# Patient Record
Sex: MALE | Race: WHITE | NOT HISPANIC OR LATINO | Employment: UNEMPLOYED | URBAN - METROPOLITAN AREA
[De-identification: names, ages, dates, MRNs, and addresses within clinical notes are randomized per-mention and may not be internally consistent; named-entity substitution may affect disease eponyms.]

---

## 2017-03-17 ENCOUNTER — TRANSCRIBE ORDERS (OUTPATIENT)
Dept: ADMINISTRATIVE | Facility: HOSPITAL | Age: 52
End: 2017-03-17

## 2017-03-17 DIAGNOSIS — R31.9 HEMATURIA: Primary | ICD-10-CM

## 2017-03-22 ENCOUNTER — HOSPITAL ENCOUNTER (OUTPATIENT)
Dept: CT IMAGING | Facility: HOSPITAL | Age: 52
Discharge: HOME | End: 2017-03-22
Admitting: NURSE PRACTITIONER

## 2017-03-22 DIAGNOSIS — R31.9 HEMATURIA: ICD-10-CM

## 2017-03-22 LAB — CREAT BLDA-MCNC: 0.9 MG/DL (ref 0.6–1.3)

## 2017-03-22 PROCEDURE — 74178 CT ABD&PLV WO CNTR FLWD CNTR: CPT

## 2017-03-22 PROCEDURE — 0 IOPAMIDOL 61 % SOLUTION: Performed by: NURSE PRACTITIONER

## 2017-03-22 PROCEDURE — 82565 ASSAY OF CREATININE: CPT

## 2017-03-22 RX ORDER — ALFUZOSIN HYDROCHLORIDE 10 MG/1
10 TABLET, EXTENDED RELEASE ORAL DAILY
Status: ON HOLD | COMMUNITY
End: 2023-03-03 | Stop reason: SDUPTHER

## 2017-03-22 RX ORDER — AMLODIPINE BESYLATE 5 MG/1
5 TABLET ORAL DAILY
Status: ON HOLD | COMMUNITY
End: 2023-03-03 | Stop reason: SDUPTHER

## 2017-03-22 RX ORDER — OLANZAPINE 10 MG/1
20 TABLET ORAL NIGHTLY
COMMUNITY
End: 2023-03-03 | Stop reason: HOSPADM

## 2017-03-22 RX ADMIN — IOPAMIDOL 85 ML: 612 INJECTION, SOLUTION INTRAVENOUS at 09:00

## 2022-05-24 ENCOUNTER — OFFICE VISIT (OUTPATIENT)
Dept: UROLOGY | Facility: CLINIC | Age: 57
End: 2022-05-24

## 2022-05-24 VITALS — WEIGHT: 215 LBS | OXYGEN SATURATION: 95 % | BODY MASS INDEX: 28.49 KG/M2 | HEIGHT: 73 IN | HEART RATE: 83 BPM

## 2022-05-24 DIAGNOSIS — R10.32 LEFT GROIN PAIN: Primary | ICD-10-CM

## 2022-05-24 PROCEDURE — 99204 OFFICE O/P NEW MOD 45 MIN: CPT | Performed by: UROLOGY

## 2022-05-24 RX ORDER — ATORVASTATIN CALCIUM 40 MG/1
TABLET, FILM COATED ORAL
Status: ON HOLD | COMMUNITY
Start: 2022-05-02 | End: 2023-03-03 | Stop reason: SDUPTHER

## 2022-05-24 RX ORDER — IBUPROFEN 800 MG/1
800 TABLET ORAL EVERY 6 HOURS PRN
COMMUNITY
Start: 2022-04-25 | End: 2022-09-14

## 2022-05-24 RX ORDER — DAPAGLIFLOZIN 5 MG/1
TABLET, FILM COATED ORAL
Status: ON HOLD | COMMUNITY
Start: 2022-05-23 | End: 2023-03-03 | Stop reason: SDUPTHER

## 2022-05-24 RX ORDER — VENLAFAXINE HYDROCHLORIDE 150 MG/1
CAPSULE, EXTENDED RELEASE ORAL
COMMUNITY
Start: 2022-04-20 | End: 2023-03-03 | Stop reason: HOSPADM

## 2022-05-24 RX ORDER — PANTOPRAZOLE SODIUM 40 MG/1
TABLET, DELAYED RELEASE ORAL
Status: ON HOLD | COMMUNITY
Start: 2022-05-14 | End: 2023-03-03 | Stop reason: SDUPTHER

## 2022-05-24 RX ORDER — LOSARTAN POTASSIUM 100 MG/1
TABLET ORAL
Status: ON HOLD | COMMUNITY
Start: 2022-04-20 | End: 2023-03-03 | Stop reason: SDUPTHER

## 2022-05-24 NOTE — PROGRESS NOTES
Office Visit New Urology      Patient Name: Regulo Sepulveda  : 1965   MRN: [id number]     Chief Complaint: Left inguinal pain    Referring Provider: Melissa Chahal NP    History of Present Illness: Regulo Sepulveda is a 56 y.o. male who presents to Urology today for evaluation of left inguinal pain.  The patient presents today unaccompanied.  He is unable to provide significant medical history.  We have no prior medical records.  He reports onset and pain of the left groin.  He reports that approximately 5 months ago he underwent a left orchiectomy at [facility] for left testicular infection.  No available operative reports or urologic reports today.    Scrotal ultrasound was obtained by primary care physician.  Demonstrates distended right testicle, no concerning masses or lesions, intact blood flow.  Surgically absent left testicle.    He reports discomfort of the left groin, phantom and testicular pain.  Reports pain is sharp in nature, neuropathic in nature.  Right testicle is descended, denies any associated pain.    Denies significant lower urinary tract symptoms.  He denies gross hematuria or history of urinary tract infection.  He is a current every day smoker.      Subjective      Review of System: Review of Systems   Constitutional: Negative for chills, fatigue, fever and unexpected weight change.   HENT: Negative for sore throat.    Eyes: Negative for visual disturbance.   Respiratory: Negative for cough, chest tightness and shortness of breath.    Cardiovascular: Negative for chest pain and leg swelling.   Gastrointestinal: Negative for blood in stool, constipation, diarrhea, nausea, rectal pain and vomiting.   Genitourinary: Positive for testicular pain. Negative for decreased urine volume, difficulty urinating, dysuria, enuresis, flank pain, frequency, genital sores, hematuria and urgency.   Musculoskeletal: Negative for back pain and joint swelling.    Skin: Negative for rash and wound.   Neurological: Negative for seizures, speech difficulty, weakness and headaches.   Psychiatric/Behavioral: Negative for confusion, sleep disturbance and suicidal ideas. The patient is not nervous/anxious.       I have reviewed the ROS documented by my clinical staff, updated appropriately and I agree. Kalia Corbin MD    Past Medical History: History reviewed. No pertinent past medical history.    Past Surgical History: History reviewed. No pertinent surgical history.    Family History: History reviewed. No pertinent family history.    Social History:   Social History     Socioeconomic History   • Marital status: Single   Tobacco Use   • Smoking status: Current Every Day Smoker     Packs/day: 1.00     Types: Cigarettes   • Smokeless tobacco: Never Used   Vaping Use   • Vaping Use: Never used   Substance and Sexual Activity   • Alcohol use: Not Currently   • Drug use: Not Currently   • Sexual activity: Defer       Medications:     Current Outpatient Medications:   •  alfuzosin (UROXATRAL) 10 MG 24 hr tablet, Take 10 mg by mouth Daily., Disp: , Rfl:   •  amLODIPine (NORVASC) 5 MG tablet, Take 5 mg by mouth Daily., Disp: , Rfl:   •  atorvastatin (LIPITOR) 40 MG tablet, , Disp: , Rfl:   •  Farxiga 5 MG tablet tablet, , Disp: , Rfl:   •  ibuprofen (ADVIL,MOTRIN) 800 MG tablet, Take 800 mg by mouth Every 6 (Six) Hours As Needed. for pain, Disp: , Rfl:   •  losartan (COZAAR) 100 MG tablet, , Disp: , Rfl:   •  metFORMIN (GLUCOPHAGE) 1000 MG tablet, , Disp: , Rfl:   •  OLANZapine (zyPREXA) 10 MG tablet, Take 20 mg by mouth Every Night., Disp: , Rfl:   •  pantoprazole (PROTONIX) 40 MG EC tablet, , Disp: , Rfl:   •  ProAir  (90 Base) MCG/ACT inhaler, , Disp: , Rfl:   •  Serevent Diskus 50 MCG/DOSE diskus inhaler, , Disp: , Rfl:   •  venlafaxine XR (EFFEXOR-XR) 150 MG 24 hr capsule, , Disp: , Rfl:     Allergies:   No Known Allergies        Objective     Physical Exam:   Vital Signs:  "  Vitals:    05/24/22 1358   Pulse: 83   SpO2: 95%   Weight: 97.5 kg (215 lb)   Height: 185.4 cm (73\")   PainSc:   2     Body mass index is 28.37 kg/m².     Physical Exam  Vitals and nursing note reviewed.   Constitutional:       Appearance: Normal appearance.   HENT:      Head: Normocephalic and atraumatic.   Cardiovascular:      Comments: Well perfused  Pulmonary:      Effort: Pulmonary effort is normal.   Abdominal:      General: Abdomen is flat.      Palpations: Abdomen is soft.   Musculoskeletal:         General: Normal range of motion.   Skin:     General: Skin is warm and dry.   Neurological:      General: No focal deficit present.      Mental Status: He is alert and oriented to person, place, and time. Mental status is at baseline.   Psychiatric:         Mood and Affect: Mood normal.         Behavior: Behavior normal.         Thought Content: Thought content normal.         Judgment: Judgment normal.         Genitourinary  Penis: circumcised penis, glans normal, no penile discharge.  No rashes/lesions.    Testes: Surgically absent left testicle, right testicle descended no masses, nontender to palpation. Remainder of scrotal contents normal. No hernia appreciated.      Labs:   Brief Urine Lab Results     None               Lab Results   Component Value Date    CALCIUM 9.0 02/22/2022     02/22/2022    K 3.3 (L) 02/22/2022    CO2 28 02/22/2022     02/22/2022    BUN 16 02/22/2022    CREATININE 1.03 02/22/2022    EGFRIFAFRI >60 02/22/2022    EGFRIFNONA >60 02/22/2022    BCR 16 02/22/2022    ANIONGAP 10 02/22/2022       Lab Results   Component Value Date    WBC 8.29 02/22/2022    HGB 14.2 02/22/2022    HCT 43.4 02/22/2022    MCV 88 02/22/2022     02/22/2022       Images:   No Images in the past 120 days found..    Measures:   Tobacco:   Regulo Sepulveda  reports that he has been smoking cigarettes. He has been smoking about 1.00 pack per day. He has never used smokeless tobacco.. I have " educated him on the risk of diseases from using tobacco products.    Assessment / Plan      Assessment/Plan:   56 y.o. male is here today for left inguinal groin pain.  Reported history of left orchiectomy approximately 5 months ago at [facility].  Pain is described as sharp groin pain worse with movement, appearing to be possible phantom related nerve pain after orchiectomy.  Physical exam today is normal.  Review of report of scrotal ultrasound which was obtained by primary care physician without concerning findings.    Today I discussed with the patient that unfortunately I have no prior records or surgical history.  Discussed that his physical exam is normal.  At this time we discussed conservative measures for inguinal groin pain.  We have recommended that he follow-up with his surgical urologist.  He is understanding agreeable.  He will follow-up with [facility].    Diagnoses and all orders for this visit:    1. Left groin pain (Primary)           Follow Up:   Return if symptoms worsen or fail to improve.    I spent approximately 45 minutes providing clinical care for this patient; including review of patient's chart and provider documentation, face to face time spent with patient in examination room (obtaining history, performing physical exam, discussing diagnosis and management options), placing orders, and completing patient documentation.     Kalia Corbin MD  [facility] [place]

## 2022-05-26 PROBLEM — R10.32 LEFT GROIN PAIN: Status: ACTIVE | Noted: 2022-05-26

## 2022-06-01 ENCOUNTER — TELEPHONE (OUTPATIENT)
Dept: UROLOGY | Facility: CLINIC | Age: 57
End: 2022-06-01

## 2022-06-01 NOTE — TELEPHONE ENCOUNTER
Caller: DEV FLAHERTY    Relationship: REF. PROVIDER    Best call back number: [phone]    What form or medical record are you requesting: OFFICE VISIT NOTE FROM MAY 24TH    Who is requesting this form or medical record from you: REF. PROVIDER    How would you like to receive the form or medical records (pick-up, mail, fax): FAX  If fax, what is the fax number: [phone]  If mail, what is the address:   If pick-up, provide patient with address and location details    Timeframe paperwork needed: ASAP    Additional notes: APRN NEEDS TO GO OVER WITH PATIENT.

## 2022-09-14 ENCOUNTER — APPOINTMENT (OUTPATIENT)
Dept: GENERAL RADIOLOGY | Facility: HOSPITAL | Age: 57
End: 2022-09-14

## 2022-09-14 ENCOUNTER — HOSPITAL ENCOUNTER (EMERGENCY)
Facility: HOSPITAL | Age: 57
Discharge: HOME | End: 2022-09-14
Attending: EMERGENCY MEDICINE | Admitting: EMERGENCY MEDICINE

## 2022-09-14 VITALS
BODY MASS INDEX: 24.43 KG/M2 | HEART RATE: 78 BPM | HEIGHT: 73 IN | RESPIRATION RATE: 16 BRPM | OXYGEN SATURATION: 93 % | TEMPERATURE: 98 F | WEIGHT: 184.3 LBS | SYSTOLIC BLOOD PRESSURE: 162 MMHG | DIASTOLIC BLOOD PRESSURE: 98 MMHG

## 2022-09-14 DIAGNOSIS — M79.10 MYALGIA: Primary | ICD-10-CM

## 2022-09-14 DIAGNOSIS — B34.9 VIRAL SYNDROME: ICD-10-CM

## 2022-09-14 DIAGNOSIS — R06.00 DYSPNEA, UNSPECIFIED TYPE: ICD-10-CM

## 2022-09-14 LAB
ALBUMIN SERPL-MCNC: 4.3 G/DL (ref 3.5–5.2)
ALBUMIN/GLOB SERPL: 1.5 G/DL
ALP SERPL-CCNC: 143 U/L (ref 39–117)
ALT SERPL W P-5'-P-CCNC: 25 U/L (ref 1–41)
ANION GAP SERPL CALCULATED.3IONS-SCNC: 12 MMOL/L (ref 5–15)
AST SERPL-CCNC: 23 U/L (ref 1–40)
BASOPHILS # BLD AUTO: 0.04 10*3/MM3 (ref 0–0.2)
BASOPHILS NFR BLD AUTO: 0.5 % (ref 0–1.5)
BILIRUB SERPL-MCNC: 0.4 MG/DL (ref 0–1.2)
BILIRUB UR QL STRIP: NEGATIVE
BUN SERPL-MCNC: 13 MG/DL (ref 6–20)
BUN/CREAT SERPL: 10 (ref 7–25)
CALCIUM SPEC-SCNC: 9 MG/DL (ref 8.6–10.5)
CHLORIDE SERPL-SCNC: 97 MMOL/L (ref 98–107)
CK SERPL-CCNC: 54 U/L (ref 20–200)
CLARITY UR: CLEAR
CO2 SERPL-SCNC: 30 MMOL/L (ref 22–29)
COLOR UR: YELLOW
CREAT SERPL-MCNC: 1.3 MG/DL (ref 0.76–1.27)
D-LACTATE SERPL-SCNC: 1.3 MMOL/L (ref 0.5–2)
DEPRECATED RDW RBC AUTO: 44.7 FL (ref 37–54)
EGFRCR SERPLBLD CKD-EPI 2021: 64.1 ML/MIN/1.73
EOSINOPHIL # BLD AUTO: 0.11 10*3/MM3 (ref 0–0.4)
EOSINOPHIL NFR BLD AUTO: 1.3 % (ref 0.3–6.2)
ERYTHROCYTE [DISTWIDTH] IN BLOOD BY AUTOMATED COUNT: 13.9 % (ref 12.3–15.4)
FLUAV AG NPH QL: NEGATIVE
FLUBV AG NPH QL IA: NEGATIVE
GLOBULIN UR ELPH-MCNC: 2.8 GM/DL
GLUCOSE SERPL-MCNC: 97 MG/DL (ref 65–99)
GLUCOSE UR STRIP-MCNC: NEGATIVE MG/DL
HCT VFR BLD AUTO: 46.5 % (ref 37.5–51)
HGB BLD-MCNC: 15.4 G/DL (ref 13–17.7)
HGB UR QL STRIP.AUTO: NEGATIVE
HOLD SPECIMEN: NORMAL
HOLD SPECIMEN: NORMAL
IMM GRANULOCYTES # BLD AUTO: 0.01 10*3/MM3 (ref 0–0.05)
IMM GRANULOCYTES NFR BLD AUTO: 0.1 % (ref 0–0.5)
KETONES UR QL STRIP: NEGATIVE
LEUKOCYTE ESTERASE UR QL STRIP.AUTO: NEGATIVE
LIPASE SERPL-CCNC: 32 U/L (ref 13–60)
LYMPHOCYTES # BLD AUTO: 2.41 10*3/MM3 (ref 0.7–3.1)
LYMPHOCYTES NFR BLD AUTO: 29.5 % (ref 19.6–45.3)
MCH RBC QN AUTO: 28.9 PG (ref 26.6–33)
MCHC RBC AUTO-ENTMCNC: 33.1 G/DL (ref 31.5–35.7)
MCV RBC AUTO: 87.4 FL (ref 79–97)
MONOCYTES # BLD AUTO: 0.86 10*3/MM3 (ref 0.1–0.9)
MONOCYTES NFR BLD AUTO: 10.5 % (ref 5–12)
NEUTROPHILS NFR BLD AUTO: 4.73 10*3/MM3 (ref 1.7–7)
NEUTROPHILS NFR BLD AUTO: 58.1 % (ref 42.7–76)
NITRITE UR QL STRIP: NEGATIVE
NRBC BLD AUTO-RTO: 0 /100 WBC (ref 0–0.2)
NT-PROBNP SERPL-MCNC: 2431 PG/ML (ref 0–900)
PH UR STRIP.AUTO: 6.5 [PH] (ref 5–8)
PLATELET # BLD AUTO: 349 10*3/MM3 (ref 140–450)
PMV BLD AUTO: 9.5 FL (ref 6–12)
POTASSIUM SERPL-SCNC: 3.1 MMOL/L (ref 3.5–5.2)
PROT SERPL-MCNC: 7.1 G/DL (ref 6–8.5)
PROT UR QL STRIP: ABNORMAL
RBC # BLD AUTO: 5.32 10*6/MM3 (ref 4.14–5.8)
SARS-COV-2 RNA PNL SPEC NAA+PROBE: NOT DETECTED
SODIUM SERPL-SCNC: 139 MMOL/L (ref 136–145)
SP GR UR STRIP: 1.01 (ref 1–1.03)
UROBILINOGEN UR QL STRIP: ABNORMAL
WBC NRBC COR # BLD: 8.16 10*3/MM3 (ref 3.4–10.8)
WHOLE BLOOD HOLD COAG: NORMAL
WHOLE BLOOD HOLD SPECIMEN: NORMAL

## 2022-09-14 PROCEDURE — 81003 URINALYSIS AUTO W/O SCOPE: CPT

## 2022-09-14 PROCEDURE — 83605 ASSAY OF LACTIC ACID: CPT

## 2022-09-14 PROCEDURE — 25010000002 MORPHINE PER 10 MG: Performed by: EMERGENCY MEDICINE

## 2022-09-14 PROCEDURE — 93010 ELECTROCARDIOGRAM REPORT: CPT | Performed by: INTERNAL MEDICINE

## 2022-09-14 PROCEDURE — 25010000002 KETOROLAC TROMETHAMINE PER 15 MG: Performed by: EMERGENCY MEDICINE

## 2022-09-14 PROCEDURE — 83880 ASSAY OF NATRIURETIC PEPTIDE: CPT | Performed by: NURSE PRACTITIONER

## 2022-09-14 PROCEDURE — 36415 COLL VENOUS BLD VENIPUNCTURE: CPT

## 2022-09-14 PROCEDURE — 80053 COMPREHEN METABOLIC PANEL: CPT

## 2022-09-14 PROCEDURE — 71045 X-RAY EXAM CHEST 1 VIEW: CPT

## 2022-09-14 PROCEDURE — 96375 TX/PRO/DX INJ NEW DRUG ADDON: CPT

## 2022-09-14 PROCEDURE — 96374 THER/PROPH/DIAG INJ IV PUSH: CPT

## 2022-09-14 PROCEDURE — U0004 COV-19 TEST NON-CDC HGH THRU: HCPCS | Performed by: EMERGENCY MEDICINE

## 2022-09-14 PROCEDURE — 87804 INFLUENZA ASSAY W/OPTIC: CPT | Performed by: EMERGENCY MEDICINE

## 2022-09-14 PROCEDURE — 82550 ASSAY OF CK (CPK): CPT | Performed by: EMERGENCY MEDICINE

## 2022-09-14 PROCEDURE — 99283 EMERGENCY DEPT VISIT LOW MDM: CPT

## 2022-09-14 PROCEDURE — 93005 ELECTROCARDIOGRAM TRACING: CPT

## 2022-09-14 PROCEDURE — 94640 AIRWAY INHALATION TREATMENT: CPT

## 2022-09-14 PROCEDURE — 94761 N-INVAS EAR/PLS OXIMETRY MLT: CPT

## 2022-09-14 PROCEDURE — 83690 ASSAY OF LIPASE: CPT

## 2022-09-14 PROCEDURE — 94799 UNLISTED PULMONARY SVC/PX: CPT

## 2022-09-14 PROCEDURE — 85025 COMPLETE CBC W/AUTO DIFF WBC: CPT

## 2022-09-14 RX ORDER — KETOROLAC TROMETHAMINE 30 MG/ML
30 INJECTION, SOLUTION INTRAMUSCULAR; INTRAVENOUS ONCE
Status: COMPLETED | OUTPATIENT
Start: 2022-09-14 | End: 2022-09-14

## 2022-09-14 RX ORDER — IBUPROFEN 600 MG/1
600 TABLET ORAL EVERY 8 HOURS PRN
Qty: 21 TABLET | Refills: 0 | Status: SHIPPED | OUTPATIENT
Start: 2022-09-14 | End: 2022-09-21

## 2022-09-14 RX ORDER — SODIUM CHLORIDE 0.9 % (FLUSH) 0.9 %
10 SYRINGE (ML) INJECTION AS NEEDED
Status: DISCONTINUED | OUTPATIENT
Start: 2022-09-14 | End: 2022-09-15 | Stop reason: HOSPADM

## 2022-09-14 RX ORDER — ALBUTEROL SULFATE 2.5 MG/3ML
2.5 SOLUTION RESPIRATORY (INHALATION) ONCE
Status: COMPLETED | OUTPATIENT
Start: 2022-09-14 | End: 2022-09-14

## 2022-09-14 RX ORDER — ALBUTEROL SULFATE 90 UG/1
2 AEROSOL, METERED RESPIRATORY (INHALATION) EVERY 4 HOURS PRN
Qty: 1 G | Refills: 0 | Status: SHIPPED | OUTPATIENT
Start: 2022-09-14 | End: 2022-10-14

## 2022-09-14 RX ORDER — PREDNISONE 50 MG/1
50 TABLET ORAL DAILY
Qty: 5 TABLET | Refills: 0 | Status: SHIPPED | OUTPATIENT
Start: 2022-09-14 | End: 2022-09-19

## 2022-09-14 RX ADMIN — KETOROLAC TROMETHAMINE 30 MG: 30 INJECTION, SOLUTION INTRAMUSCULAR; INTRAVENOUS at 17:42

## 2022-09-14 RX ADMIN — MORPHINE SULFATE 4 MG: 4 INJECTION INTRAVENOUS at 19:38

## 2022-09-14 RX ADMIN — SODIUM CHLORIDE 1000 ML: 9 INJECTION, SOLUTION INTRAVENOUS at 17:42

## 2022-09-14 RX ADMIN — ALBUTEROL SULFATE 2.5 MG: 2.5 SOLUTION RESPIRATORY (INHALATION) at 17:37

## 2022-09-14 NOTE — ED PROVIDER NOTES
Time: 5:44 PM EDT  Arrived by: private car  Chief Complaint: diffuse myalgia  History provided by: Pt  History is limited by: N/A     History of Present Illness:  Patient is a 57 y.o. year old male that presents to the emergency department with diffuse myalgia.     Pt says he started becoming weak and having muscle aches that started after [place]. He admits SOB and some back pain. Pt denies fever, chills, nausea and emesis. Pt denies dysuria, hematuria, urinary urgency, hesitancy or frequency. Pt notes normal BM, denies diarrhea, hematochezia and melena. He admits some constipation but denies any swelling in his legs.     He reports having COPD.         History provided by:  Patient   used: No        Similar Symptoms Previously: No  Recently seen: No      Patient Care Team  Primary Care Provider: Provider, No Known    Past Medical History:     No Known Allergies  History reviewed. No pertinent past medical history.  History reviewed. No pertinent surgical history.  History reviewed. No pertinent family history.    Home Medications:  Prior to Admission medications    Medication Sig Start Date End Date Taking? Authorizing Provider   alfuzosin (UROXATRAL) 10 MG 24 hr tablet Take 10 mg by mouth Daily.    Yuliya Blanchard MD   amLODIPine (NORVASC) 5 MG tablet Take 5 mg by mouth Daily.    Yuliya Blanchard MD   atorvastatin (LIPITOR) 40 MG tablet  5/2/22   Yuliya Blanchard MD   Farxiga 5 MG tablet tablet  5/23/22   Yuliya Blanchard MD   ibuprofen (ADVIL,MOTRIN) 800 MG tablet Take 800 mg by mouth Every 6 (Six) Hours As Needed. for pain 4/25/22   Yuliya Blanchard MD   losartan (COZAAR) 100 MG tablet  4/20/22   Yuliya Blanchard MD   metFORMIN (GLUCOPHAGE) 1000 MG tablet  5/18/22   Yuliya Blanchard MD   OLANZapine (zyPREXA) 10 MG tablet Take 20 mg by mouth Every Night.    Yuliya Blanchard MD   pantoprazole (PROTONIX) 40 MG EC  "tablet  5/14/22   ProviderYuliya MD   ProAir  (90 Base) MCG/ACT inhaler  5/11/22   Yuliya Blanchard MD   Serevent Diskus 50 MCG/DOSE diskus inhaler  4/21/22   Yuliya Blanchard MD   venlafaxine XR (EFFEXOR-XR) 150 MG 24 hr capsule  4/20/22   Yuliya Blanchard MD        Social History:   Social History     Tobacco Use   • Smoking status: Current Every Day Smoker     Packs/day: 1.00     Types: Cigarettes   • Smokeless tobacco: Never Used   Vaping Use   • Vaping Use: Never used   Substance Use Topics   • Alcohol use: Not Currently   • Drug use: Not Currently         Review of Systems:  Review of Systems   Constitutional: Negative for chills, diaphoresis and fever.   HENT: Negative for congestion, postnasal drip, rhinorrhea and sore throat.    Eyes: Negative for photophobia.   Respiratory: Positive for shortness of breath. Negative for cough and chest tightness.    Cardiovascular: Negative for chest pain, palpitations and leg swelling.   Gastrointestinal: Positive for constipation. Negative for abdominal pain, diarrhea, nausea and vomiting.   Genitourinary: Negative for difficulty urinating, dysuria, flank pain, frequency, hematuria and urgency.   Musculoskeletal: Positive for back pain and myalgias. Negative for neck pain and neck stiffness.   Skin: Negative for pallor and rash.   Neurological: Negative for dizziness, syncope, weakness, numbness and headaches.   Hematological: Negative for adenopathy. Does not bruise/bleed easily.   Psychiatric/Behavioral: Negative.         Physical Exam:  /98   Pulse 78   Temp 98 °F (36.7 °C) (Oral)   Resp 16   Ht 185.4 cm (73\")   Wt 83.6 kg (184 lb 4.9 oz)   SpO2 93%   BMI 24.32 kg/m²     Physical Exam  Vitals and nursing note reviewed.   Constitutional:       General: He is not in acute distress.     Appearance: Normal appearance. He is not ill-appearing, toxic-appearing or diaphoretic.   HENT:      Head: Normocephalic and atraumatic.      " Mouth/Throat:      Mouth: Mucous membranes are moist.   Eyes:      Pupils: Pupils are equal, round, and reactive to light.   Neck:      Vascular: Decreased carotid pulses.      Comments: No carotid aortic pulsations  Cardiovascular:      Rate and Rhythm: Normal rate and regular rhythm.      Pulses: Normal pulses.           Carotid pulses are 2+ on the right side and 2+ on the left side.       Radial pulses are 2+ on the right side and 2+ on the left side.        Femoral pulses are 2+ on the right side and 2+ on the left side.       Popliteal pulses are 2+ on the right side and 2+ on the left side.        Dorsalis pedis pulses are 2+ on the right side and 2+ on the left side.        Posterior tibial pulses are 2+ on the right side and 2+ on the left side.      Heart sounds: Normal heart sounds. No murmur heard.  Pulmonary:      Effort: Pulmonary effort is normal. No accessory muscle usage, respiratory distress or retractions.      Breath sounds: Normal breath sounds. No wheezing, rhonchi or rales.   Abdominal:      General: Abdomen is flat. There is no distension.      Palpations: Abdomen is soft. There is no mass.      Tenderness: There is abdominal tenderness in the epigastric area. There is no right CVA tenderness, left CVA tenderness, guarding or rebound.      Comments: No rigidity   Musculoskeletal:         General: No swelling or deformity.      Cervical back: Neck supple. No tenderness.      Thoracic back: Tenderness present. Decreased range of motion.      Lumbar back: Tenderness present. Decreased range of motion.      Right lower leg: No edema.      Left lower leg: No edema.   Skin:     General: Skin is warm and dry.      Capillary Refill: Capillary refill takes less than 2 seconds.      Coloration: Skin is not jaundiced or pale.      Findings: No erythema.   Neurological:      General: No focal deficit present.      Mental Status: He is alert and oriented to person, place, and time. Mental status is at  baseline.      Sensory: No sensory deficit.      Motor: No weakness.   Psychiatric:         Mood and Affect: Mood normal.         Behavior: Behavior normal.                Medications in the Emergency Department:  Medications   sodium chloride 0.9 % bolus 1,000 mL (0 mL Intravenous Stopped 9/14/22 1903)   ketorolac (TORADOL) injection 30 mg (30 mg Intravenous Given 9/14/22 1742)   albuterol (PROVENTIL) nebulizer solution 0.083% 2.5 mg/3mL (2.5 mg Nebulization Given 9/14/22 1737)   morphine injection 4 mg (4 mg Intravenous Given 9/14/22 1938)        Labs  Lab Results (last 24 hours)     ** No results found for the last 24 hours. **           Imaging:  No Radiology Exams Resulted Within Past 24 Hours    Procedures:  Procedures    Progress  ED Course as of 09/16/22 0229   Wed Sep 14, 2022   1648 EKG:    Rhythm: Sinus rhythm  Rate: 89  Intervals: Normal IL and QT interval  T-wave: T wave inversion in I and aVL, nonstick T wave flattening in V5 and V6  ST Segment: Nonspecific ST segment V1, V2, V3, there is no pathological ST elevation or ST depression to suggest myocardial injury or myocardial ischemia    EKG Comparison: Overall the QRS and ST morphology are probably unchanged from the EKG performed January 10, 2016    Interpreted by me   [SD]      ED Course User Index  [SD] Abel Bowden DO                            Medical Decision Making:  MDM  Number of Diagnoses or Management Options  Dyspnea, unspecified type  Myalgia  Viral syndrome  Diagnosis management comments:     Patient presented with dyspnea and myalgias.  The patient was afebrile.  The patient vital signs are stable.  The patient's urine was negative for infection.  The patient had negative flu.  The patient had a negative COVID.  The patient's lipase was normal.  The patient's chemistry essentially was normal with the exception of a creatinine of 1.3, potassium 3.1 and a bicarb of 30.0.  Patient's CBC was normal.  The patient had a lactate that was  normal.  Patient's chest x-ray was clear.  The patient's EKG demonstrated a normal sinus rhythm with a rate of 89 no acute abnormalities.  In the emergency room the patient was given Toradol morphine and a liter of saline and breathing treatments.  The patient was reevaluated afterwards patient states that he feels substantially better.  Patient states he feels better and feels comfortable to go home.  The patient was given very specific instructions on when and why to return to the emergency room.  The patient voiced understanding and felt comfortable with the discharge instructions.  They would return to the emergency room if necessary.  The patient appears appropriate for discharge and outpatient follow-up.         Amount and/or Complexity of Data Reviewed  Clinical lab tests: reviewed  Tests in the radiology section of CPT®: reviewed  Tests in the medicine section of CPT®: reviewed                 Final diagnoses:   Myalgia   Viral syndrome   Dyspnea, unspecified type        Disposition:  ED Disposition     ED Disposition   Discharge    Condition   Stable    Comment   --             Documentation assistance provided by MILLICENT LOWRY acting as scribe for Abel Bowden DO. Information recorded by the scribe was done at my direction and has been verified and validated by me.        Millicent Lowry  09/14/22 1807       Abel Bowden DO  09/16/22 3975

## 2022-09-15 NOTE — DISCHARGE INSTRUCTIONS
Please follow-up with your doctor in 48 hours for evaluation.    Please return to the emergency room for increasing shortness of breath, chest pain, chest pressure, near passing out, passing out, unusual fatigue, unusual sweating, fever or any new symptoms or concerns with

## 2022-09-18 ENCOUNTER — HOSPITAL ENCOUNTER (EMERGENCY)
Facility: HOSPITAL | Age: 57
Discharge: HOME | End: 2022-09-19
Attending: EMERGENCY MEDICINE | Admitting: EMERGENCY MEDICINE

## 2022-09-18 DIAGNOSIS — F19.10 SUBSTANCE ABUSE: ICD-10-CM

## 2022-09-18 DIAGNOSIS — M79.10 MYALGIA: Primary | ICD-10-CM

## 2022-09-18 LAB
ALBUMIN SERPL-MCNC: 4 G/DL (ref 3.5–5.2)
ALBUMIN/GLOB SERPL: 1.5 G/DL
ALP SERPL-CCNC: 134 U/L (ref 39–117)
ALT SERPL W P-5'-P-CCNC: 17 U/L (ref 1–41)
AMPHET+METHAMPHET UR QL: POSITIVE
ANION GAP SERPL CALCULATED.3IONS-SCNC: 10.1 MMOL/L (ref 5–15)
APAP SERPL-MCNC: <5 MCG/ML (ref 0–30)
AST SERPL-CCNC: 18 U/L (ref 1–40)
BACTERIA UR QL AUTO: ABNORMAL /HPF
BARBITURATES UR QL SCN: NEGATIVE
BASOPHILS # BLD AUTO: 0.06 10*3/MM3 (ref 0–0.2)
BASOPHILS NFR BLD AUTO: 0.7 % (ref 0–1.5)
BENZODIAZ UR QL SCN: NEGATIVE
BILIRUB SERPL-MCNC: 0.3 MG/DL (ref 0–1.2)
BILIRUB UR QL STRIP: NEGATIVE
BUN SERPL-MCNC: 12 MG/DL (ref 6–20)
BUN/CREAT SERPL: 9.4 (ref 7–25)
CALCIUM SPEC-SCNC: 9.5 MG/DL (ref 8.6–10.5)
CANNABINOIDS SERPL QL: NEGATIVE
CHLORIDE SERPL-SCNC: 104 MMOL/L (ref 98–107)
CK SERPL-CCNC: 61 U/L (ref 20–200)
CLARITY UR: CLEAR
CO2 SERPL-SCNC: 30.9 MMOL/L (ref 22–29)
COCAINE UR QL: NEGATIVE
COLOR UR: ABNORMAL
CREAT SERPL-MCNC: 1.27 MG/DL (ref 0.76–1.27)
DEPRECATED RDW RBC AUTO: 45.1 FL (ref 37–54)
EGFRCR SERPLBLD CKD-EPI 2021: 65.9 ML/MIN/1.73
EOSINOPHIL # BLD AUTO: 0.1 10*3/MM3 (ref 0–0.4)
EOSINOPHIL NFR BLD AUTO: 1.1 % (ref 0.3–6.2)
ERYTHROCYTE [DISTWIDTH] IN BLOOD BY AUTOMATED COUNT: 14.2 % (ref 12.3–15.4)
ETHANOL BLD-MCNC: <10 MG/DL (ref 0–10)
ETHANOL UR QL: <0.01 %
FLUAV AG NPH QL: NEGATIVE
FLUBV AG NPH QL IA: NEGATIVE
GLOBULIN UR ELPH-MCNC: 2.7 GM/DL
GLUCOSE SERPL-MCNC: 97 MG/DL (ref 65–99)
GLUCOSE UR STRIP-MCNC: ABNORMAL MG/DL
HCT VFR BLD AUTO: 43.2 % (ref 37.5–51)
HGB BLD-MCNC: 14.4 G/DL (ref 13–17.7)
HGB UR QL STRIP.AUTO: ABNORMAL
HOLD SPECIMEN: NORMAL
HOLD SPECIMEN: NORMAL
HYALINE CASTS UR QL AUTO: ABNORMAL /LPF
IMM GRANULOCYTES # BLD AUTO: 0.02 10*3/MM3 (ref 0–0.05)
IMM GRANULOCYTES NFR BLD AUTO: 0.2 % (ref 0–0.5)
KETONES UR QL STRIP: ABNORMAL
LEUKOCYTE ESTERASE UR QL STRIP.AUTO: NEGATIVE
LYMPHOCYTES # BLD AUTO: 3.03 10*3/MM3 (ref 0.7–3.1)
LYMPHOCYTES NFR BLD AUTO: 34.6 % (ref 19.6–45.3)
MCH RBC QN AUTO: 28.7 PG (ref 26.6–33)
MCHC RBC AUTO-ENTMCNC: 33.3 G/DL (ref 31.5–35.7)
MCV RBC AUTO: 86.1 FL (ref 79–97)
METHADONE UR QL SCN: NEGATIVE
MONOCYTES # BLD AUTO: 0.89 10*3/MM3 (ref 0.1–0.9)
MONOCYTES NFR BLD AUTO: 10.2 % (ref 5–12)
NEUTROPHILS NFR BLD AUTO: 4.66 10*3/MM3 (ref 1.7–7)
NEUTROPHILS NFR BLD AUTO: 53.2 % (ref 42.7–76)
NITRITE UR QL STRIP: NEGATIVE
NRBC BLD AUTO-RTO: 0 /100 WBC (ref 0–0.2)
OPIATES UR QL: NEGATIVE
OXYCODONE UR QL SCN: NEGATIVE
PH UR STRIP.AUTO: 6 [PH] (ref 5–8)
PLATELET # BLD AUTO: 313 10*3/MM3 (ref 140–450)
PMV BLD AUTO: 9.8 FL (ref 6–12)
POTASSIUM SERPL-SCNC: 3.3 MMOL/L (ref 3.5–5.2)
PROT SERPL-MCNC: 6.7 G/DL (ref 6–8.5)
PROT UR QL STRIP: ABNORMAL
RBC # BLD AUTO: 5.02 10*6/MM3 (ref 4.14–5.8)
RBC # UR STRIP: ABNORMAL /HPF
REF LAB TEST METHOD: ABNORMAL
SALICYLATES SERPL-MCNC: <0.3 MG/DL
SODIUM SERPL-SCNC: 145 MMOL/L (ref 136–145)
SP GR UR STRIP: 1.02 (ref 1–1.03)
SQUAMOUS #/AREA URNS HPF: ABNORMAL /HPF
UROBILINOGEN UR QL STRIP: ABNORMAL
WBC # UR STRIP: ABNORMAL /HPF
WBC NRBC COR # BLD: 8.76 10*3/MM3 (ref 3.4–10.8)
WHOLE BLOOD HOLD COAG: NORMAL
WHOLE BLOOD HOLD SPECIMEN: NORMAL

## 2022-09-18 PROCEDURE — 80179 DRUG ASSAY SALICYLATE: CPT | Performed by: EMERGENCY MEDICINE

## 2022-09-18 PROCEDURE — 80307 DRUG TEST PRSMV CHEM ANLYZR: CPT | Performed by: EMERGENCY MEDICINE

## 2022-09-18 PROCEDURE — 82550 ASSAY OF CK (CPK): CPT | Performed by: EMERGENCY MEDICINE

## 2022-09-18 PROCEDURE — 87804 INFLUENZA ASSAY W/OPTIC: CPT | Performed by: EMERGENCY MEDICINE

## 2022-09-18 PROCEDURE — 25010000002 KETOROLAC TROMETHAMINE PER 15 MG: Performed by: EMERGENCY MEDICINE

## 2022-09-18 PROCEDURE — U0004 COV-19 TEST NON-CDC HGH THRU: HCPCS | Performed by: EMERGENCY MEDICINE

## 2022-09-18 PROCEDURE — 36415 COLL VENOUS BLD VENIPUNCTURE: CPT | Performed by: EMERGENCY MEDICINE

## 2022-09-18 PROCEDURE — 80053 COMPREHEN METABOLIC PANEL: CPT | Performed by: EMERGENCY MEDICINE

## 2022-09-18 PROCEDURE — C9803 HOPD COVID-19 SPEC COLLECT: HCPCS | Performed by: EMERGENCY MEDICINE

## 2022-09-18 PROCEDURE — 80143 DRUG ASSAY ACETAMINOPHEN: CPT | Performed by: EMERGENCY MEDICINE

## 2022-09-18 PROCEDURE — 85025 COMPLETE CBC W/AUTO DIFF WBC: CPT | Performed by: EMERGENCY MEDICINE

## 2022-09-18 PROCEDURE — 96374 THER/PROPH/DIAG INJ IV PUSH: CPT

## 2022-09-18 PROCEDURE — 25010000002 DROPERIDOL PER 5 MG: Performed by: EMERGENCY MEDICINE

## 2022-09-18 PROCEDURE — 99284 EMERGENCY DEPT VISIT MOD MDM: CPT

## 2022-09-18 PROCEDURE — 82077 ASSAY SPEC XCP UR&BREATH IA: CPT | Performed by: EMERGENCY MEDICINE

## 2022-09-18 PROCEDURE — 96375 TX/PRO/DX INJ NEW DRUG ADDON: CPT

## 2022-09-18 PROCEDURE — 81001 URINALYSIS AUTO W/SCOPE: CPT | Performed by: EMERGENCY MEDICINE

## 2022-09-18 RX ORDER — KETOROLAC TROMETHAMINE 30 MG/ML
30 INJECTION, SOLUTION INTRAMUSCULAR; INTRAVENOUS ONCE
Status: COMPLETED | OUTPATIENT
Start: 2022-09-18 | End: 2022-09-18

## 2022-09-18 RX ORDER — SODIUM CHLORIDE 0.9 % (FLUSH) 0.9 %
10 SYRINGE (ML) INJECTION AS NEEDED
Status: DISCONTINUED | OUTPATIENT
Start: 2022-09-18 | End: 2022-09-19 | Stop reason: HOSPADM

## 2022-09-18 RX ORDER — DROPERIDOL 2.5 MG/ML
2.5 INJECTION, SOLUTION INTRAMUSCULAR; INTRAVENOUS ONCE
Status: COMPLETED | OUTPATIENT
Start: 2022-09-18 | End: 2022-09-18

## 2022-09-18 RX ADMIN — DROPERIDOL 2.5 MG: 2.5 INJECTION, SOLUTION INTRAMUSCULAR; INTRAVENOUS at 23:59

## 2022-09-18 RX ADMIN — KETOROLAC TROMETHAMINE 30 MG: 30 INJECTION, SOLUTION INTRAMUSCULAR; INTRAVENOUS at 22:13

## 2022-09-18 RX ADMIN — SODIUM CHLORIDE 1000 ML: 9 INJECTION, SOLUTION INTRAVENOUS at 22:14

## 2022-09-19 VITALS
RESPIRATION RATE: 18 BRPM | HEART RATE: 88 BPM | SYSTOLIC BLOOD PRESSURE: 187 MMHG | OXYGEN SATURATION: 96 % | BODY MASS INDEX: 24.51 KG/M2 | WEIGHT: 184.97 LBS | TEMPERATURE: 98.1 F | HEIGHT: 73 IN | DIASTOLIC BLOOD PRESSURE: 119 MMHG

## 2022-09-19 LAB — SARS-COV-2 RNA PNL SPEC NAA+PROBE: NOT DETECTED

## 2022-09-19 NOTE — ED PROVIDER NOTES
"Time: 9:45 PM EDT  Arrived by: private car  Chief Complaint: generalized body aches  Chief Complaint   Patient presents with   • Generalized Body Aches     History provided by: patient  History is limited by: not applicable     History of Present Illness:  Patient is a 57 y.o. year old male that presents to the emergency department with generalized body aches. Pt reports he smoked \"bad meth\" a few days ago, and he reports he has been experiencing generalized body aches since. He reports associated bilateral flank pain, mild cough, nausea, and vomiting, but he denies abdominal pain, diarrhea, shortness of breath, or chest pain. Pt reports hx of COPD.      HPI    Similar Symptoms Previously: no  Recently seen: yes, 9/14/22      Patient Care Team  Primary Care Provider: Palma Blanchard Known    Past Medical History:     No Known Allergies  Past Medical History:   Diagnosis Date   • COPD (chronic obstructive pulmonary disease) (HCC)    • Diabetes mellitus (HCC)    • Hypertension      History reviewed. No pertinent surgical history.  History reviewed. No pertinent family history.    Home Medications:  Prior to Admission medications    Medication Sig Start Date End Date Taking? Authorizing Provider   albuterol sulfate  (90 Base) MCG/ACT inhaler Inhale 2 puffs Every 4 (Four) Hours As Needed for Wheezing for up to 30 days. 9/14/22 10/14/22  Abel Bowden DO   alfuzosin (UROXATRAL) 10 MG 24 hr tablet Take 10 mg by mouth Daily.    Yuliya Blanchard MD   amLODIPine (NORVASC) 5 MG tablet Take 5 mg by mouth Daily.    Yuliya Blanchard MD   atorvastatin (LIPITOR) 40 MG tablet  5/2/22   Yuliya Blanchard MD   Farxiga 5 MG tablet tablet  5/23/22   Yuliya Blanchard MD   ibuprofen (ADVIL,MOTRIN) 600 MG tablet Take 1 tablet by mouth Every 8 (Eight) Hours As Needed for Mild Pain for up to 7 days. 9/14/22 9/21/22  Abel Bowden DO   losartan (COZAAR) 100 MG tablet  4/20/22   Yuliya Blanchard MD   metFORMIN " "(GLUCOPHAGE) 1000 MG tablet  5/18/22   Yuliya Blanchard MD   OLANZapine (zyPREXA) 10 MG tablet Take 20 mg by mouth Every Night.    Yuliya Blanchard MD   pantoprazole (PROTONIX) 40 MG EC tablet  5/14/22   Yuliya Blanchard MD   predniSONE (DELTASONE) 50 MG tablet Take 1 tablet by mouth Daily for 5 days. 9/14/22 9/19/22  Abel Bowden DO   Serevent Diskus 50 MCG/DOSE diskus inhaler  4/21/22   Yuliya Blanchard MD   venlafaxine XR (EFFEXOR-XR) 150 MG 24 hr capsule  4/20/22   Yuliya Blanchard MD        Social History:   Social History     Tobacco Use   • Smoking status: Current Every Day Smoker     Packs/day: 1.00     Types: Cigarettes   • Smokeless tobacco: Never Used   Vaping Use   • Vaping Use: Never used   Substance Use Topics   • Alcohol use: Not Currently   • Drug use: Yes     Types: Methamphetamines     Comment: LAST USED 4 DAYS AGO     Recent travel: not applicable    Review of Systems:  Review of Systems   Constitutional: Negative for chills and fever.   HENT: Negative for congestion, rhinorrhea and sore throat.    Eyes: Negative for pain and visual disturbance.   Respiratory: Positive for cough (mild). Negative for apnea, chest tightness and shortness of breath.    Cardiovascular: Negative for chest pain and palpitations.   Gastrointestinal: Positive for nausea and vomiting. Negative for abdominal pain and diarrhea.   Genitourinary: Positive for flank pain (bilateral). Negative for difficulty urinating and dysuria.   Musculoskeletal: Positive for myalgias (generalized). Negative for joint swelling.   Skin: Negative for color change.   Neurological: Negative for seizures and headaches.   Psychiatric/Behavioral: Negative.    All other systems reviewed and are negative.       Physical Exam:  BP (!) 170/105   Pulse 78   Temp 98.1 °F (36.7 °C) (Oral)   Resp 17   Ht 185.4 cm (73\")   Wt 83.9 kg (184 lb 15.5 oz)   SpO2 98%   BMI 24.40 kg/m²     Physical Exam  Vitals and nursing note " reviewed.   Constitutional:       General: He is not in acute distress.     Appearance: Normal appearance. He is not toxic-appearing.   HENT:      Head: Normocephalic and atraumatic.      Jaw: There is normal jaw occlusion.   Eyes:      General: Lids are normal.      Extraocular Movements: Extraocular movements intact.      Conjunctiva/sclera: Conjunctivae normal.      Pupils: Pupils are equal, round, and reactive to light.   Cardiovascular:      Rate and Rhythm: Normal rate and regular rhythm.      Pulses: Normal pulses.      Heart sounds: Normal heart sounds.   Pulmonary:      Effort: Pulmonary effort is normal. No respiratory distress.      Breath sounds: Normal breath sounds. No wheezing or rhonchi.   Abdominal:      General: Abdomen is flat.      Palpations: Abdomen is soft.      Tenderness: There is no abdominal tenderness. There is no guarding or rebound.   Musculoskeletal:         General: Normal range of motion.      Cervical back: Normal range of motion and neck supple.      Right lower leg: No edema.      Left lower leg: No edema.   Skin:     General: Skin is warm and dry.   Neurological:      Mental Status: He is alert and oriented to person, place, and time. Mental status is at baseline.   Psychiatric:         Mood and Affect: Mood normal.                Medications in the Emergency Department:  Medications   sodium chloride 0.9 % flush 10 mL (has no administration in time range)   sodium chloride 0.9 % bolus 1,000 mL (1,000 mL Intravenous New Bag 9/18/22 2214)   ketorolac (TORADOL) injection 30 mg (30 mg Intravenous Given 9/18/22 2213)   droperidol (INAPSINE) injection 2.5 mg (2.5 mg Intravenous Given 9/18/22 4488)        Labs  Lab Results (last 24 hours)     Procedure Component Value Units Date/Time    CBC & Differential [[id number]]  (Normal) Collected: 09/18/22 2210    Specimen: Blood Updated: 09/18/22 2223    Narrative:      The following orders were created for panel order CBC &  Differential.  Procedure                               Abnormality         Status                     ---------                               -----------         ------                     CBC Auto Differential[[id number]]        Normal              Final result                 Please view results for these tests on the individual orders.    Comprehensive Metabolic Panel [[id number]]  (Abnormal) Collected: 09/18/22 2210    Specimen: Blood Updated: 09/18/22 2252     Glucose 97 mg/dL      BUN 12 mg/dL      Creatinine 1.27 mg/dL      Sodium 145 mmol/L      Potassium 3.3 mmol/L      Chloride 104 mmol/L      CO2 30.9 mmol/L      Calcium 9.5 mg/dL      Total Protein 6.7 g/dL      Albumin 4.00 g/dL      ALT (SGPT) 17 U/L      AST (SGOT) 18 U/L      Alkaline Phosphatase 134 U/L      Total Bilirubin 0.3 mg/dL      Globulin 2.7 gm/dL      A/G Ratio 1.5 g/dL      BUN/Creatinine Ratio 9.4     Anion Gap 10.1 mmol/L      eGFR 65.9 mL/min/1.73      Comment: National Kidney Foundation and American Society of Nephrology (ASN) Task Force recommended calculation based on the Chronic Kidney Disease Epidemiology Collaboration (CKD-EPI) equation refit without adjustment for race.       Narrative:      GFR Normal >60  Chronic Kidney Disease <60  Kidney Failure <15      Acetaminophen Level [[id number]]  (Normal) Collected: 09/18/22 2210    Specimen: Blood Updated: 09/18/22 2252     Acetaminophen <5.0 mcg/mL     Ethanol [[id number]] Collected: 09/18/22 2210    Specimen: Blood Updated: 09/18/22 2252     Ethanol <10 mg/dL      Ethanol % <0.010 %     Narrative:      Ethanol (Plasma)  <10 Essentially Negative    Toxic Concentrations           mg/dL    Flushing, slowing of reflexes    Impaired visual activity         Depression of CNS              >100  Possible Coma                  >300       Salicylate Level [[id number]]  (Normal) Collected: 09/18/22 2210    Specimen: Blood Updated: 09/18/22 2252     Salicylate <0.3 mg/dL     CK  [[id number]]  (Normal) Collected: 09/18/22 2210    Specimen: Blood Updated: 09/18/22 2252     Creatine Kinase 61 U/L     CBC Auto Differential [[id number]]  (Normal) Collected: 09/18/22 2210    Specimen: Blood Updated: 09/18/22 2223     WBC 8.76 10*3/mm3      RBC 5.02 10*6/mm3      Hemoglobin 14.4 g/dL      Hematocrit 43.2 %      MCV 86.1 fL      MCH 28.7 pg      MCHC 33.3 g/dL      RDW 14.2 %      RDW-SD 45.1 fl      MPV 9.8 fL      Platelets 313 10*3/mm3      Neutrophil % 53.2 %      Lymphocyte % 34.6 %      Monocyte % 10.2 %      Eosinophil % 1.1 %      Basophil % 0.7 %      Immature Grans % 0.2 %      Neutrophils, Absolute 4.66 10*3/mm3      Lymphocytes, Absolute 3.03 10*3/mm3      Monocytes, Absolute 0.89 10*3/mm3      Eosinophils, Absolute 0.10 10*3/mm3      Basophils, Absolute 0.06 10*3/mm3      Immature Grans, Absolute 0.02 10*3/mm3      nRBC 0.0 /100 WBC     Influenza Antigen, Rapid - Swab, Nasopharynx [[id number]]  (Normal) Collected: 09/18/22 2230    Specimen: Swab from Nasopharynx Updated: 09/18/22 2308     Influenza A Ag, EIA Negative     Influenza B Ag, EIA Negative    COVID-19,APTIMA PANTHER(MARILEE),[facility]/ [facility], NP/OP SWAB IN UTM/VTM/[facility] TRANSPORT MEDIA,24 HR TAT - Swab, Nasal Cavity [[id number]] Collected: 09/18/22 2230    Specimen: Swab from Nasal Cavity Updated: 09/18/22 2240    Urine Drug Screen - Urine, Clean Catch [[id number]]  (Abnormal) Collected: 09/18/22 2312    Specimen: Urine, Clean Catch Updated: 09/18/22 2344     Amphet/Methamphet, Screen Positive     Barbiturates Screen, Urine Negative     Benzodiazepine Screen, Urine Negative     Cocaine Screen, Urine Negative     Opiate Screen Negative     THC, Screen, Urine Negative     Methadone Screen, Urine Negative     Oxycodone Screen, Urine Negative    Narrative:      Negative Thresholds Per Drugs Screened:    Amphetamines                 500 ng/ml  Barbiturates                 200 ng/ml  Benzodiazepines              100 ng/ml  Cocaine                       300 ng/ml  Methadone                    300 ng/ml  Opiates                      300 ng/ml  Oxycodone                    100 ng/ml  THC                           50 ng/ml    The Normal Value for all drugs tested is negative. This report includes final unconfirmed screening results to be used for medical treatment purposes only. Unconfirmed results must not be used for non-medical purposes such as employment or legal testing. Clinical consideration should be applied to any drug of abuse test, particularly when unconfirmed results are used.            Urinalysis With Culture If Indicated - Urine, Clean Catch [[id number]]  (Abnormal) Collected: 09/18/22 2312    Specimen: Urine, Clean Catch Updated: 09/18/22 2334     Color, UA Dark Yellow     Appearance, UA Clear     pH, UA 6.0     Specific Gravity, UA 1.021     Glucose,  mg/dL (Trace)     Ketones, UA Trace     Bilirubin, UA Negative     Blood, UA Trace     Protein, UA >=300 mg/dL (3+)     Leuk Esterase, UA Negative     Nitrite, UA Negative     Urobilinogen, UA 1.0 E.U./dL    Narrative:      In absence of clinical symptoms, the presence of pyuria, bacteria, and/or nitrites on the urinalysis result does not correlate with infection.    Urinalysis, Microscopic Only - Urine, Clean Catch [[id number]]  (Abnormal) Collected: 09/18/22 2312    Specimen: Urine, Clean Catch Updated: 09/18/22 2339     RBC, UA 6-12 /HPF      WBC, UA 0-2 /HPF      Comment: Urine culture not indicated.        Bacteria, UA None Seen /HPF      Squamous Epithelial Cells, UA 0-2 /HPF      Hyaline Casts, UA 7-12 /LPF      Methodology Automated Microscopy           Imaging:  No Radiology Exams Resulted Within Past 24 Hours    Procedures:  Procedures    Progress                            Medical Decision Making:  MDM  Number of Diagnoses or Management Options  Diagnosis management comments: Patient is afebrile nontoxic-appearing.  Vital signs are stable.  At time of evaluation is lying in the  bed reporting diffuse pain.  Labs were obtained and showed no significant abnormality.  O2 sat upper 90s on room air.  Patient given Toradol.  On reevaluation he still reports diffuse pain.  He was subsequently given droperidol.  He was also given IV fluids.  I recommend that he stop using drugs.  Patient is able to ambulate without difficulty.  He has no focal neurological deficits.  Recommend close follow-up with his primary physician.  Discussed return precautions, discharge instructions and answered all his questions.       Amount and/or Complexity of Data Reviewed  Clinical lab tests: ordered and reviewed  Review and summarize past medical records: yes  Independent visualization of images, tracings, or specimens: yes    Risk of Complications, Morbidity, and/or Mortality  Presenting problems: low  Management options: low    Patient Progress  Patient progress: stable       Final diagnoses:   Myalgia   Substance abuse (HCC)        Disposition:  ED Disposition     ED Disposition   Discharge    Condition   Stable    Comment   --             This medical record created using voice recognition software and may contain unintended errors.    Documentation assistance provided by Soham Aguila acting as scribe for Luis M Houston MD. Information recorded by the scribe was done at my direction and has been verified and validated by me.        Soham Aguila  09/18/22 2170       Luis M Houston MD  09/19/22 0028

## 2022-09-21 LAB — QT INTERVAL: 367 MS

## 2023-02-27 ENCOUNTER — APPOINTMENT (OUTPATIENT)
Dept: GENERAL RADIOLOGY | Facility: HOSPITAL | Age: 58
DRG: 885 | End: 2023-02-27
Payer: COMMERCIAL

## 2023-02-27 ENCOUNTER — HOSPITAL ENCOUNTER (INPATIENT)
Facility: HOSPITAL | Age: 58
LOS: 4 days | Discharge: PSYCHIATRIC OR SUBSTANCE USE FACILITY | DRG: 885 | End: 2023-03-03
Attending: PSYCHIATRY & NEUROLOGY | Admitting: PSYCHIATRY & NEUROLOGY
Payer: COMMERCIAL

## 2023-02-27 ENCOUNTER — HOSPITAL ENCOUNTER (EMERGENCY)
Facility: HOSPITAL | Age: 58
Discharge: PSYCHIATRIC OR SUBSTANCE USE FACILITY | DRG: 885 | End: 2023-02-27
Attending: EMERGENCY MEDICINE | Admitting: EMERGENCY MEDICINE
Payer: COMMERCIAL

## 2023-02-27 VITALS
DIASTOLIC BLOOD PRESSURE: 91 MMHG | RESPIRATION RATE: 18 BRPM | HEART RATE: 96 BPM | SYSTOLIC BLOOD PRESSURE: 140 MMHG | HEIGHT: 73 IN | WEIGHT: 155.2 LBS | BODY MASS INDEX: 20.57 KG/M2 | OXYGEN SATURATION: 92 % | TEMPERATURE: 97.6 F

## 2023-02-27 DIAGNOSIS — R45.850 HOMICIDAL IDEATION: ICD-10-CM

## 2023-02-27 DIAGNOSIS — R45.851 SUICIDAL IDEATION: Primary | ICD-10-CM

## 2023-02-27 PROBLEM — F33.1 MAJOR DEPRESSIVE DISORDER, RECURRENT EPISODE, MODERATE: Status: ACTIVE | Noted: 2023-02-27

## 2023-02-27 LAB
ALBUMIN SERPL-MCNC: 3.8 G/DL (ref 3.5–5.2)
ALBUMIN/GLOB SERPL: 1.3 G/DL
ALP SERPL-CCNC: 161 U/L (ref 39–117)
ALT SERPL W P-5'-P-CCNC: 23 U/L (ref 1–41)
AMPHET+METHAMPHET UR QL: POSITIVE
ANION GAP SERPL CALCULATED.3IONS-SCNC: 8.1 MMOL/L (ref 5–15)
APAP SERPL-MCNC: <5 MCG/ML (ref 0–30)
AST SERPL-CCNC: 19 U/L (ref 1–40)
BACTERIA UR QL AUTO: ABNORMAL /HPF
BARBITURATES UR QL SCN: NEGATIVE
BASOPHILS # BLD AUTO: 0.03 10*3/MM3 (ref 0–0.2)
BASOPHILS NFR BLD AUTO: 0.3 % (ref 0–1.5)
BENZODIAZ UR QL SCN: NEGATIVE
BILIRUB SERPL-MCNC: 0.4 MG/DL (ref 0–1.2)
BILIRUB UR QL STRIP: NEGATIVE
BUN SERPL-MCNC: 18 MG/DL (ref 6–20)
BUN/CREAT SERPL: 15.1 (ref 7–25)
CALCIUM SPEC-SCNC: 9.6 MG/DL (ref 8.6–10.5)
CANNABINOIDS SERPL QL: NEGATIVE
CHLORIDE SERPL-SCNC: 102 MMOL/L (ref 98–107)
CLARITY UR: CLEAR
CO2 SERPL-SCNC: 28.9 MMOL/L (ref 22–29)
COCAINE UR QL: NEGATIVE
COLOR UR: YELLOW
CREAT SERPL-MCNC: 1.19 MG/DL (ref 0.76–1.27)
DEPRECATED RDW RBC AUTO: 44.8 FL (ref 37–54)
EGFRCR SERPLBLD CKD-EPI 2021: 71.2 ML/MIN/1.73
EOSINOPHIL # BLD AUTO: 0.08 10*3/MM3 (ref 0–0.4)
EOSINOPHIL NFR BLD AUTO: 0.8 % (ref 0.3–6.2)
ERYTHROCYTE [DISTWIDTH] IN BLOOD BY AUTOMATED COUNT: 14 % (ref 12.3–15.4)
ETHANOL BLD-MCNC: <10 MG/DL (ref 0–10)
ETHANOL UR QL: <0.01 %
GLOBULIN UR ELPH-MCNC: 3 GM/DL
GLUCOSE SERPL-MCNC: 99 MG/DL (ref 65–99)
GLUCOSE UR STRIP-MCNC: NEGATIVE MG/DL
HCT VFR BLD AUTO: 43.2 % (ref 37.5–51)
HGB BLD-MCNC: 14.5 G/DL (ref 13–17.7)
HGB UR QL STRIP.AUTO: ABNORMAL
HOLD SPECIMEN: NORMAL
HOLD SPECIMEN: NORMAL
HYALINE CASTS UR QL AUTO: ABNORMAL /LPF
IMM GRANULOCYTES # BLD AUTO: 0.02 10*3/MM3 (ref 0–0.05)
IMM GRANULOCYTES NFR BLD AUTO: 0.2 % (ref 0–0.5)
KETONES UR QL STRIP: NEGATIVE
LEUKOCYTE ESTERASE UR QL STRIP.AUTO: NEGATIVE
LYMPHOCYTES # BLD AUTO: 2.13 10*3/MM3 (ref 0.7–3.1)
LYMPHOCYTES NFR BLD AUTO: 22 % (ref 19.6–45.3)
MCH RBC QN AUTO: 29.4 PG (ref 26.6–33)
MCHC RBC AUTO-ENTMCNC: 33.6 G/DL (ref 31.5–35.7)
MCV RBC AUTO: 87.4 FL (ref 79–97)
METHADONE UR QL SCN: NEGATIVE
MONOCYTES # BLD AUTO: 0.78 10*3/MM3 (ref 0.1–0.9)
MONOCYTES NFR BLD AUTO: 8 % (ref 5–12)
NEUTROPHILS NFR BLD AUTO: 6.65 10*3/MM3 (ref 1.7–7)
NEUTROPHILS NFR BLD AUTO: 68.7 % (ref 42.7–76)
NITRITE UR QL STRIP: NEGATIVE
NRBC BLD AUTO-RTO: 0 /100 WBC (ref 0–0.2)
NT-PROBNP SERPL-MCNC: 3584 PG/ML (ref 0–900)
OPIATES UR QL: NEGATIVE
OXYCODONE UR QL SCN: NEGATIVE
PH UR STRIP.AUTO: 7.5 [PH] (ref 5–8)
PLATELET # BLD AUTO: 342 10*3/MM3 (ref 140–450)
PMV BLD AUTO: 9.3 FL (ref 6–12)
POTASSIUM SERPL-SCNC: 3.5 MMOL/L (ref 3.5–5.2)
PROT SERPL-MCNC: 6.8 G/DL (ref 6–8.5)
PROT UR QL STRIP: ABNORMAL
RBC # BLD AUTO: 4.94 10*6/MM3 (ref 4.14–5.8)
RBC # UR STRIP: ABNORMAL /HPF
REF LAB TEST METHOD: ABNORMAL
SALICYLATES SERPL-MCNC: <0.3 MG/DL
SARS-COV-2 RNA RESP QL NAA+PROBE: NOT DETECTED
SODIUM SERPL-SCNC: 139 MMOL/L (ref 136–145)
SP GR UR STRIP: 1.01 (ref 1–1.03)
SQUAMOUS #/AREA URNS HPF: ABNORMAL /HPF
TROPONIN T SERPL HS-MCNC: 15 NG/L
TROPONIN T SERPL HS-MCNC: 16 NG/L
TROPONIN T SERPL HS-MCNC: 16 NG/L
UROBILINOGEN UR QL STRIP: ABNORMAL
WBC # UR STRIP: ABNORMAL /HPF
WBC NRBC COR # BLD: 9.69 10*3/MM3 (ref 3.4–10.8)
WHOLE BLOOD HOLD COAG: NORMAL
WHOLE BLOOD HOLD SPECIMEN: NORMAL

## 2023-02-27 PROCEDURE — 80307 DRUG TEST PRSMV CHEM ANLYZR: CPT | Performed by: EMERGENCY MEDICINE

## 2023-02-27 PROCEDURE — 71045 X-RAY EXAM CHEST 1 VIEW: CPT

## 2023-02-27 PROCEDURE — 93010 ELECTROCARDIOGRAM REPORT: CPT | Performed by: INTERNAL MEDICINE

## 2023-02-27 PROCEDURE — 86738 MYCOPLASMA ANTIBODY: CPT | Performed by: PHYSICIAN ASSISTANT

## 2023-02-27 PROCEDURE — 99284 EMERGENCY DEPT VISIT MOD MDM: CPT

## 2023-02-27 PROCEDURE — 36415 COLL VENOUS BLD VENIPUNCTURE: CPT

## 2023-02-27 PROCEDURE — 85025 COMPLETE CBC W/AUTO DIFF WBC: CPT | Performed by: EMERGENCY MEDICINE

## 2023-02-27 PROCEDURE — 83880 ASSAY OF NATRIURETIC PEPTIDE: CPT | Performed by: EMERGENCY MEDICINE

## 2023-02-27 PROCEDURE — 80143 DRUG ASSAY ACETAMINOPHEN: CPT | Performed by: EMERGENCY MEDICINE

## 2023-02-27 PROCEDURE — 93005 ELECTROCARDIOGRAM TRACING: CPT

## 2023-02-27 PROCEDURE — 81001 URINALYSIS AUTO W/SCOPE: CPT

## 2023-02-27 PROCEDURE — 84484 ASSAY OF TROPONIN QUANT: CPT

## 2023-02-27 PROCEDURE — 80179 DRUG ASSAY SALICYLATE: CPT | Performed by: EMERGENCY MEDICINE

## 2023-02-27 PROCEDURE — 93005 ELECTROCARDIOGRAM TRACING: CPT | Performed by: EMERGENCY MEDICINE

## 2023-02-27 PROCEDURE — 82077 ASSAY SPEC XCP UR&BREATH IA: CPT | Performed by: EMERGENCY MEDICINE

## 2023-02-27 PROCEDURE — 84145 PROCALCITONIN (PCT): CPT | Performed by: INTERNAL MEDICINE

## 2023-02-27 PROCEDURE — 84484 ASSAY OF TROPONIN QUANT: CPT | Performed by: EMERGENCY MEDICINE

## 2023-02-27 PROCEDURE — U0003 INFECTIOUS AGENT DETECTION BY NUCLEIC ACID (DNA OR RNA); SEVERE ACUTE RESPIRATORY SYNDROME CORONAVIRUS 2 (SARS-COV-2) (CORONAVIRUS DISEASE [COVID-19]), AMPLIFIED PROBE TECHNIQUE, MAKING USE OF HIGH THROUGHPUT TECHNOLOGIES AS DESCRIBED BY CMS-2020-01-R: HCPCS | Performed by: PSYCHIATRY & NEUROLOGY

## 2023-02-27 PROCEDURE — 80053 COMPREHEN METABOLIC PANEL: CPT | Performed by: EMERGENCY MEDICINE

## 2023-02-27 RX ORDER — NICOTINE 21 MG/24HR
1 PATCH, TRANSDERMAL 24 HOURS TRANSDERMAL
Status: DISCONTINUED | OUTPATIENT
Start: 2023-02-27 | End: 2023-03-03 | Stop reason: HOSPADM

## 2023-02-27 RX ORDER — SODIUM CHLORIDE 0.9 % (FLUSH) 0.9 %
10 SYRINGE (ML) INJECTION AS NEEDED
Status: DISCONTINUED | OUTPATIENT
Start: 2023-02-27 | End: 2023-02-27 | Stop reason: HOSPADM

## 2023-02-27 RX ORDER — LOSARTAN POTASSIUM 50 MG/1
100 TABLET ORAL DAILY
Status: DISCONTINUED | OUTPATIENT
Start: 2023-02-27 | End: 2023-03-03 | Stop reason: HOSPADM

## 2023-02-27 RX ORDER — ATORVASTATIN CALCIUM 40 MG/1
40 TABLET, FILM COATED ORAL NIGHTLY
Status: DISCONTINUED | OUTPATIENT
Start: 2023-02-27 | End: 2023-03-03 | Stop reason: HOSPADM

## 2023-02-27 RX ORDER — PANTOPRAZOLE SODIUM 40 MG/1
40 TABLET, DELAYED RELEASE ORAL
Status: DISCONTINUED | OUTPATIENT
Start: 2023-02-28 | End: 2023-03-03 | Stop reason: HOSPADM

## 2023-02-27 RX ORDER — TAMSULOSIN HYDROCHLORIDE 0.4 MG/1
0.4 CAPSULE ORAL NIGHTLY
Status: DISCONTINUED | OUTPATIENT
Start: 2023-02-27 | End: 2023-03-03 | Stop reason: HOSPADM

## 2023-02-27 RX ORDER — HYDROXYZINE PAMOATE 25 MG/1
50 CAPSULE ORAL EVERY 6 HOURS PRN
Status: DISCONTINUED | OUTPATIENT
Start: 2023-02-27 | End: 2023-03-03 | Stop reason: HOSPADM

## 2023-02-27 RX ORDER — AMLODIPINE BESYLATE 5 MG/1
5 TABLET ORAL DAILY
Status: DISCONTINUED | OUTPATIENT
Start: 2023-02-27 | End: 2023-03-03 | Stop reason: HOSPADM

## 2023-02-27 RX ORDER — ALBUTEROL SULFATE 90 UG/1
2 AEROSOL, METERED RESPIRATORY (INHALATION) EVERY 4 HOURS PRN
Status: DISCONTINUED | OUTPATIENT
Start: 2023-02-27 | End: 2023-03-03 | Stop reason: HOSPADM

## 2023-02-27 RX ORDER — LOPERAMIDE HYDROCHLORIDE 2 MG/1
2 CAPSULE ORAL
Status: DISCONTINUED | OUTPATIENT
Start: 2023-02-27 | End: 2023-03-03 | Stop reason: HOSPADM

## 2023-02-27 RX ORDER — TRAZODONE HYDROCHLORIDE 50 MG/1
100 TABLET ORAL NIGHTLY PRN
Status: DISCONTINUED | OUTPATIENT
Start: 2023-02-27 | End: 2023-03-03 | Stop reason: HOSPADM

## 2023-02-27 RX ORDER — ALUMINA, MAGNESIA, AND SIMETHICONE 2400; 2400; 240 MG/30ML; MG/30ML; MG/30ML
15 SUSPENSION ORAL EVERY 6 HOURS PRN
Status: DISCONTINUED | OUTPATIENT
Start: 2023-02-27 | End: 2023-03-03 | Stop reason: HOSPADM

## 2023-02-27 RX ORDER — ACETAMINOPHEN 325 MG/1
650 TABLET ORAL EVERY 4 HOURS PRN
Status: DISCONTINUED | OUTPATIENT
Start: 2023-02-27 | End: 2023-03-03 | Stop reason: HOSPADM

## 2023-02-27 RX ADMIN — METFORMIN HYDROCHLORIDE 1000 MG: 500 TABLET ORAL at 22:30

## 2023-02-28 LAB
FLUAV AG NPH QL: NEGATIVE
FLUBV AG NPH QL IA: NEGATIVE
PROCALCITONIN SERPL-MCNC: 0.05 NG/ML (ref 0–0.25)

## 2023-02-28 PROCEDURE — 87804 INFLUENZA ASSAY W/OPTIC: CPT | Performed by: INTERNAL MEDICINE

## 2023-02-28 RX ORDER — DIPHENHYDRAMINE HYDROCHLORIDE 50 MG/ML
50 INJECTION INTRAMUSCULAR; INTRAVENOUS EVERY 4 HOURS PRN
Status: DISCONTINUED | OUTPATIENT
Start: 2023-02-28 | End: 2023-03-03 | Stop reason: HOSPADM

## 2023-02-28 RX ORDER — LORAZEPAM 2 MG/1
2 TABLET ORAL EVERY 4 HOURS PRN
Status: DISCONTINUED | OUTPATIENT
Start: 2023-02-28 | End: 2023-03-03 | Stop reason: HOSPADM

## 2023-02-28 RX ORDER — DIPHENHYDRAMINE HCL 50 MG
50 CAPSULE ORAL EVERY 4 HOURS PRN
Status: DISCONTINUED | OUTPATIENT
Start: 2023-02-28 | End: 2023-03-03 | Stop reason: HOSPADM

## 2023-02-28 RX ORDER — LORAZEPAM 2 MG/ML
2 INJECTION INTRAMUSCULAR EVERY 4 HOURS PRN
Status: DISCONTINUED | OUTPATIENT
Start: 2023-02-28 | End: 2023-03-03 | Stop reason: HOSPADM

## 2023-02-28 RX ORDER — HALOPERIDOL 5 MG/1
5 TABLET ORAL EVERY 4 HOURS PRN
Status: DISCONTINUED | OUTPATIENT
Start: 2023-02-28 | End: 2023-03-03 | Stop reason: HOSPADM

## 2023-02-28 RX ORDER — ARIPIPRAZOLE 15 MG/1
15 TABLET ORAL DAILY
Status: DISCONTINUED | OUTPATIENT
Start: 2023-02-28 | End: 2023-03-03 | Stop reason: HOSPADM

## 2023-02-28 RX ORDER — HALOPERIDOL 5 MG/ML
5 INJECTION INTRAMUSCULAR EVERY 4 HOURS PRN
Status: DISCONTINUED | OUTPATIENT
Start: 2023-02-28 | End: 2023-03-03 | Stop reason: HOSPADM

## 2023-02-28 RX ADMIN — ARIPIPRAZOLE 15 MG: 15 TABLET ORAL at 12:46

## 2023-02-28 RX ADMIN — ACETAMINOPHEN 650 MG: 325 TABLET ORAL at 17:34

## 2023-02-28 RX ADMIN — METFORMIN HYDROCHLORIDE 1000 MG: 500 TABLET ORAL at 17:34

## 2023-02-28 RX ADMIN — EMPAGLIFLOZIN 10 MG: 10 TABLET, FILM COATED ORAL at 09:00

## 2023-02-28 RX ADMIN — ATORVASTATIN CALCIUM 40 MG: 40 TABLET, FILM COATED ORAL at 21:03

## 2023-02-28 RX ADMIN — AMLODIPINE BESYLATE 5 MG: 5 TABLET ORAL at 09:01

## 2023-02-28 RX ADMIN — LOSARTAN POTASSIUM 100 MG: 50 TABLET, FILM COATED ORAL at 09:01

## 2023-02-28 RX ADMIN — ALUMINUM HYDROXIDE, MAGNESIUM HYDROXIDE, AND DIMETHICONE 15 ML: 400; 400; 40 SUSPENSION ORAL at 19:11

## 2023-02-28 RX ADMIN — PANTOPRAZOLE SODIUM 40 MG: 40 TABLET, DELAYED RELEASE ORAL at 07:09

## 2023-02-28 RX ADMIN — METFORMIN HYDROCHLORIDE 1000 MG: 500 TABLET ORAL at 09:00

## 2023-02-28 RX ADMIN — TRAZODONE HYDROCHLORIDE 100 MG: 50 TABLET ORAL at 21:03

## 2023-03-01 LAB
ALBUMIN SERPL-MCNC: 3.5 G/DL (ref 3.5–5.2)
ALBUMIN/GLOB SERPL: 1.3 G/DL
ALP SERPL-CCNC: 152 U/L (ref 39–117)
ALT SERPL W P-5'-P-CCNC: 21 U/L (ref 1–41)
ANION GAP SERPL CALCULATED.3IONS-SCNC: 7.2 MMOL/L (ref 5–15)
AST SERPL-CCNC: 17 U/L (ref 1–40)
BASOPHILS # BLD AUTO: 0.05 10*3/MM3 (ref 0–0.2)
BASOPHILS NFR BLD AUTO: 0.6 % (ref 0–1.5)
BILIRUB SERPL-MCNC: 0.3 MG/DL (ref 0–1.2)
BUN SERPL-MCNC: 17 MG/DL (ref 6–20)
BUN/CREAT SERPL: 15.2 (ref 7–25)
CALCIUM SPEC-SCNC: 9.3 MG/DL (ref 8.6–10.5)
CHLORIDE SERPL-SCNC: 100 MMOL/L (ref 98–107)
CO2 SERPL-SCNC: 29.8 MMOL/L (ref 22–29)
CREAT SERPL-MCNC: 1.12 MG/DL (ref 0.76–1.27)
DEPRECATED RDW RBC AUTO: 44 FL (ref 37–54)
EGFRCR SERPLBLD CKD-EPI 2021: 76.6 ML/MIN/1.73
EOSINOPHIL # BLD AUTO: 0.12 10*3/MM3 (ref 0–0.4)
EOSINOPHIL NFR BLD AUTO: 1.5 % (ref 0.3–6.2)
ERYTHROCYTE [DISTWIDTH] IN BLOOD BY AUTOMATED COUNT: 13.7 % (ref 12.3–15.4)
GLOBULIN UR ELPH-MCNC: 2.6 GM/DL
GLUCOSE BLDC GLUCOMTR-MCNC: 120 MG/DL (ref 70–99)
GLUCOSE SERPL-MCNC: 138 MG/DL (ref 65–99)
HCT VFR BLD AUTO: 42.9 % (ref 37.5–51)
HGB BLD-MCNC: 14.3 G/DL (ref 13–17.7)
IMM GRANULOCYTES # BLD AUTO: 0.02 10*3/MM3 (ref 0–0.05)
IMM GRANULOCYTES NFR BLD AUTO: 0.2 % (ref 0–0.5)
L PNEUMO1 AG UR QL IA: NEGATIVE
LYMPHOCYTES # BLD AUTO: 2.52 10*3/MM3 (ref 0.7–3.1)
LYMPHOCYTES NFR BLD AUTO: 31.3 % (ref 19.6–45.3)
M PNEUMO IGM SER QL: NEGATIVE
MCH RBC QN AUTO: 29.2 PG (ref 26.6–33)
MCHC RBC AUTO-ENTMCNC: 33.3 G/DL (ref 31.5–35.7)
MCV RBC AUTO: 87.6 FL (ref 79–97)
MONOCYTES # BLD AUTO: 0.71 10*3/MM3 (ref 0.1–0.9)
MONOCYTES NFR BLD AUTO: 8.8 % (ref 5–12)
NEUTROPHILS NFR BLD AUTO: 4.63 10*3/MM3 (ref 1.7–7)
NEUTROPHILS NFR BLD AUTO: 57.6 % (ref 42.7–76)
NRBC BLD AUTO-RTO: 0 /100 WBC (ref 0–0.2)
PLATELET # BLD AUTO: 323 10*3/MM3 (ref 140–450)
PMV BLD AUTO: 9.6 FL (ref 6–12)
POTASSIUM SERPL-SCNC: 3.6 MMOL/L (ref 3.5–5.2)
PROT SERPL-MCNC: 6.1 G/DL (ref 6–8.5)
RBC # BLD AUTO: 4.9 10*6/MM3 (ref 4.14–5.8)
S PNEUM AG SPEC QL LA: NEGATIVE
SODIUM SERPL-SCNC: 137 MMOL/L (ref 136–145)
WBC NRBC COR # BLD: 8.05 10*3/MM3 (ref 3.4–10.8)

## 2023-03-01 PROCEDURE — 94799 UNLISTED PULMONARY SVC/PX: CPT

## 2023-03-01 PROCEDURE — 80053 COMPREHEN METABOLIC PANEL: CPT | Performed by: INTERNAL MEDICINE

## 2023-03-01 PROCEDURE — 94760 N-INVAS EAR/PLS OXIMETRY 1: CPT

## 2023-03-01 PROCEDURE — 87449 NOS EACH ORGANISM AG IA: CPT | Performed by: PHYSICIAN ASSISTANT

## 2023-03-01 PROCEDURE — 85025 COMPLETE CBC W/AUTO DIFF WBC: CPT | Performed by: INTERNAL MEDICINE

## 2023-03-01 PROCEDURE — 82962 GLUCOSE BLOOD TEST: CPT

## 2023-03-01 PROCEDURE — 87899 AGENT NOS ASSAY W/OPTIC: CPT | Performed by: PHYSICIAN ASSISTANT

## 2023-03-01 RX ORDER — GUAIFENESIN 600 MG/1
600 TABLET, EXTENDED RELEASE ORAL EVERY 12 HOURS SCHEDULED
Status: DISCONTINUED | OUTPATIENT
Start: 2023-03-01 | End: 2023-03-03 | Stop reason: HOSPADM

## 2023-03-01 RX ORDER — NICOTINE POLACRILEX 4 MG
15 LOZENGE BUCCAL
Status: DISCONTINUED | OUTPATIENT
Start: 2023-03-01 | End: 2023-03-03 | Stop reason: HOSPADM

## 2023-03-01 RX ORDER — BUDESONIDE AND FORMOTEROL FUMARATE DIHYDRATE 160; 4.5 UG/1; UG/1
2 AEROSOL RESPIRATORY (INHALATION)
Status: DISCONTINUED | OUTPATIENT
Start: 2023-03-01 | End: 2023-03-03 | Stop reason: HOSPADM

## 2023-03-01 RX ORDER — INSULIN LISPRO 100 [IU]/ML
2-7 INJECTION, SOLUTION INTRAVENOUS; SUBCUTANEOUS
Status: DISCONTINUED | OUTPATIENT
Start: 2023-03-01 | End: 2023-03-03 | Stop reason: HOSPADM

## 2023-03-01 RX ORDER — DOXYCYCLINE 100 MG/1
100 CAPSULE ORAL EVERY 12 HOURS SCHEDULED
Status: DISCONTINUED | OUTPATIENT
Start: 2023-03-01 | End: 2023-03-03 | Stop reason: HOSPADM

## 2023-03-01 RX ORDER — DEXTROSE MONOHYDRATE 25 G/50ML
25 INJECTION, SOLUTION INTRAVENOUS
Status: DISCONTINUED | OUTPATIENT
Start: 2023-03-01 | End: 2023-03-03 | Stop reason: HOSPADM

## 2023-03-01 RX ADMIN — BUDESONIDE AND FORMOTEROL FUMARATE DIHYDRATE 2 PUFF: 160; 4.5 AEROSOL RESPIRATORY (INHALATION) at 19:44

## 2023-03-01 RX ADMIN — GUAIFENESIN 600 MG: 600 TABLET ORAL at 20:35

## 2023-03-01 RX ADMIN — LOSARTAN POTASSIUM 100 MG: 50 TABLET, FILM COATED ORAL at 08:15

## 2023-03-01 RX ADMIN — PANTOPRAZOLE SODIUM 40 MG: 40 TABLET, DELAYED RELEASE ORAL at 06:14

## 2023-03-01 RX ADMIN — EMPAGLIFLOZIN 10 MG: 10 TABLET, FILM COATED ORAL at 08:15

## 2023-03-01 RX ADMIN — ALUMINUM HYDROXIDE, MAGNESIUM HYDROXIDE, AND DIMETHICONE 15 ML: 400; 400; 40 SUSPENSION ORAL at 16:04

## 2023-03-01 RX ADMIN — ATORVASTATIN CALCIUM 40 MG: 40 TABLET, FILM COATED ORAL at 20:35

## 2023-03-01 RX ADMIN — TAMSULOSIN HYDROCHLORIDE 0.4 MG: 0.4 CAPSULE ORAL at 20:35

## 2023-03-01 RX ADMIN — ACETAMINOPHEN 650 MG: 325 TABLET ORAL at 15:45

## 2023-03-01 RX ADMIN — DOXYCYCLINE 100 MG: 100 CAPSULE ORAL at 20:35

## 2023-03-01 RX ADMIN — DOXYCYCLINE 100 MG: 100 CAPSULE ORAL at 14:17

## 2023-03-01 RX ADMIN — AMLODIPINE BESYLATE 5 MG: 5 TABLET ORAL at 08:15

## 2023-03-01 RX ADMIN — ARIPIPRAZOLE 15 MG: 15 TABLET ORAL at 08:15

## 2023-03-01 RX ADMIN — GUAIFENESIN 600 MG: 600 TABLET ORAL at 14:17

## 2023-03-01 RX ADMIN — METFORMIN HYDROCHLORIDE 1000 MG: 500 TABLET ORAL at 08:15

## 2023-03-01 NOTE — PROGRESS NOTES
" [facility]     Psychiatric Progress Note    Patient Name: Regulo Sepulveda  : 1965  MRN: [id number]  Primary Care Physician:  Provider, No Known  Date of admission: 2023    Subjective   Subjective     Patient seen and chart reviewed, discussed with staff.    Chief Complaint: Depression, suicidal or homicidal ideation      HPI:     Staff reports the patient has been withdrawn to his room.  Continues to voice suicidal ideation.  Reported his anxiety and depression both as an 8 to staff.    Patient day is lying in bed.  He is engaging and cooperative but rather minimally.  Continues to endorse suicidal ideations.  Denies any homicidal ideation today.  He is difficult to engage.  Reports that he slept poorly last night but spent much of the day in bed.  He reports he was up and down all night.    Reports he feels a little better physically.  Cough seems improved      Objective   Objective     Vitals:   Temp:  [97.7 °F (36.5 °C)-98.1 °F (36.7 °C)] 98.1 °F (36.7 °C)  Heart Rate:  [82-84] 82  Resp:  [16-18] 16  BP: (147-150)/() 147/102    Less coughing      Mental Status Exam:      Appearance:   In bed, thin, ADLs attended to  Reliability:   Fair   Eye Contact:   Limited  Concentration/Focus:    Attentive to the interview  Behaviors:    No psychomotor restlessness or agitation  Memory :    Sensorium intact, no deficits  Speech:    Minimal, decreased tone  Language:   Appropriate, relevant  Mood :    \"Depressed\"  Affect:    Congruent with stated mood and constricted  Thought process:    Guarded, goal directed, no paranoia or delusions  Thought Content:    Endorses suicidal ideation, no homicidal ideation, no auditory visualizations  Insight:   Limited  Judgement:    Intact, no behavioral disturbance      Result Review    Result Review:  I have personally reviewed the results from the time of this admission to 3/1/2023 10:44 EST and agree with these findings:  [x]  Laboratory  []  Microbiology  []  " Radiology  []  EKG/Telemetry   []  Cardiology/Vascular   []  Pathology  []  Old records  []  Other:  Most notable findings include:     Lab Results (last 24 hours)     Procedure Component Value Units Date/Time    Comprehensive Metabolic Panel [[id number]]  (Abnormal) Collected: 03/01/23 0435    Specimen: Blood Updated: 03/01/23 0554     Glucose 138 mg/dL      BUN 17 mg/dL      Creatinine 1.12 mg/dL      Sodium 137 mmol/L      Potassium 3.6 mmol/L      Chloride 100 mmol/L      CO2 29.8 mmol/L      Calcium 9.3 mg/dL      Total Protein 6.1 g/dL      Albumin 3.5 g/dL      ALT (SGPT) 21 U/L      AST (SGOT) 17 U/L      Alkaline Phosphatase 152 U/L      Total Bilirubin 0.3 mg/dL      Globulin 2.6 gm/dL      A/G Ratio 1.3 g/dL      BUN/Creatinine Ratio 15.2     Anion Gap 7.2 mmol/L      eGFR 76.6 mL/min/1.73     Narrative:      GFR Normal >60  Chronic Kidney Disease <60  Kidney Failure <15      CBC & Differential [[id number]]  (Normal) Collected: 03/01/23 0435    Specimen: Blood Updated: 03/01/23 0533    Narrative:      The following orders were created for panel order CBC & Differential.  Procedure                               Abnormality         Status                     ---------                               -----------         ------                     CBC Auto Differential[[id number]]        Normal              Final result                 Please view results for these tests on the individual orders.    CBC Auto Differential [[id number]]  (Normal) Collected: 03/01/23 0435    Specimen: Blood Updated: 03/01/23 0533     WBC 8.05 10*3/mm3      RBC 4.90 10*6/mm3      Hemoglobin 14.3 g/dL      Hematocrit 42.9 %      MCV 87.6 fL      MCH 29.2 pg      MCHC 33.3 g/dL      RDW 13.7 %      RDW-SD 44.0 fl      MPV 9.6 fL      Platelets 323 10*3/mm3      Neutrophil % 57.6 %      Lymphocyte % 31.3 %      Monocyte % 8.8 %      Eosinophil % 1.5 %      Basophil % 0.6 %      Immature Grans % 0.2 %      Neutrophils, Absolute 4.63  "10*3/mm3      Lymphocytes, Absolute 2.52 10*3/mm3      Monocytes, Absolute 0.71 10*3/mm3      Eosinophils, Absolute 0.12 10*3/mm3      Basophils, Absolute 0.05 10*3/mm3      Immature Grans, Absolute 0.02 10*3/mm3      nRBC 0.0 /100 WBC     Influenza Antigen, Rapid - Swab, Nasopharynx [[id number]]  (Normal) Collected: 02/28/23 1058    Specimen: Swab from Nasopharynx Updated: 02/28/23 1124     Influenza A Ag, EIA Negative     Influenza B Ag, EIA Negative    Procalcitonin [[id number]]  (Normal) Collected: 02/27/23 1845    Specimen: Blood Updated: 02/28/23 1110     Procalcitonin 0.05 ng/mL     Narrative:      As a Marker for Sepsis (Non-Neonates):    1. <0.5 ng/mL represents a low risk of severe sepsis and/or septic shock.  2. >2 ng/mL represents a high risk of severe sepsis and/or septic shock.    As a Marker for Lower Respiratory Tract Infections that require antibiotic therapy:    PCT on Admission    Antibiotic Therapy       6-12 Hrs later    >0.5                Strongly Recommended  >0.25 - <0.5        Recommended  0.1 - 0.25          Discouraged              Remeasure/reassess PCT  <0.1                Strongly Discouraged     Remeasure/reassess PCT    As 28 day mortality risk marker: \"Change in Procalcitonin Result\" (>80% or <=80%) if Day 0 (or Day 1) and Day 4 values are available. Refer to http://[email or web address]-pct-calculator.com    Change in PCT <=80%  A decrease of PCT levels below or equal to 80% defines a positive change in PCT test result representing a higher risk for 28-day all-cause mortality of patients diagnosed with severe sepsis for septic shock.    Change in PCT >80%  A decrease of PCT levels of more than 80% defines a negative change in PCT result representing a lower risk for 28-day all-cause mortality of patients diagnosed with severe sepsis or septic shock.    This test is Prognostic not Diagnostic, if elevated correlate with clinical findings before administering antibiotic treatment.        "           Medications:   amLODIPine, 5 mg, Oral, Daily  ARIPiprazole, 15 mg, Oral, Daily  atorvastatin, 40 mg, Oral, Nightly  empagliflozin, 10 mg, Oral, Daily  insulin lispro, 2-7 Units, Subcutaneous, TID With Meals  losartan, 100 mg, Oral, Daily  nicotine, 1 patch, Transdermal, Q24H  pantoprazole, 40 mg, Oral, Q AM  tamsulosin, 0.4 mg, Oral, Nightly          Assessment / Plan       Active Hospital Problems:  Active Hospital Problems    Diagnosis    • **Major depressive disorder, recurrent episode, moderate (HCC)        Plan:     Medicine team has ordered labs and following for upper respiratory infection and other medical problems including elevated alkaline phosphatase  Encouraged patient to be up and out in the milieu, but to wear a mask due to possible illness  Work on mood stabilization and abatement of any suicidal ideation or psychosis.  Work on appropriate safety plan  Continue supportive therapy  Patient to engage in all group and individual treatment modalities available on the unit  Obtain collateral information if possible  Titrate medications as clinically indicated  Work on appropriate disposition follow-up including referrals to substance abuse treatment if indicated      Disposition:  I expect patient to be discharged 2 to 3 days.    Part of this note may be an electronic transcription/translation of spoken language to printed text using the Dragon dictation system.         Electronically signed by Felton Garduno MD, 03/01/23, 10:44 AM EST.

## 2023-03-01 NOTE — CONSULTS
[facility]   Hospitalist Consult Note  Date: 3/1/2023   Patient Name: Regulo Sepulveda  : 1965  MRN: [id number]  Primary Care Physician:  Provider, No Known  Referring Physician: No Known Provider  Date of admission: 2023    Subjective   Subjective     Reason for Consult/ Chief Complaint: Medical management cough wheezing    HPI:  Regulo Sepulveda is a 57 y.o. male past medical history significant for type 2 diabetes hypertension COPD bipolar disorder and anxiety initially presents emergency department with homicidal and suicidal ideations patient was stabilized in the emergency department has been admitted to the psychiatric cordero at [facility] hospitalist have been consulted for medical management shortness of air and wheezing.  The patient is currently resting comfortably complains of cough sputum production and intermittent shortness of air.      Personal History     Past Medical History:  Past Medical History:   Diagnosis Date   • Anxiety    • Bipolar affective (HCC)    • COPD (chronic obstructive pulmonary disease) ([facility])    • Depression    • Diabetes mellitus ([facility])    • Hypertension          Past Surgical History:  History reviewed. No pertinent surgical history.      Family History:   Family History   Problem Relation Age of Onset   • Diabetes Mother    • Depression Sister    • Depression Brother          Social History:   Social History     Socioeconomic History   • Marital status: Single   Tobacco Use   • Smoking status: Every Day     Packs/day: 1.00     Types: Cigarettes   • Smokeless tobacco: Never   Vaping Use   • Vaping Use: Never used   Substance and Sexual Activity   • Alcohol use: Not Currently   • Drug use: Yes     Types: Methamphetamines     Comment: LAST USED 4 DAYS AGO   • Sexual activity: Defer         Home Medications:  OLANZapine, Salmeterol Xinafoate, alfuzosin, amLODIPine, atorvastatin, dapagliflozin, losartan, metFORMIN, pantoprazole, and venlafaxine XR    Allergies:  No  Known Allergies    Review of Systems   All systems were reviewed and negative except for: Generalized weakness fatigue pain shortness of air cough sputum production    Objective    Objective     Vitals:   Temp:  [97.7 °F (36.5 °C)-98.1 °F (36.7 °C)] 98.1 °F (36.7 °C)  Heart Rate:  [82-84] 82  Resp:  [16-18] 16  BP: (147-150)/() 147/102    Physical Exam:   Constitutional: Awake, alert, no acute distress   Eyes: Pupils equal, sclerae anicteric, no conjunctival injection   HENT: NCAT, mucous membranes moist   Neck: Supple, no thyromegaly, no lymphadenopathy, trachea midline   Respiratory: Scattered expiratory wheezes nonlabored respirations    Cardiovascular: RRR, no murmurs, rubs, or gallops, palpable pedal pulses bilaterally   Gastrointestinal: Positive bowel sounds, soft, nontender, nondistended   Musculoskeletal: No bilateral ankle edema, no clubbing or cyanosis to extremities   Psychiatric: Flat affect depressed mood   Neurologic: Oriented x 3, strength symmetric in all extremities, Cranial Nerves grossly intact to confrontation, speech clear   Skin: No rashes     Result Review    Result Review:  I have personally reviewed the results from the time of this admission to 3/1/2023 13:05 EST and agree with these findings:  [x]  Laboratory  []  Microbiology  []  Radiology  []  EKG/Telemetry   []  Cardiology/Vascular   []  Pathology  []  Old records  []  Other:    Assessment & Plan   Assessment / Plan   Assessment:    Depression  Suicidal/homicidal ideation  Acute exacerbation of chronic obstructive pulmonary disease  Possible acute bronchitis/community-acquired pneumonia  Elevated BNP  Mildly elevated alk phos  Type 2 diabetes  Hypertension  Hyperlipidemia  Chronic and ongoing tobacco abuse with cigarettes  Hepatitis C    Plan:    We agree with current medical management of Dr. Pacheco additionally would recommend the following:  Patient with long history of tobacco abuse ongoing shortness of air sputum production  and wheezing start Symbicort albuterol and Spiriva inhalers start oral doxycycline short 5-day course.  Consider adding steroid if no improvement  Patient with elevated BNP we will check 2D echocardiogram does not appear volume overloaded at this time.  Recheck labs in the morning to include hepatic function  Sliding-scale insulin per protocol  Check thyroid profile B12 and folate levels  Check mycoplasma IgM check strep pneumo urine antigen and Legionella urine antigen  Further treatment contingent upon his hospital course  Discussed with RN  DVT prophylaxis:  Mechanical DVT prophylaxis orders are present.    CODE STATUS:    Code Status (Patient has no pulse and is not breathing): CPR (Attempt to Resuscitate)  Medical Interventions (Patient has pulse or is breathing): Full Support      Electronically signed by ARIEL Mota, 03/01/23, 1:05 PM EST.

## 2023-03-01 NOTE — PLAN OF CARE
Goal Outcome Evaluation:      Patient was awake and alert upon assessment,clear speech, pleasant mood, polite, flat affect, poor eye contact, pt reports SI, but denies a plan, denies HI ,and denies AVH. Patient reports anxiety 8/10 and depression 8/10. Pt contract for safety, able to make needs known, pt is slightly dismissive and hard to engage, medication compliant, but refused Flomax due to frequent urination. Patient has slept throughout night with no issues, will continue to monitor and provide safe environment.

## 2023-03-01 NOTE — PLAN OF CARE
Goal Outcome Evaluation:  Plan of Care Reviewed With: patient  Patient Agreement with Plan of Care: agrees         Pt has been withdrawn to bed most of the day but was observed in dayroom one time.  He reports SI with no intent or plan. He denies HI, AVH, and contracts for safety.  He rates anxiety, depression 8.  He is guarded.  He is able to make his needs known and compliant with medication.  Will continue to monitor.

## 2023-03-02 PROBLEM — I10 HYPERTENSIVE DISORDER: Status: ACTIVE | Noted: 2021-10-06

## 2023-03-02 PROBLEM — J44.9 CHRONIC OBSTRUCTIVE PULMONARY DISEASE: Status: ACTIVE | Noted: 2021-10-06

## 2023-03-02 PROBLEM — E11.9 DIABETES MELLITUS (HCC): Status: ACTIVE | Noted: 2021-10-06

## 2023-03-02 PROBLEM — B19.20 HEPATITIS C: Status: ACTIVE | Noted: 2023-03-02

## 2023-03-02 PROBLEM — E78.5 HYPERLIPIDEMIA: Status: ACTIVE | Noted: 2023-03-02

## 2023-03-02 PROBLEM — F17.210 CIGARETTE NICOTINE DEPENDENCE: Status: ACTIVE | Noted: 2023-03-02

## 2023-03-02 LAB
ALBUMIN SERPL-MCNC: 3.5 G/DL (ref 3.5–5.2)
ALP SERPL-CCNC: 150 U/L (ref 39–117)
ALT SERPL W P-5'-P-CCNC: 19 U/L (ref 1–41)
ANION GAP SERPL CALCULATED.3IONS-SCNC: 12.3 MMOL/L (ref 5–15)
AST SERPL-CCNC: 16 U/L (ref 1–40)
BILIRUB CONJ SERPL-MCNC: <0.2 MG/DL (ref 0–0.3)
BILIRUB INDIRECT SERPL-MCNC: ABNORMAL MG/DL
BILIRUB SERPL-MCNC: 0.4 MG/DL (ref 0–1.2)
BUN SERPL-MCNC: 14 MG/DL (ref 6–20)
BUN/CREAT SERPL: 13.3 (ref 7–25)
CALCIUM SPEC-SCNC: 9.1 MG/DL (ref 8.6–10.5)
CHLORIDE SERPL-SCNC: 100 MMOL/L (ref 98–107)
CO2 SERPL-SCNC: 26.7 MMOL/L (ref 22–29)
CREAT SERPL-MCNC: 1.05 MG/DL (ref 0.76–1.27)
DEPRECATED RDW RBC AUTO: 43.7 FL (ref 37–54)
EGFRCR SERPLBLD CKD-EPI 2021: 82.8 ML/MIN/1.73
ERYTHROCYTE [DISTWIDTH] IN BLOOD BY AUTOMATED COUNT: 13.7 % (ref 12.3–15.4)
FOLATE SERPL-MCNC: 6.39 NG/ML (ref 4.78–24.2)
GLUCOSE BLDC GLUCOMTR-MCNC: 112 MG/DL (ref 70–99)
GLUCOSE BLDC GLUCOMTR-MCNC: 129 MG/DL (ref 70–99)
GLUCOSE BLDC GLUCOMTR-MCNC: 86 MG/DL (ref 70–99)
GLUCOSE SERPL-MCNC: 116 MG/DL (ref 65–99)
HBA1C MFR BLD: 5.9 % (ref 4.8–5.6)
HCT VFR BLD AUTO: 42.8 % (ref 37.5–51)
HGB BLD-MCNC: 14.6 G/DL (ref 13–17.7)
MAGNESIUM SERPL-MCNC: 1.7 MG/DL (ref 1.6–2.6)
MCH RBC QN AUTO: 29.7 PG (ref 26.6–33)
MCHC RBC AUTO-ENTMCNC: 34.1 G/DL (ref 31.5–35.7)
MCV RBC AUTO: 87 FL (ref 79–97)
PHOSPHATE SERPL-MCNC: 3.8 MG/DL (ref 2.5–4.5)
PLATELET # BLD AUTO: 319 10*3/MM3 (ref 140–450)
PMV BLD AUTO: 9.6 FL (ref 6–12)
POTASSIUM SERPL-SCNC: 3.3 MMOL/L (ref 3.5–5.2)
PROT SERPL-MCNC: 6.4 G/DL (ref 6–8.5)
RBC # BLD AUTO: 4.92 10*6/MM3 (ref 4.14–5.8)
SODIUM SERPL-SCNC: 139 MMOL/L (ref 136–145)
T4 FREE SERPL-MCNC: 0.99 NG/DL (ref 0.93–1.7)
TSH SERPL DL<=0.05 MIU/L-ACNC: 1.41 UIU/ML (ref 0.27–4.2)
VIT B12 BLD-MCNC: 384 PG/ML (ref 211–946)
WBC NRBC COR # BLD: 8.34 10*3/MM3 (ref 3.4–10.8)

## 2023-03-02 PROCEDURE — 94799 UNLISTED PULMONARY SVC/PX: CPT

## 2023-03-02 PROCEDURE — 84443 ASSAY THYROID STIM HORMONE: CPT | Performed by: PHYSICIAN ASSISTANT

## 2023-03-02 PROCEDURE — 94760 N-INVAS EAR/PLS OXIMETRY 1: CPT

## 2023-03-02 PROCEDURE — 82962 GLUCOSE BLOOD TEST: CPT

## 2023-03-02 PROCEDURE — 85027 COMPLETE CBC AUTOMATED: CPT | Performed by: PHYSICIAN ASSISTANT

## 2023-03-02 PROCEDURE — 83735 ASSAY OF MAGNESIUM: CPT | Performed by: PHYSICIAN ASSISTANT

## 2023-03-02 PROCEDURE — 82607 VITAMIN B-12: CPT | Performed by: PHYSICIAN ASSISTANT

## 2023-03-02 PROCEDURE — 80076 HEPATIC FUNCTION PANEL: CPT | Performed by: PHYSICIAN ASSISTANT

## 2023-03-02 PROCEDURE — 80048 BASIC METABOLIC PNL TOTAL CA: CPT | Performed by: PHYSICIAN ASSISTANT

## 2023-03-02 PROCEDURE — 84439 ASSAY OF FREE THYROXINE: CPT | Performed by: PHYSICIAN ASSISTANT

## 2023-03-02 PROCEDURE — 82746 ASSAY OF FOLIC ACID SERUM: CPT | Performed by: PHYSICIAN ASSISTANT

## 2023-03-02 PROCEDURE — 84100 ASSAY OF PHOSPHORUS: CPT | Performed by: PHYSICIAN ASSISTANT

## 2023-03-02 PROCEDURE — 83036 HEMOGLOBIN GLYCOSYLATED A1C: CPT | Performed by: PHYSICIAN ASSISTANT

## 2023-03-02 RX ORDER — CHOLECALCIFEROL (VITAMIN D3) 125 MCG
1000 CAPSULE ORAL DAILY
Status: DISCONTINUED | OUTPATIENT
Start: 2023-03-02 | End: 2023-03-02

## 2023-03-02 RX ORDER — CHOLECALCIFEROL (VITAMIN D3) 125 MCG
500 CAPSULE ORAL DAILY
Status: DISCONTINUED | OUTPATIENT
Start: 2023-03-03 | End: 2023-03-03 | Stop reason: HOSPADM

## 2023-03-02 RX ORDER — POTASSIUM CHLORIDE 750 MG/1
30 CAPSULE, EXTENDED RELEASE ORAL ONCE
Status: COMPLETED | OUTPATIENT
Start: 2023-03-02 | End: 2023-03-02

## 2023-03-02 RX ADMIN — CYANOCOBALAMIN TAB 500 MCG 1000 MCG: 500 TAB at 13:37

## 2023-03-02 RX ADMIN — TIOTROPIUM BROMIDE INHALATION SPRAY 2 PUFF: 3.12 SPRAY, METERED RESPIRATORY (INHALATION) at 08:41

## 2023-03-02 RX ADMIN — ATORVASTATIN CALCIUM 40 MG: 40 TABLET, FILM COATED ORAL at 20:52

## 2023-03-02 RX ADMIN — TAMSULOSIN HYDROCHLORIDE 0.4 MG: 0.4 CAPSULE ORAL at 20:52

## 2023-03-02 RX ADMIN — AMLODIPINE BESYLATE 5 MG: 5 TABLET ORAL at 08:13

## 2023-03-02 RX ADMIN — GUAIFENESIN 600 MG: 600 TABLET ORAL at 20:52

## 2023-03-02 RX ADMIN — ARIPIPRAZOLE 15 MG: 15 TABLET ORAL at 08:13

## 2023-03-02 RX ADMIN — DOXYCYCLINE 100 MG: 100 CAPSULE ORAL at 08:13

## 2023-03-02 RX ADMIN — EMPAGLIFLOZIN 10 MG: 10 TABLET, FILM COATED ORAL at 08:13

## 2023-03-02 RX ADMIN — BUDESONIDE AND FORMOTEROL FUMARATE DIHYDRATE 2 PUFF: 160; 4.5 AEROSOL RESPIRATORY (INHALATION) at 19:50

## 2023-03-02 RX ADMIN — POTASSIUM CHLORIDE 30 MEQ: 10 CAPSULE, COATED, EXTENDED RELEASE ORAL at 10:48

## 2023-03-02 RX ADMIN — BUDESONIDE AND FORMOTEROL FUMARATE DIHYDRATE 2 PUFF: 160; 4.5 AEROSOL RESPIRATORY (INHALATION) at 08:41

## 2023-03-02 RX ADMIN — GUAIFENESIN 600 MG: 600 TABLET ORAL at 08:13

## 2023-03-02 RX ADMIN — DOXYCYCLINE 100 MG: 100 CAPSULE ORAL at 20:52

## 2023-03-02 RX ADMIN — LOSARTAN POTASSIUM 100 MG: 50 TABLET, FILM COATED ORAL at 08:13

## 2023-03-02 RX ADMIN — PANTOPRAZOLE SODIUM 40 MG: 40 TABLET, DELAYED RELEASE ORAL at 06:31

## 2023-03-02 NOTE — PLAN OF CARE
Goal Outcome Evaluation:  Plan of Care Reviewed With: patient  Patient Agreement with Plan of Care: agrees     Progress: improving     Patient was awake and alert upon assessment clear speech, pt denies SI, HI and denies A/V/H. Clear speech, poor eye contact, depressed mood, flat affect, rates anxiety 8/10 and depression 8/10, pt has been more talkative today, out in dayroom socializing, walking the conner, medication complaint, eating meals 100%, drinking fluids, contracts for safety, will continue to monitor and provide safe environment.

## 2023-03-02 NOTE — PROGRESS NOTES
" [facility]     Psychiatric Progress Note    Patient Name: Regulo Sepulveda  : 1965  MRN: [id number]  Primary Care Physician:  Provider, No Known  Date of admission: 2023    Subjective   Subjective     Patient seen and chart reviewed, discussed with staff.    Chief Complaint: Depression      HPI:     Patient is been seen by the medical team and I have reviewed her note and plan and agree and appreciate their input.    Staff reports patient been denying suicidal or homicidal ideation as well as any hallucinations.  Continues to have some somatic complaints.  Has been irritable at times but not agitated acutely.  Has been up and out of his room.    Today the patient is up and out in the milieu.  He does appear improved and has a better affect.  Reports that he is feeling better and no suicidal ideations today.  Reports he slept well last night and his mood is improved.  He has talked to his  and is agreeable to a plan to going to a group home house.  States prior to coming to the hospital he been seen at [facility] as well as begin receiving aripiprazole injections, but that he did not receive it.  Has been tolerating oral aripiprazole here and may initiate injectable aripiprazole prior to discharge      Objective   Objective     Vitals:   Temp:  [97.8 °F (36.6 °C)] 97.8 °F (36.6 °C)  Heart Rate:  [85-98] 98  Resp:  [16] 16  BP: (129-141)/(85-91) 141/91    Being followed by medical team      Mental Status Exam:      Appearance:   Appears better, up and out in the day room, more engaging,  Reliability:   Good  Eye Contact:   Good  Concentration/Focus:    Attentive to the interview  Behaviors:    No restlessness or agitation  Memory :    Sensorium intact  Speech:    Normal rate and volume  Language:   Appropriate, relevant  Mood :    \"Better, okay\"  Affect:    Improved but constricted  Thought process:    Linear, goal directed, sequential, future oriented, no paranoia or " delusional thinking  Thought Content:    Denies suicidal or homicidal ideation, no auditory visualization  Insight:   Improving  Judgement:    Intact, no behavioral disturbance      Result Review    Result Review:  I have personally reviewed the results from the time of this admission to 3/2/2023 11:11 EST and agree with these findings:  [x]  Laboratory  []  Microbiology  []  Radiology  []  EKG/Telemetry   []  Cardiology/Vascular   []  Pathology  []  Old records  []  Other:  Most notable findings include:     Lab Results (last 24 hours)     Procedure Component Value Units Date/Time    Folate [[id number]]  (Normal) Collected: 03/02/23 0453    Specimen: Blood Updated: 03/02/23 1014     Folate 6.39 ng/mL     Narrative:      Results may be falsely increased if patient taking Biotin.      Vitamin B12 [[id number]]  (Normal) Collected: 03/02/23 0453    Specimen: Blood Updated: 03/02/23 1014     Vitamin B-12 384 pg/mL     Narrative:      Results may be falsely increased if patient taking Biotin.      Hemoglobin A1c [[id number]]  (Abnormal) Collected: 03/02/23 0453    Specimen: Blood Updated: 03/02/23 1000     Hemoglobin A1C 5.90 %     Narrative:      Hemoglobin A1C Ranges:    Increased Risk for Diabetes  5.7% to 6.4%  Diabetes                     >= 6.5%  Diabetic Goal                < 7.0%    POC Glucose Once [[id number]]  (Abnormal) Collected: 03/02/23 0739    Specimen: Blood Updated: 03/02/23 0740     Glucose 112 mg/dL      Comment: Serial Number: [id number]:  [id number]       Basic Metabolic Panel [[id number]]  (Abnormal) Collected: 03/02/23 0453    Specimen: Blood Updated: 03/02/23 0605     Glucose 116 mg/dL      BUN 14 mg/dL      Creatinine 1.05 mg/dL      Sodium 139 mmol/L      Potassium 3.3 mmol/L      Chloride 100 mmol/L      CO2 26.7 mmol/L      Calcium 9.1 mg/dL      BUN/Creatinine Ratio 13.3     Anion Gap 12.3 mmol/L      eGFR 82.8 mL/min/1.73     Narrative:      GFR Normal >60  Chronic Kidney Disease  <60  Kidney Failure <15      Hepatic Function Panel [[id number]]  (Abnormal) Collected: 03/02/23 0453    Specimen: Blood Updated: 03/02/23 0605     Total Protein 6.4 g/dL      Albumin 3.5 g/dL      ALT (SGPT) 19 U/L      AST (SGOT) 16 U/L      Alkaline Phosphatase 150 U/L      Total Bilirubin 0.4 mg/dL      Bilirubin, Direct <0.2 mg/dL      Bilirubin, Indirect --     Comment: Unable to calculate       Magnesium [[id number]]  (Normal) Collected: 03/02/23 0453    Specimen: Blood Updated: 03/02/23 0605     Magnesium 1.7 mg/dL     Phosphorus [[id number]]  (Normal) Collected: 03/02/23 0453    Specimen: Blood Updated: 03/02/23 0605     Phosphorus 3.8 mg/dL     T4, Free [[id number]]  (Normal) Collected: 03/02/23 0453    Specimen: Blood Updated: 03/02/23 0556     Free T4 0.99 ng/dL     Narrative:      Results may be falsely increased if patient taking Biotin.      TSH [[id number]]  (Normal) Collected: 03/02/23 0453    Specimen: Blood Updated: 03/02/23 0555     TSH 1.410 uIU/mL     CBC (No Diff) [[id number]]  (Normal) Collected: 03/02/23 0453    Specimen: Blood Updated: 03/02/23 0523     WBC 8.34 10*3/mm3      RBC 4.92 10*6/mm3      Hemoglobin 14.6 g/dL      Hematocrit 42.8 %      MCV 87.0 fL      MCH 29.7 pg      MCHC 34.1 g/dL      RDW 13.7 %      RDW-SD 43.7 fl      MPV 9.6 fL      Platelets 319 10*3/mm3     S. Pneumo Ag Urine or CSF - Urine, Urine, Clean Catch [[id number]]  (Normal) Collected: 03/01/23 1540    Specimen: Urine, Clean Catch Updated: 03/01/23 1609     Strep Pneumo Ag Negative    Legionella Antigen, Urine - Urine, Urine, Clean Catch [[id number]]  (Normal) Collected: 03/01/23 1540    Specimen: Urine, Clean Catch Updated: 03/01/23 1609     LEGIONELLA ANTIGEN, URINE Negative    Mycoplasma Pneumoniae Antibody, IgM - Blood, [[id number]]  (Normal) Collected: 02/27/23 1225    Specimen: Blood Updated: 03/01/23 1255     Mycoplasma pneumo IgM Negative              Medications:   amLODIPine, 5 mg, Oral, Daily  ARIPiprazole, 15  mg, Oral, Daily  atorvastatin, 40 mg, Oral, Nightly  budesonide-formoterol, 2 puff, Inhalation, BID - RT  doxycycline, 100 mg, Oral, Q12H  empagliflozin, 10 mg, Oral, Daily  guaiFENesin, 600 mg, Oral, Q12H  insulin lispro, 2-7 Units, Subcutaneous, TID With Meals  losartan, 100 mg, Oral, Daily  nicotine, 1 patch, Transdermal, Q24H  pantoprazole, 40 mg, Oral, Q AM  tamsulosin, 0.4 mg, Oral, Nightly  tiotropium bromide monohydrate, 2 puff, Inhalation, Daily - RT          Assessment / Plan       Active Hospital Problems:  Active Hospital Problems    Diagnosis    • **Major depressive disorder, recurrent episode, moderate (HCC)    • Hyperlipidemia    • Hepatitis C    • Cigarette nicotine dependence    • Chronic obstructive pulmonary disease (HCC)    • Diabetes mellitus (HCC)    • Hypertensive disorder        Plan:     Continue current treatment protocol and titrate medications as clinically indicated  Medicine to follow make treatment recommendations indicated  Patient will go to senior living [place] and staff will contact  to help coordinate this discharge plan  Work on mood stabilization and abatement of any suicidal ideation or psychosis.  Work on appropriate safety plan  Continue supportive therapy  Patient to engage in all group and individual treatment modalities available on the unit  Obtain collateral information if possible  Titrate medications as clinically indicated  Work on appropriate disposition follow-up including referrals to substance abuse treatment if indicated      Disposition:  I expect patient to be discharged 1 to 2 days.    Part of this note may be an electronic transcription/translation of spoken language to printed text using the Dragon dictation system.         Electronically signed by Felton Garduno MD, 03/02/23, 11:09 AM EST.

## 2023-03-02 NOTE — PLAN OF CARE
"Goal Outcome Evaluation:  Plan of Care Reviewed With: patient  Patient Agreement with Plan of Care: agrees     Progress: improving.  Patient has been withdrawn to room last evening, verbalizes that he doesn't feel good and he has anxiety around others. Patient has an irritable, depressed affect and is minimally cooperative with pm assessments.  Patient answered questions very rapidly, voicing annoyance that same questions are always asked.  Patient denies S/I, states still has H/I and anger but would not act on it, because he states \"I can't, I don't have a way.\"  Patient does not share insight.  Patient was cooperative with pm medications and states that he is coughing less and feels like the congestion  has \"broken loose.\"  No SOA at rest. Patient encouraged to drink fluids and was cooperative with this and ate a pm snack.  Patient rates anxiety at 8/10 and depression at 7/10.  Patient makes needs known with encouragement. No aggression or agitation present this shift. Safe environment provided.      "

## 2023-03-03 VITALS
RESPIRATION RATE: 16 BRPM | BODY MASS INDEX: 20.57 KG/M2 | SYSTOLIC BLOOD PRESSURE: 141 MMHG | TEMPERATURE: 97.9 F | DIASTOLIC BLOOD PRESSURE: 96 MMHG | OXYGEN SATURATION: 97 % | HEIGHT: 73 IN | HEART RATE: 102 BPM | WEIGHT: 155.2 LBS

## 2023-03-03 PROBLEM — N40.0 BPH (BENIGN PROSTATIC HYPERPLASIA): Status: ACTIVE | Noted: 2023-03-03

## 2023-03-03 PROBLEM — E53.8 B12 DEFICIENCY: Status: ACTIVE | Noted: 2023-03-03

## 2023-03-03 PROBLEM — K21.9 GERD (GASTROESOPHAGEAL REFLUX DISEASE): Status: ACTIVE | Noted: 2023-03-03

## 2023-03-03 LAB
GLUCOSE BLDC GLUCOMTR-MCNC: 120 MG/DL (ref 70–99)
GLUCOSE BLDC GLUCOMTR-MCNC: 89 MG/DL (ref 70–99)

## 2023-03-03 PROCEDURE — 82962 GLUCOSE BLOOD TEST: CPT

## 2023-03-03 PROCEDURE — 94799 UNLISTED PULMONARY SVC/PX: CPT

## 2023-03-03 RX ORDER — ATORVASTATIN CALCIUM 40 MG/1
40 TABLET, FILM COATED ORAL NIGHTLY
Qty: 30 TABLET | Refills: 1 | Status: SHIPPED | OUTPATIENT
Start: 2023-03-03

## 2023-03-03 RX ORDER — ARIPIPRAZOLE 15 MG/1
15 TABLET ORAL DAILY
Qty: 30 TABLET | Refills: 1 | Status: SHIPPED | OUTPATIENT
Start: 2023-03-04

## 2023-03-03 RX ORDER — LOSARTAN POTASSIUM 100 MG/1
100 TABLET ORAL DAILY
Qty: 30 TABLET | Refills: 1 | Status: SHIPPED | OUTPATIENT
Start: 2023-03-03

## 2023-03-03 RX ORDER — ALFUZOSIN HYDROCHLORIDE 10 MG/1
10 TABLET, EXTENDED RELEASE ORAL DAILY
Qty: 30 TABLET | Refills: 1 | Status: SHIPPED | OUTPATIENT
Start: 2023-03-03

## 2023-03-03 RX ORDER — DOXYCYCLINE 100 MG/1
100 CAPSULE ORAL EVERY 12 HOURS SCHEDULED
Qty: 5 CAPSULE | Refills: 0 | Status: SHIPPED | OUTPATIENT
Start: 2023-03-03 | End: 2023-03-06

## 2023-03-03 RX ORDER — DAPAGLIFLOZIN 5 MG/1
5 TABLET, FILM COATED ORAL DAILY
Qty: 30 TABLET | Refills: 1 | Status: SHIPPED | OUTPATIENT
Start: 2023-03-03

## 2023-03-03 RX ORDER — ALBUTEROL SULFATE 90 UG/1
2 AEROSOL, METERED RESPIRATORY (INHALATION) EVERY 4 HOURS PRN
Qty: 18 G | Refills: 0 | Status: SHIPPED | OUTPATIENT
Start: 2023-03-03

## 2023-03-03 RX ORDER — PANTOPRAZOLE SODIUM 40 MG/1
40 TABLET, DELAYED RELEASE ORAL DAILY
Qty: 30 TABLET | Refills: 1 | Status: SHIPPED | OUTPATIENT
Start: 2023-03-03

## 2023-03-03 RX ORDER — AMLODIPINE BESYLATE 5 MG/1
5 TABLET ORAL DAILY
Qty: 30 TABLET | Refills: 1 | Status: SHIPPED | OUTPATIENT
Start: 2023-03-03

## 2023-03-03 RX ADMIN — PANTOPRAZOLE SODIUM 40 MG: 40 TABLET, DELAYED RELEASE ORAL at 06:37

## 2023-03-03 RX ADMIN — GUAIFENESIN 600 MG: 600 TABLET ORAL at 08:45

## 2023-03-03 RX ADMIN — EMPAGLIFLOZIN 10 MG: 10 TABLET, FILM COATED ORAL at 08:46

## 2023-03-03 RX ADMIN — AMLODIPINE BESYLATE 5 MG: 5 TABLET ORAL at 08:46

## 2023-03-03 RX ADMIN — LOSARTAN POTASSIUM 100 MG: 50 TABLET, FILM COATED ORAL at 08:46

## 2023-03-03 RX ADMIN — DOXYCYCLINE 100 MG: 100 CAPSULE ORAL at 08:46

## 2023-03-03 RX ADMIN — ARIPIPRAZOLE 15 MG: 15 TABLET ORAL at 08:45

## 2023-03-03 RX ADMIN — BUDESONIDE AND FORMOTEROL FUMARATE DIHYDRATE 2 PUFF: 160; 4.5 AEROSOL RESPIRATORY (INHALATION) at 08:19

## 2023-03-03 RX ADMIN — CYANOCOBALAMIN TAB 500 MCG 500 MCG: 500 TAB at 08:45

## 2023-03-03 RX ADMIN — TIOTROPIUM BROMIDE INHALATION SPRAY 2 PUFF: 3.12 SPRAY, METERED RESPIRATORY (INHALATION) at 08:19

## 2023-03-03 NOTE — DISCHARGE SUMMARY
[facility]         DISCHARGE SUMMARY    Patient Name: Regulo Sepulveda  : 1965  MRN: [id number]    Date of Admission: 2023  Date of Discharge: 3/3/2023  Primary Care Physician: Provider, No Known    Consults     Date and Time Order Name Status Description    2023  8:59 AM Inpatient Hospitalist Consult            Presenting Problem:   Major depressive disorder, recurrent episode, moderate (HCC) [F33.1]    Active and Resolved Hospital Problems:  Active Hospital Problems    Diagnosis POA   • **Major depressive disorder, recurrent episode, moderate (HCC) [F33.1] Yes   • BPH (benign prostatic hyperplasia) [N40.0] Yes   • GERD (gastroesophageal reflux disease) [K21.9] Yes   • B12 deficiency [E53.8] Yes   • Hyperlipidemia [E78.5] Yes   • Hepatitis C [B19.20] Yes   • Cigarette nicotine dependence [F17.210] Yes   • Chronic obstructive pulmonary disease (HCC) [J44.9] Yes   • Diabetes mellitus (HCC) [E11.9] Yes   • Hypertensive disorder [I10] Yes      Resolved Hospital Problems   No resolved problems to display.         Hospital Course     Hospital Course:  Regulo Sepulveda is a 57 y.o. male depression with suicidal ideations.  Patient also reported some possible hallucinations.  He also presented with cough and was ill-appearing.    Patient does acknowledge depression as well as some psychosis.  He reported not doing well on antidepressants in the past.  Patient had recently failed venlafaxine.  Patient reported increasing irritability and was started on aripiprazole for mood stabilization, psychosis, as well as to help with depression.  He tolerated the medication well.  He had an increase in his mood and affect throughout the course of his hospital stay.    Patient did appear quite ill and had significant cough on admission.  He does have a history of COPD as well as hypertension and other medical problems.  Consultation was done patient was seen by Dr. Lipscomb.  Inhalers were  "reinitiated and the patient's breathing improved.  His cough resolved.  He was no longer having productive sputum.  Respiratory work-up was negative for flu, COVID, no other tested pathogens and felt to have a viral upper respiratory infection and exacerbation of COPD.    The patient showed significant premotor course of hospital stay.  His general health had significant improvement.  He initially was in bed and isolative regressed over the course of his hospital stay has been up and out in the milieu and engaged with peers and staff in appropriate fashion.  Initially had suicidal ideations and these dissipated after his first day of hospitalization.  Combination of improved general health as well as aripiprazole for mood disorder has had a positive impact on his overall wellbeing.    Patient is also talked to his  who has made a referral and found a bed in a long-term house.  He did have a recent relapse on substances and wants to get back to living a sober life.    On day of discharge she is calm, cooperative, engaging, future oriented and goal directed.  There is no acute anxiety or agitation.  He makes good eye contact.  There is no restlessness or agitation.  His speech is spontaneous, fluent, articulate, normal rate and volume.  Language is appropriate relevant.  Mood is described as \"good\" and he has an appropriate and congruent affect.  Thought processes are linear nature and goal directed.  Thought content is negative for suicidal or homicidal ideation there is no auditory visualizations.    Agreeable to a [facility], [place] and following up with [facility]        DISCHARGE Follow Up Recommendations for labs and diagnostics: Lipid and glucose monitoring, primary care for general health, [facility] health, [facility], [place]      Day of Discharge     Vital Signs:  Temp:  [97.7 °F (36.5 °C)-97.9 °F (36.6 °C)] 97.9 °F (36.6 °C)  Heart Rate:  [] 102  Resp:  [16-20] 16  BP: " (141-147)/(91-96) 141/96      Pertinent  and/or Most Recent Results     LAB RESULTS:      Lab 03/02/23 0453 03/01/23 0435 02/27/23  1845 02/27/23  1225   WBC 8.34 8.05  --  9.69   HEMOGLOBIN 14.6 14.3  --  14.5   HEMATOCRIT 42.8 42.9  --  43.2   PLATELETS 319 323  --  342   NEUTROS ABS  --  4.63  --  6.65   IMMATURE GRANS (ABS)  --  0.02  --  0.02   LYMPHS ABS  --  2.52  --  2.13   MONOS ABS  --  0.71  --  0.78   EOS ABS  --  0.12  --  0.08   MCV 87.0 87.6  --  87.4   PROCALCITONIN  --   --  0.05  --          Lab 03/02/23 0453 03/01/23  0435 02/27/23  1225   SODIUM 139 137 139   POTASSIUM 3.3* 3.6 3.5   CHLORIDE 100 100 102   CO2 26.7 29.8* 28.9   ANION GAP 12.3 7.2 8.1   BUN 14 17 18   CREATININE 1.05 1.12 1.19   EGFR 82.8 76.6 71.2   GLUCOSE 116* 138* 99   CALCIUM 9.1 9.3 9.6   MAGNESIUM 1.7  --   --    PHOSPHORUS 3.8  --   --    HEMOGLOBIN A1C 5.90*  --   --    TSH 1.410  --   --          Lab 03/02/23 0453 03/01/23  0435 02/27/23  1225   TOTAL PROTEIN 6.4 6.1 6.8   ALBUMIN 3.5 3.5 3.8   GLOBULIN  --  2.6 3.0   ALT (SGPT) 19 21 23   AST (SGOT) 16 17 19   BILIRUBIN 0.4 0.3 0.4   BILIRUBIN DIRECT <0.2  --   --    ALK PHOS 150* 152* 161*         Lab 02/27/23  1845 02/27/23  1720 02/27/23  1225   PROBNP  --   --  3,584.0*   HSTROP T 16* 15* 16*             Lab 03/02/23 0453   FOLATE 6.39   VITAMIN B 12 384                     Lab 02/27/23  2210   COVID19 Not Detected         Lab 02/27/23  1225   ETHANOL PCT <0.010   ETHANOL MGDL <10         Lab 02/27/23  1350   AMPH/METHAM SCREEN, URINE Positive*   BENZODIAZEPINE SCREEN, URINE Negative   COCAINE SCREEN, URINE Negative   OPIATES Negative   THC URINE SCREEN Negative   METHADONE SCREEN, URINE Negative     Brief Urine Lab Results  (Last result in the past 365 days)      Color   Clarity   Blood   Leuk Est   Nitrite   Protein   CREAT   Urine HCG        02/27/23 1350 Yellow   Clear   Trace   Negative   Negative   100 mg/dL (2+)                    XR Chest 1  View    Result Date: 2/27/2023  Impression:  No active disease is seen.       ROBERTO HARKINS MD       Electronically Signed and Approved By: ROBERTO HARKINS MD on 2/27/2023 at 12:30                           Imaging Results (Last 7 Days)     ** No results found for the last 168 hours. **           Labs Pending at Discharge:           Discharge Details        Discharge Medications      New Medications      Instructions Start Date   ARIPiprazole 15 MG tablet  Commonly known as: ABILIFY   15 mg, Oral, Daily   Start Date: March 4, 2023     cyanocobalamin 500 MCG tablet  Commonly known as: VITAMIN B-12   500 mcg, Oral, Daily   Start Date: March 4, 2023        Changes to Medications      Instructions Start Date   atorvastatin 40 MG tablet  Commonly known as: LIPITOR  What changed:   how much to take  how to take this  when to take this   40 mg, Oral, Nightly      Farxiga 5 MG tablet tablet  Generic drug: dapagliflozin  What changed:   how much to take  how to take this  when to take this   5 mg, Oral, Daily      losartan 100 MG tablet  Commonly known as: COZAAR  What changed:   how much to take  how to take this  when to take this   100 mg, Oral, Daily      metFORMIN 1000 MG tablet  Commonly known as: GLUCOPHAGE  What changed:   how much to take  how to take this  when to take this   500 mg, Oral, 2 Times Daily With Meals      pantoprazole 40 MG EC tablet  Commonly known as: PROTONIX  What changed:   how much to take  how to take this  when to take this   40 mg, Oral, Daily         Continue These Medications      Instructions Start Date   alfuzosin 10 MG 24 hr tablet  Commonly known as: UROXATRAL   10 mg, Oral, Daily      amLODIPine 5 MG tablet  Commonly known as: NORVASC   5 mg, Oral, Daily      Serevent Diskus 50 MCG/ACT diskus inhaler  Generic drug: Salmeterol Xinafoate   No dose, route, or frequency recorded.         Stop These Medications    OLANZapine 10 MG tablet  Commonly known as: zyPREXA     venlafaxine  MG  24 hr capsule  Commonly known as: EFFEXOR-XR            No Known Allergies      Discharge Disposition:  Psychiatric Hospital or Unit (DC - External)    Diet:  Hospital:  Diet Order   Procedures   • Diet: Diabetic Diets; Consistent Carbohydrate; Texture: Regular Texture (IDDSI 7); Fluid Consistency: Thin (IDDSI 0)         Discharge Activity:   Activity Instructions     Activity as Tolerated            Discharge Condition: Stable    CODE STATUS:  Code Status and Medical Interventions:   Ordered at: 02/27/23 9963     Code Status (Patient has no pulse and is not breathing):    CPR (Attempt to Resuscitate)     Medical Interventions (Patient has pulse or is breathing):    Full Support         No future appointments.    Additional Instructions for the Follow-ups that You Need to Schedule     Discharge Follow-up with PCP   As directed       Currently Documented PCP:    Provider, No Known    PCP Phone Number:    None     Follow Up Details: 2 to 4 weeks         Discharge Follow-up with Specified Provider: Tal   As directed      To: [facility]         Discharge Follow-up with Specified Provider: custodial house   As directed      To: custodial house               Time spent on Discharge including face to face service: 40 minutes    Part of this note may be an electronic transcription/translation of spoken language to printed text using the Dragon dictation system.        Electronically signed by Felton Garduno MD, 03/03/23, 12:49 PM EST.

## 2023-03-03 NOTE — PLAN OF CARE
Goal Outcome Evaluation:  Plan of Care Reviewed With: patient  Patient Agreement with Plan of Care: agrees     Progress: improving   Patient calm and cooperative. Denies SI/HI/AVH. Rates anxiety and depression 2. Safety plan completed by patient prior to discharge.

## 2023-03-03 NOTE — PROGRESS NOTES
" [facility]     Psychiatric Progress Note    Patient Name: Regulo Sepulveda  : 1965  MRN: [id number]  Primary Care Physician:  Provider, No Known  Date of admission: 2023    Subjective   Subjective     Patient seen and chart reviewed, discussed with staff.    Chief Complaint: Depression      HPI:     Staff reports the patient has been compliant with treatment medications.  Reports that he is feeling better.  Depression and anxiety rated as a 2.  Slept last night.  Look forward to going to a detention house.    Patient today is calm and cooperative.  He is up and out the milieu.  He reports he feels better mentally and physically.  Patient is calm and cooperative.  He continues to deny any suicidal or homicidal ideations today.  Has no hallucinations.  Has talked with his  looking forward to going to a detention house.      Objective   Objective     Vitals:   Temp:  [97.7 °F (36.5 °C)-97.9 °F (36.6 °C)] 97.9 °F (36.6 °C)  Heart Rate:  [] 102  Resp:  [16-20] 16  BP: (141-147)/(91-96) 141/96          Mental Status Exam:      Appearance:   Well-developed, well-nourished, nondistressed, improved does not appear ill  Reliability:   Good  Eye Contact:   Good  Concentration/Focus:    Attentive to the interview  Behaviors:    No restlessness or agitation  Memory :    Sensorium intact, no deficit  Speech:    Normal rate and volume, spontaneous  Language:   Appropriate, relevant  Mood :    \"Good\"  Affect:    Improved, constricted  Thought process:    Linear, goal directed, future oriented  Thought Content:    Denies suicidal or homicidal ideation and auditory visual examinations  Insight:   Good  Judgement:    Intact, no behavioral      Result Review    Result Review:  I have personally reviewed the results from the time of this admission to 3/3/2023 10:40 EST and agree with these findings:  [x]  Laboratory  []  Microbiology  []  Radiology  []  EKG/Telemetry   []  Cardiology/Vascular   []  " Pathology  []  Old records  []  Other:  Most notable findings include:     Lab Results (last 24 hours)     Procedure Component Value Units Date/Time    POC Glucose Once [[id number]]  (Abnormal) Collected: 03/03/23 0741    Specimen: Blood Updated: 03/03/23 0743     Glucose 120 mg/dL      Comment: Serial Number: [id number]:  [id number]       POC Glucose Once [[id number]]  (Abnormal) Collected: 03/02/23 1633    Specimen: Blood Updated: 03/02/23 1635     Glucose 129 mg/dL      Comment: Serial Number: [id number]:  [id number]       POC Glucose Once [[id number]]  (Normal) Collected: 03/02/23 1139    Specimen: Blood Updated: 03/02/23 1140     Glucose 86 mg/dL      Comment: Serial Number: [id number]:  [id number]                 Medications:   amLODIPine, 5 mg, Oral, Daily  ARIPiprazole, 15 mg, Oral, Daily  atorvastatin, 40 mg, Oral, Nightly  budesonide-formoterol, 2 puff, Inhalation, BID - RT  doxycycline, 100 mg, Oral, Q12H  empagliflozin, 10 mg, Oral, Daily  guaiFENesin, 600 mg, Oral, Q12H  insulin lispro, 2-7 Units, Subcutaneous, TID With Meals  losartan, 100 mg, Oral, Daily  nicotine, 1 patch, Transdermal, Q24H  pantoprazole, 40 mg, Oral, Q AM  tamsulosin, 0.4 mg, Oral, Nightly  tiotropium bromide monohydrate, 2 puff, Inhalation, Daily - RT  vitamin B-12, 500 mcg, Oral, Daily          Assessment / Plan       Active Hospital Problems:  Active Hospital Problems    Diagnosis    • **Major depressive disorder, recurrent episode, moderate (HCC)    • Hyperlipidemia    • Hepatitis C    • Cigarette nicotine dependence    • Chronic obstructive pulmonary disease (HCC)    • Diabetes mellitus (HCC)    • Hypertensive disorder        Plan:     Continue current treatment protocol and titrate medications as clinically indicated  Work on mood stabilization and abatement of any suicidal ideation or psychosis.  Work on appropriate safety plan  Continue supportive therapy  Patient to engage in all group and individual  treatment modalities available on the unit  Obtain collateral information if possible  Titrate medications as clinically indicated  Work on appropriate disposition follow-up including referrals to substance abuse treatment if indicated      Disposition:  I expect patient to be discharged when bed at [facility] house found.    Part of this note may be an electronic transcription/translation of spoken language to printed text using the Dragon dictation system.         Electronically signed by Felton Garduno MD, 03/03/23, 10:40 AM EST.

## 2023-03-03 NOTE — PLAN OF CARE
Goal Outcome Evaluation:  Plan of Care Reviewed With: patient  Patient Agreement with Plan of Care: agrees     Progress: improving.  Patient has been staying in room this evening resting.  Patient has been more pleasant and talkative and states that he is physically feeling better.  Patient still has an infrequent cough, with some sputum production and patient states his chest is feeling less tight.  Patient reports he still has some SOA with exertion, O2 sat is WDL. Patient has been cooperative with pm medication and is drinking extra fluids as encouraged.  Patient denies S/I, H/I, and CFS, patient rates anxiety and depression at 6/10 which shows some improvement over the course of his stay.  Patient makes good eye contact and makes needs known.  Discussed positive thinking and coping with stress with patient and he was receptive.  Patient denies any A/V hallucinations.  Supportive care and safe environment provided.

## 2023-03-03 NOTE — SIGNIFICANT NOTE
03/03/23 1109   Plan   Plan Patient provided verbal authorization for staff to speak with Arnie Garcai, .   Patient/Family in Agreement with Plan yes  (Patient in agreement with placement by his .)   Final Note Patient informed staff he will discharge where probation and parole place him. Staff spoke with Arnie Garcia who advised she will place pt at [facility] and will arrange for his transportation today.

## 2023-03-26 LAB — QT INTERVAL: 366 MS

## 2023-08-27 ENCOUNTER — APPOINTMENT (OUTPATIENT)
Dept: GENERAL RADIOLOGY | Facility: HOSPITAL | Age: 58
DRG: 280 | End: 2023-08-27
Payer: COMMERCIAL

## 2023-08-27 ENCOUNTER — HOSPITAL ENCOUNTER (INPATIENT)
Facility: HOSPITAL | Age: 58
LOS: 6 days | Discharge: HOME HEALTH | DRG: 280 | End: 2023-09-02
Attending: EMERGENCY MEDICINE | Admitting: FAMILY MEDICINE
Payer: COMMERCIAL

## 2023-08-27 ENCOUNTER — APPOINTMENT (OUTPATIENT)
Dept: CT IMAGING | Facility: HOSPITAL | Age: 58
DRG: 280 | End: 2023-08-27
Payer: COMMERCIAL

## 2023-08-27 DIAGNOSIS — I48.91 NEW ONSET ATRIAL FIBRILLATION: ICD-10-CM

## 2023-08-27 DIAGNOSIS — Z78.9 DECREASED ACTIVITIES OF DAILY LIVING (ADL): ICD-10-CM

## 2023-08-27 DIAGNOSIS — I51.3 LV (LEFT VENTRICULAR) MURAL THROMBUS: ICD-10-CM

## 2023-08-27 DIAGNOSIS — I50.21 ACUTE SYSTOLIC CONGESTIVE HEART FAILURE: Primary | ICD-10-CM

## 2023-08-27 DIAGNOSIS — I50.9 ACUTE CONGESTIVE HEART FAILURE, UNSPECIFIED HEART FAILURE TYPE: ICD-10-CM

## 2023-08-27 LAB
ALBUMIN SERPL-MCNC: 3.9 G/DL (ref 3.5–5.2)
ALBUMIN/GLOB SERPL: 1.4 G/DL
ALP SERPL-CCNC: 212 U/L (ref 39–117)
ALT SERPL W P-5'-P-CCNC: 48 U/L (ref 1–41)
AMPHET+METHAMPHET UR QL: POSITIVE
ANION GAP SERPL CALCULATED.3IONS-SCNC: 9.6 MMOL/L (ref 5–15)
APTT PPP: 30 SECONDS (ref 78–95.9)
AST SERPL-CCNC: 27 U/L (ref 1–40)
BACTERIA UR QL AUTO: ABNORMAL /HPF
BARBITURATES UR QL SCN: NEGATIVE
BASOPHILS # BLD AUTO: 0.05 10*3/MM3 (ref 0–0.2)
BASOPHILS NFR BLD AUTO: 0.5 % (ref 0–1.5)
BENZODIAZ UR QL SCN: NEGATIVE
BILIRUB SERPL-MCNC: 0.9 MG/DL (ref 0–1.2)
BILIRUB UR QL STRIP: NEGATIVE
BUN SERPL-MCNC: 19 MG/DL (ref 6–20)
BUN/CREAT SERPL: 17.6 (ref 7–25)
CALCIUM SPEC-SCNC: 9.2 MG/DL (ref 8.6–10.5)
CANNABINOIDS SERPL QL: NEGATIVE
CHLORIDE SERPL-SCNC: 102 MMOL/L (ref 98–107)
CLARITY UR: CLEAR
CO2 SERPL-SCNC: 27.4 MMOL/L (ref 22–29)
COCAINE UR QL: NEGATIVE
COLOR UR: YELLOW
CREAT SERPL-MCNC: 1.08 MG/DL (ref 0.76–1.27)
DEPRECATED RDW RBC AUTO: 48.3 FL (ref 37–54)
EGFRCR SERPLBLD CKD-EPI 2021: 79.5 ML/MIN/1.73
EOSINOPHIL # BLD AUTO: 0.08 10*3/MM3 (ref 0–0.4)
EOSINOPHIL NFR BLD AUTO: 0.7 % (ref 0.3–6.2)
ERYTHROCYTE [DISTWIDTH] IN BLOOD BY AUTOMATED COUNT: 14.7 % (ref 12.3–15.4)
FENTANYL UR-MCNC: NEGATIVE NG/ML
GEN 5 2HR TROPONIN T REFLEX: 50 NG/L
GLOBULIN UR ELPH-MCNC: 2.8 GM/DL
GLUCOSE BLDC GLUCOMTR-MCNC: 116 MG/DL (ref 70–99)
GLUCOSE SERPL-MCNC: 156 MG/DL (ref 65–99)
GLUCOSE UR STRIP-MCNC: ABNORMAL MG/DL
HCT VFR BLD AUTO: 42.6 % (ref 37.5–51)
HGB BLD-MCNC: 14.1 G/DL (ref 13–17.7)
HGB UR QL STRIP.AUTO: ABNORMAL
HOLD SPECIMEN: NORMAL
HOLD SPECIMEN: NORMAL
HYALINE CASTS UR QL AUTO: ABNORMAL /LPF
IMM GRANULOCYTES # BLD AUTO: 0.03 10*3/MM3 (ref 0–0.05)
IMM GRANULOCYTES NFR BLD AUTO: 0.3 % (ref 0–0.5)
INR PPP: 1.02 (ref 0.86–1.15)
KETONES UR QL STRIP: NEGATIVE
LEUKOCYTE ESTERASE UR QL STRIP.AUTO: NEGATIVE
LIPASE SERPL-CCNC: 34 U/L (ref 13–60)
LYMPHOCYTES # BLD AUTO: 1.82 10*3/MM3 (ref 0.7–3.1)
LYMPHOCYTES NFR BLD AUTO: 16.5 % (ref 19.6–45.3)
MAGNESIUM SERPL-MCNC: 2 MG/DL (ref 1.6–2.6)
MCH RBC QN AUTO: 29.7 PG (ref 26.6–33)
MCHC RBC AUTO-ENTMCNC: 33.1 G/DL (ref 31.5–35.7)
MCV RBC AUTO: 89.9 FL (ref 79–97)
METHADONE UR QL SCN: NEGATIVE
MONOCYTES # BLD AUTO: 0.95 10*3/MM3 (ref 0.1–0.9)
MONOCYTES NFR BLD AUTO: 8.6 % (ref 5–12)
NEUTROPHILS NFR BLD AUTO: 73.4 % (ref 42.7–76)
NEUTROPHILS NFR BLD AUTO: 8.07 10*3/MM3 (ref 1.7–7)
NITRITE UR QL STRIP: NEGATIVE
NRBC BLD AUTO-RTO: 0 /100 WBC (ref 0–0.2)
NT-PROBNP SERPL-MCNC: ABNORMAL PG/ML (ref 0–900)
OPIATES UR QL: POSITIVE
OXYCODONE UR QL SCN: NEGATIVE
PH UR STRIP.AUTO: 6 [PH] (ref 5–8)
PLATELET # BLD AUTO: 273 10*3/MM3 (ref 140–450)
PMV BLD AUTO: 9.6 FL (ref 6–12)
POTASSIUM SERPL-SCNC: 3.9 MMOL/L (ref 3.5–5.2)
PROT SERPL-MCNC: 6.7 G/DL (ref 6–8.5)
PROT UR QL STRIP: ABNORMAL
PROTHROMBIN TIME: 13.5 SECONDS (ref 11.8–14.9)
RBC # BLD AUTO: 4.74 10*6/MM3 (ref 4.14–5.8)
RBC # UR STRIP: ABNORMAL /HPF
REF LAB TEST METHOD: ABNORMAL
SODIUM SERPL-SCNC: 139 MMOL/L (ref 136–145)
SP GR UR STRIP: >1.03 (ref 1–1.03)
SQUAMOUS #/AREA URNS HPF: ABNORMAL /HPF
TROPONIN T DELTA: 33 NG/L
TROPONIN T SERPL HS-MCNC: 17 NG/L
UROBILINOGEN UR QL STRIP: ABNORMAL
WBC # UR STRIP: ABNORMAL /HPF
WBC NRBC COR # BLD: 11 10*3/MM3 (ref 3.4–10.8)
WHOLE BLOOD HOLD COAG: NORMAL
WHOLE BLOOD HOLD SPECIMEN: NORMAL

## 2023-08-27 PROCEDURE — 80307 DRUG TEST PRSMV CHEM ANLYZR: CPT | Performed by: EMERGENCY MEDICINE

## 2023-08-27 PROCEDURE — 99223 1ST HOSP IP/OBS HIGH 75: CPT | Performed by: FAMILY MEDICINE

## 2023-08-27 PROCEDURE — 25010000002 FUROSEMIDE PER 20 MG: Performed by: EMERGENCY MEDICINE

## 2023-08-27 PROCEDURE — 71260 CT THORAX DX C+: CPT

## 2023-08-27 PROCEDURE — 94799 UNLISTED PULMONARY SVC/PX: CPT

## 2023-08-27 PROCEDURE — 93005 ELECTROCARDIOGRAM TRACING: CPT | Performed by: EMERGENCY MEDICINE

## 2023-08-27 PROCEDURE — 94761 N-INVAS EAR/PLS OXIMETRY MLT: CPT

## 2023-08-27 PROCEDURE — 80053 COMPREHEN METABOLIC PANEL: CPT | Performed by: EMERGENCY MEDICINE

## 2023-08-27 PROCEDURE — 84484 ASSAY OF TROPONIN QUANT: CPT | Performed by: EMERGENCY MEDICINE

## 2023-08-27 PROCEDURE — 83690 ASSAY OF LIPASE: CPT | Performed by: EMERGENCY MEDICINE

## 2023-08-27 PROCEDURE — 85025 COMPLETE CBC W/AUTO DIFF WBC: CPT

## 2023-08-27 PROCEDURE — 87040 BLOOD CULTURE FOR BACTERIA: CPT | Performed by: FAMILY MEDICINE

## 2023-08-27 PROCEDURE — 85610 PROTHROMBIN TIME: CPT | Performed by: FAMILY MEDICINE

## 2023-08-27 PROCEDURE — 82948 REAGENT STRIP/BLOOD GLUCOSE: CPT

## 2023-08-27 PROCEDURE — 36415 COLL VENOUS BLD VENIPUNCTURE: CPT

## 2023-08-27 PROCEDURE — 99285 EMERGENCY DEPT VISIT HI MDM: CPT

## 2023-08-27 PROCEDURE — 25010000002 HEPARIN (PORCINE) 25000-0.45 UT/250ML-% SOLUTION: Performed by: FAMILY MEDICINE

## 2023-08-27 PROCEDURE — 83735 ASSAY OF MAGNESIUM: CPT | Performed by: EMERGENCY MEDICINE

## 2023-08-27 PROCEDURE — 83880 ASSAY OF NATRIURETIC PEPTIDE: CPT | Performed by: EMERGENCY MEDICINE

## 2023-08-27 PROCEDURE — 25010000002 ONDANSETRON PER 1 MG: Performed by: EMERGENCY MEDICINE

## 2023-08-27 PROCEDURE — 71045 X-RAY EXAM CHEST 1 VIEW: CPT

## 2023-08-27 PROCEDURE — 93005 ELECTROCARDIOGRAM TRACING: CPT

## 2023-08-27 PROCEDURE — 25510000001 IOPAMIDOL PER 1 ML: Performed by: EMERGENCY MEDICINE

## 2023-08-27 PROCEDURE — 93010 ELECTROCARDIOGRAM REPORT: CPT | Performed by: INTERNAL MEDICINE

## 2023-08-27 PROCEDURE — 81001 URINALYSIS AUTO W/SCOPE: CPT | Performed by: EMERGENCY MEDICINE

## 2023-08-27 PROCEDURE — 85730 THROMBOPLASTIN TIME PARTIAL: CPT | Performed by: FAMILY MEDICINE

## 2023-08-27 PROCEDURE — 25010000002 MORPHINE PER 10 MG: Performed by: EMERGENCY MEDICINE

## 2023-08-27 RX ORDER — INSULIN LISPRO 100 [IU]/ML
2-7 INJECTION, SOLUTION INTRAVENOUS; SUBCUTANEOUS
Status: DISCONTINUED | OUTPATIENT
Start: 2023-08-27 | End: 2023-09-02 | Stop reason: HOSPADM

## 2023-08-27 RX ORDER — BISACODYL 10 MG
10 SUPPOSITORY, RECTAL RECTAL DAILY PRN
Status: DISCONTINUED | OUTPATIENT
Start: 2023-08-27 | End: 2023-09-02 | Stop reason: HOSPADM

## 2023-08-27 RX ORDER — LOSARTAN POTASSIUM 50 MG/1
1 TABLET ORAL 2 TIMES DAILY
Status: ON HOLD | COMMUNITY
Start: 2023-06-21

## 2023-08-27 RX ORDER — NICOTINE POLACRILEX 4 MG
15 LOZENGE BUCCAL
Status: DISCONTINUED | OUTPATIENT
Start: 2023-08-27 | End: 2023-09-02 | Stop reason: HOSPADM

## 2023-08-27 RX ORDER — HEPARIN SODIUM 10000 [USP'U]/100ML
12 INJECTION, SOLUTION INTRAVENOUS
Status: DISCONTINUED | OUTPATIENT
Start: 2023-08-27 | End: 2023-08-29

## 2023-08-27 RX ORDER — ASPIRIN 81 MG/1
324 TABLET, CHEWABLE ORAL ONCE
Status: COMPLETED | OUTPATIENT
Start: 2023-08-27 | End: 2023-08-27

## 2023-08-27 RX ORDER — SODIUM CHLORIDE 0.9 % (FLUSH) 0.9 %
10 SYRINGE (ML) INJECTION EVERY 12 HOURS SCHEDULED
Status: DISCONTINUED | OUTPATIENT
Start: 2023-08-27 | End: 2023-09-02 | Stop reason: HOSPADM

## 2023-08-27 RX ORDER — ONDANSETRON 2 MG/ML
4 INJECTION INTRAMUSCULAR; INTRAVENOUS ONCE
Status: COMPLETED | OUTPATIENT
Start: 2023-08-27 | End: 2023-08-27

## 2023-08-27 RX ORDER — FUROSEMIDE 10 MG/ML
40 INJECTION INTRAMUSCULAR; INTRAVENOUS ONCE
Status: COMPLETED | OUTPATIENT
Start: 2023-08-27 | End: 2023-08-27

## 2023-08-27 RX ORDER — DILTIAZEM HCL IN NACL,ISO-OSM 125 MG/125
5-15 PLASTIC BAG, INJECTION (ML) INTRAVENOUS
Status: DISCONTINUED | OUTPATIENT
Start: 2023-08-27 | End: 2023-08-29

## 2023-08-27 RX ORDER — BISACODYL 5 MG/1
5 TABLET, DELAYED RELEASE ORAL DAILY PRN
Status: DISCONTINUED | OUTPATIENT
Start: 2023-08-27 | End: 2023-09-02 | Stop reason: HOSPADM

## 2023-08-27 RX ORDER — SODIUM CHLORIDE 0.9 % (FLUSH) 0.9 %
10 SYRINGE (ML) INJECTION AS NEEDED
Status: DISCONTINUED | OUTPATIENT
Start: 2023-08-27 | End: 2023-08-27

## 2023-08-27 RX ORDER — ARIPIPRAZOLE 300 MG
300 KIT INTRAMUSCULAR
Status: ON HOLD | COMMUNITY
Start: 2023-07-10

## 2023-08-27 RX ORDER — SODIUM CHLORIDE 9 MG/ML
40 INJECTION, SOLUTION INTRAVENOUS AS NEEDED
Status: DISCONTINUED | OUTPATIENT
Start: 2023-08-27 | End: 2023-09-02 | Stop reason: HOSPADM

## 2023-08-27 RX ORDER — AMOXICILLIN 250 MG
2 CAPSULE ORAL 2 TIMES DAILY
Status: DISCONTINUED | OUTPATIENT
Start: 2023-08-27 | End: 2023-09-02 | Stop reason: HOSPADM

## 2023-08-27 RX ORDER — SODIUM CHLORIDE 0.9 % (FLUSH) 0.9 %
10 SYRINGE (ML) INJECTION AS NEEDED
Status: DISCONTINUED | OUTPATIENT
Start: 2023-08-27 | End: 2023-09-02 | Stop reason: HOSPADM

## 2023-08-27 RX ORDER — DILTIAZEM HYDROCHLORIDE 5 MG/ML
10 INJECTION INTRAVENOUS ONCE
Status: COMPLETED | OUTPATIENT
Start: 2023-08-27 | End: 2023-08-27

## 2023-08-27 RX ORDER — VENLAFAXINE HYDROCHLORIDE 37.5 MG/1
1 CAPSULE, EXTENDED RELEASE ORAL DAILY
Status: ON HOLD | COMMUNITY
Start: 2023-06-21

## 2023-08-27 RX ORDER — POLYETHYLENE GLYCOL 3350 17 G/17G
17 POWDER, FOR SOLUTION ORAL DAILY PRN
Status: DISCONTINUED | OUTPATIENT
Start: 2023-08-27 | End: 2023-09-02 | Stop reason: HOSPADM

## 2023-08-27 RX ORDER — DEXTROSE MONOHYDRATE 25 G/50ML
25 INJECTION, SOLUTION INTRAVENOUS
Status: DISCONTINUED | OUTPATIENT
Start: 2023-08-27 | End: 2023-09-02 | Stop reason: HOSPADM

## 2023-08-27 RX ADMIN — ASPIRIN 324 MG: 81 TABLET, CHEWABLE ORAL at 16:06

## 2023-08-27 RX ADMIN — ONDANSETRON 4 MG: 2 INJECTION INTRAMUSCULAR; INTRAVENOUS at 16:07

## 2023-08-27 RX ADMIN — FUROSEMIDE 40 MG: 10 INJECTION, SOLUTION INTRAMUSCULAR; INTRAVENOUS at 18:12

## 2023-08-27 RX ADMIN — MORPHINE SULFATE 4 MG: 4 INJECTION, SOLUTION INTRAMUSCULAR; INTRAVENOUS at 16:08

## 2023-08-27 RX ADMIN — DILTIAZEM HYDROCHLORIDE 10 MG: 5 INJECTION INTRAVENOUS at 16:13

## 2023-08-27 RX ADMIN — HEPARIN SODIUM 12 UNITS/KG/HR: 10000 INJECTION, SOLUTION INTRAVENOUS at 21:33

## 2023-08-27 RX ADMIN — Medication 10 ML: at 21:33

## 2023-08-27 RX ADMIN — IOPAMIDOL 100 ML: 755 INJECTION, SOLUTION INTRAVENOUS at 16:46

## 2023-08-27 NOTE — ED PROVIDER NOTES
Time: 3:27 PM EDT  Date of encounter:  8/27/2023  Independent Historian/Clinical History and Information was obtained by:   Patient    History is limited by: N/A    Chief Complaint: Chest pain, shortness of breath      History of Present Illness:  Patient is a 58 y.o. year old male who presents to the emergency department for evaluation of chest pain and shortness of breath which started last night.  Patient arrived in atrial fibrillation with rapid ventricular response.  Patient is not aware of any prior history of atrial fibrillation.    HPI    Patient Care Team  Primary Care Provider: Grace Cruz APRN    Past Medical History:     No Known Allergies  Past Medical History:   Diagnosis Date    Anxiety     Asthma     Bipolar affective     Cardiac tamponade     2023    CHF (congestive heart failure)     COPD (chronic obstructive pulmonary disease)     Coronary artery disease     Depression     Diabetes mellitus     Elevated cholesterol     Hypertension      Past Surgical History:   Procedure Laterality Date    TESTICLE SURGERY Left     extraction     Family History   Problem Relation Age of Onset    Diabetes Mother     Depression Sister     Depression Brother        Home Medications:  Prior to Admission medications    Medication Sig Start Date End Date Taking? Authorizing Provider   albuterol sulfate  (90 Base) MCG/ACT inhaler Inhale 2 puffs Every 4 (Four) Hours As Needed for Wheezing or Shortness of Air. Indications: Chronic Obstructive Lung Disease 3/3/23   Rogers Lipscomb MD   alfuzosin (UROXATRAL) 10 MG 24 hr tablet Take 1 tablet by mouth Daily. Indications: Benign Enlargement of Prostate 3/3/23   Felton Garduno MD   amLODIPine (NORVASC) 5 MG tablet Take 1 tablet by mouth Daily. Indications: High Blood Pressure Disorder 3/3/23   Felton Garduno MD   ARIPiprazole (ABILIFY) 15 MG tablet Take 1 tablet by mouth Daily. Indications: Major Depressive Disorder 3/4/23   Felton Garduno MD   atorvastatin (LIPITOR) 40 MG  "tablet Take 1 tablet by mouth Every Night. Indications: High Amount of Fats in the Blood 3/3/23   Felton Garduno MD   Farxiga 5 MG tablet tablet Take 1 tablet by mouth Daily. Indications: Type 2 Diabetes 3/3/23   Felton Garduno MD   losartan (COZAAR) 100 MG tablet Take 1 tablet by mouth Daily. Indications: High Blood Pressure Disorder 3/3/23   Felton Garduno MD   metFORMIN (GLUCOPHAGE) 1000 MG tablet Take 0.5 tablets by mouth 2 (Two) Times a Day With Meals. Indications: Type 2 Diabetes 3/3/23   Felton Garduno MD   pantoprazole (PROTONIX) 40 MG EC tablet Take 1 tablet by mouth Daily. Indications: Gastroesophageal Reflux Disease 3/3/23   Felton Garduno MD   Serevent Diskus 50 MCG/DOSE diskus inhaler  4/21/22   Yuliya Blanchard MD   tiotropium bromide monohydrate (SPIRIVA RESPIMAT) 2.5 MCG/ACT aerosol solution inhaler Inhale 2 puffs Daily. Indications: Chronic Obstructive Lung Disease 3/3/23   Rogers Lipscomb MD   vitamin B-12 (VITAMIN B-12) 500 MCG tablet Take 1 tablet by mouth Daily. Indications: Inadequate Vitamin B12 3/4/23   Felton Garduno MD        Social History:   Social History     Tobacco Use    Smoking status: Every Day     Packs/day: 1.00     Years: 50.00     Pack years: 50.00     Types: Cigarettes    Smokeless tobacco: Never   Vaping Use    Vaping Use: Every day    Substances: Nicotine   Substance Use Topics    Alcohol use: Not Currently    Drug use: Not Currently     Types: Methamphetamines     Comment: LAST USED 6 months ago         Review of Systems:  Review of Systems   Respiratory:  Positive for shortness of breath.    Cardiovascular:  Positive for chest pain.      Physical Exam:  /77 (BP Location: Left arm, Patient Position: Lying)   Pulse 63   Temp 98.6 °F (37 °C) (Oral)   Resp 18   Ht 185.4 cm (73\")   Wt 71.2 kg (156 lb 15.5 oz)   SpO2 98%   BMI 20.71 kg/m²     Physical Exam  Vitals and nursing note reviewed.   Constitutional:       General: He is not in acute distress.  HENT:      Head: " Normocephalic.   Cardiovascular:      Rate and Rhythm: Tachycardia present. Rhythm irregular.      Heart sounds: Normal heart sounds.   Pulmonary:      Effort: Pulmonary effort is normal. No respiratory distress.      Breath sounds: Normal breath sounds.   Abdominal:      General: Abdomen is flat.      Palpations: Abdomen is soft.      Tenderness: There is no abdominal tenderness.   Musculoskeletal:         General: Normal range of motion.      Cervical back: Normal range of motion.   Skin:     General: Skin is warm and dry.   Neurological:      Mental Status: He is alert and oriented to person, place, and time. Mental status is at baseline.                Procedures:  Procedures      Medical Decision Making:      Comorbidities that affect care:    Congestive Heart Failure, COPD, Diabetes, Hypertension, Substance Abuse    External Notes reviewed:    Hospital Discharge Summary: Patient discharged by Dr. Garduno from [facility] after admission for psychiatric evaluation.      The following orders were placed and all results were independently analyzed by me:  Orders Placed This Encounter   Procedures    Blood Culture - Blood,    Blood Culture - Blood,    XR Chest 1 View    CT Chest With Contrast Diagnostic    [place] Draw    High Sensitivity Troponin T    Comprehensive Metabolic Panel    Lipase    BNP    Magnesium    CBC Auto Differential    High Sensitivity Troponin T 2Hr    Urinalysis With Microscopic If Indicated (No Culture) - Urine, Clean Catch    Urine Drug Screen - Urine, Clean Catch    Protime-INR    aPTT    Urinalysis, Microscopic Only - Urine, Clean Catch    aPTT    CBC Auto Differential    aPTT    High Sensitivity Troponin T    High Sensitivity Troponin T    aPTT    CBC (No Diff)    Basic Metabolic Panel    Magnesium    High Sensitivity Troponin T    aPTT    aPTT    High Sensitivity Troponin T 2Hr    Lipid Panel    Renal Function Panel    Magnesium    Hemoglobin A1c    Protime-INR    Diet: Cardiac Diets,  Diabetic Diets, Fluid Restriction (240 mL/tray) Diets; Low Sodium (2g); Consistent Carbohydrate; 1500 mL/day; Texture: Regular Texture (IDDSI 7); Fluid Consistency: Thin (IDDSI 0)    Undress & Gown    Intake & Output    Weigh Patient    Saline Lock & Maintain IV Access    Oral Care    Vital Signs Per Hospital Policy    Pulse Oximetry, Continuous    Vital Signs    Up in Chair    Up With Assistance    Daily Weights    Strict Intake & Output    Code Status and Medical Interventions:    Inpatient Hospitalist Consult    Heart Failure     Inpatient Cardiology Consult    Inpatient Psychiatrist Consult    OT Consult: Eval & Treat    PT Consult: Eval & Treat Functional Mobility Below Baseline    Oxygen Therapy- Nasal Cannula; Titrate 1-6 LPM Per SpO2; 90 - 95%    POC Glucose 4x Daily Before Meals & at Bedtime    POC Glucose Once    POC Glucose Once    POC Glucose Once    POC Glucose Once    POC Glucose Once    POC Glucose Once    POC Glucose Once    POC Glucose Once    POC Glucose Once    POC Glucose Once    POC Glucose Once    POC Glucose Once    POC Glucose Once    POC Glucose Once    ECG 12 Lead ED Triage Standing Order; Chest Pain    ECG 12 Lead ED Triage Standing Order; Chest Pain    ECG 12 Lead Rhythm Change    Adult Transthoracic Echo Complete w/ Color, Spectral and Contrast if necessary per protocol    Walking Oximetry    Insert Peripheral IV    Insert Peripheral IV    Inpatient Admission    CBC & Differential    Green Top (Gel)    Lavender Top    Gold Top - SST    Light Blue Top    Extra Tubes    Lavender Top       Medications Given in the Emergency Department:  Medications   sodium chloride 0.9 % flush 10 mL (10 mL Intravenous Given 8/31/23 0841)   sodium chloride 0.9 % flush 10 mL (has no administration in time range)   sodium chloride 0.9 % infusion 40 mL (has no administration in time range)   sennosides-docusate (PERICOLACE) 8.6-50 MG per tablet 2 tablet (2 tablets Oral Given 8/31/23 0842)     And    polyethylene glycol (MIRALAX) packet 17 g (has no administration in time range)     And   bisacodyl (DULCOLAX) EC tablet 5 mg (has no administration in time range)     And   bisacodyl (DULCOLAX) suppository 10 mg (has no administration in time range)   dextrose (GLUTOSE) oral gel 15 g (has no administration in time range)   dextrose (D50W) (25 g/50 mL) IV injection 25 g (has no administration in time range)   glucagon (GLUCAGEN) injection 1 mg (has no administration in time range)   Insulin Lispro (humaLOG) injection 2-7 Units (2 Units Subcutaneous Not Given 8/31/23 1718)   oxyCODONE-acetaminophen (PERCOCET) 5-325 MG per tablet 1 tablet (1 tablet Oral Given 8/31/23 1706)   !Patient Home Medications Stored in Pharmacy ( Does not apply Not Given 8/31/23 0909)   busPIRone (BUSPAR) tablet 5 mg (5 mg Oral Given 8/30/23 2020)   venlafaxine XR (EFFEXOR-XR) 24 hr capsule 37.5 mg (37.5 mg Oral Given 8/31/23 0838)   acetaminophen (TYLENOL) tablet 650 mg (650 mg Oral Given 8/29/23 0554)   nicotine (NICODERM CQ) 21 MG/24HR patch 1 patch (1 patch Transdermal Medication Applied 8/31/23 0844)   potassium chloride (MICRO-K) CR capsule 40 mEq (40 mEq Oral Given 8/31/23 0838)   oxyCODONE-acetaminophen (PERCOCET)  MG per tablet 1 tablet (has no administration in time range)   Enoxaparin Sodium (LOVENOX) syringe 70 mg (70 mg Subcutaneous Given 8/31/23 1133)   famotidine (PEPCID) tablet 20 mg (20 mg Oral Given 8/31/23 1704)   albuterol (PROVENTIL) nebulizer solution 0.083% 2.5 mg/3mL (has no administration in time range)   carvedilol (COREG) tablet 25 mg (25 mg Oral Given 8/31/23 1704)   empagliflozin (JARDIANCE) tablet 10 mg (10 mg Oral Given 8/31/23 0838)   losartan (COZAAR) tablet 50 mg (50 mg Oral Given 8/31/23 0839)   vitamin B-12 (CYANOCOBALAMIN) tablet 500 mcg (500 mcg Oral Given 8/31/23 0846)   budesonide-formoterol (SYMBICORT) 160-4.5 MCG/ACT inhaler 2 puff (2 puffs Inhalation Given 8/31/23 0700)   Pharmacy to dose  warfarin (has no administration in time range)   warfarin (COUMADIN) tablet 5 mg (5 mg Oral Given 8/31/23 1704)   ARIPiprazole (ABILIFY) tablet 30 mg (30 mg Oral Given 8/31/23 0839)   aspirin chewable tablet 324 mg (324 mg Oral Given 8/27/23 1606)   dilTIAZem (CARDIZEM) injection 10 mg (10 mg Intravenous Given 8/27/23 1613)   morphine injection 4 mg (4 mg Intravenous Given 8/27/23 1608)   ondansetron (ZOFRAN) injection 4 mg (4 mg Intravenous Given 8/27/23 1607)   iopamidol (ISOVUE-370) 76 % injection 100 mL (100 mL Intravenous Given 8/27/23 1646)   furosemide (LASIX) injection 40 mg (40 mg Intravenous Given 8/27/23 1812)   heparin bolus from bag 4,600 Units (4,600 Units Intravenous Bolus from Bag 8/27/23 2134)   perflutren (DEFINITY) lipid microspheres injection 13.04 mg (13.04 mg Intravenous Given 8/28/23 0943)   traZODone (DESYREL) tablet 25 mg (25 mg Oral Given 8/29/23 0013)        ED Course:    ED Course as of 08/31/23 1720   Sun Aug 27, 2023   1501 EKG:    Rhythm: Atrial fibrillation  Rate: 114  Intervals: IVCD  T-wave: Specific changes/T wave inversion V5 V6  ST Segment: Nonspecific changes    EKG Comparison: 2/27/2023 similar    Interpreted by me   [NL]   1531 . [NL]      ED Course User Index  [NL] Jacques Renteria DO       Labs:    Lab Results (last 24 hours)       Procedure Component Value Units Date/Time    POC Glucose Once [[id number]]  (Abnormal) Collected: 08/30/23 1958    Specimen: Blood Updated: 08/30/23 1959     Glucose 162 mg/dL      Comment: Serial Number: [id number]:  [id number]       Renal Function Panel [[id number]]  (Abnormal) Collected: 08/31/23 0403    Specimen: Blood from Arm, Left Updated: 08/31/23 0551     Glucose 103 mg/dL      BUN 31 mg/dL      Creatinine 1.32 mg/dL      Sodium 141 mmol/L      Potassium 4.1 mmol/L      Chloride 100 mmol/L      CO2 29.0 mmol/L      Calcium 9.2 mg/dL      Albumin 4.1 g/dL      Phosphorus 3.8 mg/dL      Anion Gap 12.0 mmol/L      BUN/Creatinine Ratio  23.5     eGFR 62.5 mL/min/1.73     Narrative:      GFR Normal >60  Chronic Kidney Disease <60  Kidney Failure <15      Protime-INR [[id number]]  (Normal) Collected: 08/31/23 0403    Specimen: Blood from Arm, Left Updated: 08/31/23 0504     Protime 14.0 Seconds      INR 1.07    Narrative:      Suggested Therapeutic Ranges For Oral Anticoagulant Therapy:  Level of Therapy                      INR Target Range  Standard Dose                            2.0-3.0  High Dose                                2.5-3.5  Patients not receiving anticoagulant  Therapy Normal Range                     0.86-1.15    POC Glucose Once [[id number]]  (Abnormal) Collected: 08/31/23 0740    Specimen: Blood Updated: 08/31/23 0742     Glucose 155 mg/dL      Comment: Serial Number: [id number]:  [id number]       POC Glucose Once [[id number]]  (Abnormal) Collected: 08/31/23 1132    Specimen: Blood Updated: 08/31/23 1135     Glucose 112 mg/dL      Comment: Serial Number: [id number]:  [id number]       POC Glucose Once [[id number]]  (Abnormal) Collected: 08/31/23 1702    Specimen: Blood Updated: 08/31/23 1703     Glucose 100 mg/dL      Comment: Serial Number: [id number]:  [id number]                Imaging:    No Radiology Exams Resulted Within Past 24 Hours      Differential Diagnosis and Discussion:    Chest Pain:  Based on the patient's signs and symptoms, I considered aortic dissection, myocardial infaction, pulmonary embolism, cardiac tamponade, pericarditis, pneumothorax, musculoskeletal chest pain and other differential diagnosis as an etiology of the patient's chest pain.     All labs were reviewed and interpreted by me.  All X-rays impressions were independently interpreted by me.  EKG was interpreted by me.  CT scan radiology impression was interpreted by me.    MDM  Patient has new onset atrial fibrillation.  Patient was given a dose of Cardizem which improved his rate.  Patient will be admitted to the hospital service for  further evaluation and treatment.    Critical Care Note: Total Critical Care time of 40 minutes. Total critical care time documented does not include time spent on separately billed procedures for services of nurses or physician assistants. I personally saw and examined the patient. I have reviewed all diagnostic interpretations and treatment plans as written. I was present for the key portions of any procedures performed and the inclusive time noted in any critical care statement. Critical care time includes patient management by me, time spent at the patients bedside,  time to review lab and imaging results, discussing patient care, documentation in the medical record, and time spent with family or caregiver.    Patient Care Considerations:          Consultants/Shared Management Plan:    Hospitalist: I have discussed the case with Dr. Carolina who agrees to accept the patient for admission.    Social Determinants of Health:    Patient is independent, reliable, and has access to care.       Disposition and Care Coordination:    Admit:   Through independent evaluation of the patient's history, physical, and imperical data, the patient meets criteria for observation/admission to the hospital.        Final diagnoses:   New onset atrial fibrillation   Acute congestive heart failure, unspecified heart failure type        ED Disposition       ED Disposition   Decision to Admit    Condition   --    Comment   Level of Care: Telemetry [5]   Diagnosis: Atrial fibrillation with rapid ventricular response [579079]   Admitting Physician: NANCY CAROLINA [[id number]]   Attending Physician: NANCY CAROLINA [[id number]]   Certification: I Certify That Inpatient Hospital Services Are Medically Necessary For Greater Than 2 Midnights                 This medical record created using voice recognition software.             Jacques Renteria DO  08/31/23 1089

## 2023-08-27 NOTE — H&P
[facility]  Date: [facility]   Patient Name: Regulo Sepulveda  : 1965  MRN: [id number]  Primary Care Physician:  Provider, No Known  Date of admission: 2023    Subjective   Subjective     Chief Complaint: Chest pain, shortness of breath    HPI:    Regulo Sepulveda is a 58 y.o. male brought to the emergency department for evaluation of chest pain.  Had associated shortness of breath.  Onset last night.  Upon arrival to the ED, patient was in atrial fibrillation with RVR.  Patient has no known prior history of atrial fibrillation.  Patient has a history of COPD and diabetes, reports adherence to prescribed medications.  However, patient reports that over the last 2 to 3 days, his sister who is usually in charge of setting his medications aside from to take, has not been organizing his medications due to an interpersonal disagreement.  Patient denies ever having issues like this in the past.      Personal History     Past Medical History:  Past Medical History:   Diagnosis Date    Anxiety     Bipolar affective     Cardiac tamponade         COPD (chronic obstructive pulmonary disease)     Depression     Diabetes mellitus     Hypertension          Past Surgical History:  History reviewed. No pertinent surgical history.      Family History:   Family History   Problem Relation Age of Onset    Diabetes Mother     Depression Sister     Depression Brother          Social History:   Social History     Socioeconomic History    Marital status: Single   Tobacco Use    Smoking status: Former     Types: Cigarettes    Smokeless tobacco: Never   Vaping Use    Vaping Use: Every day   Substance and Sexual Activity    Alcohol use: Not Currently    Drug use: Not Currently     Types: Methamphetamines     Comment: LAST USED 6 months ago    Sexual activity: Defer         Home Medications:  ARIPiprazole, Salmeterol Xinafoate, albuterol sulfate HFA, alfuzosin, amLODIPine,  atorvastatin, cyanocobalamin, dapagliflozin, losartan, metFORMIN, pantoprazole, and tiotropium bromide monohydrate    Allergies:  No Known Allergies    Review of Systems   All systems were reviewed and negative except for: Chest pain, shortness of breath    Objective   Objective     Vitals:   Temp:  [98.2 °F (36.8 °C)] 98.2 °F (36.8 °C)  Heart Rate:  [] 90  Resp:  [16-25] 16  BP: (139-171)/() 150/102    Physical Exam    Constitutional: Awake, alert, no acute distress   Eyes: Pupils equal, sclerae anicteric, no conjunctival injection   HENT: NCAT, mucous membranes moist   Neck: Supple, no thyromegaly, no lymphadenopathy, trachea midline   Respiratory: Clear to auscultation bilaterally, nonlabored respirations    Cardiovascular: Irregularly irregular, no murmurs, rubs, or gallops, palpable pedal pulses bilaterally   Gastrointestinal: Positive bowel sounds, soft, nontender, nondistended   Musculoskeletal: No bilateral ankle edema, no clubbing or cyanosis to extremities   Psychiatric: Appropriate affect, cooperative   Neurologic: Oriented x 3, strength symmetric in all extremities, Cranial Nerves grossly intact to confrontation, speech clear   Skin: No rashes     Result Review    Result Review:  I have personally reviewed the results from the time of this admission to 8/27/2023 18:24 EDT and agree with these findings:  [x]  Laboratory  []  Microbiology  [x]  Radiology  [x]  EKG/Telemetry   [x]  Cardiology/Vascular   []  Pathology  [x]  Old records  []  Other:      Assessment & Plan   Assessment / Plan     Assessment/Plan:   Atrial fibrillation with RVR-heparin drip, Cardizem push in ED had initially helped with rate control, however during examination, heart rate was beginning to creep back up.  We will initiate Cardizem drip.  Acute congestive heart failure, unknown type-BNP 10,000.  2D echo.  Diuresis.  Leukocytosis-likely stress reaction due to above.  Check UA to rule out UTI.  Blood cultures.  Pulmonary  edema-plan as above.  Noted on CT scan  History of amphetamine use-check urine drug screen rule out relapse.      DVT prophylaxis: Heparin    CODE STATUS: Full code    Admission Status:  I believe this patient meets inpatient status.    Electronically signed by Regulo Carolina DO, 08/27/23, 6:24 PM EDT.

## 2023-08-28 ENCOUNTER — APPOINTMENT (OUTPATIENT)
Dept: CARDIOLOGY | Facility: HOSPITAL | Age: 58
DRG: 280 | End: 2023-08-28
Payer: COMMERCIAL

## 2023-08-28 LAB
APTT PPP: 32.9 SECONDS (ref 78–95.9)
APTT PPP: 41.8 SECONDS (ref 78–95.9)
APTT PPP: 54.7 SECONDS (ref 78–95.9)
BASOPHILS # BLD AUTO: 0.07 10*3/MM3 (ref 0–0.2)
BASOPHILS NFR BLD AUTO: 0.7 % (ref 0–1.5)
BH CV ECHO MEAS - AO MAX PG: 2 MMHG
BH CV ECHO MEAS - AO MEAN PG: 1 MMHG
BH CV ECHO MEAS - AO ROOT DIAM: 3.2 CM
BH CV ECHO MEAS - AO V2 MAX: 76 CM/SEC
BH CV ECHO MEAS - AO V2 VTI: 11.2 CM
BH CV ECHO MEAS - AVA(I,D): 1.48 CM2
BH CV ECHO MEAS - EDV(CUBED): 163.7 ML
BH CV ECHO MEAS - EDV(MOD-SP2): 182 ML
BH CV ECHO MEAS - EDV(MOD-SP4): 143 ML
BH CV ECHO MEAS - EF(MOD-BP): 20 %
BH CV ECHO MEAS - EF(MOD-SP2): 18.7 %
BH CV ECHO MEAS - EF(MOD-SP4): 20.3 %
BH CV ECHO MEAS - ESV(CUBED): 132.7 ML
BH CV ECHO MEAS - ESV(MOD-SP2): 148 ML
BH CV ECHO MEAS - ESV(MOD-SP4): 114 ML
BH CV ECHO MEAS - FS: 6.8 %
BH CV ECHO MEAS - IVS/LVPW: 0.98 CM
BH CV ECHO MEAS - IVSD: 1.2 CM
BH CV ECHO MEAS - LA DIMENSION: 4.1 CM
BH CV ECHO MEAS - LAT PEAK E' VEL: 6.1 CM/SEC
BH CV ECHO MEAS - LV MASS(C)D: 274.6 GRAMS
BH CV ECHO MEAS - LV MAX PG: 0.61 MMHG
BH CV ECHO MEAS - LV MEAN PG: 0 MMHG
BH CV ECHO MEAS - LV V1 MAX: 39.2 CM/SEC
BH CV ECHO MEAS - LV V1 VTI: 5.3 CM
BH CV ECHO MEAS - LVIDD: 5.5 CM
BH CV ECHO MEAS - LVIDS: 5.1 CM
BH CV ECHO MEAS - LVOT AREA: 3.1 CM2
BH CV ECHO MEAS - LVOT DIAM: 2 CM
BH CV ECHO MEAS - LVPWD: 1.23 CM
BH CV ECHO MEAS - MED PEAK E' VEL: 4.9 CM/SEC
BH CV ECHO MEAS - MV A MAX VEL: 27.9 CM/SEC
BH CV ECHO MEAS - MV DEC SLOPE: 625 CM/SEC2
BH CV ECHO MEAS - MV DEC TIME: 0.13 MSEC
BH CV ECHO MEAS - MV E MAX VEL: 78 CM/SEC
BH CV ECHO MEAS - MV E/A: 2.8
BH CV ECHO MEAS - MV MEAN PG: 1 MMHG
BH CV ECHO MEAS - MV V2 VTI: 19.4 CM
BH CV ECHO MEAS - MVA(VTI): 0.85 CM2
BH CV ECHO MEAS - RAP SYSTOLE: 3 MMHG
BH CV ECHO MEAS - RVDD: 3 CM
BH CV ECHO MEAS - RVSP: 35 MMHG
BH CV ECHO MEAS - SV(LVOT): 16.6 ML
BH CV ECHO MEAS - SV(MOD-SP2): 34 ML
BH CV ECHO MEAS - SV(MOD-SP4): 29 ML
BH CV ECHO MEAS - TR MAX PG: 32 MMHG
BH CV ECHO MEAS - TR MAX VEL: 283 CM/SEC
BH CV ECHO MEASUREMENTS AVERAGE E/E' RATIO: 14.18
DEPRECATED RDW RBC AUTO: 48.8 FL (ref 37–54)
EOSINOPHIL # BLD AUTO: 0.11 10*3/MM3 (ref 0–0.4)
EOSINOPHIL NFR BLD AUTO: 1.1 % (ref 0.3–6.2)
ERYTHROCYTE [DISTWIDTH] IN BLOOD BY AUTOMATED COUNT: 14.6 % (ref 12.3–15.4)
GLUCOSE BLDC GLUCOMTR-MCNC: 103 MG/DL (ref 70–99)
GLUCOSE BLDC GLUCOMTR-MCNC: 127 MG/DL (ref 70–99)
GLUCOSE BLDC GLUCOMTR-MCNC: 137 MG/DL (ref 70–99)
GLUCOSE BLDC GLUCOMTR-MCNC: 92 MG/DL (ref 70–99)
HCT VFR BLD AUTO: 40.8 % (ref 37.5–51)
HGB BLD-MCNC: 13 G/DL (ref 13–17.7)
IMM GRANULOCYTES # BLD AUTO: 0.03 10*3/MM3 (ref 0–0.05)
IMM GRANULOCYTES NFR BLD AUTO: 0.3 % (ref 0–0.5)
LEFT ATRIUM VOLUME INDEX: 45.8 ML/M2
LYMPHOCYTES # BLD AUTO: 1.88 10*3/MM3 (ref 0.7–3.1)
LYMPHOCYTES NFR BLD AUTO: 18.7 % (ref 19.6–45.3)
MCH RBC QN AUTO: 28.9 PG (ref 26.6–33)
MCHC RBC AUTO-ENTMCNC: 31.9 G/DL (ref 31.5–35.7)
MCV RBC AUTO: 90.7 FL (ref 79–97)
MONOCYTES # BLD AUTO: 0.98 10*3/MM3 (ref 0.1–0.9)
MONOCYTES NFR BLD AUTO: 9.7 % (ref 5–12)
NEUTROPHILS NFR BLD AUTO: 69.5 % (ref 42.7–76)
NEUTROPHILS NFR BLD AUTO: 7 10*3/MM3 (ref 1.7–7)
NRBC BLD AUTO-RTO: 0 /100 WBC (ref 0–0.2)
PLATELET # BLD AUTO: 273 10*3/MM3 (ref 140–450)
PMV BLD AUTO: 10.5 FL (ref 6–12)
QT INTERVAL: 355 MS
QTC INTERVAL: 490 MS
RBC # BLD AUTO: 4.5 10*6/MM3 (ref 4.14–5.8)
TROPONIN T SERPL HS-MCNC: 285 NG/L
TROPONIN T SERPL HS-MCNC: 312 NG/L
WBC NRBC COR # BLD: 10.07 10*3/MM3 (ref 3.4–10.8)

## 2023-08-28 PROCEDURE — 93010 ELECTROCARDIOGRAM REPORT: CPT | Performed by: INTERNAL MEDICINE

## 2023-08-28 PROCEDURE — 84484 ASSAY OF TROPONIN QUANT: CPT | Performed by: INTERNAL MEDICINE

## 2023-08-28 PROCEDURE — 93306 TTE W/DOPPLER COMPLETE: CPT | Performed by: STUDENT IN AN ORGANIZED HEALTH CARE EDUCATION/TRAINING PROGRAM

## 2023-08-28 PROCEDURE — 85730 THROMBOPLASTIN TIME PARTIAL: CPT | Performed by: FAMILY MEDICINE

## 2023-08-28 PROCEDURE — 82948 REAGENT STRIP/BLOOD GLUCOSE: CPT

## 2023-08-28 PROCEDURE — 25510000001 PERFLUTREN 6.52 MG/ML SUSPENSION: Performed by: INTERNAL MEDICINE

## 2023-08-28 PROCEDURE — 85730 THROMBOPLASTIN TIME PARTIAL: CPT | Performed by: INTERNAL MEDICINE

## 2023-08-28 PROCEDURE — 25010000002 FUROSEMIDE PER 20 MG: Performed by: INTERNAL MEDICINE

## 2023-08-28 PROCEDURE — 85025 COMPLETE CBC W/AUTO DIFF WBC: CPT | Performed by: FAMILY MEDICINE

## 2023-08-28 PROCEDURE — 99254 IP/OBS CNSLTJ NEW/EST MOD 60: CPT | Performed by: STUDENT IN AN ORGANIZED HEALTH CARE EDUCATION/TRAINING PROGRAM

## 2023-08-28 PROCEDURE — 99233 SBSQ HOSP IP/OBS HIGH 50: CPT | Performed by: INTERNAL MEDICINE

## 2023-08-28 PROCEDURE — 25010000002 HEPARIN (PORCINE) 25000-0.45 UT/250ML-% SOLUTION: Performed by: FAMILY MEDICINE

## 2023-08-28 PROCEDURE — 93005 ELECTROCARDIOGRAM TRACING: CPT | Performed by: INTERNAL MEDICINE

## 2023-08-28 PROCEDURE — 93306 TTE W/DOPPLER COMPLETE: CPT

## 2023-08-28 RX ORDER — TRAZODONE HYDROCHLORIDE 50 MG/1
25 TABLET ORAL ONCE
Status: COMPLETED | OUTPATIENT
Start: 2023-08-29 | End: 2023-08-29

## 2023-08-28 RX ORDER — BUSPIRONE HYDROCHLORIDE 5 MG/1
5 TABLET ORAL NIGHTLY
Status: ON HOLD | COMMUNITY

## 2023-08-28 RX ORDER — FUROSEMIDE 10 MG/ML
40 INJECTION INTRAMUSCULAR; INTRAVENOUS
Status: DISCONTINUED | OUTPATIENT
Start: 2023-08-28 | End: 2023-08-31

## 2023-08-28 RX ORDER — CLONIDINE HYDROCHLORIDE 0.1 MG/1
0.1 TABLET ORAL 2 TIMES DAILY
Status: CANCELLED | OUTPATIENT
Start: 2023-08-28

## 2023-08-28 RX ORDER — VENLAFAXINE HYDROCHLORIDE 37.5 MG/1
37.5 CAPSULE, EXTENDED RELEASE ORAL DAILY
Status: CANCELLED | OUTPATIENT
Start: 2023-08-28

## 2023-08-28 RX ORDER — CLONIDINE HYDROCHLORIDE 0.1 MG/1
0.1 TABLET ORAL 2 TIMES DAILY
COMMUNITY
End: 2023-09-02 | Stop reason: HOSPADM

## 2023-08-28 RX ORDER — SPIRONOLACTONE 25 MG/1
25 TABLET ORAL DAILY
Status: ON HOLD | COMMUNITY

## 2023-08-28 RX ORDER — BUSPIRONE HYDROCHLORIDE 5 MG/1
5 TABLET ORAL NIGHTLY
Status: DISCONTINUED | OUTPATIENT
Start: 2023-08-29 | End: 2023-09-02 | Stop reason: HOSPADM

## 2023-08-28 RX ORDER — BUMETANIDE 2 MG/1
1 TABLET ORAL DAILY
Status: ON HOLD | COMMUNITY

## 2023-08-28 RX ORDER — ASPIRIN 81 MG/1
81 TABLET, CHEWABLE ORAL DAILY
Status: DISCONTINUED | OUTPATIENT
Start: 2023-08-28 | End: 2023-08-30

## 2023-08-28 RX ORDER — VENLAFAXINE HYDROCHLORIDE 37.5 MG/1
37.5 CAPSULE, EXTENDED RELEASE ORAL DAILY
Status: DISCONTINUED | OUTPATIENT
Start: 2023-08-29 | End: 2023-09-02 | Stop reason: HOSPADM

## 2023-08-28 RX ORDER — CARVEDILOL 25 MG/1
25 TABLET ORAL 2 TIMES DAILY WITH MEALS
Status: ON HOLD | COMMUNITY

## 2023-08-28 RX ORDER — OXYCODONE HYDROCHLORIDE AND ACETAMINOPHEN 5; 325 MG/1; MG/1
1 TABLET ORAL EVERY 8 HOURS PRN
Status: DISCONTINUED | OUTPATIENT
Start: 2023-08-28 | End: 2023-09-02 | Stop reason: HOSPADM

## 2023-08-28 RX ORDER — BUSPIRONE HYDROCHLORIDE 5 MG/1
5 TABLET ORAL NIGHTLY
Status: CANCELLED | OUTPATIENT
Start: 2023-08-28

## 2023-08-28 RX ORDER — CARVEDILOL 25 MG/1
25 TABLET ORAL 2 TIMES DAILY WITH MEALS
Status: CANCELLED | OUTPATIENT
Start: 2023-08-28

## 2023-08-28 RX ADMIN — OXYCODONE AND ACETAMINOPHEN 1 TABLET: 5; 325 TABLET ORAL at 11:20

## 2023-08-28 RX ADMIN — HEPARIN SODIUM 21 UNITS/KG/HR: 10000 INJECTION, SOLUTION INTRAVENOUS at 21:20

## 2023-08-28 RX ADMIN — FUROSEMIDE 40 MG: 10 INJECTION, SOLUTION INTRAMUSCULAR; INTRAVENOUS at 10:28

## 2023-08-28 RX ADMIN — ASPIRIN 81 MG 81 MG: 81 TABLET ORAL at 09:28

## 2023-08-28 RX ADMIN — FUROSEMIDE 40 MG: 10 INJECTION, SOLUTION INTRAMUSCULAR; INTRAVENOUS at 17:35

## 2023-08-28 RX ADMIN — HEPARIN SODIUM 18 UNITS/KG/HR: 10000 INJECTION, SOLUTION INTRAVENOUS at 12:09

## 2023-08-28 RX ADMIN — PERFLUTREN 13.04 MG: 6.52 INJECTION, SUSPENSION INTRAVENOUS at 09:43
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
## 2023-08-28 NOTE — PLAN OF CARE
Goal Outcome Evaluation:   No acute events this shift.  Family in to visit.  VSS.
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
## 2023-08-28 NOTE — PLAN OF CARE
Goal Outcome Evaluation:  Plan of Care Reviewed With: patient, sibling        Progress: improving  Outcome Evaluation: Patient SR-ST 's. Patient converted to SR in ER at 1958 on 8/27/23. No reports of pain per patient. PTA med list completed with sister over phone. Heparin gtt initiated. VSS. Call light within reach.
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
## 2023-08-29 LAB
ANION GAP SERPL CALCULATED.3IONS-SCNC: 8.5 MMOL/L (ref 5–15)
APTT PPP: 78.7 SECONDS (ref 78–95.9)
APTT PPP: 80.9 SECONDS (ref 78–95.9)
BUN SERPL-MCNC: 20 MG/DL (ref 6–20)
BUN/CREAT SERPL: 20.2 (ref 7–25)
CALCIUM SPEC-SCNC: 9.1 MG/DL (ref 8.6–10.5)
CHLORIDE SERPL-SCNC: 100 MMOL/L (ref 98–107)
CO2 SERPL-SCNC: 29.5 MMOL/L (ref 22–29)
CREAT SERPL-MCNC: 0.99 MG/DL (ref 0.76–1.27)
DEPRECATED RDW RBC AUTO: 47.8 FL (ref 37–54)
EGFRCR SERPLBLD CKD-EPI 2021: 88.3 ML/MIN/1.73
ERYTHROCYTE [DISTWIDTH] IN BLOOD BY AUTOMATED COUNT: 14.4 % (ref 12.3–15.4)
GEN 5 2HR TROPONIN T REFLEX: 369 NG/L
GLUCOSE BLDC GLUCOMTR-MCNC: 116 MG/DL (ref 70–99)
GLUCOSE BLDC GLUCOMTR-MCNC: 118 MG/DL (ref 70–99)
GLUCOSE BLDC GLUCOMTR-MCNC: 126 MG/DL (ref 70–99)
GLUCOSE SERPL-MCNC: 135 MG/DL (ref 65–99)
HBA1C MFR BLD: 6.1 % (ref 4.8–5.6)
HCT VFR BLD AUTO: 43.9 % (ref 37.5–51)
HGB BLD-MCNC: 13.9 G/DL (ref 13–17.7)
MAGNESIUM SERPL-MCNC: 1.9 MG/DL (ref 1.6–2.6)
MCH RBC QN AUTO: 28.6 PG (ref 26.6–33)
MCHC RBC AUTO-ENTMCNC: 31.7 G/DL (ref 31.5–35.7)
MCV RBC AUTO: 90.3 FL (ref 79–97)
PLATELET # BLD AUTO: 263 10*3/MM3 (ref 140–450)
PMV BLD AUTO: 10.3 FL (ref 6–12)
POTASSIUM SERPL-SCNC: 3.2 MMOL/L (ref 3.5–5.2)
QT INTERVAL: 367 MS
QT INTERVAL: 379 MS
QTC INTERVAL: 456 MS
QTC INTERVAL: 491 MS
RBC # BLD AUTO: 4.86 10*6/MM3 (ref 4.14–5.8)
SODIUM SERPL-SCNC: 138 MMOL/L (ref 136–145)
TROPONIN T DELTA: -28 NG/L
TROPONIN T SERPL HS-MCNC: 397 NG/L
WBC NRBC COR # BLD: 8.7 10*3/MM3 (ref 3.4–10.8)

## 2023-08-29 PROCEDURE — 94640 AIRWAY INHALATION TREATMENT: CPT

## 2023-08-29 PROCEDURE — 25010000002 FUROSEMIDE PER 20 MG: Performed by: INTERNAL MEDICINE

## 2023-08-29 PROCEDURE — 97165 OT EVAL LOW COMPLEX 30 MIN: CPT

## 2023-08-29 PROCEDURE — 85027 COMPLETE CBC AUTOMATED: CPT | Performed by: INTERNAL MEDICINE

## 2023-08-29 PROCEDURE — 25010000002 ENOXAPARIN PER 10 MG: Performed by: INTERNAL MEDICINE

## 2023-08-29 PROCEDURE — 83735 ASSAY OF MAGNESIUM: CPT | Performed by: INTERNAL MEDICINE

## 2023-08-29 PROCEDURE — 83036 HEMOGLOBIN GLYCOSYLATED A1C: CPT | Performed by: INTERNAL MEDICINE

## 2023-08-29 PROCEDURE — 84484 ASSAY OF TROPONIN QUANT: CPT | Performed by: INTERNAL MEDICINE

## 2023-08-29 PROCEDURE — 99233 SBSQ HOSP IP/OBS HIGH 50: CPT | Performed by: INTERNAL MEDICINE

## 2023-08-29 PROCEDURE — 82948 REAGENT STRIP/BLOOD GLUCOSE: CPT

## 2023-08-29 PROCEDURE — 94799 UNLISTED PULMONARY SVC/PX: CPT

## 2023-08-29 PROCEDURE — 80048 BASIC METABOLIC PNL TOTAL CA: CPT | Performed by: INTERNAL MEDICINE

## 2023-08-29 PROCEDURE — 85730 THROMBOPLASTIN TIME PARTIAL: CPT | Performed by: PHYSICIAN ASSISTANT

## 2023-08-29 RX ORDER — SPIRONOLACTONE 25 MG/1
25 TABLET ORAL DAILY
Status: DISCONTINUED | OUTPATIENT
Start: 2023-08-29 | End: 2023-08-31

## 2023-08-29 RX ORDER — CARVEDILOL 25 MG/1
25 TABLET ORAL 2 TIMES DAILY WITH MEALS
Status: DISCONTINUED | OUTPATIENT
Start: 2023-08-29 | End: 2023-09-02 | Stop reason: HOSPADM

## 2023-08-29 RX ORDER — ENOXAPARIN SODIUM 100 MG/ML
1 INJECTION SUBCUTANEOUS EVERY 12 HOURS
Status: DISCONTINUED | OUTPATIENT
Start: 2023-08-29 | End: 2023-09-02 | Stop reason: HOSPADM

## 2023-08-29 RX ORDER — FAMOTIDINE 20 MG/1
20 TABLET, FILM COATED ORAL
Status: DISCONTINUED | OUTPATIENT
Start: 2023-08-29 | End: 2023-09-02 | Stop reason: HOSPADM

## 2023-08-29 RX ORDER — ACETAMINOPHEN 325 MG/1
650 TABLET ORAL EVERY 4 HOURS PRN
Status: DISCONTINUED | OUTPATIENT
Start: 2023-08-29 | End: 2023-09-02 | Stop reason: HOSPADM

## 2023-08-29 RX ORDER — BUDESONIDE AND FORMOTEROL FUMARATE DIHYDRATE 160; 4.5 UG/1; UG/1
2 AEROSOL RESPIRATORY (INHALATION)
Status: DISCONTINUED | OUTPATIENT
Start: 2023-08-29 | End: 2023-09-02 | Stop reason: HOSPADM

## 2023-08-29 RX ORDER — OXYCODONE AND ACETAMINOPHEN 10; 325 MG/1; MG/1
1 TABLET ORAL EVERY 8 HOURS PRN
Status: DISCONTINUED | OUTPATIENT
Start: 2023-08-29 | End: 2023-09-02 | Stop reason: HOSPADM

## 2023-08-29 RX ORDER — POTASSIUM CHLORIDE 750 MG/1
40 CAPSULE, EXTENDED RELEASE ORAL DAILY
Status: DISCONTINUED | OUTPATIENT
Start: 2023-08-29 | End: 2023-09-02

## 2023-08-29 RX ORDER — NICOTINE 21 MG/24HR
1 PATCH, TRANSDERMAL 24 HOURS TRANSDERMAL
Status: DISCONTINUED | OUTPATIENT
Start: 2023-08-29 | End: 2023-09-02 | Stop reason: HOSPADM

## 2023-08-29 RX ORDER — CHOLECALCIFEROL (VITAMIN D3) 125 MCG
500 CAPSULE ORAL DAILY
Status: DISCONTINUED | OUTPATIENT
Start: 2023-08-29 | End: 2023-09-02 | Stop reason: HOSPADM

## 2023-08-29 RX ORDER — LOSARTAN POTASSIUM 50 MG/1
50 TABLET ORAL 2 TIMES DAILY
Status: DISCONTINUED | OUTPATIENT
Start: 2023-08-29 | End: 2023-09-02 | Stop reason: HOSPADM

## 2023-08-29 RX ORDER — ALBUTEROL SULFATE 2.5 MG/3ML
2.5 SOLUTION RESPIRATORY (INHALATION) EVERY 6 HOURS PRN
Status: DISCONTINUED | OUTPATIENT
Start: 2023-08-29 | End: 2023-09-02 | Stop reason: HOSPADM

## 2023-08-29 RX ADMIN — FUROSEMIDE 40 MG: 10 INJECTION, SOLUTION INTRAMUSCULAR; INTRAVENOUS at 08:49

## 2023-08-29 RX ADMIN — OXYCODONE AND ACETAMINOPHEN 1 TABLET: 5; 325 TABLET ORAL at 00:13

## 2023-08-29 RX ADMIN — TRAZODONE HYDROCHLORIDE 25 MG: 50 TABLET ORAL at 00:13

## 2023-08-29 RX ADMIN — OXYCODONE AND ACETAMINOPHEN 1 TABLET: 5; 325 TABLET ORAL at 08:50

## 2023-08-29 RX ADMIN — NICOTINE 1 PATCH: 21 PATCH, EXTENDED RELEASE TRANSDERMAL at 11:19

## 2023-08-29 RX ADMIN — Medication 10 ML: at 20:25

## 2023-08-29 RX ADMIN — FAMOTIDINE 20 MG: 20 TABLET ORAL at 17:06

## 2023-08-29 RX ADMIN — SENNOSIDES AND DOCUSATE SODIUM 2 TABLET: 50; 8.6 TABLET ORAL at 20:25

## 2023-08-29 RX ADMIN — FUROSEMIDE 40 MG: 10 INJECTION, SOLUTION INTRAMUSCULAR; INTRAVENOUS at 17:06

## 2023-08-29 RX ADMIN — ENOXAPARIN SODIUM 70 MG: 100 INJECTION SUBCUTANEOUS at 11:18

## 2023-08-29 RX ADMIN — EMPAGLIFLOZIN 10 MG: 10 TABLET, FILM COATED ORAL at 11:19

## 2023-08-29 RX ADMIN — ENOXAPARIN SODIUM 70 MG: 100 INJECTION SUBCUTANEOUS at 22:32

## 2023-08-29 RX ADMIN — BUSPIRONE HYDROCHLORIDE 5 MG: 5 TABLET ORAL at 00:13

## 2023-08-29 RX ADMIN — BUDESONIDE AND FORMOTEROL FUMARATE DIHYDRATE 2 PUFF: 160; 4.5 AEROSOL RESPIRATORY (INHALATION) at 13:29

## 2023-08-29 RX ADMIN — ACETAMINOPHEN 650 MG: 325 TABLET ORAL at 05:54

## 2023-08-29 RX ADMIN — CARVEDILOL 25 MG: 25 TABLET, FILM COATED ORAL at 17:06

## 2023-08-29 RX ADMIN — LOSARTAN POTASSIUM 50 MG: 50 TABLET, FILM COATED ORAL at 11:19

## 2023-08-29 RX ADMIN — SPIRONOLACTONE 25 MG: 25 TABLET ORAL at 11:19

## 2023-08-29 RX ADMIN — POTASSIUM CHLORIDE 40 MEQ: 10 CAPSULE, COATED, EXTENDED RELEASE ORAL at 11:19

## 2023-08-29 RX ADMIN — BUDESONIDE AND FORMOTEROL FUMARATE DIHYDRATE 2 PUFF: 160; 4.5 AEROSOL RESPIRATORY (INHALATION) at 20:27

## 2023-08-29 RX ADMIN — CARVEDILOL 25 MG: 25 TABLET, FILM COATED ORAL at 11:19

## 2023-08-29 RX ADMIN — ASPIRIN 81 MG 81 MG: 81 TABLET ORAL at 08:50

## 2023-08-29 RX ADMIN — OXYCODONE AND ACETAMINOPHEN 1 TABLET: 5; 325 TABLET ORAL at 17:07

## 2023-08-29 RX ADMIN — CYANOCOBALAMIN TAB 500 MCG 500 MCG: 500 TAB at 11:19

## 2023-08-29 RX ADMIN — Medication 10 ML: at 08:50

## 2023-08-29 RX ADMIN — BUSPIRONE HYDROCHLORIDE 5 MG: 5 TABLET ORAL at 20:25

## 2023-08-29 RX ADMIN — VENLAFAXINE HYDROCHLORIDE 37.5 MG: 37.5 CAPSULE, EXTENDED RELEASE ORAL at 00:13

## 2023-08-29 NOTE — PLAN OF CARE
Goal Outcome Evaluation:      No acute events this shift.  Pt. Compliant and agreeable. VSS.

## 2023-08-29 NOTE — THERAPY EVALUATION
Patient Name: Regulo eSpulveda  : 1965    MRN: [id number]                              Today's Date: 2023       Admit Date: 2023    Visit Dx:     ICD-10-CM ICD-9-CM   1. New onset atrial fibrillation  I48.91 427.31   2. Acute congestive heart failure, unspecified heart failure type  I50.9 428.0   3. Decreased activities of daily living (ADL)  Z78.9 V49.89     Patient Active Problem List   Diagnosis    Left groin pain    Major depressive disorder, recurrent episode, moderate    Chronic obstructive pulmonary disease    Diabetes mellitus    Hypertensive disorder    Hyperlipidemia    Hepatitis C    Cigarette nicotine dependence    BPH (benign prostatic hyperplasia)    GERD (gastroesophageal reflux disease)    B12 deficiency    Atrial fibrillation with rapid ventricular response     Past Medical History:   Diagnosis Date    Anxiety     Asthma     Bipolar affective     Cardiac tamponade         CHF (congestive heart failure)     COPD (chronic obstructive pulmonary disease)     Coronary artery disease     Depression     Diabetes mellitus     Elevated cholesterol     Hypertension      Past Surgical History:   Procedure Laterality Date    TESTICLE SURGERY Left     extraction      General Information       Row Name 23 1518          OT Time and Intention    Document Type evaluation  -ES     Mode of Treatment individual therapy;occupational therapy  -ES       Row Name 23 1518          General Information    Patient Profile Reviewed yes  -ES     Prior Level of Function independent:;ADL's;all household mobility;community mobility  Patient independent with ADLs at baseline. No device for fucntional mobility. Tub/shower combo, standing shower completion. [place] in stance. No home O2. Denies recent falls.  -ES     Existing Precautions/Restrictions fall;oxygen therapy device and L/min  -ES     Barriers to Rehab none identified  -ES       Row Name 23 1518          Occupational Profile    Reason  for Services/Referral (Occupational Profile) Patient is 58 yr old male admitted to [facility] on [facility]/27/2023 with reports of chest pain and shortness of breath. Patient PMH significant for substance abuse. OT evaluation and treatment ordered d/t recent decline in ADLs/transfer ability and discharge planning recommendations. No previous OT services for current condition.  -ES       Row Name 08/29/23 1518          Living Environment    People in Home friend(s)  -ES       Row Name 08/29/23 1518          Cognition    Orientation Status (Cognition) oriented x 3  patient cooperative, agreeable to therapy evaluation  -ES       Row Name 08/29/23 1518          Safety Issues, Functional Mobility    Impairments Affecting Function (Mobility) shortness of breath  -ES               User Key  (r) = Recorded By, (t) = Taken By, (c) = Cosigned By      Initials Name Provider Type    ES Paige Azevedo, OTR/L, CSRS Occupational Therapist                     Mobility/ADL's       Row Name 08/29/23 1523          Bed Mobility    Bed Mobility supine-sit;sit-supine  -ES     Supine-Sit [place] (Bed Mobility) independent  -ES     Sit-Supine [place] (Bed Mobility) independent  -ES       Row Name 08/29/23 1523          Transfers    Transfers sit-stand transfer;stand-sit transfer  -ES       Row Name 08/29/23 1523          Sit-Stand Transfer    Sit-Stand [place] (Transfers) independent  -ES     Assistive Device (Sit-Stand Transfers) other (see comments)  no assistive device  -ES       Row Name 08/29/23 1523          Stand-Sit Transfer    Stand-Sit [place] (Transfers) independent  -ES     Assistive Device (Stand-Sit Transfers) other (see comments)  no assistive device  -ES       Row Name 08/29/23 1523          Functional Mobility    Functional Mobility- Ind. Level independent  -ES     Functional Mobility- Device other (see comments)  no assistive device  -ES       Row Name 08/29/23 1523          Activities of Daily  Living    BADL Assessment/Intervention bathing;upper body dressing;lower body dressing;grooming;feeding;toileting  -ES       Row Name 08/29/23 1523          Bathing Assessment/Intervention    [place] Level (Bathing) bathing skills;independent  -ES       Row Name 08/29/23 1523          Upper Body Dressing Assessment/Training    [place] Level (Upper Body Dressing) upper body dressing skills;independent  -ES       Row Name 08/29/23 1523          Lower Body Dressing Assessment/Training    [place] Level (Lower Body Dressing) lower body dressing skills;independent  -ES       Row Name 08/29/23 1523          Grooming Assessment/Training    [place] Level (Grooming) grooming skills;independent  -ES       Row Name 08/29/23 1523          Self-Feeding Assessment/Training    [place] Level (Feeding) feeding skills;independent  -ES       Row Name 08/29/23 1523          Toileting Assessment/Training    [place] Level (Toileting) toileting skills;independent  -ES               User Key  (r) = Recorded By, (t) = Taken By, (c) = Cosigned By      Initials Name Provider Type    ES Paige Azevedo, OTR/L, CSRS Occupational Therapist                   Obj/Interventions       Row Name 08/29/23 1524          Sensory Assessment (Somatosensory)    Sensory Assessment (Somatosensory) sensation intact  -ES       Row Name 08/29/23 1524          Vision Assessment/Intervention    Visual Impairment/Limitations WFL  -ES       Row Name 08/29/23 1524          Range of Motion Comprehensive    General Range of Motion no range of motion deficits identified;bilateral upper extremity ROM WNL  -ES       Row Name 08/29/23 1524          Strength Comprehensive (MMT)    Comment, General Manual Muscle Testing (MMT) Assessment BUEs assessed 4/5  -ES       Row Name 08/29/23 1524          Motor Skills    Motor Skills functional endurance  -ES     Functional Endurance fair.  -ES       Row Name 08/29/23 1524          Balance    Balance  Assessment sitting dynamic balance;standing dynamic balance  -ES     Dynamic Sitting Balance independent  -ES     Position, Sitting Balance unsupported  -ES     Dynamic Standing Balance independent  -ES     Position/Device Used, Standing Balance unsupported  -ES               User Key  (r) = Recorded By, (t) = Taken By, (c) = Cosigned By      Initials Name Provider Type    Paige Fan, OTR/L, CSRS Occupational Therapist                   Goals/Plan    No documentation.                  Clinical Impression       Row Name 08/29/23 1530          Plan of Care Review    Plan of Care Reviewed With patient  -ES     Progress no change  -ES     Outcome Evaluation Patient presents at or near baseline functional status with no functional deficits that impede patient independence with activities of daily living.  No indicated need for skilled occupational therapy intervention in the acute care setting.  Occupational therapy will sign off at this time.  -ES       Row Name 08/29/23 1530          Therapy Assessment/Plan (OT)    Criteria for Skilled Therapeutic Interventions Met (OT) no problems identified which require skilled intervention  -ES     Therapy Frequency (OT) evaluation only  -ES       Row Name 08/29/23 1530          Therapy Plan Review/Discharge Plan (OT)    Anticipated Discharge Disposition (OT) home  -ES               User Key  (r) = Recorded By, (t) = Taken By, (c) = Cosigned By      Initials Name Provider Type    Paige Fan, OTR/L, CSRS Occupational Therapist                   Outcome Measures       Row Name 08/29/23 1530          How much help from another is currently needed...    Putting on and taking off regular lower body clothing? 4  -ES     Bathing (including washing, rinsing, and drying) 4  -ES     Toileting (which includes using toilet bed pan or urinal) 4  -ES     Putting on and taking off regular upper body clothing 4  -ES     Taking care of personal grooming (such as brushing teeth) 4  -ES      Eating meals 4  -ES     [facility] 6 Clicks Score (OT) 24  -ES       Row Name 08/29/23 0900          How much help from another person do you currently need...    Turning from your back to your side while in flat bed without using bedrails? 4  -MS     Moving from lying on back to sitting on the side of a flat bed without bedrails? 4  -MS     Moving to and from a bed to a chair (including a wheelchair)? 3  -MS     Standing up from a chair using your arms (e.g., wheelchair, bedside chair)? 4  -MS     Climbing 3-5 steps with a railing? 3  -MS     To walk in hospital room? 3  -MS     [facility] 6 Clicks Score (PT) 21  -MS     Highest level of mobility 6 --> Walked 10 steps or more  -MS       Row Name 08/29/23 1530          Functional Assessment    Outcome Measure Options [facility] 6 Clicks Daily Activity (OT);Optimal Instrument  -ES       Row Name 08/29/23 1530          Optimal Instrument    Optimal Instrument Optimal - 3  -ES     Bending/Stooping 1  -ES     Balancing 1  -ES     Standing 1  -ES     From the list, choose the 3 activities you would most like to be able to do without any difficulty Bending/stooping;Standing;Balancing  -ES     Total Score Optimal - 3 3  -ES               User Key  (r) = Recorded By, (t) = Taken By, (c) = Cosigned By      Initials Name Provider Type    Mita Cline, RN Registered Nurse    Paige Fan, OTR/L, CSRS Occupational Therapist                      OT Recommendation and Plan  Therapy Frequency (OT): evaluation only  Plan of Care Review  Plan of Care Reviewed With: patient  Progress: no change  Outcome Evaluation: Patient presents at or near baseline functional status with no functional deficits that impede patient independence with activities of daily living.  No indicated need for skilled occupational therapy intervention in the acute care setting.  Occupational therapy will sign off at this time.     Time Calculation:   Evaluation Complexity (OT)  Review Occupational  Profile/Medical/Therapy History Complexity: brief/low complexity  Assessment, Occupational Performance/Identification of Deficit Complexity: 1-3 performance deficits  Clinical Decision Making Complexity (OT): problem focused assessment/low complexity  Overall Complexity of Evaluation (OT): low complexity     Time Calculation- OT       Row Name 08/29/23 1532             Time Calculation- OT    OT Received On 08/29/23  -ES         Untimed Charges    OT Eval/Re-eval Minutes 31  -ES         Total Minutes    Untimed Charges Total Minutes 31  -ES       Total Minutes 31  -ES                User Key  (r) = Recorded By, (t) = Taken By, (c) = Cosigned By      Initials Name Provider Type    ES Paige Azevedo, OTR/L, CSRS Occupational Therapist                  Therapy Charges for Today       Code Description Service Date Service Provider Modifiers Qty    [id number]  OT EVAL LOW COMPLEXITY 3 8/29/2023 Paige Azevedo, OTR/L, CSRS GO 1                 Paige Azevedo OTR/L, CSRS  8/29/2023

## 2023-08-29 NOTE — PLAN OF CARE
Goal Outcome Evaluation:  Plan of Care Reviewed With: patient        Progress: no change  Outcome Evaluation: Patient presents at or near baseline functional status with no functional deficits that impede patient independence with activities of daily living.  No indicated need for skilled occupational therapy intervention in the acute care setting.  Occupational therapy will sign off at this time.      Anticipated Discharge Disposition (OT): home

## 2023-08-29 NOTE — PLAN OF CARE
Goal Outcome Evaluation:  Plan of Care Reviewed With: patient        Progress: no change  Outcome Evaluation: Pt was irritable and agitated at beginning os shift per report, administered scheduled anxiety meds as ordered. PT c/o pain/discomfort this shift, administered prn pain med as ordered. Pt on heparin drip as ordered with it running  at a rate of 21 units/kg/hr as ordered/parameter and will have a repeat aPTT at 0600. No new issues or new needs noted at this time.
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
## 2023-08-30 LAB
ALBUMIN SERPL-MCNC: 3.8 G/DL (ref 3.5–5.2)
ANION GAP SERPL CALCULATED.3IONS-SCNC: 11.3 MMOL/L (ref 5–15)
BUN SERPL-MCNC: 23 MG/DL (ref 6–20)
BUN/CREAT SERPL: 21.5 (ref 7–25)
CALCIUM SPEC-SCNC: 9 MG/DL (ref 8.6–10.5)
CHLORIDE SERPL-SCNC: 101 MMOL/L (ref 98–107)
CHOLEST SERPL-MCNC: 131 MG/DL (ref 0–200)
CO2 SERPL-SCNC: 28.7 MMOL/L (ref 22–29)
CREAT SERPL-MCNC: 1.07 MG/DL (ref 0.76–1.27)
EGFRCR SERPLBLD CKD-EPI 2021: 80.4 ML/MIN/1.73
GLUCOSE BLDC GLUCOMTR-MCNC: 112 MG/DL (ref 70–99)
GLUCOSE BLDC GLUCOMTR-MCNC: 120 MG/DL (ref 70–99)
GLUCOSE BLDC GLUCOMTR-MCNC: 162 MG/DL (ref 70–99)
GLUCOSE SERPL-MCNC: 100 MG/DL (ref 65–99)
HDLC SERPL-MCNC: 32 MG/DL (ref 40–60)
LDLC SERPL CALC-MCNC: 77 MG/DL (ref 0–100)
LDLC/HDLC SERPL: 2.34 {RATIO}
MAGNESIUM SERPL-MCNC: 1.9 MG/DL (ref 1.6–2.6)
PHOSPHATE SERPL-MCNC: 3.6 MG/DL (ref 2.5–4.5)
POTASSIUM SERPL-SCNC: 3.6 MMOL/L (ref 3.5–5.2)
SODIUM SERPL-SCNC: 141 MMOL/L (ref 136–145)
TRIGL SERPL-MCNC: 121 MG/DL (ref 0–150)
VLDLC SERPL-MCNC: 22 MG/DL (ref 5–40)
WHOLE BLOOD HOLD SPECIMEN: NORMAL

## 2023-08-30 PROCEDURE — 80069 RENAL FUNCTION PANEL: CPT | Performed by: INTERNAL MEDICINE

## 2023-08-30 PROCEDURE — 94799 UNLISTED PULMONARY SVC/PX: CPT

## 2023-08-30 PROCEDURE — 83735 ASSAY OF MAGNESIUM: CPT | Performed by: INTERNAL MEDICINE

## 2023-08-30 PROCEDURE — 99233 SBSQ HOSP IP/OBS HIGH 50: CPT | Performed by: INTERNAL MEDICINE

## 2023-08-30 PROCEDURE — 25010000002 FUROSEMIDE PER 20 MG: Performed by: INTERNAL MEDICINE

## 2023-08-30 PROCEDURE — 80061 LIPID PANEL: CPT | Performed by: INTERNAL MEDICINE

## 2023-08-30 PROCEDURE — 25010000002 ENOXAPARIN PER 10 MG: Performed by: INTERNAL MEDICINE

## 2023-08-30 PROCEDURE — 82948 REAGENT STRIP/BLOOD GLUCOSE: CPT

## 2023-08-30 RX ORDER — ARIPIPRAZOLE 15 MG/1
30 TABLET ORAL DAILY
Status: DISCONTINUED | OUTPATIENT
Start: 2023-08-30 | End: 2023-09-02

## 2023-08-30 RX ORDER — ENOXAPARIN SODIUM 100 MG/ML
1 INJECTION SUBCUTANEOUS EVERY 12 HOURS SCHEDULED
Qty: 8 ML | Refills: 0 | Status: SHIPPED | OUTPATIENT
Start: 2023-08-30 | End: 2023-09-08

## 2023-08-30 RX ORDER — WARFARIN SODIUM 5 MG/1
5 TABLET ORAL
Status: DISCONTINUED | OUTPATIENT
Start: 2023-08-30 | End: 2023-09-01

## 2023-08-30 RX ADMIN — Medication 10 ML: at 20:20

## 2023-08-30 RX ADMIN — BUDESONIDE AND FORMOTEROL FUMARATE DIHYDRATE 2 PUFF: 160; 4.5 AEROSOL RESPIRATORY (INHALATION) at 07:12

## 2023-08-30 RX ADMIN — ASPIRIN 81 MG 81 MG: 81 TABLET ORAL at 08:28

## 2023-08-30 RX ADMIN — BUDESONIDE AND FORMOTEROL FUMARATE DIHYDRATE 2 PUFF: 160; 4.5 AEROSOL RESPIRATORY (INHALATION) at 18:39

## 2023-08-30 RX ADMIN — EMPAGLIFLOZIN 10 MG: 10 TABLET, FILM COATED ORAL at 08:28

## 2023-08-30 RX ADMIN — FUROSEMIDE 40 MG: 10 INJECTION, SOLUTION INTRAMUSCULAR; INTRAVENOUS at 08:29

## 2023-08-30 RX ADMIN — LOSARTAN POTASSIUM 50 MG: 50 TABLET, FILM COATED ORAL at 08:28

## 2023-08-30 RX ADMIN — Medication 10 ML: at 08:29

## 2023-08-30 RX ADMIN — CARVEDILOL 25 MG: 25 TABLET, FILM COATED ORAL at 08:28

## 2023-08-30 RX ADMIN — VENLAFAXINE HYDROCHLORIDE 37.5 MG: 37.5 CAPSULE, EXTENDED RELEASE ORAL at 08:28

## 2023-08-30 RX ADMIN — OXYCODONE AND ACETAMINOPHEN 1 TABLET: 5; 325 TABLET ORAL at 08:27

## 2023-08-30 RX ADMIN — CARVEDILOL 25 MG: 25 TABLET, FILM COATED ORAL at 17:14

## 2023-08-30 RX ADMIN — POTASSIUM CHLORIDE 40 MEQ: 10 CAPSULE, COATED, EXTENDED RELEASE ORAL at 08:27

## 2023-08-30 RX ADMIN — LOSARTAN POTASSIUM 50 MG: 50 TABLET, FILM COATED ORAL at 20:20

## 2023-08-30 RX ADMIN — CYANOCOBALAMIN TAB 500 MCG 500 MCG: 500 TAB at 08:28

## 2023-08-30 RX ADMIN — SPIRONOLACTONE 25 MG: 25 TABLET ORAL at 08:28

## 2023-08-30 RX ADMIN — NICOTINE 1 PATCH: 21 PATCH, EXTENDED RELEASE TRANSDERMAL at 08:29

## 2023-08-30 RX ADMIN — ARIPIPRAZOLE 30 MG: 15 TABLET ORAL at 13:51

## 2023-08-30 RX ADMIN — FAMOTIDINE 20 MG: 20 TABLET ORAL at 08:28

## 2023-08-30 RX ADMIN — FUROSEMIDE 40 MG: 10 INJECTION, SOLUTION INTRAMUSCULAR; INTRAVENOUS at 17:14

## 2023-08-30 RX ADMIN — WARFARIN SODIUM 5 MG: 5 TABLET ORAL at 17:14

## 2023-08-30 RX ADMIN — SENNOSIDES AND DOCUSATE SODIUM 2 TABLET: 50; 8.6 TABLET ORAL at 08:28

## 2023-08-30 RX ADMIN — BUSPIRONE HYDROCHLORIDE 5 MG: 5 TABLET ORAL at 20:20

## 2023-08-30 RX ADMIN — ENOXAPARIN SODIUM 70 MG: 100 INJECTION SUBCUTANEOUS at 11:23

## 2023-08-30 RX ADMIN — OXYCODONE AND ACETAMINOPHEN 1 TABLET: 5; 325 TABLET ORAL at 20:20

## 2023-08-30 RX ADMIN — FAMOTIDINE 20 MG: 20 TABLET ORAL at 17:14

## 2023-08-30 NOTE — PROGRESS NOTES
Pharmacy to Dose: Warfarin  Consulting provider: Kev  Indication for warfarin: New LV thrombus, full anticoagulation (patient also afib with RVR)  Goal INR range: 2 - 3  Home warfarin dose:  New start warfarin    Date INR Warfarin Dose Given   8/30 1.02 5mg ordered                         Any Vitamin K given?: No  Drug interactions Reviewed: Yes. Details: None at this time  Is patient on bridging therapy with another anticoagulant?: Yes; Enoxaparin 70 mg SQ Q12H    Lab Results   Component Value Date     08/29/2023     08/28/2023    HGB 13.9 08/29/2023    HGB 13.0 08/28/2023       Plan:   Patient has new LV thrombus, was on heparin drip and was transitioned to therapeutic Lovenox. Team is adding warfarin for patient to be discharged on, but bridging currently with the Lovenox.  Warfarin 5mg ordered today and also ordered daily INR labs.     Thank you for this consult. Pharmacy will continue to monitor.   Rosi Begum RPH

## 2023-08-30 NOTE — PLAN OF CARE
Goal Outcome Evaluation:  Plan of Care Reviewed With: patient, sibling      No acute changes overnight. VSS. Call light within reach. Reached out to sister about Abilify IM injection, since pharmacy does not supply the medication. She will bring the shot in when she is able to get it from their pharmacy.
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
## 2023-08-30 NOTE — SIGNIFICANT NOTE
08/30/23 1245   Coping/Psychosocial   Observed Emotional State calm;cooperative   Verbalized Emotional State hopefulness   Trust Relationship/Rapport empathic listening provided   Involvement in Care interacting with patient   Additional Documentation Spiritual Care (Group)   Spiritual Care   Use of Spiritual Resources non-Yazidism use of spiritual care   Spiritual Care Source  initiative   Spiritual Care Follow-Up follow-up, none required as presently assessed   Response to Spiritual Care receptive of support   Spiritual Care Interventions supportive conversation provided   Spiritual Care Visit Type initial   Receptivity to Spiritual Care visit welcomed

## 2023-08-30 NOTE — PROGRESS NOTES
[facility]   Hospitalist Progress Note  Date: 2023  Patient Name: Regulo Sepulveda  : 1965  MRN: [id number]  Date of admission: 2023      Subjective   Subjective     Chief Complaint:   Chest pain, shortness of breath    Summary:   Regulo Sepulveda is a 58-year-old male with a past medical history significant for COPD, diabetes, hypertension, anxiety, drug use.  Patient was admitted to the hospital on the evening of the  with chief complaint of chest pain or shortness of breath.  Patient was found to be in A-fib with RVR given diltiazem and converted to normal sinus rhythm.  Endorses adherence with medications.  UDS positive for meth, denies use, states he has been clean for a few months.  States he was in a room where other people were smoking meth.  Patient's troponins christine to the night and patient was started on a heparin drip.  Morning troponin christine to 285 therefore cardiology consulted.  Patient evaluated by cardiology and psychiatry    Interval Followup:   Patient still complaining of positional chest pain and shortness of breath   Remains on 2 L.  No home oxygen use.  Still has dyspnea on exertion  Patient wants his monthly Abilify injection for schizophrenia.  Not in hospital formulary.  Patient's sister trying to get it from his pharmacy.  Good urine output, negative fluid balance.   patient's echocardiogram shows LV thrombus seen in the apex.  Echo also shows ejection fraction of 20%.    Review of Systems   All systems were reviewed and negative except for: Shortness of breath, chest pain    Objective   Objective     Vitals:   Temp:  [97.4 °F (36.3 °C)-98.2 °F (36.8 °C)] 97.7 °F (36.5 °C)  Heart Rate:  [60-74] 68  Resp:  [16-18] 16  BP: ()/(59-89) 127/79  Flow (L/min):  [1-2] 1  Physical Exam      Constitutional: Awake, alert, uncomfortable appearing              Eyes: Pupils equal, sclerae anicteric, no conjunctival injection              HENT: NCAT, mucous membranes  moist              Neck: Supple, no thyromegaly, no lymphadenopathy, trachea midline              Respiratory: Diminished breath sounds heard throughout, crackles at bases              Cardiovascular: RRR, no murmurs, rubs, or gallops, palpable pedal pulses bilaterally              Gastrointestinal: Positive bowel sounds, soft, nontender, nondistended              Musculoskeletal: No bilateral ankle edema, no clubbing or cyanosis to extremities              Neurologic: Oriented x 3, strength symmetric in all extremities, Cranial Nerves grossly intact to confrontation, speech clear              Skin: No rashes        Result Review    Result Review:  I have personally reviewed the results from 8/30/2023 and agree with these findings:  x laboratory  Microbiology  x radiology  x EKG/Telemetry sinus tachycardia 101 with LVH  Cardiology/Vascular   Pathology  x old records  x other: Medications    Assessment & Plan   Assessment / Plan     Assessment/Plan:  Acute exacerbation of combined heart failure, 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  Hemoglobin A1c of 6.1%  Hypertension  Anxiety  Question substance abuse.  Urine drug seen positive for meth.  Patient denies using it  Schizophrenia.  Hyperlipidemia LDL of 77    Plan:  Patient remains admitted to hospital for further care and management  Cardiology consulted, appreciate assistance  Pharmacy to start warfarin with INR target of 2-3.  Continue bridging Lovenox until therapeutic INR.  Lovenox has been approved for home use by insurance  Patient's EKG with no evidence of ischemia  Suspecting elevated troponin in the setting of atrial fibrillation, heart failure exacerbation  Continue IV Lasix, strict I's and O's  Abilify IM injection once a month.  Nonformulary.  Sister tried to get it  Appreciate psych input.  Started on oral Abilify  to overlap with his injection.  Patient will need long-term anticoagulation given LV thrombus and [id number] score  Sliding-scale insulin.  Nicotine patch.  DC aspirin as starting Coumadin  Continue Lipitor.  Resume appropriate home medication including BuSpar and Effexor  Cardiology recommends reconsultation once euvolemic for ischemic evaluation  Wean off oxygen  Discussed plan with RN and .  May need 6-minute walk test.  Patient prefers to go home and not to rehab.  Need follow-up with heart failure clinic on discharge  Continue telemetry    DVT prophylaxis:  Medical DVT prophylaxis orders are present.    CODE STATUS:   Level Of Support Discussed With: Patient  Code Status (Patient has no pulse and is not breathing): CPR (Attempt to Resuscitate)  Medical Interventions (Patient has pulse or is breathing): Full Support

## 2023-08-31 LAB
ALBUMIN SERPL-MCNC: 4.1 G/DL (ref 3.5–5.2)
ANION GAP SERPL CALCULATED.3IONS-SCNC: 12 MMOL/L (ref 5–15)
BUN SERPL-MCNC: 31 MG/DL (ref 6–20)
BUN/CREAT SERPL: 23.5 (ref 7–25)
CALCIUM SPEC-SCNC: 9.2 MG/DL (ref 8.6–10.5)
CHLORIDE SERPL-SCNC: 100 MMOL/L (ref 98–107)
CO2 SERPL-SCNC: 29 MMOL/L (ref 22–29)
CREAT SERPL-MCNC: 1.32 MG/DL (ref 0.76–1.27)
EGFRCR SERPLBLD CKD-EPI 2021: 62.5 ML/MIN/1.73
GLUCOSE BLDC GLUCOMTR-MCNC: 100 MG/DL (ref 70–99)
GLUCOSE BLDC GLUCOMTR-MCNC: 112 MG/DL (ref 70–99)
GLUCOSE BLDC GLUCOMTR-MCNC: 115 MG/DL (ref 70–99)
GLUCOSE BLDC GLUCOMTR-MCNC: 155 MG/DL (ref 70–99)
GLUCOSE SERPL-MCNC: 103 MG/DL (ref 65–99)
INR PPP: 1.07 (ref 0.86–1.15)
PHOSPHATE SERPL-MCNC: 3.8 MG/DL (ref 2.5–4.5)
POTASSIUM SERPL-SCNC: 4.1 MMOL/L (ref 3.5–5.2)
PROTHROMBIN TIME: 14 SECONDS (ref 11.8–14.9)
SODIUM SERPL-SCNC: 141 MMOL/L (ref 136–145)

## 2023-08-31 PROCEDURE — 94618 PULMONARY STRESS TESTING: CPT

## 2023-08-31 PROCEDURE — 82948 REAGENT STRIP/BLOOD GLUCOSE: CPT

## 2023-08-31 PROCEDURE — 94664 DEMO&/EVAL PT USE INHALER: CPT

## 2023-08-31 PROCEDURE — 80069 RENAL FUNCTION PANEL: CPT | Performed by: INTERNAL MEDICINE

## 2023-08-31 PROCEDURE — 25010000002 FUROSEMIDE PER 20 MG: Performed by: INTERNAL MEDICINE

## 2023-08-31 PROCEDURE — 25010000002 ENOXAPARIN PER 10 MG: Performed by: INTERNAL MEDICINE

## 2023-08-31 PROCEDURE — 94799 UNLISTED PULMONARY SVC/PX: CPT

## 2023-08-31 PROCEDURE — 85610 PROTHROMBIN TIME: CPT | Performed by: INTERNAL MEDICINE

## 2023-08-31 PROCEDURE — 94761 N-INVAS EAR/PLS OXIMETRY MLT: CPT

## 2023-08-31 PROCEDURE — 63710000001 INSULIN LISPRO (HUMAN) PER 5 UNITS: Performed by: FAMILY MEDICINE

## 2023-08-31 PROCEDURE — 99232 SBSQ HOSP IP/OBS MODERATE 35: CPT | Performed by: INTERNAL MEDICINE

## 2023-08-31 RX ADMIN — OXYCODONE AND ACETAMINOPHEN 1 TABLET: 5; 325 TABLET ORAL at 08:41

## 2023-08-31 RX ADMIN — POTASSIUM CHLORIDE 40 MEQ: 10 CAPSULE, COATED, EXTENDED RELEASE ORAL at 08:38

## 2023-08-31 RX ADMIN — Medication 10 ML: at 20:55

## 2023-08-31 RX ADMIN — ENOXAPARIN SODIUM 70 MG: 100 INJECTION SUBCUTANEOUS at 11:33

## 2023-08-31 RX ADMIN — OXYCODONE AND ACETAMINOPHEN 1 TABLET: 5; 325 TABLET ORAL at 17:06

## 2023-08-31 RX ADMIN — BUSPIRONE HYDROCHLORIDE 5 MG: 5 TABLET ORAL at 20:53

## 2023-08-31 RX ADMIN — SPIRONOLACTONE 25 MG: 25 TABLET ORAL at 08:39

## 2023-08-31 RX ADMIN — VENLAFAXINE HYDROCHLORIDE 37.5 MG: 37.5 CAPSULE, EXTENDED RELEASE ORAL at 08:38

## 2023-08-31 RX ADMIN — ARIPIPRAZOLE 30 MG: 15 TABLET ORAL at 08:39

## 2023-08-31 RX ADMIN — LOSARTAN POTASSIUM 50 MG: 50 TABLET, FILM COATED ORAL at 08:39

## 2023-08-31 RX ADMIN — EMPAGLIFLOZIN 10 MG: 10 TABLET, FILM COATED ORAL at 08:38

## 2023-08-31 RX ADMIN — Medication 10 ML: at 08:41

## 2023-08-31 RX ADMIN — SENNOSIDES AND DOCUSATE SODIUM 2 TABLET: 50; 8.6 TABLET ORAL at 08:42

## 2023-08-31 RX ADMIN — BUDESONIDE AND FORMOTEROL FUMARATE DIHYDRATE 2 PUFF: 160; 4.5 AEROSOL RESPIRATORY (INHALATION) at 21:55

## 2023-08-31 RX ADMIN — ENOXAPARIN SODIUM 70 MG: 100 INJECTION SUBCUTANEOUS at 00:29

## 2023-08-31 RX ADMIN — FUROSEMIDE 40 MG: 10 INJECTION, SOLUTION INTRAMUSCULAR; INTRAVENOUS at 08:38

## 2023-08-31 RX ADMIN — CARVEDILOL 25 MG: 25 TABLET, FILM COATED ORAL at 17:04

## 2023-08-31 RX ADMIN — CYANOCOBALAMIN TAB 500 MCG 500 MCG: 500 TAB at 08:46

## 2023-08-31 RX ADMIN — WARFARIN SODIUM 5 MG: 5 TABLET ORAL at 17:04

## 2023-08-31 RX ADMIN — FAMOTIDINE 20 MG: 20 TABLET ORAL at 08:40

## 2023-08-31 RX ADMIN — CARVEDILOL 25 MG: 25 TABLET, FILM COATED ORAL at 08:39

## 2023-08-31 RX ADMIN — INSULIN LISPRO 2 UNITS: 100 INJECTION, SOLUTION INTRAVENOUS; SUBCUTANEOUS at 08:38

## 2023-08-31 RX ADMIN — BUDESONIDE AND FORMOTEROL FUMARATE DIHYDRATE 2 PUFF: 160; 4.5 AEROSOL RESPIRATORY (INHALATION) at 07:00

## 2023-08-31 RX ADMIN — FAMOTIDINE 20 MG: 20 TABLET ORAL at 17:04

## 2023-08-31 RX ADMIN — NICOTINE 1 PATCH: 21 PATCH, EXTENDED RELEASE TRANSDERMAL at 08:44

## 2023-08-31 RX ADMIN — LOSARTAN POTASSIUM 50 MG: 50 TABLET, FILM COATED ORAL at 20:53

## 2023-08-31 NOTE — PLAN OF CARE
Goal Outcome Evaluation:         Pt Receiving Lovenox while bridging to coumadin. Pt on 1LNC. Received pain pill.

## 2023-08-31 NOTE — SIGNIFICANT NOTE
"   08/31/23 8201   Values/Beliefs   Spiritual, Cultural Beliefs, Hoahaoism Practices, Values that Affect Care no   Behavior WDL   Behavior WDL interactions   Interactions   (Falling asleep in the begining)   Emotion Mood WDL   Emotion/Mood/Affect WDL WDL   Mental Health   Little Interest or Pleasure in Doing Things 3-->nearly every day   Feeling Down, Depressed or Hopeless 2-->more than half the days   Do you feel stress - tense, restless, nervous, or anxious, or unable to sleep at night because your mind is troubled all the time - these days? Very much   Speech WDL   Speech WDL speech   Speech soft/quiet   Perceptual State WDL   Perceptual State WDL WDL   Thought Process WDL   Thought Process WDL WDL   Intellectual Performance WDL   Intellectual Performance WDL WDL   Coping/Stress   Major Change/Loss/Stressor housing concerns;illness   C-SSRS (Recent)   Q1 Wished to be Dead (Past Month) no   Q2 Suicidal Thoughts (Past Month) no   Q6 Suicide Behavior (Lifetime) no   Violence Risk   Feels Like Hurting Others no   Previous Attempt to Harm Others no     CSW social met with patient at the bedside Patient very quiet and a bit hard to understand. Patient notified SW that he is currently living at home with his sister. He is unemployed, and attempting to get on disability. Patient denies SI/HI. In the beginning of the assessment patient was falling asleep, but was more alert during and towards the end of the assessment. Patient states his current mood is \"okay.\" He reports he has been very stressed, due to his illness and trying to find his own place to live. Patient feels that he is safe at home currently. States he was having panic attacks prior to coming to the hospital. He reports he currently sees a a mental health provider, but cannot remember who it is, and began getting frustrated with SW when SW was uncertain who it was. He wants to follow up with them and does not want new counseling resources. Asked patient if " he has a HX of substance abuse, patient at first states no. Then tells SW that 6 months ago, he was taking meth, and that he went to rehab and is now clean. However, he states he was at a friends house who was smoking it, and he is positive for it because he was around someone while they were smoking it. Patient has a UDS positive for opiates and meth. Patient states his main issues right now is he has a transportation barrier, and cannot get two and from Dr appointments. Notified pt that since he has [employer] health insurance, he should be able to use talk. Patient then notified SW that he was aware of this, but he cannot read and write, and that it is difficult to arrange the transportation. SW notified Him we can arrange appointments, before he leaves, but after he is discharged it would be his and his families responsibility to make these arrangements. SW will provide patient with [facility] phone number. SW to follow.

## 2023-08-31 NOTE — PROGRESS NOTES
Pharmacy to Dose: Warfarin  Consulting provider: Kev  Indication for warfarin: New LV thrombus, full anticoagulation (patient also afib with RVR)  Goal INR range: 2 - 3  Home warfarin dose:  New start warfarin    Date INR Warfarin Dose Given   8/30 1.02 5mg   8/31 1.07 5mg                    Any Vitamin K given?: No  Drug interactions Reviewed: Yes. Details: None at this time  Is patient on bridging therapy with another anticoagulant?: Yes; Enoxaparin 70 mg SQ Q12H    Lab Results   Component Value Date     08/29/2023     08/28/2023    HGB 13.9 08/29/2023    HGB 13.0 08/28/2023       Plan:   Continue warfarin 5mg with Lovenox bridge.     Thank you for this consult. Pharmacy will continue to monitor.   Rosi Begum RPH

## 2023-08-31 NOTE — NURSING NOTE
Exercise Oximetry    Patient Name:Regulo Sepulveda   MRN: [id number]   Date: 08/31/23             ROOM AIR BASELINE   SpO2% 98   Heart Rate 67   Blood Pressure      EXERCISE ON ROOM AIR SpO2% EXERCISE ON O2 @  LPM SpO2%   1 MINUTE 97 1 MINUTE    2 MINUTES 95 2 MINUTES    3 MINUTES 96 3 MINUTES    4 MINUTES 92 4 MINUTES    5 MINUTES 94 5 MINUTES    6 MINUTES 94 6 MINUTES               Distance Walked  250 feet Distance Walked   Dyspnea (Anna Marie Scale)   Dyspnea (Anna Marie Scale)   Fatigue (Anna Marie Scale)   Fatigue (Anna Marie Scale)   SpO2% Post Exercise  94 SpO2% Post Exercise   HR Post Exercise  71 HR Post Exercise   Time to Recovery   Time to Recovery     Comments: Walk test complete without using oxygen.

## 2023-08-31 NOTE — PROGRESS NOTES
[facility]   Hospitalist Progress Note  Date: 2023  Patient Name: Regulo Sepulveda  : 1965  MRN: [id number]  Date of admission: 2023      Subjective   Subjective     Chief Complaint:   Chest pain, shortness of breath    Summary:   Regulo Sepulveda is a 58-year-old male with a past medical history significant for COPD, diabetes, hypertension, anxiety, drug use.  Patient was admitted to the hospital on the evening of the  with chief complaint of chest pain or shortness of breath.  Patient was found to be in A-fib with RVR given diltiazem and converted to normal sinus rhythm.  Endorses adherence with medications.  UDS positive for meth, denies use, states he has been clean for a few months.  States he was in a room where other people were smoking meth.  Patient's troponins christine to the night and patient was started on a heparin drip.  Morning troponin christine to 285 therefore cardiology consulted.  Patient evaluated by cardiology and psychiatry.  Cleared by psych.  Cardiology signed off with ischemic work-up as an outpatient    Interval Followup:   Patient on room air with good oxygen saturation of 98%.  Past walking oximetry  still has dyspnea on exertion  Has hoarseness of voice for past few days  Patient wants his monthly Abilify injection for schizophrenia.  Not in hospital formulary.  Patient's sister trying to get it from his pharmacy.  Meanwhile psychiatrist started oral Abilify for overlap.  Creatinine trending up  Good urine output, negative fluid balance.   patient's echocardiogram shows LV thrombus seen in the apex.  Echo also shows ejection fraction of 20%.    Review of Systems   All systems were reviewed and negative except for: Shortness of breath, chest pain    Objective   Objective     Vitals:   Temp:  [97.1 °F (36.2 °C)-97.7 °F (36.5 °C)] 97.5 °F (36.4 °C)  Heart Rate:  [54-80] 62  Resp:  [16-18] 18  BP: (110-131)/(64-79) 110/79  Flow (L/min):  [1] 1  Physical  Exam      Constitutional: Awake, alert, uncomfortable appearing              Eyes: Pupils equal, sclerae anicteric, no conjunctival injection              HENT: NCAT, mucous membranes moist              Neck: Supple, no thyromegaly, no lymphadenopathy, trachea midline              Respiratory: Diminished breath sounds heard throughout, crackles at bases              Cardiovascular: RRR, no murmurs, rubs, or gallops, palpable pedal pulses bilaterally              Gastrointestinal: Positive bowel sounds, soft, nontender, nondistended              Musculoskeletal: No bilateral ankle edema, no clubbing or cyanosis to extremities              Neurologic: Oriented x 3, strength symmetric in all extremities, Cranial Nerves grossly intact to confrontation, speech clear              Skin: No rashes        Result Review    Result Review:  I have personally reviewed the results from 8/31/2023 and agree with these findings:  x laboratory  Microbiology  x radiology  x EKG/Telemetry sinus tachycardia 101 with LVH  Cardiology/Vascular   Pathology  x old records  x other: Medications    Assessment & Plan   Assessment / Plan     Assessment/Plan:  Acute on chronic exacerbation of combined heart failure, EF 20%  LV thrombus on Lovenox/Coumadin  Atrial fibrillation with RVR converted to sinus  NSTEMI, type II demand in the setting of atrial fibrillation and heart failure exacerbation  COPD  Diabetes.  Hemoglobin A1c of 6.1%  Hypertension  Anxiety  Question substance abuse.  Urine drug seen positive for meth.  Patient denies using it  Schizophrenia.  Hyperlipidemia LDL of 77  Hoarseness      Plan:  Patient remains admitted to hospital for further care and management  Cardiology consulted, appreciate assistance  Pharmacy to start warfarin with INR target of 2-3.  Continue bridging Lovenox until therapeutic INR.  Lovenox has been approved for home use by insurance  Patient's EKG with no evidence of ischemia  Suspecting elevated troponin  in the setting of atrial fibrillation, heart failure exacerbation  DC Lasix and Aldactone  Monitor renal function as trending up  Abilify IM injection once a month.  Nonformulary.  Sister is trying to get it from patient's pharmacy  Appreciate psych input.  Started on oral Abilify to overlap with his injection.  Patient will need long-term anticoagulation given LV thrombus and [id number] score  Sliding-scale insulin.  Nicotine patch.  DC aspirin as started Coumadin  Continue Lipitor.  Resume appropriate home medication including BuSpar and Effexor  Cardiology recommends reconsultation once euvolemic for ischemic evaluation  Wean off oxygen  Discussed plan with RN and .   Patient prefers to go home and not to rehab.  Need follow-up with heart failure clinic on discharge.  Likely discharge home in a.m. with monitoring of PT/INR by home health  Continue telemetry    DVT prophylaxis:  Medical DVT prophylaxis orders are present.  Lovenox and Coumadin    CODE STATUS:   Level Of Support Discussed With: Patient  Code Status (Patient has no pulse and is not breathing): CPR (Attempt to Resuscitate)  Medical Interventions (Patient has pulse or is breathing): Full Support    Electronically signed by Alex Angulo MD, 08/31/23, 2:19 PM EDT.

## 2023-08-31 NOTE — PLAN OF CARE
Goal Outcome Evaluation:      Patient passed walk test and on RA. Received pain pill x2 during shift per MAR. VSS. Patient resting comfortably at end of shift.

## 2023-09-01 LAB
ALBUMIN SERPL-MCNC: 4 G/DL (ref 3.5–5.2)
ANION GAP SERPL CALCULATED.3IONS-SCNC: 9.8 MMOL/L (ref 5–15)
BACTERIA SPEC AEROBE CULT: NORMAL
BACTERIA SPEC AEROBE CULT: NORMAL
BUN SERPL-MCNC: 32 MG/DL (ref 6–20)
BUN/CREAT SERPL: 23.5 (ref 7–25)
CALCIUM SPEC-SCNC: 9.4 MG/DL (ref 8.6–10.5)
CHLORIDE SERPL-SCNC: 102 MMOL/L (ref 98–107)
CO2 SERPL-SCNC: 29.2 MMOL/L (ref 22–29)
CREAT SERPL-MCNC: 1.36 MG/DL (ref 0.76–1.27)
EGFRCR SERPLBLD CKD-EPI 2021: 60.3 ML/MIN/1.73
GLUCOSE BLDC GLUCOMTR-MCNC: 108 MG/DL (ref 70–99)
GLUCOSE BLDC GLUCOMTR-MCNC: 116 MG/DL (ref 70–99)
GLUCOSE BLDC GLUCOMTR-MCNC: 159 MG/DL (ref 70–99)
GLUCOSE BLDC GLUCOMTR-MCNC: 99 MG/DL (ref 70–99)
GLUCOSE SERPL-MCNC: 99 MG/DL (ref 65–99)
INR PPP: 1.07 (ref 0.86–1.15)
PHOSPHATE SERPL-MCNC: 3.3 MG/DL (ref 2.5–4.5)
POTASSIUM SERPL-SCNC: 4.3 MMOL/L (ref 3.5–5.2)
PROTHROMBIN TIME: 14 SECONDS (ref 11.8–14.9)
SODIUM SERPL-SCNC: 141 MMOL/L (ref 136–145)
WHOLE BLOOD HOLD SPECIMEN: NORMAL

## 2023-09-01 PROCEDURE — 25010000002 ENOXAPARIN PER 10 MG: Performed by: INTERNAL MEDICINE

## 2023-09-01 PROCEDURE — 94664 DEMO&/EVAL PT USE INHALER: CPT

## 2023-09-01 PROCEDURE — 94761 N-INVAS EAR/PLS OXIMETRY MLT: CPT

## 2023-09-01 PROCEDURE — 94799 UNLISTED PULMONARY SVC/PX: CPT

## 2023-09-01 PROCEDURE — 99232 SBSQ HOSP IP/OBS MODERATE 35: CPT | Performed by: INTERNAL MEDICINE

## 2023-09-01 PROCEDURE — 82948 REAGENT STRIP/BLOOD GLUCOSE: CPT

## 2023-09-01 PROCEDURE — 85610 PROTHROMBIN TIME: CPT | Performed by: INTERNAL MEDICINE

## 2023-09-01 PROCEDURE — 63710000001 INSULIN LISPRO (HUMAN) PER 5 UNITS: Performed by: FAMILY MEDICINE

## 2023-09-01 PROCEDURE — 80069 RENAL FUNCTION PANEL: CPT | Performed by: INTERNAL MEDICINE

## 2023-09-01 RX ORDER — WARFARIN SODIUM 10 MG/1
10 TABLET ORAL
Status: COMPLETED | OUTPATIENT
Start: 2023-09-01 | End: 2023-09-01

## 2023-09-01 RX ORDER — ONDANSETRON 2 MG/ML
4 INJECTION INTRAMUSCULAR; INTRAVENOUS EVERY 6 HOURS PRN
Status: DISCONTINUED | OUTPATIENT
Start: 2023-09-01 | End: 2023-09-02 | Stop reason: HOSPADM

## 2023-09-01 RX ADMIN — BUSPIRONE HYDROCHLORIDE 5 MG: 5 TABLET ORAL at 20:51

## 2023-09-01 RX ADMIN — OXYCODONE AND ACETAMINOPHEN 1 TABLET: 5; 325 TABLET ORAL at 17:19

## 2023-09-01 RX ADMIN — ENOXAPARIN SODIUM 70 MG: 100 INJECTION SUBCUTANEOUS at 23:10

## 2023-09-01 RX ADMIN — VENLAFAXINE HYDROCHLORIDE 37.5 MG: 37.5 CAPSULE, EXTENDED RELEASE ORAL at 08:43

## 2023-09-01 RX ADMIN — LOSARTAN POTASSIUM 50 MG: 50 TABLET, FILM COATED ORAL at 20:51

## 2023-09-01 RX ADMIN — ARIPIPRAZOLE 30 MG: 15 TABLET ORAL at 08:41

## 2023-09-01 RX ADMIN — FAMOTIDINE 20 MG: 20 TABLET ORAL at 08:41

## 2023-09-01 RX ADMIN — FAMOTIDINE 20 MG: 20 TABLET ORAL at 17:19

## 2023-09-01 RX ADMIN — INSULIN LISPRO 2 UNITS: 100 INJECTION, SOLUTION INTRAVENOUS; SUBCUTANEOUS at 17:28

## 2023-09-01 RX ADMIN — SENNOSIDES AND DOCUSATE SODIUM 2 TABLET: 50; 8.6 TABLET ORAL at 08:41

## 2023-09-01 RX ADMIN — NICOTINE 1 PATCH: 21 PATCH, EXTENDED RELEASE TRANSDERMAL at 08:44

## 2023-09-01 RX ADMIN — CARVEDILOL 25 MG: 25 TABLET, FILM COATED ORAL at 08:41

## 2023-09-01 RX ADMIN — Medication 10 ML: at 20:52

## 2023-09-01 RX ADMIN — OXYCODONE AND ACETAMINOPHEN 1 TABLET: 10; 325 TABLET ORAL at 00:45

## 2023-09-01 RX ADMIN — CARVEDILOL 25 MG: 25 TABLET, FILM COATED ORAL at 17:18

## 2023-09-01 RX ADMIN — BISACODYL 5 MG: 5 TABLET, COATED ORAL at 14:32

## 2023-09-01 RX ADMIN — LOSARTAN POTASSIUM 50 MG: 50 TABLET, FILM COATED ORAL at 08:41

## 2023-09-01 RX ADMIN — SENNOSIDES AND DOCUSATE SODIUM 2 TABLET: 50; 8.6 TABLET ORAL at 20:51

## 2023-09-01 RX ADMIN — Medication 10 ML: at 08:42

## 2023-09-01 RX ADMIN — ENOXAPARIN SODIUM 70 MG: 100 INJECTION SUBCUTANEOUS at 00:45

## 2023-09-01 RX ADMIN — BUDESONIDE AND FORMOTEROL FUMARATE DIHYDRATE 2 PUFF: 160; 4.5 AEROSOL RESPIRATORY (INHALATION) at 07:25

## 2023-09-01 RX ADMIN — POLYETHYLENE GLYCOL 3350 17 G: 17 POWDER, FOR SOLUTION ORAL at 12:59

## 2023-09-01 RX ADMIN — POTASSIUM CHLORIDE 40 MEQ: 10 CAPSULE, COATED, EXTENDED RELEASE ORAL at 08:40

## 2023-09-01 RX ADMIN — OXYCODONE AND ACETAMINOPHEN 1 TABLET: 5; 325 TABLET ORAL at 08:55

## 2023-09-01 RX ADMIN — EMPAGLIFLOZIN 10 MG: 10 TABLET, FILM COATED ORAL at 08:41

## 2023-09-01 RX ADMIN — ENOXAPARIN SODIUM 70 MG: 100 INJECTION SUBCUTANEOUS at 11:41

## 2023-09-01 RX ADMIN — WARFARIN SODIUM 10 MG: 5 TABLET ORAL at 17:18

## 2023-09-01 RX ADMIN — CYANOCOBALAMIN TAB 500 MCG 500 MCG: 500 TAB at 08:41

## 2023-09-01 NOTE — PLAN OF CARE
Problem: Adult Inpatient Plan of Care  Goal: Plan of Care Review  Outcome: Ongoing, Progressing  Goal: Patient-Specific Goal (Individualized)  Outcome: Ongoing, Progressing  Goal: Absence of Hospital-Acquired Illness or Injury  Outcome: Ongoing, Progressing  Intervention: Identify and Manage Fall Risk  Recent Flowsheet Documentation  Taken 8/31/2023 2200 by Florence Louise RN  Safety Promotion/Fall Prevention: safety round/check completed  Intervention: Prevent Skin Injury  Recent Flowsheet Documentation  Taken 8/31/2023 2200 by Florence Louise RN  Skin Protection: tubing/devices free from skin contact  Goal: Optimal Comfort and Wellbeing  Outcome: Ongoing, Progressing  Intervention: Provide Person-Centered Care  Recent Flowsheet Documentation  Taken 8/31/2023 1944 by Florence Louise RN  Trust Relationship/Rapport:   care explained   questions encouraged  Goal: Readiness for Transition of Care  Outcome: Ongoing, Progressing     Problem: Dysrhythmia  Goal: Normalized Cardiac Rhythm  Outcome: Ongoing, Progressing     Problem: Behavioral Health Comorbidity  Goal: Maintenance of Behavioral Health Symptom Control  Outcome: Ongoing, Progressing  Intervention: Maintain Behavioral Health Symptom Control  Recent Flowsheet Documentation  Taken 8/31/2023 2200 by Florence Louise RN  Medication Review/Management: medications reviewed     Problem: COPD (Chronic Obstructive Pulmonary Disease) Comorbidity  Goal: Maintenance of COPD Symptom Control  Outcome: Ongoing, Progressing  Intervention: Maintain COPD-Symptom Control  Recent Flowsheet Documentation  Taken 8/31/2023 2200 by Florence Louise RN  Medication Review/Management: medications reviewed     Problem: Diabetes Comorbidity  Goal: Blood Glucose Level Within Targeted Range  Outcome: Ongoing, Progressing  Intervention: Monitor and Manage Glycemia  Recent Flowsheet Documentation  Taken 8/31/2023 2200 by Florence Louise RN  Glycemic Management: blood glucose monitored      Problem: Hypertension Comorbidity  Goal: Blood Pressure in Desired Range  Outcome: Ongoing, Progressing  Intervention: Maintain Blood Pressure Management  Recent Flowsheet Documentation  Taken 8/31/2023 2200 by Florence Louise RN  Medication Review/Management: medications reviewed     Problem: Fall Injury Risk  Goal: Absence of Fall and Fall-Related Injury  Outcome: Ongoing, Progressing  Intervention: Identify and Manage Contributors  Recent Flowsheet Documentation  Taken 8/31/2023 2200 by Florence Louise RN  Medication Review/Management: medications reviewed  Intervention: Promote Injury-Free Environment  Recent Flowsheet Documentation  Taken 8/31/2023 2200 by Florence Louise RN  Safety Promotion/Fall Prevention: safety round/check completed   Goal Outcome Evaluation:

## 2023-09-01 NOTE — PROGRESS NOTES
Pharmacy to Dose: Warfarin  Consulting provider: Kev  Indication for warfarin: New LV thrombus, full anticoagulation (patient also afib with RVR)  Goal INR range: 2 - 3  Home warfarin dose:  New start warfarin    Date INR Warfarin Dose Given   8/30 1.02 5mg   8/31 1.07 5mg   9/1 1.07 10mg               Any Vitamin K given?: No  Drug interactions Reviewed: Yes. Details: None at this time  Is patient on bridging therapy with another anticoagulant?: Yes; Enoxaparin 70 mg SQ Q12H    Lab Results   Component Value Date     08/29/2023     08/28/2023    HGB 13.9 08/29/2023    HGB 13.0 08/28/2023       Plan:   Will give 10mg x 1 tonight as INR has not changed. Will review INR in am for next dose.     Thank you for this consult. Pharmacy will continue to monitor.   Gemini Saavedra RPH

## 2023-09-01 NOTE — PROGRESS NOTES
[facility]   Hospitalist Progress Note  Date: 2023  Patient Name: Regulo Sepulveda  : 1965  MRN: [id number]  Date of admission: 2023      Subjective   Subjective     Chief Complaint:   Chest pain, shortness of breath    Summary:   Regulo Sepulveda is a 58-year-old male with a past medical history significant for COPD, diabetes, hypertension, anxiety, drug use.  Patient was admitted to the hospital on the evening of the  with chief complaint of chest pain or shortness of breath.  Patient was found to be in A-fib with RVR given diltiazem and converted to normal sinus rhythm.  Endorses adherence with medications.  UDS positive for meth, denies use, states he has been clean for a few months.  States he was in a room where other people were smoking meth.  Patient's troponins christine to the night and patient was started on a heparin drip.  Morning troponin christine to 285 therefore cardiology consulted.  Patient evaluated by cardiology and psychiatry.  Cleared by psych.  Cardiology signed off with ischemic work-up as an outpatient    Interval Followup:   Patient on room air with good oxygen saturation of 98%.  Passed walking oximetry  still has dyspnea on exertion  Has hoarseness of voice for past few days  Patient wants his monthly Abilify injection for schizophrenia.  Not in hospital formulary.  Patient's sister trying to get it from his pharmacy.  Meanwhile psychiatrist started oral Abilify for overlap.  Creatinine remains up  Good urine output, negative fluid balance.   patient's echocardiogram shows LV thrombus seen in the apex.  Echo also shows ejection fraction of 20%.    Review of Systems   All systems were reviewed and negative except for: Shortness of breath, chest pain    Objective   Objective     Vitals:   Temp:  [97.5 °F (36.4 °C)-98.1 °F (36.7 °C)] 97.9 °F (36.6 °C)  Heart Rate:  [58-82] 77  Resp:  [16-18] 18  BP: (104-135)/(61-94) 131/83  Physical Exam      Constitutional: Awake,  alert, comfortable appearing              Eyes: Pupils equal, sclerae anicteric, no conjunctival injection              HENT: NCAT, mucous membranes moist              Neck: Supple, no thyromegaly, no lymphadenopathy, trachea midline              Respiratory: Diminished breath sounds heard throughout, crackles at bases              Cardiovascular: RRR, no murmurs, rubs, or gallops, palpable pedal pulses bilaterally              Gastrointestinal: Positive bowel sounds, soft, nontender, nondistended              Musculoskeletal: No bilateral ankle edema, no clubbing or cyanosis to extremities              Neurologic: Oriented x 3, strength symmetric in all extremities, Cranial Nerves grossly intact to confrontation, speech clear              Skin: No rashes        Result Review    Result Review:  I have personally reviewed the results from 9/1/2023 and agree with these findings:  x laboratory  Microbiology  x radiology  x EKG/Telemetry sinus tachycardia 101 with LVH  Cardiology/Vascular   Pathology  x old records  x other: Medications    Assessment & Plan   Assessment / Plan     Assessment/Plan:  Acute on chronic exacerbation of combined heart failure, EF 20%  LV thrombus on Lovenox/Coumadin  Atrial fibrillation with RVR converted to sinus  NSTEMI, type II demand in the setting of atrial fibrillation and heart failure exacerbation  COPD  Diabetes.  Hemoglobin A1c of 6.1%  Hypertension  Anxiety  Question substance abuse.  Urine drug seen positive for meth.  Patient denies using it  Schizophrenia.  Hyperlipidemia LDL of 77  Hoarseness  Hypoxemia.  Resolved      Plan:  DC fluid restriction as creatinine remains elevated.  Cardiology consulted, appreciate assistance  Pharmacy to start warfarin with INR target of 2-3.  Continue bridging Lovenox until therapeutic INR.  Lovenox has been approved for home use by insurance  Patient's EKG with no evidence of ischemia  Suspecting elevated troponin in the setting of atrial  fibrillation, heart failure exacerbation  Holding Lasix and Aldactone  Monitor renal function as trending up  Abilify IM injection once a month.  Nonformulary.  Sister is trying to get it from patient's pharmacy  Appreciate psych input.  Started on oral Abilify to overlap with his injection.  Patient will need long-term anticoagulation given LV thrombus and [id number] score  Sliding-scale insulin.  Nicotine patch.  DC aspirin as started Coumadin  Continue Lipitor.  Resume appropriate home medication including BuSpar and Effexor  Cardiology recommends reconsultation once euvolemic for ischemic evaluation    Discussed plan with RN and .   Patient prefers to go home and not to rehab.  Need follow-up with heart failure clinic on discharge.  Likely discharge home in a.m. with monitoring of PT/INR by home health.  Lovenox has been prior authorized and ready to use for home  Continue telemetry    DVT prophylaxis:  Medical DVT prophylaxis orders are present.  Lovenox and Coumadin    CODE STATUS:   Level Of Support Discussed With: Patient  Code Status (Patient has no pulse and is not breathing): CPR (Attempt to Resuscitate)  Medical Interventions (Patient has pulse or is breathing): Full Support      Electronically signed by Alex Angulo MD, 09/01/23, 4:21 PM EDT.

## 2023-09-02 ENCOUNTER — READMISSION MANAGEMENT (OUTPATIENT)
Dept: CALL CENTER | Facility: HOSPITAL | Age: 58
End: 2023-09-02
Payer: COMMERCIAL

## 2023-09-02 VITALS
WEIGHT: 157.41 LBS | TEMPERATURE: 97.2 F | SYSTOLIC BLOOD PRESSURE: 126 MMHG | OXYGEN SATURATION: 99 % | BODY MASS INDEX: 20.86 KG/M2 | RESPIRATION RATE: 18 BRPM | DIASTOLIC BLOOD PRESSURE: 72 MMHG | HEIGHT: 73 IN | HEART RATE: 65 BPM

## 2023-09-02 PROBLEM — I50.9 ACUTE CONGESTIVE HEART FAILURE: Status: ACTIVE | Noted: 2023-09-02

## 2023-09-02 PROBLEM — I51.3 LV (LEFT VENTRICULAR) MURAL THROMBUS: Status: ACTIVE | Noted: 2023-09-02

## 2023-09-02 PROBLEM — I50.9 ACUTE CONGESTIVE HEART FAILURE: Status: RESOLVED | Noted: 2023-09-02 | Resolved: 2023-09-02

## 2023-09-02 PROBLEM — I48.91 ATRIAL FIBRILLATION WITH RAPID VENTRICULAR RESPONSE: Status: RESOLVED | Noted: 2023-08-27 | Resolved: 2023-09-02

## 2023-09-02 PROBLEM — F20.9 SCHIZOPHRENIA: Status: ACTIVE | Noted: 2023-09-02

## 2023-09-02 LAB
ALBUMIN SERPL-MCNC: 4 G/DL (ref 3.5–5.2)
ANION GAP SERPL CALCULATED.3IONS-SCNC: 8.2 MMOL/L (ref 5–15)
BUN SERPL-MCNC: 34 MG/DL (ref 6–20)
BUN/CREAT SERPL: 28.1 (ref 7–25)
CALCIUM SPEC-SCNC: 9.5 MG/DL (ref 8.6–10.5)
CHLORIDE SERPL-SCNC: 101 MMOL/L (ref 98–107)
CO2 SERPL-SCNC: 29.8 MMOL/L (ref 22–29)
CREAT SERPL-MCNC: 1.21 MG/DL (ref 0.76–1.27)
EGFRCR SERPLBLD CKD-EPI 2021: 69.4 ML/MIN/1.73
GLUCOSE BLDC GLUCOMTR-MCNC: 149 MG/DL (ref 70–99)
GLUCOSE BLDC GLUCOMTR-MCNC: 95 MG/DL (ref 70–99)
GLUCOSE SERPL-MCNC: 118 MG/DL (ref 65–99)
INR PPP: 1.14 (ref 0.86–1.15)
PHOSPHATE SERPL-MCNC: 3.1 MG/DL (ref 2.5–4.5)
POTASSIUM SERPL-SCNC: 5 MMOL/L (ref 3.5–5.2)
PROTHROMBIN TIME: 14.7 SECONDS (ref 11.8–14.9)
SODIUM SERPL-SCNC: 139 MMOL/L (ref 136–145)

## 2023-09-02 PROCEDURE — 99239 HOSP IP/OBS DSCHRG MGMT >30: CPT | Performed by: INTERNAL MEDICINE

## 2023-09-02 PROCEDURE — 82948 REAGENT STRIP/BLOOD GLUCOSE: CPT

## 2023-09-02 PROCEDURE — 94799 UNLISTED PULMONARY SVC/PX: CPT

## 2023-09-02 PROCEDURE — 80069 RENAL FUNCTION PANEL: CPT | Performed by: INTERNAL MEDICINE

## 2023-09-02 PROCEDURE — 85610 PROTHROMBIN TIME: CPT | Performed by: INTERNAL MEDICINE

## 2023-09-02 PROCEDURE — 25010000002 ONDANSETRON PER 1 MG: Performed by: INTERNAL MEDICINE

## 2023-09-02 PROCEDURE — 94761 N-INVAS EAR/PLS OXIMETRY MLT: CPT

## 2023-09-02 PROCEDURE — 25010000002 ENOXAPARIN PER 10 MG: Performed by: INTERNAL MEDICINE

## 2023-09-02 RX ORDER — NICOTINE 21 MG/24HR
1 PATCH, TRANSDERMAL 24 HOURS TRANSDERMAL
Qty: 14 PATCH | Refills: 0 | Status: ON HOLD | OUTPATIENT
Start: 2023-09-03 | End: 2023-09-17

## 2023-09-02 RX ORDER — WARFARIN SODIUM 10 MG/1
10 TABLET ORAL
Status: DISCONTINUED | OUTPATIENT
Start: 2023-09-02 | End: 2023-09-02 | Stop reason: HOSPADM

## 2023-09-02 RX ORDER — WARFARIN SODIUM 7.5 MG/1
TABLET ORAL
Qty: 5 TABLET | Refills: 0 | Status: ON HOLD | OUTPATIENT
Start: 2023-09-02

## 2023-09-02 RX ADMIN — NICOTINE 1 PATCH: 21 PATCH, EXTENDED RELEASE TRANSDERMAL at 08:08

## 2023-09-02 RX ADMIN — LOSARTAN POTASSIUM 50 MG: 50 TABLET, FILM COATED ORAL at 08:09

## 2023-09-02 RX ADMIN — VENLAFAXINE HYDROCHLORIDE 37.5 MG: 37.5 CAPSULE, EXTENDED RELEASE ORAL at 08:09

## 2023-09-02 RX ADMIN — OXYCODONE AND ACETAMINOPHEN 1 TABLET: 5; 325 TABLET ORAL at 10:49

## 2023-09-02 RX ADMIN — POTASSIUM CHLORIDE 40 MEQ: 10 CAPSULE, COATED, EXTENDED RELEASE ORAL at 08:09

## 2023-09-02 RX ADMIN — ONDANSETRON 4 MG: 2 INJECTION INTRAMUSCULAR; INTRAVENOUS at 06:34

## 2023-09-02 RX ADMIN — SENNOSIDES AND DOCUSATE SODIUM 2 TABLET: 50; 8.6 TABLET ORAL at 08:09

## 2023-09-02 RX ADMIN — CARVEDILOL 25 MG: 25 TABLET, FILM COATED ORAL at 08:09

## 2023-09-02 RX ADMIN — ARIPIPRAZOLE 30 MG: 15 TABLET ORAL at 08:09

## 2023-09-02 RX ADMIN — Medication 10 ML: at 08:10

## 2023-09-02 RX ADMIN — CYANOCOBALAMIN TAB 500 MCG 500 MCG: 500 TAB at 08:09

## 2023-09-02 RX ADMIN — EMPAGLIFLOZIN 10 MG: 10 TABLET, FILM COATED ORAL at 08:09

## 2023-09-02 RX ADMIN — FAMOTIDINE 20 MG: 20 TABLET ORAL at 08:10

## 2023-09-02 RX ADMIN — ENOXAPARIN SODIUM 70 MG: 100 INJECTION SUBCUTANEOUS at 10:49

## 2023-09-02 NOTE — OUTREACH NOTE
Prep Survey      Flowsheet Row Responses   [facility] facility patient discharged from? [facility]   Is LACE score < 7 ? No   Eligibility Readm Mgmt   Discharge diagnosis Acute congestive heart failure   Does the patient have one of the following disease processes/diagnoses(primary or secondary)? CHF   Does the patient have Home health ordered? Yes   What is the Home health agency?  [facility]   Is there a DME ordered? No   Prep survey completed? Yes            Lorrie BURROWS - Registered Nurse

## 2023-09-02 NOTE — PLAN OF CARE
Goal Outcome Evaluation:  Plan of Care Reviewed With: patient        Progress: no change. Continue to monitor INR.

## 2023-09-02 NOTE — DISCHARGE SUMMARY
[facility]        HOSPITALIST  DISCHARGE SUMMARY    Patient Name: Regulo Sepulveda  : 1965  MRN: [id number]    Date of Admission: 2023  Date of Discharge:  2023  Primary Care Physician: Grace Cruz APRN    Consults       Date and Time Order Name Status Description    2023 12:33 AM Inpatient Psychiatrist Consult Completed     2023  8:36 AM Inpatient Cardiology Consult Completed     2023  6:09 PM Inpatient Hospitalist Consult              Active and Resolved Hospital Problems:  Active Hospital Problems    Diagnosis POA   • LV (left ventricular) mural thrombus [I51.3] Yes   • Schizophrenia [F20.9] Yes      Resolved Hospital Problems    Diagnosis POA   • Acute congestive heart failure [I50.9] Yes   • Atrial fibrillation with rapid ventricular response [I48.91] Yes   Acute on chronic exacerbation of combined heart failure, EF 20%.  Resolved  LV thrombus on Lovenox/Coumadin  Atrial fibrillation with RVR converted to sinus.  On Coumadin  NSTEMI, type II demand in the setting of atrial fibrillation and heart failure exacerbation  COPD.  Stable  Diabetes.  Hemoglobin A1c of 6.1%  Hypertension  Anxiety  Question substance abuse.  Urine drug seen positive for meth.  Patient denies using it  Schizophrenia.  Hyperlipidemia LDL of 77  Hoarseness  Hypoxemia.  Resolved    Hospital Course     Hospital Course:  Regulo Sepulveda is a 58 y.o. male with a past medical history significant for COPD, diabetes, hypertension, anxiety, drug use.  Patient was admitted to the hospital on the evening of the  with chief complaint of chest pain or shortness of breath.  Patient was found to be in A-fib with RVR given diltiazem and converted to normal sinus rhythm.  Endorses adherence with medications.  UDS positive for meth, denies use, states he has been clean for a few months.  States he was in a room where other people were smoking meth.  Patient's troponins christine to the night and  patient was started on a heparin drip.  Morning troponin christine to 285 therefore cardiology consulted.  Patient evaluated by cardiology and psychiatry.  Cleared by psych.  Cardiology signed off with ischemic work-up as an outpatient     Interval Followup:   Patient on room air with good oxygen saturation of 98%.  Passed walking oximetry  Dyspnea on exertion has improved  Has hoarseness of voice for past few days  Patient given his monthly Abilify IM injection on September 2 brought in by his sister.  Bridging oral Abilify started by psychiatry while in hospital DC'd   creatinine normalized.  Diuretics resumed  Good urine output, negative fluid balance.   patient's echocardiogram shows LV thrombus seen in the apex.  Echo also shows ejection fraction of 20%.  Patient sister and mother will help patient with Lovenox injection at home and taking blood work for PT/INR monitoring      DISCHARGE Follow Up Recommendations for labs and diagnostics: Have PT/INR and BMP checked in 48 hours on September 4.  Follow-up with psychiatry, PCP and heart failure clinic.  Stop Lovenox injection when INR is close to 2.  Continue home Coumadin afterwards.  PCP/heart failure clinic to refill prescription for Coumadin.      Day of Discharge     Vital Signs:  Temp:  [97.3 °F (36.3 °C)-98.2 °F (36.8 °C)] 98.2 °F (36.8 °C)  Heart Rate:  [59-71] 68  Resp:  [14-18] 18  BP: (116-140)/(62-84) 125/80  Flow (L/min):  [0.5] 0.5    Physical Exam:   Constitutional: Awake, alert, comfortable appearing              Eyes: Pupils equal, sclerae anicteric, no conjunctival injection              HENT: NCAT, mucous membranes moist              Neck: Supple, no thyromegaly, no lymphadenopathy, trachea midline              Respiratory: Diminished breath sounds heard throughout, crackles at bases              Cardiovascular: RRR, no murmurs, rubs, or gallops, palpable pedal pulses bilaterally              Gastrointestinal: Positive bowel sounds, soft, nontender,  nondistended              Musculoskeletal: No bilateral ankle edema, no clubbing or cyanosis to extremities              Neurologic: Oriented x 3, strength symmetric in all extremities, Cranial Nerves grossly intact to confrontation, speech clear              Skin: No rashes     Discharge Details        Discharge Medications        New Medications        Instructions Start Date   Enoxaparin Sodium 80 MG/0.8ML solution prefilled syringe syringe  Commonly known as: LOVENOX   1 mg/kg (70 mg), Subcutaneous, Every 12 Hours Scheduled      nicotine 21 MG/24HR patch  Commonly known as: NICODERM CQ   1 patch, Transdermal, Every 24 Hours Scheduled   Start Date: September 3, 2023     warfarin 7.5 MG tablet  Commonly known as: Coumadin   Continue Lovenox injection and Coumadin until INR is 2 and then stop Lovenox injection.             Continue These Medications        Instructions Start Date   Abilify Maintena 300 MG Suspension Reconstituted ER IM injection ER  Generic drug: ARIPiprazole ER   300 mg, Intramuscular, Every 30 Days      albuterol sulfate  (90 Base) MCG/ACT inhaler  Commonly known as: PROVENTIL HFA;VENTOLIN HFA;PROAIR HFA   2 puffs, Inhalation, Every 4 Hours PRN      atorvastatin 40 MG tablet  Commonly known as: LIPITOR   40 mg, Oral, Nightly      bumetanide 0.5 MG tablet  Commonly known as: BUMEX   1 mg, Oral, Daily      busPIRone 5 MG tablet  Commonly known as: BUSPAR   5 mg, Oral, Nightly      carvedilol 25 MG tablet  Commonly known as: COREG   25 mg, Oral, 2 Times Daily With Meals      cyanocobalamin 500 MCG tablet  Commonly known as: VITAMIN B-12   500 mcg, Oral, Daily      Farxiga 5 MG tablet tablet  Generic drug: dapagliflozin   5 mg, Oral, Daily      losartan 50 MG tablet  Commonly known as: COZAAR   1 tablet, Oral, 2 Times Daily      metFORMIN 1000 MG tablet  Commonly known as: GLUCOPHAGE   500 mg, Oral, 2 Times Daily With Meals      pantoprazole 40 MG EC tablet  Commonly known as: PROTONIX   40  mg, Oral, Daily      spironolactone 25 MG tablet  Commonly known as: ALDACTONE   25 mg, Oral, Daily      tiotropium bromide monohydrate 2.5 MCG/ACT aerosol solution inhaler  Commonly known as: SPIRIVA RESPIMAT   2 puffs, Inhalation, Daily - RT      venlafaxine XR 37.5 MG 24 hr capsule  Commonly known as: EFFEXOR-XR   1 capsule, Oral, Daily             Stop These Medications      amLODIPine 5 MG tablet  Commonly known as: NORVASC     cloNIDine 0.1 MG tablet  Commonly known as: CATAPRES              No Known Allergies    Discharge Disposition:  Home-Health Care c.  In private vehicle with sister    Diet:  Diet Instructions       Diet: Cardiac Diets, Fluid Restriction (240 mL/tray) Diets; Low Sodium (2g); Regular Texture (IDDSI 7); Thin (IDDSI 0); 2000 mL/day      Discharge Diet:  Cardiac Diets  Fluid Restriction (240 mL/tray) Diets       Cardiac Diet: Low Sodium (2g)    Texture: Regular Texture (IDDSI 7)    Fluid Consistency: Thin (IDDSI 0)    Fluid Restriction Diet (240 mL/tray): 2000 mL/day            Discharge Activity:   Activity Instructions       Activity as Tolerated              CODE STATUS:  Code Status and Medical Interventions:   Ordered at: 08/27/23 1831     Level Of Support Discussed With:    Patient     Code Status (Patient has no pulse and is not breathing):    CPR (Attempt to Resuscitate)     Medical Interventions (Patient has pulse or is breathing):    Full Support         No future appointments.    Additional Instructions for the Follow-ups that You Need to Schedule       Discharge Follow-up with PCP   As directed       Currently Documented PCP:    Grace Cruz APRN    PCP Phone Number:    [phone]     Follow Up Details: 4 days        Discharge Follow-up with Specified Provider: Dr. Gillette; 1 Week   As directed      To: Dr. Gillette   Follow Up: 1 Week   Follow Up Details: Heart failure clinic./Blood clot in the heart                Pertinent  and/or Most Recent Results     PROCEDURES:   *  Cannot find OR case *     LAB RESULTS:      Lab 09/02/23 0415 09/01/23 0415 08/31/23 0403 08/29/23  0549 08/28/23  2359 08/28/23  1734 08/28/23  1126 08/28/23 0402 08/27/23 2047 08/27/23 2047 08/27/23  1518   WBC  --   --   --  8.70  --   --   --  10.07  --   --  11.00*   HEMOGLOBIN  --   --   --  13.9  --   --   --  13.0  --   --  14.1   HEMATOCRIT  --   --   --  43.9  --   --   --  40.8  --   --  42.6   PLATELETS  --   --   --  263  --   --   --  273  --   --  273   NEUTROS ABS  --   --   --   --   --   --   --  7.00  --   --  8.07*   IMMATURE GRANS (ABS)  --   --   --   --   --   --   --  0.03  --   --  0.03   LYMPHS ABS  --   --   --   --   --   --   --  1.88  --   --  1.82   MONOS ABS  --   --   --   --   --   --   --  0.98*  --   --  0.95*   EOS ABS  --   --   --   --   --   --   --  0.11  --   --  0.08   MCV  --   --   --  90.3  --   --   --  90.7  --   --  89.9   PROTIME 14.7 14.0 14.0  --   --   --   --   --   --  13.5  --    APTT  --   --   --  78.7 80.9 54.7* 41.8* 32.9*   < > 30.0*  --     < > = values in this interval not displayed.         Lab 09/02/23 0415 09/01/23 0415 08/31/23 0403 08/30/23 0429 08/29/23 0549 08/27/23  1518   SODIUM 139 141 141 141 138 139   POTASSIUM 5.0 4.3 4.1 3.6 3.2* 3.9   CHLORIDE 101 102 100 101 100 102   CO2 29.8* 29.2* 29.0 28.7 29.5* 27.4   ANION GAP 8.2 9.8 12.0 11.3 8.5 9.6   BUN 34* 32* 31* 23* 20 19   CREATININE 1.21 1.36* 1.32* 1.07 0.99 1.08   EGFR 69.4 60.3 62.5 80.4 88.3 79.5   GLUCOSE 118* 99 103* 100* 135* 156*   CALCIUM 9.5 9.4 9.2 9.0 9.1 9.2   MAGNESIUM  --   --   --  1.9 1.9 2.0   PHOSPHORUS 3.1 3.3 3.8 3.6  --   --    HEMOGLOBIN A1C  --   --   --   --  6.10*  --          Lab 09/02/23  0415 09/01/23  0415 08/31/23  0403 08/30/23  0429 08/27/23  1518   TOTAL PROTEIN  --   --   --   --  6.7   ALBUMIN 4.0 4.0 4.1 3.8 3.9   GLOBULIN  --   --   --   --  2.8   ALT (SGPT)  --   --   --   --  48*   AST (SGOT)  --   --   --   --  27   BILIRUBIN  --   --    --   --  0.9   ALK PHOS  --   --   --   --  212*   LIPASE  --   --   --   --  34         Lab 09/02/23  0415 09/01/23  0415 08/31/23  0403 08/29/23  0756 08/29/23  0549 08/28/23  1126 08/28/23  0749 08/27/23 2047 08/27/23  1749 08/27/23  1518   PROBNP  --   --   --   --   --   --   --   --   --  10,069.0*   HSTROP T  --   --   --  369* 397* 312* 285*  --  50* 17*   PROTIME 14.7 14.0 14.0  --   --   --   --  13.5  --   --    INR 1.14 1.07 1.07  --   --   --   --  1.02  --   --          Lab 08/30/23  0429   CHOLESTEROL 131   LDL CHOL 77   HDL CHOL 32*   TRIGLYCERIDES 121             Brief Urine Lab Results  (Last result in the past 365 days)        Color   Clarity   Blood   Leuk Est   Nitrite   Protein   CREAT   Urine HCG        08/27/23 1925 Yellow   Clear   Small (1+)   Negative   Negative   100 mg/dL (2+)                 Microbiology Results (last 10 days)       Procedure Component Value - Date/Time    Blood Culture - Blood, Arm, Left [[id number]]  (Normal) Collected: 08/27/23 2047    Lab Status: Final result Specimen: Blood from Arm, Left Updated: 09/01/23 2101     Blood Culture No growth at 5 days    Blood Culture - Blood, Arm, Left [[id number]]  (Normal) Collected: 08/27/23 2047    Lab Status: Final result Specimen: Blood from Arm, Left Updated: 09/01/23 2101     Blood Culture No growth at 5 days            CT Chest With Contrast Diagnostic    Result Date: 8/27/2023  Impression:   1. No definite findings of acute pulmonary embolism. 2. Contrast bolus timing somewhat limits assessment of the distal pulmonary arteries. 3. Cardiomegaly with findings as above which may indicate right heart failure. 4. Moderate bilateral pleural effusions and findings of suspected pulmonary edema in the lung bases which may be related to heart failure.  Mild mediastinal and hilar lymphadenopathy, nonspecific. 5. Mild-to-moderate emphysema.  Correlate with risk factors to see if patient qualifies for low-dose CT lung cancer screening.      DARLINE IZQUIERDO MD       Electronically Signed and Approved By: DARLINE IZQUIERDO MD on 8/27/2023 at 17:19             XR Chest 1 View    Result Date: 8/27/2023  Impression:   New basilar predominant airspace opacities which may indicate pneumonia or edema.       DARLINE IZQUIERDO MD       Electronically Signed and Approved By: DARLINE IZQUIERDO MD on 8/27/2023 at 16:05                          Imaging Results (All)       Procedure Component Value Units Date/Time    CT Chest With Contrast Diagnostic [[id number]] Collected: 08/27/23 1720     Updated: 08/27/23 1723    Narrative:      PROCEDURE: CT CHEST W CONTRAST DIAGNOSTIC     COMPARISON:  [facility], CR, XR CHEST 1 VW, 8/27/2023, 15:51.  [facility], CR, XR CHEST 1 VW, 2/27/2023, 12:12.  INDICATIONS: CHEST PAIN. SHORTNESS OF BREATH.     TECHNIQUE: After obtaining the patient's consent, CT images were obtained with non-ionic   intravenous contrast material.       PROTOCOL:   Pulmonary embolism imaging protocol performed      RADIATION:   DLP: 345.4mGy*cm    Automated exposure control was utilized to minimize radiation dose.   CONTRAST: 93cc Isovue 370 I.V.  LABS:   eGFR: >60ml/min/1.73m2     FINDINGS:   Evaluation is somewhat limited by contrast bolus timing with significant contrast remaining in the   systemic venous circulation.  The central pulmonary arteries appear well opacified and are without   definite focal filling defect.  The distal segmental and subsegmental pulmonary arteries are   incompletely opacified without a definitive focal filling defect seen on this exam.     There is cardiomegaly with reflux of contrast into the IVC, patent veins, and azygos vein.  This   may be seen with right heart failure.  No pericardial effusion.  There are moderate bilateral   layering pleural effusions.  There does not appear to be significant calcific atherosclerosis of   the coronary arteries or aorta.  The aorta is not well opacified.  No definite  acute aortic   abnormality is seen.     There are suspected enlarged paratracheal lymph nodes .  Lymph node on image 101 of the axial   series appears to measure up to 1.4 cm in short axis diameter.  No definite axillary adenopathy is   seen.  There are prominent prevascular lymph nodes.  There is also suspected mild enlargement of   bilateral hilar lymph nodes.     There is mild-to-moderate emphysema.  There is some ground-glass opacities and septal thickening in   the lower lungs with mild central bronchial wall thickening.  This may be seen with pulmonary   edema.  There is mild consolidation adjacent to the effusions, likely atelectasis.     No definite acute abnormality is seen in the visualized upper abdomen on this limited evaluation.    There is gynecomastia.  There are degenerative changes in the thoracic spine.  No aggressive   osseous lesion is identified.       Impression:         1. No definite findings of acute pulmonary embolism.  2. Contrast bolus timing somewhat limits assessment of the distal pulmonary arteries.  3. Cardiomegaly with findings as above which may indicate right heart failure.  4. Moderate bilateral pleural effusions and findings of suspected pulmonary edema in the lung bases   which may be related to heart failure.  Mild mediastinal and hilar lymphadenopathy, nonspecific.  5. Mild-to-moderate emphysema.  Correlate with risk factors to see if patient qualifies for   low-dose CT lung cancer screening.            DARLINE IZQUIERDO MD         Electronically Signed and Approved By: DARLINE IZQUIERDO MD on 8/27/2023 at 17:19                     XR Chest 1 View [[id number]] Collected: 08/27/23 1605     Updated: 08/27/23 1608    Narrative:      PROCEDURE: XR CHEST 1 VW     COMPARISON: [facility], JUAREZ, XR CHEST 1 VW, 9/14/2022, 17:04.  [facility], JUAREZ, XR CHEST 1 VW, 2/27/2023, 12:12.     INDICATIONS: CHEST PAIN AND SHORTNESS OF BREATH     FINDINGS:   There appear to be new  bilateral basilar predominant airspace opacities.  No significant focal   consolidation, pneumothorax, or effusion.  Heart size and mediastinal contour appear within normal   limits.       Impression:         New basilar predominant airspace opacities which may indicate pneumonia or edema.                  DARLINE IZQUIERDO MD         Electronically Signed and Approved By: DARLINE IZQUIERDO MD on 8/27/2023 at 16:05                              Labs Pending at Discharge:          Time spent on Discharge including face to face service: 35 minutes  Part of this note may be an electronic transcription/translation of spoken language to printed text using the Dragon Dictation System.     TElectronically signed by Alex Angulo MD, 09/02/23, 2:22 PM EDT.

## 2023-09-02 NOTE — PROGRESS NOTES
Pharmacy to Dose: Warfarin  Consulting provider: Kev  Indication for warfarin: New LV thrombus, full anticoagulation (patient also afib with RVR)  Goal INR range: 2 - 3  Home warfarin dose:  New start warfarin    Date INR Warfarin Dose Given   8/30 1.02 5mg   8/31 1.07 5mg   9/1 1.07 10mg   9/2 1.14 10 mg          Any Vitamin K given?: No  Drug interactions Reviewed: Yes. Details: None at this time  Is patient on bridging therapy with another anticoagulant?: Yes; Enoxaparin 70 mg SQ Q12H    Lab Results   Component Value Date     08/29/2023     08/28/2023    HGB 13.9 08/29/2023    HGB 13.0 08/28/2023     Plan:   INR reported as 1.14.  Will provide warfarin 10 mg once one today per protocol.  Lovenox 70 mg q 12 hours continues.      Christopher Christiansen

## 2023-09-06 ENCOUNTER — READMISSION MANAGEMENT (OUTPATIENT)
Dept: CALL CENTER | Facility: HOSPITAL | Age: 58
End: 2023-09-06
Payer: COMMERCIAL

## 2023-09-06 NOTE — OUTREACH NOTE
CHF Week 1 Survey      Flowsheet Row Responses   [facility] facility patient discharged from? [facility]   Does the patient have one of the following disease processes/diagnoses(primary or secondary)? CHF   CHF Week 1 attempt successful? No   Unsuccessful attempts Attempt 1            Krsiten SUAREZ - Registered Nurse

## 2023-09-12 ENCOUNTER — READMISSION MANAGEMENT (OUTPATIENT)
Dept: CALL CENTER | Facility: HOSPITAL | Age: 58
End: 2023-09-12
Payer: COMMERCIAL

## 2023-09-12 NOTE — OUTREACH NOTE
CHF Week 2 Survey      Flowsheet Row Responses   [facility] patient discharged from? [facility]   Does the patient have one of the following disease processes/diagnoses(primary or secondary)? CHF   Week 2 attempt successful? No   Unsuccessful attempts Attempt 1  [Reached roommate Yun, but she is not able to go over patient information. Requests to call his number only for follow up calls. ]   General alerts for this patient Call patient number only for follow up calls.            SHELDON HERBERT - Registered Nurse

## 2023-09-15 ENCOUNTER — READMISSION MANAGEMENT (OUTPATIENT)
Dept: CALL CENTER | Facility: HOSPITAL | Age: 58
End: 2023-09-15
Payer: COMMERCIAL

## 2023-09-15 NOTE — OUTREACH NOTE
CHF Week 2 Survey      Flowsheet Row Responses   [facility] patient discharged from? [facility]   Does the patient have one of the following disease processes/diagnoses(primary or secondary)? CHF   Week 2 attempt successful? No   Unsuccessful attempts Attempt 2            KENA BURROWS - Registered Nurse

## 2023-09-16 ENCOUNTER — APPOINTMENT (OUTPATIENT)
Dept: GENERAL RADIOLOGY | Facility: HOSPITAL | Age: 58
End: 2023-09-16
Payer: COMMERCIAL

## 2023-09-16 LAB
ALBUMIN SERPL-MCNC: 4 G/DL (ref 3.5–5.2)
ALBUMIN/GLOB SERPL: 1.4 G/DL
ALP SERPL-CCNC: 175 U/L (ref 39–117)
ALT SERPL W P-5'-P-CCNC: 66 U/L (ref 1–41)
ANION GAP SERPL CALCULATED.3IONS-SCNC: 11 MMOL/L (ref 5–15)
AST SERPL-CCNC: 43 U/L (ref 1–40)
BASOPHILS # BLD AUTO: 0.05 10*3/MM3 (ref 0–0.2)
BASOPHILS NFR BLD AUTO: 0.5 % (ref 0–1.5)
BILIRUB SERPL-MCNC: 0.4 MG/DL (ref 0–1.2)
BUN SERPL-MCNC: 18 MG/DL (ref 6–20)
BUN/CREAT SERPL: 13.6 (ref 7–25)
CALCIUM SPEC-SCNC: 9.1 MG/DL (ref 8.6–10.5)
CHLORIDE SERPL-SCNC: 105 MMOL/L (ref 98–107)
CO2 SERPL-SCNC: 24 MMOL/L (ref 22–29)
CREAT SERPL-MCNC: 1.32 MG/DL (ref 0.76–1.27)
DEPRECATED RDW RBC AUTO: 45.5 FL (ref 37–54)
EGFRCR SERPLBLD CKD-EPI 2021: 62.5 ML/MIN/1.73
EOSINOPHIL # BLD AUTO: 0.18 10*3/MM3 (ref 0–0.4)
EOSINOPHIL NFR BLD AUTO: 1.7 % (ref 0.3–6.2)
ERYTHROCYTE [DISTWIDTH] IN BLOOD BY AUTOMATED COUNT: 13.9 % (ref 12.3–15.4)
GLOBULIN UR ELPH-MCNC: 2.8 GM/DL
GLUCOSE SERPL-MCNC: 101 MG/DL (ref 65–99)
HCT VFR BLD AUTO: 40.6 % (ref 37.5–51)
HGB BLD-MCNC: 13.5 G/DL (ref 13–17.7)
HOLD SPECIMEN: NORMAL
HOLD SPECIMEN: NORMAL
IMM GRANULOCYTES # BLD AUTO: 0.02 10*3/MM3 (ref 0–0.05)
IMM GRANULOCYTES NFR BLD AUTO: 0.2 % (ref 0–0.5)
LYMPHOCYTES # BLD AUTO: 2.31 10*3/MM3 (ref 0.7–3.1)
LYMPHOCYTES NFR BLD AUTO: 22.1 % (ref 19.6–45.3)
MCH RBC QN AUTO: 29.8 PG (ref 26.6–33)
MCHC RBC AUTO-ENTMCNC: 33.3 G/DL (ref 31.5–35.7)
MCV RBC AUTO: 89.6 FL (ref 79–97)
MONOCYTES # BLD AUTO: 0.99 10*3/MM3 (ref 0.1–0.9)
MONOCYTES NFR BLD AUTO: 9.5 % (ref 5–12)
NEUTROPHILS NFR BLD AUTO: 6.92 10*3/MM3 (ref 1.7–7)
NEUTROPHILS NFR BLD AUTO: 66 % (ref 42.7–76)
NRBC BLD AUTO-RTO: 0 /100 WBC (ref 0–0.2)
NT-PROBNP SERPL-MCNC: 8668 PG/ML (ref 0–900)
PLATELET # BLD AUTO: 362 10*3/MM3 (ref 140–450)
PMV BLD AUTO: 9.4 FL (ref 6–12)
POTASSIUM SERPL-SCNC: 3.8 MMOL/L (ref 3.5–5.2)
PROT SERPL-MCNC: 6.8 G/DL (ref 6–8.5)
RBC # BLD AUTO: 4.53 10*6/MM3 (ref 4.14–5.8)
SODIUM SERPL-SCNC: 140 MMOL/L (ref 136–145)
TROPONIN T SERPL HS-MCNC: 15 NG/L
WBC NRBC COR # BLD: 10.47 10*3/MM3 (ref 3.4–10.8)
WHOLE BLOOD HOLD COAG: NORMAL
WHOLE BLOOD HOLD SPECIMEN: NORMAL

## 2023-09-16 PROCEDURE — 93010 ELECTROCARDIOGRAM REPORT: CPT | Performed by: INTERNAL MEDICINE

## 2023-09-16 PROCEDURE — 71045 X-RAY EXAM CHEST 1 VIEW: CPT

## 2023-09-16 PROCEDURE — 85610 PROTHROMBIN TIME: CPT | Performed by: EMERGENCY MEDICINE

## 2023-09-16 PROCEDURE — 93005 ELECTROCARDIOGRAM TRACING: CPT

## 2023-09-16 PROCEDURE — 36415 COLL VENOUS BLD VENIPUNCTURE: CPT

## 2023-09-16 PROCEDURE — 93005 ELECTROCARDIOGRAM TRACING: CPT | Performed by: EMERGENCY MEDICINE

## 2023-09-16 PROCEDURE — 83880 ASSAY OF NATRIURETIC PEPTIDE: CPT

## 2023-09-16 PROCEDURE — 84484 ASSAY OF TROPONIN QUANT: CPT

## 2023-09-16 PROCEDURE — 99284 EMERGENCY DEPT VISIT MOD MDM: CPT

## 2023-09-16 PROCEDURE — 96374 THER/PROPH/DIAG INJ IV PUSH: CPT

## 2023-09-16 PROCEDURE — 85025 COMPLETE CBC W/AUTO DIFF WBC: CPT

## 2023-09-16 PROCEDURE — 80053 COMPREHEN METABOLIC PANEL: CPT

## 2023-09-16 RX ORDER — SODIUM CHLORIDE 0.9 % (FLUSH) 0.9 %
10 SYRINGE (ML) INJECTION AS NEEDED
Status: DISCONTINUED | OUTPATIENT
Start: 2023-09-16 | End: 2023-09-19 | Stop reason: HOSPADM

## 2023-09-17 ENCOUNTER — READMISSION MANAGEMENT (OUTPATIENT)
Dept: CALL CENTER | Facility: HOSPITAL | Age: 58
End: 2023-09-17
Payer: COMMERCIAL

## 2023-09-17 ENCOUNTER — HOSPITAL ENCOUNTER (OUTPATIENT)
Facility: HOSPITAL | Age: 58
Discharge: HOME | End: 2023-09-19
Attending: EMERGENCY MEDICINE | Admitting: INTERNAL MEDICINE
Payer: COMMERCIAL

## 2023-09-17 DIAGNOSIS — R26.2 DIFFICULTY IN WALKING: ICD-10-CM

## 2023-09-17 DIAGNOSIS — I50.9 ACUTE CONGESTIVE HEART FAILURE, UNSPECIFIED HEART FAILURE TYPE: ICD-10-CM

## 2023-09-17 DIAGNOSIS — R06.09 DYSPNEA ON EXERTION: ICD-10-CM

## 2023-09-17 DIAGNOSIS — I50.23 ACUTE ON CHRONIC SYSTOLIC CHF (CONGESTIVE HEART FAILURE): Primary | ICD-10-CM

## 2023-09-17 DIAGNOSIS — I50.9 ACUTE DECOMPENSATED HEART FAILURE: ICD-10-CM

## 2023-09-17 DIAGNOSIS — J81.0 ACUTE PULMONARY EDEMA: ICD-10-CM

## 2023-09-17 LAB
AMPHET+METHAMPHET UR QL: NEGATIVE
ANION GAP SERPL CALCULATED.3IONS-SCNC: 11.2 MMOL/L (ref 5–15)
BARBITURATES UR QL SCN: NEGATIVE
BASE EXCESS BLDA CALC-SCNC: 2.1 MMOL/L (ref -2–2)
BASE EXCESS BLDA CALC-SCNC: 2.4 MMOL/L (ref -2–2)
BASE EXCESS BLDV CALC-SCNC: 2.4 MMOL/L (ref -2–2)
BASOPHILS # BLD AUTO: 0.05 10*3/MM3 (ref 0–0.2)
BASOPHILS NFR BLD AUTO: 0.5 % (ref 0–1.5)
BDY SITE: ABNORMAL
BENZODIAZ UR QL SCN: NEGATIVE
BUN SERPL-MCNC: 16 MG/DL (ref 6–20)
BUN/CREAT SERPL: 13.6 (ref 7–25)
CALCIUM SPEC-SCNC: 9 MG/DL (ref 8.6–10.5)
CANNABINOIDS SERPL QL: NEGATIVE
CHLORIDE SERPL-SCNC: 102 MMOL/L (ref 98–107)
CO2 SERPL-SCNC: 26.8 MMOL/L (ref 22–29)
COCAINE UR QL: NEGATIVE
COHGB MFR BLD: 0.6 % (ref 0–1.5)
COHGB MFR BLD: 0.8 % (ref 0–1.5)
COHGB MFR BLD: 1.7 % (ref 0–1.5)
CREAT SERPL-MCNC: 1.18 MG/DL (ref 0.76–1.27)
D-LACTATE SERPL-SCNC: 0.8 MMOL/L (ref 0.5–2)
DEPRECATED RDW RBC AUTO: 46.2 FL (ref 37–54)
EGFRCR SERPLBLD CKD-EPI 2021: 71.5 ML/MIN/1.73
EOSINOPHIL # BLD AUTO: 0.12 10*3/MM3 (ref 0–0.4)
EOSINOPHIL NFR BLD AUTO: 1.2 % (ref 0.3–6.2)
ERYTHROCYTE [DISTWIDTH] IN BLOOD BY AUTOMATED COUNT: 13.9 % (ref 12.3–15.4)
ETHANOL BLD-MCNC: <10 MG/DL (ref 0–10)
ETHANOL UR QL: <0.01 %
FENTANYL UR-MCNC: NEGATIVE NG/ML
FHHB: 1.6 % (ref 0–5)
FHHB: 3.9 % (ref 0–5)
FHHB: 30.7 % (ref 0–5)
FLUAV AG NPH QL: NEGATIVE
FLUBV AG NPH QL IA: NEGATIVE
GAS FLOW AIRWAY: 2 LPM
GAS FLOW AIRWAY: 3 LPM
GLUCOSE BLDC GLUCOMTR-MCNC: 105 MG/DL (ref 70–99)
GLUCOSE SERPL-MCNC: 131 MG/DL (ref 65–99)
HCO3 BLDA-SCNC: 25.5 MMOL/L (ref 22–26)
HCO3 BLDA-SCNC: 27 MMOL/L (ref 22–26)
HCO3 BLDV-SCNC: 27.7 MMOL/L (ref 22–26)
HCT VFR BLD AUTO: 43.2 % (ref 37.5–51)
HGB BLD-MCNC: 14.3 G/DL (ref 13–17.7)
HGB BLDA-MCNC: 14.5 G/DL (ref 13.8–16.4)
HGB BLDA-MCNC: 14.7 G/DL (ref 13.8–16.4)
HGB BLDA-MCNC: 14.8 G/DL (ref 13.8–16.4)
IMM GRANULOCYTES # BLD AUTO: 0.03 10*3/MM3 (ref 0–0.05)
IMM GRANULOCYTES NFR BLD AUTO: 0.3 % (ref 0–0.5)
INHALED O2 CONCENTRATION: 28 %
INHALED O2 CONCENTRATION: 28 %
INR PPP: 1.11 (ref 0.86–1.15)
LACTATE BLDA-SCNC: ABNORMAL MMOL/L
LACTATE BLDA-SCNC: ABNORMAL MMOL/L
LYMPHOCYTES # BLD AUTO: 1.82 10*3/MM3 (ref 0.7–3.1)
LYMPHOCYTES NFR BLD AUTO: 18.5 % (ref 19.6–45.3)
MAGNESIUM SERPL-MCNC: 1.8 MG/DL (ref 1.6–2.6)
MCH RBC QN AUTO: 30.1 PG (ref 26.6–33)
MCHC RBC AUTO-ENTMCNC: 33.1 G/DL (ref 31.5–35.7)
MCV RBC AUTO: 90.9 FL (ref 79–97)
METHADONE UR QL SCN: NEGATIVE
METHGB BLD QL: 0.2 % (ref 0–1.5)
MODALITY: ABNORMAL
MONOCYTES # BLD AUTO: 0.8 10*3/MM3 (ref 0.1–0.9)
MONOCYTES NFR BLD AUTO: 8.1 % (ref 5–12)
NEUTROPHILS NFR BLD AUTO: 7.02 10*3/MM3 (ref 1.7–7)
NEUTROPHILS NFR BLD AUTO: 71.4 % (ref 42.7–76)
NOTE: ABNORMAL
NRBC BLD AUTO-RTO: 0 /100 WBC (ref 0–0.2)
OPIATES UR QL: NEGATIVE
OXYCODONE UR QL SCN: NEGATIVE
OXYHGB MFR BLDV: 68.3 % (ref 94–99)
OXYHGB MFR BLDV: 95.3 % (ref 94–99)
OXYHGB MFR BLDV: 96.5 % (ref 94–99)
PCO2 BLDA: 34.9 MM HG (ref 35–45)
PCO2 BLDA: 42.9 MM HG (ref 35–45)
PCO2 BLDV: 45.2 MM HG (ref 41–51)
PH BLDA: 7.42 PH UNITS (ref 7.35–7.45)
PH BLDA: 7.48 PH UNITS (ref 7.35–7.45)
PH BLDV: 7.41 PH UNITS (ref 7.31–7.41)
PHOSPHATE SERPL-MCNC: 3.2 MG/DL (ref 2.5–4.5)
PLATELET # BLD AUTO: 362 10*3/MM3 (ref 140–450)
PMV BLD AUTO: 9.8 FL (ref 6–12)
PO2 BLD: 279 MM[HG] (ref 0–500)
PO2 BLDA: 131.4 MM HG (ref 80–100)
PO2 BLDA: 78.1 MM HG (ref 80–100)
PO2 BLDV: <40.5 MM HG (ref 35–42)
POTASSIUM SERPL-SCNC: 3.7 MMOL/L (ref 3.5–5.2)
PROCALCITONIN SERPL-MCNC: 0.04 NG/ML (ref 0–0.25)
PROTHROMBIN TIME: 14.4 SECONDS (ref 11.8–14.9)
QT INTERVAL: 358 MS
QT INTERVAL: 390 MS
QTC INTERVAL: 442 MS
QTC INTERVAL: 467 MS
RBC # BLD AUTO: 4.75 10*6/MM3 (ref 4.14–5.8)
SAO2 % BLDCOA: 96.1 % (ref 95–99)
SAO2 % BLDCOA: 98.4 % (ref 95–99)
SAO2 % BLDCOV: 69 % (ref 45–75)
SARS-COV-2 RNA RESP QL NAA+PROBE: NOT DETECTED
SODIUM SERPL-SCNC: 140 MMOL/L (ref 136–145)
TROPONIN T SERPL HS-MCNC: 19 NG/L
WBC NRBC COR # BLD: 9.84 10*3/MM3 (ref 3.4–10.8)

## 2023-09-17 PROCEDURE — 99152 MOD SED SAME PHYS/QHP 5/>YRS: CPT | Performed by: INTERNAL MEDICINE

## 2023-09-17 PROCEDURE — 99223 1ST HOSP IP/OBS HIGH 75: CPT | Performed by: INTERNAL MEDICINE

## 2023-09-17 PROCEDURE — 85025 COMPLETE CBC W/AUTO DIFF WBC: CPT | Performed by: STUDENT IN AN ORGANIZED HEALTH CARE EDUCATION/TRAINING PROGRAM

## 2023-09-17 PROCEDURE — 0 POTASSIUM CHLORIDE 10 MEQ/100ML SOLUTION: Performed by: STUDENT IN AN ORGANIZED HEALTH CARE EDUCATION/TRAINING PROGRAM

## 2023-09-17 PROCEDURE — 94799 UNLISTED PULMONARY SVC/PX: CPT

## 2023-09-17 PROCEDURE — 82805 BLOOD GASES W/O2 SATURATION: CPT | Performed by: INTERNAL MEDICINE

## 2023-09-17 PROCEDURE — 83735 ASSAY OF MAGNESIUM: CPT | Performed by: STUDENT IN AN ORGANIZED HEALTH CARE EDUCATION/TRAINING PROGRAM

## 2023-09-17 PROCEDURE — C1760 CLOSURE DEV, VASC: HCPCS | Performed by: INTERNAL MEDICINE

## 2023-09-17 PROCEDURE — 84484 ASSAY OF TROPONIN QUANT: CPT | Performed by: EMERGENCY MEDICINE

## 2023-09-17 PROCEDURE — 82820 HEMOGLOBIN-OXYGEN AFFINITY: CPT | Performed by: INTERNAL MEDICINE

## 2023-09-17 PROCEDURE — 25010000002 MIDAZOLAM PER 1MG: Performed by: INTERNAL MEDICINE

## 2023-09-17 PROCEDURE — 87040 BLOOD CULTURE FOR BACTERIA: CPT | Performed by: STUDENT IN AN ORGANIZED HEALTH CARE EDUCATION/TRAINING PROGRAM

## 2023-09-17 PROCEDURE — 80307 DRUG TEST PRSMV CHEM ANLYZR: CPT | Performed by: STUDENT IN AN ORGANIZED HEALTH CARE EDUCATION/TRAINING PROGRAM

## 2023-09-17 PROCEDURE — 83050 HGB METHEMOGLOBIN QUAN: CPT | Performed by: INTERNAL MEDICINE

## 2023-09-17 PROCEDURE — 82805 BLOOD GASES W/O2 SATURATION: CPT | Performed by: EMERGENCY MEDICINE

## 2023-09-17 PROCEDURE — 83605 ASSAY OF LACTIC ACID: CPT | Performed by: EMERGENCY MEDICINE

## 2023-09-17 PROCEDURE — 84100 ASSAY OF PHOSPHORUS: CPT | Performed by: STUDENT IN AN ORGANIZED HEALTH CARE EDUCATION/TRAINING PROGRAM

## 2023-09-17 PROCEDURE — 83050 HGB METHEMOGLOBIN QUAN: CPT | Performed by: EMERGENCY MEDICINE

## 2023-09-17 PROCEDURE — 82948 REAGENT STRIP/BLOOD GLUCOSE: CPT

## 2023-09-17 PROCEDURE — 82077 ASSAY SPEC XCP UR&BREATH IA: CPT | Performed by: STUDENT IN AN ORGANIZED HEALTH CARE EDUCATION/TRAINING PROGRAM

## 2023-09-17 PROCEDURE — 25010000002 ENOXAPARIN PER 10 MG: Performed by: STUDENT IN AN ORGANIZED HEALTH CARE EDUCATION/TRAINING PROGRAM

## 2023-09-17 PROCEDURE — C1894 INTRO/SHEATH, NON-LASER: HCPCS | Performed by: INTERNAL MEDICINE

## 2023-09-17 PROCEDURE — 82375 ASSAY CARBOXYHB QUANT: CPT | Performed by: INTERNAL MEDICINE

## 2023-09-17 PROCEDURE — 87635 SARS-COV-2 COVID-19 AMP PRB: CPT | Performed by: STUDENT IN AN ORGANIZED HEALTH CARE EDUCATION/TRAINING PROGRAM

## 2023-09-17 PROCEDURE — 93460 R&L HRT ART/VENTRICLE ANGIO: CPT | Performed by: INTERNAL MEDICINE

## 2023-09-17 PROCEDURE — 87804 INFLUENZA ASSAY W/OPTIC: CPT | Performed by: STUDENT IN AN ORGANIZED HEALTH CARE EDUCATION/TRAINING PROGRAM

## 2023-09-17 PROCEDURE — 94640 AIRWAY INHALATION TREATMENT: CPT

## 2023-09-17 PROCEDURE — 25010000002 LORAZEPAM PER 2 MG: Performed by: STUDENT IN AN ORGANIZED HEALTH CARE EDUCATION/TRAINING PROGRAM

## 2023-09-17 PROCEDURE — 93010 ELECTROCARDIOGRAM REPORT: CPT | Performed by: INTERNAL MEDICINE

## 2023-09-17 PROCEDURE — 25010000002 MAGNESIUM SULFATE IN D5W 1G/100ML (PREMIX) 1-5 GM/100ML-% SOLUTION: Performed by: STUDENT IN AN ORGANIZED HEALTH CARE EDUCATION/TRAINING PROGRAM

## 2023-09-17 PROCEDURE — 80048 BASIC METABOLIC PNL TOTAL CA: CPT | Performed by: STUDENT IN AN ORGANIZED HEALTH CARE EDUCATION/TRAINING PROGRAM

## 2023-09-17 PROCEDURE — C1769 GUIDE WIRE: HCPCS | Performed by: INTERNAL MEDICINE

## 2023-09-17 PROCEDURE — 25010000002 FENTANYL CITRATE (PF) 100 MCG/2ML SOLUTION: Performed by: INTERNAL MEDICINE

## 2023-09-17 PROCEDURE — 25510000001 IOPAMIDOL PER 1 ML: Performed by: INTERNAL MEDICINE

## 2023-09-17 PROCEDURE — 94761 N-INVAS EAR/PLS OXIMETRY MLT: CPT

## 2023-09-17 PROCEDURE — 82375 ASSAY CARBOXYHB QUANT: CPT | Performed by: EMERGENCY MEDICINE

## 2023-09-17 PROCEDURE — 84145 PROCALCITONIN (PCT): CPT | Performed by: STUDENT IN AN ORGANIZED HEALTH CARE EDUCATION/TRAINING PROGRAM

## 2023-09-17 PROCEDURE — 93005 ELECTROCARDIOGRAM TRACING: CPT | Performed by: STUDENT IN AN ORGANIZED HEALTH CARE EDUCATION/TRAINING PROGRAM

## 2023-09-17 RX ORDER — ACETAMINOPHEN 160 MG/5ML
650 SOLUTION ORAL EVERY 4 HOURS PRN
Status: DISCONTINUED | OUTPATIENT
Start: 2023-09-17 | End: 2023-09-19 | Stop reason: HOSPADM

## 2023-09-17 RX ORDER — ACETAMINOPHEN 650 MG/1
650 SUPPOSITORY RECTAL EVERY 4 HOURS PRN
Status: DISCONTINUED | OUTPATIENT
Start: 2023-09-17 | End: 2023-09-19 | Stop reason: HOSPADM

## 2023-09-17 RX ORDER — LOSARTAN POTASSIUM 25 MG/1
50 TABLET ORAL
Status: DISCONTINUED | OUTPATIENT
Start: 2023-09-17 | End: 2023-09-19 | Stop reason: HOSPADM

## 2023-09-17 RX ORDER — ALBUTEROL SULFATE 2.5 MG/3ML
2.5 SOLUTION RESPIRATORY (INHALATION) EVERY 6 HOURS PRN
Status: DISCONTINUED | OUTPATIENT
Start: 2023-09-17 | End: 2023-09-19 | Stop reason: HOSPADM

## 2023-09-17 RX ORDER — ONDANSETRON 2 MG/ML
4 INJECTION INTRAMUSCULAR; INTRAVENOUS EVERY 6 HOURS PRN
Status: DISCONTINUED | OUTPATIENT
Start: 2023-09-17 | End: 2023-09-19 | Stop reason: HOSPADM

## 2023-09-17 RX ORDER — MIDAZOLAM HYDROCHLORIDE 2 MG/2ML
INJECTION, SOLUTION INTRAMUSCULAR; INTRAVENOUS
Status: DISCONTINUED | OUTPATIENT
Start: 2023-09-17 | End: 2023-09-17 | Stop reason: HOSPADM

## 2023-09-17 RX ORDER — BUMETANIDE 0.25 MG/ML
2 INJECTION INTRAMUSCULAR; INTRAVENOUS ONCE
Status: COMPLETED | OUTPATIENT
Start: 2023-09-17 | End: 2023-09-17

## 2023-09-17 RX ORDER — BUMETANIDE 0.25 MG/ML
2 INJECTION INTRAMUSCULAR; INTRAVENOUS 2 TIMES DAILY WITH MEALS
Status: DISCONTINUED | OUTPATIENT
Start: 2023-09-17 | End: 2023-09-19 | Stop reason: HOSPADM

## 2023-09-17 RX ORDER — POLYETHYLENE GLYCOL 3350 17 G/17G
17 POWDER, FOR SOLUTION ORAL DAILY PRN
Status: DISCONTINUED | OUTPATIENT
Start: 2023-09-17 | End: 2023-09-19 | Stop reason: HOSPADM

## 2023-09-17 RX ORDER — VENLAFAXINE HYDROCHLORIDE 37.5 MG/1
37.5 CAPSULE, EXTENDED RELEASE ORAL
Status: DISCONTINUED | OUTPATIENT
Start: 2023-09-17 | End: 2023-09-19 | Stop reason: HOSPADM

## 2023-09-17 RX ORDER — SODIUM CHLORIDE 0.9 % (FLUSH) 0.9 %
10 SYRINGE (ML) INJECTION EVERY 12 HOURS SCHEDULED
Status: DISCONTINUED | OUTPATIENT
Start: 2023-09-17 | End: 2023-09-19 | Stop reason: HOSPADM

## 2023-09-17 RX ORDER — BUSPIRONE HYDROCHLORIDE 5 MG/1
5 TABLET ORAL NIGHTLY
Status: DISCONTINUED | OUTPATIENT
Start: 2023-09-17 | End: 2023-09-19 | Stop reason: HOSPADM

## 2023-09-17 RX ORDER — BUMETANIDE 0.25 MG/ML
2 INJECTION INTRAMUSCULAR; INTRAVENOUS 2 TIMES DAILY WITH MEALS
Status: DISCONTINUED | OUTPATIENT
Start: 2023-09-17 | End: 2023-09-17

## 2023-09-17 RX ORDER — LORAZEPAM 2 MG/ML
1 INJECTION INTRAMUSCULAR ONCE
Status: COMPLETED | OUTPATIENT
Start: 2023-09-17 | End: 2023-09-17

## 2023-09-17 RX ORDER — SODIUM CHLORIDE 0.9 % (FLUSH) 0.9 %
10 SYRINGE (ML) INJECTION AS NEEDED
Status: DISCONTINUED | OUTPATIENT
Start: 2023-09-17 | End: 2023-09-19 | Stop reason: HOSPADM

## 2023-09-17 RX ORDER — ACETAMINOPHEN 325 MG/1
650 TABLET ORAL EVERY 4 HOURS PRN
Status: DISCONTINUED | OUTPATIENT
Start: 2023-09-17 | End: 2023-09-17

## 2023-09-17 RX ORDER — DIPHENHYDRAMINE HCL 25 MG
25 CAPSULE ORAL EVERY 6 HOURS PRN
Status: DISCONTINUED | OUTPATIENT
Start: 2023-09-17 | End: 2023-09-19 | Stop reason: HOSPADM

## 2023-09-17 RX ORDER — POTASSIUM CHLORIDE 7.45 MG/ML
10 INJECTION INTRAVENOUS
Status: COMPLETED | OUTPATIENT
Start: 2023-09-17 | End: 2023-09-17

## 2023-09-17 RX ORDER — PREDNISONE 20 MG/1
40 TABLET ORAL
Status: DISCONTINUED | OUTPATIENT
Start: 2023-09-17 | End: 2023-09-19 | Stop reason: HOSPADM

## 2023-09-17 RX ORDER — WARFARIN SODIUM 10 MG/1
10 TABLET ORAL
Status: COMPLETED | OUTPATIENT
Start: 2023-09-17 | End: 2023-09-17

## 2023-09-17 RX ORDER — VENLAFAXINE 37.5 MG/1
37.5 TABLET ORAL
Status: DISCONTINUED | OUTPATIENT
Start: 2023-09-17 | End: 2023-09-17

## 2023-09-17 RX ORDER — BISACODYL 10 MG
10 SUPPOSITORY, RECTAL RECTAL DAILY PRN
Status: DISCONTINUED | OUTPATIENT
Start: 2023-09-17 | End: 2023-09-19 | Stop reason: HOSPADM

## 2023-09-17 RX ORDER — NITROGLYCERIN 0.4 MG/1
0.4 TABLET SUBLINGUAL
Status: DISCONTINUED | OUTPATIENT
Start: 2023-09-17 | End: 2023-09-19 | Stop reason: HOSPADM

## 2023-09-17 RX ORDER — NICOTINE 21 MG/24HR
1 PATCH, TRANSDERMAL 24 HOURS TRANSDERMAL EVERY 24 HOURS
Status: DISCONTINUED | OUTPATIENT
Start: 2023-09-17 | End: 2023-09-19 | Stop reason: HOSPADM

## 2023-09-17 RX ORDER — ACETAMINOPHEN 325 MG/1
650 TABLET ORAL EVERY 4 HOURS PRN
Status: DISCONTINUED | OUTPATIENT
Start: 2023-09-17 | End: 2023-09-19 | Stop reason: HOSPADM

## 2023-09-17 RX ORDER — BISACODYL 5 MG/1
5 TABLET, DELAYED RELEASE ORAL DAILY PRN
Status: DISCONTINUED | OUTPATIENT
Start: 2023-09-17 | End: 2023-09-19 | Stop reason: HOSPADM

## 2023-09-17 RX ORDER — NITROGLYCERIN 0.4 MG/1
0.4 TABLET SUBLINGUAL
Status: DISCONTINUED | OUTPATIENT
Start: 2023-09-17 | End: 2023-09-17

## 2023-09-17 RX ORDER — IPRATROPIUM BROMIDE AND ALBUTEROL SULFATE 2.5; .5 MG/3ML; MG/3ML
3 SOLUTION RESPIRATORY (INHALATION)
Status: DISCONTINUED | OUTPATIENT
Start: 2023-09-17 | End: 2023-09-18

## 2023-09-17 RX ORDER — ENOXAPARIN SODIUM 100 MG/ML
1 INJECTION SUBCUTANEOUS EVERY 12 HOURS SCHEDULED
Status: DISCONTINUED | OUTPATIENT
Start: 2023-09-17 | End: 2023-09-19 | Stop reason: HOSPADM

## 2023-09-17 RX ORDER — SODIUM CHLORIDE 9 MG/ML
40 INJECTION, SOLUTION INTRAVENOUS AS NEEDED
Status: DISCONTINUED | OUTPATIENT
Start: 2023-09-17 | End: 2023-09-19 | Stop reason: HOSPADM

## 2023-09-17 RX ORDER — AMOXICILLIN 250 MG
2 CAPSULE ORAL 2 TIMES DAILY
Status: DISCONTINUED | OUTPATIENT
Start: 2023-09-17 | End: 2023-09-19 | Stop reason: HOSPADM

## 2023-09-17 RX ORDER — ONDANSETRON 4 MG/1
4 TABLET, FILM COATED ORAL EVERY 6 HOURS PRN
Status: DISCONTINUED | OUTPATIENT
Start: 2023-09-17 | End: 2023-09-19 | Stop reason: HOSPADM

## 2023-09-17 RX ORDER — HYDROCODONE BITARTRATE AND ACETAMINOPHEN 7.5; 325 MG/1; MG/1
1 TABLET ORAL ONCE
Status: COMPLETED | OUTPATIENT
Start: 2023-09-17 | End: 2023-09-17

## 2023-09-17 RX ORDER — FENTANYL CITRATE 50 UG/ML
INJECTION, SOLUTION INTRAMUSCULAR; INTRAVENOUS
Status: DISCONTINUED | OUTPATIENT
Start: 2023-09-17 | End: 2023-09-17 | Stop reason: HOSPADM

## 2023-09-17 RX ORDER — MAGNESIUM SULFATE 1 G/100ML
1 INJECTION INTRAVENOUS ONCE
Status: COMPLETED | OUTPATIENT
Start: 2023-09-17 | End: 2023-09-17

## 2023-09-17 RX ORDER — LIDOCAINE HYDROCHLORIDE 20 MG/ML
INJECTION, SOLUTION INFILTRATION; PERINEURAL
Status: DISCONTINUED | OUTPATIENT
Start: 2023-09-17 | End: 2023-09-17 | Stop reason: HOSPADM

## 2023-09-17 RX ORDER — HYDROCODONE BITARTRATE AND ACETAMINOPHEN 5; 325 MG/1; MG/1
1 TABLET ORAL EVERY 4 HOURS PRN
Status: DISCONTINUED | OUTPATIENT
Start: 2023-09-17 | End: 2023-09-19 | Stop reason: HOSPADM

## 2023-09-17 RX ADMIN — VENLAFAXINE HYDROCHLORIDE 37.5 MG: 37.5 CAPSULE, EXTENDED RELEASE ORAL at 14:47

## 2023-09-17 RX ADMIN — ENOXAPARIN SODIUM 70 MG: 100 INJECTION SUBCUTANEOUS at 20:10

## 2023-09-17 RX ADMIN — BUMETANIDE 2 MG: 0.25 INJECTION, SOLUTION INTRAMUSCULAR; INTRAVENOUS at 04:24

## 2023-09-17 RX ADMIN — MAGNESIUM SULFATE 1 G: 1 INJECTION INTRAVENOUS at 11:57

## 2023-09-17 RX ADMIN — HYDROCODONE BITARTRATE AND ACETAMINOPHEN 1 TABLET: 5; 325 TABLET ORAL at 20:15

## 2023-09-17 RX ADMIN — BUSPIRONE HYDROCHLORIDE 5 MG: 5 TABLET ORAL at 20:10

## 2023-09-17 RX ADMIN — BUMETANIDE 2 MG: 0.25 INJECTION INTRAMUSCULAR; INTRAVENOUS at 17:23

## 2023-09-17 RX ADMIN — HYDROCODONE BITARTRATE AND ACETAMINOPHEN 1 TABLET: 5; 325 TABLET ORAL at 15:40

## 2023-09-17 RX ADMIN — Medication 10 ML: at 14:47

## 2023-09-17 RX ADMIN — LORAZEPAM 1 MG: 2 INJECTION INTRAMUSCULAR; INTRAVENOUS at 15:50

## 2023-09-17 RX ADMIN — POTASSIUM CHLORIDE 10 MEQ: 7.46 INJECTION, SOLUTION INTRAVENOUS at 15:03

## 2023-09-17 RX ADMIN — POTASSIUM CHLORIDE 10 MEQ: 7.46 INJECTION, SOLUTION INTRAVENOUS at 15:02

## 2023-09-17 RX ADMIN — LOSARTAN POTASSIUM 50 MG: 25 TABLET, FILM COATED ORAL at 14:59

## 2023-09-17 RX ADMIN — HYDROCODONE BITARTRATE AND ACETAMINOPHEN 1 TABLET: 7.5; 325 TABLET ORAL at 04:22

## 2023-09-17 RX ADMIN — WARFARIN SODIUM 10 MG: 10 TABLET ORAL at 17:23

## 2023-09-17 RX ADMIN — IPRATROPIUM BROMIDE AND ALBUTEROL SULFATE 3 ML: .5; 3 SOLUTION RESPIRATORY (INHALATION) at 12:55

## 2023-09-17 RX ADMIN — Medication 10 ML: at 20:10

## 2023-09-17 NOTE — H&P
[facility]   Patient Name: Regulo Sepulveda  : 1965  MRN: [id number]  Primary Care Physician:  Grace Cruz APRN  Date of admission: 2023    Subjective   Subjective     Chief Complaint:   Chief Complaint   Patient presents with    Shortness of Breath         HPI:    Regulo Sepulveda is a 58 y.o. male  with significant hx of heart failure with reduced fraction, LV thrombus, A-fib, COPD, hypertension, hyperlipidemia bipolar disorder presented to ER with complaints of shortness of breath.  Reports its been ongoing, he has not felt well in the past week.  He ran out of his medication 3 days ago.  Reports he cannot walk far without getting short of breath.  He sleeps laying up, has episodes of PND every day.  Also endorses some chest pain, nonradiating associated with cough and.  Denies any fever but endorses chills.  Reports cough but no production of sputum. Reports he has not really smoked since being discharged and has not used any drugs either. Lives with a roommate.     In the ER, he is afebrile at 97.4, heart rate between , respiratory rate 20-26, blood pressure 150/113, saturating 94 to 100% sporadically on 2 L nasal cannula. Lab work was remarkable for WBC count of 10.4, hemoglobin of 13.5, platelet count of 362 high-sensitivity opponent of 15, proBNP of 8000 668, sodium 148, potassium 3.8, bicarb 24, BUN 18, creatinine 1.32, glucose 101, AST 43, ALT 66, T. bili of 0.4, anion gap of 11.  INR is 1.1.  EKG shows normal sinus rhythm with LVH repolarization changes.  In the ER received Bumex 2 mg and made more than 1.5 L of urine per tech. Xr chest concerning for multifocal pneumonia vs pulm edema.    Prior hospitalization 2 weeks ago, discharged in 2023 underwent an echocardiogram which revealed an EF of 20% with apical layering thrombus.  He was discharged on warfarin and Lovenox bridge.  Seen by psychiatry    Personal  History     Past Medical History:  Past Medical History:   Diagnosis Date    Anxiety     Asthma     Bipolar affective     Cardiac tamponade     2023    CHF (congestive heart failure)     COPD (chronic obstructive pulmonary disease)     Coronary artery disease     Depression     Diabetes mellitus     Elevated cholesterol     Hypertension        Past Surgical History:  Past Surgical History:   Procedure Laterality Date    TESTICLE SURGERY Left     extraction       Family History:   Family History   Problem Relation Age of Onset    Diabetes Mother     Depression Sister     Depression Brother        Social History:   Social History     Socioeconomic History    Marital status: Single   Tobacco Use    Smoking status: Every Day     Packs/day: 1.00     Years: 50.00     Pack years: 50.00     Types: Cigarettes    Smokeless tobacco: Never   Vaping Use    Vaping Use: Every day    Substances: Nicotine   Substance and Sexual Activity    Alcohol use: Not Currently    Drug use: Not Currently     Types: Methamphetamines     Comment: LAST USED 6 months ago    Sexual activity: Defer       Home Medications:  ARIPiprazole ER, albuterol sulfate HFA, atorvastatin, bumetanide, busPIRone, carvedilol, cyanocobalamin, dapagliflozin, losartan, metFORMIN, nicotine, pantoprazole, spironolactone, tiotropium bromide monohydrate, venlafaxine XR, and warfarin    Allergies:  No Known Allergies    Review of Systems   All systems were reviewed and negative except for: Chest pain, shortness of breath,  Objective   Objective     Vitals:   Temp:  [97.4 °F (36.3 °C)-97.8 °F (36.6 °C)] 97.8 °F (36.6 °C)  Heart Rate:  [] 88  Resp:  [18-26] 18  BP: (134-164)/() 154/111  Flow (L/min):  [2] 2    Physical Exam    Constitutional: Awake, alert, follows commands.  Unable to lay flat due to significant shortness of breath. Temporal wasting, cachectic appearing.    Eyes: Pupils equal, sclerae anicteric,    HENT: NCAT, mucous membranes moist   Neck:  Supple, JVP about 10 cm.   Respiratory: Bilateral basilar crackles.,  Mild end expiratory wheezing.   Cardiovascular: RRR, holosystolic murmur best heard at apex.  Trace edema bilaterally.  JVP 10 cm.  Warm to touch.   Gastrointestinal: Positive bowel sounds, soft, nontender, nondistended   Musculoskeletal: Trace bilateral ankle edema, no clubbing or cyanosis to extremities   Psychiatric: Appropriate affect, cooperative   Neurologic: Oriented x 3, speech clear, able to follow all commands. Skin: No rashes     Result Review    Result Review:  I have personally reviewed the results from the time of this admission to 9/17/2023 08:31 EDT and agree with these findings:  [x]  Laboratory  []  Microbiology  [x]  Radiology  [x]  EKG/Telemetry   [x]  Cardiology/Vascular   []  Pathology  [x]  Old records  []  Other:      Assessment & Plan   Assessment / Plan   Assessment/Plan:     58-year-old male with recent diagnosis of heart failure, LV thrombus and A-fib presents to ER with complaints of shortness of breath.  Hemodynamically stable, required 2 L on NC sporadically.     Impression    Shortness of breath  Acute on chronic congestive heart failure exacerbation  TTE from last admission LVEF 20%, & LV thrombus.  Has responded well to Bumex 2 mg IV once.  Cardiology consulted, was going to get ischemic eval outpatient but re-presented due to worsening symptoms.  Possible plan for left and right heart cath today.  Repeat labs including  BMP mag phos and lactic acid   May be low output state, does not appear to have significant volume overload on exam.   Will resume Losartan today, followed by addition of GDMT as tolerated. Trend renal function.   we will hold off on beta-blocker for now.   Daily weights, strict I/os      LV thrombus  A-fib  Tele monitoring   - on Anticoagulation with lovenox and warfain  - will resume post procedure   - keep K >4, mg>2    Doubt pneumonia, has no fever/leukocytosis.   Check pro simon, blood  culture  COVID, Flu panel   Hold off on initiating antibiotics     COPD   Prednisone 40 mg daily, DuoNebs, albuterol as needed.  Bronchopulmonary hygiene.  Nasal cannula to keep SPO2 greater than 88%    Mild transaminitis   Likely related to congestive hepatopathy   Trend hepatic function panel    Schizophrenia/ Bipolar Dx  Resume home medications once reconciled.     Polysubstance abuse   Check urine drug screen, ethanol level  Nicotine patch       Diet Npo for now given planned procedure.     DVT prophylaxis:  Lovenox full dose for LV thrombus, and history of A-fib.    CODE STATUS:    Level Of Support Discussed With: Patient  Code Status (Patient has no pulse and is not breathing): CPR (Attempt to Resuscitate)  Medical Interventions (Patient has pulse or is breathing): Full Support      Admission Status:  I believe this patient meets Inpatient status.    Electronically signed by Shubham Jiménez MD, 09/17/23, 7:40 AM EDT.

## 2023-09-17 NOTE — CONSULTS
Referring Provider: Dave Calzada MD    Reason for Consultation: HFrEF exacerbation, LV thrombus, atrial fibrillation      Patient Care Team:  Grace Cruz APRN as PCP - General (Nurse Practitioner)  Kalia Corbin MD as Consulting Physician (Urology)      SUBJECTIVE     Chief Complaint: Shortness of breath    History of present illness:  Regulo Sepulveda is a 58 y.o. male with recently diagnosed HFrEF, LV thrombus, atrial fibrillation, methamphetamine abuse, hypertension, hyperlipidemia, bipolar disorder who presented to the hospital with shortness of breath.  He ran out of his medications 3 days ago.  He has started to notice worsening shortness of breath to the point that he is unable to lay flat and is also noted leg edema.  He was noted to be in volume overload, hypertensive and tachycardic.Labs were remarkable for proBNP of 8668, creatinine 1.32, minimally elevated troponin of 15.  Cardiology has been consulted for further evaluation and management given advanced heart failure.      Review of systems:    Constitutional: No weakness, fatigue, fever, rigors, chills   Eyes: No vision changes, eye pain   ENT/oropharynx: No difficulty swallowing, sore throat, epistaxis, changes in hearing   Cardiovascular: No chest pain, chest tightness, palpitations, paroxysmal nocturnal dyspnea, orthopnea, diaphoresis, dizziness / syncopal episode   Respiratory: + shortness of breath, dyspnea on exertion, cough, wheezing, hemoptysis   Gastrointestinal: No abdominal pain, nausea, vomiting, diarrhea, bloody stools   Genitourinary: No hematuria, dysuria   Neurological: No headache, tremors, numbness, one-sided weakness    Musculoskeletal: No cramps, myalgias, joint pain, joint swelling   Integument: No rash, edema        Personal History:      Past Medical History:   Diagnosis Date    Anxiety     Asthma     Bipolar affective     Cardiac tamponade     2023    CHF (congestive heart failure)     COPD (chronic obstructive  pulmonary disease)     Coronary artery disease     Depression     Diabetes mellitus     Elevated cholesterol     Hypertension        Past Surgical History:   Procedure Laterality Date    TESTICLE SURGERY Left     extraction       Family History   Problem Relation Age of Onset    Diabetes Mother     Depression Sister     Depression Brother        Social History     Tobacco Use    Smoking status: Every Day     Packs/day: 1.00     Years: 50.00     Pack years: 50.00     Types: Cigarettes    Smokeless tobacco: Never   Vaping Use    Vaping Use: Every day    Substances: Nicotine   Substance Use Topics    Alcohol use: Not Currently    Drug use: Not Currently     Types: Methamphetamines     Comment: LAST USED 6 months ago        Home meds:  Prior to Admission medications    Medication Sig Start Date End Date Taking? Authorizing Provider   Abilify Maintena 300 MG Suspension Reconstituted ER IM injection ER Inject 300 mg into the appropriate muscle as directed by prescriber Every 30 (Thirty) Days. 7/10/23   Yuliya Blanchard MD   albuterol sulfate  (90 Base) MCG/ACT inhaler Inhale 2 puffs Every 4 (Four) Hours As Needed for Wheezing or Shortness of Air. Indications: Chronic Obstructive Lung Disease 3/3/23   Rogers Lipscomb MD   atorvastatin (LIPITOR) 40 MG tablet Take 1 tablet by mouth Every Night. Indications: High Amount of Fats in the Blood 3/3/23   Felton Garduno MD   bumetanide (BUMEX) 0.5 MG tablet Take 2 tablets by mouth Daily.    Yuliya Blanchard MD   busPIRone (BUSPAR) 5 MG tablet Take 1 tablet by mouth Every Night.    Yuliya Blanchard MD   carvedilol (COREG) 25 MG tablet Take 1 tablet by mouth 2 (Two) Times a Day With Meals.    Yuliya Blanchard MD   Farxiga 5 MG tablet tablet Take 1 tablet by mouth Daily. Indications: Type 2 Diabetes 3/3/23   Felton Garduno MD   losartan (COZAAR) 50 MG tablet Take 1 tablet by mouth 2 (Two) Times a Day. 6/21/23   Yuliya Blanchard MD   metFORMIN (GLUCOPHAGE)  "1000 MG tablet Take 0.5 tablets by mouth 2 (Two) Times a Day With Meals. Indications: Type 2 Diabetes  Patient taking differently: Take 1 tablet by mouth 2 (Two) Times a Day With Meals. Indications: Type 2 Diabetes 3/3/23   Felton Garduno MD   nicotine (NICODERM CQ) 21 MG/24HR patch Place 1 patch on the skin as directed by provider Daily for 14 days. 9/3/23 9/17/23  Alex Angulo MD   pantoprazole (PROTONIX) 40 MG EC tablet Take 1 tablet by mouth Daily. Indications: Gastroesophageal Reflux Disease 3/3/23   Felton Garduno MD   spironolactone (ALDACTONE) 25 MG tablet Take 1 tablet by mouth Daily.    ProviderYuliya MD   tiotropium bromide monohydrate (SPIRIVA RESPIMAT) 2.5 MCG/ACT aerosol solution inhaler Inhale 2 puffs Daily. Indications: Chronic Obstructive Lung Disease 3/3/23   Rogers Lipscomb MD   venlafaxine XR (EFFEXOR-XR) 37.5 MG 24 hr capsule Take 1 capsule by mouth Daily. 6/21/23   ProviderYuliya MD   vitamin B-12 (VITAMIN B-12) 500 MCG tablet Take 1 tablet by mouth Daily. Indications: Inadequate Vitamin B12 3/4/23   Felton Garduno MD   warfarin (Coumadin) 7.5 MG tablet Continue Lovenox injection and Coumadin until INR is 2 and then stop Lovenox injection. 9/2/23   Alex Angulo MD       Allergies:     Patient has no known allergies.    Scheduled Meds:   Continuous Infusions:   PRN Meds:  sodium chloride    [COMPLETED] Insert Peripheral IV **AND** sodium chloride      OBJECTIVE    Vital Signs  Vitals:    09/17/23 0645 09/17/23 0745 09/17/23 0815 09/17/23 0830   BP: 146/96 (!) 147/109 (!) 154/111 143/91   BP Location:       Patient Position:       Pulse: 97 76 88 90   Resp:  18 18 18   Temp:       TempSrc:       SpO2: 98% 99% 99% 95%   Weight:       Height:           Flowsheet Rows      Flowsheet Row First Filed Value   Admission Height 185.4 cm (73\") Documented at 09/17/2023 0345   Admission Weight 71 kg (156 lb 8.4 oz) Documented at 09/17/2023 0345            No intake or output data in the 24 " hours ending 09/17/23 0839     Telemetry: Sinus rhythm    Physical Exam:  The patient is alert, oriented and in no distress.  Vital signs as noted above.  Head and neck revealed no carotid bruits or jugular venous distention.  No thyromegaly or lymphadenopathy is present  Lungs clear.  No wheezing.  Breath sounds are normal bilaterally.  Heart: Normal first and second heart sounds. No murmur.  No precordial rub is present.  No gallop is present.  Abdomen: Soft and nontender.  No organomegaly is present.  Extremities with good peripheral pulses without any pedal edema.  Skin: Warm and dry.  Musculoskeletal system is grossly normal.  CNS grossly normal.       Results Review:  I have personally reviewed the results from the time of this admission to 9/17/2023 08:39 EDT and agree with these findings:  []  Laboratory  []  Microbiology  []  Radiology  []  EKG/Telemetry   []  Cardiology/Vascular   []  Pathology  []  Old records  []  Other:    Most notable findings include:     Lab Results (last 24 hours)       Procedure Component Value Units Date/Time    Lactic Acid, Plasma [[id number]] Collected: 09/17/23 0831    Specimen: Blood Updated: 09/17/23 0833    Single High Sensitivity Troponin T [[id number]] Collected: 09/17/23 0831    Specimen: Blood Updated: 09/17/23 0833    Basic Metabolic Panel [[id number]] Collected: 09/17/23 0831    Specimen: Blood Updated: 09/17/23 0833    Magnesium [[id number]] Collected: 09/17/23 0831    Specimen: Blood Updated: 09/17/23 0833    Phosphorus [[id number]] Collected: 09/17/23 0831    Specimen: Blood Updated: 09/17/23 0833    Blood Gas, Arterial -With Co-Ox Panel: Yes [[id number]]  (Abnormal) Collected: 09/17/23 0822    Specimen: Arterial Blood Updated: 09/17/23 0822     pH, Arterial 7.417 pH units      pCO2, Arterial 42.9 mm Hg      pO2, Arterial 131.4 mm Hg      HCO3, Arterial 27.0 mmol/L      Base Excess, Arterial 2.1 mmol/L      O2 Saturation, Arterial 98.4 %      Hemoglobin, Blood Gas 14.8  g/dL      Carboxyhemoglobin 1.7 %      Methemoglobin 0.20 %      Oxyhemoglobin 96.5 %      FHHB 1.6 %      Site Arterial: right radial     Modality Cannula     Flow Rate 3 lpm      Lactate, Arterial --    Protime-INR [[id number]]  (Normal) Collected: 09/16/23 2159    Specimen: Blood from Arm, Right Updated: 09/17/23 0415     Protime 14.4 Seconds      INR 1.11    Narrative:      Suggested Therapeutic Ranges For Oral Anticoagulant Therapy:  Level of Therapy                      INR Target Range  Standard Dose                            2.0-3.0  High Dose                                2.5-3.5  Patients not receiving anticoagulant  Therapy Normal Range                     0.86-1.15    [place] Draw [[id number]] Collected: 09/16/23 2159    Specimen: Blood from Arm, Right Updated: 09/16/23 2300    Narrative:      The following orders were created for panel order [place] Draw.  Procedure                               Abnormality         Status                     ---------                               -----------         ------                     Green Top (Gel)[[id number]]                                  Final result               Lavender Top[[id number]]                                     Final result               Gold Top - SST[[id number]]                                   Final result               Light Blue Top[[id number]]                                   Final result                 Please view results for these tests on the individual orders.    Green Top (Gel) [[id number]] Collected: 09/16/23 2159    Specimen: Blood from Arm, Right Updated: 09/16/23 2300     Extra Tube Hold for add-ons.     Comment: Auto resulted.       Gold Top - SST [[id number]] Collected: 09/16/23 2159    Specimen: Blood from Arm, Right Updated: 09/16/23 2300     Extra Tube Hold for add-ons.     Comment: Auto resulted.       Light Blue Top [[id number]] Collected: 09/16/23 2159    Specimen: Blood from Arm, Right Updated: 09/16/23 2300     Extra  Tube Hold for add-ons.     Comment: Auto resulted       Lavender Top [[id number]] Collected: 09/16/23 2159    Specimen: Blood from Arm, Right Updated: 09/16/23 2300     Extra Tube hold for add-on     Comment: Auto resulted       Comprehensive Metabolic Panel [[id number]]  (Abnormal) Collected: 09/16/23 2159    Specimen: Blood from Arm, Right Updated: 09/16/23 2232     Glucose 101 mg/dL      BUN 18 mg/dL      Creatinine 1.32 mg/dL      Sodium 140 mmol/L      Potassium 3.8 mmol/L      Chloride 105 mmol/L      CO2 24.0 mmol/L      Calcium 9.1 mg/dL      Total Protein 6.8 g/dL      Albumin 4.0 g/dL      ALT (SGPT) 66 U/L      AST (SGOT) 43 U/L      Alkaline Phosphatase 175 U/L      Total Bilirubin 0.4 mg/dL      Globulin 2.8 gm/dL      A/G Ratio 1.4 g/dL      BUN/Creatinine Ratio 13.6     Anion Gap 11.0 mmol/L      eGFR 62.5 mL/min/1.73     Narrative:      GFR Normal >60  Chronic Kidney Disease <60  Kidney Failure <15      Single High Sensitivity Troponin T [[id number]]  (Abnormal) Collected: 09/16/23 2159    Specimen: Blood from Arm, Right Updated: 09/16/23 2232     HS Troponin T 15 ng/L     Narrative:      High Sensitive Troponin T Reference Range:  <10.0 ng/L- Negative Female for AMI  <15.0 ng/L- Negative Male for AMI  >=10 - Abnormal Female indicating possible myocardial injury.  >=15 - Abnormal Male indicating possible myocardial injury.   Clinicians would have to utilize clinical acumen, EKG, Troponin, and serial changes to determine if it is an Acute Myocardial Infarction or myocardial injury due to an underlying chronic condition.         BNP [[id number]]  (Abnormal) Collected: 09/16/23 2159    Specimen: Blood from Arm, Right Updated: 09/16/23 2228     proBNP 8,668.0 pg/mL     Narrative:      Among patients with dyspnea, NT-proBNP is highly sensitive for the detection of acute congestive heart failure. In addition NT-proBNP of <300 pg/ml effectively rules out acute congestive heart failure with 99% negative  predictive value.      CBC & Differential [[id number]]  (Abnormal) Collected: 09/16/23 2159    Specimen: Blood from Arm, Right Updated: 09/16/23 2212    Narrative:      The following orders were created for panel order CBC & Differential.  Procedure                               Abnormality         Status                     ---------                               -----------         ------                     CBC Auto Differential[[id number]]        Abnormal            Final result                 Please view results for these tests on the individual orders.    CBC Auto Differential [[id number]]  (Abnormal) Collected: 09/16/23 2159    Specimen: Blood from Arm, Right Updated: 09/16/23 2212     WBC 10.47 10*3/mm3      RBC 4.53 10*6/mm3      Hemoglobin 13.5 g/dL      Hematocrit 40.6 %      MCV 89.6 fL      MCH 29.8 pg      MCHC 33.3 g/dL      RDW 13.9 %      RDW-SD 45.5 fl      MPV 9.4 fL      Platelets 362 10*3/mm3      Neutrophil % 66.0 %      Lymphocyte % 22.1 %      Monocyte % 9.5 %      Eosinophil % 1.7 %      Basophil % 0.5 %      Immature Grans % 0.2 %      Neutrophils, Absolute 6.92 10*3/mm3      Lymphocytes, Absolute 2.31 10*3/mm3      Monocytes, Absolute 0.99 10*3/mm3      Eosinophils, Absolute 0.18 10*3/mm3      Basophils, Absolute 0.05 10*3/mm3      Immature Grans, Absolute 0.02 10*3/mm3      nRBC 0.0 /100 WBC             Imaging Results (Last 24 Hours)       Procedure Component Value Units Date/Time    XR Chest 1 View [[id number]] Collected: 09/16/23 2307     Updated: 09/16/23 2310    Narrative:      PROCEDURE: XR CHEST 1 VW     COMPARISON: 8/27/2023.     INDICATIONS: SOA/SOB/shortness of air/shortness of breath.     FINDINGS: A single PA upright portable view of the chest is provided for review.  Bilateral   infiltrates are seen.  The findings may represent infectious multifocal pneumonia.  Mild cardiac   enlargement is suggested.  No pneumothorax.  No pneumomediastinum.  No pleural effusion.  There is    slight pulmonary hypoinflation.  Degenerative changes involve the bilateral shoulders and the   spine.       Impression:       Bilateral infiltrates are seen.  The findings may represent infectious multifocal   pneumonia.  Pulmonary edema is possible.                 Please note that portions of this note were completed with a voice recognition program.     MAHOGANY ELIZONDO JR, MD         Electronically Signed and Approved By: MAHOGANY ELIZONDO JR, MD on 9/16/2023 at 23:07                                LAB RESULTS (LAST 7 DAYS)    CBC  Results from last 7 days   Lab Units 09/16/23  2159   WBC 10*3/mm3 10.47   RBC 10*6/mm3 4.53   HEMOGLOBIN g/dL 13.5   HEMATOCRIT % 40.6   MCV fL 89.6   PLATELETS 10*3/mm3 362       BMP  Results from last 7 days   Lab Units 09/16/23  2159   SODIUM mmol/L 140   POTASSIUM mmol/L 3.8   CHLORIDE mmol/L 105   CO2 mmol/L 24.0   BUN mg/dL 18   CREATININE mg/dL 1.32*   GLUCOSE mg/dL 101*       CMP   Results from last 7 days   Lab Units 09/16/23  2159   SODIUM mmol/L 140   POTASSIUM mmol/L 3.8   CHLORIDE mmol/L 105   CO2 mmol/L 24.0   BUN mg/dL 18   CREATININE mg/dL 1.32*   GLUCOSE mg/dL 101*   ALBUMIN g/dL 4.0   BILIRUBIN mg/dL 0.4   ALK PHOS U/L 175*   AST (SGOT) U/L 43*   ALT (SGPT) U/L 66*       BNP        TROPONIN  Results from last 7 days   Lab Units 09/16/23  2159   HSTROP T ng/L 15*       CoAg  Results from last 7 days   Lab Units 09/16/23  2159   INR  1.11       Creatinine Clearance  Estimated Creatinine Clearance: 61.3 mL/min (A) (by C-G formula based on SCr of 1.32 mg/dL (H)).    ABG  Results from last 7 days   Lab Units 09/17/23  0822   PH, ARTERIAL pH units 7.417   PCO2, ARTERIAL mm Hg 42.9   PO2 ART mm Hg 131.4*   O2 SATURATION ART % 98.4   BASE EXCESS ART mmol/L 2.1*         Radiology  XR Chest 1 View    Result Date: 9/16/2023   Bilateral infiltrates are seen.  The findings may represent infectious multifocal pneumonia.  Pulmonary edema is possible.      Please note that portions  of this note were completed with a voice recognition program.  MAHOGANY ELIZONDO JR, MD       Electronically Signed and Approved By: MAHOGANY ELIZONDO JR, MD on 9/16/2023 at 23:07                 EKG  I personally viewed and interpreted the patient's EKG/Telemetry data:  ECG 12 Lead Dyspnea   Preliminary Result   HEART RATE= 86  bpm   RR Interval= 696  ms   PA Interval= 195  ms   P Horizontal Axis=   deg   P Front Axis= 63  deg   QRSD Interval= 111  ms   QT Interval= 390  ms   QTcB= 467  ms   QRS Axis= -36  deg   T Wave Axis= 122  deg   - ABNORMAL ECG -   Sinus rhythm   Left atrial enlargement   LVH with secondary repolarization abnormality   Electronically Signed By:    Date and Time of Study: 2023-09-17 08:13:53      ECG 12 Lead ED Triage Standing Order; SOA   Preliminary Result   HEART RATE= 92  bpm   RR Interval= 656  ms   PA Interval= 179  ms   P Horizontal Axis= -3  deg   P Front Axis= 52  deg   QRSD Interval= 105  ms   QT Interval= 358  ms   QTcB= 442  ms   QRS Axis= -31  deg   T Wave Axis= 121  deg   - ABNORMAL ECG -   Sinus rhythm   Left atrial enlargement   LVH with secondary repolarization abnormality   Electronically Signed By:    Date and Time of Study: 2023-09-16 22:01:30            Echocardiogram:    Results for orders placed during the hospital encounter of 08/27/23    Adult Transthoracic Echo Complete w/ Color, Spectral and Contrast if necessary per protocol    Interpretation Summary    Left ventricular systolic function is severely decreased. Calculated left ventricular EF = 20%    Left ventricular diastolic function is consistent with (grade II w/high LAP) pseudonormalization.    There is a solid thrombus located in the left ventricular apex. The thrombus measures 2.1 cm in diameter and is fixed.    The left atrial cavity is mild to moderately dilated.    Estimated right ventricular systolic pressure from tricuspid regurgitation is mildly elevated (35-45 mmHg).    Global hypokinesis of the LV with  severely reduced ejection fraction and 2.1cm fixed, apical LV thrombus seen with difinity.        Stress Test:        Cardiac Catheterization:  No results found for this or any previous visit.        Other:      ASSESSMENT & PLAN:    Principal Problem:    Acute decompensated heart failure    HFrEF  Previous echocardiogram showed EF of 20%, grade 2 diastolic dysfunction akinetic apex with LV thrombus.  Slowly resume and uptitrate his GDMT.  He has previously tolerated Coreg, losartan, Farxiga and Aldactone.  Start IV diuretics with Bumex or Lasix.  Closely monitor renal function creatinine is 1.32.  Monitor I's and O's and replace electrolytes as needed.  HFrEF may be secondary to uncontrolled hypertension, meth use, coronary artery disease.  He will need ischemic work-up given akinetic apex.  I have discussed the risk and benefit of undergoing a right and left heart cath for further work-up of HFrEF.  Patient is agreeable.  Warfarin is held.    Addendum  Cardiac catheterization completed  Nonischemic idiopathic dilated cardiomyopathy.  Normal cardiac output and index  Significantly elevated LVEDP and wedge pressure.  26 mm and 31 mm respectively.  Significantly elevated systemic vascular resistance        RECOMMENDATIONS  -Afterload reduction and diuretics  -Uptitrate GDMT  -Abstinence from drug abuse      Atrial fibrillation  Paroxysmal atrial fibrillation.  Currently in sinus rhythm.  [id number] score is 4.  Continue full anticoagulation with heparin or warfarin.    LV thrombus  Known LV thrombus.  He will need anticoagulation for atrial fibrillation and LV thrombus.  I do not see an indication to repeat an echocardiogram at this time.    Hypertension  Initiate and uptitrate GDMT as mentioned above.    COPD/polysubstance abuse  Meth abuse, tobacco abuse.  Counseling regarding abstinence from substance abuse provided to the patient  Inhaled bronchodilators for COPD    Bipolar disorder  He has been on venlafaxine  and buspirone.    Ben Grant MD  09/17/23  08:39 EDT

## 2023-09-17 NOTE — PROGRESS NOTES
Pharmacy to Dose: Warfarin  Consulting provider: BETO  Indication for warfarin: AF REQUIRING FULL ANTICOAGULATION; DVT/PE, LV CLOT  Goal INR range: 2-3  Home warfarin dose: 7.5 MG DAILY  Actions or monitoring: INR & S/S OF BLEEDING    Any Vitamin K given?: NO  Any major drug interactions:   Is patient on bridging therapy with another anticoagulant?: ENOXAPARIN    Plan: INR 1.11 today. Will order warfarin 10 mg. Daily INRs ordered.    Date HGB PLATELETS INR Warfarin Dose Given   9-17 14.3 362 1.11 10 mg

## 2023-09-17 NOTE — PROGRESS NOTES
Respiratory Therapist Broncho-Pulmonary Hygiene Progress Note      Patient Name:  Regulo Sepulveda  YOB: 1965    Regulo Sepulveda meets the qualification for Level 2 of the Bronco-Pulmonary Hygiene Protocol. This was based on my daily patient assessment and includes review of chest x-ray results, cough ability and quality, oxygenation, secretions or risk for secretion development and patient mobility.     Broncho-Pulmonary Hygiene Assessment:    Level of Movement: Actively changing positions without assistance  Alert/ oriented/ cooperative    Breath Sounds: Diminished and/or coarse rhonchi    Cough: Strong, effective and/or frequent    Chest X-Ray: Mild consolidation and/or atelectasis.    Sputum Productions: None or small amount of thin or watery secretions with effective cough    History and Physical: None POSSIBLE CURRENT COVID      SpO2 to Oxygen Need: greater than 92% on room air or  less than 3L nasal canula    Current SpO2 is: 95 on ROOM AIR    Based on this information, I have completed the following interventions: Teach/Instruct patient on cough and deep breathe and Aerobika with bronchodialtor medication or TID      Electronically signed by Janet Ravi RRT, 09/17/23, 12:58 PM EDT.

## 2023-09-17 NOTE — Clinical Note
A 4 fr sheath was  inserted using micropuncture technique with ultrasound guidance into the right femoral artery.
A 4 fr sheath was  inserted using micropuncture technique with ultrasound guidance into the right femoral vein.
All Patches/Pads removed. Skin Intact.
Allergies reviewed.  H&P note has been confirmed for the patient. Procedural consent has been signed.  Staff has reviewed the patient's labs.
Calculated contrast threshold is 205.58 mL.
Cardiac SAT's Collected
Cardiac SAT's Collected
Catheter Pulled back from LV to AO. Measurement captured.
Dmitriy rodas
Hemostasis started on the right femoral artery. Angio-Seal was used in achieving hemostasis. Closure device deployed in the vessel. Hemostasis achieved successfully.
Hemostasis started on the right femoral vein. Mynx was used in achieving hemostasis. Closure device deployed in the vessel. Hemostasis achieved successfully.
Inserted under fluoro.
No changes from previous assessment.
No in lab complications
Patient was given Post Procedure instruction by the staff.
Post-Procedural Saturation was taken from the Left Hand. Sat result is 96%.
Pre-Procedural Saturation was taken from the Left Hand. Sat result is 98%
Replaced previous sheath in the right femoral artery.
Replaced previous sheath in the right femoral vein.
The DP pulses are +2 bilaterally. The PT pulses are +2 bilaterally. The femoral pulses are +2 bilaterally.
The left coronary artery was selectively engaged, injected and visualized.
The physician has confirmed that the patient has been reassessed and is appropriate for moderate sedation
The right coronary artery was selectively engaged, injected and visualized.
catheter advanced into LV.
catheter removed  over the wire.
catheter removed.
inserted over wire.
removed.
left upper arm

## 2023-09-17 NOTE — PLAN OF CARE
Problem: Fall Injury Risk  Goal: Absence of Fall and Fall-Related Injury  9/17/2023 1806 by Myesha Nicole, RN  Outcome: Ongoing, Progressing  9/17/2023 1802 by Myesha Nicole, RN  Outcome: Ongoing, Progressing  Intervention: Promote Injury-Free Environment   Goal Outcome Evaluation:  Plan of Care Reviewed With: patient        Progress: no change  Outcome Evaluation: Patient has had several tests during this shift, has tolerated well. No complaints or concerns at this time. Will continue to monitor, call light in reach.

## 2023-09-17 NOTE — ED PROVIDER NOTES
Time: 9:54 PM EDT  Date of encounter:  9/16/2023  Independent Historian/Clinical History and Information was obtained by:   Patient    History is limited by: N/A    Chief Complaint   Patient presents with    Shortness of Breath         History of Present Illness:  Patient is a 58 y.o. year old male who presents to the emergency department for evaluation of SOB.  Patient states he feels weak.    Patient states he has increasing dyspnea on exertion after his discharge from the hospital last week.  He states he felt better a week ago than he does today.  He has a nonproductive cough.  He is unable to lay flat on his back.  He states he has been compliant with his medication.  He has discomfort across his anterior chest with cough.  He denies pedal edema.  He denies fever or chills.    HPI    Patient Care Team  Primary Care Provider: Grace Cruz APRN    Past Medical History:     No Known Allergies  Past Medical History:   Diagnosis Date    Anxiety     Asthma     Bipolar affective     Cardiac tamponade     2023    CHF (congestive heart failure)     COPD (chronic obstructive pulmonary disease)     Coronary artery disease     Depression     Diabetes mellitus     Elevated cholesterol     Hypertension      Past Surgical History:   Procedure Laterality Date    TESTICLE SURGERY Left     extraction     Family History   Problem Relation Age of Onset    Diabetes Mother     Depression Sister     Depression Brother        Home Medications:  Prior to Admission medications    Medication Sig Start Date End Date Taking? Authorizing Provider   Abilify Maintena 300 MG Suspension Reconstituted ER IM injection ER Inject 300 mg into the appropriate muscle as directed by prescriber Every 30 (Thirty) Days. 7/10/23   Provider, MD Yuliya   albuterol sulfate  (90 Base) MCG/ACT inhaler Inhale 2 puffs Every 4 (Four) Hours As Needed for Wheezing or Shortness of Air. Indications: Chronic Obstructive Lung Disease 3/3/23   Rogers Lipscomb  MD SRAVAN   atorvastatin (LIPITOR) 40 MG tablet Take 1 tablet by mouth Every Night. Indications: High Amount of Fats in the Blood 3/3/23   Felton Garduno MD   bumetanide (BUMEX) 0.5 MG tablet Take 2 tablets by mouth Daily.    Yuliya Blanchard MD   busPIRone (BUSPAR) 5 MG tablet Take 1 tablet by mouth Every Night.    Yuliya Blanchard MD   carvedilol (COREG) 25 MG tablet Take 1 tablet by mouth 2 (Two) Times a Day With Meals.    Yuliya Blanchard MD   Farxiga 5 MG tablet tablet Take 1 tablet by mouth Daily. Indications: Type 2 Diabetes 3/3/23   Felton Garduno MD   losartan (COZAAR) 50 MG tablet Take 1 tablet by mouth 2 (Two) Times a Day. 6/21/23   Yuliya Blanchard MD   metFORMIN (GLUCOPHAGE) 1000 MG tablet Take 0.5 tablets by mouth 2 (Two) Times a Day With Meals. Indications: Type 2 Diabetes  Patient taking differently: Take 1 tablet by mouth 2 (Two) Times a Day With Meals. Indications: Type 2 Diabetes 3/3/23   Felton Garduno MD   nicotine (NICODERM CQ) 21 MG/24HR patch Place 1 patch on the skin as directed by provider Daily for 14 days. 9/3/23 9/17/23  Alex Angulo MD   pantoprazole (PROTONIX) 40 MG EC tablet Take 1 tablet by mouth Daily. Indications: Gastroesophageal Reflux Disease 3/3/23   Felton Garduno MD   spironolactone (ALDACTONE) 25 MG tablet Take 1 tablet by mouth Daily.    Yuliya Blanchard MD   tiotropium bromide monohydrate (SPIRIVA RESPIMAT) 2.5 MCG/ACT aerosol solution inhaler Inhale 2 puffs Daily. Indications: Chronic Obstructive Lung Disease 3/3/23   Rogers Lipscomb MD   venlafaxine XR (EFFEXOR-XR) 37.5 MG 24 hr capsule Take 1 capsule by mouth Daily. 6/21/23   Yuliya Blanchard MD   vitamin B-12 (VITAMIN B-12) 500 MCG tablet Take 1 tablet by mouth Daily. Indications: Inadequate Vitamin B12 3/4/23   Felton Garduno MD   warfarin (Coumadin) 7.5 MG tablet Continue Lovenox injection and Coumadin until INR is 2 and then stop Lovenox injection. 9/2/23   Alex Angulo MD        Social  "History:   Social History     Tobacco Use    Smoking status: Every Day     Packs/day: 1.00     Years: 50.00     Pack years: 50.00     Types: Cigarettes    Smokeless tobacco: Never   Vaping Use    Vaping Use: Every day    Substances: Nicotine   Substance Use Topics    Alcohol use: Not Currently    Drug use: Not Currently     Types: Methamphetamines     Comment: LAST USED 6 months ago         Review of Systems:  Review of Systems   Constitutional:  Negative for chills and fever.   HENT:  Negative for congestion, ear pain and sore throat.    Eyes:  Negative for pain.   Respiratory:  Positive for cough and shortness of breath. Negative for chest tightness.    Cardiovascular:  Negative for chest pain.   Gastrointestinal:  Negative for abdominal pain, diarrhea, nausea and vomiting.   Genitourinary:  Negative for flank pain and hematuria.   Musculoskeletal:  Negative for joint swelling.   Skin:  Negative for pallor.   Neurological:  Negative for seizures and headaches.   All other systems reviewed and are negative.     Physical Exam:  /96   Pulse 97   Temp 97.8 °F (36.6 °C) (Oral)   Resp 22   Ht 185.4 cm (73\")   Wt 71 kg (156 lb 8.4 oz)   SpO2 98%   BMI 20.65 kg/m²         Physical Exam  Vitals and nursing note reviewed.   Constitutional:       General: He is not in acute distress.     Appearance: Normal appearance. He is not toxic-appearing.   HENT:      Head: Normocephalic and atraumatic.      Jaw: There is normal jaw occlusion.   Eyes:      General: Lids are normal.      Extraocular Movements: Extraocular movements intact.      Conjunctiva/sclera: Conjunctivae normal.      Pupils: Pupils are equal, round, and reactive to light.   Cardiovascular:      Rate and Rhythm: Normal rate and regular rhythm.      Pulses: Normal pulses.      Heart sounds: Normal heart sounds.   Pulmonary:      Effort: Pulmonary effort is normal. No respiratory distress.      Breath sounds: Rhonchi present. No wheezing.   Abdominal:    "   General: Abdomen is flat. There is no distension.      Palpations: Abdomen is soft.      Tenderness: There is no abdominal tenderness. There is no guarding or rebound.   Musculoskeletal:         General: Normal range of motion.      Cervical back: Normal range of motion and neck supple.      Right lower leg: No edema.      Left lower leg: No edema.   Skin:     General: Skin is warm and dry.      Coloration: Skin is not cyanotic.   Neurological:      Mental Status: He is alert and oriented to person, place, and time. Mental status is at baseline.   Psychiatric:         Attention and Perception: Attention and perception normal.         Mood and Affect: Mood normal.              Procedures:  Procedures      Medical Decision Making:      Comorbidities that affect care:    Hypertension, diabetes, cardiac tamponade, left ventricular thrombus, COPD, CHF, asthma, coronary artery disease    External Notes reviewed:    Hospital Discharge Summary: Discharge summary from hospitalization September 2, 2023 for left ventricular thrombus      The following orders were placed and all results were independently analyzed by me:  Orders Placed This Encounter   Procedures    XR Chest 1 View    [place] Draw    Comprehensive Metabolic Panel    BNP    Single High Sensitivity Troponin T    CBC Auto Differential    Protime-INR    Blood Gas, Arterial -With Co-Ox Panel: Yes    Single High Sensitivity Troponin T    Lactic Acid, Plasma    Basic Metabolic Panel    Magnesium    Phosphorus    NPO Diet NPO Type: Strict NPO    Undress & Gown    Continuous Pulse Oximetry    Vital Signs    Hospitalist (on-call MD unless specified)    Oxygen Therapy- Nasal Cannula; Titrate 1-6 LPM Per SpO2; 90 - 95%    ECG 12 Lead ED Triage Standing Order; SOA    Insert Peripheral IV    Insert Peripheral IV    CBC & Differential    Green Top (Gel)    Lavender Top    Gold Top - SST    Light Blue Top       Medications Given in the Emergency Department:  Medications    sodium chloride 0.9 % flush 10 mL (has no administration in time range)   sodium chloride 0.9 % flush 10 mL (has no administration in time range)   bumetanide (BUMEX) injection 2 mg (2 mg Intravenous Given 9/17/23 0424)   HYDROcodone-acetaminophen (NORCO) 7.5-325 MG per tablet 1 tablet (1 tablet Oral Given 9/17/23 0422)        ED Course:    The patient was initially evaluated in the triage area where orders were placed. The patient was later dispositioned by Dave Calzada MD.      The patient was advised to stay for completion of workup which includes but is not limited to communication of labs and radiological results, reassessment and plan. The patient was advised that leaving prior to disposition by a provider could result in critical findings that are not communicated to the patient.     ED Course as of 09/17/23 0755   Sat Sep 16, 2023   2155 --- PROVIDER IN TRIAGE NOTE ---    The patient was evaluated by Cori alonzo in triage. Orders were placed and the patient is currently awaiting disposition.    [AJ]   2330 MPV: 9.4 [AJ]   Sun Sep 17, 2023   0401 , Interpretation of EKG: Sinus rhythm 92, anterolateral T wave inversion, seen previously [JS]      ED Course User Index  [AJ] Cori Perez PA-C  [JS] Dave Calzada MD       Labs:    Lab Results (last 24 hours)       Procedure Component Value Units Date/Time    CBC & Differential [[id number]]  (Abnormal) Collected: 09/16/23 2159    Specimen: Blood from Arm, Right Updated: 09/16/23 2212    Narrative:      The following orders were created for panel order CBC & Differential.  Procedure                               Abnormality         Status                     ---------                               -----------         ------                     CBC Auto Differential[[id number]]        Abnormal            Final result                 Please view results for these tests on the individual orders.    Comprehensive Metabolic Panel [[id number]]   (Abnormal) Collected: 09/16/23 2159    Specimen: Blood from Arm, Right Updated: 09/16/23 2232     Glucose 101 mg/dL      BUN 18 mg/dL      Creatinine 1.32 mg/dL      Sodium 140 mmol/L      Potassium 3.8 mmol/L      Chloride 105 mmol/L      CO2 24.0 mmol/L      Calcium 9.1 mg/dL      Total Protein 6.8 g/dL      Albumin 4.0 g/dL      ALT (SGPT) 66 U/L      AST (SGOT) 43 U/L      Alkaline Phosphatase 175 U/L      Total Bilirubin 0.4 mg/dL      Globulin 2.8 gm/dL      A/G Ratio 1.4 g/dL      BUN/Creatinine Ratio 13.6     Anion Gap 11.0 mmol/L      eGFR 62.5 mL/min/1.73     Narrative:      GFR Normal >60  Chronic Kidney Disease <60  Kidney Failure <15      BNP [[id number]]  (Abnormal) Collected: 09/16/23 2159    Specimen: Blood from Arm, Right Updated: 09/16/23 2228     proBNP 8,668.0 pg/mL     Narrative:      Among patients with dyspnea, NT-proBNP is highly sensitive for the detection of acute congestive heart failure. In addition NT-proBNP of <300 pg/ml effectively rules out acute congestive heart failure with 99% negative predictive value.      Single High Sensitivity Troponin T [[id number]]  (Abnormal) Collected: 09/16/23 2159    Specimen: Blood from Arm, Right Updated: 09/16/23 2232     HS Troponin T 15 ng/L     Narrative:      High Sensitive Troponin T Reference Range:  <10.0 ng/L- Negative Female for AMI  <15.0 ng/L- Negative Male for AMI  >=10 - Abnormal Female indicating possible myocardial injury.  >=15 - Abnormal Male indicating possible myocardial injury.   Clinicians would have to utilize clinical acumen, EKG, Troponin, and serial changes to determine if it is an Acute Myocardial Infarction or myocardial injury due to an underlying chronic condition.         CBC Auto Differential [[id number]]  (Abnormal) Collected: 09/16/23 2159    Specimen: Blood from Arm, Right Updated: 09/16/23 2212     WBC 10.47 10*3/mm3      RBC 4.53 10*6/mm3      Hemoglobin 13.5 g/dL      Hematocrit 40.6 %      MCV 89.6 fL      MCH 29.8  pg      MCHC 33.3 g/dL      RDW 13.9 %      RDW-SD 45.5 fl      MPV 9.4 fL      Platelets 362 10*3/mm3      Neutrophil % 66.0 %      Lymphocyte % 22.1 %      Monocyte % 9.5 %      Eosinophil % 1.7 %      Basophil % 0.5 %      Immature Grans % 0.2 %      Neutrophils, Absolute 6.92 10*3/mm3      Lymphocytes, Absolute 2.31 10*3/mm3      Monocytes, Absolute 0.99 10*3/mm3      Eosinophils, Absolute 0.18 10*3/mm3      Basophils, Absolute 0.05 10*3/mm3      Immature Grans, Absolute 0.02 10*3/mm3      nRBC 0.0 /100 WBC     Protime-INR [[id number]]  (Normal) Collected: 09/16/23 2159    Specimen: Blood from Arm, Right Updated: 09/17/23 0415     Protime 14.4 Seconds      INR 1.11    Narrative:      Suggested Therapeutic Ranges For Oral Anticoagulant Therapy:  Level of Therapy                      INR Target Range  Standard Dose                            2.0-3.0  High Dose                                2.5-3.5  Patients not receiving anticoagulant  Therapy Normal Range                     0.86-1.15             Imaging:    XR Chest 1 View    Result Date: 9/16/2023  PROCEDURE: XR CHEST 1 VW  COMPARISON: 8/27/2023.  INDICATIONS: SOA/SOB/shortness of air/shortness of breath.  FINDINGS: A single PA upright portable view of the chest is provided for review.  Bilateral infiltrates are seen.  The findings may represent infectious multifocal pneumonia.  Mild cardiac enlargement is suggested.  No pneumothorax.  No pneumomediastinum.  No pleural effusion.  There is slight pulmonary hypoinflation.  Degenerative changes involve the bilateral shoulders and the spine.       Bilateral infiltrates are seen.  The findings may represent infectious multifocal pneumonia.  Pulmonary edema is possible.      Please note that portions of this note were completed with a voice recognition program.  MAHOGANY ELIZONDO JR, MD       Electronically Signed and Approved By: MAHOGANY ELIOZNDO JR, MD on 9/16/2023 at 23:07                 Differential Diagnosis and  Discussion:      Dyspnea: Differential diagnosis includes but is not limited to metabolic acidosis, neurological disorders, psychogenic, asthma, pneumothorax, upper airway obstruction, COPD, pneumonia, noncardiogenic pulmonary edema, interstitial lung disease, anemia, congestive heart failure, and pulmonary embolism    All labs were reviewed and interpreted by me.  All X-rays impressions were independently interpreted by me.  EKG was interpreted by me.    MDM     Amount and/or Complexity of Data Reviewed  Decide to obtain previous medical records or to obtain history from someone other than the patient: yes             Patient Care Considerations:    I considered discharge home but the patient is unable to ambulate without significant dyspnea and increased work of breathing even after IV diuresis in the emergency department.      Consultants/Shared Management Plan:    Hospitalist: I have discussed the case with the hospitalist who agrees to accept the patient for admission.    Social Determinants of Health:    Patient is unable to carry out activities of daily life. Escalation of care is necessary.       Disposition and Care Coordination:    Admit:   Through independent evaluation of the patient's history, physical, and imperical data, the patient meets criteria for observation/admission to the hospital.        Final diagnoses:   Acute congestive heart failure, unspecified heart failure type   Acute pulmonary edema   Dyspnea on exertion        ED Disposition       ED Disposition   Decision to Admit    Condition   --    Comment   --               This medical record created using voice recognition software.             Dave Calzada MD  09/17/23 0752

## 2023-09-17 NOTE — PAYOR COMM NOTE
"Nancy Sepluveda (58 y.o. Male)     PATIENT INFORMATION  Name:  Nancy Sepulveda  MRN#:     [id number]  :  1965         ADMISSION INFORMATION  CLASS: Inpatient   DOS:  2023        CURRENT ATTENDING PROVIDER INFORMATION  Name/NPI: Shubham Jiménez MD [id number]  Phone:  Phone: [phone]        RENDERING FACILITY  Name:  [facility]   NPI:  [id number]  TID:  [id number]  Address:      [address] [place] [address]  Phone  [phone]        CASE MANAGEMENT CONTACT INFORMATION  Phone:      [phone]  Fax:           [phone]              Date of Birth   1965    Social Security Number       Address   [address] [place], [place] [address]    Home Phone   [phone]    MRN   [id number]       Oriental orthodox   None    Marital Status   Single                            Admission Date   23    Admission Type   Emergency    Admitting Provider   Shubham Jiménez MD    Attending Provider   Shubham Jiménez MD    Department, Room/Bed   [facility] PROGRESSIVE CARE UNIT 2, 262/1       Discharge Date       Discharge Disposition       Discharge Destination                                 Attending Provider: Shubham Jiménez MD    Allergies: No Known Allergies    Isolation: None   Infection: COVID (rule out) (23)   Code Status: CPR    Ht: 185.4 cm (73\")   Wt: 71 kg (156 lb 8.4 oz)    Admission Cmt: None   Principal Problem: Acute decompensated heart failure [I50.9]                   Active Insurance as of 2023       Primary Coverage       Payor Plan Insurance Group Employer/Plan Group    [facility] BY NITHIN [id number]       Payor Plan Address Payor Plan Phone Number Payor Plan Fax Number Effective Dates    [address] [address]   2021 - None Entered    [facility] [phone]         Subscriber Name Subscriber Birth Date Member ID       NANCY SEPULVEDA 1965 [id number]                  "   Heart Failure [facility] Inpatient Care by Catrina Coyle RN       Indications Met: Reviewed on 9/17/2023 by Catrina Coyle RN       Created Using Review Status Review Entered   [facility] for Case Management Primary Completed 9/17/2023 1202       Created By   Catrina Coyle RN       Criteria Set Name - Subset   Heart Failure [facility] Inpatient Care      Criteria Review   Heart Failure [facility] Inpatient Care     Overall Determination: Indications Met     Criteria:  [×] Admission is indicated for  1 or more  of the following :      [×] Progressively (ongoing) rising creatinine with reduction of more than 25% in estimated glomerular filtration rate from baseline [B]          9/17/2023 12:02 PM              -- 9/17/2023 12:02 PM by Catrina Coyle RN --                  Creatinine 1.32. (Last creat 1.21. Baseline 1.05-1.19). GFR 62.5. Baseline is 79-88.3.     Notes:  -- 9/17/2023 12:02 PM by Catrina Coyle RN --      To ED w/o worsening SOA. + BLE edema. + CORTEZ, +PND; +Orthopnea, + cough. Intermittent CP x 3days. Ran out of his meds 3 days ago but reports had been compliant up to that point. On arrival, /124-154/111. On exam: + wheezing & rales. Trace BLE edema. Sats 96% on RA but placed on O2.             PMhx: HFrEF (20% EF); LV thrombus; Afib; meth abuse; COPD; CAD; DM; HLD; HTN; HLD; Bipolar d/o. On Coumadin. Hx cardiac tamponade.             ED results: Creatinine 1.32; GFR 62.5; ALT 66; AST 43; ; BNP 8,668.0; Trop 15; K+ 3.7;Mg+ 1.8.            CXR: Bilateral infiltrates. Multifocal PNA vs Pulmonary Edema. EKG: ) with LVH.             In ED: O2 @ 2L; Bumex 2mgIV; Norco PO for chest wall pain.             Admit: Telemetry. Plan for cath today. Cardiology consulted. O2 prn; Duonebs qid; albuterol nebs prn; MgSO4 IVPBx1 dose. Prednisone PO qd; KCL 10 IV q1h x2 doses. UDS. COVID swab.             Plan for R & L Heart Cath today by Interventional Cardiology.                   History & Physical        Jessy,  Shubham Grant MD at 23 0740           [facility] HISTORY AND PHYSICAL  Date: 2023   Patient Name: Regulo Sepulveda  : 1965  MRN: [id number]  Primary Care Physician:  Grace Cruz APRN  Date of admission: 2023    Subjective   Subjective     Chief Complaint:   Chief Complaint   Patient presents with   • Shortness of Breath         HPI:    Regulo Sepulveda is a 58 y.o. male  with significant hx of heart failure with reduced fraction, LV thrombus, A-fib, COPD, hypertension, hyperlipidemia bipolar disorder presented to ER with complaints of shortness of breath.  Reports its been ongoing, he has not felt well in the past week.  He ran out of his medication 3 days ago.  Reports he cannot walk far without getting short of breath.  He sleeps laying up, has episodes of PND every day.  Also endorses some chest pain, nonradiating associated with cough and.  Denies any fever but endorses chills.  Reports cough but no production of sputum. Reports he has not really smoked since being discharged and has not used any drugs either. Lives with a roommate.     In the ER, he is afebrile at 97.4, heart rate between , respiratory rate 20-26, blood pressure 150/113, saturating 94 to 100% sporadically on 2 L nasal cannula. Lab work was remarkable for WBC count of 10.4, hemoglobin of 13.5, platelet count of 362 high-sensitivity opponent of 15, proBNP of 8000 668, sodium 148, potassium 3.8, bicarb 24, BUN 18, creatinine 1.32, glucose 101, AST 43, ALT 66, T. bili of 0.4, anion gap of 11.  INR is 1.1.  EKG shows normal sinus rhythm with LVH repolarization changes.  In the ER received Bumex 2 mg and made more than 1.5 L of urine per tech. Xr chest concerning for multifocal pneumonia vs pulm edema.    Prior hospitalization 2 weeks ago, discharged in 2023 underwent an echocardiogram which revealed an EF of 20% with apical layering thrombus.  He was discharged on warfarin and  Lovenox bridge.  Seen by psychiatry    Personal History     Past Medical History:  Past Medical History:   Diagnosis Date   • Anxiety    • Asthma    • Bipolar affective    • Cardiac tamponade     2023   • CHF (congestive heart failure)    • COPD (chronic obstructive pulmonary disease)    • Coronary artery disease    • Depression    • Diabetes mellitus    • Elevated cholesterol    • Hypertension        Past Surgical History:  Past Surgical History:   Procedure Laterality Date   • TESTICLE SURGERY Left     extraction       Family History:   Family History   Problem Relation Age of Onset   • Diabetes Mother    • Depression Sister    • Depression Brother        Social History:   Social History     Socioeconomic History   • Marital status: Single   Tobacco Use   • Smoking status: Every Day     Packs/day: 1.00     Years: 50.00     Pack years: 50.00     Types: Cigarettes   • Smokeless tobacco: Never   Vaping Use   • Vaping Use: Every day   • Substances: Nicotine   Substance and Sexual Activity   • Alcohol use: Not Currently   • Drug use: Not Currently     Types: Methamphetamines     Comment: LAST USED 6 months ago   • Sexual activity: Defer       Home Medications:  ARIPiprazole ER, albuterol sulfate HFA, atorvastatin, bumetanide, busPIRone, carvedilol, cyanocobalamin, dapagliflozin, losartan, metFORMIN, nicotine, pantoprazole, spironolactone, tiotropium bromide monohydrate, venlafaxine XR, and warfarin    Allergies:  No Known Allergies    Review of Systems   All systems were reviewed and negative except for: Chest pain, shortness of breath,  Objective   Objective     Vitals:   Temp:  [97.4 °F (36.3 °C)-97.8 °F (36.6 °C)] 97.8 °F (36.6 °C)  Heart Rate:  [] 88  Resp:  [18-26] 18  BP: (134-164)/() 154/111  Flow (L/min):  [2] 2    Physical Exam    Constitutional: Awake, alert, follows commands.  Unable to lay flat due to significant shortness of breath. Temporal wasting, cachectic appearing.    Eyes: Pupils equal,  sclerae anicteric,    HENT: NCAT, mucous membranes moist   Neck: Supple, JVP about 10 cm.   Respiratory: Bilateral basilar crackles.,  Mild end expiratory wheezing.   Cardiovascular: RRR, holosystolic murmur best heard at apex.  Trace edema bilaterally.  JVP 10 cm.  Warm to touch.   Gastrointestinal: Positive bowel sounds, soft, nontender, nondistended   Musculoskeletal: Trace bilateral ankle edema, no clubbing or cyanosis to extremities   Psychiatric: Appropriate affect, cooperative   Neurologic: Oriented x 3, speech clear, able to follow all commands. Skin: No rashes     Result Review    Result Review:  I have personally reviewed the results from the time of this admission to 9/17/2023 08:31 EDT and agree with these findings:  [x]  Laboratory  []  Microbiology  [x]  Radiology  [x]  EKG/Telemetry   [x]  Cardiology/Vascular   []  Pathology  [x]  Old records  []  Other:      Assessment & Plan   Assessment / Plan   Assessment/Plan:     58-year-old male with recent diagnosis of heart failure, LV thrombus and A-fib presents to ER with complaints of shortness of breath.  Hemodynamically stable, required 2 L on NC sporadically.     Impression    Shortness of breath  Acute on chronic congestive heart failure exacerbation  TTE from last admission LVEF 20%, & LV thrombus.  Has responded well to Bumex 2 mg IV once.  Cardiology consulted, was going to get ischemic eval outpatient but re-presented due to worsening symptoms.  Possible plan for left and right heart cath today.  Repeat labs including  BMP mag phos and lactic acid   May be low output state, does not appear to have significant volume overload on exam.   Will resume Losartan today, followed by addition of GDMT as tolerated. Trend renal function.   we will hold off on beta-blocker for now.   Daily weights, strict I/os      LV thrombus  A-fib  Tele monitoring   - on Anticoagulation with lovenox and warfain  - will resume post procedure   - keep K >4, mg>2    Doubt  pneumonia, has no fever/leukocytosis.   Check pro simon, blood culture  COVID, Flu panel   Hold off on initiating antibiotics     COPD   Prednisone 40 mg daily, DuoNebs, albuterol as needed.  Bronchopulmonary hygiene.  Nasal cannula to keep SPO2 greater than 88%    Mild transaminitis   Likely related to congestive hepatopathy   Trend hepatic function panel    Schizophrenia/ Bipolar Dx  Resume home medications once reconciled.     Polysubstance abuse   Check urine drug screen, ethanol level  Nicotine patch       Diet Npo for now given planned procedure.     DVT prophylaxis:  Lovenox full dose for LV thrombus, and history of A-fib.    CODE STATUS:    Level Of Support Discussed With: Patient  Code Status (Patient has no pulse and is not breathing): CPR (Attempt to Resuscitate)  Medical Interventions (Patient has pulse or is breathing): Full Support      Admission Status:  I believe this patient meets Inpatient status.    Electronically signed by Shubham Jiménez MD, 09/17/23, 7:40 AM EDT.              Electronically signed by Shubham Jiménez MD at 09/17/23 1004          Emergency Department Notes        Dave Calzada MD at 09/16/23 0924          Time: 9:54 PM EDT  Date of encounter:  9/16/2023  Independent Historian/Clinical History and Information was obtained by:   Patient    History is limited by: N/A    Chief Complaint   Patient presents with   • Shortness of Breath         History of Present Illness:  Patient is a 58 y.o. year old male who presents to the emergency department for evaluation of SOB.  Patient states he feels weak.    Patient states he has increasing dyspnea on exertion after his discharge from the hospital last week.  He states he felt better a week ago than he does today.  He has a nonproductive cough.  He is unable to lay flat on his back.  He states he has been compliant with his medication.  He has discomfort across his anterior chest with cough.  He denies pedal edema.  He denies  fever or chills.    HPI    Patient Care Team  Primary Care Provider: Grace Cruz APRN    Past Medical History:     No Known Allergies  Past Medical History:   Diagnosis Date   • Anxiety    • Asthma    • Bipolar affective    • Cardiac tamponade     2023   • CHF (congestive heart failure)    • COPD (chronic obstructive pulmonary disease)    • Coronary artery disease    • Depression    • Diabetes mellitus    • Elevated cholesterol    • Hypertension      Past Surgical History:   Procedure Laterality Date   • TESTICLE SURGERY Left     extraction     Family History   Problem Relation Age of Onset   • Diabetes Mother    • Depression Sister    • Depression Brother        Home Medications:  Prior to Admission medications    Medication Sig Start Date End Date Taking? Authorizing Provider   Abilify Maintena 300 MG Suspension Reconstituted ER IM injection ER Inject 300 mg into the appropriate muscle as directed by prescriber Every 30 (Thirty) Days. 7/10/23   Yuliya Blanchard MD   albuterol sulfate  (90 Base) MCG/ACT inhaler Inhale 2 puffs Every 4 (Four) Hours As Needed for Wheezing or Shortness of Air. Indications: Chronic Obstructive Lung Disease 3/3/23   Rogers Lipsocmb MD   atorvastatin (LIPITOR) 40 MG tablet Take 1 tablet by mouth Every Night. Indications: High Amount of Fats in the Blood 3/3/23   Felton Garduno MD   bumetanide (BUMEX) 0.5 MG tablet Take 2 tablets by mouth Daily.    ProviderYuliya MD   busPIRone (BUSPAR) 5 MG tablet Take 1 tablet by mouth Every Night.    Yuliya Blanchard MD   carvedilol (COREG) 25 MG tablet Take 1 tablet by mouth 2 (Two) Times a Day With Meals.    Yuliya Blanchard MD   Farxiga 5 MG tablet tablet Take 1 tablet by mouth Daily. Indications: Type 2 Diabetes 3/3/23   Felton Garduno MD   losartan (COZAAR) 50 MG tablet Take 1 tablet by mouth 2 (Two) Times a Day. 6/21/23   Yuliya Blanchard MD   metFORMIN (GLUCOPHAGE) 1000 MG tablet Take 0.5 tablets by mouth 2 (Two)  Times a Day With Meals. Indications: Type 2 Diabetes  Patient taking differently: Take 1 tablet by mouth 2 (Two) Times a Day With Meals. Indications: Type 2 Diabetes 3/3/23   Felton Garduno MD   nicotine (NICODERM CQ) 21 MG/24HR patch Place 1 patch on the skin as directed by provider Daily for 14 days. 9/3/23 9/17/23  Alex Angulo MD   pantoprazole (PROTONIX) 40 MG EC tablet Take 1 tablet by mouth Daily. Indications: Gastroesophageal Reflux Disease 3/3/23   Felton Garduno MD   spironolactone (ALDACTONE) 25 MG tablet Take 1 tablet by mouth Daily.    ProviderYuliya MD   tiotropium bromide monohydrate (SPIRIVA RESPIMAT) 2.5 MCG/ACT aerosol solution inhaler Inhale 2 puffs Daily. Indications: Chronic Obstructive Lung Disease 3/3/23   Rogers Lipscomb MD   venlafaxine XR (EFFEXOR-XR) 37.5 MG 24 hr capsule Take 1 capsule by mouth Daily. 6/21/23   ProviderYuliya MD   vitamin B-12 (VITAMIN B-12) 500 MCG tablet Take 1 tablet by mouth Daily. Indications: Inadequate Vitamin B12 3/4/23   Felton Garduno MD   warfarin (Coumadin) 7.5 MG tablet Continue Lovenox injection and Coumadin until INR is 2 and then stop Lovenox injection. 9/2/23   Alex Angulo MD        Social History:   Social History     Tobacco Use   • Smoking status: Every Day     Packs/day: 1.00     Years: 50.00     Pack years: 50.00     Types: Cigarettes   • Smokeless tobacco: Never   Vaping Use   • Vaping Use: Every day   • Substances: Nicotine   Substance Use Topics   • Alcohol use: Not Currently   • Drug use: Not Currently     Types: Methamphetamines     Comment: LAST USED 6 months ago         Review of Systems:  Review of Systems   Constitutional:  Negative for chills and fever.   HENT:  Negative for congestion, ear pain and sore throat.    Eyes:  Negative for pain.   Respiratory:  Positive for cough and shortness of breath. Negative for chest tightness.    Cardiovascular:  Negative for chest pain.   Gastrointestinal:  Negative for abdominal pain,  "diarrhea, nausea and vomiting.   Genitourinary:  Negative for flank pain and hematuria.   Musculoskeletal:  Negative for joint swelling.   Skin:  Negative for pallor.   Neurological:  Negative for seizures and headaches.   All other systems reviewed and are negative.     Physical Exam:  /96   Pulse 97   Temp 97.8 °F (36.6 °C) (Oral)   Resp 22   Ht 185.4 cm (73\")   Wt 71 kg (156 lb 8.4 oz)   SpO2 98%   BMI 20.65 kg/m²         Physical Exam  Vitals and nursing note reviewed.   Constitutional:       General: He is not in acute distress.     Appearance: Normal appearance. He is not toxic-appearing.   HENT:      Head: Normocephalic and atraumatic.      Jaw: There is normal jaw occlusion.   Eyes:      General: Lids are normal.      Extraocular Movements: Extraocular movements intact.      Conjunctiva/sclera: Conjunctivae normal.      Pupils: Pupils are equal, round, and reactive to light.   Cardiovascular:      Rate and Rhythm: Normal rate and regular rhythm.      Pulses: Normal pulses.      Heart sounds: Normal heart sounds.   Pulmonary:      Effort: Pulmonary effort is normal. No respiratory distress.      Breath sounds: Rhonchi present. No wheezing.   Abdominal:      General: Abdomen is flat. There is no distension.      Palpations: Abdomen is soft.      Tenderness: There is no abdominal tenderness. There is no guarding or rebound.   Musculoskeletal:         General: Normal range of motion.      Cervical back: Normal range of motion and neck supple.      Right lower leg: No edema.      Left lower leg: No edema.   Skin:     General: Skin is warm and dry.      Coloration: Skin is not cyanotic.   Neurological:      Mental Status: He is alert and oriented to person, place, and time. Mental status is at baseline.   Psychiatric:         Attention and Perception: Attention and perception normal.         Mood and Affect: Mood normal.              Procedures:  Procedures      Medical Decision " Making:      Comorbidities that affect care:    Hypertension, diabetes, cardiac tamponade, left ventricular thrombus, COPD, CHF, asthma, coronary artery disease    External Notes reviewed:    Hospital Discharge Summary: Discharge summary from hospitalization September 2, 2023 for left ventricular thrombus      The following orders were placed and all results were independently analyzed by me:  Orders Placed This Encounter   Procedures   • XR Chest 1 View   • [place] Draw   • Comprehensive Metabolic Panel   • BNP   • Single High Sensitivity Troponin T   • CBC Auto Differential   • Protime-INR   • Blood Gas, Arterial -With Co-Ox Panel: Yes   • Single High Sensitivity Troponin T   • Lactic Acid, Plasma   • Basic Metabolic Panel   • Magnesium   • Phosphorus   • NPO Diet NPO Type: Strict NPO   • Undress & Gown   • Continuous Pulse Oximetry   • Vital Signs   • Hospitalist (on-call MD unless specified)   • Oxygen Therapy- Nasal Cannula; Titrate 1-6 LPM Per SpO2; 90 - 95%   • ECG 12 Lead ED Triage Standing Order; SOA   • Insert Peripheral IV   • Insert Peripheral IV   • CBC & Differential   • Green Top (Gel)   • Lavender Top   • Gold Top - SST   • Light Blue Top       Medications Given in the Emergency Department:  Medications   sodium chloride 0.9 % flush 10 mL (has no administration in time range)   sodium chloride 0.9 % flush 10 mL (has no administration in time range)   bumetanide (BUMEX) injection 2 mg (2 mg Intravenous Given 9/17/23 0424)   HYDROcodone-acetaminophen (NORCO) 7.5-325 MG per tablet 1 tablet (1 tablet Oral Given 9/17/23 0422)        ED Course:    The patient was initially evaluated in the triage area where orders were placed. The patient was later dispositioned by Dave Calzada MD.      The patient was advised to stay for completion of workup which includes but is not limited to communication of labs and radiological results, reassessment and plan. The patient was advised that leaving prior to disposition  by a provider could result in critical findings that are not communicated to the patient.     ED Course as of 09/17/23 0755   Sat Sep 16, 2023   2155 --- PROVIDER IN TRIAGE NOTE ---    The patient was evaluated by Cori alonzo in triage. Orders were placed and the patient is currently awaiting disposition.    [AJ]   2330 MPV: 9.4 [AJ]   Sun Sep 17, 2023   0401 , Interpretation of EKG: Sinus rhythm 92, anterolateral T wave inversion, seen previously [JS]      ED Course User Index  [AJ] Cori Perez PA-C  [JS] Dave Calzada MD       Labs:    Lab Results (last 24 hours)       Procedure Component Value Units Date/Time    CBC & Differential [[id number]]  (Abnormal) Collected: 09/16/23 2159    Specimen: Blood from Arm, Right Updated: 09/16/23 2212    Narrative:      The following orders were created for panel order CBC & Differential.  Procedure                               Abnormality         Status                     ---------                               -----------         ------                     CBC Auto Differential[[id number]]        Abnormal            Final result                 Please view results for these tests on the individual orders.    Comprehensive Metabolic Panel [[id number]]  (Abnormal) Collected: 09/16/23 2159    Specimen: Blood from Arm, Right Updated: 09/16/23 2232     Glucose 101 mg/dL      BUN 18 mg/dL      Creatinine 1.32 mg/dL      Sodium 140 mmol/L      Potassium 3.8 mmol/L      Chloride 105 mmol/L      CO2 24.0 mmol/L      Calcium 9.1 mg/dL      Total Protein 6.8 g/dL      Albumin 4.0 g/dL      ALT (SGPT) 66 U/L      AST (SGOT) 43 U/L      Alkaline Phosphatase 175 U/L      Total Bilirubin 0.4 mg/dL      Globulin 2.8 gm/dL      A/G Ratio 1.4 g/dL      BUN/Creatinine Ratio 13.6     Anion Gap 11.0 mmol/L      eGFR 62.5 mL/min/1.73     Narrative:      GFR Normal >60  Chronic Kidney Disease <60  Kidney Failure <15      BNP [[id number]]  (Abnormal) Collected: 09/16/23 2159     Specimen: Blood from Arm, Right Updated: 09/16/23 2228     proBNP 8,668.0 pg/mL     Narrative:      Among patients with dyspnea, NT-proBNP is highly sensitive for the detection of acute congestive heart failure. In addition NT-proBNP of <300 pg/ml effectively rules out acute congestive heart failure with 99% negative predictive value.      Single High Sensitivity Troponin T [[id number]]  (Abnormal) Collected: 09/16/23 2159    Specimen: Blood from Arm, Right Updated: 09/16/23 2232     HS Troponin T 15 ng/L     Narrative:      High Sensitive Troponin T Reference Range:  <10.0 ng/L- Negative Female for AMI  <15.0 ng/L- Negative Male for AMI  >=10 - Abnormal Female indicating possible myocardial injury.  >=15 - Abnormal Male indicating possible myocardial injury.   Clinicians would have to utilize clinical acumen, EKG, Troponin, and serial changes to determine if it is an Acute Myocardial Infarction or myocardial injury due to an underlying chronic condition.         CBC Auto Differential [[id number]]  (Abnormal) Collected: 09/16/23 2159    Specimen: Blood from Arm, Right Updated: 09/16/23 2212     WBC 10.47 10*3/mm3      RBC 4.53 10*6/mm3      Hemoglobin 13.5 g/dL      Hematocrit 40.6 %      MCV 89.6 fL      MCH 29.8 pg      MCHC 33.3 g/dL      RDW 13.9 %      RDW-SD 45.5 fl      MPV 9.4 fL      Platelets 362 10*3/mm3      Neutrophil % 66.0 %      Lymphocyte % 22.1 %      Monocyte % 9.5 %      Eosinophil % 1.7 %      Basophil % 0.5 %      Immature Grans % 0.2 %      Neutrophils, Absolute 6.92 10*3/mm3      Lymphocytes, Absolute 2.31 10*3/mm3      Monocytes, Absolute 0.99 10*3/mm3      Eosinophils, Absolute 0.18 10*3/mm3      Basophils, Absolute 0.05 10*3/mm3      Immature Grans, Absolute 0.02 10*3/mm3      nRBC 0.0 /100 WBC     Protime-INR [[id number]]  (Normal) Collected: 09/16/23 2159    Specimen: Blood from Arm, Right Updated: 09/17/23 0415     Protime 14.4 Seconds      INR 1.11    Narrative:      Suggested Therapeutic  Ranges For Oral Anticoagulant Therapy:  Level of Therapy                      INR Target Range  Standard Dose                            2.0-3.0  High Dose                                2.5-3.5  Patients not receiving anticoagulant  Therapy Normal Range                     0.86-1.15             Imaging:    XR Chest 1 View    Result Date: 9/16/2023  PROCEDURE: XR CHEST 1 VW  COMPARISON: 8/27/2023.  INDICATIONS: SOA/SOB/shortness of air/shortness of breath.  FINDINGS: A single PA upright portable view of the chest is provided for review.  Bilateral infiltrates are seen.  The findings may represent infectious multifocal pneumonia.  Mild cardiac enlargement is suggested.  No pneumothorax.  No pneumomediastinum.  No pleural effusion.  There is slight pulmonary hypoinflation.  Degenerative changes involve the bilateral shoulders and the spine.       Bilateral infiltrates are seen.  The findings may represent infectious multifocal pneumonia.  Pulmonary edema is possible.      Please note that portions of this note were completed with a voice recognition program.  MAHOGANY ELIZONDO JR, MD       Electronically Signed and Approved By: MAHOGANY ELIZONDO JR, MD on 9/16/2023 at 23:07                 Differential Diagnosis and Discussion:      Dyspnea: Differential diagnosis includes but is not limited to metabolic acidosis, neurological disorders, psychogenic, asthma, pneumothorax, upper airway obstruction, COPD, pneumonia, noncardiogenic pulmonary edema, interstitial lung disease, anemia, congestive heart failure, and pulmonary embolism    All labs were reviewed and interpreted by me.  All X-rays impressions were independently interpreted by me.  EKG was interpreted by me.    MDM     Amount and/or Complexity of Data Reviewed  Decide to obtain previous medical records or to obtain history from someone other than the patient: yes             Patient Care Considerations:    I considered discharge home but the patient is unable to  ambulate without significant dyspnea and increased work of breathing even after IV diuresis in the emergency department.      Consultants/Shared Management Plan:    Hospitalist: I have discussed the case with the hospitalist who agrees to accept the patient for admission.    Social Determinants of Health:    Patient is unable to carry out activities of daily life. Escalation of care is necessary.       Disposition and Care Coordination:    Admit:   Through independent evaluation of the patient's history, physical, and imperical data, the patient meets criteria for observation/admission to the hospital.        Final diagnoses:   Acute congestive heart failure, unspecified heart failure type   Acute pulmonary edema   Dyspnea on exertion        ED Disposition       ED Disposition   Decision to Admit    Condition   --    Comment   --               This medical record created using voice recognition software.             Dave Calzada MD  09/17/23 0755      Electronically signed by Dave Calzada MD at 09/17/23 0755          Consult Notes (last 24 hours)        Ben Grant MD at 09/17/23 0838        Consult Orders    1. Inpatient Cardiology Consult [[id number]] ordered by Shubham Jiménez MD at 09/17/23 0805                   Referring Provider: Dave Calzada MD    Reason for Consultation: HFrEF exacerbation, LV thrombus, atrial fibrillation      Patient Care Team:  Grace Cruz APRN as PCP - General (Nurse Practitioner)  Kalia Corbin MD as Consulting Physician (Urology)      SUBJECTIVE     Chief Complaint: Shortness of breath    History of present illness:  Regulo Sepulveda is a 58 y.o. male with recently diagnosed HFrEF, LV thrombus, atrial fibrillation, methamphetamine abuse, hypertension, hyperlipidemia, bipolar disorder who presented to the hospital with shortness of breath.  He ran out of his medications 3 days ago.  He has started to notice worsening shortness of breath to the point that he is  unable to lay flat and is also noted leg edema.  He was noted to be in volume overload, hypertensive and tachycardic.Labs were remarkable for proBNP of 8668, creatinine 1.32, minimally elevated troponin of 15.  Cardiology has been consulted for further evaluation and management given advanced heart failure.      Review of systems:    Constitutional: No weakness, fatigue, fever, rigors, chills   Eyes: No vision changes, eye pain   ENT/oropharynx: No difficulty swallowing, sore throat, epistaxis, changes in hearing   Cardiovascular: No chest pain, chest tightness, palpitations, paroxysmal nocturnal dyspnea, orthopnea, diaphoresis, dizziness / syncopal episode   Respiratory: + shortness of breath, dyspnea on exertion, cough, wheezing, hemoptysis   Gastrointestinal: No abdominal pain, nausea, vomiting, diarrhea, bloody stools   Genitourinary: No hematuria, dysuria   Neurological: No headache, tremors, numbness, one-sided weakness    Musculoskeletal: No cramps, myalgias, joint pain, joint swelling   Integument: No rash, edema        Personal History:      Past Medical History:   Diagnosis Date   • Anxiety    • Asthma    • Bipolar affective    • Cardiac tamponade     2023   • CHF (congestive heart failure)    • COPD (chronic obstructive pulmonary disease)    • Coronary artery disease    • Depression    • Diabetes mellitus    • Elevated cholesterol    • Hypertension        Past Surgical History:   Procedure Laterality Date   • TESTICLE SURGERY Left     extraction       Family History   Problem Relation Age of Onset   • Diabetes Mother    • Depression Sister    • Depression Brother        Social History     Tobacco Use   • Smoking status: Every Day     Packs/day: 1.00     Years: 50.00     Pack years: 50.00     Types: Cigarettes   • Smokeless tobacco: Never   Vaping Use   • Vaping Use: Every day   • Substances: Nicotine   Substance Use Topics   • Alcohol use: Not Currently   • Drug use: Not Currently     Types:  Methamphetamines     Comment: LAST USED 6 months ago        Home meds:  Prior to Admission medications    Medication Sig Start Date End Date Taking? Authorizing Provider   Abilify Maintena 300 MG Suspension Reconstituted ER IM injection ER Inject 300 mg into the appropriate muscle as directed by prescriber Every 30 (Thirty) Days. 7/10/23   Yuliya Blanchard MD   albuterol sulfate  (90 Base) MCG/ACT inhaler Inhale 2 puffs Every 4 (Four) Hours As Needed for Wheezing or Shortness of Air. Indications: Chronic Obstructive Lung Disease 3/3/23   Rogers Lipscomb MD   atorvastatin (LIPITOR) 40 MG tablet Take 1 tablet by mouth Every Night. Indications: High Amount of Fats in the Blood 3/3/23   Felton Garduno MD   bumetanide (BUMEX) 0.5 MG tablet Take 2 tablets by mouth Daily.    Yuliya Blanchard MD   busPIRone (BUSPAR) 5 MG tablet Take 1 tablet by mouth Every Night.    Yuliya Blanchard MD   carvedilol (COREG) 25 MG tablet Take 1 tablet by mouth 2 (Two) Times a Day With Meals.    Yuliya Blanchard MD   Farxiga 5 MG tablet tablet Take 1 tablet by mouth Daily. Indications: Type 2 Diabetes 3/3/23   Felton Garduno MD   losartan (COZAAR) 50 MG tablet Take 1 tablet by mouth 2 (Two) Times a Day. 6/21/23   Yuliya Blanchard MD   metFORMIN (GLUCOPHAGE) 1000 MG tablet Take 0.5 tablets by mouth 2 (Two) Times a Day With Meals. Indications: Type 2 Diabetes  Patient taking differently: Take 1 tablet by mouth 2 (Two) Times a Day With Meals. Indications: Type 2 Diabetes 3/3/23   Felton Garduno MD   nicotine (NICODERM CQ) 21 MG/24HR patch Place 1 patch on the skin as directed by provider Daily for 14 days. 9/3/23 9/17/23  Alex Angulo MD   pantoprazole (PROTONIX) 40 MG EC tablet Take 1 tablet by mouth Daily. Indications: Gastroesophageal Reflux Disease 3/3/23   Felton Garduno MD   spironolactone (ALDACTONE) 25 MG tablet Take 1 tablet by mouth Daily.    Yuliya Blanchard MD   tiotropium bromide monohydrate (SPIRIVA  "RESPIMAT) 2.5 MCG/ACT aerosol solution inhaler Inhale 2 puffs Daily. Indications: Chronic Obstructive Lung Disease 3/3/23   Rogers Lipscomb MD   venlafaxine XR (EFFEXOR-XR) 37.5 MG 24 hr capsule Take 1 capsule by mouth Daily. 6/21/23   ProviderYuliya MD   vitamin B-12 (VITAMIN B-12) 500 MCG tablet Take 1 tablet by mouth Daily. Indications: Inadequate Vitamin B12 3/4/23   Felton Garduno MD   warfarin (Coumadin) 7.5 MG tablet Continue Lovenox injection and Coumadin until INR is 2 and then stop Lovenox injection. 9/2/23   Alex Angulo MD       Allergies:     Patient has no known allergies.    Scheduled Meds:   Continuous Infusions:   PRN Meds:•  sodium chloride  •  [COMPLETED] Insert Peripheral IV **AND** sodium chloride      OBJECTIVE    Vital Signs  Vitals:    09/17/23 0645 09/17/23 0745 09/17/23 0815 09/17/23 0830   BP: 146/96 (!) 147/109 (!) 154/111 143/91   BP Location:       Patient Position:       Pulse: 97 76 88 90   Resp:  18 18 18   Temp:       TempSrc:       SpO2: 98% 99% 99% 95%   Weight:       Height:           Flowsheet Rows      Flowsheet Row First Filed Value   Admission Height 185.4 cm (73\") Documented at 09/17/2023 0345   Admission Weight 71 kg (156 lb 8.4 oz) Documented at 09/17/2023 0345            No intake or output data in the 24 hours ending 09/17/23 0839     Telemetry: Sinus rhythm    Physical Exam:  The patient is alert, oriented and in no distress.  Vital signs as noted above.  Head and neck revealed no carotid bruits or jugular venous distention.  No thyromegaly or lymphadenopathy is present  Lungs clear.  No wheezing.  Breath sounds are normal bilaterally.  Heart: Normal first and second heart sounds. No murmur.  No precordial rub is present.  No gallop is present.  Abdomen: Soft and nontender.  No organomegaly is present.  Extremities with good peripheral pulses without any pedal edema.  Skin: Warm and dry.  Musculoskeletal system is grossly normal.  CNS grossly normal. "       Results Review:  I have personally reviewed the results from the time of this admission to 9/17/2023 08:39 EDT and agree with these findings:  []  Laboratory  []  Microbiology  []  Radiology  []  EKG/Telemetry   []  Cardiology/Vascular   []  Pathology  []  Old records  []  Other:    Most notable findings include:     Lab Results (last 24 hours)       Procedure Component Value Units Date/Time    Lactic Acid, Plasma [[id number]] Collected: 09/17/23 0831    Specimen: Blood Updated: 09/17/23 0833    Single High Sensitivity Troponin T [[id number]] Collected: 09/17/23 0831    Specimen: Blood Updated: 09/17/23 0833    Basic Metabolic Panel [[id number]] Collected: 09/17/23 0831    Specimen: Blood Updated: 09/17/23 0833    Magnesium [[id number]] Collected: 09/17/23 0831    Specimen: Blood Updated: 09/17/23 0833    Phosphorus [[id number]] Collected: 09/17/23 0831    Specimen: Blood Updated: 09/17/23 0833    Blood Gas, Arterial -With Co-Ox Panel: Yes [[id number]]  (Abnormal) Collected: 09/17/23 0822    Specimen: Arterial Blood Updated: 09/17/23 0822     pH, Arterial 7.417 pH units      pCO2, Arterial 42.9 mm Hg      pO2, Arterial 131.4 mm Hg      HCO3, Arterial 27.0 mmol/L      Base Excess, Arterial 2.1 mmol/L      O2 Saturation, Arterial 98.4 %      Hemoglobin, Blood Gas 14.8 g/dL      Carboxyhemoglobin 1.7 %      Methemoglobin 0.20 %      Oxyhemoglobin 96.5 %      FHHB 1.6 %      Site Arterial: right radial     Modality Cannula     Flow Rate 3 lpm      Lactate, Arterial --    Protime-INR [[id number]]  (Normal) Collected: 09/16/23 2159    Specimen: Blood from Arm, Right Updated: 09/17/23 0415     Protime 14.4 Seconds      INR 1.11    Narrative:      Suggested Therapeutic Ranges For Oral Anticoagulant Therapy:  Level of Therapy                      INR Target Range  Standard Dose                            2.0-3.0  High Dose                                2.5-3.5  Patients not receiving anticoagulant  Therapy Normal Range                      0.86-1.15    Gary Draw [[id number]] Collected: 09/16/23 2159    Specimen: Blood from Arm, Right Updated: 09/16/23 2300    Narrative:      The following orders were created for panel order Gary Draw.  Procedure                               Abnormality         Status                     ---------                               -----------         ------                     Green Top (Gel)[[id number]]                                  Final result               Lavender Top[[id number]]                                     Final result               Gold Top - SST[[id number]]                                   Final result               Light Blue Top[[id number]]                                   Final result                 Please view results for these tests on the individual orders.    Green Top (Gel) [[id number]] Collected: 09/16/23 2159    Specimen: Blood from Arm, Right Updated: 09/16/23 2300     Extra Tube Hold for add-ons.     Comment: Auto resulted.       Gold Top - SST [[id number]] Collected: 09/16/23 2159    Specimen: Blood from Arm, Right Updated: 09/16/23 2300     Extra Tube Hold for add-ons.     Comment: Auto resulted.       Light Blue Top [[id number]] Collected: 09/16/23 2159    Specimen: Blood from Arm, Right Updated: 09/16/23 2300     Extra Tube Hold for add-ons.     Comment: Auto resulted       Lavender Top [[id number]] Collected: 09/16/23 2159    Specimen: Blood from Arm, Right Updated: 09/16/23 2300     Extra Tube hold for add-on     Comment: Auto resulted       Comprehensive Metabolic Panel [[id number]]  (Abnormal) Collected: 09/16/23 2159    Specimen: Blood from Arm, Right Updated: 09/16/23 2232     Glucose 101 mg/dL      BUN 18 mg/dL      Creatinine 1.32 mg/dL      Sodium 140 mmol/L      Potassium 3.8 mmol/L      Chloride 105 mmol/L      CO2 24.0 mmol/L      Calcium 9.1 mg/dL      Total Protein 6.8 g/dL      Albumin 4.0 g/dL      ALT (SGPT) 66 U/L      AST (SGOT) 43 U/L      Alkaline  Phosphatase 175 U/L      Total Bilirubin 0.4 mg/dL      Globulin 2.8 gm/dL      A/G Ratio 1.4 g/dL      BUN/Creatinine Ratio 13.6     Anion Gap 11.0 mmol/L      eGFR 62.5 mL/min/1.73     Narrative:      GFR Normal >60  Chronic Kidney Disease <60  Kidney Failure <15      Single High Sensitivity Troponin T [[id number]]  (Abnormal) Collected: 09/16/23 2159    Specimen: Blood from Arm, Right Updated: 09/16/23 2232     HS Troponin T 15 ng/L     Narrative:      High Sensitive Troponin T Reference Range:  <10.0 ng/L- Negative Female for AMI  <15.0 ng/L- Negative Male for AMI  >=10 - Abnormal Female indicating possible myocardial injury.  >=15 - Abnormal Male indicating possible myocardial injury.   Clinicians would have to utilize clinical acumen, EKG, Troponin, and serial changes to determine if it is an Acute Myocardial Infarction or myocardial injury due to an underlying chronic condition.         BNP [[id number]]  (Abnormal) Collected: 09/16/23 2159    Specimen: Blood from Arm, Right Updated: 09/16/23 2228     proBNP 8,668.0 pg/mL     Narrative:      Among patients with dyspnea, NT-proBNP is highly sensitive for the detection of acute congestive heart failure. In addition NT-proBNP of <300 pg/ml effectively rules out acute congestive heart failure with 99% negative predictive value.      CBC & Differential [[id number]]  (Abnormal) Collected: 09/16/23 2159    Specimen: Blood from Arm, Right Updated: 09/16/23 2212    Narrative:      The following orders were created for panel order CBC & Differential.  Procedure                               Abnormality         Status                     ---------                               -----------         ------                     CBC Auto Differential[[id number]]        Abnormal            Final result                 Please view results for these tests on the individual orders.    CBC Auto Differential [[id number]]  (Abnormal) Collected: 09/16/23 2159    Specimen: Blood from Arm,  Right Updated: 09/16/23 2212     WBC 10.47 10*3/mm3      RBC 4.53 10*6/mm3      Hemoglobin 13.5 g/dL      Hematocrit 40.6 %      MCV 89.6 fL      MCH 29.8 pg      MCHC 33.3 g/dL      RDW 13.9 %      RDW-SD 45.5 fl      MPV 9.4 fL      Platelets 362 10*3/mm3      Neutrophil % 66.0 %      Lymphocyte % 22.1 %      Monocyte % 9.5 %      Eosinophil % 1.7 %      Basophil % 0.5 %      Immature Grans % 0.2 %      Neutrophils, Absolute 6.92 10*3/mm3      Lymphocytes, Absolute 2.31 10*3/mm3      Monocytes, Absolute 0.99 10*3/mm3      Eosinophils, Absolute 0.18 10*3/mm3      Basophils, Absolute 0.05 10*3/mm3      Immature Grans, Absolute 0.02 10*3/mm3      nRBC 0.0 /100 WBC             Imaging Results (Last 24 Hours)       Procedure Component Value Units Date/Time    XR Chest 1 View [[id number]] Collected: 09/16/23 2307     Updated: 09/16/23 2310    Narrative:      PROCEDURE: XR CHEST 1 VW     COMPARISON: 8/27/2023.     INDICATIONS: SOA/SOB/shortness of air/shortness of breath.     FINDINGS: A single PA upright portable view of the chest is provided for review.  Bilateral   infiltrates are seen.  The findings may represent infectious multifocal pneumonia.  Mild cardiac   enlargement is suggested.  No pneumothorax.  No pneumomediastinum.  No pleural effusion.  There is   slight pulmonary hypoinflation.  Degenerative changes involve the bilateral shoulders and the   spine.       Impression:       Bilateral infiltrates are seen.  The findings may represent infectious multifocal   pneumonia.  Pulmonary edema is possible.                 Please note that portions of this note were completed with a voice recognition program.     MAHOGANY ELIZONDO JR, MD         Electronically Signed and Approved By: MAHOGANY ELIZONDO JR, MD on 9/16/2023 at 23:07                                LAB RESULTS (LAST 7 DAYS)    CBC  Results from last 7 days   Lab Units 09/16/23 2159   WBC 10*3/mm3 10.47   RBC 10*6/mm3 4.53   HEMOGLOBIN g/dL 13.5   HEMATOCRIT %  40.6   MCV fL 89.6   PLATELETS 10*3/mm3 362       BMP  Results from last 7 days   Lab Units 09/16/23 2159   SODIUM mmol/L 140   POTASSIUM mmol/L 3.8   CHLORIDE mmol/L 105   CO2 mmol/L 24.0   BUN mg/dL 18   CREATININE mg/dL 1.32*   GLUCOSE mg/dL 101*       CMP   Results from last 7 days   Lab Units 09/16/23 2159   SODIUM mmol/L 140   POTASSIUM mmol/L 3.8   CHLORIDE mmol/L 105   CO2 mmol/L 24.0   BUN mg/dL 18   CREATININE mg/dL 1.32*   GLUCOSE mg/dL 101*   ALBUMIN g/dL 4.0   BILIRUBIN mg/dL 0.4   ALK PHOS U/L 175*   AST (SGOT) U/L 43*   ALT (SGPT) U/L 66*       BNP        TROPONIN  Results from last 7 days   Lab Units 09/16/23 2159   HSTROP T ng/L 15*       CoAg  Results from last 7 days   Lab Units 09/16/23 2159   INR  1.11       Creatinine Clearance  Estimated Creatinine Clearance: 61.3 mL/min (A) (by C-G formula based on SCr of 1.32 mg/dL (H)).    ABG  Results from last 7 days   Lab Units 09/17/23  0822   PH, ARTERIAL pH units 7.417   PCO2, ARTERIAL mm Hg 42.9   PO2 ART mm Hg 131.4*   O2 SATURATION ART % 98.4   BASE EXCESS ART mmol/L 2.1*         Radiology  XR Chest 1 View    Result Date: 9/16/2023   Bilateral infiltrates are seen.  The findings may represent infectious multifocal pneumonia.  Pulmonary edema is possible.      Please note that portions of this note were completed with a voice recognition program.  MAHOGANY ELIZONDO JR, MD       Electronically Signed and Approved By: MAHOGANY ELIZONDO JR, MD on 9/16/2023 at 23:07                 EKG  I personally viewed and interpreted the patient's EKG/Telemetry data:  ECG 12 Lead Dyspnea   Preliminary Result   HEART RATE= 86  bpm   RR Interval= 696  ms   NH Interval= 195  ms   P Horizontal Axis=   deg   P Front Axis= 63  deg   QRSD Interval= 111  ms   QT Interval= 390  ms   QTcB= 467  ms   QRS Axis= -36  deg   T Wave Axis= 122  deg   - ABNORMAL ECG -   Sinus rhythm   Left atrial enlargement   LVH with secondary repolarization abnormality   Electronically Signed By:     Date and Time of Study: 2023-09-17 08:13:53      ECG 12 Lead ED Triage Standing Order; SOA   Preliminary Result   HEART RATE= 92  bpm   RR Interval= 656  ms   IL Interval= 179  ms   P Horizontal Axis= -3  deg   P Front Axis= 52  deg   QRSD Interval= 105  ms   QT Interval= 358  ms   QTcB= 442  ms   QRS Axis= -31  deg   T Wave Axis= 121  deg   - ABNORMAL ECG -   Sinus rhythm   Left atrial enlargement   LVH with secondary repolarization abnormality   Electronically Signed By:    Date and Time of Study: 2023-09-16 22:01:30            Echocardiogram:    Results for orders placed during the hospital encounter of 08/27/23    Adult Transthoracic Echo Complete w/ Color, Spectral and Contrast if necessary per protocol    Interpretation Summary  •  Left ventricular systolic function is severely decreased. Calculated left ventricular EF = 20%  •  Left ventricular diastolic function is consistent with (grade II w/high LAP) pseudonormalization.  •  There is a solid thrombus located in the left ventricular apex. The thrombus measures 2.1 cm in diameter and is fixed.  •  The left atrial cavity is mild to moderately dilated.  •  Estimated right ventricular systolic pressure from tricuspid regurgitation is mildly elevated (35-45 mmHg).  •  Global hypokinesis of the LV with severely reduced ejection fraction and 2.1cm fixed, apical LV thrombus seen with difinity.        Stress Test:        Cardiac Catheterization:  No results found for this or any previous visit.        Other:      ASSESSMENT & PLAN:    Principal Problem:    Acute decompensated heart failure    HFrEF  Previous echocardiogram showed EF of 20%, grade 2 diastolic dysfunction akinetic apex with LV thrombus.  Slowly resume and uptitrate his GDMT.  He has previously tolerated Coreg, losartan, Farxiga and Aldactone.  Start IV diuretics with Bumex or Lasix.  Closely monitor renal function creatinine is 1.32.  Monitor I's and O's and replace electrolytes as needed.  HFrEF  may be secondary to uncontrolled hypertension, meth use, coronary artery disease.  He will need ischemic work-up given akinetic apex.  I have discussed the risk and benefit of undergoing a right and left heart cath for further work-up of HFrEF.  Patient is agreeable.  Warfarin is held.    Atrial fibrillation  Paroxysmal atrial fibrillation.  Currently in sinus rhythm.  [id number] score is 4.  Continue full anticoagulation with heparin or warfarin.    LV thrombus  Known LV thrombus.  He will need anticoagulation for atrial fibrillation and LV thrombus.  I do not see an indication to repeat an echocardiogram at this time.    Hypertension  Initiate and uptitrate GDMT as mentioned above.    COPD/polysubstance abuse  Meth abuse, tobacco abuse.  Counseling regarding abstinence from substance abuse provided to the patient  Inhaled bronchodilators for COPD    Bipolar disorder  He has been on venlafaxine and buspirone.    Ben Grant MD  09/17/23  08:39 EDT                Electronically signed by Ben Grant MD at 09/17/23 3184

## 2023-09-18 LAB
ALBUMIN SERPL-MCNC: 3.8 G/DL (ref 3.5–5.2)
ALP SERPL-CCNC: 168 U/L (ref 39–117)
ALT SERPL W P-5'-P-CCNC: 49 U/L (ref 1–41)
ANION GAP SERPL CALCULATED.3IONS-SCNC: 11.5 MMOL/L (ref 5–15)
AST SERPL-CCNC: 24 U/L (ref 1–40)
BASOPHILS # BLD AUTO: 0.06 10*3/MM3 (ref 0–0.2)
BASOPHILS NFR BLD AUTO: 0.6 % (ref 0–1.5)
BILIRUB CONJ SERPL-MCNC: 0.2 MG/DL (ref 0–0.3)
BILIRUB INDIRECT SERPL-MCNC: 0.6 MG/DL
BILIRUB SERPL-MCNC: 0.8 MG/DL (ref 0–1.2)
BUN SERPL-MCNC: 17 MG/DL (ref 6–20)
BUN/CREAT SERPL: 14.5 (ref 7–25)
CALCIUM SPEC-SCNC: 9 MG/DL (ref 8.6–10.5)
CHLORIDE SERPL-SCNC: 100 MMOL/L (ref 98–107)
CO2 SERPL-SCNC: 28.5 MMOL/L (ref 22–29)
CREAT SERPL-MCNC: 1.17 MG/DL (ref 0.76–1.27)
DEPRECATED RDW RBC AUTO: 43.6 FL (ref 37–54)
EGFRCR SERPLBLD CKD-EPI 2021: 72.3 ML/MIN/1.73
EOSINOPHIL # BLD AUTO: 0.14 10*3/MM3 (ref 0–0.4)
EOSINOPHIL NFR BLD AUTO: 1.4 % (ref 0.3–6.2)
ERYTHROCYTE [DISTWIDTH] IN BLOOD BY AUTOMATED COUNT: 13.4 % (ref 12.3–15.4)
GLUCOSE BLDC GLUCOMTR-MCNC: 112 MG/DL (ref 70–99)
GLUCOSE BLDC GLUCOMTR-MCNC: 174 MG/DL (ref 70–99)
GLUCOSE BLDC GLUCOMTR-MCNC: 219 MG/DL (ref 70–99)
GLUCOSE BLDC GLUCOMTR-MCNC: 236 MG/DL (ref 70–99)
GLUCOSE SERPL-MCNC: 102 MG/DL (ref 65–99)
HCT VFR BLD AUTO: 45.2 % (ref 37.5–51)
HGB BLD-MCNC: 14.6 G/DL (ref 13–17.7)
IMM GRANULOCYTES # BLD AUTO: 0.03 10*3/MM3 (ref 0–0.05)
IMM GRANULOCYTES NFR BLD AUTO: 0.3 % (ref 0–0.5)
INR PPP: 1.24 (ref 0.86–1.15)
LYMPHOCYTES # BLD AUTO: 2.08 10*3/MM3 (ref 0.7–3.1)
LYMPHOCYTES NFR BLD AUTO: 20.4 % (ref 19.6–45.3)
MAGNESIUM SERPL-MCNC: 2.1 MG/DL (ref 1.6–2.6)
MCH RBC QN AUTO: 29 PG (ref 26.6–33)
MCHC RBC AUTO-ENTMCNC: 32.3 G/DL (ref 31.5–35.7)
MCV RBC AUTO: 89.9 FL (ref 79–97)
MONOCYTES # BLD AUTO: 1.05 10*3/MM3 (ref 0.1–0.9)
MONOCYTES NFR BLD AUTO: 10.3 % (ref 5–12)
NEUTROPHILS NFR BLD AUTO: 6.84 10*3/MM3 (ref 1.7–7)
NEUTROPHILS NFR BLD AUTO: 67 % (ref 42.7–76)
NRBC BLD AUTO-RTO: 0 /100 WBC (ref 0–0.2)
PHOSPHATE SERPL-MCNC: 2.9 MG/DL (ref 2.5–4.5)
PLATELET # BLD AUTO: 337 10*3/MM3 (ref 140–450)
PMV BLD AUTO: 10 FL (ref 6–12)
POTASSIUM SERPL-SCNC: 3.9 MMOL/L (ref 3.5–5.2)
PROT SERPL-MCNC: 6.7 G/DL (ref 6–8.5)
PROTHROMBIN TIME: 15.6 SECONDS (ref 11.8–14.9)
RBC # BLD AUTO: 5.03 10*6/MM3 (ref 4.14–5.8)
SODIUM SERPL-SCNC: 140 MMOL/L (ref 136–145)
WBC NRBC COR # BLD: 10.2 10*3/MM3 (ref 3.4–10.8)

## 2023-09-18 PROCEDURE — 84100 ASSAY OF PHOSPHORUS: CPT | Performed by: INTERNAL MEDICINE

## 2023-09-18 PROCEDURE — 63710000001 INSULIN LISPRO (HUMAN) PER 5 UNITS: Performed by: INTERNAL MEDICINE

## 2023-09-18 PROCEDURE — 83735 ASSAY OF MAGNESIUM: CPT | Performed by: INTERNAL MEDICINE

## 2023-09-18 PROCEDURE — 94799 UNLISTED PULMONARY SVC/PX: CPT

## 2023-09-18 PROCEDURE — 94664 DEMO&/EVAL PT USE INHALER: CPT

## 2023-09-18 PROCEDURE — 94761 N-INVAS EAR/PLS OXIMETRY MLT: CPT

## 2023-09-18 PROCEDURE — 99232 SBSQ HOSP IP/OBS MODERATE 35: CPT | Performed by: SPECIALIST

## 2023-09-18 PROCEDURE — 80048 BASIC METABOLIC PNL TOTAL CA: CPT | Performed by: INTERNAL MEDICINE

## 2023-09-18 PROCEDURE — 80076 HEPATIC FUNCTION PANEL: CPT | Performed by: STUDENT IN AN ORGANIZED HEALTH CARE EDUCATION/TRAINING PROGRAM

## 2023-09-18 PROCEDURE — 85025 COMPLETE CBC W/AUTO DIFF WBC: CPT | Performed by: INTERNAL MEDICINE

## 2023-09-18 PROCEDURE — 25010000002 ENOXAPARIN PER 10 MG: Performed by: STUDENT IN AN ORGANIZED HEALTH CARE EDUCATION/TRAINING PROGRAM

## 2023-09-18 PROCEDURE — 63710000001 PREDNISONE PER 1 MG: Performed by: INTERNAL MEDICINE

## 2023-09-18 PROCEDURE — 97161 PT EVAL LOW COMPLEX 20 MIN: CPT

## 2023-09-18 PROCEDURE — 82948 REAGENT STRIP/BLOOD GLUCOSE: CPT

## 2023-09-18 PROCEDURE — 85610 PROTHROMBIN TIME: CPT | Performed by: INTERNAL MEDICINE

## 2023-09-18 RX ORDER — BUDESONIDE 0.5 MG/2ML
0.5 INHALANT ORAL
Status: DISCONTINUED | OUTPATIENT
Start: 2023-09-18 | End: 2023-09-19 | Stop reason: HOSPADM

## 2023-09-18 RX ORDER — NICOTINE POLACRILEX 4 MG
15 LOZENGE BUCCAL
Status: DISCONTINUED | OUTPATIENT
Start: 2023-09-18 | End: 2023-09-19 | Stop reason: HOSPADM

## 2023-09-18 RX ORDER — SPIRONOLACTONE 25 MG/1
25 TABLET ORAL DAILY
Status: DISCONTINUED | OUTPATIENT
Start: 2023-09-18 | End: 2023-09-19 | Stop reason: HOSPADM

## 2023-09-18 RX ORDER — PANTOPRAZOLE SODIUM 40 MG/1
40 TABLET, DELAYED RELEASE ORAL DAILY
Status: DISCONTINUED | OUTPATIENT
Start: 2023-09-18 | End: 2023-09-19 | Stop reason: HOSPADM

## 2023-09-18 RX ORDER — CHOLECALCIFEROL (VITAMIN D3) 125 MCG
500 CAPSULE ORAL DAILY
Status: DISCONTINUED | OUTPATIENT
Start: 2023-09-18 | End: 2023-09-19 | Stop reason: HOSPADM

## 2023-09-18 RX ORDER — ARFORMOTEROL TARTRATE 15 UG/2ML
15 SOLUTION RESPIRATORY (INHALATION)
Status: DISCONTINUED | OUTPATIENT
Start: 2023-09-18 | End: 2023-09-19 | Stop reason: HOSPADM

## 2023-09-18 RX ORDER — INSULIN LISPRO 100 [IU]/ML
2-7 INJECTION, SOLUTION INTRAVENOUS; SUBCUTANEOUS
Status: DISCONTINUED | OUTPATIENT
Start: 2023-09-18 | End: 2023-09-19 | Stop reason: HOSPADM

## 2023-09-18 RX ORDER — CARVEDILOL 25 MG/1
25 TABLET ORAL 2 TIMES DAILY WITH MEALS
Status: DISCONTINUED | OUTPATIENT
Start: 2023-09-18 | End: 2023-09-19 | Stop reason: HOSPADM

## 2023-09-18 RX ORDER — DEXTROSE MONOHYDRATE 25 G/50ML
25 INJECTION, SOLUTION INTRAVENOUS
Status: DISCONTINUED | OUTPATIENT
Start: 2023-09-18 | End: 2023-09-19 | Stop reason: HOSPADM

## 2023-09-18 RX ORDER — WARFARIN SODIUM 6 MG/1
12 TABLET ORAL
Status: COMPLETED | OUTPATIENT
Start: 2023-09-18 | End: 2023-09-18

## 2023-09-18 RX ORDER — IPRATROPIUM BROMIDE AND ALBUTEROL SULFATE 2.5; .5 MG/3ML; MG/3ML
3 SOLUTION RESPIRATORY (INHALATION) EVERY 4 HOURS PRN
Status: DISCONTINUED | OUTPATIENT
Start: 2023-09-18 | End: 2023-09-19 | Stop reason: HOSPADM

## 2023-09-18 RX ORDER — IBUPROFEN 600 MG/1
1 TABLET ORAL
Status: DISCONTINUED | OUTPATIENT
Start: 2023-09-18 | End: 2023-09-19 | Stop reason: HOSPADM

## 2023-09-18 RX ORDER — ATORVASTATIN CALCIUM 40 MG/1
40 TABLET, FILM COATED ORAL NIGHTLY
Status: DISCONTINUED | OUTPATIENT
Start: 2023-09-18 | End: 2023-09-19 | Stop reason: HOSPADM

## 2023-09-18 RX ORDER — ENOXAPARIN SODIUM 100 MG/ML
1 INJECTION SUBCUTANEOUS 2 TIMES DAILY
Status: SHIPPED | OUTPATIENT
Start: 2023-09-18 | End: 2023-09-23

## 2023-09-18 RX ORDER — ENOXAPARIN SODIUM 100 MG/ML
1 INJECTION SUBCUTANEOUS 2 TIMES DAILY
Qty: 7 ML | Refills: 0 | Status: SHIPPED | OUTPATIENT
Start: 2023-09-18 | End: 2023-09-23

## 2023-09-18 RX ADMIN — ARFORMOTEROL TARTRATE 15 MCG: 15 SOLUTION RESPIRATORY (INHALATION) at 09:18

## 2023-09-18 RX ADMIN — ARFORMOTEROL TARTRATE 15 MCG: 15 SOLUTION RESPIRATORY (INHALATION) at 19:10

## 2023-09-18 RX ADMIN — WARFARIN SODIUM 12 MG: 6 TABLET ORAL at 17:10

## 2023-09-18 RX ADMIN — EMPAGLIFLOZIN 10 MG: 10 TABLET, FILM COATED ORAL at 09:00

## 2023-09-18 RX ADMIN — BUSPIRONE HYDROCHLORIDE 5 MG: 5 TABLET ORAL at 20:33

## 2023-09-18 RX ADMIN — INSULIN LISPRO 3 UNITS: 100 INJECTION, SOLUTION INTRAVENOUS; SUBCUTANEOUS at 17:09

## 2023-09-18 RX ADMIN — PREDNISONE 40 MG: 20 TABLET ORAL at 09:00

## 2023-09-18 RX ADMIN — CYANOCOBALAMIN TAB 500 MCG 500 MCG: 500 TAB at 09:00

## 2023-09-18 RX ADMIN — SPIRONOLACTONE 25 MG: 25 TABLET ORAL at 09:00

## 2023-09-18 RX ADMIN — Medication 10 ML: at 20:35

## 2023-09-18 RX ADMIN — BUMETANIDE 2 MG: 0.25 INJECTION INTRAMUSCULAR; INTRAVENOUS at 08:59

## 2023-09-18 RX ADMIN — CARVEDILOL 25 MG: 25 TABLET, FILM COATED ORAL at 17:10

## 2023-09-18 RX ADMIN — INSULIN LISPRO 2 UNITS: 100 INJECTION, SOLUTION INTRAVENOUS; SUBCUTANEOUS at 11:47

## 2023-09-18 RX ADMIN — HYDROCODONE BITARTRATE AND ACETAMINOPHEN 1 TABLET: 5; 325 TABLET ORAL at 09:00

## 2023-09-18 RX ADMIN — SENNOSIDES AND DOCUSATE SODIUM 2 TABLET: 50; 8.6 TABLET ORAL at 20:34

## 2023-09-18 RX ADMIN — HYDROCODONE BITARTRATE AND ACETAMINOPHEN 1 TABLET: 5; 325 TABLET ORAL at 17:10

## 2023-09-18 RX ADMIN — ATORVASTATIN CALCIUM 40 MG: 40 TABLET, FILM COATED ORAL at 20:33

## 2023-09-18 RX ADMIN — NICOTINE 1 PATCH: 21 PATCH, EXTENDED RELEASE TRANSDERMAL at 10:21

## 2023-09-18 RX ADMIN — IPRATROPIUM BROMIDE AND ALBUTEROL SULFATE 3 ML: .5; 3 SOLUTION RESPIRATORY (INHALATION) at 06:56

## 2023-09-18 RX ADMIN — Medication 10 ML: at 09:01

## 2023-09-18 RX ADMIN — LOSARTAN POTASSIUM 50 MG: 25 TABLET, FILM COATED ORAL at 09:00

## 2023-09-18 RX ADMIN — CARVEDILOL 25 MG: 25 TABLET, FILM COATED ORAL at 08:59

## 2023-09-18 RX ADMIN — BUMETANIDE 2 MG: 0.25 INJECTION INTRAMUSCULAR; INTRAVENOUS at 17:09

## 2023-09-18 RX ADMIN — BUDESONIDE 0.5 MG: 0.5 INHALANT RESPIRATORY (INHALATION) at 19:10

## 2023-09-18 RX ADMIN — BUDESONIDE 0.5 MG: 0.5 INHALANT RESPIRATORY (INHALATION) at 09:18

## 2023-09-18 RX ADMIN — PANTOPRAZOLE SODIUM 40 MG: 40 TABLET, DELAYED RELEASE ORAL at 09:00

## 2023-09-18 RX ADMIN — INSULIN LISPRO 3 UNITS: 100 INJECTION, SOLUTION INTRAVENOUS; SUBCUTANEOUS at 20:33

## 2023-09-18 RX ADMIN — VENLAFAXINE HYDROCHLORIDE 37.5 MG: 37.5 CAPSULE, EXTENDED RELEASE ORAL at 09:00

## 2023-09-18 NOTE — THERAPY EVALUATION
Acute Care - Physical Therapy Initial Evaluation   [place]     Patient Name: Regulo Sepulveda  : 1965  MRN: [id number]  Today's Date: 2023      Visit Dx:     ICD-10-CM ICD-9-CM   1. Acute congestive heart failure, unspecified heart failure type  I50.9 428.0   2. Acute pulmonary edema  J81.0 518.4   3. Dyspnea on exertion  R06.09 786.09   4. Acute decompensated heart failure  I50.9 428.0   5. Difficulty in walking  R26.2 719.7     Patient Active Problem List   Diagnosis    Left groin pain    Major depressive disorder, recurrent episode, moderate    Chronic obstructive pulmonary disease    Diabetes mellitus    Hypertensive disorder    Hyperlipidemia    Hepatitis C    Cigarette nicotine dependence    BPH (benign prostatic hyperplasia)    GERD (gastroesophageal reflux disease)    B12 deficiency    LV (left ventricular) mural thrombus    Schizophrenia    Acute decompensated heart failure    Acute on chronic HFrEF (heart failure with reduced ejection fraction)     Past Medical History:   Diagnosis Date    Anxiety     Asthma     Bipolar affective     Cardiac tamponade         CHF (congestive heart failure)     COPD (chronic obstructive pulmonary disease)     Coronary artery disease     Depression     Diabetes mellitus     Elevated cholesterol     Hypertension      Past Surgical History:   Procedure Laterality Date    TESTICLE SURGERY Left     extraction     PT Assessment (last 12 hours)       PT Evaluation and Treatment       Row Name 23 1034          Physical Therapy Time and Intention    Subjective Information no complaints (P)   -ZT     Document Type evaluation (P)   -ZT     Mode of Treatment individual therapy;physical therapy (P)   -ZT     Patient Effort good (P)   -ZT       Row Name 23 1034          General Information    Patient Profile Reviewed yes (P)   -ZT     Patient Observations alert;cooperative;agree to therapy (P)   -ZT     Prior Level of Function independent:;all household  mobility;gait;ADL's (P)   -ZT     Equipment Currently Used at Home none (P)   -ZT     Existing Precautions/Restrictions fall (P)   -ZT       Row Name 09/18/23 1034          Living Environment    Current Living Arrangements home (P)   -ZT     Home Accessibility stairs to enter home (P)   -ZT     People in Home sibling(s) (P)   -ZT     Primary Care Provided by self (P)   -ZT       Row Name 09/18/23 1034          Home Main Entrance    Number of Stairs, Main Entrance five (P)   -ZT     Stair Railings, Main Entrance none (P)   -ZT     Landing, Stairs, Main Entrance no railings (P)   -ZT       Row Name 09/18/23 1034          Home Use of Assistive/Adaptive Equipment    Equipment Currently Used at Home none (P)   -ZT       [facility] Name 09/18/23 1034          Range of Motion (ROM)    Range of Motion bilateral lower extremities;ROM is WFL (P)   -[facility] Name 09/18/23 1034          Strength (Manual Muscle Testing)    Strength (Manual Muscle Testing) bilateral lower extremities;strength is WFL (P)   5/5 bilateral LE strength  -ZT       [facility] Name 09/18/23 1034          Bed Mobility    Bed Mobility bed mobility (all) activities (P)   -ZT     All Activities, [place] (Bed Mobility) independent (P)   -ZT     Assistive Device (Bed Mobility) bed rails (P)   -ZT       [facility] Name 09/18/23 1034          Transfers    Transfers sit-stand transfer;stand-sit transfer (P)   -ZT       Row Name 09/18/23 1034          Sit-Stand Transfer    Sit-Stand [place] (Transfers) independent (P)   -ZT       Row Name 09/18/23 1034          Stand-Sit Transfer    Stand-Sit [place] (Transfers) independent (P)   -ZT       [facility] Name 09/18/23 1034          Gait/Stairs (Locomotion)    Gait/Stairs Locomotion gait/ambulation independence (P)   -ZT     [place] Level (Gait) standby assist (P)   -ZT     Distance in Feet (Gait) 150 (P)   -ZT     Pattern (Gait) step-through (P)   -ZT       Row Name 09/18/23 1034          Safety Issues, Functional  Mobility    Impairments Affecting Function (Mobility) endurance/activity tolerance (P)   -ZT       Row Name 09/18/23 1034          Balance    Balance Assessment standing dynamic balance (P)   -     Dynamic Standing Balance standby assist (P)   -ZT       Row Name 09/18/23 1034          Plan of Care Review    Plan of Care Reviewed With patient (P)   -ZT     Outcome Evaluation Pt demonstrates functional bilateral LE strength and the ability to tolerate functional mobility. He was able to safely and independently exit and enter his bed. He had no issues walking down the conner. This pt does not require skilled PT services at this time. (P)   -ZT       Row Name 09/18/23 1034          Therapy Assessment/Plan (PT)    Criteria for Skilled Interventions Met (PT) no;no problems identified which require skilled intervention (P)   -     Therapy Frequency (PT) evaluation only (P)   -ZT       Row Name 09/18/23 1034          Therapy Plan Review/Discharge Plan (PT)    Therapy Plan Review (PT) evaluation/treatment results reviewed;care plan/treatment goals reviewed;participants included;patient (P)   -ZT               User Key  (r) = Recorded By, (t) = Taken By, (c) = Cosigned By      Initials Name Provider Type    ZT Timothy Hong, PT Student PT Student                    Physical Therapy Education       Title: PT OT SLP Therapies (Done)       Topic: Physical Therapy (Done)       Point: Mobility training (Done)       Learning Progress Summary             Patient Acceptance, E,TB, VU by  at 9/18/2023 1039                         Point: Home exercise program (Done)       Learning Progress Summary             Patient Acceptance, E,TB, VU by  at 9/18/2023 1039                         Point: Precautions (Done)       Learning Progress Summary             Patient Acceptance, E,TB, VU by  at 9/18/2023 1039                                         User Key       Initials Effective Dates Name Provider Type Discipline     09/05/23  -  Timothy Hong PT Student PT Student PT                  PT Recommendation and Plan  Anticipated Discharge Disposition (PT): (P) home  Therapy Frequency (PT): (P) evaluation only  Plan of Care Reviewed With: (P) patient  Outcome Evaluation: (P) Pt demonstrates functional bilateral LE strength and the ability to tolerate functional mobility. He was able to safely and independently exit and enter his bed. He had no issues walking down the conner. This pt does not require skilled PT services at this time.   Outcome Measures       Row Name 09/18/23 1000             How much help from another person do you currently need...    Turning from your back to your side while in flat bed without using bedrails? 4 (P)   -ZT      Moving from lying on back to sitting on the side of a flat bed without bedrails? 4 (P)   -ZT      Moving to and from a bed to a chair (including a wheelchair)? 4 (P)   -ZT      Standing up from a chair using your arms (e.g., wheelchair, bedside chair)? 4 (P)   -ZT      Climbing 3-5 steps with a railing? 3 (P)   -ZT      To walk in hospital room? 4 (P)   -ZT      AM-PAC 6 Clicks Score (PT) 23 (P)   -ZT                User Key  (r) = Recorded By, (t) = Taken By, (c) = Cosigned By      Initials Name Provider Type    ZT Timothy Hong PT Student PT Student                     Time Calculation:    PT Charges       Row Name 09/18/23 1034             Time Calculation    PT Received On 09/18/23 (P)   -ZT      PT Goal Re-Cert Due Date 09/27/23 (P)   -ZT         Untimed Charges    PT Eval/Re-eval Minutes 25 (P)   -ZT         Total Minutes    Untimed Charges Total Minutes 25 (P)   -ZT       Total Minutes 25 (P)   -ZT                User Key  (r) = Recorded By, (t) = Taken By, (c) = Cosigned By      Initials Name Provider Type    Timothy Perez PT Student PT Student                      PT G-Codes  AM-PAC 6 Clicks Score (PT): (P) 23    GEO Poon  9/18/2023

## 2023-09-18 NOTE — PLAN OF CARE
Patient has been resting throughout the day. Patient complained of pain and had PRN pain medication once today. Call light is within reach.     Daniel Cardozo RN

## 2023-09-18 NOTE — PROGRESS NOTES
Pharmacy to Dose: Warfarin  Consulting provider: BETO  Indication for warfarin: AF REQUIRING FULL ANTICOAGULATION; DVT/PE, LV CLOT  Goal INR range: 2-3  Home warfarin dose: 7.5 MG DAILY  Actions or monitoring: INR & S/S OF BLEEDING    Any Vitamin K given?: NO  Any major drug interactions:   Is patient on bridging therapy with another anticoagulant?: ENOXAPARIN    Plan: INR reported as 1.24 today. Will order warfarin 12 mg today x 1. Daily INRs ordered.    Date HGB PLATELETS INR Warfarin Dose Given   9-17 14.3 362 1.11 10 mg   9/18 14.6 337 1.24 12 mg

## 2023-09-18 NOTE — OUTREACH NOTE
CHF Week 3 Survey      Flowsheet Row Responses   [facility] facility patient discharged from? [facility]   Does the patient have one of the following disease processes/diagnoses(primary or secondary)? CHF   Week 3 attempt successful? No   Unsuccessful attempts Attempt 1   Revoke Readmitted            Lenore BOUDREAUX - Registered Nurse

## 2023-09-18 NOTE — PROGRESS NOTES
[facility]   Hospitalist Progress Note  Date: 2023  Patient Name: Regulo Sepulveda  : 1965  MRN: [id number]  Date of admission: 2023      Subjective   Subjective     Chief Complaint: Shortness of air    Summary:   Regulo Sepulveda is a 58 y.o. male  with significant hx of heart failure with reduced fraction, LV thrombus, A-fib, COPD, hypertension, hyperlipidemia bipolar disorder presented to ER with complaints of shortness of breath.  Reports its been ongoing, he has not felt well in the past week.  He ran out of his medication 3 days ago.  Reports he cannot walk far without getting short of breath.  He sleeps laying up, has episodes of PND every day.  Also endorses some chest pain, nonradiating associated with cough and.  Denies any fever but endorses chills.  Reports cough but no production of sputum. Reports he has not really smoked since being discharged and has not used any drugs either. Lives with a roommate.      In the ER, he is afebrile at 97.4, heart rate between , respiratory rate 20-26, blood pressure 150/113, saturating 94 to 100% sporadically on 2 L nasal cannula. Lab work was remarkable for WBC count of 10.4, hemoglobin of 13.5, platelet count of 362 high-sensitivity opponent of 15, proBNP of 8000 668, sodium 148, potassium 3.8, bicarb 24, BUN 18, creatinine 1.32, glucose 101, AST 43, ALT 66, T. bili of 0.4, anion gap of 11.  INR is 1.1.  EKG shows normal sinus rhythm with LVH repolarization changes.  In the ER received Bumex 2 mg and made more than 1.5 L of urine per tech. Xr chest concerning for multifocal pneumonia vs pulm edema.     Prior hospitalization 2 weeks ago, discharged in 2023 underwent an echocardiogram which revealed an EF of 20% with apical layering thrombus.  He was discharged on warfarin and Lovenox bridge.  Seen by psychiatry.  Patient seen by cardiology and underwent right and left heart cath.  It showed normal ischemic dilated  cardiomyopathy.  Per patient he has been compliant with his medications.  Although INR on admission is 1.2.  On previous discharge patient could not follow-up with heart failure clinic because of lack of transport.    Interval Followup:   Vital signs stable on room air with 97% saturation.  Shortness of air is better.  Have some dyspnea on exertion.  PND and orthopnea better.  Good urine output -800 mL  Review of Systems   All systems were reviewed and negative except for: Summary and interval follow-up    Objective   Objective     Vitals:   Temp:  [98.3 °F (36.8 °C)-98.7 °F (37.1 °C)] 98.4 °F (36.9 °C)  Heart Rate:  [] 83  Resp:  [18] 18  BP: (119-168)/() 119/74  Flow (L/min):  [2] 2  Physical Exam    Constitutional: Awake, alert, no acute distress   Eyes: Pupils equal, sclerae anicteric, no conjunctival injection   HENT: NCAT, mucous membranes moist   Neck: Supple, no thyromegaly, no lymphadenopathy, trachea midline   Respiratory: Clear to auscultation bilaterally, nonlabored respirations    Cardiovascular: RRR, no murmurs, rubs, or gallops, palpable pedal pulses bilaterally   Gastrointestinal: Positive bowel sounds, soft, nontender, nondistended   Musculoskeletal: No bilateral ankle edema, no clubbing or cyanosis to extremities   Psychiatric: Appropriate affect, cooperative   Neurologic: Oriented x 3, strength symmetric in all extremities, Cranial Nerves grossly intact to confrontation, speech clear   Skin: No rashes     Result Review    Result Review:  I have personally reviewed the results for the past 24 hours and agree with these findings:  [x]  Laboratory  []  Microbiology  [x]  Radiology  [x]  EKG/Telemetry A-fib rate of 126 on admission  []  Cardiology/Vascular   []  Pathology  [x]  Old records  [x]  Other: Medications    Assessment & Plan   Assessment / Plan     Assessment:   Acute on chronic exacerbation of combined heart failure, EF 20%.  Nonischemic dilated cardiomyopathy.  S/p left and  right heart cath September 17  LV thrombus on Coumadin  Subtherapeutic INR on admission  Paroxysmal atrial fibrillation with RVR on admission.  On Coumadin  COPD  Diabetes.  Most recent Hemoglobin A1c of 6.1%  Hypertension  Anxiety  Question substance abuse.  Previous urine drug screen was positive for meth  Schizophrenia.  Bipolar  Hyperlipidemia   Hoarseness  Hypoxemia.  Resolved.  Noncompliant     Plan:  Low-salt diet, fluid restriction, strict input output and daily weights.  IV Bumex.  Discussed with cardiology.  Epogen input.  Continue home losartan, Jardiance, Aldactone and beta-blocker.  Continue home BuSpar, Effexor.  Continue warfarin pharmacy to dose.  Bridging Lovenox.  Nebulizer treatment.  Prednisone.  Sliding-scale insulin.  Continue telemetry  PT OT.    Discussed plan with RN and .  Discharge home in a.m. if remains stable will need bridging Lovenox until INR is close to 2.    DVT prophylaxis:  Medical DVT prophylaxis orders are present.  Lovenox and Coumadin    CODE STATUS:   Level Of Support Discussed With: Patient  Code Status (Patient has no pulse and is not breathing): CPR (Attempt to Resuscitate)  Medical Interventions (Patient has pulse or is breathing): Full Support      Part of this note may be an electronic transcription/translation of spoken language to printed text using the Dragon Dictation System.     Electronically signed by Alex Angulo MD, 09/18/23, 2:08 PM EDT.

## 2023-09-18 NOTE — PROGRESS NOTES
[facility]   Cardiology Progress Note      Patient Name: Regulo Sepulveda  : 1965  MRN: [id number]  Primary Care Physician:  Grace Cruz APRN  Referring Physician: No ref. provider found  Date of admission: 2023    Subjective   Subjective     Chief Complaint: Nonischemic cardiomyopathy    HPI:  Regulo Sepulveda is a 58 y.o. male admitted with CHF and nonischemic cardiomyopathy.  No chest pain or shortness of breath    REVIEW OF SYSTEMS    Constitutional:    No fever, no weight loss  Skin:     No rash  Otolaryngeal:    No difficulty swallowing  Cardiovascular:  No chest pain or shortness of breath  Pulmonary:    No cough, no sputum production    Objective    Objective     Vitals:   Vitals:    23 0030 23 0415 23 0657 23 0733   BP: 127/86 143/100  (!) 155/110   BP Location:  Left arm  Left arm   Patient Position:    Lying   Pulse: 91 96 95 111   Resp:     Temp:    98.6 °F (37 °C)   TempSrc:    Oral   SpO2:   97% 98%   Weight:       Height:                Physical Exam:   Constitutional: Awake, alert, No acute distress    Eyes: PERRLA, sclerae anicteric, no conjunctival injection   HENT: NCAT, mucous membranes moist   Neck: Supple, no thyromegaly, no lymphadenopathy, trachea midline   Respiratory: Clear to auscultation bilaterally, nonlabored respirations    Cardiovascular: RRR, no murmurs, rubs, or gallops, palpable pedal pulses bilaterally   Gastrointestinal: Positive bowel sounds, soft, nontender, nondistended   Musculoskeletal: No bilateral ankle edema, no clubbing or cyanosis to extremities   Psychiatric: Appropriate affect, cooperative   Neurologic: Oriented x 3, strength symmetric in all extremities, Cranial Nerves grossly intact to confrontation, speech clear   Skin: No rashes.      Current medications:  arformoterol, 15 mcg, Nebulization, BID - RT  atorvastatin, 40 mg, Oral, Nightly  budesonide, 0.5 mg, Nebulization, BID - RT  bumetanide, 2 mg,  Intravenous, BID With Meals  busPIRone, 5 mg, Oral, Nightly  carvedilol, 25 mg, Oral, BID With Meals  empagliflozin, 10 mg, Oral, Daily  enoxaparin, 1 mg/kg, Subcutaneous, Q12H  insulin lispro, 2-7 Units, Subcutaneous, 4x Daily AC & at Bedtime  losartan, 50 mg, Oral, Q24H  nicotine, 1 patch, Transdermal, Q24H  pantoprazole, 40 mg, Oral, Daily  predniSONE, 40 mg, Oral, Daily With Breakfast  senna-docusate sodium, 2 tablet, Oral, BID  sodium chloride, 10 mL, Intravenous, Q12H  spironolactone, 25 mg, Oral, Daily  venlafaxine XR, 37.5 mg, Oral, Daily With Breakfast  cyanocobalamin, 500 mcg, Oral, Daily  warfarin, 12 mg, Oral, Once      Current IV drips:  Pharmacy to Dose enoxaparin (LOVENOX),   Pharmacy to dose warfarin,         Result Review    Result Review:  I have personally reviewed the results from the time of this admission to 9/18/2023 08:44 EDT and agree with these findings:  []  Laboratory  []  EKG/Telemetry   []  Cardiology/Vascular   []  Radiology         CBC          9/16/2023    21:59 9/17/2023    09:45 9/18/2023    04:49   CBC   WBC 10.47  9.84  10.20    RBC 4.53  4.75  5.03    Hemoglobin 13.5  14.3  14.6    Hematocrit 40.6  43.2  45.2    MCV 89.6  90.9  89.9    MCH 29.8  30.1  29.0    MCHC 33.3  33.1  32.3    RDW 13.9  13.9  13.4    Platelets 362  362  337      CMP          9/16/2023    21:59 9/17/2023    08:31 9/18/2023    04:49   CMP   Glucose 101  131  102    BUN 18  16  17    Creatinine 1.32  1.18  1.17    EGFR 62.5  71.5  72.3    Sodium 140  140  140    Potassium 3.8  3.7  3.9    Chloride 105  102  100    Calcium 9.1  9.0  9.0    Total Protein 6.8   6.7    Albumin 4.0   3.8    Globulin 2.8      Total Bilirubin 0.4   0.8    Alkaline Phosphatase 175   168    AST (SGOT) 43   24    ALT (SGPT) 66   49    Albumin/Globulin Ratio 1.4      BUN/Creatinine Ratio 13.6  13.6  14.5    Anion Gap 11.0  11.2  11.5         Results for orders placed during the hospital encounter of 09/17/23    Cardiac  Catheterization/Vascular Study    Narrative  OPERATORS  Ben Grant M.D. (Attending Cardiologist)      PROCEDURE PERFORMED  Ultrasound guided vascular access  Right heart catheterization  Coronary Angiogram  Left Heart Catheterization 57595  Moderate Sedation    INDICATIONS FOR PROCEDURE  58-year-old man with multiple cardiovascular risk factors presented with acute on chronic HFrEF exacerbation.  He has not had any previous ischemic work-up.  He also has pulmonary hypertension.  After discussing the risk and benefit of the procedure he was brought in for right and left heart cath.    PROCEDURE IN DETAIL  Informed consent was obtained from the patient after explaining the risks, benefits, and alternative options of the procedure. After obtaining informed consent, the patient was brought to the cath lab and was prepped in a sterile fashion. Lidocaine 2% was used for local anesthesia into the right femoral venous access site. Right femoral vein was accessed using the micropuncture needle under ultrasound guidance and micropuncture wire advanced under flouroscopy. A 7 Anguillan vascular sheath was put into place percutaneously over guide-wire. Guide wires were removed. A 6Fr swan sydnee catheter was advanced to wedge position. RA, RV and PA and wedge pressures were recorded.  PA sat and arterial sats recorded.  The patient tolerated the procedure well without any complications.    Lidocaine 2% was used for local anesthesia into the right femoral arterial access site. The right femoral artery was accessed with a micropuncture needle via modified Seldinger technique under ultrasound guidance. A 6F was inserted successfully.  Afterwards, 6F JR4 and JL4 diagnostic catheters were advanced over a wire into the ascending aorta and were used to engage the ostia of the left main and RCA respectively. JR4 used to cross the AV and obtain LV pressures and gradient across the AV measured via pullback technique. Images of the right and  left coronary systems were obtained. All the catheters were exchanged over a wire and subsequently removed. Angiogram of the femoral access site was obtained and did not show complications. The patient tolerated the procedure well without any complications. The pictures were reviewed at the end of the procedure. A Mynx closure device was applied for venous closure.  A 6 Swazi Angio-Seal device was applied for arterial closure.    HEMODYNAMICS    RHC  RA 8/5, 4 mmHg  RV 55/3, 8 mmHg  PA 59/24, 40 mmHg  PCW 34/27, 26 mmHg  AO Sat 96%  PA Sat 69%    Lydia CO 4.9 L/min    Lydia CI 2.53 L/min/m²    SVR 1958 D/S   D/S    LHC  LV: 137/27, 31 mmHg  AO: 136/96, 116 mmHg  No significant gradient across the aortic valve during pullback of JR4 catheter.  LV gram was not performed due to recently available echocardiogram.    FINDINGS  Coronary Angiogram    Right dominant circulation    Left main: Left main is a large caliber vessel which gives rise to the Left Anterior Descending and the Left circumflex.  Left main coronary artery is angiographically free from any significant disease.    Left Anterior Descending Artery: LAD is a medium caliber vessel which gives rise to several septal perforators and several diagonal branches.  LAD is angiographically free from any significant disease.    Left Circumflex: Left circumflex artery gives rise to marginals.  Left circumflex artery is angiographically free from any significant disease.    Right Coronary Artery: The RCA is a large caliber dominant vessel gives rise to PDA and PLV.  RCA is angiographically free from any significant disease    ESTIMATED BLOOD LOSS:  10 ml    COMPLICATIONS:  None    PROCEDURE DATA:  Contrast Used: 24 cc  Sedation Time: 30 minutes    IMPRESSIONS  Non obstructive CAD.  Nonischemic idiopathic dilated cardiomyopathy.  Normal cardiac output and index  Significantly elevated LVEDP and wedge pressure.  Pulmonary hypertension due to volume  overload  Significantly elevated systemic vascular resistance      RECOMMENDATIONS  -Afterload reduction and diuretics  -Uptitrate GDMT  -Abstinence from drug abuse    Electronically signed by Ben Grant MD, 09/17/23, 2:51 PM EDT.     Lab Results   Component Value Date    PROBNP 8,668.0 (H) 09/16/2023         Telemetry reviewed     Assessment / Plan     ASSESSMENT:    Acute decompensated heart failure    Acute on chronic HFrEF (heart failure with reduced ejection fraction)  No significant coronary artery disease.  LV thrombus.    PLAN:  1.  Continue carvedilol and losartan.  2.  Continue IV Bumex.  Change to p.o. Bumex on discharge.  3.  Continue Coumadin.  Lovenox bridge until INR therapeutic.  4.  Can be discharged home.  Follow-up as an outpatient.  Electronically signed by Jaun Carreno MD, 09/18/23, 8:44 AM EDT.

## 2023-09-18 NOTE — CASE MANAGEMENT/SOCIAL WORK
Discharge Planning Assessment   [place]     Patient Name: Regulo Sepulveda  MRN: [id number]  Today's Date: 9/18/2023    Admit Date: 9/17/2023    Plan: Last admission Pt discharged with [facility].   Pt stated they had not shown up. SW following up with [facility].   Discharge Needs Assessment       Row Name 09/18/23 0932       Living Environment    People in Home sibling(s)    Name(s) of People in Home Pt stated he lives with his sister.    Current Living Arrangements home    Potentially Unsafe Housing Conditions none    Primary Care Provided by self    Provides Primary Care For no one    Family Caregiver if Needed sibling(s)    Quality of Family Relationships helpful       Resource/Environmental Concerns    Resource/Environmental Concerns none    Transportation Concerns none       Food Insecurity    Within the past 12 months, you worried that your food would run out before you got the money to buy more. Sometimes    Within the past 12 months, the food you bought just didn't last and you didn't have money to get more. Sometimes       Transition Planning    Patient/Family Anticipates Transition to home    Patient/Family Anticipated Services at Transition none       Discharge Needs Assessment    Equipment Currently Used at Home glucometer;bp cuff    Concerns to be Addressed discharge planning    Equipment Needed After Discharge none    Discharge Coordination/Progress Pt states he lives with his sister. Pt is a re-admission, Pt plans to return home at discharge. PCP: SRAVAN Cruz, Pt states in the past he has issues with affording food and utilities, at this time he does not. Pt is going to meet with  to discuss possible dissability hearing. Per MD Pt will discharge on Lovenox, script as bee sent to pharmacy with likely discharge on Tuesday. SW/CM will continue to follow for discharge needs.                   Discharge Plan       Row Name 09/18/23 0951       Plan    Plan Last admission Pt discharged with  [facility].   Pt stated they had not shown up. SW following up with [facility].      Row Name 09/18/23 0949       Plan    Plan Pt plans to discharge home when ready. MD has Sent Lovenox referral to Pharmacy with possible discharge on Tuesday.                  Continued Care and Services - Admitted Since 9/17/2023    Coordination has not been started for this encounter.          Demographic Summary       Row Name 09/18/23 0928       General Information    Admission Type inpatient    Arrived From emergency department    Referral Source admission list    Reason for Consult discharge planning    Preferred Language English       Contact Information    Permission Granted to Share Info With lay caregiver    Contact Information Obtained for lay caregiver       Lay Caregiver Information    Name, Lay Caregiver Aurora Prather    Phone, Lay Caregiver [phone]                   Functional Status       Row Name 09/18/23 0928       Functional Status    Usual Activity Tolerance fair    Current Activity Tolerance fair       Physical Activity    On average, how many days per week do you engage in moderate to strenuous exercise (like a brisk walk)? 0 days    On average, how many minutes do you engage in exercise at this level? 0 min    Number of minutes of exercise per week 0       Assessment of Health Literacy    How often do you have someone help you read hospital materials? Occasionally    How often do you have problems learning about your medical condition because of difficulty understanding written information? Never    How often do you have a problem understanding what is told to you about your medical condition? Occasionally    How confident are you filling out medical forms by yourself? Quite a bit    Health Literacy Fair       Functional Status, IADL    Medications independent    Meal Preparation independent;assistive person    Housekeeping independent;assistive person    Laundry independent;assistive person    Shopping  independent;assistive person       Mental Status    General Appearance WDL WDL       Mental Status Summary    Recent Changes in Mental Status/Cognitive Functioning no changes       Employment/    Employment Status unemployed    Current or Previous Occupation not applicable                   Psychosocial    No documentation.                  Abuse/Neglect    No documentation.                  Legal       Row Name 09/18/23 0929       Financial Resource Strain    How hard is it for you to pay for the very basics like food, housing, medical care, and heating? Somewhat       Financial/Legal    Source of Income none    Application for Public Assistance not applied    Finance Comments Pt stated that he has an appt to meet with  for discussion about disability hearing.       Legal    Criminal Activity/Legal Involvement none                   Substance Abuse    No documentation.                  Patient Forms    No documentation.                     Hailey Jade

## 2023-09-18 NOTE — PLAN OF CARE
Goal Outcome Evaluation:  Plan of Care Reviewed With: (P) patient           Outcome Evaluation: (P) Pt demonstrates functional bilateral LE strength and the ability to tolerate functional mobility. He was able to safely and independently exit and enter his bed. He had no issues walking down the conner. This pt does not require skilled PT services at this time.      Anticipated Discharge Disposition (PT): (P) home

## 2023-09-19 ENCOUNTER — READMISSION MANAGEMENT (OUTPATIENT)
Dept: CALL CENTER | Facility: HOSPITAL | Age: 58
End: 2023-09-19
Payer: COMMERCIAL

## 2023-09-19 VITALS
SYSTOLIC BLOOD PRESSURE: 142 MMHG | TEMPERATURE: 97.8 F | OXYGEN SATURATION: 97 % | RESPIRATION RATE: 18 BRPM | HEIGHT: 73 IN | DIASTOLIC BLOOD PRESSURE: 89 MMHG | BODY MASS INDEX: 20.75 KG/M2 | WEIGHT: 156.53 LBS | HEART RATE: 74 BPM

## 2023-09-19 PROBLEM — I50.23 ACUTE ON CHRONIC SYSTOLIC CHF (CONGESTIVE HEART FAILURE): Status: ACTIVE | Noted: 2023-09-19

## 2023-09-19 PROBLEM — I50.23 ACUTE ON CHRONIC SYSTOLIC CHF (CONGESTIVE HEART FAILURE): Status: RESOLVED | Noted: 2023-09-19 | Resolved: 2023-09-19

## 2023-09-19 PROBLEM — I50.23 ACUTE ON CHRONIC HFREF (HEART FAILURE WITH REDUCED EJECTION FRACTION): Status: RESOLVED | Noted: 2023-09-17 | Resolved: 2023-09-19

## 2023-09-19 PROBLEM — I50.9 ACUTE DECOMPENSATED HEART FAILURE: Status: RESOLVED | Noted: 2023-09-17 | Resolved: 2023-09-19

## 2023-09-19 LAB
ANION GAP SERPL CALCULATED.3IONS-SCNC: 11 MMOL/L (ref 5–15)
BASOPHILS # BLD AUTO: 0.04 10*3/MM3 (ref 0–0.2)
BASOPHILS NFR BLD AUTO: 0.3 % (ref 0–1.5)
BUN SERPL-MCNC: 26 MG/DL (ref 6–20)
BUN/CREAT SERPL: 22 (ref 7–25)
CALCIUM SPEC-SCNC: 9.4 MG/DL (ref 8.6–10.5)
CHLORIDE SERPL-SCNC: 100 MMOL/L (ref 98–107)
CO2 SERPL-SCNC: 27 MMOL/L (ref 22–29)
CREAT SERPL-MCNC: 1.18 MG/DL (ref 0.76–1.27)
DEPRECATED RDW RBC AUTO: 43 FL (ref 37–54)
EGFRCR SERPLBLD CKD-EPI 2021: 71.5 ML/MIN/1.73
EOSINOPHIL # BLD AUTO: 0.09 10*3/MM3 (ref 0–0.4)
EOSINOPHIL NFR BLD AUTO: 0.8 % (ref 0.3–6.2)
ERYTHROCYTE [DISTWIDTH] IN BLOOD BY AUTOMATED COUNT: 13.4 % (ref 12.3–15.4)
GLUCOSE BLDC GLUCOMTR-MCNC: 132 MG/DL (ref 70–99)
GLUCOSE SERPL-MCNC: 153 MG/DL (ref 65–99)
HCT VFR BLD AUTO: 43.3 % (ref 37.5–51)
HGB BLD-MCNC: 14.6 G/DL (ref 13–17.7)
IMM GRANULOCYTES # BLD AUTO: 0.04 10*3/MM3 (ref 0–0.05)
IMM GRANULOCYTES NFR BLD AUTO: 0.3 % (ref 0–0.5)
INR PPP: 1.5 (ref 0.86–1.15)
LYMPHOCYTES # BLD AUTO: 1.94 10*3/MM3 (ref 0.7–3.1)
LYMPHOCYTES NFR BLD AUTO: 16.2 % (ref 19.6–45.3)
MAGNESIUM SERPL-MCNC: 2.2 MG/DL (ref 1.6–2.6)
MCH RBC QN AUTO: 29.7 PG (ref 26.6–33)
MCHC RBC AUTO-ENTMCNC: 33.7 G/DL (ref 31.5–35.7)
MCV RBC AUTO: 88.2 FL (ref 79–97)
MONOCYTES # BLD AUTO: 0.96 10*3/MM3 (ref 0.1–0.9)
MONOCYTES NFR BLD AUTO: 8 % (ref 5–12)
NEUTROPHILS NFR BLD AUTO: 74.4 % (ref 42.7–76)
NEUTROPHILS NFR BLD AUTO: 8.88 10*3/MM3 (ref 1.7–7)
NRBC BLD AUTO-RTO: 0 /100 WBC (ref 0–0.2)
PHOSPHATE SERPL-MCNC: 3.2 MG/DL (ref 2.5–4.5)
PLATELET # BLD AUTO: 352 10*3/MM3 (ref 140–450)
PMV BLD AUTO: 9.9 FL (ref 6–12)
POTASSIUM SERPL-SCNC: 3.3 MMOL/L (ref 3.5–5.2)
PROTHROMBIN TIME: 18.1 SECONDS (ref 11.8–14.9)
RBC # BLD AUTO: 4.91 10*6/MM3 (ref 4.14–5.8)
SODIUM SERPL-SCNC: 138 MMOL/L (ref 136–145)
WBC NRBC COR # BLD: 11.95 10*3/MM3 (ref 3.4–10.8)

## 2023-09-19 PROCEDURE — 82948 REAGENT STRIP/BLOOD GLUCOSE: CPT

## 2023-09-19 PROCEDURE — 85610 PROTHROMBIN TIME: CPT | Performed by: INTERNAL MEDICINE

## 2023-09-19 PROCEDURE — 94799 UNLISTED PULMONARY SVC/PX: CPT

## 2023-09-19 PROCEDURE — 94761 N-INVAS EAR/PLS OXIMETRY MLT: CPT

## 2023-09-19 PROCEDURE — 80048 BASIC METABOLIC PNL TOTAL CA: CPT | Performed by: INTERNAL MEDICINE

## 2023-09-19 PROCEDURE — 85025 COMPLETE CBC W/AUTO DIFF WBC: CPT | Performed by: INTERNAL MEDICINE

## 2023-09-19 PROCEDURE — 84100 ASSAY OF PHOSPHORUS: CPT | Performed by: INTERNAL MEDICINE

## 2023-09-19 PROCEDURE — G0378 HOSPITAL OBSERVATION PER HR: HCPCS

## 2023-09-19 PROCEDURE — 94664 DEMO&/EVAL PT USE INHALER: CPT

## 2023-09-19 PROCEDURE — 83735 ASSAY OF MAGNESIUM: CPT | Performed by: INTERNAL MEDICINE

## 2023-09-19 PROCEDURE — 63710000001 PREDNISONE PER 1 MG: Performed by: INTERNAL MEDICINE

## 2023-09-19 RX ORDER — ATORVASTATIN CALCIUM 10 MG/1
10 TABLET, FILM COATED ORAL DAILY
Qty: 30 TABLET | Refills: 11 | Status: SHIPPED | OUTPATIENT
Start: 2023-09-19 | End: 2024-09-18

## 2023-09-19 RX ORDER — DAPAGLIFLOZIN 5 MG/1
5 TABLET, FILM COATED ORAL DAILY
Qty: 30 TABLET | Refills: 1 | Status: SHIPPED | OUTPATIENT
Start: 2023-09-19

## 2023-09-19 RX ORDER — POTASSIUM CHLORIDE 750 MG/1
40 CAPSULE, EXTENDED RELEASE ORAL EVERY 4 HOURS
Status: DISCONTINUED | OUTPATIENT
Start: 2023-09-19 | End: 2023-09-19 | Stop reason: HOSPADM

## 2023-09-19 RX ADMIN — Medication 10 ML: at 08:17

## 2023-09-19 RX ADMIN — CARVEDILOL 25 MG: 25 TABLET, FILM COATED ORAL at 08:16

## 2023-09-19 RX ADMIN — PREDNISONE 40 MG: 20 TABLET ORAL at 08:16

## 2023-09-19 RX ADMIN — PANTOPRAZOLE SODIUM 40 MG: 40 TABLET, DELAYED RELEASE ORAL at 08:17

## 2023-09-19 RX ADMIN — NICOTINE 1 PATCH: 21 PATCH, EXTENDED RELEASE TRANSDERMAL at 10:50

## 2023-09-19 RX ADMIN — VENLAFAXINE HYDROCHLORIDE 37.5 MG: 37.5 CAPSULE, EXTENDED RELEASE ORAL at 08:16

## 2023-09-19 RX ADMIN — SPIRONOLACTONE 25 MG: 25 TABLET ORAL at 08:16

## 2023-09-19 RX ADMIN — BUMETANIDE 2 MG: 0.25 INJECTION INTRAMUSCULAR; INTRAVENOUS at 08:16

## 2023-09-19 RX ADMIN — HYDROCODONE BITARTRATE AND ACETAMINOPHEN 1 TABLET: 5; 325 TABLET ORAL at 08:16

## 2023-09-19 RX ADMIN — CYANOCOBALAMIN TAB 500 MCG 500 MCG: 500 TAB at 08:16

## 2023-09-19 RX ADMIN — LOSARTAN POTASSIUM 50 MG: 25 TABLET, FILM COATED ORAL at 08:17

## 2023-09-19 RX ADMIN — BUDESONIDE 0.5 MG: 0.5 INHALANT RESPIRATORY (INHALATION) at 07:23

## 2023-09-19 RX ADMIN — EMPAGLIFLOZIN 10 MG: 10 TABLET, FILM COATED ORAL at 08:17

## 2023-09-19 RX ADMIN — ARFORMOTEROL TARTRATE 15 MCG: 15 SOLUTION RESPIRATORY (INHALATION) at 07:23

## 2023-09-19 NOTE — PLAN OF CARE
Goal Outcome Evaluation:           Progress: no change  Outcome Evaluation: VSS. A&Ox4. Patient has had no changes to current condition. No signs or symptoms of distress. Patient requested pain medication x1 but was sleeping when I returned. Currently resting with eyes closed and visible respirations.

## 2023-09-19 NOTE — DISCHARGE SUMMARY
[facility]        HOSPITALIST  DISCHARGE SUMMARY    Patient Name: Regulo Sepulveda  : 1965  MRN: [id number]    Date of Admission: 2023  Date of Discharge:  2023  Primary Care Physician: Grace Cruz APRN    Consults       Date and Time Order Name Status Description    2023  8:05 AM Inpatient Cardiology Consult Completed     2023  7:05 AM Hospitalist (on-call MD unless specified)      2023 12:33 AM Inpatient Psychiatrist Consult Completed     2023  8:36 AM Inpatient Cardiology Consult Completed             Active and Resolved Hospital Problems:  Active Hospital Problems   No active problems to display.      Resolved Hospital Problems    Diagnosis POA    Acute on chronic systolic CHF (congestive heart failure) [I50.23] Yes    Acute decompensated heart failure [I50.9] Yes    Acute on chronic HFrEF (heart failure with reduced ejection fraction) [I50.23] Yes     Acute on chronic exacerbation of combined heart failure, EF 20%.  Nonischemic dilated cardiomyopathy.  S/p left and right heart cath .  LV thrombus on Coumadin  Subtherapeutic INR on admission.  On bridging Lovenox  Paroxysmal atrial fibrillation with RVR on admission.  On Coumadin  COPD  Diabetes.  Most recent Hemoglobin A1c of 6.1%  Hypertension  Anxiety  Question substance abuse.Previous urine drug screen was positive for meth.  Urine drug screen this admission negative.  Schizophrenia.  Bipolar  Hyperlipidemia   Hoarseness  Hypoxemia.  Resolved.  Noncompliant  Hospital Course     Hospital Course:  Regulo Sepulveda is a 58 y.o. male with significant hx of heart failure with reduced fraction, LV thrombus, A-fib, COPD, hypertension, hyperlipidemia bipolar disorder presented to ER with complaints of shortness of breath.  Reports its been ongoing, he has not felt well in the past week.  He ran out of his medication 3 days ago.  Reports he cannot walk far without getting short of breath.   He sleeps laying up, has episodes of PND every day.  Also endorses some chest pain, nonradiating associated with cough and.  Denies any fever but endorses chills.  Reports cough but no production of sputum. Reports he has not really smoked since being discharged and has not used any drugs either. Lives with a roommate.      In the ER, he is afebrile at 97.4, heart rate between , respiratory rate 20-26, blood pressure 150/113, saturating 94 to 100% sporadically on 2 L nasal cannula. Lab work was remarkable for WBC count of 10.4, hemoglobin of 13.5, platelet count of 362 high-sensitivity opponent of 15, proBNP of 8000 668, sodium 148, potassium 3.8, bicarb 24, BUN 18, creatinine 1.32, glucose 101, AST 43, ALT 66, T. bili of 0.4, anion gap of 11.  INR is 1.1.  EKG shows normal sinus rhythm with LVH repolarization changes.  In the ER received Bumex 2 mg and made more than 1.5 L of urine per tech. Xr chest concerning for multifocal pneumonia vs pulm edema.     Prior hospitalization 2 weeks ago, discharged in September 2, 2023 underwent an echocardiogram which revealed an EF of 20% with apical layering thrombus.  He was discharged on warfarin and Lovenox bridge.  Seen by psychiatry.  Patient seen by cardiology and underwent right and left heart cath.  It showed normal ischemic dilated cardiomyopathy.  Per patient he has been compliant with his medications.  Although INR on admission is 1.2.  On previous discharge patient could not follow-up with heart failure clinic because of lack of transport.  Cardiology signed off     Interval Followup:   Vital signs stable on room air with 97% saturation.  Shortness of air is better.  Have some dyspnea on exertion.  PND and orthopnea better.  Patient wants to go home        DISCHARGE Follow Up Recommendations for labs and diagnostics: Follow-up with PCP, cardiology and psychiatry.  Have home health and PCP check PT/INR and BMP in 48 hours.Stop bridging Lovenox and continue  lifelong Coumadin if INR is close to 2 or above.  Coumadin dose to be adjusted by PCP.      Day of Discharge     Vital Signs:  Temp:  [97.3 °F (36.3 °C)-98.6 °F (37 °C)] 97.8 °F (36.6 °C)  Heart Rate:  [61-88] 74  Resp:  [18-20] 18  BP: (120-149)/(67-92) 142/89    Physical Exam:   Constitutional: Awake, alert, no acute distress              Eyes: Pupils equal, sclerae anicteric, no conjunctival injection              HENT: NCAT, mucous membranes moist              Neck: Supple, no thyromegaly, no lymphadenopathy, trachea midline              Respiratory: Clear to auscultation bilaterally, nonlabored respirations               Cardiovascular: RRR, no murmurs, rubs, or gallops, palpable pedal pulses bilaterally              Gastrointestinal: Positive bowel sounds, soft, nontender, nondistended              Musculoskeletal: No bilateral ankle edema, no clubbing or cyanosis to extremities              Psychiatric: Appropriate affect, cooperative              Neurologic: Oriented x 3, strength symmetric in all extremities, Cranial Nerves grossly intact to confrontation, speech clear              Skin: No rashes        Discharge Details        Discharge Medications        New Medications        Instructions Start Date   Enoxaparin Sodium 60 MG/0.6ML solution prefilled syringe syringe  Commonly known as: LOVENOX   1 mg/kg (70 mg), Subcutaneous, 2 Times Daily             Changes to Medications        Instructions Start Date   atorvastatin 10 MG tablet  Commonly known as: Lipitor  What changed:   medication strength  how much to take  when to take this   10 mg, Oral, Daily             Continue These Medications        Instructions Start Date   Abilify Maintena 300 MG Suspension Reconstituted ER IM injection ER  Generic drug: ARIPiprazole ER   300 mg, Intramuscular, Every 30 Days      albuterol sulfate  (90 Base) MCG/ACT inhaler  Commonly known as: PROVENTIL HFA;VENTOLIN HFA;PROAIR HFA   2 puffs, Inhalation, Every 4  Hours PRN      bumetanide 2 MG tablet  Commonly known as: BUMEX   1 mg, Oral, Daily      busPIRone 5 MG tablet  Commonly known as: BUSPAR   5 mg, Oral, Nightly      carvedilol 25 MG tablet  Commonly known as: COREG   25 mg, Oral, 2 Times Daily With Meals      cyanocobalamin 500 MCG tablet  Commonly known as: VITAMIN B-12   500 mcg, Oral, Daily      Farxiga 5 MG tablet tablet  Generic drug: dapagliflozin   5 mg, Oral, Daily      losartan 50 MG tablet  Commonly known as: COZAAR   1 tablet, Oral, 2 Times Daily      metFORMIN 1000 MG tablet  Commonly known as: GLUCOPHAGE   1,000 mg, Oral, 2 Times Daily      pantoprazole 40 MG EC tablet  Commonly known as: PROTONIX   40 mg, Oral, Daily      spironolactone 25 MG tablet  Commonly known as: ALDACTONE   25 mg, Oral, Daily      venlafaxine XR 37.5 MG 24 hr capsule  Commonly known as: EFFEXOR-XR   1 capsule, Oral, Daily      warfarin 7.5 MG tablet  Commonly known as: Coumadin   Continue Lovenox injection and Coumadin until INR is 2 and then stop Lovenox injection.             Stop These Medications      nicotine 21 MG/24HR patch  Commonly known as: NICODERM CQ              No Known Allergies    Discharge Disposition:  Home-Health Care c.  In cab to home.    Diet:  Diet Instructions       Diet: Cardiac Diets, Diabetic Diets, Fluid Restriction (240 mL/tray) Diets; Low Sodium (2g); Regular Texture (IDDSI 7); Thin (IDDSI 0); Consistent Carbohydrate; 1500 mL/day      Discharge Diet:  Cardiac Diets  Diabetic Diets  Fluid Restriction (240 mL/tray) Diets       Cardiac Diet: Low Sodium (2g)    Texture: Regular Texture (IDDSI 7)    Fluid Consistency: Thin (IDDSI 0)    Diabetic Diet: Consistent Carbohydrate    Fluid Restriction Diet (240 mL/tray): 1500 mL/day            Discharge Activity:   As tolerated.    CODE STATUS:  Code Status and Medical Interventions:   Ordered at: 09/17/23 0808     Level Of Support Discussed With:    Patient     Code Status (Patient has no pulse and is not  breathing):    CPR (Attempt to Resuscitate)     Medical Interventions (Patient has pulse or is breathing):    Full Support         Future Appointments   Date Time Provider Department Center   9/26/2023  1:30 PM Beto Gillette MD [facility] None       Additional Instructions for the Follow-ups that You Need to Schedule       Discharge Follow-up with PCP   As directed       Currently Documented PCP:    Grace Cruz APRN    PCP Phone Number:    [phone]     Follow Up Details: 3 days        Discharge Follow-up with Specified Provider: Dr. Gillette; 1 Week   As directed      To: Dr. Gillette   Follow Up: 1 Week   Follow Up Details: CHF clinic        Discharge Follow-up with Specified Provider: Psychiatrist   As directed      To: Psychiatrist   Follow Up Details: Schizophrenia                Pertinent  and/or Most Recent Results     PROCEDURES:   Procedure:    Right and Left Heart Cath  CPT(R) Code:  59112 - WI R & L HRT CATH WINJX HRT ART& L VENTR IMG       LAB RESULTS:      Lab 09/19/23 0525 09/18/23 0449 09/17/23  0945 09/17/23  0831 09/16/23 2159   WBC 11.95* 10.20 9.84  --  10.47   HEMOGLOBIN 14.6 14.6 14.3  --  13.5   HEMATOCRIT 43.3 45.2 43.2  --  40.6   PLATELETS 352 337 362  --  362   NEUTROS ABS 8.88* 6.84 7.02*  --  6.92   IMMATURE GRANS (ABS) 0.04 0.03 0.03  --  0.02   LYMPHS ABS 1.94 2.08 1.82  --  2.31   MONOS ABS 0.96* 1.05* 0.80  --  0.99*   EOS ABS 0.09 0.14 0.12  --  0.18   MCV 88.2 89.9 90.9  --  89.6   PROCALCITONIN  --   --   --  0.04  --    LACTATE  --   --   --  0.8  --    PROTIME 18.1* 15.6*  --   --  14.4         Lab 09/19/23 0525 09/18/23 0449 09/17/23 0831 09/16/23 2159   SODIUM 138 140 140 140   POTASSIUM 3.3* 3.9 3.7 3.8   CHLORIDE 100 100 102 105   CO2 27.0 28.5 26.8 24.0   ANION GAP 11.0 11.5 11.2 11.0   BUN 26* 17 16 18   CREATININE 1.18 1.17 1.18 1.32*   EGFR 71.5 72.3 71.5 62.5   GLUCOSE 153* 102* 131* 101*   CALCIUM 9.4 9.0 9.0 9.1   MAGNESIUM 2.2 2.1 1.8  --    PHOSPHORUS 3.2  2.9 3.2  --          Lab 09/18/23  0449 09/16/23  2159   TOTAL PROTEIN 6.7 6.8   ALBUMIN 3.8 4.0   GLOBULIN  --  2.8   ALT (SGPT) 49* 66*   AST (SGOT) 24 43*   BILIRUBIN 0.8 0.4   INDIRECT BILIRUBIN 0.6  --    BILIRUBIN DIRECT 0.2  --    ALK PHOS 168* 175*         Lab 09/19/23  0525 09/18/23  0449 09/17/23  0831 09/16/23  2159   PROBNP  --   --   --  8,668.0*   HSTROP T  --   --  19* 15*   PROTIME 18.1* 15.6*  --  14.4   INR 1.50* 1.24*  --  1.11                 Lab 09/17/23  1354 09/17/23  0822   PH, ARTERIAL 7.481* 7.417   PCO2, ARTERIAL 34.9* 42.9   PO2 ART 78.1* 131.4*   O2 SATURATION ART 96.1 98.4   FIO2 28  28  --    HCO3 ART 25.5 27.0*   BASE EXCESS ART 2.4* 2.1*   CARBOXYHEMOGLOBIN 0.8  0.6 1.7*     Brief Urine Lab Results  (Last result in the past 365 days)        Color   Clarity   Blood   Leuk Est   Nitrite   Protein   CREAT   Urine HCG        08/27/23 1925 Yellow   Clear   Small (1+)   Negative   Negative   100 mg/dL (2+)                 Microbiology Results (last 10 days)       Procedure Component Value - Date/Time    COVID-19,CEPHEID/KIMBERLEE,COR/RONALD/PAD/JANETTE/MAD IN-HOUSE(OR EMERGENT/ADD-ON),NP SWAB IN TRANSPORT MEDIA 3-4 HR TAT, RT-PCR - Swab, Nasopharynx [[id number]]  (Normal) Collected: 09/17/23 1105    Lab Status: Final result Specimen: Swab from Nasopharynx Updated: 09/17/23 1217     COVID19 Not Detected    Narrative:      Fact sheet for providers: https://www.fda.gov/media/123942/download     Fact sheet for patients: https://www.fda.gov/media/106054/download  Fact sheet for providers: https://www.fda.gov/media/775015/download     Fact sheet for patients: https://www.fda.gov/media/165240/download    Influenza Antigen, Rapid - Swab, Nasopharynx [[id number]]  (Normal) Collected: 09/17/23 1105    Lab Status: Final result Specimen: Swab from Nasopharynx Updated: 09/17/23 1154     Influenza A Ag, EIA Negative     Influenza B Ag, EIA Negative    Blood Culture - Blood, Arm, Left [[id number]]  (Normal)  Collected: 09/17/23 0945    Lab Status: Preliminary result Specimen: Blood from Arm, Left Updated: 09/19/23 1015     Blood Culture No growth at 2 days    Blood Culture - Blood, Arm, Right [[id number]]  (Normal) Collected: 09/17/23 0945    Lab Status: Preliminary result Specimen: Blood from Arm, Right Updated: 09/19/23 1015     Blood Culture No growth at 2 days            CT Chest With Contrast Diagnostic    Result Date: 8/27/2023  Impression:   1. No definite findings of acute pulmonary embolism. 2. Contrast bolus timing somewhat limits assessment of the distal pulmonary arteries. 3. Cardiomegaly with findings as above which may indicate right heart failure. 4. Moderate bilateral pleural effusions and findings of suspected pulmonary edema in the lung bases which may be related to heart failure.  Mild mediastinal and hilar lymphadenopathy, nonspecific. 5. Mild-to-moderate emphysema.  Correlate with risk factors to see if patient qualifies for low-dose CT lung cancer screening.     DARLINE IZQUIERDO MD       Electronically Signed and Approved By: DARLINE IZQUIERDO MD on 8/27/2023 at 17:19             XR Chest 1 View    Result Date: 9/16/2023  Impression:  Bilateral infiltrates are seen.  The findings may represent infectious multifocal pneumonia.  Pulmonary edema is possible.      Please note that portions of this note were completed with a voice recognition program.  MAHOGANY ELIZONDO JR, MD       Electronically Signed and Approved By: MAHOGANY ELIZONDO JR, MD on 9/16/2023 at 23:07              XR Chest 1 View    Result Date: 8/27/2023  Impression:   New basilar predominant airspace opacities which may indicate pneumonia or edema.       DARLINE IZQUIERDO MD       Electronically Signed and Approved By: DARLINE IZQUIERDO MD on 8/27/2023 at 16:05                          Imaging Results (All)       Procedure Component Value Units Date/Time    XR Chest 1 View [[id number]] Collected: 09/16/23 2307     Updated: 09/16/23 2310    Narrative:       PROCEDURE: XR CHEST 1 VW     COMPARISON: 8/27/2023.     INDICATIONS: SOA/SOB/shortness of air/shortness of breath.     FINDINGS: A single PA upright portable view of the chest is provided for review.  Bilateral   infiltrates are seen.  The findings may represent infectious multifocal pneumonia.  Mild cardiac   enlargement is suggested.  No pneumothorax.  No pneumomediastinum.  No pleural effusion.  There is   slight pulmonary hypoinflation.  Degenerative changes involve the bilateral shoulders and the   spine.       Impression:       Bilateral infiltrates are seen.  The findings may represent infectious multifocal   pneumonia.  Pulmonary edema is possible.                 Please note that portions of this note were completed with a voice recognition program.     MAHOGANY ELIZONDO JR, MD         Electronically Signed and Approved By: MAHOGANY ELIZONDO JR, MD on 9/16/2023 at 23:07                                 Labs Pending at Discharge:  Pending Labs       Order Current Status    Blood Culture - Blood, Arm, Left Preliminary result    Blood Culture - Blood, Arm, Right Preliminary result                Time spent on Discharge including face to face service: 32 minutes  Part of this note may be an electronic transcription/translation of spoken language to printed text using the Dragon Dictation System.     TElectronically signed by Alex Angulo MD, 09/19/23, 2:26 PM EDT.

## 2023-09-20 NOTE — OUTREACH NOTE
Prep Survey      Flowsheet Row Responses   [facility] facility patient discharged from? [facility]   Is LACE score < 7 ? No   Eligibility Readm Mgmt   Discharge diagnosis CHF   Does the patient have one of the following disease processes/diagnoses(primary or secondary)? CHF   Does the patient have Home health ordered? No   Is there a DME ordered? No   Medication alerts for this patient See AVS   Prep survey completed? Yes            Chani BROWN - Registered Nurse

## 2023-09-22 LAB
BACTERIA SPEC AEROBE CULT: NORMAL
BACTERIA SPEC AEROBE CULT: NORMAL

## 2023-09-29 ENCOUNTER — READMISSION MANAGEMENT (OUTPATIENT)
Dept: CALL CENTER | Facility: HOSPITAL | Age: 58
End: 2023-09-29
Payer: COMMERCIAL

## 2023-09-29 NOTE — OUTREACH NOTE
CHF Week 2 Survey      Flowsheet Row Responses   [facility] facility patient discharged from? [facility]   Does the patient have one of the following disease processes/diagnoses(primary or secondary)? CHF   Week 2 attempt successful? No   Unsuccessful attempts Attempt 1            Kristen SUAREZ - Registered Nurse

## 2023-10-03 ENCOUNTER — READMISSION MANAGEMENT (OUTPATIENT)
Dept: CALL CENTER | Facility: HOSPITAL | Age: 58
End: 2023-10-03
Payer: COMMERCIAL

## 2023-10-03 NOTE — OUTREACH NOTE
CHF Week 2 Survey      Flowsheet Row Responses   [facility] facility patient discharged from? [facility]   Does the patient have one of the following disease processes/diagnoses(primary or secondary)? CHF   Week 2 attempt successful? No   Unsuccessful attempts Attempt 2            Ana GASCA - Licensed Nurse

## 2023-10-11 ENCOUNTER — READMISSION MANAGEMENT (OUTPATIENT)
Dept: CALL CENTER | Facility: HOSPITAL | Age: 58
End: 2023-10-11
Payer: COMMERCIAL

## 2023-10-11 NOTE — OUTREACH NOTE
CHF Week 3 Survey      Flowsheet Row Responses   [facility] facility patient discharged from? [facility]   Does the patient have one of the following disease processes/diagnoses(primary or secondary)? CHF   Week 3 attempt successful? No   Unsuccessful attempts Attempt 1            Ana GASCA - Licensed Nurse

## 2023-10-17 ENCOUNTER — APPOINTMENT (OUTPATIENT)
Dept: GENERAL RADIOLOGY | Facility: HOSPITAL | Age: 58
DRG: 291 | End: 2023-10-17
Payer: COMMERCIAL

## 2023-10-17 ENCOUNTER — HOSPITAL ENCOUNTER (EMERGENCY)
Facility: HOSPITAL | Age: 58
Discharge: LEFT BEFORE DISCHARGE | DRG: 291 | End: 2023-10-17
Attending: EMERGENCY MEDICINE | Admitting: EMERGENCY MEDICINE
Payer: COMMERCIAL

## 2023-10-17 ENCOUNTER — READMISSION MANAGEMENT (OUTPATIENT)
Dept: CALL CENTER | Facility: HOSPITAL | Age: 58
End: 2023-10-17
Payer: COMMERCIAL

## 2023-10-17 VITALS
SYSTOLIC BLOOD PRESSURE: 151 MMHG | RESPIRATION RATE: 36 BRPM | BODY MASS INDEX: 20.65 KG/M2 | TEMPERATURE: 97.5 F | HEART RATE: 104 BPM | HEIGHT: 73 IN | DIASTOLIC BLOOD PRESSURE: 107 MMHG | OXYGEN SATURATION: 97 %

## 2023-10-17 DIAGNOSIS — I50.9 ACUTE ON CHRONIC CONGESTIVE HEART FAILURE, UNSPECIFIED HEART FAILURE TYPE: Primary | ICD-10-CM

## 2023-10-17 DIAGNOSIS — R07.9 CHEST PAIN, UNSPECIFIED TYPE: ICD-10-CM

## 2023-10-17 LAB
ALBUMIN SERPL-MCNC: 3.6 G/DL (ref 3.5–5.2)
ALBUMIN/GLOB SERPL: 1.3 G/DL
ALP SERPL-CCNC: 256 U/L (ref 39–117)
ALT SERPL W P-5'-P-CCNC: 59 U/L (ref 1–41)
ANION GAP SERPL CALCULATED.3IONS-SCNC: 10.1 MMOL/L (ref 5–15)
AST SERPL-CCNC: 35 U/L (ref 1–40)
BASOPHILS # BLD AUTO: 0.07 10*3/MM3 (ref 0–0.2)
BASOPHILS NFR BLD AUTO: 0.7 % (ref 0–1.5)
BILIRUB SERPL-MCNC: 0.8 MG/DL (ref 0–1.2)
BUN SERPL-MCNC: 20 MG/DL (ref 6–20)
BUN/CREAT SERPL: 22.2 (ref 7–25)
CALCIUM SPEC-SCNC: 9 MG/DL (ref 8.6–10.5)
CHLORIDE SERPL-SCNC: 99 MMOL/L (ref 98–107)
CO2 SERPL-SCNC: 22.9 MMOL/L (ref 22–29)
CREAT SERPL-MCNC: 0.9 MG/DL (ref 0.76–1.27)
DEPRECATED RDW RBC AUTO: 45.2 FL (ref 37–54)
EGFRCR SERPLBLD CKD-EPI 2021: 99 ML/MIN/1.73
EOSINOPHIL # BLD AUTO: 0.08 10*3/MM3 (ref 0–0.4)
EOSINOPHIL NFR BLD AUTO: 0.8 % (ref 0.3–6.2)
ERYTHROCYTE [DISTWIDTH] IN BLOOD BY AUTOMATED COUNT: 13.9 % (ref 12.3–15.4)
FLUAV SUBTYP SPEC NAA+PROBE: NOT DETECTED
FLUBV RNA ISLT QL NAA+PROBE: NOT DETECTED
GLOBULIN UR ELPH-MCNC: 2.7 GM/DL
GLUCOSE SERPL-MCNC: 121 MG/DL (ref 65–99)
HCT VFR BLD AUTO: 40.3 % (ref 37.5–51)
HGB BLD-MCNC: 12.9 G/DL (ref 13–17.7)
HOLD SPECIMEN: NORMAL
HOLD SPECIMEN: NORMAL
IMM GRANULOCYTES # BLD AUTO: 0.03 10*3/MM3 (ref 0–0.05)
IMM GRANULOCYTES NFR BLD AUTO: 0.3 % (ref 0–0.5)
LIPASE SERPL-CCNC: 15 U/L (ref 13–60)
LYMPHOCYTES # BLD AUTO: 1.78 10*3/MM3 (ref 0.7–3.1)
LYMPHOCYTES NFR BLD AUTO: 17.7 % (ref 19.6–45.3)
MAGNESIUM SERPL-MCNC: 1.9 MG/DL (ref 1.6–2.6)
MCH RBC QN AUTO: 28.6 PG (ref 26.6–33)
MCHC RBC AUTO-ENTMCNC: 32 G/DL (ref 31.5–35.7)
MCV RBC AUTO: 89.4 FL (ref 79–97)
MONOCYTES # BLD AUTO: 1.16 10*3/MM3 (ref 0.1–0.9)
MONOCYTES NFR BLD AUTO: 11.6 % (ref 5–12)
NEUTROPHILS NFR BLD AUTO: 6.92 10*3/MM3 (ref 1.7–7)
NEUTROPHILS NFR BLD AUTO: 68.9 % (ref 42.7–76)
NRBC BLD AUTO-RTO: 0 /100 WBC (ref 0–0.2)
NT-PROBNP SERPL-MCNC: ABNORMAL PG/ML (ref 0–900)
PLATELET # BLD AUTO: 393 10*3/MM3 (ref 140–450)
PMV BLD AUTO: 9.8 FL (ref 6–12)
POTASSIUM SERPL-SCNC: 4 MMOL/L (ref 3.5–5.2)
PROT SERPL-MCNC: 6.3 G/DL (ref 6–8.5)
QT INTERVAL: 352 MS
QTC INTERVAL: 472 MS
RBC # BLD AUTO: 4.51 10*6/MM3 (ref 4.14–5.8)
RSV RNA NPH QL NAA+NON-PROBE: NOT DETECTED
SARS-COV-2 RNA RESP QL NAA+PROBE: NOT DETECTED
SODIUM SERPL-SCNC: 132 MMOL/L (ref 136–145)
TROPONIN T SERPL HS-MCNC: 29 NG/L
WBC NRBC COR # BLD: 10.04 10*3/MM3 (ref 3.4–10.8)
WHOLE BLOOD HOLD COAG: NORMAL
WHOLE BLOOD HOLD SPECIMEN: NORMAL

## 2023-10-17 PROCEDURE — 85025 COMPLETE CBC W/AUTO DIFF WBC: CPT

## 2023-10-17 PROCEDURE — 83690 ASSAY OF LIPASE: CPT

## 2023-10-17 PROCEDURE — 80053 COMPREHEN METABOLIC PANEL: CPT

## 2023-10-17 PROCEDURE — 83735 ASSAY OF MAGNESIUM: CPT

## 2023-10-17 PROCEDURE — 71045 X-RAY EXAM CHEST 1 VIEW: CPT

## 2023-10-17 PROCEDURE — 93005 ELECTROCARDIOGRAM TRACING: CPT | Performed by: EMERGENCY MEDICINE

## 2023-10-17 PROCEDURE — 87637 SARSCOV2&INF A&B&RSV AMP PRB: CPT | Performed by: EMERGENCY MEDICINE

## 2023-10-17 PROCEDURE — 83880 ASSAY OF NATRIURETIC PEPTIDE: CPT

## 2023-10-17 PROCEDURE — 93010 ELECTROCARDIOGRAM REPORT: CPT | Performed by: INTERNAL MEDICINE

## 2023-10-17 PROCEDURE — 84484 ASSAY OF TROPONIN QUANT: CPT

## 2023-10-17 PROCEDURE — 36415 COLL VENOUS BLD VENIPUNCTURE: CPT

## 2023-10-17 PROCEDURE — 93005 ELECTROCARDIOGRAM TRACING: CPT

## 2023-10-17 PROCEDURE — 99284 EMERGENCY DEPT VISIT MOD MDM: CPT

## 2023-10-17 RX ORDER — IPRATROPIUM BROMIDE AND ALBUTEROL SULFATE 2.5; .5 MG/3ML; MG/3ML
6 SOLUTION RESPIRATORY (INHALATION) ONCE
Status: DISCONTINUED | OUTPATIENT
Start: 2023-10-17 | End: 2023-10-17 | Stop reason: HOSPADM

## 2023-10-17 RX ORDER — METHYLPREDNISOLONE SODIUM SUCCINATE 125 MG/2ML
125 INJECTION, POWDER, LYOPHILIZED, FOR SOLUTION INTRAMUSCULAR; INTRAVENOUS ONCE
Status: DISCONTINUED | OUTPATIENT
Start: 2023-10-17 | End: 2023-10-17 | Stop reason: HOSPADM

## 2023-10-17 RX ORDER — SODIUM CHLORIDE 0.9 % (FLUSH) 0.9 %
10 SYRINGE (ML) INJECTION AS NEEDED
Status: DISCONTINUED | OUTPATIENT
Start: 2023-10-17 | End: 2023-10-17 | Stop reason: HOSPADM

## 2023-10-17 RX ORDER — ASPIRIN 81 MG/1
324 TABLET, CHEWABLE ORAL ONCE
Status: DISCONTINUED | OUTPATIENT
Start: 2023-10-17 | End: 2023-10-17 | Stop reason: HOSPADM

## 2023-10-17 NOTE — OUTREACH NOTE
CHF Week 3 Survey      Flowsheet Row Responses   [facility] facility patient discharged from? [facility]   Does the patient have one of the following disease processes/diagnoses(primary or secondary)? CHF   Week 3 attempt successful? No   Unsuccessful attempts Attempt 2            Kristen SUAREZ - Registered Nurse

## 2023-10-17 NOTE — ED NOTES
Patient's IV in right arm was occluded. Went to remove IV, and patient said he did not want anymore IV's, and began cursing and demanding to see the doctor again. I told him I would tell him, but I have no way to give him medication. He stated that he did not care, and kept cursing. And threw all his leads, and monitors on the floor. Went to find MD and he is aware. As this nurse was coming up the hallway, patient was fully dressed, and asked where the exit was. Patient eloped.

## 2023-10-18 ENCOUNTER — HOSPITAL ENCOUNTER (INPATIENT)
Facility: HOSPITAL | Age: 58
LOS: 4 days | Discharge: HOME | DRG: 291 | End: 2023-10-23
Attending: EMERGENCY MEDICINE | Admitting: HOSPITALIST
Payer: COMMERCIAL

## 2023-10-18 ENCOUNTER — APPOINTMENT (OUTPATIENT)
Dept: CT IMAGING | Facility: HOSPITAL | Age: 58
DRG: 291 | End: 2023-10-18
Payer: COMMERCIAL

## 2023-10-18 ENCOUNTER — APPOINTMENT (OUTPATIENT)
Dept: GENERAL RADIOLOGY | Facility: HOSPITAL | Age: 58
DRG: 291 | End: 2023-10-18
Payer: COMMERCIAL

## 2023-10-18 DIAGNOSIS — R26.2 DIFFICULTY IN WALKING: ICD-10-CM

## 2023-10-18 DIAGNOSIS — B19.20 HEPATITIS C VIRUS INFECTION WITHOUT HEPATIC COMA, UNSPECIFIED CHRONICITY: ICD-10-CM

## 2023-10-18 DIAGNOSIS — Z78.9 DECREASED ACTIVITIES OF DAILY LIVING (ADL): ICD-10-CM

## 2023-10-18 DIAGNOSIS — I50.9 ACUTE ON CHRONIC CONGESTIVE HEART FAILURE, UNSPECIFIED HEART FAILURE TYPE: Primary | ICD-10-CM

## 2023-10-18 DIAGNOSIS — R09.02 HYPOXIA: ICD-10-CM

## 2023-10-18 LAB
ALBUMIN SERPL-MCNC: 3.5 G/DL (ref 3.5–5.2)
ALBUMIN/GLOB SERPL: 1.3 G/DL
ALP SERPL-CCNC: 299 U/L (ref 39–117)
ALT SERPL W P-5'-P-CCNC: 82 U/L (ref 1–41)
ANION GAP SERPL CALCULATED.3IONS-SCNC: 12.7 MMOL/L (ref 5–15)
APTT PPP: 29.3 SECONDS (ref 78–95.9)
AST SERPL-CCNC: 64 U/L (ref 1–40)
BASE EXCESS BLDA CALC-SCNC: -4.7 MMOL/L (ref -2–2)
BASOPHILS # BLD AUTO: 0.06 10*3/MM3 (ref 0–0.2)
BASOPHILS NFR BLD AUTO: 0.6 % (ref 0–1.5)
BDY SITE: ABNORMAL
BILIRUB SERPL-MCNC: 1.1 MG/DL (ref 0–1.2)
BUN SERPL-MCNC: 23 MG/DL (ref 6–20)
BUN/CREAT SERPL: 16 (ref 7–25)
CALCIUM SPEC-SCNC: 9.1 MG/DL (ref 8.6–10.5)
CHLORIDE SERPL-SCNC: 103 MMOL/L (ref 98–107)
CO2 SERPL-SCNC: 23.3 MMOL/L (ref 22–29)
COHGB MFR BLD: 1.3 % (ref 0–1.5)
CREAT SERPL-MCNC: 1.44 MG/DL (ref 0.76–1.27)
DEPRECATED RDW RBC AUTO: 47.9 FL (ref 37–54)
EGFRCR SERPLBLD CKD-EPI 2021: 56.3 ML/MIN/1.73
EOSINOPHIL # BLD AUTO: 0.09 10*3/MM3 (ref 0–0.4)
EOSINOPHIL NFR BLD AUTO: 0.9 % (ref 0.3–6.2)
ERYTHROCYTE [DISTWIDTH] IN BLOOD BY AUTOMATED COUNT: 14.3 % (ref 12.3–15.4)
FHHB: 13.8 % (ref 0–5)
GLOBULIN UR ELPH-MCNC: 2.8 GM/DL
GLUCOSE BLDC GLUCOMTR-MCNC: 110 MG/DL (ref 70–99)
GLUCOSE SERPL-MCNC: 132 MG/DL (ref 65–99)
HCO3 BLDA-SCNC: 20.1 MMOL/L (ref 22–26)
HCT VFR BLD AUTO: 47.5 % (ref 37.5–51)
HGB BLD-MCNC: 14.5 G/DL (ref 13–17.7)
HGB BLDA-MCNC: 15 G/DL (ref 13.8–16.4)
HOLD SPECIMEN: NORMAL
HOLD SPECIMEN: NORMAL
IMM GRANULOCYTES # BLD AUTO: 0.03 10*3/MM3 (ref 0–0.05)
IMM GRANULOCYTES NFR BLD AUTO: 0.3 % (ref 0–0.5)
INHALED O2 CONCENTRATION: 21 %
INR PPP: 1.23 (ref 0.86–1.15)
INR PPP: 1.46 (ref 0.86–1.15)
LACTATE BLDA-SCNC: ABNORMAL MMOL/L
LYMPHOCYTES # BLD AUTO: 1.93 10*3/MM3 (ref 0.7–3.1)
LYMPHOCYTES NFR BLD AUTO: 18.7 % (ref 19.6–45.3)
MAGNESIUM SERPL-MCNC: 1.9 MG/DL (ref 1.6–2.6)
MCH RBC QN AUTO: 27.9 PG (ref 26.6–33)
MCHC RBC AUTO-ENTMCNC: 30.5 G/DL (ref 31.5–35.7)
MCV RBC AUTO: 91.5 FL (ref 79–97)
METHGB BLD QL: 0.2 % (ref 0–1.5)
MODALITY: ABNORMAL
MONOCYTES # BLD AUTO: 1.09 10*3/MM3 (ref 0.1–0.9)
MONOCYTES NFR BLD AUTO: 10.6 % (ref 5–12)
NEUTROPHILS NFR BLD AUTO: 68.9 % (ref 42.7–76)
NEUTROPHILS NFR BLD AUTO: 7.11 10*3/MM3 (ref 1.7–7)
NRBC BLD AUTO-RTO: 0 /100 WBC (ref 0–0.2)
NT-PROBNP SERPL-MCNC: ABNORMAL PG/ML (ref 0–900)
OXYHGB MFR BLDV: 84.7 % (ref 94–99)
PCO2 BLDA: 36.7 MM HG (ref 35–45)
PH BLDA: 7.36 PH UNITS (ref 7.35–7.45)
PHOSPHATE SERPL-MCNC: 5.2 MG/DL (ref 2.5–4.5)
PLATELET # BLD AUTO: 426 10*3/MM3 (ref 140–450)
PMV BLD AUTO: 10.1 FL (ref 6–12)
PO2 BLD: 266 MM[HG] (ref 0–500)
PO2 BLDA: 55.9 MM HG (ref 80–100)
POTASSIUM SERPL-SCNC: 5 MMOL/L (ref 3.5–5.2)
PROCALCITONIN SERPL-MCNC: 0.05 NG/ML (ref 0–0.25)
PROT SERPL-MCNC: 6.3 G/DL (ref 6–8.5)
PROTHROMBIN TIME: 15.6 SECONDS (ref 11.8–14.9)
PROTHROMBIN TIME: 17.7 SECONDS (ref 11.8–14.9)
RBC # BLD AUTO: 5.19 10*6/MM3 (ref 4.14–5.8)
SAO2 % BLDCOA: 86 % (ref 95–99)
SODIUM SERPL-SCNC: 139 MMOL/L (ref 136–145)
TROPONIN T SERPL HS-MCNC: 35 NG/L
WBC NRBC COR # BLD: 10.31 10*3/MM3 (ref 3.4–10.8)
WHOLE BLOOD HOLD COAG: NORMAL
WHOLE BLOOD HOLD SPECIMEN: NORMAL

## 2023-10-18 PROCEDURE — G0378 HOSPITAL OBSERVATION PER HR: HCPCS

## 2023-10-18 PROCEDURE — 82805 BLOOD GASES W/O2 SATURATION: CPT | Performed by: HOSPITALIST

## 2023-10-18 PROCEDURE — 71250 CT THORAX DX C-: CPT

## 2023-10-18 PROCEDURE — 93010 ELECTROCARDIOGRAM REPORT: CPT | Performed by: INTERNAL MEDICINE

## 2023-10-18 PROCEDURE — 25010000002 ENOXAPARIN PER 10 MG: Performed by: HOSPITALIST

## 2023-10-18 PROCEDURE — 85730 THROMBOPLASTIN TIME PARTIAL: CPT | Performed by: EMERGENCY MEDICINE

## 2023-10-18 PROCEDURE — 63710000001 ONDANSETRON PER 8 MG: Performed by: HOSPITALIST

## 2023-10-18 PROCEDURE — 85610 PROTHROMBIN TIME: CPT | Performed by: HOSPITALIST

## 2023-10-18 PROCEDURE — 94799 UNLISTED PULMONARY SVC/PX: CPT

## 2023-10-18 PROCEDURE — 85025 COMPLETE CBC W/AUTO DIFF WBC: CPT

## 2023-10-18 PROCEDURE — 83735 ASSAY OF MAGNESIUM: CPT | Performed by: HOSPITALIST

## 2023-10-18 PROCEDURE — 84484 ASSAY OF TROPONIN QUANT: CPT | Performed by: EMERGENCY MEDICINE

## 2023-10-18 PROCEDURE — 82948 REAGENT STRIP/BLOOD GLUCOSE: CPT

## 2023-10-18 PROCEDURE — 83880 ASSAY OF NATRIURETIC PEPTIDE: CPT

## 2023-10-18 PROCEDURE — 93005 ELECTROCARDIOGRAM TRACING: CPT | Performed by: EMERGENCY MEDICINE

## 2023-10-18 PROCEDURE — 36600 WITHDRAWAL OF ARTERIAL BLOOD: CPT | Performed by: HOSPITALIST

## 2023-10-18 PROCEDURE — 93005 ELECTROCARDIOGRAM TRACING: CPT

## 2023-10-18 PROCEDURE — 36415 COLL VENOUS BLD VENIPUNCTURE: CPT

## 2023-10-18 PROCEDURE — 63710000001 PREDNISONE PER 1 MG: Performed by: HOSPITALIST

## 2023-10-18 PROCEDURE — 84145 PROCALCITONIN (PCT): CPT | Performed by: HOSPITALIST

## 2023-10-18 PROCEDURE — 84100 ASSAY OF PHOSPHORUS: CPT | Performed by: HOSPITALIST

## 2023-10-18 PROCEDURE — 94640 AIRWAY INHALATION TREATMENT: CPT

## 2023-10-18 PROCEDURE — 85610 PROTHROMBIN TIME: CPT | Performed by: EMERGENCY MEDICINE

## 2023-10-18 PROCEDURE — 99223 1ST HOSP IP/OBS HIGH 75: CPT | Performed by: HOSPITALIST

## 2023-10-18 PROCEDURE — 25010000002 FUROSEMIDE PER 20 MG: Performed by: EMERGENCY MEDICINE

## 2023-10-18 PROCEDURE — 83050 HGB METHEMOGLOBIN QUAN: CPT | Performed by: HOSPITALIST

## 2023-10-18 PROCEDURE — 82375 ASSAY CARBOXYHB QUANT: CPT | Performed by: HOSPITALIST

## 2023-10-18 PROCEDURE — 99285 EMERGENCY DEPT VISIT HI MDM: CPT

## 2023-10-18 PROCEDURE — 71045 X-RAY EXAM CHEST 1 VIEW: CPT

## 2023-10-18 PROCEDURE — 25010000002 FUROSEMIDE PER 20 MG: Performed by: HOSPITALIST

## 2023-10-18 PROCEDURE — 80053 COMPREHEN METABOLIC PANEL: CPT | Performed by: EMERGENCY MEDICINE

## 2023-10-18 RX ORDER — ACETAMINOPHEN 650 MG/1
650 SUPPOSITORY RECTAL EVERY 4 HOURS PRN
Status: DISCONTINUED | OUTPATIENT
Start: 2023-10-18 | End: 2023-10-23 | Stop reason: HOSPADM

## 2023-10-18 RX ORDER — POLYETHYLENE GLYCOL 3350 17 G/17G
17 POWDER, FOR SOLUTION ORAL DAILY PRN
Status: DISCONTINUED | OUTPATIENT
Start: 2023-10-18 | End: 2023-10-23 | Stop reason: HOSPADM

## 2023-10-18 RX ORDER — FUROSEMIDE 10 MG/ML
40 INJECTION INTRAMUSCULAR; INTRAVENOUS ONCE
Status: COMPLETED | OUTPATIENT
Start: 2023-10-18 | End: 2023-10-18

## 2023-10-18 RX ORDER — ONDANSETRON 2 MG/ML
4 INJECTION INTRAMUSCULAR; INTRAVENOUS EVERY 6 HOURS PRN
Status: DISCONTINUED | OUTPATIENT
Start: 2023-10-18 | End: 2023-10-23 | Stop reason: HOSPADM

## 2023-10-18 RX ORDER — SODIUM CHLORIDE 0.9 % (FLUSH) 0.9 %
10 SYRINGE (ML) INJECTION AS NEEDED
Status: DISCONTINUED | OUTPATIENT
Start: 2023-10-18 | End: 2023-10-19

## 2023-10-18 RX ORDER — ATORVASTATIN CALCIUM 40 MG/1
40 TABLET, FILM COATED ORAL NIGHTLY
Status: DISCONTINUED | OUTPATIENT
Start: 2023-10-18 | End: 2023-10-22

## 2023-10-18 RX ORDER — ARFORMOTEROL TARTRATE 15 UG/2ML
15 SOLUTION RESPIRATORY (INHALATION)
Status: DISCONTINUED | OUTPATIENT
Start: 2023-10-18 | End: 2023-10-23 | Stop reason: HOSPADM

## 2023-10-18 RX ORDER — CHOLECALCIFEROL (VITAMIN D3) 125 MCG
5 CAPSULE ORAL NIGHTLY PRN
Status: DISCONTINUED | OUTPATIENT
Start: 2023-10-18 | End: 2023-10-23 | Stop reason: HOSPADM

## 2023-10-18 RX ORDER — AMOXICILLIN 250 MG
2 CAPSULE ORAL 2 TIMES DAILY
Status: DISCONTINUED | OUTPATIENT
Start: 2023-10-18 | End: 2023-10-23 | Stop reason: HOSPADM

## 2023-10-18 RX ORDER — BUDESONIDE 0.5 MG/2ML
0.5 INHALANT ORAL
Status: DISCONTINUED | OUTPATIENT
Start: 2023-10-18 | End: 2023-10-23 | Stop reason: HOSPADM

## 2023-10-18 RX ORDER — FUROSEMIDE 10 MG/ML
40 INJECTION INTRAMUSCULAR; INTRAVENOUS ONCE
Status: DISCONTINUED | OUTPATIENT
Start: 2023-10-18 | End: 2023-10-18

## 2023-10-18 RX ORDER — ACETAMINOPHEN 325 MG/1
650 TABLET ORAL EVERY 4 HOURS PRN
Status: DISCONTINUED | OUTPATIENT
Start: 2023-10-18 | End: 2023-10-23 | Stop reason: HOSPADM

## 2023-10-18 RX ORDER — HYDROCODONE BITARTRATE AND ACETAMINOPHEN 5; 325 MG/1; MG/1
1 TABLET ORAL EVERY 6 HOURS PRN
Status: DISCONTINUED | OUTPATIENT
Start: 2023-10-18 | End: 2023-10-23 | Stop reason: HOSPADM

## 2023-10-18 RX ORDER — ACETAMINOPHEN 160 MG/5ML
650 SOLUTION ORAL EVERY 4 HOURS PRN
Status: DISCONTINUED | OUTPATIENT
Start: 2023-10-18 | End: 2023-10-23 | Stop reason: HOSPADM

## 2023-10-18 RX ORDER — IPRATROPIUM BROMIDE AND ALBUTEROL SULFATE 2.5; .5 MG/3ML; MG/3ML
SOLUTION RESPIRATORY (INHALATION)
Status: COMPLETED
Start: 2023-10-18 | End: 2023-10-18

## 2023-10-18 RX ORDER — PREDNISONE 20 MG/1
40 TABLET ORAL
Status: COMPLETED | OUTPATIENT
Start: 2023-10-18 | End: 2023-10-22

## 2023-10-18 RX ORDER — NICOTINE POLACRILEX 4 MG
15 LOZENGE BUCCAL
Status: DISCONTINUED | OUTPATIENT
Start: 2023-10-18 | End: 2023-10-23 | Stop reason: HOSPADM

## 2023-10-18 RX ORDER — SODIUM CHLORIDE 0.9 % (FLUSH) 0.9 %
10 SYRINGE (ML) INJECTION AS NEEDED
Status: DISCONTINUED | OUTPATIENT
Start: 2023-10-18 | End: 2023-10-23 | Stop reason: HOSPADM

## 2023-10-18 RX ORDER — ATORVASTATIN CALCIUM 40 MG/1
40 TABLET, FILM COATED ORAL
COMMUNITY

## 2023-10-18 RX ORDER — MELOXICAM 15 MG/1
15 TABLET ORAL DAILY
COMMUNITY
End: 2023-10-23 | Stop reason: HOSPADM

## 2023-10-18 RX ORDER — MORPHINE SULFATE 2 MG/ML
1 INJECTION, SOLUTION INTRAMUSCULAR; INTRAVENOUS EVERY 4 HOURS PRN
Status: DISCONTINUED | OUTPATIENT
Start: 2023-10-18 | End: 2023-10-19

## 2023-10-18 RX ORDER — BISACODYL 10 MG
10 SUPPOSITORY, RECTAL RECTAL DAILY PRN
Status: DISCONTINUED | OUTPATIENT
Start: 2023-10-18 | End: 2023-10-23 | Stop reason: HOSPADM

## 2023-10-18 RX ORDER — BISACODYL 5 MG/1
5 TABLET, DELAYED RELEASE ORAL DAILY PRN
Status: DISCONTINUED | OUTPATIENT
Start: 2023-10-18 | End: 2023-10-23 | Stop reason: HOSPADM

## 2023-10-18 RX ORDER — FAMOTIDINE 20 MG/1
40 TABLET, FILM COATED ORAL DAILY
Status: DISCONTINUED | OUTPATIENT
Start: 2023-10-18 | End: 2023-10-23 | Stop reason: HOSPADM

## 2023-10-18 RX ORDER — BUMETANIDE 0.25 MG/ML
2 INJECTION INTRAMUSCULAR; INTRAVENOUS
Status: DISCONTINUED | OUTPATIENT
Start: 2023-10-19 | End: 2023-10-20

## 2023-10-18 RX ORDER — ONDANSETRON 2 MG/ML
4 INJECTION INTRAMUSCULAR; INTRAVENOUS EVERY 6 HOURS PRN
Status: DISCONTINUED | OUTPATIENT
Start: 2023-10-18 | End: 2023-10-18 | Stop reason: SDUPTHER

## 2023-10-18 RX ORDER — SODIUM CHLORIDE 9 MG/ML
40 INJECTION, SOLUTION INTRAVENOUS AS NEEDED
Status: DISCONTINUED | OUTPATIENT
Start: 2023-10-18 | End: 2023-10-23 | Stop reason: HOSPADM

## 2023-10-18 RX ORDER — VENLAFAXINE HYDROCHLORIDE 37.5 MG/1
37.5 CAPSULE, EXTENDED RELEASE ORAL DAILY
Status: DISCONTINUED | OUTPATIENT
Start: 2023-10-18 | End: 2023-10-23 | Stop reason: HOSPADM

## 2023-10-18 RX ORDER — NICOTINE 21 MG/24HR
1 PATCH, TRANSDERMAL 24 HOURS TRANSDERMAL
Status: DISCONTINUED | OUTPATIENT
Start: 2023-10-18 | End: 2023-10-23 | Stop reason: HOSPADM

## 2023-10-18 RX ORDER — IPRATROPIUM BROMIDE AND ALBUTEROL SULFATE 2.5; .5 MG/3ML; MG/3ML
3 SOLUTION RESPIRATORY (INHALATION)
Status: DISCONTINUED | OUTPATIENT
Start: 2023-10-18 | End: 2023-10-22

## 2023-10-18 RX ORDER — WARFARIN SODIUM 7.5 MG/1
7.5 TABLET ORAL
Status: DISCONTINUED | OUTPATIENT
Start: 2023-10-18 | End: 2023-10-19

## 2023-10-18 RX ORDER — IBUPROFEN 600 MG/1
1 TABLET ORAL
Status: DISCONTINUED | OUTPATIENT
Start: 2023-10-18 | End: 2023-10-23 | Stop reason: HOSPADM

## 2023-10-18 RX ORDER — SODIUM CHLORIDE 0.9 % (FLUSH) 0.9 %
10 SYRINGE (ML) INJECTION EVERY 12 HOURS SCHEDULED
Status: DISCONTINUED | OUTPATIENT
Start: 2023-10-18 | End: 2023-10-23 | Stop reason: HOSPADM

## 2023-10-18 RX ORDER — CLONIDINE HYDROCHLORIDE 0.1 MG/1
0.1 TABLET ORAL 2 TIMES DAILY
COMMUNITY
End: 2023-10-23 | Stop reason: HOSPADM

## 2023-10-18 RX ORDER — ENOXAPARIN SODIUM 100 MG/ML
1 INJECTION SUBCUTANEOUS EVERY 12 HOURS
Status: DISCONTINUED | OUTPATIENT
Start: 2023-10-18 | End: 2023-10-21

## 2023-10-18 RX ORDER — PROPRANOLOL HYDROCHLORIDE 80 MG/1
80 CAPSULE, EXTENDED RELEASE ORAL DAILY
Status: ON HOLD | COMMUNITY
Start: 2023-09-29 | End: 2023-10-19

## 2023-10-18 RX ORDER — INSULIN LISPRO 100 [IU]/ML
2-9 INJECTION, SOLUTION INTRAVENOUS; SUBCUTANEOUS
Status: DISCONTINUED | OUTPATIENT
Start: 2023-10-18 | End: 2023-10-23 | Stop reason: HOSPADM

## 2023-10-18 RX ORDER — BUMETANIDE 0.25 MG/ML
2 INJECTION INTRAMUSCULAR; INTRAVENOUS ONCE
Status: CANCELLED | OUTPATIENT
Start: 2023-10-18 | End: 2023-10-18

## 2023-10-18 RX ORDER — DEXTROSE MONOHYDRATE 25 G/50ML
25 INJECTION, SOLUTION INTRAVENOUS
Status: DISCONTINUED | OUTPATIENT
Start: 2023-10-18 | End: 2023-10-23 | Stop reason: HOSPADM

## 2023-10-18 RX ORDER — ONDANSETRON 4 MG/1
4 TABLET, FILM COATED ORAL EVERY 6 HOURS PRN
Status: DISCONTINUED | OUTPATIENT
Start: 2023-10-18 | End: 2023-10-23 | Stop reason: HOSPADM

## 2023-10-18 RX ADMIN — IPRATROPIUM BROMIDE AND ALBUTEROL SULFATE 3 ML: .5; 3 SOLUTION RESPIRATORY (INHALATION) at 18:37

## 2023-10-18 RX ADMIN — PREDNISONE 40 MG: 20 TABLET ORAL at 20:50

## 2023-10-18 RX ADMIN — ARFORMOTEROL TARTRATE 15 MCG: 15 SOLUTION RESPIRATORY (INHALATION) at 23:18

## 2023-10-18 RX ADMIN — WARFARIN SODIUM 7.5 MG: 7.5 TABLET ORAL at 20:51

## 2023-10-18 RX ADMIN — BUDESONIDE 0.5 MG: 0.5 INHALANT RESPIRATORY (INHALATION) at 23:18

## 2023-10-18 RX ADMIN — ENOXAPARIN SODIUM 80 MG: 100 INJECTION SUBCUTANEOUS at 20:51

## 2023-10-18 RX ADMIN — ATORVASTATIN CALCIUM 40 MG: 40 TABLET, FILM COATED ORAL at 20:50

## 2023-10-18 RX ADMIN — FUROSEMIDE 40 MG: 10 INJECTION, SOLUTION INTRAMUSCULAR; INTRAVENOUS at 16:43

## 2023-10-18 RX ADMIN — FUROSEMIDE 40 MG: 10 INJECTION, SOLUTION INTRAMUSCULAR; INTRAVENOUS at 20:51

## 2023-10-18 RX ADMIN — ONDANSETRON HYDROCHLORIDE 4 MG: 4 TABLET, FILM COATED ORAL at 20:51

## 2023-10-18 RX ADMIN — FAMOTIDINE 40 MG: 20 TABLET, FILM COATED ORAL at 20:50

## 2023-10-18 RX ADMIN — Medication 10 ML: at 20:51

## 2023-10-18 RX ADMIN — IPRATROPIUM BROMIDE AND ALBUTEROL SULFATE 3 ML: .5; 3 SOLUTION RESPIRATORY (INHALATION) at 23:18

## 2023-10-18 NOTE — H&P
[facility]  Date: 10/18/2023   Patient Name: Regulo Sepulveda  : 1965  MRN: [id number]  Primary Care Physician:  Grace Cruz APRN  Date of admission: 10/18/2023    Subjective shortness of breath  Subjective     Chief Complaint: Shortness of breath    HPI: Patient is a 58-year-old man with a past medical history of congestive heart failure with an EF of 25%, LV thrombus, A-fib, COPD, hypertension, hyperlipidemia, bipolar disorder, who is presented to the emergency room with worsening shortness of breath.  Patient has been in the hospital multiple times for CHF exacerbation.  Patient most recently was discharged on 2023 for CHF exacerbation.  Patient came into the ER with worsening shortness of breath yesterday as well.    On arrival to the ED, patient temperature 97.6, pulse of 81, respiratory rate of 26, blood pressure 135/89 and he saturating 94% on room air.  He keeps saying that he is feels severely short of breath.  Chest x-ray shows similar to previous findings.  Septal thickening which may be seen with pulmonary edema or chronic lung disease.  Cardiomegaly.    On labs today, patient's white blood cell count is 10.31.  INR is 1.23.  proBNP is 23,881.  Creatinine is bumped up to 1.44.  ALT is 82, AST is 64, alkaline phosphatase is 299.  High-sensitivity troponin is 35.    Patient states that he is compliant with all his medications however his INR is subtherapeutic.      Personal History     Past Medical History:  Past Medical History:   Diagnosis Date    Anxiety     Asthma     Bipolar affective     Cardiac tamponade         CHF (congestive heart failure)     COPD (chronic obstructive pulmonary disease)     Coronary artery disease     Depression     Diabetes mellitus     Elevated cholesterol     Hypertension        Past Surgical History:  Past Surgical History:   Procedure Laterality Date    CARDIAC CATHETERIZATION Right 2023    Procedure: Right  and Left Heart Cath;  Surgeon: Ben Grant MD;  Location: [facility] INVASIVE LOCATION;  Service: Cardiovascular;  Laterality: Right;    TESTICLE SURGERY Left     extraction       Family History:   Family History   Problem Relation Age of Onset    Diabetes Mother     Depression Sister     Depression Brother        Social History:   Social History     Socioeconomic History    Marital status: Single   Tobacco Use    Smoking status: Every Day     Packs/day: 1.00     Years: 50.00     Additional pack years: 0.00     Total pack years: 50.00     Types: Cigarettes    Smokeless tobacco: Never   Vaping Use    Vaping Use: Every day    Substances: Nicotine   Substance and Sexual Activity    Alcohol use: Not Currently    Drug use: Not Currently     Types: Methamphetamines     Comment: LAST USED 6 months ago    Sexual activity: Defer       Home Medications:  ARIPiprazole ER, albuterol sulfate HFA, atorvastatin, cloNIDine, cyanocobalamin, dapagliflozin, losartan, meloxicam, metFORMIN, propranolol LA, and venlafaxine XR    Allergies:  No Known Allergies    Review of Systems   All systems were reviewed and negative except for: Extreme shortness of breath    Objective   Objective     Vitals:   Temp:  [97.6 °F (36.4 °C)] 97.6 °F (36.4 °C)  Heart Rate:  [75-87] 75  Resp:  [26] 26  BP: (133-135)/() 133/89    Physical Exam    Constitutional: Distressed   Eyes: Pupils equal, sclerae anicteric, no conjunctival injection   HENT: NCAT, mucous membranes moist   Neck: Supple, no thyromegaly, no lymphadenopathy, trachea midline   Respiratory: Crackles   Cardiovascular: RRR, no murmurs, rubs, or gallops, palpable pedal pulses bilaterally   Gastrointestinal: Positive bowel sounds, soft, nontender, nondistended   Musculoskeletal: No bilateral ankle edema, no clubbing or cyanosis to extremities   Psychiatric: Distressed   Neurologic: Oriented x 3, strength symmetric in all extremities, Cranial Nerves grossly intact to confrontation, speech  clear   Skin: No rashes     Result Review    Result Review:  I have personally reviewed the results from the time of this admission to 10/18/2023 18:37 EDT and agree with these findings:  [x]  Laboratory  []  Microbiology  [x]  Radiology  []  EKG/Telemetry   []  Cardiology/Vascular   []  Pathology  [x]  Old records  []  Other:      Assessment & Plan   Assessment / Plan   Assessment/Plan:   #1 acute on chronic CHF  -TTE from last admission LVEF 20%, & LV thrombus.  -Patient had right and left heart cath.  The plan was to uptitrate GDMT.  - Bumex 2 mg IV twice daily  -We will need to start GDMT tomorrow.  Patient on ARB, beta-blocker, Jardiance, Bumex, spironolactone at home.    #2 LV thrombus and atrial fibrillation  -INR subtherapeutic.  Will bridge with Lovenox and have pharmacy resume Coumadin    #3 mild transaminitis secondary to congestion.    #4 COPD possible expect exacerbation with possible atypical infection  -We will get chest CT for better visualization  -Tad Schmitt Pulmicort, we will start the patient on 40 of prednisone daily.  -Nicotine patch for ongoing tobacco use  -Follow procalcitonin.  COVID screen ordered.    #5 history of polysubstance abuse  -We will check toxicology screen    #6 bipolar disorder continue home regimen    #7 diabetes continue insulin sliding scale    #8 TAYLOR-continue diuresis.  Hold nephrotoxic drugs.    DVT prophylaxis:  Lovenox bridge to Coumadin    CODE STATUS:     Full code    Admission Status:  I believe this patient meets observation status.    Electronically signed by Antoine Myrick DO, 10/18/23, 6:37 PM EDT.

## 2023-10-18 NOTE — ED NOTES
Patient ambulated on room air approximately 60 feet.  Pts oxygen sats were 92% at rest and dropped down to 87%.

## 2023-10-18 NOTE — ED PROVIDER NOTES
Time: 4:07 PM EDT  Date of encounter:  10/18/2023  Independent Historian/Clinical History and Information was obtained by:   Patient    History is limited by: N/A    Chief Complaint:  SOA      History of Present Illness:  Patient is a 58 y.o. year old male who presents to the emergency department for evaluation of shortness of breath.  Patient has a history of coronary disease, diabetes, hypertension, CHF who presents with shortness of breath.  States he was here yesterday for the same thing.  States he has not improved.  States he cannot walk from the bed to the door without getting extremely short of breath.  States he just cannot breathe at this time.  No other complaints this time.    HPI    Patient Care Team  Primary Care Provider: Grace Cruz APRN    Past Medical History:     No Known Allergies  Past Medical History:   Diagnosis Date    Anxiety     Asthma     Bipolar affective     Cardiac tamponade     2023    CHF (congestive heart failure)     COPD (chronic obstructive pulmonary disease)     Coronary artery disease     Depression     Diabetes mellitus     Elevated cholesterol     Hypertension      Past Surgical History:   Procedure Laterality Date    CARDIAC CATHETERIZATION Right 9/17/2023    Procedure: Right and Left Heart Cath;  Surgeon: Ben Grant MD;  Location: [facility] CATH INVASIVE LOCATION;  Service: Cardiovascular;  Laterality: Right;    TESTICLE SURGERY Left     extraction     Family History   Problem Relation Age of Onset    Diabetes Mother     Depression Sister     Depression Brother        Home Medications:  Prior to Admission medications    Medication Sig Start Date End Date Taking? Authorizing Provider   Abilify Maintena 300 MG Suspension Reconstituted ER IM injection ER Inject 300 mg into the appropriate muscle as directed by prescriber Every 30 (Thirty) Days. 7/10/23   Provider, MD Yuliya   albuterol sulfate  (90 Base) MCG/ACT inhaler Inhale 2 puffs Every 4 (Four) Hours As  Needed for Wheezing or Shortness of Air. Indications: Chronic Obstructive Lung Disease 3/3/23   Rogers Lipscomb MD   atorvastatin (Lipitor) 10 MG tablet Take 1 tablet by mouth Daily. 9/19/23 9/18/24  Alex Angulo MD   bumetanide (BUMEX) 2 MG tablet Take 0.5 tablets by mouth Daily.    Yuliya Blanchard MD   busPIRone (BUSPAR) 5 MG tablet Take 1 tablet by mouth Every Night.    Yuliya Blanchard MD   carvedilol (COREG) 25 MG tablet Take 1 tablet by mouth 2 (Two) Times a Day With Meals.    Yuliya Blanchard MD   Farxiga 5 MG tablet tablet Take 1 tablet by mouth Daily. Indications: Type 2 Diabetes 9/19/23   Alex Angulo MD   losartan (COZAAR) 50 MG tablet Take 1 tablet by mouth 2 (Two) Times a Day. 6/21/23   Yuliya Blanchard MD   metFORMIN (GLUCOPHAGE) 1000 MG tablet Take 1 tablet by mouth 2 (Two) Times a Day.    Yuliya Blanchard MD   pantoprazole (PROTONIX) 40 MG EC tablet Take 1 tablet by mouth Daily. Indications: Gastroesophageal Reflux Disease 3/3/23   Felton Garduno MD   spironolactone (ALDACTONE) 25 MG tablet Take 1 tablet by mouth Daily.    Yuliya Blanchard MD   venlafaxine XR (EFFEXOR-XR) 37.5 MG 24 hr capsule Take 1 capsule by mouth Daily. 6/21/23   Yuliya Blanchard MD   vitamin B-12 (VITAMIN B-12) 500 MCG tablet Take 1 tablet by mouth Daily. Indications: Inadequate Vitamin B12 3/4/23   Felton Garduno MD   warfarin (Coumadin) 7.5 MG tablet Continue Lovenox injection and Coumadin until INR is 2 and then stop Lovenox injection. 9/2/23   Alex Angulo MD        Social History:   Social History     Tobacco Use    Smoking status: Every Day     Packs/day: 1.00     Years: 50.00     Additional pack years: 0.00     Total pack years: 50.00     Types: Cigarettes    Smokeless tobacco: Never   Vaping Use    Vaping Use: Every day    Substances: Nicotine   Substance Use Topics    Alcohol use: Not Currently    Drug use: Not Currently     Types: Methamphetamines     Comment: LAST USED 6  "months ago         Review of Systems:  Review of Systems   Respiratory:  Positive for shortness of breath.         Physical Exam:  /89   Pulse 80   Temp 97.6 °F (36.4 °C) (Oral)   Resp 26   Ht 185.4 cm (73\")   Wt 81.5 kg (179 lb 10.8 oz)   SpO2 95%   BMI 23.71 kg/m²     Physical Exam  Vitals and nursing note reviewed.   Constitutional:       Appearance: Normal appearance.   HENT:      Head: Normocephalic and atraumatic.   Eyes:      General: No scleral icterus.  Cardiovascular:      Rate and Rhythm: Normal rate and regular rhythm.   Pulmonary:      Effort: Pulmonary effort is normal.      Comments: Coarse breath sounds  Abdominal:      Palpations: Abdomen is soft.      Tenderness: There is no abdominal tenderness.   Musculoskeletal:         General: Normal range of motion.      Cervical back: Normal range of motion.   Skin:     Findings: No rash.   Neurological:      General: No focal deficit present.      Mental Status: He is alert.                  Procedures:  Procedures      Medical Decision Making:      Comorbidities that affect care:    Atrial Fibrillation, Congestive Heart Failure, COPD    External Notes reviewed:    Reviewed admission from 9/17/2023      The following orders were placed and all results were independently analyzed by me:  Orders Placed This Encounter   Procedures    XR Chest 1 View    [place] Draw    Comprehensive Metabolic Panel    BNP    Single High Sensitivity Troponin T    CBC Auto Differential    Protime-INR    aPTT    NPO Diet NPO Type: Strict NPO    Undress & Gown    Continuous Pulse Oximetry    Vital Signs    Inpatient Hospitalist Consult    Oxygen Therapy- Nasal Cannula; Titrate 1-6 LPM Per SpO2; 90 - 95%    ECG 12 Lead ED Triage Standing Order; SOA    Insert Peripheral IV    CBC & Differential    Green Top (Gel)    Lavender Top    Gold Top - SST    Light Blue Top       Medications Given in the Emergency Department:  Medications   sodium chloride 0.9 % flush 10 mL (has " no administration in time range)   furosemide (LASIX) injection 40 mg (40 mg Intravenous Given 10/18/23 1643)        ED Course:    ED Course as of 10/18/23 1746   Wed Oct 18, 2023   1627 EKG interpreted by me  Time: 1511  Heart rate 81  Sinus, LVH, diffuse ST depressions similar to previous. [MA]   1744 Spoke with Dr. Myrick who agrees to admit [MA]      ED Course User Index  [MA] Prasanna Birmingham MD       Labs:    Lab Results (last 24 hours)       Procedure Component Value Units Date/Time    CBC & Differential [[id number]]  (Abnormal) Collected: 10/18/23 1519    Specimen: Blood Updated: 10/18/23 1525    Narrative:      The following orders were created for panel order CBC & Differential.  Procedure                               Abnormality         Status                     ---------                               -----------         ------                     CBC Auto Differential[[id number]]        Abnormal            Final result                 Please view results for these tests on the individual orders.    BNP [[id number]]  (Abnormal) Collected: 10/18/23 1519    Specimen: Blood Updated: 10/18/23 1545     proBNP 23,881.0 pg/mL     Narrative:      This assay is used as an aid in the diagnosis of individuals suspected of having heart failure. It can be used as an aid in the diagnosis of acute decompensated heart failure (ADHF) in patients presenting with signs and symptoms of ADHF to the emergency department (ED). In addition, NT-proBNP of <300 pg/mL indicates ADHF is not likely.    Age Range Result Interpretation  NT-proBNP Concentration (pg/mL:      <50             Positive            >450                   Gray                 300-450                    Negative             <300    50-75           Positive            >900                  Gray                300-900                  Negative            <300      >75             Positive            >1800                  Gray                300-1800                   Negative            <300    CBC Auto Differential [[id number]]  (Abnormal) Collected: 10/18/23 1519    Specimen: Blood Updated: 10/18/23 1525     WBC 10.31 10*3/mm3      RBC 5.19 10*6/mm3      Hemoglobin 14.5 g/dL      Hematocrit 47.5 %      MCV 91.5 fL      MCH 27.9 pg      MCHC 30.5 g/dL      RDW 14.3 %      RDW-SD 47.9 fl      MPV 10.1 fL      Platelets 426 10*3/mm3      Neutrophil % 68.9 %      Lymphocyte % 18.7 %      Monocyte % 10.6 %      Eosinophil % 0.9 %      Basophil % 0.6 %      Immature Grans % 0.3 %      Neutrophils, Absolute 7.11 10*3/mm3      Lymphocytes, Absolute 1.93 10*3/mm3      Monocytes, Absolute 1.09 10*3/mm3      Eosinophils, Absolute 0.09 10*3/mm3      Basophils, Absolute 0.06 10*3/mm3      Immature Grans, Absolute 0.03 10*3/mm3      nRBC 0.0 /100 WBC     Protime-INR [[id number]]  (Abnormal) Collected: 10/18/23 1519    Specimen: Blood Updated: 10/18/23 1637     Protime 15.6 Seconds      INR 1.23    Narrative:      Suggested Therapeutic Ranges For Oral Anticoagulant Therapy:  Level of Therapy                      INR Target Range  Standard Dose                            2.0-3.0  High Dose                                2.5-3.5  Patients not receiving anticoagulant  Therapy Normal Range                     0.86-1.15    aPTT [[id number]]  (Abnormal) Collected: 10/18/23 1519    Specimen: Blood Updated: 10/18/23 1637     PTT 29.3 seconds     Comprehensive Metabolic Panel [[id number]]  (Abnormal) Collected: 10/18/23 1559    Specimen: Blood Updated: 10/18/23 1638     Glucose 132 mg/dL      BUN 23 mg/dL      Creatinine 1.44 mg/dL      Sodium 139 mmol/L      Potassium 5.0 mmol/L      Comment: Slight hemolysis detected by analyzer. Results may be affected.        Chloride 103 mmol/L      CO2 23.3 mmol/L      Calcium 9.1 mg/dL      Total Protein 6.3 g/dL      Albumin 3.5 g/dL      ALT (SGPT) 82 U/L      AST (SGOT) 64 U/L      Alkaline Phosphatase 299 U/L      Total Bilirubin 1.1 mg/dL      Globulin  2.8 gm/dL      A/G Ratio 1.3 g/dL      BUN/Creatinine Ratio 16.0     Anion Gap 12.7 mmol/L      eGFR 56.3 mL/min/1.73     Narrative:      GFR Normal >60  Chronic Kidney Disease <60  Kidney Failure <15      Single High Sensitivity Troponin T [[id number]]  (Abnormal) Collected: 10/18/23 1559    Specimen: Blood Updated: 10/18/23 1635     HS Troponin T 35 ng/L     Narrative:      High Sensitive Troponin T Reference Range:  <10.0 ng/L- Negative Female for AMI  <15.0 ng/L- Negative Male for AMI  >=10 - Abnormal Female indicating possible myocardial injury.  >=15 - Abnormal Male indicating possible myocardial injury.   Clinicians would have to utilize clinical acumen, EKG, Troponin, and serial changes to determine if it is an Acute Myocardial Infarction or myocardial injury due to an underlying chronic condition.                  Imaging:    XR Chest 1 View    Result Date: 10/18/2023  PROCEDURE: XR CHEST 1 VW  COMPARISON: [facility], , XR CHEST 1 VW, 10/17/2023, 12:19.  INDICATIONS: SOA Triage Protocol  FINDINGS:  Lungs are normally expanded.  There is cardiomegaly.  No pneumothorax or pleural effusion or focal pulmonary parenchymal opacity.  There is septal thickening that is similar previous study that could be seen with chronic lung disease or pulmonary edema.       Findings similar previous examination.  Septal thickening which may be seen with pulmonary edema or chronic lung disease.  Cardiomegaly.       COREY MORALES MD       Electronically Signed and Approved By: COREY MORALES MD on 10/18/2023 at 15:34                Differential Diagnosis and Discussion:    Dyspnea: Differential diagnosis includes but is not limited to metabolic acidosis, neurological disorders, psychogenic, asthma, pneumothorax, upper airway obstruction, COPD, pneumonia, noncardiogenic pulmonary edema, interstitial lung disease, anemia, congestive heart failure, and pulmonary embolism    All labs were reviewed and interpreted by  me.  All X-rays impressions were independently interpreted by me.  EKG was interpreted by me.    MDM     Amount and/or Complexity of Data Reviewed  Decide to obtain previous medical records or to obtain history from someone other than the patient: yes       Patient with acute on chronic congestive heart failure.  Was only able to ambulate about 50 feet before his oxygen dropped down to 87%.  Does have fluid on his chest noted.  Will give Lasix here.  Did have slight bump in his creatinine from yesterday.  Likely some fluid overload associated.  Will need close monitoring.  Will admit to the hospital for further work-up and management.      Patient Care Considerations:          Consultants/Shared Management Plan:    Hospitalist: I have discussed the case with Dr. Myrick who agrees to accept the patient for admission.    Social Determinants of Health:          Disposition and Care Coordination:    Admit:   Through independent evaluation of the patient's history, physical, and imperical data, the patient meets criteria for observation/admission to the hospital.        Final diagnoses:   Acute on chronic congestive heart failure, unspecified heart failure type   Hypoxia        ED Disposition       ED Disposition   Decision to Admit    Condition   --    Comment   --               This medical record created using voice recognition software.             Prasanna Birmingham MD  10/18/23 3956

## 2023-10-19 PROBLEM — I50.43 ACUTE ON CHRONIC HEART FAILURE WITH REDUCED EJECTION FRACTION AND DIASTOLIC DYSFUNCTION: Status: ACTIVE | Noted: 2023-10-19

## 2023-10-19 PROBLEM — I50.43 ACUTE ON CHRONIC COMBINED SYSTOLIC AND DIASTOLIC CHF (CONGESTIVE HEART FAILURE): Status: ACTIVE | Noted: 2023-10-18

## 2023-10-19 LAB
AMPHET+METHAMPHET UR QL: POSITIVE
ANION GAP SERPL CALCULATED.3IONS-SCNC: 14.7 MMOL/L (ref 5–15)
BARBITURATES UR QL SCN: NEGATIVE
BASOPHILS # BLD AUTO: 0.02 10*3/MM3 (ref 0–0.2)
BASOPHILS NFR BLD AUTO: 0.2 % (ref 0–1.5)
BENZODIAZ UR QL SCN: NEGATIVE
BUN SERPL-MCNC: 34 MG/DL (ref 6–20)
BUN/CREAT SERPL: 21.3 (ref 7–25)
CALCIUM SPEC-SCNC: 9 MG/DL (ref 8.6–10.5)
CANNABINOIDS SERPL QL: NEGATIVE
CHLORIDE SERPL-SCNC: 102 MMOL/L (ref 98–107)
CO2 SERPL-SCNC: 21.3 MMOL/L (ref 22–29)
COCAINE UR QL: NEGATIVE
CREAT SERPL-MCNC: 1.6 MG/DL (ref 0.76–1.27)
DEPRECATED RDW RBC AUTO: 44.5 FL (ref 37–54)
EGFRCR SERPLBLD CKD-EPI 2021: 49.6 ML/MIN/1.73
EOSINOPHIL # BLD AUTO: 0 10*3/MM3 (ref 0–0.4)
EOSINOPHIL NFR BLD AUTO: 0 % (ref 0.3–6.2)
ERYTHROCYTE [DISTWIDTH] IN BLOOD BY AUTOMATED COUNT: 13.7 % (ref 12.3–15.4)
FENTANYL UR-MCNC: NEGATIVE NG/ML
GLUCOSE BLDC GLUCOMTR-MCNC: 152 MG/DL (ref 70–99)
GLUCOSE BLDC GLUCOMTR-MCNC: 161 MG/DL (ref 70–99)
GLUCOSE BLDC GLUCOMTR-MCNC: 262 MG/DL (ref 70–99)
GLUCOSE BLDC GLUCOMTR-MCNC: 263 MG/DL (ref 70–99)
GLUCOSE SERPL-MCNC: 131 MG/DL (ref 65–99)
HCT VFR BLD AUTO: 44 % (ref 37.5–51)
HGB BLD-MCNC: 13.7 G/DL (ref 13–17.7)
IMM GRANULOCYTES # BLD AUTO: 0.03 10*3/MM3 (ref 0–0.05)
IMM GRANULOCYTES NFR BLD AUTO: 0.4 % (ref 0–0.5)
INR PPP: 1.5 (ref 0.86–1.15)
LYMPHOCYTES # BLD AUTO: 1.24 10*3/MM3 (ref 0.7–3.1)
LYMPHOCYTES NFR BLD AUTO: 14.5 % (ref 19.6–45.3)
MAGNESIUM SERPL-MCNC: 1.9 MG/DL (ref 1.6–2.6)
MCH RBC QN AUTO: 27.8 PG (ref 26.6–33)
MCHC RBC AUTO-ENTMCNC: 31.1 G/DL (ref 31.5–35.7)
MCV RBC AUTO: 89.2 FL (ref 79–97)
METHADONE UR QL SCN: NEGATIVE
MONOCYTES # BLD AUTO: 0.59 10*3/MM3 (ref 0.1–0.9)
MONOCYTES NFR BLD AUTO: 6.9 % (ref 5–12)
NEUTROPHILS NFR BLD AUTO: 6.67 10*3/MM3 (ref 1.7–7)
NEUTROPHILS NFR BLD AUTO: 78 % (ref 42.7–76)
NRBC BLD AUTO-RTO: 0 /100 WBC (ref 0–0.2)
OPIATES UR QL: NEGATIVE
OXYCODONE UR QL SCN: NEGATIVE
PHOSPHATE SERPL-MCNC: 5.9 MG/DL (ref 2.5–4.5)
PLATELET # BLD AUTO: 335 10*3/MM3 (ref 140–450)
PMV BLD AUTO: 10.4 FL (ref 6–12)
POTASSIUM SERPL-SCNC: 4.3 MMOL/L (ref 3.5–5.2)
PROTHROMBIN TIME: 18.1 SECONDS (ref 11.8–14.9)
RBC # BLD AUTO: 4.93 10*6/MM3 (ref 4.14–5.8)
SODIUM SERPL-SCNC: 138 MMOL/L (ref 136–145)
WBC NRBC COR # BLD: 8.55 10*3/MM3 (ref 3.4–10.8)

## 2023-10-19 PROCEDURE — 80307 DRUG TEST PRSMV CHEM ANLYZR: CPT | Performed by: HOSPITALIST

## 2023-10-19 PROCEDURE — 99233 SBSQ HOSP IP/OBS HIGH 50: CPT | Performed by: INTERNAL MEDICINE

## 2023-10-19 PROCEDURE — 85025 COMPLETE CBC W/AUTO DIFF WBC: CPT | Performed by: HOSPITALIST

## 2023-10-19 PROCEDURE — 85610 PROTHROMBIN TIME: CPT | Performed by: HOSPITALIST

## 2023-10-19 PROCEDURE — 25010000002 ENOXAPARIN PER 10 MG: Performed by: HOSPITALIST

## 2023-10-19 PROCEDURE — 97165 OT EVAL LOW COMPLEX 30 MIN: CPT

## 2023-10-19 PROCEDURE — 63710000001 PREDNISONE PER 1 MG: Performed by: HOSPITALIST

## 2023-10-19 PROCEDURE — 82948 REAGENT STRIP/BLOOD GLUCOSE: CPT

## 2023-10-19 PROCEDURE — 83735 ASSAY OF MAGNESIUM: CPT | Performed by: HOSPITALIST

## 2023-10-19 PROCEDURE — 80048 BASIC METABOLIC PNL TOTAL CA: CPT | Performed by: HOSPITALIST

## 2023-10-19 PROCEDURE — 63710000001 INSULIN LISPRO (HUMAN) PER 5 UNITS: Performed by: HOSPITALIST

## 2023-10-19 PROCEDURE — 94664 DEMO&/EVAL PT USE INHALER: CPT

## 2023-10-19 PROCEDURE — 84100 ASSAY OF PHOSPHORUS: CPT | Performed by: HOSPITALIST

## 2023-10-19 PROCEDURE — 94799 UNLISTED PULMONARY SVC/PX: CPT

## 2023-10-19 PROCEDURE — 99254 IP/OBS CNSLTJ NEW/EST MOD 60: CPT | Performed by: INTERNAL MEDICINE

## 2023-10-19 PROCEDURE — 97161 PT EVAL LOW COMPLEX 20 MIN: CPT

## 2023-10-19 RX ORDER — ASPIRIN 81 MG/1
81 TABLET ORAL DAILY
Status: DISCONTINUED | OUTPATIENT
Start: 2023-10-19 | End: 2023-10-23 | Stop reason: HOSPADM

## 2023-10-19 RX ORDER — WARFARIN SODIUM 10 MG/1
10 TABLET ORAL
Status: COMPLETED | OUTPATIENT
Start: 2023-10-19 | End: 2023-10-19

## 2023-10-19 RX ORDER — CARVEDILOL 6.25 MG/1
6.25 TABLET ORAL 2 TIMES DAILY WITH MEALS
Status: DISCONTINUED | OUTPATIENT
Start: 2023-10-19 | End: 2023-10-23 | Stop reason: HOSPADM

## 2023-10-19 RX ADMIN — WARFARIN SODIUM 10 MG: 10 TABLET ORAL at 17:23

## 2023-10-19 RX ADMIN — PREDNISONE 40 MG: 20 TABLET ORAL at 08:22

## 2023-10-19 RX ADMIN — DOCUSATE SODIUM 50MG AND SENNOSIDES 8.6MG 2 TABLET: 8.6; 5 TABLET, FILM COATED ORAL at 20:50

## 2023-10-19 RX ADMIN — BUDESONIDE 0.5 MG: 0.5 INHALANT RESPIRATORY (INHALATION) at 19:20

## 2023-10-19 RX ADMIN — CARVEDILOL 6.25 MG: 6.25 TABLET, FILM COATED ORAL at 17:23

## 2023-10-19 RX ADMIN — INSULIN LISPRO 2 UNITS: 100 INJECTION, SOLUTION INTRAVENOUS; SUBCUTANEOUS at 08:22

## 2023-10-19 RX ADMIN — ARFORMOTEROL TARTRATE 15 MCG: 15 SOLUTION RESPIRATORY (INHALATION) at 09:58

## 2023-10-19 RX ADMIN — ARFORMOTEROL TARTRATE 15 MCG: 15 SOLUTION RESPIRATORY (INHALATION) at 19:20

## 2023-10-19 RX ADMIN — FAMOTIDINE 40 MG: 20 TABLET, FILM COATED ORAL at 08:22

## 2023-10-19 RX ADMIN — ENOXAPARIN SODIUM 80 MG: 100 INJECTION SUBCUTANEOUS at 08:22

## 2023-10-19 RX ADMIN — NICOTINE 1 PATCH: 21 PATCH, EXTENDED RELEASE TRANSDERMAL at 08:23

## 2023-10-19 RX ADMIN — CARVEDILOL 6.25 MG: 6.25 TABLET, FILM COATED ORAL at 09:49

## 2023-10-19 RX ADMIN — Medication 10 ML: at 20:51

## 2023-10-19 RX ADMIN — ENOXAPARIN SODIUM 80 MG: 100 INJECTION SUBCUTANEOUS at 20:50

## 2023-10-19 RX ADMIN — IPRATROPIUM BROMIDE AND ALBUTEROL SULFATE 3 ML: .5; 3 SOLUTION RESPIRATORY (INHALATION) at 09:57

## 2023-10-19 RX ADMIN — INSULIN LISPRO 6 UNITS: 100 INJECTION, SOLUTION INTRAVENOUS; SUBCUTANEOUS at 11:59

## 2023-10-19 RX ADMIN — BUMETANIDE 2 MG: 0.25 INJECTION INTRAMUSCULAR; INTRAVENOUS at 08:23

## 2023-10-19 RX ADMIN — INSULIN LISPRO 2 UNITS: 100 INJECTION, SOLUTION INTRAVENOUS; SUBCUTANEOUS at 17:23

## 2023-10-19 RX ADMIN — IPRATROPIUM BROMIDE AND ALBUTEROL SULFATE 3 ML: .5; 3 SOLUTION RESPIRATORY (INHALATION) at 23:38

## 2023-10-19 RX ADMIN — IPRATROPIUM BROMIDE AND ALBUTEROL SULFATE 3 ML: .5; 3 SOLUTION RESPIRATORY (INHALATION) at 19:20

## 2023-10-19 RX ADMIN — IPRATROPIUM BROMIDE AND ALBUTEROL SULFATE 3 ML: .5; 3 SOLUTION RESPIRATORY (INHALATION) at 13:38

## 2023-10-19 RX ADMIN — VENLAFAXINE HYDROCHLORIDE 37.5 MG: 37.5 CAPSULE, EXTENDED RELEASE ORAL at 08:22

## 2023-10-19 RX ADMIN — BUMETANIDE 2 MG: 0.25 INJECTION INTRAMUSCULAR; INTRAVENOUS at 17:30

## 2023-10-19 RX ADMIN — ASPIRIN 81 MG: 81 TABLET, COATED ORAL at 09:49

## 2023-10-19 RX ADMIN — Medication 10 ML: at 08:23

## 2023-10-19 RX ADMIN — ATORVASTATIN CALCIUM 40 MG: 40 TABLET, FILM COATED ORAL at 20:50

## 2023-10-19 RX ADMIN — INSULIN LISPRO 6 UNITS: 100 INJECTION, SOLUTION INTRAVENOUS; SUBCUTANEOUS at 20:50

## 2023-10-19 RX ADMIN — BUDESONIDE 0.5 MG: 0.5 INHALANT RESPIRATORY (INHALATION) at 09:58

## 2023-10-19 NOTE — ED PROVIDER NOTES
Time: 9:40 PM EDT  Date of encounter:  10/17/2023  Independent Historian/Clinical History and Information was obtained by:   Patient  Chief Complaint: Chest pain and shortness of breath    History is limited by: N/A    History of Present Illness:  Patient is a 58 y.o. year old male who presents to the emergency department for evaluation of chest pain and shortness of breath.  Patient has been having chest pain for the past 5 days.  Patient reports the pain is sharp and worse with movement.  Pain is intermittent.  He denies any radiation of the pain.  Patient also reports having increased difficulty with breathing and shortness of air.  His breathing is worse with walking.  He states he has a cough that is nonproductive.  He also admits to wheezing.  Patient has a history of COPD but denies having any inhalers.  Patient continues to smoke on a daily basis.    HPI    Patient Care Team  Primary Care Provider: Grace Cruz APRN    Past Medical History:     No Known Allergies  Past Medical History:   Diagnosis Date    Anxiety     Asthma     Bipolar affective     Cardiac tamponade     2023    CHF (congestive heart failure)     COPD (chronic obstructive pulmonary disease)     Coronary artery disease     Depression     Diabetes mellitus     Elevated cholesterol     Hypertension      Past Surgical History:   Procedure Laterality Date    CARDIAC CATHETERIZATION Right 9/17/2023    Procedure: Right and Left Heart Cath;  Surgeon: Ben Grant MD;  Location: [facility] CATH INVASIVE LOCATION;  Service: Cardiovascular;  Laterality: Right;    TESTICLE SURGERY Left     extraction     Family History   Problem Relation Age of Onset    Diabetes Mother     Depression Sister     Depression Brother        Home Medications:  Prior to Admission medications    Medication Sig Start Date End Date Taking? Authorizing Provider   Abilify Maintena 300 MG Suspension Reconstituted ER IM injection ER Inject 300 mg into the appropriate muscle as  directed by prescriber Every 30 (Thirty) Days. 7/10/23   Yuliya Blanchard MD   albuterol sulfate  (90 Base) MCG/ACT inhaler Inhale 2 puffs Every 4 (Four) Hours As Needed for Wheezing or Shortness of Air. Indications: Chronic Obstructive Lung Disease 3/3/23   Rogers Lipscomb MD   atorvastatin (LIPITOR) 40 MG tablet Take 1 tablet by mouth every night at bedtime.    Yuliya Blanchard MD   cloNIDine (CATAPRES) 0.1 MG tablet Take 1 tablet by mouth 2 (Two) Times a Day.    Yuliya Blanchard MD   Farxiga 5 MG tablet tablet Take 1 tablet by mouth Daily. Indications: Type 2 Diabetes 9/19/23   Alex Angulo MD   losartan (COZAAR) 50 MG tablet Take 1 tablet by mouth 2 (Two) Times a Day. 6/21/23   Yuliya Blanchard MD   meloxicam (MOBIC) 15 MG tablet Take 1 tablet by mouth Daily.    Yuliya Blanchard MD   metFORMIN (GLUCOPHAGE) 1000 MG tablet Take 1 tablet by mouth 2 (Two) Times a Day.    Yuliya Blanchard MD   propranolol LA (INDERAL LA) 80 MG 24 hr capsule Take 1 capsule by mouth Daily. 9/29/23   Yuliya Blanchard MD   venlafaxine XR (EFFEXOR-XR) 37.5 MG 24 hr capsule Take 1 capsule by mouth Daily. 6/21/23   Yuliya Blanchard MD   vitamin B-12 (VITAMIN B-12) 500 MCG tablet Take 1 tablet by mouth Daily. Indications: Inadequate Vitamin B12 3/4/23   Felton Garduno MD   atorvastatin (Lipitor) 10 MG tablet Take 1 tablet by mouth Daily.  Patient taking differently: Take 4 tablets by mouth Daily. 9/19/23 10/18/23  Alex Angulo MD   bumetanide (BUMEX) 2 MG tablet Take 0.5 tablets by mouth Daily.  10/18/23  Yuliya Blanchard MD   busPIRone (BUSPAR) 5 MG tablet Take 1 tablet by mouth Every Night.  10/18/23  Yuliya Blanchard MD   carvedilol (COREG) 25 MG tablet Take 1 tablet by mouth 2 (Two) Times a Day With Meals.  10/18/23  Yuliya Blanchard MD   pantoprazole (PROTONIX) 40 MG EC tablet Take 1 tablet by mouth Daily. Indications: Gastroesophageal Reflux Disease 3/3/23 10/18/23   "Felton Garduno MD   spironolactone (ALDACTONE) 25 MG tablet Take 1 tablet by mouth Daily.  10/18/23  Provider, MD Yuliya   warfarin (Coumadin) 7.5 MG tablet Continue Lovenox injection and Coumadin until INR is 2 and then stop Lovenox injection. 9/2/23 10/18/23  Alex Angulo MD        Social History:   Social History     Tobacco Use    Smoking status: Every Day     Packs/day: 1.00     Years: 50.00     Additional pack years: 0.00     Total pack years: 50.00     Types: Cigarettes    Smokeless tobacco: Never   Vaping Use    Vaping Use: Every day    Substances: Nicotine   Substance Use Topics    Alcohol use: Not Currently    Drug use: Not Currently     Types: Methamphetamines     Comment: LAST USED 6 months ago         Review of Systems:  Review of Systems   Constitutional:  Negative for chills and fever.   HENT:  Negative for congestion, ear pain and sore throat.    Eyes:  Negative for pain.   Respiratory:  Positive for cough, shortness of breath and wheezing. Negative for chest tightness.    Cardiovascular:  Positive for chest pain.   Gastrointestinal:  Negative for abdominal pain, diarrhea, nausea and vomiting.   Genitourinary:  Negative for flank pain and hematuria.   Musculoskeletal:  Negative for joint swelling.   Skin:  Negative for pallor.   Neurological:  Negative for seizures and headaches.   All other systems reviewed and are negative.       Physical Exam:  BP (!) 151/107   Pulse 104   Temp 97.5 °F (36.4 °C) (Oral)   Resp (!) 36   Ht 185.4 cm (73\")   SpO2 97%   BMI 20.65 kg/m²     Physical Exam    Vital signs were reviewed under triage note.  General appearance - Patient appears well-developed and well-nourished.  Patient is in no acute distress.  Head - Normocephalic, atraumatic.  Pupils - Equal, round, reactive to light.  Extraocular muscles are intact.  Conjunctiva is clear.  Nasal - Normal inspection.  No evidence of trauma or epistaxis.  Tympanic membranes - Gray, intact without erythema or " retractions.  Oral mucosa - Pink and moist without lesions or erythema.  Uvula is midline.  Chest wall - Atraumatic.  Chest wall is nontender.  There are no vesicular rashes noted.  Neck - Supple.  Trachea was midline.  There is no palpable lymphadenopathy or thyromegaly.  There are no meningeal signs  Lungs - Auscultation reveals coarse moderate diffuse wheezes.  Patient also has some rales in the bases bilaterally.  Patient is tachypneic with some accessory muscle usage.  Heart - Tachycardic rate but with a regular rhythm without any murmurs, clicks, or gallops.  Abdomen - Soft.  Bowel sounds are present.  There is no palpable tenderness.  There is no rebound, guarding, or rigidity.  There are no palpable masses.  There are no pulsatile masses.  Back - Spine is straight and midline.  There is no CVA tenderness.  Extremities - Intact x4 with full range of motion.  There is no palpable edema.  Pulses are intact x4 and equal.  Neurologic - Patient is awake, alert, and oriented x3.  Cranial nerves II through XII are grossly intact.  Motor and sensory functions grossly intact.  Cerebellar function was normal.  Integument - There are no rashes.  There are no petechia or purpura lesions noted.  There are no vesicular lesions noted.          Procedures:  Procedures      Medical Decision Making:      Comorbidities that affect care:    Hypertension, COPD, diabetes, depression, anxiety, congestive heart failure, and asthma, tobacco use    External Notes reviewed:    Hospital Discharge Summary: Hospital discharge summary from 9/18/2023 was reviewed by me.      The following orders were placed and all results were independently analyzed by me:  Orders Placed This Encounter   Procedures    COVID-19, FLU A/B, RSV PCR - Swab, Nasopharynx    XR Chest 1 View    [place] Draw    High Sensitivity Troponin T    Comprehensive Metabolic Panel    Lipase    BNP    Magnesium    CBC Auto Differential    Undress & Gown    Continuous Pulse  Oximetry    CBC & Differential    Green Top (Gel)    Lavender Top    Gold Top - SST    Light Blue Top       Medications Given in the Emergency Department:  Medications - No data to display     ED Course:    ED Course as of 10/18/23 2153   Wed Oct 18, 2023   2139 EKG performed at 1155 was interpreted by me telemetry sinus tachycardia with a ventricular of 100 bpm.  The WA interval is 166 ms.  P waves are suggestive of left atrial enlargement.  QRS interval is normal.  Axis is at -37 degrees.  There is voltage criteria suggesting LVH.  There are some nonspecific ST-T wave changes identified.  QT corrected was 472 ms. [TB]      ED Course User Index  [TB] Alex Bridgse DO     Patient was seen and evaluated in the ED by me.  The above history and physical examination was performed as documented.  Diagnostic data was ordered.  Prior to completion of the ED work-up the patient eloped from the hospital.  Nursing staff did tell me that he was talking about leaving so I attempted to get to his room to discuss the status of his work-up however but limited to the room he had eloped.  After he left the hospital his diagnostic results were reviewed by me which showed the patient most likely was having exacerbation of his CHF.  I did attempt to contact the patient as he would benefit from acute hospitalization and diuresis.  Unfortunately no answer was obtained and the voicemail box was full.    Labs:    Lab Results (last 24 hours)       Procedure Component Value Units Date/Time    CBC & Differential [[id number]]  (Abnormal) Collected: 10/18/23 1519    Specimen: Blood Updated: 10/18/23 1525    Narrative:      The following orders were created for panel order CBC & Differential.  Procedure                               Abnormality         Status                     ---------                               -----------         ------                     CBC Auto Differential[[id number]]        Abnormal            Final result                  Please view results for these tests on the individual orders.    BNP [[id number]]  (Abnormal) Collected: 10/18/23 1519    Specimen: Blood Updated: 10/18/23 1545     proBNP 23,881.0 pg/mL     Narrative:      This assay is used as an aid in the diagnosis of individuals suspected of having heart failure. It can be used as an aid in the diagnosis of acute decompensated heart failure (ADHF) in patients presenting with signs and symptoms of ADHF to the emergency department (ED). In addition, NT-proBNP of <300 pg/mL indicates ADHF is not likely.    Age Range Result Interpretation  NT-proBNP Concentration (pg/mL:      <50             Positive            >450                   Gray                 300-450                    Negative             <300    50-75           Positive            >900                  Gray                300-900                  Negative            <300      >75             Positive            >1800                  Gray                300-1800                  Negative            <300    CBC Auto Differential [[id number]]  (Abnormal) Collected: 10/18/23 1519    Specimen: Blood Updated: 10/18/23 1525     WBC 10.31 10*3/mm3      RBC 5.19 10*6/mm3      Hemoglobin 14.5 g/dL      Hematocrit 47.5 %      MCV 91.5 fL      MCH 27.9 pg      MCHC 30.5 g/dL      RDW 14.3 %      RDW-SD 47.9 fl      MPV 10.1 fL      Platelets 426 10*3/mm3      Neutrophil % 68.9 %      Lymphocyte % 18.7 %      Monocyte % 10.6 %      Eosinophil % 0.9 %      Basophil % 0.6 %      Immature Grans % 0.3 %      Neutrophils, Absolute 7.11 10*3/mm3      Lymphocytes, Absolute 1.93 10*3/mm3      Monocytes, Absolute 1.09 10*3/mm3      Eosinophils, Absolute 0.09 10*3/mm3      Basophils, Absolute 0.06 10*3/mm3      Immature Grans, Absolute 0.03 10*3/mm3      nRBC 0.0 /100 WBC     Protime-INR [[id number]]  (Abnormal) Collected: 10/18/23 1519    Specimen: Blood Updated: 10/18/23 1637     Protime 15.6 Seconds      INR 1.23    Narrative:       Medical Assessment Completed on: 14-Sep-2022 01:02 Suggested Therapeutic Ranges For Oral Anticoagulant Therapy:  Level of Therapy                      INR Target Range  Standard Dose                            2.0-3.0  High Dose                                2.5-3.5  Patients not receiving anticoagulant  Therapy Normal Range                     0.86-1.15    aPTT [[id number]]  (Abnormal) Collected: 10/18/23 1519    Specimen: Blood Updated: 10/18/23 1637     PTT 29.3 seconds     Comprehensive Metabolic Panel [[id number]]  (Abnormal) Collected: 10/18/23 1559    Specimen: Blood Updated: 10/18/23 1638     Glucose 132 mg/dL      BUN 23 mg/dL      Creatinine 1.44 mg/dL      Sodium 139 mmol/L      Potassium 5.0 mmol/L      Comment: Slight hemolysis detected by analyzer. Results may be affected.        Chloride 103 mmol/L      CO2 23.3 mmol/L      Calcium 9.1 mg/dL      Total Protein 6.3 g/dL      Albumin 3.5 g/dL      ALT (SGPT) 82 U/L      AST (SGOT) 64 U/L      Alkaline Phosphatase 299 U/L      Total Bilirubin 1.1 mg/dL      Globulin 2.8 gm/dL      A/G Ratio 1.3 g/dL      BUN/Creatinine Ratio 16.0     Anion Gap 12.7 mmol/L      eGFR 56.3 mL/min/1.73     Narrative:      GFR Normal >60  Chronic Kidney Disease <60  Kidney Failure <15      Single High Sensitivity Troponin T [[id number]]  (Abnormal) Collected: 10/18/23 1559    Specimen: Blood Updated: 10/18/23 1635     HS Troponin T 35 ng/L     Narrative:      High Sensitive Troponin T Reference Range:  <10.0 ng/L- Negative Female for AMI  <15.0 ng/L- Negative Male for AMI  >=10 - Abnormal Female indicating possible myocardial injury.  >=15 - Abnormal Male indicating possible myocardial injury.   Clinicians would have to utilize clinical acumen, EKG, Troponin, and serial changes to determine if it is an Acute Myocardial Infarction or myocardial injury due to an underlying chronic condition.         Procalcitonin [[id number]]  (Normal) Collected: 10/18/23 1559    Specimen: Blood Updated: 10/18/23 1921     Procalcitonin 0.05 ng/mL   "   Narrative:      As a Marker for Sepsis (Non-Neonates):    1. <0.5 ng/mL represents a low risk of severe sepsis and/or septic shock.  2. >2 ng/mL represents a high risk of severe sepsis and/or septic shock.    As a Marker for Lower Respiratory Tract Infections that require antibiotic therapy:    PCT on Admission    Antibiotic Therapy       6-12 Hrs later    >0.5                Strongly Recommended  >0.25 - <0.5        Recommended  0.1 - 0.25          Discouraged              Remeasure/reassess PCT  <0.1                Strongly Discouraged     Remeasure/reassess PCT    As 28 day mortality risk marker: \"Change in Procalcitonin Result\" (>80% or <=80%) if Day 0 (or Day 1) and Day 4 values are available. Refer to http://www.[email or web address]CL[facility] ([facility])pct-calculator.com    Change in PCT <=80%  A decrease of PCT levels below or equal to 80% defines a positive change in PCT test result representing a higher risk for 28-day all-cause mortality of patients diagnosed with severe sepsis for septic shock.    Change in PCT >80%  A decrease of PCT levels of more than 80% defines a negative change in PCT result representing a lower risk for 28-day all-cause mortality of patients diagnosed with severe sepsis or septic shock.    This test is Prognostic not Diagnostic, if elevated correlate with clinical findings before administering antibiotic treatment.        POC Glucose Once [[id number]]  (Abnormal) Collected: 10/18/23 2008    Specimen: Blood Updated: 10/18/23 2009     Glucose 110 mg/dL      Comment: Serial Number: [id number]:  [id number]       Protime-INR [[id number]]  (Abnormal) Collected: 10/18/23 2026    Specimen: Blood from Arm, Left Updated: 10/18/23 2044     Protime 17.7 Seconds      INR 1.46    Narrative:      Suggested Therapeutic Ranges For Oral Anticoagulant Therapy:  Level of Therapy                      INR Target Range  Standard Dose                            2.0-3.0  High Dose                                2.5-3.5  Patients not " receiving anticoagulant  Therapy Normal Range                     0.86-1.15    Magnesium [[id number]]  (Normal) Collected: 10/18/23 2026    Specimen: Blood from Arm, Left Updated: 10/18/23 2058     Magnesium 1.9 mg/dL     Phosphorus [[id number]]  (Abnormal) Collected: 10/18/23 2026    Specimen: Blood from Arm, Left Updated: 10/18/23 2058     Phosphorus 5.2 mg/dL     Blood Gas, Arterial -With Co-Ox Panel: Yes [[id number]]  (Abnormal) Collected: 10/18/23 2036    Specimen: Arterial Blood from Arm, Right Updated: 10/18/23 2040     pH, Arterial 7.356 pH units      pCO2, Arterial 36.7 mm Hg      pO2, Arterial 55.9 mm Hg      HCO3, Arterial 20.1 mmol/L      Base Excess, Arterial -4.7 mmol/L      O2 Saturation, Arterial 86.0 %      Hemoglobin, Blood Gas 15.0 g/dL      Carboxyhemoglobin 1.3 %      Methemoglobin 0.20 %      Oxyhemoglobin 84.7 %      FHHB 13.8 %      Site Arterial: right brachial     Modality Room Air     FIO2 21 %      PO2/FIO2 266     Lactate, Arterial --             Imaging:    XR Chest 1 View    Result Date: 10/18/2023  PROCEDURE: XR CHEST 1 VW  COMPARISON: [facility], , XR CHEST 1 VW, 10/17/2023, 12:19.  INDICATIONS: SOA Triage Protocol  FINDINGS:  Lungs are normally expanded.  There is cardiomegaly.  No pneumothorax or pleural effusion or focal pulmonary parenchymal opacity.  There is septal thickening that is similar previous study that could be seen with chronic lung disease or pulmonary edema.       Findings similar previous examination.  Septal thickening which may be seen with pulmonary edema or chronic lung disease.  Cardiomegaly.       COREY MORALES MD       Electronically Signed and Approved By: COREY MORALES MD on 10/18/2023 at 15:34                Differential Diagnosis and Discussion:    Chest Pain:  Based on the patient's signs and symptoms, I considered aortic dissection, myocardial infaction, pulmonary embolism, cardiac tamponade, pericarditis, pneumothorax, musculoskeletal chest  pain and other differential diagnosis as an etiology of the patient's chest pain.   Dyspnea: Differential diagnosis includes but is not limited to metabolic acidosis, neurological disorders, psychogenic, asthma, pneumothorax, upper airway obstruction, COPD, pneumonia, noncardiogenic pulmonary edema, interstitial lung disease, anemia, congestive heart failure, and pulmonary embolism    All labs were reviewed and interpreted by me.  All X-rays impressions were independently interpreted by me.  EKG was interpreted by me.    MDM     Amount and/or Complexity of Data Reviewed  Decide to obtain previous medical records or to obtain history from someone other than the patient: yes             Patient Care Considerations:    CONSULT: I considered consulting hospitalist service for admission, however the patient eloped from the hospital prior to completion of ED evaluation and testing.      Consultants/Shared Management Plan:    The patient eloped from the hospital prior to my ability to consult the hospital service for admission.    Social Determinants of Health:    Patient is independent, reliable, and has access to care.       Disposition and Care Coordination:    Eloped: This patient has left the emergency department or waiting room with no communication to myself, nursing or administrative staff. There was no opportunity to discuss the patient's decision to leave, provide medical advice or discuss alternatives to. The staff has made efforts to locate patient without success.        Final diagnoses:   Acute on chronic congestive heart failure, unspecified heart failure type   Chest pain, unspecified type        ED Disposition       ED Disposition   Eloped    Condition   --    Comment   --               This medical record created using voice recognition software.             Alex Bridges DO  10/18/23 2419

## 2023-10-19 NOTE — PLAN OF CARE
Goal Outcome Evaluation:           Progress: no change  Outcome Evaluation: Patient new admit this shift. Patient currently on 3LNC, VSS. Resting well in bed. No complaints.

## 2023-10-19 NOTE — PLAN OF CARE
Goal Outcome Evaluation:  Plan of Care Reviewed With: patient        Progress: no change  Outcome Evaluation: Patient remains alert and oriented x4. No complaints of pain throughout the shift. Blood glucose monitored. No new issues at this time.

## 2023-10-19 NOTE — CASE MANAGEMENT/SOCIAL WORK
Discharge Planning Assessment  [facility]     Patient Name: Regulo Sepulveda  MRN: [id number]  Today's Date: 10/19/2023    Admit Date: 10/18/2023    Plan: Mr Sepulveda notes he lives in a trailer with a roommate. He notes his sister usually helps him with his medical needs but he is unfamiliar with a lot of his medical needs such as his home medications and pharmacy. Patient reports he has missed MD appointments because of unreliable transportation, he is looking for a new pcp and he was dismissed from his last one. Patient also notes he may need home O2 set up, he is on 2LNC currently. Patient reports he does not have a credit card on him to set up tack, his sister is supposed to be setting him up, cm attempted to reach sister to discuss. CM will continue to try and reach sister. Patient also noted he filed again for SSI and this is pending.   Discharge Needs Assessment       Row Name 10/19/23 6520       Living Environment    People in Home other (see comments)    Current Living Arrangements home    Potentially Unsafe Housing Conditions none    Primary Care Provided by self;other (see comments)    Family Caregiver if Needed sibling(s)    Quality of Family Relationships helpful    Able to Return to Prior Arrangements yes       Resource/Environmental Concerns    Resource/Environmental Concerns none    Transportation Concerns no car;rides, unreliable from others;public transportation, does not know how to access       Food Insecurity    Within the past 12 months, you worried that your food would run out before you got the money to buy more. Often true    Within the past 12 months, the food you bought just didn't last and you didn't have money to get more. Often true       Transition Planning    Patient/Family Anticipates Transition to home    Patient/Family Anticipated Services at Transition durable medical equipment    Transportation Anticipated family or friend will provide       Discharge Needs Assessment     Readmission Within the Last 30 Days no previous admission in last 30 days    Equipment Currently Used at Home none    Concerns to be Addressed discharge planning    Anticipated Changes Related to Illness inability to care for self    Equipment Needed After Discharge oxygen                   Discharge Plan       Row Name 10/19/23 1531       Plan    Plan Mr Sepulveda notes he lives in a trailer with a roommate. He notes his sister usually helps him with his medical needs but he is unfamiliar with a lot of his medical needs such as his home medications and pharmacy. Patient reports he has missed MD appointments because of unreliable transportation, he is looking for a new pcp and he was dismissed from his last one. Patient also notes he may need home O2 set up, he is on 2LNC currently. Patient reports he does not have a credit card on him to set up tack, his sister is supposed to be setting him up, cm attempted to reach sister to discuss. CM will continue to try and reach sister. Patient also noted he filed again for SSI and this is pending.                  Continued Care and Services - Admitted Since 10/18/2023    Coordination has not been started for this encounter.       Expected Discharge Date and Time       Expected Discharge Date Expected Discharge Time    Oct 22, 2023            Demographic Summary       Row Name 10/19/23 1529       General Information    Admission Type inpatient    Arrived From home;emergency department    Referral Source admission list    Reason for Consult discharge planning    Preferred Language English       Contact Information    Permission Granted to Share Info With family/designee    Contact Information Obtained for                    Functional Status       Row Name 10/19/23 1529       Functional Status    Usual Activity Tolerance fair    Current Activity Tolerance fair       Assessment of Health Literacy    How often do you have someone help you read hospital materials?  Occasionally    How often do you have problems learning about your medical condition because of difficulty understanding written information? Occasionally    How often do you have a problem understanding what is told to you about your medical condition? Occasionally    How confident are you filling out medical forms by yourself? Quite a bit    Health Literacy Low       Functional Status, IADL    Medications assistive person    Meal Preparation assistive person    Housekeeping assistive person    Laundry assistive person    Shopping completely dependent       Mental Status    General Appearance WDL WDL       Mental Status Summary    Recent Changes in Mental Status/Cognitive Functioning no changes                   Psychosocial    No documentation.                  Abuse/Neglect    No documentation.                  Legal    No documentation.                  Substance Abuse    No documentation.                  Patient Forms    No documentation.                     Saira Hendrix RN

## 2023-10-19 NOTE — PROGRESS NOTES
[facility]   Hospitalist Progress Note  Date: 10/19/2023  Patient Name: Regulo Sepulveda  : 1965  MRN: [id number]  Date of admission: 10/18/2023  Consultants:   -Cardiology: Dr. Dave Dinero    Subjective   Subjective     Chief Complaint: Shortness of breath    Summary:   Regulo Sepulveda is a 58 y.o. male with chronic heart failure with reduced ejection fraction, LV thrombus, chronic paroxysmal atrial fibrillation, COPD, essential hypertension and hyperlipidemia who presented to ED with worsening shortness of breath.  Eval in ED significant for patient being tachypneic.  proBNP noted to be elevated and creatinine elevated compared to baseline.  LFTs elevated as well.  Hospitalist service contacted for further evaluation management.  Cardiology consulted.  IV diuresis initiated.    Interval Followup:   No acute events overnight.  Patient is stated that he feels like his shortness of breath is a little bit better than it was when he first got to the hospital.  He denies any active chest pain.  Nursing with no additional acute issues to report.    Review of Systems   All systems reviewed and negative unless stated otherwise under subjective.    Objective   Objective     Vitals:   Temp:  [97.3 °F (36.3 °C)-99.1 °F (37.3 °C)] 97.3 °F (36.3 °C)  Heart Rate:  [73-89] 80  Resp:  [16-26] 18  BP: (133-161)/() 138/91  Flow (L/min):  [2-3] 2  Physical Exam   Gen: No acute distress, Conversant, Pleasant, lying in bed  Resp: Diminished breath sounds bilaterally, faint bilateral wheezing noted, bilateral crackles appreciated, equal chest rise bilaterally  Card: RRR, No m/r/g  Abd: Soft, Nontender, Nondistended, + bowel sounds    Result Review    Result Review:  I have personally reviewed the results as below and agree with these findings:  []  Laboratory:   CMP          10/17/2023    12:03 10/18/2023    15:59 10/19/2023    05:13   CMP   Glucose 121  132  131    BUN 20  23  34    Creatinine 0.90  1.44  1.60     EGFR 99.0  56.3  49.6    Sodium 132  139  138    Potassium 4.0  5.0  4.3    Chloride 99  103  102    Calcium 9.0  9.1  9.0    Total Protein 6.3  6.3     Albumin 3.6  3.5     Globulin 2.7  2.8     Total Bilirubin 0.8  1.1     Alkaline Phosphatase 256  299     AST (SGOT) 35  64     ALT (SGPT) 59  82     Albumin/Globulin Ratio 1.3  1.3     BUN/Creatinine Ratio 22.2  16.0  21.3    Anion Gap 10.1  12.7  14.7      CBC          10/17/2023    12:03 10/18/2023    15:19 10/19/2023    05:13   CBC   WBC 10.04  10.31  8.55    RBC 4.51  5.19  4.93    Hemoglobin 12.9  14.5  13.7    Hematocrit 40.3  47.5  44.0    MCV 89.4  91.5  89.2    MCH 28.6  27.9  27.8    MCHC 32.0  30.5  31.1    RDW 13.9  14.3  13.7    Platelets 393  426  335    Phosphorus slightly elevated.  Magnesium within normal limits.  []  Microbiology:   [x]  Radiology: CT chest imaging personally reviewed.  Bilateral pleural effusions noted.  [x]  EKG/Telemetry:    []  Cardiology/Vascular:    []  Pathology:  []  Old records:  []  Other:    Assessment & Plan   Assessment / Plan     Assessment:  Acute on chronic combined heart failure  Acute renal failure (baseline creatinine 0.9 and creatinine during hospitalization 1.60)  Chronic paroxysmal atrial fibrillation  Therapeutic drug monitoring, Coumadin  Bilateral pleural effusions  Cardiogenic pulmonary edema  Acute COPD exacerbation  LV mural thrombus  History of drug abuse  Amphetamine/methamphetamine positive talk screen  Type 2 diabetes mellitus    Plan:  -Cardiology consulted and following, appreciate assistance and recommendations in the care of this patient.  -Continue IV diuresis with Bumex 2 mg twice daily.  Strict I's and O's.  Monitor electrolytes and renal function closely given IV diuresis  -Continue supplemental O2 to maintain sats greater than 90%, wean as tolerated  -Hold spironolactone, ACE inhibitor/ARB and Farxiga given acute kidney injury  -Continue carvedilol  -Continue Brovana, Pulmicort and  DuoNebs  -Continue prednisone 40 mg daily for total of 5 days  -Continue therapeutic Lovenox and Coumadin.  Plan to bridge patient to Coumadin.  Monitor INR daily.  -Continue SSI.  Monitor blood glucose level and SSI requirement titrate insulin regimen accordingly  -Continue appropriate home medications  -Management discussed with cardiology.  In agreement regarding IV diuresis.  -Will monitor electrolytes and renal function with BMP and magnesium level in the AM  -Will monitor WBC and Hgb with CBC in the AM  -Clinical course will dictate further management     DVT Prophylaxis: Lovenox/Coumadin  GI Prophylaxis: Famotidine  Diet: Cardiac  Dispo: Home when medically appropriate for discharge  Code Status: Full code     Personally reviewed patients labs and imaging, discussed with patient and nurse at bedside. Discussed management with the following consultants: Cardiology.     Part of this note may be an electronic transcription/translation of spoken language to printed text using the Dragon dictation system.    DVT prophylaxis:  Medical DVT prophylaxis orders are present.    CODE STATUS:   Level Of Support Discussed With: Patient  Code Status (Patient has no pulse and is not breathing): CPR (Attempt to Resuscitate)  Medical Interventions (Patient has pulse or is breathing): Full Support        Electronically signed by Santy Quan MD, 10/19/23, 1:11 PM EDT.

## 2023-10-19 NOTE — PROGRESS NOTES
Pharmacy to Dose: Warfarin  Consulting provider: Ramez  Indication for warfarin: Afib, Hx of LV thrombus in Aug 2023  Goal INR range: 2 - 3  Home warfarin dose: warfarin 7.5 mg PO daily*     Date INR Warfarin Dose Given   10/18 1.23 7.5 mg   10/19 1.50 (10 mg)                    Any Vitamin K given?: No  Drug interactions Reviewed: Yes. Details: venlafaxine  Is patient on bridging therapy with another anticoagulant?: Yes; Enoxaparin 80 mg SQ Q12H    Lab Results   Component Value Date     10/19/2023     10/18/2023    HGB 13.7 10/19/2023    HGB 14.5 10/18/2023     Plan:  *Patient with hx of LV thrombus in Aug 2023, discharged on warfarin 7.5 mg daily x5 days on 9/2. Was readmitted 9/17 for a left and right heart cath and was given warfarin x2 days (10 mg and 12 mg subsequently) inpatient in addition to a lovenox bridge. Patient received an outpatient script for lovenox only. Given this, it appears patient has not had warfarin since 9/18. He has a history of noncompliance.   Given recent hx of LV thrombus and stable H/H, will increase warfarin to 10 mg x1 today.   Daily INR ordered.    Thank you for this consult. Pharmacy will continue to monitor.   Doreen Puentes RPH

## 2023-10-19 NOTE — PROGRESS NOTES
Respiratory Therapist Broncho-Pulmonary Hygiene Progress Note      Patient Name:  Regulo Sepulveda  YOB: 1965    Regulo Sepulveda meets the qualification for Level 1 of the Bronco-Pulmonary Hygiene Protocol. This was based on my daily patient assessment and includes review of chest x-ray results, cough ability and quality, oxygenation, secretions or risk for secretion development and patient mobility.     Broncho-Pulmonary Hygiene Assessment:    Level of Movement: Actively changing positions without assistance  Alert/ oriented/ cooperative    Breath Sounds: Clear to slightly diminished    Cough: Ineffective, weak or not cough efforts    Chest X-Ray: Possible signs of consolidation and/or atelectasis or clear.     Sputum Productions: None or small amount of thin or watery secretions with effective cough    History and Physical: Chronic condition    SpO2 to Oxygen Need: greater than 92% on room air or  less than 3L nasal canula    Current SpO2 is: 96 on 2 liter nasal cannula    Based on this information, I have completed the following interventions: Teach/Instruct patient on cough and deep breathe      Electronically signed by Sybil Yeh RRT, 10/19/23, 12:44 AM EDT.

## 2023-10-19 NOTE — PROGRESS NOTES
Pharmacy to Dose: Warfarin  Consulting provider: Ramez  Indication for warfarin: Atrial fib - requiring full anticoagulation  Goal INR range: 2-3  Home warfarin dose: patient was prescirbed 7.5 mg daily post 9/2 admission x 7 days, but has not taken warfarin since  Actions or monitoring: Daily PT/INR    Date INR Warfarin Dose Given    10/18/23  1.23  Re-initiate 7.5 mg daily                         Any Vitamin K given?: N/A (if yes list dose and date)  Any major drug interactions: prednisone moderate interaction, no significant interactions  Is patient on bridging therapy with another anticoagulant?: Yes, Lovenox 1 mg/kg q12h    Plan:   Will re-initiate warfarin 7.5 mg nightly starting tonight.  Lovenox has been started tonight, SCr increased today, will continue to monitor lovenox dose.  H/H within normal limits  Will continue to monitor INR

## 2023-10-19 NOTE — PLAN OF CARE
Goal Outcome Evaluation:  Plan of Care Reviewed With: (P) patient           Outcome Evaluation: (P) Pt presents with the ability to tolerate functional activity and no balance deficits. He walked 300' with no rest breaks required or LOB during. He does not require skilled PT services at this time as he is fully functional. Outpatient therapy services are recommended upon discharge to increase his BLE strength.      Anticipated Discharge Disposition (PT): (P) home with outpatient therapy services

## 2023-10-19 NOTE — THERAPY EVALUATION
Acute Care - Physical Therapy Initial Evaluation   [place]     Patient Name: Regulo Sepulveda  : 1965  MRN: [id number]  Today's Date: 10/19/2023      Visit Dx:     ICD-10-CM ICD-9-CM   1. Acute on chronic congestive heart failure, unspecified heart failure type  I50.9 428.0   2. Hypoxia  R09.02 799.02   3. Decreased activities of daily living (ADL)  Z78.9 V49.89   4. Difficulty in walking  R26.2 719.7     Patient Active Problem List   Diagnosis    Left groin pain    Major depressive disorder, recurrent episode, moderate    Chronic obstructive pulmonary disease    Diabetes mellitus    Hypertensive disorder    Hyperlipidemia    Hepatitis C    Cigarette nicotine dependence    BPH (benign prostatic hyperplasia)    GERD (gastroesophageal reflux disease)    B12 deficiency    LV (left ventricular) mural thrombus    Schizophrenia    Acute on chronic combined systolic and diastolic CHF (congestive heart failure)    Acute on chronic heart failure with reduced ejection fraction and diastolic dysfunction     Past Medical History:   Diagnosis Date    Anxiety     Asthma     Bipolar affective     Cardiac tamponade         CHF (congestive heart failure)     COPD (chronic obstructive pulmonary disease)     Coronary artery disease     Depression     Diabetes mellitus     Elevated cholesterol     Hypertension      Past Surgical History:   Procedure Laterality Date    CARDIAC CATHETERIZATION Right 2023    Procedure: Right and Left Heart Cath;  Surgeon: Ben Grant MD;  Location: [facility] CATH [facility]ASIVE [facility];  Service: Cardiovascular;  Laterality: Right;    TESTICLE SURGERY Left     extraction     PT Assessment (last 12 hours)       PT Evaluation and Treatment       Row Name 10/19/23 1426          Physical Therapy Time and Intention    Subjective Information no complaints (P)   -ZT     Document Type evaluation (P)   -ZT     Mode of Treatment individual therapy;physical therapy (P)   -ZT     Patient Effort good (P)    -ZT       Row Name 10/19/23 1426          General Information    Patient Profile Reviewed yes (P)   -ZT     Patient Observations alert;cooperative;agree to therapy (P)   -ZT     Prior Level of Function independent:;all household mobility;ADL's (P)   -ZT     Equipment Currently Used at Home none (P)   -ZT     Existing Precautions/Restrictions fall (P)   -ZT       Row Name 10/19/23 1426          Living Environment    Current Living Arrangements home (P)   -ZT     Home Accessibility stairs to enter home (P)   -ZT     People in Home sibling(s) (P)   -ZT     Primary Care Provided by self (P)   -ZT       Row Name 10/19/23 1426          Home Main Entrance    Number of Stairs, Main Entrance three (P)   -ZT     Stair Railings, Main Entrance none (P)   -ZT     Landing, Stairs, Main Entrance no railings (P)   -ZT       Row Name 10/19/23 1426          Home Use of Assistive/Adaptive Equipment    Equipment Currently Used at Home none (P)   -ZT       Row Name 10/19/23 1426          Range of Motion (ROM)    Range of Motion bilateral lower extremities;ROM is WFL (P)   -ZT       Row Name 10/19/23 1426          Strength (Manual Muscle Testing)    Strength (Manual Muscle Testing) bilateral lower extremities;other (see comments) (P)   4-/5 BLE strength  -ZT       Row Name 10/19/23 1426          Bed Mobility    Bed Mobility bed mobility (all) activities (P)   -ZT     All Activities, [place] (Bed Mobility) independent (P)   -ZT       Row Name 10/19/23 1426          Transfers    Transfers sit-stand transfer;stand-sit transfer (P)   -ZT       Row Name 10/19/23 1426          Sit-Stand Transfer    Sit-Stand [place] (Transfers) independent (P)   -ZT     Assistive Device (Sit-Stand Transfers) other (see comments) (P)   No AD  -ZT       Row Name 10/19/23 1426          Stand-Sit Transfer    Stand-Sit [place] (Transfers) independent (P)   -ZT     Assistive Device (Stand-Sit Transfers) other (see comments) (P)   No AD  -ZT        Row Name 10/19/23 1426          Gait/Stairs (Locomotion)    Gait/Stairs Locomotion gait/ambulation independence (P)   -ZT     [place] Level (Gait) standby assist (P)   -ZT     Distance in Feet (Gait) 300 (P)   -ZT     Pattern (Gait) step-through (P)   -ZT       Row Name 10/19/23 1426          Safety Issues, Functional Mobility    Impairments Affecting Function (Mobility) endurance/activity tolerance (P)   -ZT       Row Name 10/19/23 1426          Balance    Balance Assessment standing dynamic balance (P)   -ZT     Dynamic Standing Balance standby assist (P)   -ZT       Row Name 10/19/23 1426          Plan of Care Review    Plan of Care Reviewed With patient (P)   -ZT     Outcome Evaluation Pt presents with the ability to tolerate functional activity and no balance deficits. He walked 300' with no rest breaks required or LOB during. He does not require skilled PT services at this time as he is fully functional. Outpatient therapy services are recommended upon discharge to increase his BLE strength. (P)   -ZT       Row Name 10/19/23 1426          Therapy Assessment/Plan (PT)    Criteria for Skilled Interventions Met (PT) no;no problems identified which require skilled intervention (P)   -ZT     Therapy Frequency (PT) evaluation only (P)   -ZT       Row Name 10/19/23 1426          Therapy Plan Review/Discharge Plan (PT)    Therapy Plan Review (PT) evaluation/treatment results reviewed;care plan/treatment goals reviewed;participants included;patient (P)   -ZT               User Key  (r) = Recorded By, (t) = Taken By, (c) = Cosigned By      Initials Name Provider Type    ZT Timothy Hong, PT Student PT Student                    Physical Therapy Education       Title: PT OT SLP Therapies (Done)       Topic: Physical Therapy (Done)       Point: Mobility training (Done)       Learning Progress Summary             Patient Acceptance, E,TB, VU by ZT at 10/19/2023 1754                         Point: Home exercise  program (Done)       Learning Progress Summary             Patient Acceptance, E,TB, VU by ZT at 10/19/2023 1459                         Point: Body mechanics (Done)       Learning Progress Summary             Patient Acceptance, E,TB, VU by ZT at 10/19/2023 1459                         Point: Precautions (Done)       Learning Progress Summary             Patient Acceptance, E,TB, VU by ZT at 10/19/2023 1459                                         User Key       Initials Effective Dates Name Provider Type Discipline    ZT 09/05/23 -  Timothy oHng, PT Student PT Student PT                  PT Recommendation and Plan  Anticipated Discharge Disposition (PT): (P) home with outpatient therapy services  Therapy Frequency (PT): (P) evaluation only  Plan of Care Reviewed With: (P) patient  Outcome Evaluation: (P) Pt presents with the ability to tolerate functional activity and no balance deficits. He walked 300' with no rest breaks required or LOB during. He does not require skilled PT services at this time as he is fully functional. Outpatient therapy services are recommended upon discharge to increase his BLE strength.   Outcome Measures       Row Name 10/19/23 1429             How much help from another person do you currently need...    Turning from your back to your side while in flat bed without using bedrails? 4 (P)   -ZT      Moving from lying on back to sitting on the side of a flat bed without bedrails? 4 (P)   -ZT      Moving to and from a bed to a chair (including a wheelchair)? 4 (P)   -ZT      Standing up from a chair using your arms (e.g., wheelchair, bedside chair)? 4 (P)   -ZT      Climbing 3-5 steps with a railing? 3 (P)   -ZT      To walk in hospital room? 4 (P)   -ZT      AM-PAC 6 Clicks Score (PT) 23 (P)   -ZT      Highest level of mobility 7 --> Walked 25 feet or more (P)   -ZT                User Key  (r) = Recorded By, (t) = Taken By, (c) = Cosigned By      Initials Name Provider Type    ZT  Timothy Hong, PT Student PT Student                     Time Calculation:    PT Charges       Row Name 10/19/23 1426             Time Calculation    PT Received On 10/19/23 (P)   -ZT      PT Goal Re-Cert Due Date 10/28/23 (P)   -ZT         Untimed Charges    PT Eval/Re-eval Minutes 25 (P)   -ZT         Total Minutes    Untimed Charges Total Minutes 25 (P)   -ZT       Total Minutes 25 (P)   -ZT                User Key  (r) = Recorded By, (t) = Taken By, (c) = Cosigned By      Initials Name Provider Type    ZT Timothy Hong, PT Student PT Student                      PT G-Codes  Outcome Measure Options: AM-PAC 6 Clicks Daily Activity (OT), Optimal Instrument  AM-PAC 6 Clicks Score (PT): (P) 23  AM-PAC 6 Clicks Score (OT): 24    Timothy Hong, PT Student  10/19/2023

## 2023-10-19 NOTE — CONSULTS
[facility]   Cardiology Consult Note    Patient Name: Regulo Sepulveda  : 1965  MRN: [id number]  Primary Care Physician:  Grace Cruz APRN  Primary cardiologist : Not established with anyone, has appointments at CHF clinic  Referring Physician: Santy Quan MD    Date of admission: 10/18/2023    Subjective   Subjective     Reason for Consultation : Heart failure exacerbation    Chief Complaint : Shortness of breath    HPI:  Regulo Sepulveda is a 58 y.o. male with dilated cardiomyopathy, chronic combined heart failure, ejection fraction 25%, paroxysmal atrial fibrillation, chronic obstructive pulm disease, COPD, hypertension, hyperlipidemia and bipolar disorder.  He presented emergency room because of worsening shortness of breath of 3 to 5 days duration.  He was recently discharged in the hospital on 2023 after being treated for CHF exacerbation.  He did not follow-up with heart failure clinic as scheduled earlier.  Per patient, he was taking some of his medications at home.  His sister takes care of the medicine since he is unable to read and write.    Shortness of breath got worse approximately a week back.  He was unable to lie down flat due to short of breath and could not sleep as well.  Mild swelling of the feet was noted.  He did not have any chest pain.  There was no fever or chills.  He came to the ER 2 nights back and was discharged on steroids for COPD exacerbation.  He came back yesterday since there was no improvement in symptoms.    On arrival, he was not hypoxic.  Chest x-ray showed mild pulmonary vascular congestion and cardiomegaly.  However labs showed significantly elevated proBNP, higher than his baseline.  Liver enzymes are elevated as well.  He was given IV diuretics and was admitted to internal medicine service.  Steroids were restarted as well.  This morning he reports some improvement in his symptoms.      Review of Systems   All systems were reviewed and  negative except for: Shortness of breath, fatigue, swelling and symptoms of history of orthopnea.    Personal History     Past Medical History:   Diagnosis Date   • Anxiety    • Asthma    • Bipolar affective    • Cardiac tamponade     2023   • CHF (congestive heart failure)    • COPD (chronic obstructive pulmonary disease)    • Coronary artery disease    • Depression    • Diabetes mellitus    • Elevated cholesterol    • Hypertension           Family History: family history includes Depression in his brother and sister; Diabetes in his mother. Otherwise pertinent FHx was reviewed and not pertinent to current issue.    Social History:  reports that he has been smoking cigarettes. He has a 50.00 pack-year smoking history. He has never used smokeless tobacco. He reports that he does not currently use alcohol. He reports that he does not currently use drugs after having used the following drugs: Methamphetamines.    Home Medications:  ARIPiprazole ER, albuterol sulfate HFA, atorvastatin, cloNIDine, cyanocobalamin, dapagliflozin, losartan, meloxicam, metFORMIN, and venlafaxine XR    Allergies:  No Known Allergies    Objective    Objective     Vitals:   Temp:  [97.3 °F (36.3 °C)-99.1 °F (37.3 °C)] 97.3 °F (36.3 °C)  Heart Rate:  [73-89] 76  Resp:  [16-26] 16  BP: (133-161)/() 134/84  Flow (L/min):  [3] 3      Physical Exam:   Constitutional: Awake, alert, in mild distress because of cough, nasal cannula on   Eyes: PERRLA, sclerae anicteric, no conjunctival injection   HENT: NCAT, mucous membranes moist   Neck: Supple, no thyromegaly, no lymphadenopathy, trachea midline   Respiratory: Bilateral faint wheezing and crackles heard.   Cardiovascular: RRR, no murmurs, rubs, or gallops, palpable pedal pulses bilaterally   Gastrointestinal: Positive bowel sounds, soft, nontender, nondistended   Musculoskeletal: No edema present, no clubbing or cyanosis to extremities   Psychiatric: Appropriate affect,  cooperative   Neurologic: Oriented x 3, strength symmetric in all extremities, speech clear   Skin: No rashes     Result Review    Result Review:  I have personally reviewed the results from the time of this admission to 10/19/2023 08:35 EDT and agree with these findings:  [x]  Laboratory  []  Microbiology  [x]  Radiology  [x]  EKG/Telemetry   [x]  Cardiology/Vascular   []  Pathology  [x]  Old records  []  Other:  Most notable findings include:     CMP          10/17/2023    12:03 10/18/2023    15:59 10/19/2023    05:13   CMP   Glucose 121  132  131    BUN 20  23  34    Creatinine 0.90  1.44  1.60    EGFR 99.0  56.3  49.6    Sodium 132  139  138    Potassium 4.0  5.0  4.3    Chloride 99  103  102    Calcium 9.0  9.1  9.0    Total Protein 6.3  6.3     Albumin 3.6  3.5     Globulin 2.7  2.8     Total Bilirubin 0.8  1.1     Alkaline Phosphatase 256  299     AST (SGOT) 35  64     ALT (SGPT) 59  82     Albumin/Globulin Ratio 1.3  1.3     BUN/Creatinine Ratio 22.2  16.0  21.3    Anion Gap 10.1  12.7  14.7       CBC          10/17/2023    12:03 10/18/2023    15:19 10/19/2023    05:13   CBC   WBC 10.04  10.31  8.55    RBC 4.51  5.19  4.93    Hemoglobin 12.9  14.5  13.7    Hematocrit 40.3  47.5  44.0    MCV 89.4  91.5  89.2    MCH 28.6  27.9  27.8    MCHC 32.0  30.5  31.1    RDW 13.9  14.3  13.7    Platelets 393  426  335       Lab Results   Component Value Date    TROPONINT 35 (H) 10/18/2023     Results for orders placed during the hospital encounter of 08/27/23    Adult Transthoracic Echo Complete w/ Color, Spectral and Contrast if necessary per protocol    Interpretation Summary  •  Left ventricular systolic function is severely decreased. Calculated left ventricular EF = 20%  •  Left ventricular diastolic function is consistent with (grade II w/high LAP) pseudonormalization.  •  There is a solid thrombus located in the left ventricular apex. The thrombus measures 2.1 cm in diameter and is fixed.  •  The left atrial  cavity is mild to moderately dilated.  •  Estimated right ventricular systolic pressure from tricuspid regurgitation is mildly elevated (35-45 mmHg).  •  Global hypokinesis of the LV with severely reduced ejection fraction and 2.1cm fixed, apical LV thrombus seen with difinity         EKG done at this time of admission showed sinus rhythm, left atrial lodgment, LVH, no new ischemic changes    Assessment & Plan   Assessment / Plan     Brief Patient Summary:  Regulo Sepulveda is a 58 y.o. male with dilated cardiomyopathy, chronic combined heart failure, ejection fraction 25%, paroxysmal atrial fibrillation, chronic obstructive pulm disease, COPD, hypertension, hyperlipidemia and bipolar disorder.  He presented to the hospital with increasing shortness of breath    Active Hospital Problems:  Active Hospital Problems    Diagnosis    • **Acute on chronic combined systolic and diastolic CHF (congestive heart failure)      Acute on chronic combined heart failure : He has mild volume overload on physical examination, however proBNP significantly elevated.  Creatinine is above baseline, which is trending up with diuretics.  Liver enzymes are elevated as well.  Unsure about medication compliance    COPD with exacerbation : On steroids, per hospitalist team    LV mural thrombus : On anticoagulation with Coumadin, INR subtherapeutic on admission    Schizophrenia    Previous drug abuse    Acute kidney injury : Creatinine 1.4 on admission, it was normal at the time of previous discharge.  Possibly cardiorenal    Plan:     We will continue IV Bumex today with close monitoring of renal functions, since patient is clinically volume overloaded    Holding spironolactone and ACE inhibitor/ARB, Farxiga due to acute kidney injury    Will restart beta-blockers.  He was previously discharged on carvedilol, which will be restarted at a lower dose of 6.25 mg twice daily    Strict intake/output chart, daily weights    Bronchodilators,  steroids per primary team    Repeat labs a.m.    We will follow    Electronically signed by Dave Dinero MD, 10/19/23, 8:26 AM EDT.

## 2023-10-19 NOTE — THERAPY EVALUATION
Patient Name: Regulo Sepulveda  : 1965    MRN: [id number]                              Today's Date: 10/19/2023       Admit Date: 10/18/2023    Visit Dx:     ICD-10-CM ICD-9-CM   1. Acute on chronic congestive heart failure, unspecified heart failure type  I50.9 428.0   2. Hypoxia  R09.02 799.02   3. Decreased activities of daily living (ADL)  Z78.9 V49.89   4. Difficulty in walking  R26.2 719.7     Patient Active Problem List   Diagnosis    Left groin pain    Major depressive disorder, recurrent episode, moderate    Chronic obstructive pulmonary disease    Diabetes mellitus    Hypertensive disorder    Hyperlipidemia    Hepatitis C    Cigarette nicotine dependence    BPH (benign prostatic hyperplasia)    GERD (gastroesophageal reflux disease)    B12 deficiency    LV (left ventricular) mural thrombus    Schizophrenia    Acute on chronic combined systolic and diastolic CHF (congestive heart failure)    Acute on chronic heart failure with reduced ejection fraction and diastolic dysfunction     Past Medical History:   Diagnosis Date    Anxiety     Asthma     Bipolar affective     Cardiac tamponade         CHF (congestive heart failure)     COPD (chronic obstructive pulmonary disease)     Coronary artery disease     Depression     Diabetes mellitus     Elevated cholesterol     Hypertension      Past Surgical History:   Procedure Laterality Date    CARDIAC CATHETERIZATION Right 2023    Procedure: Right and Left Heart Cath;  Surgeon: Ben Grant MD;  Location: [facility] CATH INVASIVE LOCATION;  Service: Cardiovascular;  Laterality: Right;    TESTICLE SURGERY Left     extraction      General Information       Row Name 10/19/23 1419          OT Time and Intention    Document Type evaluation  -ES     Mode of Treatment individual therapy;occupational therapy  -ES       Row Name 10/19/23 1419          General Information    Patient Profile Reviewed yes  -ES     Prior Level of Function independent:;ADL's;all  household mobility;community mobility  Patient independent with B and IADLs at baseline. No device for functional mobility. Tub/shower combo, standing shower completion. [place] in stance. No home O2 use. Denies recent falls.  -ES     Existing Precautions/Restrictions no known precautions/restrictions  -ES     Barriers to Rehab none identified  -ES       Row Name 10/19/23 1419          Occupational Profile    Reason for Services/Referral (Occupational Profile) Patient is 58 yr old male admitted to [facility] on 10/18/2023 with reports of shortness of breath. Patient with multiple hospital admissions with same symptoms last few months. OT evaluation and treatment ordered d/t recent decline in ADLs/transfer ability and discharge planning recommendations. No previous OT services for current condition.  -ES       Row Name 10/19/23 1419          Living Environment    People in Home friend(s)  -ES       Row Name 10/19/23 1419          Cognition    Orientation Status (Cognition) oriented x 4  patient pleasant and cooperative, agreeable to therapy evaluation  -ES               User Key  (r) = Recorded By, (t) = Taken By, (c) = Cosigned By      Initials Name Provider Type    ES Paige Azevedo, OTR/L, CSRS Occupational Therapist                     Mobility/ADL's       Row Name 10/19/23 1424          Bed Mobility    Bed Mobility supine-sit;sit-supine  -ES     Supine-Sit [place] (Bed Mobility) independent  -ES     Sit-Supine [place] (Bed Mobility) independent  -ES       Row Name 10/19/23 1424          Transfers    Transfers sit-stand transfer;stand-sit transfer  -ES       Row Name 10/19/23 1424          Sit-Stand Transfer    Sit-Stand [place] (Transfers) independent  -ES     Assistive Device (Sit-Stand Transfers) other (see comments)  no assistive device  -ES       Row Name 10/19/23 1424          Stand-Sit Transfer    Stand-Sit [place] (Transfers) independent  -ES     Assistive Device (Stand-Sit  Transfers) other (see comments)  no assistive device  -ES       Row Name 10/19/23 1424          Functional Mobility    Functional Mobility- Ind. Level independent  -ES     Functional Mobility- Device other (see comments)  no assistive device  -ES       Row Name 10/19/23 1424          Activities of Daily Living    BADL Assessment/Intervention bathing;upper body dressing;lower body dressing;grooming;toileting;feeding  -ES       Row Name 10/19/23 1424          Bathing Assessment/Intervention    [place] Level (Bathing) bathing skills;independent  -ES       Row Name 10/19/23 1424          Upper Body Dressing Assessment/Training    [place] Level (Upper Body Dressing) upper body dressing skills;independent  -ES       Row Name 10/19/23 1424          Lower Body Dressing Assessment/Training    [place] Level (Lower Body Dressing) lower body dressing skills;independent  -ES       Row Name 10/19/23 1424          Grooming Assessment/Training    [place] Level (Grooming) grooming skills;independent  -ES       Row Name 10/19/23 1424          Toileting Assessment/Training    [place] Level (Toileting) toileting skills;independent  -ES       Row Name 10/19/23 1424          Self-Feeding Assessment/Training    [place] Level (Feeding) feeding skills;independent  -ES               User Key  (r) = Recorded By, (t) = Taken By, (c) = Cosigned By      Initials Name Provider Type    Paige Fan, OTR/L, CSRS Occupational Therapist                   Obj/Interventions       Row Name 10/19/23 1427          Sensory Assessment (Somatosensory)    Sensory Assessment (Somatosensory) sensation intact  -ES       Row Name 10/19/23 1427          Vision Assessment/Intervention    Visual Impairment/Limitations WFL  -ES       Row Name 10/19/23 1427          Range of Motion Comprehensive    General Range of Motion bilateral upper extremity ROM WNL  -ES       Row Name 10/19/23 1427          Strength Comprehensive (MMT)     General Manual Muscle Testing (MMT) Assessment no strength deficits identified  -ES     Comment, General Manual Muscle Testing (MMT) Assessment BUEs WFL  -ES       Row Name 10/19/23 1427          Motor Skills    Motor Skills functional endurance  -ES     Functional Endurance good  -ES       Row Name 10/19/23 1427          Balance    Balance Assessment sitting dynamic balance;standing dynamic balance  -ES     Dynamic Sitting Balance independent  -ES     Position, Sitting Balance unsupported  -ES     Dynamic Standing Balance independent  -ES     Position/Device Used, Standing Balance unsupported  -ES               User Key  (r) = Recorded By, (t) = Taken By, (c) = Cosigned By      Initials Name Provider Type    ES Paige Azevedo, OTR/L, CSRS Occupational Therapist                   Goals/Plan    No documentation.                  Clinical Impression       Row Name 10/19/23 1428          Plan of Care Review    Plan of Care Reviewed With patient  -ES     Progress no change  -ES     Outcome Evaluation Patient presents at or near baseline functional status at time of evaluation with no identified functional deficits that impede patient independence with activities of daily living.  No indicated need for skilled occupational therapy intervention in the acute care setting.  Occupational therapy will sign off at this time.  -ES       Row Name 10/19/23 1428          Therapy Assessment/Plan (OT)    Criteria for Skilled Therapeutic Interventions Met (OT) no problems identified which require skilled intervention  -ES     Therapy Frequency (OT) evaluation only  -ES       Row Name 10/19/23 1428          Therapy Plan Review/Discharge Plan (OT)    Anticipated Discharge Disposition (OT) home  -ES               User Key  (r) = Recorded By, (t) = Taken By, (c) = Cosigned By      Initials Name Provider Type    ES Paige Azevedo, OTR/L, CSRS Occupational Therapist                   Outcome Measures       Row Name 10/19/23 1429          How  much help from another is currently needed...    Putting on and taking off regular lower body clothing? 4  -ES     Bathing (including washing, rinsing, and drying) 4  -ES     Toileting (which includes using toilet bed pan or urinal) 4  -ES     Putting on and taking off regular upper body clothing 4  -ES     Taking care of personal grooming (such as brushing teeth) 4  -ES     Eating meals 4  -ES     [facility] 6 Clicks Score (OT) 24  -ES       Row Name 10/19/23 0746          How much help from another person do you currently need...    Turning from your back to your side while in flat bed without using bedrails? 4  -MP     Moving from lying on back to sitting on the side of a flat bed without bedrails? 4  -MP     Moving to and from a bed to a chair (including a wheelchair)? 3  -MP     Standing up from a chair using your arms (e.g., wheelchair, bedside chair)? 3  -MP     Climbing 3-5 steps with a railing? 2  -MP     To walk in hospital room? 3  -MP     [facility] 6 Clicks Score (PT) 19  -MP     Highest level of mobility 6 --> Walked 10 steps or more  -MP       Row Name 10/19/23 1429          Functional Assessment    Outcome Measure Options [facility] 6 Clicks Daily Activity (OT);Optimal Instrument  -ES       Row Name 10/19/23 1429          Optimal Instrument    Optimal Instrument Optimal - 3  -ES     Bending/Stooping 1  -ES     Balancing 1  -ES     Standing 1  -ES     From the list, choose the 3 activities you would most like to be able to do without any difficulty Bending/stooping;Standing;Balancing  -ES     Total Score Optimal - 3 3  -ES               User Key  (r) = Recorded By, (t) = Taken By, (c) = Cosigned By      Initials Name Provider Type    Zulma Taylor, RN Registered Nurse    Paige Fan, OTR/L, CSRS Occupational Therapist                      OT Recommendation and Plan  Therapy Frequency (OT): evaluation only  Plan of Care Review  Plan of Care Reviewed With: patient  Progress: no change  Outcome  Evaluation: Patient presents at or near baseline functional status at time of evaluation with no identified functional deficits that impede patient independence with activities of daily living.  No indicated need for skilled occupational therapy intervention in the acute care setting.  Occupational therapy will sign off at this time.     Time Calculation:   Evaluation Complexity (OT)  Review Occupational Profile/Medical/Therapy History Complexity: brief/low complexity  Assessment, Occupational Performance/Identification of Deficit Complexity: 1-3 performance deficits  Clinical Decision Making Complexity (OT): problem focused assessment/low complexity  Overall Complexity of Evaluation (OT): low complexity     Time Calculation- OT       Row Name 10/19/23 1431             Time Calculation- OT    OT Received On 10/19/23  -ES         Untimed Charges    OT Eval/Re-eval Minutes 31  -ES         Total Minutes    Untimed Charges Total Minutes 31  -ES       Total Minutes 31  -ES                User Key  (r) = Recorded By, (t) = Taken By, (c) = Cosigned By      Initials Name Provider Type    ES Paige Azevedo, OTR/L, CSRS Occupational Therapist                  Therapy Charges for Today       Code Description Service Date Service Provider Modifiers Qty    [id number]  OT EVAL LOW COMPLEXITY 3 10/19/2023 Paige Azevedo, OTR/L, CSRS GO 1                 Paige Azevedo, OTR/L, CSRS  10/19/2023

## 2023-10-20 LAB
ANION GAP SERPL CALCULATED.3IONS-SCNC: 11.4 MMOL/L (ref 5–15)
BUN SERPL-MCNC: 47 MG/DL (ref 6–20)
BUN/CREAT SERPL: 30.3 (ref 7–25)
CALCIUM SPEC-SCNC: 8.6 MG/DL (ref 8.6–10.5)
CHLORIDE SERPL-SCNC: 99 MMOL/L (ref 98–107)
CO2 SERPL-SCNC: 26.6 MMOL/L (ref 22–29)
CREAT SERPL-MCNC: 1.55 MG/DL (ref 0.76–1.27)
DEPRECATED RDW RBC AUTO: 44.1 FL (ref 37–54)
EGFRCR SERPLBLD CKD-EPI 2021: 51.6 ML/MIN/1.73
ERYTHROCYTE [DISTWIDTH] IN BLOOD BY AUTOMATED COUNT: 13.7 % (ref 12.3–15.4)
GLUCOSE BLDC GLUCOMTR-MCNC: 129 MG/DL (ref 70–99)
GLUCOSE BLDC GLUCOMTR-MCNC: 178 MG/DL (ref 70–99)
GLUCOSE BLDC GLUCOMTR-MCNC: 216 MG/DL (ref 70–99)
GLUCOSE BLDC GLUCOMTR-MCNC: 229 MG/DL (ref 70–99)
GLUCOSE SERPL-MCNC: 122 MG/DL (ref 65–99)
HCT VFR BLD AUTO: 41.2 % (ref 37.5–51)
HGB BLD-MCNC: 13.2 G/DL (ref 13–17.7)
INR PPP: 1.53 (ref 0.86–1.15)
MAGNESIUM SERPL-MCNC: 2 MG/DL (ref 1.6–2.6)
MCH RBC QN AUTO: 28.4 PG (ref 26.6–33)
MCHC RBC AUTO-ENTMCNC: 32 G/DL (ref 31.5–35.7)
MCV RBC AUTO: 88.8 FL (ref 79–97)
PHOSPHATE SERPL-MCNC: 4.3 MG/DL (ref 2.5–4.5)
PLATELET # BLD AUTO: 335 10*3/MM3 (ref 140–450)
PMV BLD AUTO: 10.9 FL (ref 6–12)
POTASSIUM SERPL-SCNC: 4 MMOL/L (ref 3.5–5.2)
PROTHROMBIN TIME: 18.4 SECONDS (ref 11.8–14.9)
QT INTERVAL: 409 MS
QTC INTERVAL: 474 MS
RBC # BLD AUTO: 4.64 10*6/MM3 (ref 4.14–5.8)
SODIUM SERPL-SCNC: 137 MMOL/L (ref 136–145)
WBC NRBC COR # BLD: 20.96 10*3/MM3 (ref 3.4–10.8)

## 2023-10-20 PROCEDURE — 80048 BASIC METABOLIC PNL TOTAL CA: CPT | Performed by: INTERNAL MEDICINE

## 2023-10-20 PROCEDURE — 99233 SBSQ HOSP IP/OBS HIGH 50: CPT | Performed by: INTERNAL MEDICINE

## 2023-10-20 PROCEDURE — 99232 SBSQ HOSP IP/OBS MODERATE 35: CPT | Performed by: INTERNAL MEDICINE

## 2023-10-20 PROCEDURE — 85610 PROTHROMBIN TIME: CPT | Performed by: HOSPITALIST

## 2023-10-20 PROCEDURE — 25010000002 ENOXAPARIN PER 10 MG: Performed by: HOSPITALIST

## 2023-10-20 PROCEDURE — 83735 ASSAY OF MAGNESIUM: CPT | Performed by: INTERNAL MEDICINE

## 2023-10-20 PROCEDURE — 94799 UNLISTED PULMONARY SVC/PX: CPT

## 2023-10-20 PROCEDURE — 94664 DEMO&/EVAL PT USE INHALER: CPT

## 2023-10-20 PROCEDURE — 63710000001 PREDNISONE PER 1 MG: Performed by: INTERNAL MEDICINE

## 2023-10-20 PROCEDURE — 85027 COMPLETE CBC AUTOMATED: CPT | Performed by: INTERNAL MEDICINE

## 2023-10-20 PROCEDURE — 63710000001 INSULIN LISPRO (HUMAN) PER 5 UNITS: Performed by: HOSPITALIST

## 2023-10-20 PROCEDURE — 84100 ASSAY OF PHOSPHORUS: CPT | Performed by: INTERNAL MEDICINE

## 2023-10-20 PROCEDURE — 82948 REAGENT STRIP/BLOOD GLUCOSE: CPT

## 2023-10-20 RX ORDER — WARFARIN SODIUM 10 MG/1
10 TABLET ORAL
Status: COMPLETED | OUTPATIENT
Start: 2023-10-20 | End: 2023-10-20

## 2023-10-20 RX ORDER — BUMETANIDE 0.25 MG/ML
1 INJECTION INTRAMUSCULAR; INTRAVENOUS
Status: DISCONTINUED | OUTPATIENT
Start: 2023-10-20 | End: 2023-10-21

## 2023-10-20 RX ADMIN — PREDNISONE 40 MG: 20 TABLET ORAL at 08:44

## 2023-10-20 RX ADMIN — FAMOTIDINE 40 MG: 20 TABLET, FILM COATED ORAL at 08:44

## 2023-10-20 RX ADMIN — IPRATROPIUM BROMIDE AND ALBUTEROL SULFATE 3 ML: .5; 3 SOLUTION RESPIRATORY (INHALATION) at 11:57

## 2023-10-20 RX ADMIN — ATORVASTATIN CALCIUM 40 MG: 40 TABLET, FILM COATED ORAL at 21:35

## 2023-10-20 RX ADMIN — CARVEDILOL 6.25 MG: 6.25 TABLET, FILM COATED ORAL at 17:13

## 2023-10-20 RX ADMIN — INSULIN LISPRO 4 UNITS: 100 INJECTION, SOLUTION INTRAVENOUS; SUBCUTANEOUS at 21:34

## 2023-10-20 RX ADMIN — VENLAFAXINE HYDROCHLORIDE 37.5 MG: 37.5 CAPSULE, EXTENDED RELEASE ORAL at 08:43

## 2023-10-20 RX ADMIN — Medication 10 ML: at 21:35

## 2023-10-20 RX ADMIN — ASPIRIN 81 MG: 81 TABLET, COATED ORAL at 08:44

## 2023-10-20 RX ADMIN — NICOTINE 1 PATCH: 21 PATCH, EXTENDED RELEASE TRANSDERMAL at 08:44

## 2023-10-20 RX ADMIN — ENOXAPARIN SODIUM 80 MG: 100 INJECTION SUBCUTANEOUS at 21:34

## 2023-10-20 RX ADMIN — Medication 10 ML: at 08:44

## 2023-10-20 RX ADMIN — DOCUSATE SODIUM 50MG AND SENNOSIDES 8.6MG 2 TABLET: 8.6; 5 TABLET, FILM COATED ORAL at 08:44

## 2023-10-20 RX ADMIN — IPRATROPIUM BROMIDE AND ALBUTEROL SULFATE 3 ML: .5; 3 SOLUTION RESPIRATORY (INHALATION) at 06:54

## 2023-10-20 RX ADMIN — BUMETANIDE 1 MG: 0.25 INJECTION INTRAMUSCULAR; INTRAVENOUS at 17:12

## 2023-10-20 RX ADMIN — CARVEDILOL 6.25 MG: 6.25 TABLET, FILM COATED ORAL at 08:44

## 2023-10-20 RX ADMIN — ENOXAPARIN SODIUM 80 MG: 100 INJECTION SUBCUTANEOUS at 08:43

## 2023-10-20 RX ADMIN — ARFORMOTEROL TARTRATE 15 MCG: 15 SOLUTION RESPIRATORY (INHALATION) at 06:54

## 2023-10-20 RX ADMIN — INSULIN LISPRO 4 UNITS: 100 INJECTION, SOLUTION INTRAVENOUS; SUBCUTANEOUS at 17:13

## 2023-10-20 RX ADMIN — WARFARIN SODIUM 10 MG: 10 TABLET ORAL at 17:12

## 2023-10-20 RX ADMIN — BUDESONIDE 0.5 MG: 0.5 INHALANT RESPIRATORY (INHALATION) at 06:54

## 2023-10-20 RX ADMIN — HYDROCODONE BITARTRATE AND ACETAMINOPHEN 1 TABLET: 5; 325 TABLET ORAL at 11:42

## 2023-10-20 RX ADMIN — BUMETANIDE 2 MG: 0.25 INJECTION INTRAMUSCULAR; INTRAVENOUS at 08:44

## 2023-10-20 RX ADMIN — INSULIN LISPRO 2 UNITS: 100 INJECTION, SOLUTION INTRAVENOUS; SUBCUTANEOUS at 08:43

## 2023-10-20 NOTE — PROGRESS NOTES
[facility]     Cardiology Progress Note    Patient Name: Regulo Sepulveda  : 1965  MRN: [id number]  Primary Care Physician:  Grace Cruz APRN  Date of admission: 10/18/2023    Subjective   Subjective     Chief Complaint: Follow-up visit for congestive heart failure, shortness of breath    Interval HPI:    Patient reports feeling much better today.  Breathing improved.  He is able to lie down flat and slept well overnight.  Denies chest pain or palpitations.  He diuresed well over the past 24 hours  With 3.8 L of urine output.  Renal function stable      Review of Systems   All systems were reviewed and negative except for: Shortness of breath and cough    Objective   Objective     Vitals:   Temp:  [97.4 °F (36.3 °C)-98.6 °F (37 °C)] 98 °F (36.7 °C)  Heart Rate:  [68-81] 78  Resp:  [16-20] 20  BP: (105-136)/(64-93) 123/77  Flow (L/min):  [1-2] 1  Physical Exam      General : Alert, awake, no acute distress  CVS : Regular rate and rhythm, no murmur, rubs or gallops  Lungs: Bilateral wheezing and crackles heard   Abdomen: Soft, nontender, bowel sounds heard in all 4 quadrants  Extremities: Warm, well-perfused, no pedal edema    Scheduled Meds:arformoterol, 15 mcg, Nebulization, BID - RT  aspirin, 81 mg, Oral, Daily  atorvastatin, 40 mg, Oral, Nightly  budesonide, 0.5 mg, Nebulization, BID - RT  bumetanide, 1 mg, Intravenous, BID  carvedilol, 6.25 mg, Oral, BID With Meals  enoxaparin, 1 mg/kg, Subcutaneous, Q12H  famotidine, 40 mg, Oral, Daily  insulin lispro, 2-9 Units, Subcutaneous, 4x Daily AC & at Bedtime  ipratropium-albuterol, 3 mL, Nebulization, 4x Daily - RT  nicotine, 1 patch, Transdermal, Q24H  predniSONE, 40 mg, Oral, Daily With Breakfast  senna-docusate sodium, 2 tablet, Oral, BID  sodium chloride, 10 mL, Intravenous, Q12H  venlafaxine XR, 37.5 mg, Oral, Daily  warfarin, 10 mg, Oral, Once      Continuous Infusions:Pharmacy to dose warfarin,            Result Review    Result Review:  I  have personally reviewed the results from the time of this admission to 10/20/2023 13:12 EDT and agree with these findings:  [x]  Laboratory  []  Microbiology  [x]  Radiology  [x]  EKG/Telemetry   [x]  Cardiology/Vascular   []  Pathology  []  Old records  []  Other:  Most notable findings include:     CBC          10/18/2023    15:19 10/19/2023    05:13 10/20/2023    04:26   CBC   WBC 10.31  8.55  20.96    RBC 5.19  4.93  4.64    Hemoglobin 14.5  13.7  13.2    Hematocrit 47.5  44.0  41.2    MCV 91.5  89.2  88.8    MCH 27.9  27.8  28.4    MCHC 30.5  31.1  32.0    RDW 14.3  13.7  13.7    Platelets 426  335  335      CMP          10/18/2023    15:59 10/19/2023    05:13 10/20/2023    04:26   CMP   Glucose 132  131  122    BUN 23  34  47    Creatinine 1.44  1.60  1.55    EGFR 56.3  49.6  51.6    Sodium 139  138  137    Potassium 5.0  4.3  4.0    Chloride 103  102  99    Calcium 9.1  9.0  8.6    Total Protein 6.3      Albumin 3.5      Globulin 2.8      Total Bilirubin 1.1      Alkaline Phosphatase 299      AST (SGOT) 64      ALT (SGPT) 82      Albumin/Globulin Ratio 1.3      BUN/Creatinine Ratio 16.0  21.3  30.3    Anion Gap 12.7  14.7  11.4       CARDIAC LABS:      Lab 10/20/23  0426 10/19/23  1217 10/18/23  2026 10/18/23  1559 10/18/23  1519 10/17/23  1203   PROBNP  --   --   --   --  23,881.0* 19,337.0*   HSTROP T  --   --   --  35*  --  29*   PROTIME 18.4* 18.1* 17.7*  --  15.6*  --    INR 1.53* 1.50* 1.46*  --  1.23*  --         Assessment & Plan   Assessment / Plan     Brief Patient Summary:  Regulo Sepulveda is a 58 y.o. male with dilated cardiomyopathy, chronic combined heart failure, ejection fraction 25%, paroxysmal atrial fibrillation, chronic obstructive pulm disease, COPD, hypertension, hyperlipidemia and bipolar disorder.  He presented to the hospital with increasing shortness of breath     Active Hospital Problems:  Active Hospital Problems    Diagnosis    • **Acute on chronic combined systolic and  diastolic CHF (congestive heart failure)    • Acute on chronic heart failure with reduced ejection fraction and diastolic dysfunction      Acute on chronic combined heart failure : He has mild volume overload on physical examination, however proBNP significantly elevated.  Creatinine is above baseline, which is trending up with diuretics.  Liver enzymes are elevated as well.  Unsure about medication compliance     COPD with exacerbation : On steroids, per hospitalist team     LV mural thrombus : On anticoagulation with Coumadin, INR subtherapeutic on admission     Schizophrenia     Previous drug abuse     Acute kidney injury : Creatinine stable but above baseline.  Likely cardiorenal    Plan:     We will decrease dose of Bumex to 1 mg twice daily  Continue to home hold spironolactone, Farxiga, losartan today as well due to creatinine above baseline    Continue carvedilol at the current dose  Steroids, antibiotics, bronchodilators per primary team  CMP, proBNP AM       CODE STATUS:   Level Of Support Discussed With: Patient  Code Status (Patient has no pulse and is not breathing): CPR (Attempt to Resuscitate)  Medical Interventions (Patient has pulse or is breathing): Full Support      Electronically signed by Dave Dinero MD, 10/20/23, 1:12 PM EDT.

## 2023-10-20 NOTE — PLAN OF CARE
Goal Outcome Evaluation:  Plan of Care Reviewed With: patient        Progress: improving.

## 2023-10-20 NOTE — PROGRESS NOTES
[facility]   Hospitalist Progress Note  Date: 10/20/2023  Patient Name: Regulo Sepulveda  : 1965  MRN: [id number]  Date of admission: 10/18/2023  Consultants:   -Cardiology: Dr. Dave Dinero    Subjective   Subjective     Chief Complaint: Shortness of breath    Summary:   Regulo Sepulveda is a 58 y.o. male with chronic heart failure with reduced ejection fraction, LV thrombus, chronic paroxysmal atrial fibrillation, COPD, essential hypertension and hyperlipidemia who presented to ED with worsening shortness of breath.  Eval in ED significant for patient being tachypneic.  proBNP noted to be elevated and creatinine elevated compared to baseline.  LFTs elevated as well.  Hospitalist service contacted for further evaluation management.  Cardiology consulted.  IV diuresis initiated.  Nebulizer breathing treatments and steroid therapy started as well.    Interval Followup:   No acute issues overnight.  Patient stated he would like his breathing was improved today compared to yesterday.  He was able to sleep and breathe with well while lying down.  Denied any chest pain.  Nursing with no additional acute issues to report.    Review of Systems   All systems reviewed and negative unless stated otherwise under subjective.    Objective   Objective     Vitals:   Temp:  [97.4 °F (36.3 °C)-98.6 °F (37 °C)] 97.6 °F (36.4 °C)  Heart Rate:  [68-81] 78  Resp:  [18-20] 20  BP: (105-136)/(64-93) 136/93  Flow (L/min):  [1-2] 1  Physical Exam   Gen: No acute distress, Conversant, Pleasant, lying in bed  Resp: Diminished breath sounds bilaterally, faint bilateral wheezing noted, improved crackles noted bilaterally, normal respiratory effort  Card: RRR, No m/r/g  Abd: Soft, Nontender, Nondistended, + bowel sounds    Result Review    Result Review:  I have personally reviewed the results as below and agree with these findings:  []  Laboratory:   CMP          10/18/2023    15:59 10/19/2023    05:13 10/20/2023    04:26    CMP   Glucose 132  131  122    BUN 23  34  47    Creatinine 1.44  1.60  1.55    EGFR 56.3  49.6  51.6    Sodium 139  138  137    Potassium 5.0  4.3  4.0    Chloride 103  102  99    Calcium 9.1  9.0  8.6    Total Protein 6.3      Albumin 3.5      Globulin 2.8      Total Bilirubin 1.1      Alkaline Phosphatase 299      AST (SGOT) 64      ALT (SGPT) 82      Albumin/Globulin Ratio 1.3      BUN/Creatinine Ratio 16.0  21.3  30.3    Anion Gap 12.7  14.7  11.4      CBC          10/18/2023    15:19 10/19/2023    05:13 10/20/2023    04:26   CBC   WBC 10.31  8.55  20.96    RBC 5.19  4.93  4.64    Hemoglobin 14.5  13.7  13.2    Hematocrit 47.5  44.0  41.2    MCV 91.5  89.2  88.8    MCH 27.9  27.8  28.4    MCHC 30.5  31.1  32.0    RDW 14.3  13.7  13.7    Platelets 426  335  335    Magnesium and phosphorus within normal limits.  []  Microbiology:   []  Radiology:   [x]  EKG/Telemetry:    []  Cardiology/Vascular:    []  Pathology:  []  Old records:  []  Other:    Assessment & Plan   Assessment / Plan     Assessment:  Acute on chronic combined heart failure  Acute renal failure (baseline creatinine 0.9 and creatinine during hospitalization 1.60)  Chronic paroxysmal atrial fibrillation  Therapeutic drug monitoring, Coumadin  Bilateral pleural effusions  Cardiogenic pulmonary edema  Acute COPD exacerbation  LV mural thrombus  History of drug abuse  Amphetamine/methamphetamine positive talk screen  Type 2 diabetes mellitus    Plan:  -Cardiology consulted and following, appreciate assistance and recommendations in the care of this patient.  -Continue diuresis with IV Bumex 2 mg twice daily.  Strict I's and O's.  Monitor electrolytes and renal function closely given IV diuresis  -Continue supplemental O2 to maintain sats greater than 90%, wean as tolerated  -Continue holding spironolactone, ACE inhibitor/ARB and Farxiga given acute kidney injury  -Continue carvedilol  -Continue Brovana, Pulmicort and DuoNebs  -Continue prednisone 40  mg daily for total of 5 days (Day   -Continue therapeutic Lovenox and Coumadin.  Plan to bridge patient to Coumadin.  Monitor INR daily.  -No change to insulin regimen at this time  -Continue appropriate home medications  -Management discussed with cardiology.  Agree with holding spironolactone, ACE inhibitor/ARB and Farxiga  -Monitor electrolytes and renal function with BMP and magnesium level in the AM  -Monitor WBC and Hgb with CBC in the AM  -Clinical course will dictate further management     DVT Prophylaxis: Lovenox/Coumadin  GI Prophylaxis: Famotidine  Diet: Cardiac  Dispo: Home when medically appropriate for discharge  Code Status: Full code     Personally reviewed patients labs and imaging, discussed with patient and nurse at bedside. Discussed management with the following consultants: Cardiology.     Part of this note may be an electronic transcription/translation of spoken language to printed text using the Dragon dictation system.    DVT prophylaxis:  Medical DVT prophylaxis orders are present.    CODE STATUS:   Level Of Support Discussed With: Patient  Code Status (Patient has no pulse and is not breathing): CPR (Attempt to Resuscitate)  Medical Interventions (Patient has pulse or is breathing): Full Support      Electronically signed by Santy Quan MD, 10/20/23, 11:33 AM EDT.

## 2023-10-20 NOTE — PROGRESS NOTES
Pharmacy to Dose: Warfarin  Consulting provider: Ramez  Indication for warfarin: Afib, Hx of LV thrombus in Aug 2023  Goal INR range: 2 - 3  Home warfarin dose: warfarin 7.5 mg PO daily*     Date INR Warfarin Dose Given   10/18 1.23 7.5 mg   10/19 1.50 10 mg   10/20 1.53 (10 mg)               Any Vitamin K given?: No  Drug interactions Reviewed: Yes. Details: venlafaxine  Is patient on bridging therapy with another anticoagulant?: Yes; Enoxaparin 80 mg SQ Q12H    Lab Results   Component Value Date     10/20/2023     10/19/2023    HGB 13.2 10/20/2023    HGB 13.7 10/19/2023     Plan:  *Patient with hx of LV thrombus in Aug 2023, discharged on warfarin 7.5 mg daily x5 days on 9/2. Was readmitted 9/17 for a left and right heart cath and was given warfarin x2 days (10 mg and 12 mg subsequently) inpatient in addition to a lovenox bridge. Patient received an outpatient script for lovenox only. Given this, it appears patient has not had warfarin since 9/18. He has a history of noncompliance.   Given recent hx of LV thrombus and stable H/H, will repeat dose of warfarin 10 mg x1 again today.   Daily INR ordered.    Thank you for this consult. Pharmacy will continue to monitor.   Doreen Puentes RPH

## 2023-10-21 LAB
ALBUMIN SERPL-MCNC: 3.4 G/DL (ref 3.5–5.2)
ALBUMIN/GLOB SERPL: 1.3 G/DL
ALP SERPL-CCNC: 234 U/L (ref 39–117)
ALT SERPL W P-5'-P-CCNC: 548 U/L (ref 1–41)
ANION GAP SERPL CALCULATED.3IONS-SCNC: 8 MMOL/L (ref 5–15)
AST SERPL-CCNC: 178 U/L (ref 1–40)
BILIRUB SERPL-MCNC: 0.5 MG/DL (ref 0–1.2)
BUN SERPL-MCNC: 38 MG/DL (ref 6–20)
BUN/CREAT SERPL: 32.8 (ref 7–25)
CALCIUM SPEC-SCNC: 8.7 MG/DL (ref 8.6–10.5)
CHLORIDE SERPL-SCNC: 99 MMOL/L (ref 98–107)
CO2 SERPL-SCNC: 34 MMOL/L (ref 22–29)
CREAT SERPL-MCNC: 1.16 MG/DL (ref 0.76–1.27)
DEPRECATED RDW RBC AUTO: 44.2 FL (ref 37–54)
EGFRCR SERPLBLD CKD-EPI 2021: 73 ML/MIN/1.73
ERYTHROCYTE [DISTWIDTH] IN BLOOD BY AUTOMATED COUNT: 13.6 % (ref 12.3–15.4)
GLOBULIN UR ELPH-MCNC: 2.6 GM/DL
GLUCOSE BLDC GLUCOMTR-MCNC: 120 MG/DL (ref 70–99)
GLUCOSE BLDC GLUCOMTR-MCNC: 167 MG/DL (ref 70–99)
GLUCOSE BLDC GLUCOMTR-MCNC: 205 MG/DL (ref 70–99)
GLUCOSE BLDC GLUCOMTR-MCNC: 214 MG/DL (ref 70–99)
GLUCOSE SERPL-MCNC: 114 MG/DL (ref 65–99)
HCT VFR BLD AUTO: 42.7 % (ref 37.5–51)
HGB BLD-MCNC: 13.4 G/DL (ref 13–17.7)
INR PPP: 2.52 (ref 0.86–1.15)
MAGNESIUM SERPL-MCNC: 2.1 MG/DL (ref 1.6–2.6)
MCH RBC QN AUTO: 27.9 PG (ref 26.6–33)
MCHC RBC AUTO-ENTMCNC: 31.4 G/DL (ref 31.5–35.7)
MCV RBC AUTO: 89 FL (ref 79–97)
NT-PROBNP SERPL-MCNC: 4588 PG/ML (ref 0–900)
PLATELET # BLD AUTO: 327 10*3/MM3 (ref 140–450)
PMV BLD AUTO: 10.9 FL (ref 6–12)
POTASSIUM SERPL-SCNC: 3.4 MMOL/L (ref 3.5–5.2)
PROT SERPL-MCNC: 6 G/DL (ref 6–8.5)
PROTHROMBIN TIME: 26.8 SECONDS (ref 11.8–14.9)
RBC # BLD AUTO: 4.8 10*6/MM3 (ref 4.14–5.8)
SODIUM SERPL-SCNC: 141 MMOL/L (ref 136–145)
WBC NRBC COR # BLD: 18.27 10*3/MM3 (ref 3.4–10.8)

## 2023-10-21 PROCEDURE — 85610 PROTHROMBIN TIME: CPT | Performed by: HOSPITALIST

## 2023-10-21 PROCEDURE — 83880 ASSAY OF NATRIURETIC PEPTIDE: CPT | Performed by: INTERNAL MEDICINE

## 2023-10-21 PROCEDURE — 99221 1ST HOSP IP/OBS SF/LOW 40: CPT | Performed by: INTERNAL MEDICINE

## 2023-10-21 PROCEDURE — 94799 UNLISTED PULMONARY SVC/PX: CPT

## 2023-10-21 PROCEDURE — 82948 REAGENT STRIP/BLOOD GLUCOSE: CPT

## 2023-10-21 PROCEDURE — 80053 COMPREHEN METABOLIC PANEL: CPT | Performed by: INTERNAL MEDICINE

## 2023-10-21 PROCEDURE — 83735 ASSAY OF MAGNESIUM: CPT | Performed by: INTERNAL MEDICINE

## 2023-10-21 PROCEDURE — 80074 ACUTE HEPATITIS PANEL: CPT | Performed by: INTERNAL MEDICINE

## 2023-10-21 PROCEDURE — 99233 SBSQ HOSP IP/OBS HIGH 50: CPT | Performed by: INTERNAL MEDICINE

## 2023-10-21 PROCEDURE — 85027 COMPLETE CBC AUTOMATED: CPT | Performed by: INTERNAL MEDICINE

## 2023-10-21 PROCEDURE — 63710000001 PREDNISONE PER 1 MG: Performed by: INTERNAL MEDICINE

## 2023-10-21 PROCEDURE — 94664 DEMO&/EVAL PT USE INHALER: CPT

## 2023-10-21 PROCEDURE — 63710000001 INSULIN LISPRO (HUMAN) PER 5 UNITS: Performed by: HOSPITALIST

## 2023-10-21 RX ORDER — LOSARTAN POTASSIUM 25 MG/1
25 TABLET ORAL
Status: DISCONTINUED | OUTPATIENT
Start: 2023-10-21 | End: 2023-10-23 | Stop reason: HOSPADM

## 2023-10-21 RX ORDER — POTASSIUM CHLORIDE 750 MG/1
40 CAPSULE, EXTENDED RELEASE ORAL EVERY 4 HOURS
Status: COMPLETED | OUTPATIENT
Start: 2023-10-21 | End: 2023-10-21

## 2023-10-21 RX ORDER — SPIRONOLACTONE 25 MG/1
25 TABLET ORAL DAILY
Status: DISCONTINUED | OUTPATIENT
Start: 2023-10-21 | End: 2023-10-23 | Stop reason: HOSPADM

## 2023-10-21 RX ORDER — WARFARIN SODIUM 7.5 MG/1
7.5 TABLET ORAL
Status: COMPLETED | OUTPATIENT
Start: 2023-10-21 | End: 2023-10-21

## 2023-10-21 RX ORDER — TORSEMIDE 20 MG/1
40 TABLET ORAL DAILY
Status: DISCONTINUED | OUTPATIENT
Start: 2023-10-21 | End: 2023-10-23 | Stop reason: HOSPADM

## 2023-10-21 RX ADMIN — WARFARIN SODIUM 7.5 MG: 7.5 TABLET ORAL at 17:05

## 2023-10-21 RX ADMIN — INSULIN LISPRO 2 UNITS: 100 INJECTION, SOLUTION INTRAVENOUS; SUBCUTANEOUS at 17:04

## 2023-10-21 RX ADMIN — Medication 10 ML: at 08:28

## 2023-10-21 RX ADMIN — Medication 10 ML: at 20:36

## 2023-10-21 RX ADMIN — ARFORMOTEROL TARTRATE 15 MCG: 15 SOLUTION RESPIRATORY (INHALATION) at 06:40

## 2023-10-21 RX ADMIN — ASPIRIN 81 MG: 81 TABLET, COATED ORAL at 08:28

## 2023-10-21 RX ADMIN — VENLAFAXINE HYDROCHLORIDE 37.5 MG: 37.5 CAPSULE, EXTENDED RELEASE ORAL at 08:28

## 2023-10-21 RX ADMIN — LOSARTAN POTASSIUM 25 MG: 25 TABLET, FILM COATED ORAL at 13:54

## 2023-10-21 RX ADMIN — POTASSIUM CHLORIDE 40 MEQ: 10 CAPSULE, COATED, EXTENDED RELEASE ORAL at 08:28

## 2023-10-21 RX ADMIN — IPRATROPIUM BROMIDE AND ALBUTEROL SULFATE 3 ML: .5; 3 SOLUTION RESPIRATORY (INHALATION) at 06:40

## 2023-10-21 RX ADMIN — DOCUSATE SODIUM 50MG AND SENNOSIDES 8.6MG 2 TABLET: 8.6; 5 TABLET, FILM COATED ORAL at 20:36

## 2023-10-21 RX ADMIN — INSULIN LISPRO 4 UNITS: 100 INJECTION, SOLUTION INTRAVENOUS; SUBCUTANEOUS at 20:36

## 2023-10-21 RX ADMIN — EMPAGLIFLOZIN 10 MG: 10 TABLET, FILM COATED ORAL at 13:54

## 2023-10-21 RX ADMIN — PREDNISONE 40 MG: 20 TABLET ORAL at 08:28

## 2023-10-21 RX ADMIN — CARVEDILOL 6.25 MG: 6.25 TABLET, FILM COATED ORAL at 08:28

## 2023-10-21 RX ADMIN — INSULIN LISPRO 4 UNITS: 100 INJECTION, SOLUTION INTRAVENOUS; SUBCUTANEOUS at 12:29

## 2023-10-21 RX ADMIN — TORSEMIDE 40 MG: 20 TABLET ORAL at 13:54

## 2023-10-21 RX ADMIN — IPRATROPIUM BROMIDE AND ALBUTEROL SULFATE 3 ML: .5; 3 SOLUTION RESPIRATORY (INHALATION) at 11:51

## 2023-10-21 RX ADMIN — ATORVASTATIN CALCIUM 40 MG: 40 TABLET, FILM COATED ORAL at 20:36

## 2023-10-21 RX ADMIN — NICOTINE 1 PATCH: 21 PATCH, EXTENDED RELEASE TRANSDERMAL at 08:26

## 2023-10-21 RX ADMIN — POTASSIUM CHLORIDE 40 MEQ: 10 CAPSULE, COATED, EXTENDED RELEASE ORAL at 12:29

## 2023-10-21 RX ADMIN — SPIRONOLACTONE 25 MG: 25 TABLET ORAL at 13:54

## 2023-10-21 RX ADMIN — BUMETANIDE 1 MG: 0.25 INJECTION INTRAMUSCULAR; INTRAVENOUS at 08:28

## 2023-10-21 RX ADMIN — BUDESONIDE 0.5 MG: 0.5 INHALANT RESPIRATORY (INHALATION) at 06:40

## 2023-10-21 RX ADMIN — CARVEDILOL 6.25 MG: 6.25 TABLET, FILM COATED ORAL at 17:04

## 2023-10-21 RX ADMIN — FAMOTIDINE 40 MG: 20 TABLET, FILM COATED ORAL at 08:27

## 2023-10-21 NOTE — PROGRESS NOTES
Pharmacy to Dose: Warfarin  Consulting provider: Ramez  Indication for warfarin: Afib, Hx of LV thrombus in Aug 2023  Goal INR range: 2 - 3  Home warfarin dose: warfarin 7.5 mg PO daily*     Date INR Warfarin Dose Given   10/18 1.23 7.5 mg   10/19 1.50 10 mg   10/20 1.53 10 mg   10/21 2.52 7.5 mg (ordered)          Any Vitamin K given?: No  Drug interactions Reviewed: Yes. Details: venlafaxine  Is patient on bridging therapy with another anticoagulant?: Yes; Enoxaparin 80 mg SQ Q12H - held today    Lab Results   Component Value Date     10/21/2023     10/20/2023    HGB 13.4 10/21/2023    HGB 13.2 10/20/2023     Plan:  INR increased to 2.52 today. Give 7.5 mg warfarin this evening.  Held lovenox bridge as INR now therapeutic per attending  Daily INR ordered.    Thank you for this consult. Pharmacy will continue to monitor.   Wan Simons RPH

## 2023-10-21 NOTE — CONSULTS
[facility]  Initial Inpatient Consult Note    Referring Provider: Hospitalist Physician    Reason for Consultation: Transaminitis    Subjective     History of present illness:    Regulo Sepulveda is a 58 y.o. male with chronic heart failure with reduced ejection fraction, LV thrombus, chronic paroxysmal atrial fibrillation, COPD, essential hypertension and hyperlipidemia who presented to ED with worsening shortness of breath.  Eval in ED significant for patient being tachypneic.  proBNP noted to be elevated and creatinine elevated compared to baseline.  LFTs elevated as well.  Hospitalist service contacted for further evaluation management.  Cardiology consulted.  IV diuresis initiated.  Nebulizer breathing treatments and steroid therapy started as well.     Asked to evaluate him for elevated liver enzymes specifically transaminases which had increased on today's laboratory patient 1 month ago had normal transaminases he has been running hypotensive 1 and 2 days before the blood test from today prior to that his transaminases were in the higher range of normal    Past Medical History:  Past Medical History:   Diagnosis Date    Anxiety     Asthma     Bipolar affective     Cardiac tamponade     2023    CHF (congestive heart failure)     COPD (chronic obstructive pulmonary disease)     Coronary artery disease     Depression     Diabetes mellitus     Elevated cholesterol     Hypertension      Past Surgical History:  Past Surgical History:   Procedure Laterality Date    CARDIAC CATHETERIZATION Right 9/17/2023    Procedure: Right and Left Heart Cath;  Surgeon: Ben Grant MD;  Location: [facility] CATH INVASIVE LOCATION;  Service: Cardiovascular;  Laterality: Right;    TESTICLE SURGERY Left     extraction      Social History:   Social History     Tobacco Use    Smoking status: Every Day     Packs/day: 1.00     Years: 50.00     Additional pack years: 0.00     Total pack years: 50.00     Types:  Cigarettes    Smokeless tobacco: Never   Substance Use Topics    Alcohol use: Not Currently      Family History:  Family History   Problem Relation Age of Onset    Diabetes Mother     Depression Sister     Depression Brother        Home Meds:  Medications Prior to Admission   Medication Sig Dispense Refill Last Dose    Abilify Maintena 300 MG Suspension Reconstituted ER IM injection ER Inject 300 mg into the appropriate muscle as directed by prescriber Every 30 (Thirty) Days.       albuterol sulfate  (90 Base) MCG/ACT inhaler Inhale 2 puffs Every 4 (Four) Hours As Needed for Wheezing or Shortness of Air. Indications: Chronic Obstructive Lung Disease 18 g 0     atorvastatin (LIPITOR) 40 MG tablet Take 1 tablet by mouth every night at bedtime.       cloNIDine (CATAPRES) 0.1 MG tablet Take 1 tablet by mouth 2 (Two) Times a Day.       Farxiga 5 MG tablet tablet Take 1 tablet by mouth Daily. Indications: Type 2 Diabetes 30 tablet 1     losartan (COZAAR) 50 MG tablet Take 1 tablet by mouth 2 (Two) Times a Day.       meloxicam (MOBIC) 15 MG tablet Take 1 tablet by mouth Daily.       metFORMIN (GLUCOPHAGE) 1000 MG tablet Take 1 tablet by mouth 2 (Two) Times a Day.       venlafaxine XR (EFFEXOR-XR) 37.5 MG 24 hr capsule Take 1 capsule by mouth Daily.       vitamin B-12 (VITAMIN B-12) 500 MCG tablet Take 1 tablet by mouth Daily. Indications: Inadequate Vitamin B12 30 tablet 1      Current Meds:   arformoterol, 15 mcg, Nebulization, BID - RT  aspirin, 81 mg, Oral, Daily  atorvastatin, 40 mg, Oral, Nightly  budesonide, 0.5 mg, Nebulization, BID - RT  carvedilol, 6.25 mg, Oral, BID With Meals  empagliflozin, 10 mg, Oral, Daily  famotidine, 40 mg, Oral, Daily  insulin lispro, 2-9 Units, Subcutaneous, 4x Daily AC & at Bedtime  ipratropium-albuterol, 3 mL, Nebulization, 4x Daily - RT  losartan, 25 mg, Oral, Q24H  nicotine, 1 patch, Transdermal, Q24H  predniSONE, 40 mg, Oral, Daily With Breakfast  senna-docusate sodium, 2  tablet, Oral, BID  sodium chloride, 10 mL, Intravenous, Q12H  spironolactone, 25 mg, Oral, Daily  torsemide, 40 mg, Oral, Daily  venlafaxine XR, 37.5 mg, Oral, Daily  warfarin, 7.5 mg, Oral, Once      Allergies:  No Known Allergies  Review of Systems  Pertinent items are noted in HPI, all other systems reviewed and negative         Vital Signs  Temp:  [97.3 °F (36.3 °C)-98.2 °F (36.8 °C)] 98.1 °F (36.7 °C)  Heart Rate:  [68-84] 80  Resp:  [18-22] 20  BP: (113-147)/(64-99) 134/93  Physical Exam:  General Appearance:    Alert, cooperative, in no acute distress   Head:    Normocephalic, without obvious abnormality, atraumatic   Eyes:          conjunctivae and sclerae normal, no   icterus   Throat:   no thrush, oral mucosa moist   Neck:   Supple, no adenopathy   Lungs:     Clear to auscultation bilaterally    Heart:    Regular rhythm and normal rate    Chest Wall:    No abnormalities observed   Abdomen:     Soft, nondistended, nontender; normal bowel sounds   Extremities:   no edema, no redness   Skin: Multiple tattoos over upper extremities   Psychiatric:  normal mood and insight     Results Review:  [x]  Laboratory   [x]  Radiology  []  Pathology      I reviewed the patient's new clinical results.    Results from last 7 days   Lab Units 10/21/23  0502 10/20/23  0426 10/19/23  0513   WBC 10*3/mm3 18.27* 20.96* 8.55   HEMOGLOBIN g/dL 13.4 13.2 13.7   HEMATOCRIT % 42.7 41.2 44.0   PLATELETS 10*3/mm3 327 335 335     Results from last 7 days   Lab Units 10/21/23  0502 10/20/23  0426 10/19/23  0513 10/18/23  1559 10/17/23  1203   SODIUM mmol/L 141 137 138 139 132*   POTASSIUM mmol/L 3.4* 4.0 4.3 5.0 4.0   CHLORIDE mmol/L 99 99 102 103 99   CO2 mmol/L 34.0* 26.6 21.3* 23.3 22.9   BUN mg/dL 38* 47* 34* 23* 20   CREATININE mg/dL 1.16 1.55* 1.60* 1.44* 0.90   CALCIUM mg/dL 8.7 8.6 9.0 9.1 9.0   BILIRUBIN mg/dL 0.5  --   --  1.1 0.8   ALK PHOS U/L 234*  --   --  299* 256*   ALT (SGPT) U/L 548*  --   --  82* 59*   AST (SGOT) U/L  178*  --   --  64* 35   GLUCOSE mg/dL 114* 122* 131* 132* 121*     Results from last 7 days   Lab Units 10/21/23  0502 10/20/23  0426 10/19/23  1217   INR  2.52* 1.53* 1.50*     Lab Results   Lab Value Date/Time    LIPASE 15 10/17/2023 1203    LIPASE 34 08/27/2023 1518    LIPASE 32 09/14/2022 1645       Radiology:  CT Chest Without Contrast Diagnostic   Final Result           1. Slight increase in small-to-moderate-sized bilateral pleural effusions is possible since the    prior 8/27/2023 CT study.         2. Mild pulmonary edema is suspected.         3. There is mild-to-moderate cardiomegaly.  Minimal, if any, coronary artery calcification is    suggested.  A small pericardial effusion is seen.         4. Age-indeterminate cholecystitis cannot be excluded.  There may be gastroesophageal reflux    disease (GERD).         5. Enlarged mediastinal and hilar lymph nodes are seen, which are nonspecific.       6. Age-indeterminate cholecystitis is possible.  Consider further imaging assessment as detailed    above.       7. Please see above comments for further detail.                 Please note that portions of this note were completed with a voice recognition program.       MAHOGANY ELIZONDO JR, MD          Electronically Signed and Approved By: MAHOGANY ELIZONDO JR, MD on 10/19/2023 at 4:57                               XR Chest 1 View   Final Result    Findings similar previous examination.  Septal thickening which may be seen with    pulmonary edema or chronic lung disease.  Cardiomegaly.                        COREY MORALES MD          Electronically Signed and Approved By: COREY MORALES MD on 10/18/2023 at 15:34                           US Abdomen Limited    (Results Pending)        Assessment & Plan     Active Hospital Problems    Diagnosis     **Acute on chronic combined systolic and diastolic CHF (congestive heart failure)     Acute on chronic heart failure with reduced ejection fraction and diastolic dysfunction          Plan:  Chart reviewed patient examined  Increased elevation of transaminases seems to correlate following episode of hypotension is probably related to shock liver either mild or resolving  Recommendations  Supportive care blood pressure control  Check hepatitis serologies as baseline  Avoid hepatotoxins she is getting both acetaminophen and hydrocodone with acetaminophen  Repeat labs daily for 2 days to see that they are resolving and trending downward or upward        I discussed the patients findings and my recommendations with patient and nursing staff.    Electronically signed by Roderick Berrios MD, 10/21/23, 2:22 PM EDT.

## 2023-10-21 NOTE — PLAN OF CARE
Problem: Adult Inpatient Plan of Care  Goal: Absence of Hospital-Acquired Illness or Injury  Intervention: Identify and Manage Fall Risk  Intervention: Prevent Skin Injury  Intervention: Prevent and Manage VTE (Venous Thromboembolism) Risk  Intervention: Prevent Infection  Goal: Optimal Comfort and Wellbeing  Intervention: Provide Person-Centered Care     Problem: Adult Inpatient Plan of Care  Goal: Absence of Hospital-Acquired Illness or Injury  Intervention: Prevent Skin Injury     Problem: Adult Inpatient Plan of Care  Goal: Absence of Hospital-Acquired Illness or Injury  Intervention: Prevent and Manage VTE (Venous Thromboembolism) Risk   Goal Outcome Evaluation:

## 2023-10-21 NOTE — PLAN OF CARE
Goal Outcome Evaluation:           Progress: improving  Outcome Evaluation: Pt to be NPO after midnight for abd US.

## 2023-10-21 NOTE — PROGRESS NOTES
[facility]     Cardiology Progress Note    Patient Name: Regulo Sepulveda  : 1965  MRN: [id number]  Primary Care Physician:  Grace Cruz APRN  Date of admission: 10/18/2023    Subjective   Subjective     No acute events overnight.  He continues to have chest congestion.  He has no other complaints.  He has no chest pain or dyspnea.  He has no dizziness or palpitations.    Review of Systems   All systems were reviewed and negative except as above  Objective   Objective     Vitals:   Temp:  [97.3 °F (36.3 °C)-98.2 °F (36.8 °C)] 98.1 °F (36.7 °C)  Heart Rate:  [68-84] 80  Resp:  [18-22] 20  BP: (113-147)/(64-99) 134/93  Flow (L/min):  [1] 1  Physical Exam      General : Alert, awake, no acute distress  CVS : Regular rate and rhythm, no murmur, rubs or gallops  Lungs: Bilateral crackles heard   Abdomen: Soft, nontender, bowel sounds heard in all 4 quadrants  Extremities: Warm, well-perfused, no pedal edema    Scheduled Meds:arformoterol, 15 mcg, Nebulization, BID - RT  aspirin, 81 mg, Oral, Daily  atorvastatin, 40 mg, Oral, Nightly  budesonide, 0.5 mg, Nebulization, BID - RT  bumetanide, 1 mg, Intravenous, BID  carvedilol, 6.25 mg, Oral, BID With Meals  famotidine, 40 mg, Oral, Daily  insulin lispro, 2-9 Units, Subcutaneous, 4x Daily AC & at Bedtime  ipratropium-albuterol, 3 mL, Nebulization, 4x Daily - RT  nicotine, 1 patch, Transdermal, Q24H  predniSONE, 40 mg, Oral, Daily With Breakfast  senna-docusate sodium, 2 tablet, Oral, BID  sodium chloride, 10 mL, Intravenous, Q12H  venlafaxine XR, 37.5 mg, Oral, Daily  warfarin, 7.5 mg, Oral, Once      Continuous Infusions:Pharmacy to dose warfarin,            Result Review    Result Review:  I have personally reviewed the results from the time of this admission to 10/21/2023 12:37 EDT and agree with these findings:  [x]  Laboratory  []  Microbiology  [x]  Radiology  [x]  EKG/Telemetry   [x]  Cardiology/Vascular   []  Pathology  []  Old records  []   Other:  Most notable findings include:     CBC          10/19/2023    05:13 10/20/2023    04:26 10/21/2023    05:02   CBC   WBC 8.55  20.96  18.27    RBC 4.93  4.64  4.80    Hemoglobin 13.7  13.2  13.4    Hematocrit 44.0  41.2  42.7    MCV 89.2  88.8  89.0    MCH 27.8  28.4  27.9    MCHC 31.1  32.0  31.4    RDW 13.7  13.7  13.6    Platelets 335  335  327      CMP          10/19/2023    05:13 10/20/2023    04:26 10/21/2023    05:02   CMP   Glucose 131  122  114    BUN 34  47  38    Creatinine 1.60  1.55  1.16    EGFR 49.6  51.6  73.0    Sodium 138  137  141    Potassium 4.3  4.0  3.4    Chloride 102  99  99    Calcium 9.0  8.6  8.7    Total Protein   6.0    Albumin   3.4    Globulin   2.6    Total Bilirubin   0.5    Alkaline Phosphatase   234    AST (SGOT)   178    ALT (SGPT)   548    Albumin/Globulin Ratio   1.3    BUN/Creatinine Ratio 21.3  30.3  32.8    Anion Gap 14.7  11.4  8.0       CARDIAC LABS:      Lab 10/21/23  0502 10/20/23  0426 10/19/23  1217 10/18/23  2026 10/18/23  1559 10/18/23  1519 10/17/23  1203   PROBNP  --   --   --   --   --  23,881.0* 19,337.0*   HSTROP T  --   --   --   --  35*  --  29*   PROTIME 26.8* 18.4* 18.1* 17.7*  --  15.6*  --    INR 2.52* 1.53* 1.50* 1.46*  --  1.23*  --         Assessment & Plan   Assessment / Plan     Brief Patient Summary:  Regulo Sepulveda is a 58 y.o. male with dilated cardiomyopathy, chronic combined heart failure, ejection fraction 25%, paroxysmal atrial fibrillation, chronic obstructive pulm disease, COPD, hypertension, hyperlipidemia and bipolar disorder.  He presented to the hospital with increasing shortness of breath             Active Hospital Problems     Diagnosis     • **Acute on chronic combined systolic and diastolic CHF (congestive heart failure)     • Acute on chronic heart failure with reduced ejection fraction and diastolic dysfunction        Acute on chronic combined heart failure : LVEF 20%.  He has rhonchi/crackles by exam but no other  evidence of fluid overload.     COPD with exacerbation : On steroids, per hospitalist team     LV mural thrombus : On anticoagulation with Coumadin, INR subtherapeutic on admission     Schizophrenia     Previous drug abuse     Acute kidney injury : Creatinine stable but above baseline.  Likely cardiorenal     Plan:      Patient appears to be euvolemic on exam.  He is down about 5 kg since admission.  He has rhonchi on exam, more likely related to COPD exacerbation.  I will add proBNP to this morning's labs.  IV bumetanide will be switched to p.o. torsemide 40 mg daily.  Continue strict ins and outs and daily weights.  Maintain K around 4 and mag around 2  Monitor renal function  Continue carvedilol  He will be started back on spironolactone, Farxiga and losartan  Continue warfarin for history of LV thrombus.  Goal INR is 2-3.  INR today is 2.5.  Transaminitis work-up as per primary team.      CODE STATUS:   Level Of Support Discussed With: Patient  Code Status (Patient has no pulse and is not breathing): CPR (Attempt to Resuscitate)  Medical Interventions (Patient has pulse or is breathing): Full Support      Electronically signed by Jojo Navarrete MD, 10/21/23, 12:37 PM EDT.

## 2023-10-21 NOTE — PROGRESS NOTES
[facility]   Hospitalist Progress Note  Date: 10/21/2023  Patient Name: Regulo Sepulveda  : 1965  MRN: [id number]  Date of admission: 10/18/2023  Consultants:   -Cardiology: Dr. Dave Dinero    Subjective   Subjective     Chief Complaint: Shortness of breath    Summary:   Regulo Sepulveda is a 58 y.o. male with chronic heart failure with reduced ejection fraction, LV thrombus, chronic paroxysmal atrial fibrillation, COPD, essential hypertension and hyperlipidemia who presented to ED with worsening shortness of breath.  Eval in ED significant for patient being tachypneic.  proBNP noted to be elevated and creatinine elevated compared to baseline.  LFTs elevated as well.  Hospitalist service contacted for further evaluation management.  Cardiology consulted.  IV diuresis initiated.  Nebulizer breathing treatments and steroid therapy started as well.    Interval Followup:   No acute events overnight.  Creatinine continues to improve.  Patient noted that he felt like his breathing was much improved.    Review of Systems   All systems reviewed and negative unless stated otherwise under subjective.    Objective   Objective     Vitals:   Temp:  [97.3 °F (36.3 °C)-98.2 °F (36.8 °C)] 98.1 °F (36.7 °C)  Heart Rate:  [68-84] 80  Resp:  [18-22] 20  BP: (113-147)/(64-99) 134/93  Flow (L/min):  [1] 1  Physical Exam   Gen: No acute distress, Conversant, Pleasant, lying in bed  Resp: improved aeration, rhonchi noted, equal chest rise bilaterally, normal respiratory effort  Card: RRR, No m/r/g  Abd: Soft, Nontender, Nondistended, + bowel sounds    Result Review    Result Review:  I have personally reviewed the results as below and agree with these findings:  []  Laboratory:   CMP          10/19/2023    05:13 10/20/2023    04:26 10/21/2023    05:02   CMP   Glucose 131  122  114    BUN 34  47  38    Creatinine 1.60  1.55  1.16    EGFR 49.6  51.6  73.0    Sodium 138  137  141    Potassium 4.3  4.0  3.4    Chloride 102   99  99    Calcium 9.0  8.6  8.7    Total Protein   6.0    Albumin   3.4    Globulin   2.6    Total Bilirubin   0.5    Alkaline Phosphatase   234    AST (SGOT)   178    ALT (SGPT)   548    Albumin/Globulin Ratio   1.3    BUN/Creatinine Ratio 21.3  30.3  32.8    Anion Gap 14.7  11.4  8.0      CBC          10/19/2023    05:13 10/20/2023    04:26 10/21/2023    05:02   CBC   WBC 8.55  20.96  18.27    RBC 4.93  4.64  4.80    Hemoglobin 13.7  13.2  13.4    Hematocrit 44.0  41.2  42.7    MCV 89.2  88.8  89.0    MCH 27.8  28.4  27.9    MCHC 31.1  32.0  31.4    RDW 13.7  13.7  13.6    Platelets 335  335  327    Magnesium within normal limits.  INR: 2.52  []  Microbiology:   []  Radiology:   [x]  EKG/Telemetry:    []  Cardiology/Vascular:    []  Pathology:  []  Old records:  []  Other:    Assessment & Plan   Assessment / Plan     Assessment:  Acute on chronic combined heart failure  Acute renal failure (baseline creatinine 0.9 and creatinine during hospitalization 1.60)  Chronic paroxysmal atrial fibrillation  Therapeutic drug monitoring, Coumadin  Bilateral pleural effusions  Cardiogenic pulmonary edema  Acute COPD exacerbation  LV mural thrombus  Transaminitis  History of drug abuse  Amphetamine/methamphetamine positive tox screen  Type 2 diabetes mellitus    Plan:  -Cardiology consulted and following, appreciate assistance and recommendations in the care of this patient.  -Discontinue IV diuresis.  Transition patient to torsemide 40 mg daily.  -Starting Jardiance, spironolactone and losartan  -Continue supplemental O2 to maintain sats greater than 90%, wean as tolerated  -Continue carvedilol  -Continue Brovana, Pulmicort and DuoNebs  -Continue prednisone 40 mg daily for total of 5 days (Day 4/5)  -Continue Coumadin.  Monitor INR.  -Given elevation in AST/ALT right upper quadrant ultrasound ordered. Gastroenterology consult placed.  -Continue current insulin regimen  -Continue appropriate home medications  -Management  discussed with cardiology.  Plan to transition patient to oral diuretics and resuming ACE inhibitor, Jardiance and spironolactone  -Will monitor electrolytes, LFTs and renal function with CMP and magnesium level in the AM  -Will monitor WBC and Hgb with CBC in the AM  -Clinical course will dictate further management     DVT Prophylaxis: Lovenox/Coumadin  GI Prophylaxis: Famotidine  Diet: Cardiac  Dispo: Home when medically appropriate for discharge  Code Status: Full code     Personally reviewed patients labs and imaging, discussed with patient and nurse at bedside. Discussed management with the following consultants: Cardiology.     Part of this note may be an electronic transcription/translation of spoken language to printed text using the Dragon dictation system.    DVT prophylaxis:  Medical DVT prophylaxis orders are present.    CODE STATUS:   Level Of Support Discussed With: Patient  Code Status (Patient has no pulse and is not breathing): CPR (Attempt to Resuscitate)  Medical Interventions (Patient has pulse or is breathing): Full Support      Electronically signed by Santy Quan MD, 10/21/23, 12:47 PM EDT.

## 2023-10-22 ENCOUNTER — APPOINTMENT (OUTPATIENT)
Dept: ULTRASOUND IMAGING | Facility: HOSPITAL | Age: 58
DRG: 291 | End: 2023-10-22
Payer: COMMERCIAL

## 2023-10-22 LAB
ALBUMIN SERPL-MCNC: 3.7 G/DL (ref 3.5–5.2)
ALBUMIN/GLOB SERPL: 1.4 G/DL
ALP SERPL-CCNC: 257 U/L (ref 39–117)
ALT SERPL W P-5'-P-CCNC: 495 U/L (ref 1–41)
ANION GAP SERPL CALCULATED.3IONS-SCNC: 8.1 MMOL/L (ref 5–15)
AST SERPL-CCNC: 135 U/L (ref 1–40)
BILIRUB SERPL-MCNC: 0.5 MG/DL (ref 0–1.2)
BUN SERPL-MCNC: 38 MG/DL (ref 6–20)
BUN/CREAT SERPL: 29.5 (ref 7–25)
CALCIUM SPEC-SCNC: 9.3 MG/DL (ref 8.6–10.5)
CHLORIDE SERPL-SCNC: 97 MMOL/L (ref 98–107)
CO2 SERPL-SCNC: 37.9 MMOL/L (ref 22–29)
CREAT SERPL-MCNC: 1.29 MG/DL (ref 0.76–1.27)
DEPRECATED RDW RBC AUTO: 43.5 FL (ref 37–54)
EGFRCR SERPLBLD CKD-EPI 2021: 64.3 ML/MIN/1.73
ERYTHROCYTE [DISTWIDTH] IN BLOOD BY AUTOMATED COUNT: 13.5 % (ref 12.3–15.4)
GLOBULIN UR ELPH-MCNC: 2.7 GM/DL
GLUCOSE BLDC GLUCOMTR-MCNC: 155 MG/DL (ref 70–99)
GLUCOSE BLDC GLUCOMTR-MCNC: 171 MG/DL (ref 70–99)
GLUCOSE BLDC GLUCOMTR-MCNC: 183 MG/DL (ref 70–99)
GLUCOSE SERPL-MCNC: 144 MG/DL (ref 65–99)
HAV IGM SERPL QL IA: ABNORMAL
HBV CORE IGM SERPL QL IA: ABNORMAL
HBV SURFACE AG SERPL QL IA: ABNORMAL
HCT VFR BLD AUTO: 44.6 % (ref 37.5–51)
HCV AB SER DONR QL: REACTIVE
HGB BLD-MCNC: 14.4 G/DL (ref 13–17.7)
INR PPP: 2.83 (ref 0.86–1.15)
MAGNESIUM SERPL-MCNC: 2.1 MG/DL (ref 1.6–2.6)
MCH RBC QN AUTO: 28.5 PG (ref 26.6–33)
MCHC RBC AUTO-ENTMCNC: 32.3 G/DL (ref 31.5–35.7)
MCV RBC AUTO: 88.1 FL (ref 79–97)
PHOSPHATE SERPL-MCNC: 2.5 MG/DL (ref 2.5–4.5)
PLATELET # BLD AUTO: 354 10*3/MM3 (ref 140–450)
PMV BLD AUTO: 10.1 FL (ref 6–12)
POTASSIUM SERPL-SCNC: 3.9 MMOL/L (ref 3.5–5.2)
PROT SERPL-MCNC: 6.4 G/DL (ref 6–8.5)
PROTHROMBIN TIME: 29.3 SECONDS (ref 11.8–14.9)
RBC # BLD AUTO: 5.06 10*6/MM3 (ref 4.14–5.8)
SODIUM SERPL-SCNC: 143 MMOL/L (ref 136–145)
WBC NRBC COR # BLD: 16.61 10*3/MM3 (ref 3.4–10.8)

## 2023-10-22 PROCEDURE — 76705 ECHO EXAM OF ABDOMEN: CPT

## 2023-10-22 PROCEDURE — 80053 COMPREHEN METABOLIC PANEL: CPT | Performed by: INTERNAL MEDICINE

## 2023-10-22 PROCEDURE — 94664 DEMO&/EVAL PT USE INHALER: CPT

## 2023-10-22 PROCEDURE — 63710000001 INSULIN LISPRO (HUMAN) PER 5 UNITS: Performed by: HOSPITALIST

## 2023-10-22 PROCEDURE — 85610 PROTHROMBIN TIME: CPT | Performed by: HOSPITALIST

## 2023-10-22 PROCEDURE — 94799 UNLISTED PULMONARY SVC/PX: CPT

## 2023-10-22 PROCEDURE — 99232 SBSQ HOSP IP/OBS MODERATE 35: CPT | Performed by: INTERNAL MEDICINE

## 2023-10-22 PROCEDURE — 99233 SBSQ HOSP IP/OBS HIGH 50: CPT | Performed by: INTERNAL MEDICINE

## 2023-10-22 PROCEDURE — 85027 COMPLETE CBC AUTOMATED: CPT | Performed by: INTERNAL MEDICINE

## 2023-10-22 PROCEDURE — 63710000001 PREDNISONE PER 1 MG: Performed by: INTERNAL MEDICINE

## 2023-10-22 PROCEDURE — 94762 N-INVAS EAR/PLS OXIMTRY CONT: CPT

## 2023-10-22 PROCEDURE — 87522 HEPATITIS C REVRS TRNSCRPJ: CPT | Performed by: INTERNAL MEDICINE

## 2023-10-22 PROCEDURE — 94618 PULMONARY STRESS TESTING: CPT

## 2023-10-22 PROCEDURE — 84100 ASSAY OF PHOSPHORUS: CPT | Performed by: INTERNAL MEDICINE

## 2023-10-22 PROCEDURE — 83735 ASSAY OF MAGNESIUM: CPT | Performed by: INTERNAL MEDICINE

## 2023-10-22 PROCEDURE — 82948 REAGENT STRIP/BLOOD GLUCOSE: CPT

## 2023-10-22 RX ORDER — IPRATROPIUM BROMIDE AND ALBUTEROL SULFATE 2.5; .5 MG/3ML; MG/3ML
3 SOLUTION RESPIRATORY (INHALATION) EVERY 4 HOURS PRN
Status: DISCONTINUED | OUTPATIENT
Start: 2023-10-22 | End: 2023-10-23 | Stop reason: HOSPADM

## 2023-10-22 RX ORDER — WARFARIN SODIUM 7.5 MG/1
7.5 TABLET ORAL
Status: COMPLETED | OUTPATIENT
Start: 2023-10-22 | End: 2023-10-22

## 2023-10-22 RX ADMIN — Medication 10 ML: at 08:33

## 2023-10-22 RX ADMIN — WARFARIN SODIUM 7.5 MG: 7.5 TABLET ORAL at 17:08

## 2023-10-22 RX ADMIN — DOCUSATE SODIUM 50MG AND SENNOSIDES 8.6MG 2 TABLET: 8.6; 5 TABLET, FILM COATED ORAL at 08:33

## 2023-10-22 RX ADMIN — INSULIN LISPRO 2 UNITS: 100 INJECTION, SOLUTION INTRAVENOUS; SUBCUTANEOUS at 12:22

## 2023-10-22 RX ADMIN — IPRATROPIUM BROMIDE AND ALBUTEROL SULFATE 3 ML: .5; 3 SOLUTION RESPIRATORY (INHALATION) at 07:00

## 2023-10-22 RX ADMIN — INSULIN LISPRO 2 UNITS: 100 INJECTION, SOLUTION INTRAVENOUS; SUBCUTANEOUS at 20:14

## 2023-10-22 RX ADMIN — BUDESONIDE 0.5 MG: 0.5 INHALANT RESPIRATORY (INHALATION) at 07:00

## 2023-10-22 RX ADMIN — ARFORMOTEROL TARTRATE 15 MCG: 15 SOLUTION RESPIRATORY (INHALATION) at 07:00

## 2023-10-22 RX ADMIN — FAMOTIDINE 40 MG: 20 TABLET, FILM COATED ORAL at 08:32

## 2023-10-22 RX ADMIN — SPIRONOLACTONE 25 MG: 25 TABLET ORAL at 08:32

## 2023-10-22 RX ADMIN — LOSARTAN POTASSIUM 25 MG: 25 TABLET, FILM COATED ORAL at 08:32

## 2023-10-22 RX ADMIN — VENLAFAXINE HYDROCHLORIDE 37.5 MG: 37.5 CAPSULE, EXTENDED RELEASE ORAL at 08:33

## 2023-10-22 RX ADMIN — INSULIN LISPRO 2 UNITS: 100 INJECTION, SOLUTION INTRAVENOUS; SUBCUTANEOUS at 17:08

## 2023-10-22 RX ADMIN — PREDNISONE 40 MG: 20 TABLET ORAL at 08:33

## 2023-10-22 RX ADMIN — CARVEDILOL 6.25 MG: 6.25 TABLET, FILM COATED ORAL at 08:32

## 2023-10-22 RX ADMIN — TORSEMIDE 40 MG: 20 TABLET ORAL at 08:33

## 2023-10-22 RX ADMIN — EMPAGLIFLOZIN 10 MG: 10 TABLET, FILM COATED ORAL at 08:33

## 2023-10-22 RX ADMIN — Medication 10 ML: at 20:15

## 2023-10-22 RX ADMIN — ASPIRIN 81 MG: 81 TABLET, COATED ORAL at 08:32

## 2023-10-22 RX ADMIN — CARVEDILOL 6.25 MG: 6.25 TABLET, FILM COATED ORAL at 17:08

## 2023-10-22 RX ADMIN — NICOTINE 1 PATCH: 21 PATCH, EXTENDED RELEASE TRANSDERMAL at 08:32

## 2023-10-22 NOTE — PROGRESS NOTES
[facility]  Inpatient Progress Note    Reason for Follow Up: Elevated liver enzymes    Subjective     Interval History:   Patient had abdominal ultrasound today  Blood pressure has improved over the past 48 hours    Current Facility-Administered Medications:     acetaminophen (TYLENOL) tablet 650 mg, 650 mg, Oral, Q4H PRN **OR** acetaminophen (TYLENOL) 160 MG/5ML oral solution 650 mg, 650 mg, Oral, Q4H PRN **OR** acetaminophen (TYLENOL) suppository 650 mg, 650 mg, Rectal, Q4H PRN, Myrick, Dimpi, DO    arformoterol (BROVANA) nebulizer solution 15 mcg, 15 mcg, Nebulization, BID - RT, Myrick, Dimpi, DO, 15 mcg at 10/22/23 0700    aspirin EC tablet 81 mg, 81 mg, Oral, Daily, Dave Dinero MD, 81 mg at 10/22/23 0832    atorvastatin (LIPITOR) tablet 40 mg, 40 mg, Oral, Nightly, Myrick, Dimpi, DO, 40 mg at 10/21/23 2036    sennosides-docusate (PERICOLACE) 8.6-50 MG per tablet 2 tablet, 2 tablet, Oral, BID, 2 tablet at 10/22/23 0833 **AND** polyethylene glycol (MIRALAX) packet 17 g, 17 g, Oral, Daily PRN **AND** bisacodyl (DULCOLAX) EC tablet 5 mg, 5 mg, Oral, Daily PRN **AND** bisacodyl (DULCOLAX) suppository 10 mg, 10 mg, Rectal, Daily PRN, Myrick, Dimpi, DO    budesonide (PULMICORT) nebulizer solution 0.5 mg, 0.5 mg, Nebulization, BID - RT, Myrick, Dimpi, DO, 0.5 mg at 10/22/23 0700    carvedilol (COREG) tablet 6.25 mg, 6.25 mg, Oral, BID With Meals, Dave Dinero MD, 6.25 mg at 10/22/23 0832    dextrose (D50W) (25 g/50 mL) IV injection 25 g, 25 g, Intravenous, Q15 Min PRN, Myrick, Dimpi, DO    dextrose (GLUTOSE) oral gel 15 g, 15 g, Oral, Q15 Min PRN, Myrick, Dimpi, DO    empagliflozin (JARDIANCE) tablet 10 mg, 10 mg, Oral, Daily, Jojo Navarrete MD, 10 mg at 10/22/23 0833    famotidine (PEPCID) tablet 40 mg, 40 mg, Oral, Daily, Myrick, Dimpi, DO, 40 mg at 10/22/23 0832    glucagon (GLUCAGEN) injection 1 mg, 1 mg, Intramuscular, Q15 Min PRN, Myrick, Dimpi, DO    HYDROcodone-acetaminophen  (NORCO) 5-325 MG per tablet 1 tablet, 1 tablet, Oral, Q6H PRN, Myrick, Dimpi, DO, 1 tablet at 10/20/23 1142    Insulin Lispro (humaLOG) injection 2-9 Units, 2-9 Units, Subcutaneous, 4x Daily AC & at Bedtime, Myrick, Dimpi, DO, 4 Units at 10/21/23 2036    ipratropium-albuterol (DUO-NEB) nebulizer solution 3 mL, 3 mL, Nebulization, 4x Daily - RT, Myrick, Dimpi, DO, 3 mL at 10/22/23 0700    losartan (COZAAR) tablet 25 mg, 25 mg, Oral, Q24H, Jojo Navarrete MD, 25 mg at 10/22/23 0832    melatonin tablet 5 mg, 5 mg, Oral, Nightly PRN, Myrick, Dimpi, DO    nicotine (NICODERM CQ) 21 MG/24HR patch 1 patch, 1 patch, Transdermal, Q24H, Myrick, Dimpi, DO, 1 patch at 10/22/23 0832    ondansetron (ZOFRAN) tablet 4 mg, 4 mg, Oral, Q6H PRN, 4 mg at 10/18/23 2051 **OR** ondansetron (ZOFRAN) injection 4 mg, 4 mg, Intravenous, Q6H PRN, Myrick, Dimpi, DO    Pharmacy to dose warfarin, , Does not apply, Continuous PRN, Myrick, Dimpi, DO    sodium chloride 0.9 % flush 10 mL, 10 mL, Intravenous, Q12H, Myrick, Dimpi, DO, 10 mL at 10/22/23 0833    sodium chloride 0.9 % flush 10 mL, 10 mL, Intravenous, PRN, Myrick, Dimpi, DO    sodium chloride 0.9 % infusion 40 mL, 40 mL, Intravenous, PRN, Myrick, Dimpi, DO    spironolactone (ALDACTONE) tablet 25 mg, 25 mg, Oral, Daily, Jojo Navarrete MD, 25 mg at 10/22/23 0832    torsemide (DEMADEX) tablet 40 mg, 40 mg, Oral, Daily, Jojo Navarrete MD, 40 mg at 10/22/23 0833    venlafaxine XR (EFFEXOR-XR) 24 hr capsule 37.5 mg, 37.5 mg, Oral, Daily, Antoine Myrick, DO, 37.5 mg at 10/22/23 0833    warfarin (COUMADIN) tablet 7.5 mg, 7.5 mg, Oral, Once, Santy Quan MD  Review of Systems:    All systems were reviewed and negative except for that previously mentioned in the HPI    Objective     Vital Signs  Temp:  [97.5 °F (36.4 °C)-98.5 °F (36.9 °C)] 97.5 °F (36.4 °C)  Heart Rate:  [70-86] 82  Resp:  [18-20] 20  BP: (134-148)/(78-93) 144/87  Body mass index is 20.07 kg/m².    Intake/Output Summary (Last 24  hours) at 10/22/2023 0916  Last data filed at 10/22/2023 0757  Gross per 24 hour   Intake 960 ml   Output 4800 ml   Net -3840 ml     I/O this shift:  In: -   Out: 500 [Urine:500]     Physical Exam:   General: patient awake, alert and cooperative   Eyes: Normal lids and lashes, no scleral icterus   Neck: supple, normal ROM   Skin: warm and dry, not jaundiced   Cardiovascular: regular rhythm and rate, no murmurs auscultated   Pulm: clear to auscultation bilaterally, regular and unlabored   Abdomen: soft, nontender, nondistended; normal bowel sounds   Rectal: deferred   Extremities: no rash or edema   Psychiatric: Normal mood and behavior; memory intact     Results Review:     I reviewed the patient's new clinical results.    Results from last 7 days   Lab Units 10/22/23  0401 10/21/23  0502 10/20/23  0426   WBC 10*3/mm3 16.61* 18.27* 20.96*   HEMOGLOBIN g/dL 14.4 13.4 13.2   HEMATOCRIT % 44.6 42.7 41.2   PLATELETS 10*3/mm3 354 327 335     Results from last 7 days   Lab Units 10/22/23  0401 10/21/23  0502 10/20/23  0426 10/19/23  0513 10/18/23  1559   SODIUM mmol/L 143 141 137   < > 139   POTASSIUM mmol/L 3.9 3.4* 4.0   < > 5.0   CHLORIDE mmol/L 97* 99 99   < > 103   CO2 mmol/L 37.9* 34.0* 26.6   < > 23.3   BUN mg/dL 38* 38* 47*   < > 23*   CREATININE mg/dL 1.29* 1.16 1.55*   < > 1.44*   CALCIUM mg/dL 9.3 8.7 8.6   < > 9.1   BILIRUBIN mg/dL 0.5 0.5  --   --  1.1   ALK PHOS U/L 257* 234*  --   --  299*   ALT (SGPT) U/L 495* 548*  --   --  82*   AST (SGOT) U/L 135* 178*  --   --  64*   GLUCOSE mg/dL 144* 114* 122*   < > 132*    < > = values in this interval not displayed.     Results from last 7 days   Lab Units 10/22/23  0401 10/21/23  0502 10/20/23  0426   INR  2.83* 2.52* 1.53*     Lab Results   Lab Value Date/Time    LIPASE 15 10/17/2023 1203    LIPASE 34 08/27/2023 1518    LIPASE 32 09/14/2022 1645       Radiology:  [unfilled]        Assessment:     Active Hospital Problems    Diagnosis     **Acute on chronic  combined systolic and diastolic CHF (congestive heart failure)     Acute on chronic heart failure with reduced ejection fraction and diastolic dysfunction         Plan:     Liver enzymes are slowly trending downward  Ultrasound notes fatty liver and sludge or small polyp in gallbladder neck is no signs of any obstruction  Continue present treatment plan  No other recommendations for now other than blood pressure monitoring        I discussed the patients findings and my recommendations with staff      Electronically signed by Roderick Berrios MD, 10/22/23, 9:16 AM EDT.

## 2023-10-22 NOTE — PROGRESS NOTES
Pharmacy to Dose: Warfarin  Consulting provider: Ramez  Indication for warfarin: Afib, Hx of LV thrombus in Aug 2023  Goal INR range: 2 - 3  Home warfarin dose: warfarin 7.5 mg PO daily*     Date INR Warfarin Dose Given   10/18 1.23 7.5 mg   10/19 1.50 10 mg   10/20 1.53 10 mg   10/21 2.52 7.5 mg    10/22 2.83 7.5 mg (ordered)     Any Vitamin K given?: No  Drug interactions Reviewed: Yes. Details: venlafaxine  Is patient on bridging therapy with another anticoagulant?: No    Lab Results   Component Value Date     10/22/2023     10/21/2023    HGB 14.4 10/22/2023    HGB 13.4 10/21/2023     Plan:  INR increased to 2.83 today. Continue 7.5 mg warfarin this evening.  Daily INR ordered.    Thank you for this consult. Pharmacy will continue to monitor.   Raman Botello RPH

## 2023-10-22 NOTE — PROGRESS NOTES
[facility]   Hospitalist Progress Note  Date: 10/22/2023  Patient Name: Regulo Sepulveda  : 1965  MRN: [id number]  Date of admission: 10/18/2023  Consultants:   -Cardiology: Dr. Dave Dinero  -Gastroenterology: Dr. Roderick Berrios    Subjective   Subjective     Chief Complaint: Shortness of breath    Summary:   Regulo Sepulveda is a 58 y.o. male with chronic heart failure with reduced ejection fraction, LV thrombus, chronic paroxysmal atrial fibrillation, COPD, essential hypertension and hyperlipidemia who presented to ED with worsening shortness of breath.  Eval in ED significant for patient being tachypneic.  proBNP noted to be elevated and creatinine elevated compared to baseline.  LFTs elevated as well.  Hospitalist service contacted for further evaluation management.  Cardiology consulted.  IV diuresis initiated.  Nebulizer breathing treatments and steroid therapy started as well.  Patient's respiratory status improved.  Labs were significant for transaminitis, gastroenterology was consulted and elevation felt to be secondary to mild shock liver.  Right upper quadrant ultrasound obtained and fatty liver and sludge/small polyp in gallbladder neck were noted, no signs of obstruction and imaging not consistent with cirrhosis.    Interval Followup:   No acute issues overnight.  Creatinine mildly increased but essentially stable.  Patient good urine output.  Patient states that his breathing continues to improve.  Nursing with no additional acute issues to report.    Review of Systems   All systems reviewed and negative unless stated otherwise under subjective.    Objective   Objective     Vitals:   Temp:  [97.5 °F (36.4 °C)-98.5 °F (36.9 °C)] 97.5 °F (36.4 °C)  Heart Rate:  [70-86] 72  Resp:  [18-20] 20  BP: (134-148)/(78-93) 144/87  Flow (L/min):  [1] 1  Physical Exam   Gen: No acute distress, lying bed, easily rousable, pleasant, conversant  Resp: Good aeration, minimal rhonchi, no increase in  work of breathing  Card: RRR, No m/r/g  Abd: Soft, Nontender, Nondistended, + bowel sounds    Result Review    Result Review:  I have personally reviewed the results as below and agree with these findings:  []  Laboratory:   CMP          10/20/2023    04:26 10/21/2023    05:02 10/22/2023    04:01   CMP   Glucose 122  114  144    BUN 47  38  38    Creatinine 1.55  1.16  1.29    EGFR 51.6  73.0  64.3    Sodium 137  141  143    Potassium 4.0  3.4  3.9    Chloride 99  99  97    Calcium 8.6  8.7  9.3    Total Protein  6.0  6.4    Albumin  3.4  3.7    Globulin  2.6  2.7    Total Bilirubin  0.5  0.5    Alkaline Phosphatase  234  257    AST (SGOT)  178  135    ALT (SGPT)  548  495    Albumin/Globulin Ratio  1.3  1.4    BUN/Creatinine Ratio 30.3  32.8  29.5    Anion Gap 11.4  8.0  8.1      CBC          10/20/2023    04:26 10/21/2023    05:02 10/22/2023    04:01   CBC   WBC 20.96  18.27  16.61    RBC 4.64  4.80  5.06    Hemoglobin 13.2  13.4  14.4    Hematocrit 41.2  42.7  44.6    MCV 88.8  89.0  88.1    MCH 28.4  27.9  28.5    MCHC 32.0  31.4  32.3    RDW 13.7  13.6  13.5    Platelets 335  327  354    Phosphorus and magnesium within normal limits.  INR: 2.3  []  Microbiology:   []  Radiology:   [x]  EKG/Telemetry:    []  Cardiology/Vascular:    []  Pathology:  []  Old records:  []  Other:    Assessment & Plan   Assessment / Plan     Assessment:  Acute on chronic combined heart failure  Acute renal failure (baseline creatinine 0.9 and creatinine during hospitalization 1.60)  Chronic paroxysmal atrial fibrillation  Therapeutic drug monitoring, Coumadin  Bilateral pleural effusions  Cardiogenic pulmonary edema  Acute COPD exacerbation  LV mural thrombus  Transaminitis likely secondary to mild shock liver  History of drug abuse  Amphetamine/methamphetamine positive tox screen  Type 2 diabetes mellitus    Plan:  -Cardiology and Gastroenterology consulted and following, appreciate assistance and recommendations in the care of this  patient.  -Continue torsemide 40 mg daily, Jardiance, spironolactone and losartan  -Continue supplemental O2 to maintain sats greater than 90%, wean as tolerated  -Walk test ordered to evaluate need for home oxygen  -Continue carvedilol  -Continue Brovana, Pulmicort and DuoNebs  -Continue prednisone 40 mg daily for total of 5 days (Day 5/5)  -Continue Coumadin.  Monitor INR.  -No change to insulin regimen  -Continue appropriate home medications  -Management discussed with cardiology.  Continue oral diuretics, ACE inhibitor, Jardiance and spironolactone  -Manage discussed Gastroenterology.  I suspect transaminitis is a likely due to mild shock liver given episode of hypotension.  No further work-up necessary at this time.  -Will monitor electrolytes, LFTs and renal function with CMP and magnesium level in the AM  -Will monitor WBC and Hgb with CBC in the AM  -Clinical course will dictate further management     DVT Prophylaxis: Lovenox/Coumadin  GI Prophylaxis: Famotidine  Diet: Cardiac  Dispo: Home when medically appropriate for discharge  Code Status: Full code     Personally reviewed patients labs and imaging, discussed with patient and nurse at bedside. Discussed management with the following consultants: Cardiology and Gastroenterology.     Part of this note may be an electronic transcription/translation of spoken language to printed text using the Dragon dictation system.    DVT prophylaxis:  Medical DVT prophylaxis orders are present.    CODE STATUS:   Level Of Support Discussed With: Patient  Code Status (Patient has no pulse and is not breathing): CPR (Attempt to Resuscitate)  Medical Interventions (Patient has pulse or is breathing): Full Support      Electronically signed by Santy Quan MD, 10/22/23, 10:58 AM EDT.

## 2023-10-22 NOTE — PROGRESS NOTES
[facility]     Cardiology Progress Note    Patient Name: Regulo Sepulveda  : 1965  MRN: [id number]  Primary Care Physician:  Grace Cruz APRN  Date of admission: 10/18/2023    Subjective   Subjective     No acute events overnight.  He is feeling well this morning.  His shortness of breath continues to improve.  He has no chest pain, palpitations or dizziness.    Review of Systems   All systems were reviewed and negative except as above    Objective   Objective     Vitals:   Temp:  [97.5 °F (36.4 °C)-98.5 °F (36.9 °C)] 97.5 °F (36.4 °C)  Heart Rate:  [70-86] 72  Resp:  [18-20] 20  BP: (135-148)/(78-91) 144/87  Flow (L/min):  [1] 1  Physical Exam      General : Alert, awake, no acute distress  CVS : Regular rate and rhythm, no murmur, rubs or gallops  Lungs: Diffuse rhonchi.  No crackles or wheezing.  Abdomen: Soft, nontender, bowel sounds heard in all 4 quadrants  Extremities: Warm, well-perfused, no pedal edema    Scheduled Meds:arformoterol, 15 mcg, Nebulization, BID - RT  aspirin, 81 mg, Oral, Daily  atorvastatin, 40 mg, Oral, Nightly  budesonide, 0.5 mg, Nebulization, BID - RT  carvedilol, 6.25 mg, Oral, BID With Meals  empagliflozin, 10 mg, Oral, Daily  famotidine, 40 mg, Oral, Daily  insulin lispro, 2-9 Units, Subcutaneous, 4x Daily AC & at Bedtime  ipratropium-albuterol, 3 mL, Nebulization, 4x Daily - RT  losartan, 25 mg, Oral, Q24H  nicotine, 1 patch, Transdermal, Q24H  senna-docusate sodium, 2 tablet, Oral, BID  sodium chloride, 10 mL, Intravenous, Q12H  spironolactone, 25 mg, Oral, Daily  torsemide, 40 mg, Oral, Daily  venlafaxine XR, 37.5 mg, Oral, Daily  warfarin, 7.5 mg, Oral, Once      Continuous Infusions:Pharmacy to dose warfarin,            Result Review    Result Review:  I have personally reviewed the results from the time of this admission to 10/22/2023 11:52 EDT and agree with these findings:  [x]  Laboratory  []  Microbiology  [x]  Radiology  [x]  EKG/Telemetry   [x]   Cardiology/Vascular   []  Pathology  []  Old records  []  Other:  Most notable findings include:     CBC          10/20/2023    04:26 10/21/2023    05:02 10/22/2023    04:01   CBC   WBC 20.96  18.27  16.61    RBC 4.64  4.80  5.06    Hemoglobin 13.2  13.4  14.4    Hematocrit 41.2  42.7  44.6    MCV 88.8  89.0  88.1    MCH 28.4  27.9  28.5    MCHC 32.0  31.4  32.3    RDW 13.7  13.6  13.5    Platelets 335  327  354      CMP          10/20/2023    04:26 10/21/2023    05:02 10/22/2023    04:01   CMP   Glucose 122  114  144    BUN 47  38  38    Creatinine 1.55  1.16  1.29    EGFR 51.6  73.0  64.3    Sodium 137  141  143    Potassium 4.0  3.4  3.9    Chloride 99  99  97    Calcium 8.6  8.7  9.3    Total Protein  6.0  6.4    Albumin  3.4  3.7    Globulin  2.6  2.7    Total Bilirubin  0.5  0.5    Alkaline Phosphatase  234  257    AST (SGOT)  178  135    ALT (SGPT)  548  495    Albumin/Globulin Ratio  1.3  1.4    BUN/Creatinine Ratio 30.3  32.8  29.5    Anion Gap 11.4  8.0  8.1       CARDIAC LABS:      Lab 10/22/23  0401 10/21/23  0502 10/20/23  0426 10/19/23  1217 10/18/23  2026 10/18/23  1559 10/18/23  1519 10/18/23  1519 10/17/23  1203   PROBNP  --  4,588.0*  --   --   --   --   --  23,881.0* 19,337.0*   HSTROP T  --   --   --   --   --  35*  --   --  29*   PROTIME 29.3* 26.8* 18.4* 18.1* 17.7*  --    < > 15.6*  --    INR 2.83* 2.52* 1.53* 1.50* 1.46*  --    < > 1.23*  --     < > = values in this interval not displayed.        Assessment & Plan   Assessment / Plan     Brief Patient Summary:  Regulo Sepulveda is a 58 y.o. male with dilated cardiomyopathy, chronic combined heart failure, ejection fraction 25%, paroxysmal atrial fibrillation, chronic obstructive pulm disease, COPD, hypertension, hyperlipidemia and bipolar disorder.  He presented to the hospital with increasing shortness of breath                 Active Hospital Problems     Diagnosis     • **Acute on chronic combined systolic and diastolic CHF (congestive  heart failure)     • Acute on chronic heart failure with reduced ejection fraction and diastolic dysfunction        Acute on chronic combined heart failure : LVEF 20%.  His lung exam significantly improved today.  He has no crackles or wheezing but has scattered rhonchi.     COPD with exacerbation : On steroids, per hospitalist team     LV mural thrombus : On anticoagulation with Coumadin, INR subtherapeutic on admission     Schizophrenia     Previous drug abuse     Acute kidney injury : Creatinine stable but above baseline.  Likely cardiorenal     Plan:      Patient appears to be euvolemic on exam.    Continue torsemide 40 mg daily.  Continue strict ins and outs and daily weights.  Maintain K around 4 and mag around 2  Monitor renal function  Continue carvedilol, spironolactone, Farxiga and losartan  Continue warfarin for history of LV thrombus.  Goal INR is 2-3.  INR today is 2.5.  I will stop atorvastatin due to persistently elevated transaminases.  Will defer further work-up to primary team.    Patient may be discharged from cardiac perspective.  He will need close outpatient follow-up.    Thank you for the consultation.  Please call with any questions.    CODE STATUS:   Level Of Support Discussed With: Patient  Code Status (Patient has no pulse and is not breathing): CPR (Attempt to Resuscitate)  Medical Interventions (Patient has pulse or is breathing): Full Support      Electronically signed by Jojo Navarrete MD, 10/22/23, 11:52 AM EDT.

## 2023-10-22 NOTE — THERAPY EVALUATION
Respiratory Therapist Walking Oximetry Progress Note      Patient Name:  Regulo Sepulveda  YOB: 1965  Date of Procedure: 10/22/23              ROOM AIR BASELINE   SpO2% 94   Heart Rate 82       EXERCISE ON ROOM AIR SpO2% EXERCISE ON O2 @  LPM SpO2%   1 MINUTE 94 1 MINUTE    2 MINUTES 93 2 MINUTES    3 MINUTES 94 3 MINUTES    4 MINUTES 95 4 MINUTES    5 MINUTES 96 5 MINUTES    6 MINUTES 93 6 MINUTES               SpO2% Post Exercise  95   HR Post Exercise  140   Time to Recovery  1-2 minutes     Comments: patient walked 5 laps around pcu at a good pace. Shortness of air noted on the last lap .  Patient tolerated walk, well.          Electronically signed by Jordan Laguerre RRT, 10/22/23, 10:37 AM EDT.      [Negative] : Heme/Lymph

## 2023-10-23 ENCOUNTER — READMISSION MANAGEMENT (OUTPATIENT)
Dept: CALL CENTER | Facility: HOSPITAL | Age: 58
End: 2023-10-23
Payer: COMMERCIAL

## 2023-10-23 VITALS
HEIGHT: 73 IN | TEMPERATURE: 97.9 F | BODY MASS INDEX: 19.93 KG/M2 | DIASTOLIC BLOOD PRESSURE: 106 MMHG | HEART RATE: 86 BPM | OXYGEN SATURATION: 94 % | WEIGHT: 150.35 LBS | SYSTOLIC BLOOD PRESSURE: 159 MMHG | RESPIRATION RATE: 18 BRPM

## 2023-10-23 LAB
ALBUMIN SERPL-MCNC: 3.8 G/DL (ref 3.5–5.2)
ALBUMIN/GLOB SERPL: 1.5 G/DL
ALP SERPL-CCNC: 237 U/L (ref 39–117)
ALT SERPL W P-5'-P-CCNC: 416 U/L (ref 1–41)
ANION GAP SERPL CALCULATED.3IONS-SCNC: 8.4 MMOL/L (ref 5–15)
AST SERPL-CCNC: 104 U/L (ref 1–40)
BILIRUB SERPL-MCNC: 0.5 MG/DL (ref 0–1.2)
BUN SERPL-MCNC: 43 MG/DL (ref 6–20)
BUN/CREAT SERPL: 36.4 (ref 7–25)
CALCIUM SPEC-SCNC: 9.7 MG/DL (ref 8.6–10.5)
CHLORIDE SERPL-SCNC: 93 MMOL/L (ref 98–107)
CO2 SERPL-SCNC: 36.6 MMOL/L (ref 22–29)
CREAT SERPL-MCNC: 1.18 MG/DL (ref 0.76–1.27)
DEPRECATED RDW RBC AUTO: 42.4 FL (ref 37–54)
EGFRCR SERPLBLD CKD-EPI 2021: 71.5 ML/MIN/1.73
ERYTHROCYTE [DISTWIDTH] IN BLOOD BY AUTOMATED COUNT: 13.3 % (ref 12.3–15.4)
GLOBULIN UR ELPH-MCNC: 2.6 GM/DL
GLUCOSE BLDC GLUCOMTR-MCNC: 111 MG/DL (ref 70–99)
GLUCOSE SERPL-MCNC: 126 MG/DL (ref 65–99)
HCT VFR BLD AUTO: 49.5 % (ref 37.5–51)
HGB BLD-MCNC: 15.7 G/DL (ref 13–17.7)
INR PPP: 2.84 (ref 0.86–1.15)
MAGNESIUM SERPL-MCNC: 2.1 MG/DL (ref 1.6–2.6)
MCH RBC QN AUTO: 27.5 PG (ref 26.6–33)
MCHC RBC AUTO-ENTMCNC: 31.7 G/DL (ref 31.5–35.7)
MCV RBC AUTO: 86.8 FL (ref 79–97)
PLATELET # BLD AUTO: 386 10*3/MM3 (ref 140–450)
PMV BLD AUTO: 10.1 FL (ref 6–12)
POTASSIUM SERPL-SCNC: 3.7 MMOL/L (ref 3.5–5.2)
PROT SERPL-MCNC: 6.4 G/DL (ref 6–8.5)
PROTHROMBIN TIME: 29.3 SECONDS (ref 11.8–14.9)
RBC # BLD AUTO: 5.7 10*6/MM3 (ref 4.14–5.8)
SODIUM SERPL-SCNC: 138 MMOL/L (ref 136–145)
WBC NRBC COR # BLD: 16.38 10*3/MM3 (ref 3.4–10.8)

## 2023-10-23 PROCEDURE — 94799 UNLISTED PULMONARY SVC/PX: CPT

## 2023-10-23 PROCEDURE — 80053 COMPREHEN METABOLIC PANEL: CPT | Performed by: INTERNAL MEDICINE

## 2023-10-23 PROCEDURE — 82948 REAGENT STRIP/BLOOD GLUCOSE: CPT

## 2023-10-23 PROCEDURE — 99239 HOSP IP/OBS DSCHRG MGMT >30: CPT | Performed by: INTERNAL MEDICINE

## 2023-10-23 PROCEDURE — 83735 ASSAY OF MAGNESIUM: CPT | Performed by: INTERNAL MEDICINE

## 2023-10-23 PROCEDURE — 85610 PROTHROMBIN TIME: CPT | Performed by: HOSPITALIST

## 2023-10-23 PROCEDURE — 94664 DEMO&/EVAL PT USE INHALER: CPT

## 2023-10-23 PROCEDURE — 85027 COMPLETE CBC AUTOMATED: CPT | Performed by: INTERNAL MEDICINE

## 2023-10-23 RX ORDER — ASPIRIN 81 MG/1
81 TABLET ORAL DAILY
Qty: 30 TABLET | Refills: 0 | Status: SHIPPED | OUTPATIENT
Start: 2023-10-23 | End: 2023-11-22

## 2023-10-23 RX ORDER — WARFARIN SODIUM 7.5 MG/1
7.5 TABLET ORAL NIGHTLY
Qty: 30 TABLET | Refills: 0 | Status: SHIPPED | OUTPATIENT
Start: 2023-10-23 | End: 2023-11-22

## 2023-10-23 RX ORDER — LOSARTAN POTASSIUM 50 MG/1
50 TABLET ORAL DAILY
Qty: 30 TABLET | Refills: 0 | Status: SHIPPED | OUTPATIENT
Start: 2023-10-23 | End: 2023-11-22

## 2023-10-23 RX ORDER — POTASSIUM CHLORIDE 1500 MG/1
20 TABLET, FILM COATED, EXTENDED RELEASE ORAL DAILY
Qty: 30 TABLET | Refills: 0 | Status: SHIPPED | OUTPATIENT
Start: 2023-10-23 | End: 2023-10-23 | Stop reason: SDUPTHER

## 2023-10-23 RX ORDER — POTASSIUM CHLORIDE 1500 MG/1
20 TABLET, FILM COATED, EXTENDED RELEASE ORAL DAILY
Qty: 30 TABLET | Refills: 0 | Status: SHIPPED | OUTPATIENT
Start: 2023-10-23 | End: 2023-11-22

## 2023-10-23 RX ORDER — CARVEDILOL 6.25 MG/1
6.25 TABLET ORAL 2 TIMES DAILY WITH MEALS
Qty: 60 TABLET | Refills: 0 | Status: SHIPPED | OUTPATIENT
Start: 2023-10-23 | End: 2023-11-22

## 2023-10-23 RX ORDER — NICOTINE 21 MG/24HR
1 PATCH, TRANSDERMAL 24 HOURS TRANSDERMAL
Qty: 21 PATCH | Refills: 0 | Status: SHIPPED | OUTPATIENT
Start: 2023-10-23 | End: 2023-11-13

## 2023-10-23 RX ORDER — SPIRONOLACTONE 25 MG/1
25 TABLET ORAL DAILY
Qty: 30 TABLET | Refills: 0 | Status: SHIPPED | OUTPATIENT
Start: 2023-10-23 | End: 2023-11-22

## 2023-10-23 RX ADMIN — ARFORMOTEROL TARTRATE 15 MCG: 15 SOLUTION RESPIRATORY (INHALATION) at 06:47

## 2023-10-23 RX ADMIN — LOSARTAN POTASSIUM 25 MG: 25 TABLET, FILM COATED ORAL at 08:21

## 2023-10-23 RX ADMIN — ASPIRIN 81 MG: 81 TABLET, COATED ORAL at 08:21

## 2023-10-23 RX ADMIN — TORSEMIDE 40 MG: 20 TABLET ORAL at 08:22

## 2023-10-23 RX ADMIN — Medication 10 ML: at 08:23

## 2023-10-23 RX ADMIN — VENLAFAXINE HYDROCHLORIDE 37.5 MG: 37.5 CAPSULE, EXTENDED RELEASE ORAL at 08:21

## 2023-10-23 RX ADMIN — EMPAGLIFLOZIN 10 MG: 10 TABLET, FILM COATED ORAL at 08:20

## 2023-10-23 RX ADMIN — FAMOTIDINE 40 MG: 20 TABLET, FILM COATED ORAL at 08:19

## 2023-10-23 RX ADMIN — BUDESONIDE 0.5 MG: 0.5 INHALANT RESPIRATORY (INHALATION) at 06:47

## 2023-10-23 RX ADMIN — CARVEDILOL 6.25 MG: 6.25 TABLET, FILM COATED ORAL at 08:21

## 2023-10-23 RX ADMIN — NICOTINE 1 PATCH: 21 PATCH, EXTENDED RELEASE TRANSDERMAL at 08:19

## 2023-10-23 RX ADMIN — SPIRONOLACTONE 25 MG: 25 TABLET ORAL at 08:21

## 2023-10-23 NOTE — DISCHARGE SUMMARY
[facility]         HOSPITALIST  DISCHARGE SUMMARY    Patient Name: Regulo Sepulveda  : 1965  MRN: [id number]    Date of Admission: 10/18/2023  Date of Discharge:  10/23/23  Primary Care Physician: Grace Cruz APRN    Consultants:  -Cardiology: Dr. Dave Dinero, Dr. Jojo Navarrete  -Gastroenterology: Dr. Roderick Berrios    Hospital Problems:  Acute on chronic combined heart failure  Acute renal failure (baseline creatinine 0.9 and creatinine during hospitalization 1.60)  Chronic paroxysmal atrial fibrillation  Therapeutic drug monitoring, Coumadin  Bilateral pleural effusions  Cardiogenic pulmonary edema  Acute COPD exacerbation  LV mural thrombus  Transaminitis likely secondary to mild shock liver  History of drug abuse  Amphetamine/methamphetamine positive tox screen  Type 2 diabetes mellitus    Hospital Course     Hospital Course:  Regulo Sepulveda is a 58 y.o. male with chronic heart failure with reduced ejection fraction, LV thrombus, chronic paroxysmal atrial fibrillation, COPD, essential hypertension and hyperlipidemia who presented to ED with worsening shortness of breath.  Eval in ED significant for patient being tachypneic.  proBNP noted to be elevated and creatinine elevated compared to baseline.  LFTs elevated as well.  Hospitalist service contacted for further evaluation management.  Cardiology consulted.  IV diuresis initiated.  Nebulizer breathing treatments and steroid therapy started as well.  Patient's respiratory status improved.  Per cardiology recommendation patient discharging on carvedilol, spironolactone, torsemide and aspirin.  Patient also to continue warfarin therapy at discharge.  Prior to discharge home walk test was performed to evaluate need for home O2 and patient passed.  Labs were significant for transaminitis, gastroenterology was consulted and elevation felt to be secondary to mild shock liver.  Right upper quadrant ultrasound obtained and fatty  liver and sludge/small polyp in gallbladder neck were noted, no signs of obstruction and imaging not consistent with cirrhosis.  Hepatitis C antibody was noted to be reactive, HCV quant pending at time of discharge.  Patient's PCP will need to follow-up on this results.  Patient also referred to gastroenterology for outpatient follow-up.  Importance of medication compliance, following up with scheduled appointments and proper diet expressed at length with the patient.  Due to patient's medical comorbidities and history of noncompliance he is very high risk for readmission to the hospital.  On day of discharge patient hemodynamically stable and no additional inpatient evaluation or work-up necessary at this time, he will discharge home and is to follow-up with PCP, cardiology and gastroenterology.    DISCHARGE Follow Up Recommendations for labs and diagnostics:   -Follow-up with PCP in 3 to 5 days  -Referral placed for follow-up in heart failure clinic in 3 to 5 days  -Referral placed for gastroenterology follow-up    Day of Discharge     Vital Signs:  Temp:  [97.5 °F (36.4 °C)-98.2 °F (36.8 °C)] 98.2 °F (36.8 °C)  Heart Rate:  [72-90] 83  Resp:  [18-20] 18  BP: (140-156)/() 156/106  Flow (L/min):  [2] 2  Physical Exam:   Gen: No acute distress, sitting up on edge of bed, conversant  Resp: Good aeration, normal respiratory effort, equal chest rise bilaterally  Card: RRR, No m/r/g  Abd: Soft, Nontender, Nondistended, + bowel sounds     Discharge Details        Discharge Medications        New Medications        Instructions Start Date   aspirin 81 MG EC tablet   81 mg, Oral, Daily      carvedilol 6.25 MG tablet  Commonly known as: COREG   6.25 mg, Oral, 2 Times Daily With Meals      nicotine 21 MG/24HR patch  Commonly known as: NICODERM CQ   1 patch, Transdermal, Every 24 Hours Scheduled      potassium chloride 20 MEQ tablet controlled-release ER tablet  Commonly known as: K-DUR,KLOR-CON   20 mEq, Oral, Daily       spironolactone 25 MG tablet  Commonly known as: ALDACTONE   25 mg, Oral, Daily      Torsemide 40 MG tablet   40 mg, Oral, Daily      warfarin 7.5 MG tablet  Commonly known as: COUMADIN   7.5 mg, Oral, Nightly             Changes to Medications        Instructions Start Date   losartan 50 MG tablet  Commonly known as: COZAAR  What changed: when to take this   50 mg, Oral, Daily             Continue These Medications        Instructions Start Date   Abilify Maintena 300 MG Suspension Reconstituted ER IM injection ER  Generic drug: ARIPiprazole ER   300 mg, Intramuscular, Every 30 Days      albuterol sulfate  (90 Base) MCG/ACT inhaler  Commonly known as: PROVENTIL HFA;VENTOLIN HFA;PROAIR HFA   2 puffs, Inhalation, Every 4 Hours PRN      atorvastatin 40 MG tablet  Commonly known as: LIPITOR   40 mg, Oral, Every Night at Bedtime      cyanocobalamin 500 MCG tablet  Commonly known as: VITAMIN B-12   500 mcg, Oral, Daily      Farxiga 5 MG tablet tablet  Generic drug: dapagliflozin   5 mg, Oral, Daily      metFORMIN 1000 MG tablet  Commonly known as: GLUCOPHAGE   1,000 mg, Oral, 2 Times Daily      venlafaxine XR 37.5 MG 24 hr capsule  Commonly known as: EFFEXOR-XR   1 capsule, Oral, Daily             Stop These Medications      cloNIDine 0.1 MG tablet  Commonly known as: CATAPRES     meloxicam 15 MG tablet  Commonly known as: MOBIC              No Known Allergies    Discharge Disposition:  Home or Self Care    Diet:  Hospital:  Diet Order   Procedures   • Diet: Cardiac Diets; Healthy Heart (2-3 Na+); Texture: Regular Texture (IDDSI 7); Fluid Consistency: Thin (IDDSI 0)       Discharge Activity:       CODE STATUS:  Code Status and Medical Interventions:   Ordered at: 10/18/23 1940     Level Of Support Discussed With:    Patient     Code Status (Patient has no pulse and is not breathing):    CPR (Attempt to Resuscitate)     Medical Interventions (Patient has pulse or is breathing):    Full Support       No future  appointments.    Additional Instructions for the Follow-ups that You Need to Schedule       Discharge Follow-up with PCP   As directed       Currently Documented PCP:    Grace Cruz APRN    PCP Phone Number:    [phone]     Follow Up Details: Follow-up in 3-5 days                Pertinent  and/or Most Recent Results     RADIOLOGY:   US Abdomen Limited [[id number]] Jose as Reviewed   Order Status: Completed Collected: 10/22/23 0812    Updated: 10/22/23 0815   Narrative:     PROCEDURE: US ABDOMEN LIMITED     COMPARISON: None the liver is mildly enlarged measuring 19 cm in maximum oblique dimension on this  exam.  No focal liver masses.  It is borderline increased echogenicity.     INDICATIONS: Elevated transaminases     TECHNIQUE: Ultrasound examination of the right upper quadrant of the abdomen was performed.       FINDINGS:  The liver is mildly enlarged measuring 19 cm in maximum oblique dimension on this exam.  The liver  is homogeneous and borderline increased in echogenicity. No focal masses are seen. Hepatic and  portal veins are patent with normal directional flow.  There is a 3 mm nonshadowing focus in the  neck of the gallbladder which may represent a tiny polyp or a very small amount of tumefactive  sludge.  No definitive stones are seen in the gallbladder.  No  gallbladder wall thickening or  pericholecystic fluid. There is no biliary dilatation. The common duct is 3 mm. The pancreas is  sonographically unremarkable. The right kidney is 11.8 cm in pole to pole length and is  sonographically unremarkable. No fluid collections are seen in the right upper quadrant.           Impression:     1. Mild hepatomegaly with borderline increased echogenicity likely due to mild fatty  infiltration.  2.  Tiny polyp versus tumefactive sludge in the neck of the gallbladder.  Otherwise normal  examination.            ROB MANSFIELD DO        ELECTRONICALLY SIGNED AND APPROVED BY: ROB MANSFIELD DO ON 10/22/2023 AT  8:12                   CT Chest Without Contrast Diagnostic [[id number]] Jose as Reviewed   Order Status: Completed Collected: 10/18/23 2151    Updated: 10/19/23 0500   Narrative:     PROCEDURE: CT CHEST WO CONTRAST DIAGNOSTIC     COMPARISONS: 10/18/2023; 8/27/2023.     INDICATIONS: shortness of breath     TECHNIQUE: 732 CT images were created without the administration of contrast material.       PROTOCOL:   Standard CT imaging protocol performed     RADIATION:   Total DLP: 162 mGy*cm.    Automated exposure control was utilized to minimize radiation dose.     FINDINGS: Bilateral small-to-moderate-sized bilateral pleural effusions are seen; these findings  are overall similar to slightly larger in size when compared with the 8/27/2023 CT study.  There is  mild-to-moderate cardiomegaly.  Subsegmental atelectasis is suggested lung bases.  There may be  mild pulmonary edema.  Pneumonia cannot be excluded but is thought to be less likely.    Emphysematous changes involve the lungs, especially paraseptal and centrilobular.  There may be  some degree of panlobular emphysematous change.  These findings are predominantly in the upper lung  zones (especially the lung apices).  The central tracheobronchial tree is well aerated without  filling defect.  Grossly, no suspicious pulmonary nodules are seen.  The degree of airspace  opacification and pleural effusions may obscure such findings, however.  Enlarged mediastinal and  hilar lymph nodes are possible, which are nonspecific.  They may be reactive in nature.  No  definite thyroid enlargement.  No enlarged axillary lymph nodes are suggested.  There is incidental  bilateral gynecomastia.  No pneumothorax is seen.  A small pericardial effusion is present.  An  air-fluid level involves the lower thoracic esophagus and is nonspecific.  It may be related to  gastroesophageal reflux disease (GERD).  Esophageal dysmotility is possible.  There may be  gallbladder wall thickening and  pericholecystic fluid which is nonspecific.  Gallbladder hydrops is  possible.  Age-indeterminate cholecystitis cannot be excluded.  Gallbladder sludge and/or  gallstones may be present.  Consider further imaging assessment of the gallbladder and biliary tree  with ultrasound examination (of the gallbladder) if clinically warranted and if not recently  performed.  Also, there is age-indeterminate inflammatory change involving the left paracolic  gutter, such as seen on image 188 of series 2 and adjacent images.  This finding is nonspecific and  age-indeterminate and incompletely evaluated on this nonenhanced chest CT exam.  There may be  minimal ascites, especially in the subhepatic region.  Please note that the upper abdomen is only  partially imaged on this nonenhanced chest CT exam.  Old healed bilateral anterior lower  costochondral fractures cannot be excluded.  Degenerative changes are seen throughout the imaged  spine.  There may be diffuse idiopathic skeletal hyperostosis (DISH).  Degenerative changes involve  the bilateral shoulders, which are partially imaged.  No definite acute fracture.  No aggressive  osseous lesion is suggested.        Impression:          1. Slight increase in small-to-moderate-sized bilateral pleural effusions is possible since the  prior 8/27/2023 CT study.       2. Mild pulmonary edema is suspected.       3. There is mild-to-moderate cardiomegaly.  Minimal, if any, coronary artery calcification is  suggested.  A small pericardial effusion is seen.       4. Age-indeterminate cholecystitis cannot be excluded.  There may be gastroesophageal reflux  disease (GERD).       5. Enlarged mediastinal and hilar lymph nodes are seen, which are nonspecific.     6. Age-indeterminate cholecystitis is possible.  Consider further imaging assessment as detailed  above.     7. Please see above comments for further detail.             Please note that portions of this note were completed with a voice  recognition program.     MAHOGANY ELIZONDO JR, MD        Electronically Signed and Approved By: MAHOGANY ELIZONDO JR, MD on 10/19/2023 at 4:57                      XR Chest 1 View [[id number]] Jose as Reviewed   Order Status: Completed Collected: 10/18/23 1534    Updated: 10/18/23 1537   Narrative:     PROCEDURE: XR CHEST 1 VW     COMPARISON: [facility], CR, XR CHEST 1 VW, 10/17/2023, 12:19.     INDICATIONS: SOA Triage Protocol     FINDINGS:  Lungs are normally expanded.  There is cardiomegaly.  No pneumothorax or pleural effusion or focal  pulmonary parenchymal opacity.  There is septal thickening that is similar previous study that  could be seen with chronic lung disease or pulmonary edema.      Impression:     Findings similar previous examination.  Septal thickening which may be seen with  pulmonary edema or chronic lung disease.  Cardiomegaly.                  COREY MORALES MD        Electronically Signed and Approved By: COREY MORALES MD on 10/18/2023 at 15:34                   XR Chest 1 View [[id number]] Jose as Reviewed   Order Status: Completed Collected: 10/17/23 1254    Updated: 10/17/23 1257   Narrative:     PROCEDURE: XR CHEST 1 VW     COMPARISON: [facility], CR, XR CHEST 1 VW, 9/16/2023, 22:04.     INDICATIONS: Chest Pain Triage Protocol, SHORTNESS OF BREATH     FINDINGS:  There is patchy mixed interstitial/airspace disease slightly changed in configuration from the  previous exam.  This could be due to pulmonary interstitial edema or an atypical infection.  There  is no pneumothorax or pleural effusion.  The heart is borderline enlarged.  There are chronic  age-related changes involving the bony thorax.      Impression:     Patchy mixed interstitial/airspace disease slightly changed in configuration from the  previous exam.  This could be due to pulmonary interstitial edema or an atypical infection.                  SILKE RODRIGUEZ MD        Electronically Signed and Approved By: SILKE  MICHAEL JUAN on 10/17/2023 at 12:53         LAB RESULTS:      Lab 10/23/23  0419 10/22/23  0401 10/21/23  0502 10/20/23  0426 10/19/23  1217 10/19/23  0513 10/18/23  2026 10/18/23  1559 10/18/23  1519 10/17/23  1203   WBC 16.38* 16.61* 18.27* 20.96*  --  8.55  --   --  10.31 10.04   HEMOGLOBIN 15.7 14.4 13.4 13.2  --  13.7  --   --  14.5 12.9*   HEMATOCRIT 49.5 44.6 42.7 41.2  --  44.0  --   --  47.5 40.3   PLATELETS 386 354 327 335  --  335  --   --  426 393   NEUTROS ABS  --   --   --   --   --  6.67  --   --  7.11* 6.92   IMMATURE GRANS (ABS)  --   --   --   --   --  0.03  --   --  0.03 0.03   LYMPHS ABS  --   --   --   --   --  1.24  --   --  1.93 1.78   MONOS ABS  --   --   --   --   --  0.59  --   --  1.09* 1.16*   EOS ABS  --   --   --   --   --  0.00  --   --  0.09 0.08   MCV 86.8 88.1 89.0 88.8  --  89.2  --   --  91.5 89.4   PROCALCITONIN  --   --   --   --   --   --   --  0.05  --   --    PROTIME 29.3* 29.3* 26.8* 18.4* 18.1*  --    < >  --  15.6*  --    APTT  --   --   --   --   --   --   --   --  29.3*  --     < > = values in this interval not displayed.         Lab 10/23/23  0419 10/22/23  0401 10/21/23  0502 10/20/23  0426 10/19/23  0513 10/18/23  2026   SODIUM 138 143 141 137 138  --    POTASSIUM 3.7 3.9 3.4* 4.0 4.3  --    CHLORIDE 93* 97* 99 99 102  --    CO2 36.6* 37.9* 34.0* 26.6 21.3*  --    ANION GAP 8.4 8.1 8.0 11.4 14.7  --    BUN 43* 38* 38* 47* 34*  --    CREATININE 1.18 1.29* 1.16 1.55* 1.60*  --    EGFR 71.5 64.3 73.0 51.6* 49.6*  --    GLUCOSE 126* 144* 114* 122* 131*  --    CALCIUM 9.7 9.3 8.7 8.6 9.0  --    MAGNESIUM 2.1 2.1 2.1 2.0 1.9 1.9   PHOSPHORUS  --  2.5  --  4.3 5.9* 5.2*         Lab 10/23/23  0419 10/22/23  0401 10/21/23  0502 10/18/23  1559 10/17/23  1203   TOTAL PROTEIN 6.4 6.4 6.0 6.3 6.3   ALBUMIN 3.8 3.7 3.4* 3.5 3.6   GLOBULIN 2.6 2.7 2.6 2.8 2.7   ALT (SGPT) 416* 495* 548* 82* 59*   AST (SGOT) 104* 135* 178* 64* 35   BILIRUBIN 0.5 0.5 0.5 1.1 0.8   ALK PHOS 237* 257* 234*  299* 256*   LIPASE  --   --   --   --  15         Lab 10/23/23  0419 10/22/23  0401 10/21/23  0502 10/20/23  0426 10/19/23  1217 10/18/23  2026 10/18/23  1559 10/18/23  1519 10/17/23  1203   PROBNP  --   --  4,588.0*  --   --   --   --  23,881.0* 19,337.0*   HSTROP T  --   --   --   --   --   --  35*  --  29*   PROTIME 29.3* 29.3* 26.8* 18.4* 18.1*   < >  --  15.6*  --    INR 2.84* 2.83* 2.52* 1.53* 1.50*   < >  --  1.23*  --     < > = values in this interval not displayed.                 Lab 10/18/23  2036   PH, ARTERIAL 7.356   PCO2, ARTERIAL 36.7   PO2 ART 55.9*   O2 SATURATION ART 86.0*   FIO2 21   HCO3 ART 20.1*   BASE EXCESS ART -4.7*   CARBOXYHEMOGLOBIN 1.3     Brief Urine Lab Results  (Last result in the past 365 days)        Color   Clarity   Blood   Leuk Est   Nitrite   Protein   CREAT   Urine HCG        08/27/23 1925 Yellow   Clear   Small (1+)   Negative   Negative   100 mg/dL (2+)                 Microbiology Results (last 10 days)       Procedure Component Value - Date/Time    COVID-19, FLU A/B, RSV PCR - Swab, Nasopharynx [[id number]]  (Normal) Collected: 10/17/23 1204    Lab Status: Final result Specimen: Swab from Nasopharynx Updated: 10/17/23 1324     COVID19 Not Detected     Influenza A PCR Not Detected     Influenza B PCR Not Detected     RSV, PCR Not Detected    Narrative:      Fact sheet for providers: https://www.fda.gov/media/409392/download    Fact sheet for patients: https://www.fda.gov/media/805233/download    Test performed by PCR.            Results for orders placed during the hospital encounter of 08/27/23    Adult Transthoracic Echo Complete w/ Color, Spectral and Contrast if necessary per protocol    Interpretation Summary  •  Left ventricular systolic function is severely decreased. Calculated left ventricular EF = 20%  •  Left ventricular diastolic function is consistent with (grade II w/high LAP) pseudonormalization.  •  There is a solid thrombus located in the left ventricular  apex. The thrombus measures 2.1 cm in diameter and is fixed.  •  The left atrial cavity is mild to moderately dilated.  •  Estimated right ventricular systolic pressure from tricuspid regurgitation is mildly elevated (35-45 mmHg).  •  Global hypokinesis of the LV with severely reduced ejection fraction and 2.1cm fixed, apical LV thrombus seen with difinity.      Labs Pending at Discharge:  Pending Labs       Order Current Status    Hepatitis C RNA, Quantitative, PCR (graph) In process            Time spent on Discharge including face to face service: Greater than 30 minutes    Electronically signed by Santy Quan MD, 10/23/23, 8:08 AM EDT.     Full range of motion with no contractures

## 2023-10-23 NOTE — OUTREACH NOTE
Prep Survey      Flowsheet Row Responses   [facility] facility patient discharged from? [facility]   Is LACE score < 7 ? No   Eligibility [facility]   [facility]   Date of Admission 10/18/23   Date of Discharge 10/23/23   Discharge Disposition Home or Self Care   Discharge diagnosis a/c CHF   Does the patient have one of the following disease processes/diagnoses(primary or secondary)? CHF   Does the patient have Home health ordered? No   Is there a DME ordered? No   Prep survey completed? Yes            JULI A - Registered Nurse

## 2023-10-23 NOTE — PLAN OF CARE
Goal Outcome Evaluation:      No changes through shift. Pt resting well throughout the night. Paige Wolff RN

## 2023-10-23 NOTE — DISCHARGE INSTR - LAB
Follow up with Cardiology Monday 10/30/2023 at 11:00  [phone]     Follow up with Gastroenterology Wednesday 11/29/23 at 9:45   [phone]    Follow up with PCP Friday 10/27/2023 at 10:20 [phone]

## 2023-10-24 ENCOUNTER — TRANSITIONAL CARE MANAGEMENT TELEPHONE ENCOUNTER (OUTPATIENT)
Dept: CALL CENTER | Facility: HOSPITAL | Age: 58
End: 2023-10-24
Payer: COMMERCIAL

## 2023-10-24 ENCOUNTER — TELEPHONE (OUTPATIENT)
Dept: GASTROENTEROLOGY | Facility: CLINIC | Age: 58
End: 2023-10-24
Payer: COMMERCIAL

## 2023-10-24 NOTE — TELEPHONE ENCOUNTER
Left voice message for patient to return call  to schedule an appointment at the complex care clinic. Received a referral from Santy Quan

## 2023-10-24 NOTE — OUTREACH NOTE
Call Center TCM Note      Flowsheet Row Responses   [facility] patient discharged from? [facility]   Does the patient have one of the following disease processes/diagnoses(primary or secondary)? CHF   TCM attempt successful? No   Unsuccessful attempts Attempt 1   Comments HOSP DC FU appt 10/26/23 1 pm with Rosaura Campos   Does the patient have an appointment with their PCP within 7-14 days of discharge? Yes             Gela Sepulveda RN    10/24/2023, 12:23 EDT

## 2023-10-24 NOTE — OUTREACH NOTE
Call Center TCM Note      Flowsheet Row Responses   [facility] facility patient discharged from? [facility]   Does the patient have one of the following disease processes/diagnoses(primary or secondary)? CHF   TCM attempt successful? No   Unsuccessful attempts Attempt 2             Gela Sepulveda RN    10/24/2023, 14:58 EDT

## 2023-10-24 NOTE — PROGRESS NOTES
"Enter Query Response Below      Query Response: Heart failure due to hypertension, cardiomyopathy and Atrial Fibrillation             If applicable, please update the problem list.   Patient: Regulo Sepulveda        : 1965  Account: [id number]           Admit Date:         How to Respond to this query:       a. Click New Note     b. Answer query within the yellow box.                c. Update the Problem List, if applicable.      If you have any questions about this query contact me at: [email or web address]     Dr. Navarrete,     58 yr male noted with \"Acute on chronic combined systolic and diastolic CHF\" noted in progress notes. Patient noted in Atrial Fibrillation, HTN and cardiomyopathy. IV Bumex, Coumadin, Coreg, Cozaar and Demadex given.     After study, please clarify the etiology of the patient’s heart failure:    Heart failure due to hypertension  Heart failure due to cardiomyopathy  Heart failure due to Atrial Fibrillation   Heart failure due to hypertension, cardiomyopathy and Atrial Fibrillation  Other- specify__________  Unable to determine      By submitting this query, we are merely seeking further clarification of documentation to accurately reflect all conditions that you are monitoring, evaluating, treating or that extend the hospitalization or utilize additional resources of care. Please utilize your independent clinical judgment when addressing the question(s) above.     This query and your response, once completed, will be entered into the legal medical record.    Sincerely,  Connie AMBRIZ Rn  Clinical Documentation Integrity Program   "

## 2023-10-25 ENCOUNTER — TRANSITIONAL CARE MANAGEMENT TELEPHONE ENCOUNTER (OUTPATIENT)
Dept: CALL CENTER | Facility: HOSPITAL | Age: 58
End: 2023-10-25
Payer: COMMERCIAL

## 2023-10-25 LAB
HCV RNA SERPL NAA+PROBE-ACNC: NORMAL IU/ML
TEST INFORMATION: NORMAL

## 2023-10-25 NOTE — OUTREACH NOTE
Call Center TCM Note      Flowsheet Row Responses   [facility] facility patient discharged from? [facility]   Does the patient have one of the following disease processes/diagnoses(primary or secondary)? CHF   TCM attempt successful? No   Unsuccessful attempts Attempt 3             Carley Galaviz LPN    10/25/2023, 12:06 EDT

## 2023-10-31 ENCOUNTER — TELEPHONE (OUTPATIENT)
Dept: CARDIOLOGY | Facility: CLINIC | Age: 58
End: 2023-10-31

## 2023-10-31 NOTE — TELEPHONE ENCOUNTER
Caller: Aurora Prather    Relationship to patient: Emergency Contact    Best call back number: [phone]     Chief complaint: PATIENT MISSED APPT DUE TO NOT BEING NOTIFIED OF APPT FROM HOSPITAL AND NEXT AVAILABLE WITH DR. VANESSA IS 11.28.23    Type of visit: HOSPITAL FOLLOW/ INITIAL EVALUATION    Requested date: ASAP    If rescheduling, when is the original appointment: 10.30.23    Additional notes:

## 2023-11-02 ENCOUNTER — OFFICE VISIT (OUTPATIENT)
Dept: FAMILY MEDICINE CLINIC | Facility: CLINIC | Age: 58
End: 2023-11-02
Payer: COMMERCIAL

## 2023-11-02 VITALS
BODY MASS INDEX: 21.87 KG/M2 | TEMPERATURE: 97.5 F | OXYGEN SATURATION: 99 % | SYSTOLIC BLOOD PRESSURE: 160 MMHG | HEIGHT: 73 IN | DIASTOLIC BLOOD PRESSURE: 110 MMHG | HEART RATE: 109 BPM | WEIGHT: 165 LBS

## 2023-11-02 DIAGNOSIS — Z72.0 TOBACCO ABUSE: ICD-10-CM

## 2023-11-02 DIAGNOSIS — E78.5 HYPERLIPIDEMIA, UNSPECIFIED HYPERLIPIDEMIA TYPE: ICD-10-CM

## 2023-11-02 DIAGNOSIS — I50.20 HFREF (HEART FAILURE WITH REDUCED EJECTION FRACTION): ICD-10-CM

## 2023-11-02 DIAGNOSIS — R76.8 HEPATITIS C ANTIBODY POSITIVE IN BLOOD: ICD-10-CM

## 2023-11-02 DIAGNOSIS — E11.9 TYPE 2 DIABETES MELLITUS WITHOUT COMPLICATION, WITHOUT LONG-TERM CURRENT USE OF INSULIN: ICD-10-CM

## 2023-11-02 DIAGNOSIS — I50.31 ACUTE DIASTOLIC CHF (CONGESTIVE HEART FAILURE): ICD-10-CM

## 2023-11-02 DIAGNOSIS — K21.9 GASTROESOPHAGEAL REFLUX DISEASE WITHOUT ESOPHAGITIS: ICD-10-CM

## 2023-11-02 DIAGNOSIS — I10 HYPERTENSION, UNSPECIFIED TYPE: ICD-10-CM

## 2023-11-02 DIAGNOSIS — F20.5 RESIDUAL SCHIZOPHRENIA: Primary | ICD-10-CM

## 2023-11-02 RX ORDER — ALBUTEROL SULFATE 90 UG/1
2 AEROSOL, METERED RESPIRATORY (INHALATION) EVERY 4 HOURS PRN
Qty: 18 G | Refills: 0 | Status: SHIPPED | OUTPATIENT
Start: 2023-11-02 | End: 2023-11-02 | Stop reason: SDUPTHER

## 2023-11-02 RX ORDER — PANTOPRAZOLE SODIUM 40 MG/1
40 TABLET, DELAYED RELEASE ORAL DAILY
Qty: 90 TABLET | Refills: 1 | Status: SHIPPED | OUTPATIENT
Start: 2023-11-02

## 2023-11-02 RX ORDER — PANTOPRAZOLE SODIUM 40 MG/1
1 TABLET, DELAYED RELEASE ORAL DAILY
COMMUNITY
Start: 2023-10-24 | End: 2023-11-02

## 2023-11-02 RX ORDER — ARIPIPRAZOLE 300 MG
300 KIT INTRAMUSCULAR
Qty: 1 EACH | Refills: 12 | Status: SHIPPED | OUTPATIENT
Start: 2023-11-02

## 2023-11-02 RX ORDER — ARIPIPRAZOLE 300 MG
300 KIT INTRAMUSCULAR
Qty: 1 EACH | Refills: 12 | Status: SHIPPED | OUTPATIENT
Start: 2023-11-02 | End: 2023-11-02 | Stop reason: SDUPTHER

## 2023-11-02 RX ORDER — ALBUTEROL SULFATE 90 UG/1
2 AEROSOL, METERED RESPIRATORY (INHALATION) EVERY 4 HOURS PRN
Qty: 18 G | Refills: 0 | Status: SHIPPED | OUTPATIENT
Start: 2023-11-02

## 2023-11-02 RX ORDER — VARENICLINE TARTRATE 0.5 MG/1
TABLET, FILM COATED ORAL
Qty: 38 TABLET | Refills: 1 | Status: SHIPPED | OUTPATIENT
Start: 2023-11-02 | End: 2023-12-10

## 2023-11-02 NOTE — PROGRESS NOTES
Chief Complaint  Hospital Follow Up Visit and Establish Care    Subjective      History of Present Illness        58 y.o. male with HFrEF, LV thrombus, chronic paroxysmal atrial fibrillation, COPD, essential hypertension and hyperlipidemia who presented to ED with worsening shortness of breath.  Eval in ED significant for patient being tachypneic.  proBNP noted to be elevated and creatinine elevated compared to baseline.  LFTs elevated as well.  Per cardiology recommendation patient discharging on carvedilol, spironolactone, torsemide and aspirin.  Patient also to continue warfarin therapy at discharge.  Prior to discharge home walk test was performed to evaluate need for home O2 and patient passed.  Labs were significant for transaminitis, gastroenterology was consulted and elevation felt to be secondary to mild shock liver.  Right upper quadrant ultrasound obtained and fatty liver and sludge/small polyp in gallbladder neck were noted, no signs of obstruction and imaging not consistent with cirrhosis.  Hepatitis C antibody was noted to be reactive, HCV quant pending at time of discharge.  Pt states he had Tc for Hep C in the penententary, but he does have f/u with GI on 11/29/23. Importance of medication compliance, following up with scheduled appointments and proper diet expressed at length with the patient.  Pt  continues to smoke but his sister is her today and states he has admittd to her he want s to quit.    Pt is in my office today states he has some SOB, he does report taking the torsemide today but did not brin g any medications with him today.  I have stressed to him he needs to watch the amount of fluid he can take, as well as avoiding sodium.  Pt takes abilify 300 mg once monthly, he used to get from his PCP.    Pt highest BNP while admitted was 23K, it was down to 4500 at D/ C.         Past Medical History:   Diagnosis Date    Anxiety     Asthma     Bipolar affective     Cardiac tamponade     2023     "CHF (congestive heart failure)     COPD (chronic obstructive pulmonary disease)     Coronary artery disease     Depression     Diabetes mellitus     Elevated cholesterol     Hypertension          Objective   Vital Signs:   Vitals:    11/02/23 1253   BP: (!) 160/110   Pulse: 109   Temp: 97.5 °F (36.4 °C)   SpO2: 99%   Weight: 74.8 kg (165 lb)   Height: 185.4 cm (73\")       Wt Readings from Last 3 Encounters:   11/02/23 74.8 kg (165 lb)   10/23/23 68.2 kg (150 lb 5.7 oz)   09/17/23 71 kg (156 lb 8.4 oz)     BP Readings from Last 3 Encounters:   11/02/23 (!) 160/110   10/23/23 (!) 159/106   10/17/23 (!) 151/107       Health Maintenance   Topic Date Due    Hepatitis B (1 of 3 - 3-dose series) Never done    URINE MICROALBUMIN  Never done    COLORECTAL CANCER SCREENING  Never done    Pneumococcal Vaccine 0-64 (1 - PCV) Never done    ANNUAL PHYSICAL  Never done    DIABETIC FOOT EXAM  Never done    DIABETIC EYE EXAM  Never done    ZOSTER VACCINE (2 of 2) 08/16/2022    INFLUENZA VACCINE  08/01/2023    COVID-19 Vaccine (4 - 2023-24 season) 09/01/2023    HEMOGLOBIN A1C  02/29/2024    LIPID PANEL  08/30/2024    LUNG CANCER SCREENING  10/18/2024    TDAP/TD VACCINES (3 - Td or Tdap) 06/21/2032    HEPATITIS C SCREENING  Completed       Physical Exam  Constitutional:       General: He is not in acute distress.     Appearance: Normal appearance. He is not toxic-appearing.   HENT:      Head: Normocephalic and atraumatic.      Right Ear: Tympanic membrane normal.      Left Ear: Tympanic membrane normal.      Nose: Nose normal.      Mouth/Throat:      Mouth: Mucous membranes are moist.   Eyes:      General: No scleral icterus.     Extraocular Movements: Extraocular movements intact.      Conjunctiva/sclera: Conjunctivae normal.      Pupils: Pupils are equal, round, and reactive to light.   Cardiovascular:      Rate and Rhythm: Normal rate and regular rhythm.      Pulses: Normal pulses.      Heart sounds: Normal heart sounds. No murmur " heard.     No gallop.   Pulmonary:      Effort: Pulmonary effort is normal.      Breath sounds: Decreased air movement present. No wheezing or rales.   Abdominal:      General: Abdomen is flat. Bowel sounds are normal. There is no distension.      Palpations: Abdomen is soft.   Musculoskeletal:         General: Normal range of motion.      Cervical back: Normal range of motion.   Skin:     General: Skin is warm and dry.      Capillary Refill: Capillary refill takes less than 2 seconds.   Neurological:      General: No focal deficit present.      Mental Status: He is alert.   Psychiatric:         Mood and Affect: Mood normal.            Result Review :  The following data was reviewed by: Dickson Landry MD on 11/02/2023:      Procedures        Assessment and Plan   Diagnoses and all orders for this visit:    1. Residual schizophrenia (Primary)  -     Discontinue: Abilify Maintena 300 MG Suspension Reconstituted ER IM injection ER; Inject 300 mg into the appropriate muscle as directed by prescriber Every 30 (Thirty) Days.  Dispense: 1 each; Refill: 12  -     Abilify Maintena 300 MG Suspension Reconstituted ER IM injection ER; Inject 300 mg into the appropriate muscle as directed by prescriber Every 30 (Thirty) Days.  Dispense: 1 each; Refill: 12    2. Acute diastolic CHF (congestive heart failure)  -     Discontinue: albuterol sulfate  (90 Base) MCG/ACT inhaler; Inhale 2 puffs Every 4 (Four) Hours As Needed for Wheezing or Shortness of Air. Indications: Chronic Obstructive Lung Disease  Dispense: 18 g; Refill: 0  -     Ambulatory Referral to Nephrology  -     albuterol sulfate  (90 Base) MCG/ACT inhaler; Inhale 2 puffs Every 4 (Four) Hours As Needed for Wheezing or Shortness of Air. Indications: Chronic Obstructive Lung Disease  Dispense: 18 g; Refill: 0    3. HFrEF (heart failure with reduced ejection fraction)  -     Discontinue: albuterol sulfate  (90 Base) MCG/ACT inhaler; Inhale 2 puffs Every  4 (Four) Hours As Needed for Wheezing or Shortness of Air. Indications: Chronic Obstructive Lung Disease  Dispense: 18 g; Refill: 0  -     albuterol sulfate  (90 Base) MCG/ACT inhaler; Inhale 2 puffs Every 4 (Four) Hours As Needed for Wheezing or Shortness of Air. Indications: Chronic Obstructive Lung Disease  Dispense: 18 g; Refill: 0    4. Hepatitis C antibody positive in blood    5. Type 2 diabetes mellitus without complication, without long-term current use of insulin    6. Hypertension, unspecified type    7. Hyperlipidemia, unspecified hyperlipidemia type    8. Gastroesophageal reflux disease without esophagitis    9. Tobacco abuse    Other orders  -     varenicline (Chantix) 0.5 MG tablet; Take 1 tablet by mouth Every 30 (Thirty) Days for 4 days, THEN 1 tablet 2 (Two) Times a Day for 4 days, THEN 2 tablets 2 (Two) Times a Day for 30 days.  Dispense: 38 tablet; Refill: 1  -     pantoprazole (Protonix) 40 MG EC tablet; Take 1 tablet by mouth Daily.  Dispense: 90 tablet; Refill: 1    Pt refuses labs today, pt also did not bring in his medications with him.  Pt is here with his sister, she states she is going to help him with mediations from now on.     BMI is within normal parameters. No other follow-up for BMI required.         FOLLOW UP  Return in about 3 years (around 11/2/2026).  Patient was given instructions and counseling regarding his condition or for health maintenance advice. Please see specific information pulled into the AVS if appropriate.       Dickson Landry MD  11/02/23  13:44 EDT    CURRENT & DISCONTINUED MEDICATIONS  Current Outpatient Medications   Medication Instructions    Abilify Maintena 300 mg, Intramuscular, Every 30 Days    albuterol sulfate  (90 Base) MCG/ACT inhaler 2 puffs, Inhalation, Every 4 Hours PRN    aspirin 81 mg, Oral, Daily    atorvastatin (LIPITOR) 40 mg, Oral, Every Night at Bedtime    carvedilol (COREG) 6.25 mg, Oral, 2 Times Daily With Meals     cyanocobalamin (VITAMIN B-12) 500 mcg, Oral, Daily    Farxiga 5 mg, Oral, Daily    losartan (COZAAR) 50 mg, Oral, Daily    metFORMIN (GLUCOPHAGE) 1,000 mg, Oral, 2 Times Daily    nicotine (NICODERM CQ) 21 MG/24HR patch 1 patch, Transdermal, Every 24 Hours Scheduled    pantoprazole (PROTONIX) 40 mg, Oral, Daily    potassium chloride (K-DUR,KLOR-CON) 20 MEQ tablet controlled-release ER tablet 20 mEq, Oral, Daily    spironolactone (ALDACTONE) 25 mg, Oral, Daily    Torsemide 40 mg, Oral, Daily    varenicline (Chantix) 0.5 MG tablet Take 1 tablet by mouth Every 30 (Thirty) Days for 4 days, THEN 1 tablet 2 (Two) Times a Day for 4 days, THEN 2 tablets 2 (Two) Times a Day for 30 days.    venlafaxine XR (EFFEXOR-XR) 37.5 MG 24 hr capsule 1 capsule, Oral, Daily    warfarin (COUMADIN) 7.5 mg, Oral, Nightly       Medications Discontinued During This Encounter   Medication Reason    albuterol sulfate  (90 Base) MCG/ACT inhaler Reorder    Abilify Maintena 300 MG Suspension Reconstituted ER IM injection ER Reorder    pantoprazole (PROTONIX) 40 MG EC tablet     albuterol sulfate  (90 Base) MCG/ACT inhaler Reorder    Abilify Maintena 300 MG Suspension Reconstituted ER IM injection ER Reorder

## 2023-11-07 ENCOUNTER — CLINICAL SUPPORT (OUTPATIENT)
Dept: FAMILY MEDICINE CLINIC | Facility: CLINIC | Age: 58
End: 2023-11-07
Payer: COMMERCIAL

## 2023-11-07 DIAGNOSIS — F20.9 SCHIZOPHRENIA, UNSPECIFIED TYPE: ICD-10-CM

## 2023-11-07 DIAGNOSIS — F33.1 MAJOR DEPRESSIVE DISORDER, RECURRENT EPISODE, MODERATE: Primary | ICD-10-CM

## 2023-11-10 ENCOUNTER — APPOINTMENT (OUTPATIENT)
Dept: GENERAL RADIOLOGY | Facility: HOSPITAL | Age: 58
DRG: 291 | End: 2023-11-10
Payer: COMMERCIAL

## 2023-11-10 ENCOUNTER — HOSPITAL ENCOUNTER (INPATIENT)
Facility: HOSPITAL | Age: 58
LOS: 2 days | Discharge: HOME | DRG: 291 | End: 2023-11-15
Attending: EMERGENCY MEDICINE | Admitting: FAMILY MEDICINE
Payer: COMMERCIAL

## 2023-11-10 DIAGNOSIS — Z78.9 DECREASED ACTIVITIES OF DAILY LIVING (ADL): ICD-10-CM

## 2023-11-10 DIAGNOSIS — I50.9 CONGESTIVE HEART FAILURE, UNSPECIFIED HF CHRONICITY, UNSPECIFIED HEART FAILURE TYPE: Primary | ICD-10-CM

## 2023-11-10 DIAGNOSIS — R26.2 DIFFICULTY IN WALKING: ICD-10-CM

## 2023-11-10 PROBLEM — I48.0 PAF (PAROXYSMAL ATRIAL FIBRILLATION): Status: ACTIVE | Noted: 2023-11-10

## 2023-11-10 LAB
ALBUMIN SERPL-MCNC: 3.6 G/DL (ref 3.5–5.2)
ALBUMIN/GLOB SERPL: 1.4 G/DL
ALP SERPL-CCNC: 249 U/L (ref 39–117)
ALT SERPL W P-5'-P-CCNC: 52 U/L (ref 1–41)
ANION GAP SERPL CALCULATED.3IONS-SCNC: 13.2 MMOL/L (ref 5–15)
AST SERPL-CCNC: 32 U/L (ref 1–40)
BASOPHILS # BLD AUTO: 0.05 10*3/MM3 (ref 0–0.2)
BASOPHILS NFR BLD AUTO: 0.5 % (ref 0–1.5)
BILIRUB SERPL-MCNC: 1.2 MG/DL (ref 0–1.2)
BUN SERPL-MCNC: 29 MG/DL (ref 6–20)
BUN/CREAT SERPL: 26.1 (ref 7–25)
CALCIUM SPEC-SCNC: 8.6 MG/DL (ref 8.6–10.5)
CHLORIDE SERPL-SCNC: 105 MMOL/L (ref 98–107)
CO2 SERPL-SCNC: 20.8 MMOL/L (ref 22–29)
CREAT SERPL-MCNC: 1.11 MG/DL (ref 0.76–1.27)
DEPRECATED RDW RBC AUTO: 46.5 FL (ref 37–54)
EGFRCR SERPLBLD CKD-EPI 2021: 77 ML/MIN/1.73
EOSINOPHIL # BLD AUTO: 0.02 10*3/MM3 (ref 0–0.4)
EOSINOPHIL NFR BLD AUTO: 0.2 % (ref 0.3–6.2)
ERYTHROCYTE [DISTWIDTH] IN BLOOD BY AUTOMATED COUNT: 14.9 % (ref 12.3–15.4)
GLOBULIN UR ELPH-MCNC: 2.6 GM/DL
GLUCOSE BLDC GLUCOMTR-MCNC: 102 MG/DL (ref 70–99)
GLUCOSE BLDC GLUCOMTR-MCNC: 106 MG/DL (ref 70–99)
GLUCOSE BLDC GLUCOMTR-MCNC: 146 MG/DL (ref 70–99)
GLUCOSE SERPL-MCNC: 121 MG/DL (ref 65–99)
HCT VFR BLD AUTO: 42.4 % (ref 37.5–51)
HGB BLD-MCNC: 13.4 G/DL (ref 13–17.7)
HOLD SPECIMEN: NORMAL
HOLD SPECIMEN: NORMAL
IMM GRANULOCYTES # BLD AUTO: 0.02 10*3/MM3 (ref 0–0.05)
IMM GRANULOCYTES NFR BLD AUTO: 0.2 % (ref 0–0.5)
INR PPP: 1.2 (ref 0.86–1.15)
LYMPHOCYTES # BLD AUTO: 1.84 10*3/MM3 (ref 0.7–3.1)
LYMPHOCYTES NFR BLD AUTO: 18.8 % (ref 19.6–45.3)
MCH RBC QN AUTO: 27.3 PG (ref 26.6–33)
MCHC RBC AUTO-ENTMCNC: 31.6 G/DL (ref 31.5–35.7)
MCV RBC AUTO: 86.4 FL (ref 79–97)
MONOCYTES # BLD AUTO: 1.01 10*3/MM3 (ref 0.1–0.9)
MONOCYTES NFR BLD AUTO: 10.3 % (ref 5–12)
NEUTROPHILS NFR BLD AUTO: 6.85 10*3/MM3 (ref 1.7–7)
NEUTROPHILS NFR BLD AUTO: 70 % (ref 42.7–76)
NRBC BLD AUTO-RTO: 0 /100 WBC (ref 0–0.2)
NT-PROBNP SERPL-MCNC: ABNORMAL PG/ML (ref 0–900)
PLATELET # BLD AUTO: 281 10*3/MM3 (ref 140–450)
PMV BLD AUTO: 11.4 FL (ref 6–12)
POTASSIUM SERPL-SCNC: 4.9 MMOL/L (ref 3.5–5.2)
PROT SERPL-MCNC: 6.2 G/DL (ref 6–8.5)
PROTHROMBIN TIME: 15.5 SECONDS (ref 11.8–14.9)
QT INTERVAL: 387 MS
QTC INTERVAL: 444 MS
RBC # BLD AUTO: 4.91 10*6/MM3 (ref 4.14–5.8)
SODIUM SERPL-SCNC: 139 MMOL/L (ref 136–145)
TROPONIN T SERPL HS-MCNC: 18 NG/L
WBC NRBC COR # BLD: 9.79 10*3/MM3 (ref 3.4–10.8)
WHOLE BLOOD HOLD COAG: NORMAL
WHOLE BLOOD HOLD SPECIMEN: NORMAL

## 2023-11-10 PROCEDURE — 25010000002 FUROSEMIDE PER 20 MG: Performed by: EMERGENCY MEDICINE

## 2023-11-10 PROCEDURE — 93005 ELECTROCARDIOGRAM TRACING: CPT | Performed by: EMERGENCY MEDICINE

## 2023-11-10 PROCEDURE — 25010000002 ENOXAPARIN PER 10 MG: Performed by: FAMILY MEDICINE

## 2023-11-10 PROCEDURE — 80053 COMPREHEN METABOLIC PANEL: CPT | Performed by: EMERGENCY MEDICINE

## 2023-11-10 PROCEDURE — 25010000002 FUROSEMIDE PER 20 MG: Performed by: FAMILY MEDICINE

## 2023-11-10 PROCEDURE — 84484 ASSAY OF TROPONIN QUANT: CPT | Performed by: EMERGENCY MEDICINE

## 2023-11-10 PROCEDURE — G0378 HOSPITAL OBSERVATION PER HR: HCPCS

## 2023-11-10 PROCEDURE — 83880 ASSAY OF NATRIURETIC PEPTIDE: CPT | Performed by: EMERGENCY MEDICINE

## 2023-11-10 PROCEDURE — 99285 EMERGENCY DEPT VISIT HI MDM: CPT

## 2023-11-10 PROCEDURE — 85610 PROTHROMBIN TIME: CPT | Performed by: FAMILY MEDICINE

## 2023-11-10 PROCEDURE — 85025 COMPLETE CBC W/AUTO DIFF WBC: CPT | Performed by: EMERGENCY MEDICINE

## 2023-11-10 PROCEDURE — 71045 X-RAY EXAM CHEST 1 VIEW: CPT

## 2023-11-10 PROCEDURE — 93005 ELECTROCARDIOGRAM TRACING: CPT

## 2023-11-10 PROCEDURE — 99223 1ST HOSP IP/OBS HIGH 75: CPT | Performed by: FAMILY MEDICINE

## 2023-11-10 PROCEDURE — 82948 REAGENT STRIP/BLOOD GLUCOSE: CPT

## 2023-11-10 RX ORDER — ENOXAPARIN SODIUM 100 MG/ML
80 INJECTION SUBCUTANEOUS EVERY 12 HOURS
Status: DISCONTINUED | OUTPATIENT
Start: 2023-11-10 | End: 2023-11-15 | Stop reason: HOSPADM

## 2023-11-10 RX ORDER — NICOTINE 21 MG/24HR
1 PATCH, TRANSDERMAL 24 HOURS TRANSDERMAL
Status: DISCONTINUED | OUTPATIENT
Start: 2023-11-10 | End: 2023-11-10

## 2023-11-10 RX ORDER — LOSARTAN POTASSIUM 50 MG/1
50 TABLET ORAL DAILY
Status: DISCONTINUED | OUTPATIENT
Start: 2023-11-10 | End: 2023-11-10

## 2023-11-10 RX ORDER — CARVEDILOL 12.5 MG/1
25 TABLET ORAL EVERY 12 HOURS SCHEDULED
Status: DISCONTINUED | OUTPATIENT
Start: 2023-11-10 | End: 2023-11-15 | Stop reason: HOSPADM

## 2023-11-10 RX ORDER — POLYETHYLENE GLYCOL 3350 17 G/17G
17 POWDER, FOR SOLUTION ORAL DAILY PRN
Status: DISCONTINUED | OUTPATIENT
Start: 2023-11-10 | End: 2023-11-15 | Stop reason: HOSPADM

## 2023-11-10 RX ORDER — VENLAFAXINE HYDROCHLORIDE 37.5 MG/1
37.5 CAPSULE, EXTENDED RELEASE ORAL DAILY
Status: DISCONTINUED | OUTPATIENT
Start: 2023-11-11 | End: 2023-11-15 | Stop reason: HOSPADM

## 2023-11-10 RX ORDER — WARFARIN SODIUM 7.5 MG/1
7.5 TABLET ORAL
Status: DISCONTINUED | OUTPATIENT
Start: 2023-11-10 | End: 2023-11-11

## 2023-11-10 RX ORDER — BISACODYL 5 MG/1
5 TABLET, DELAYED RELEASE ORAL DAILY PRN
Status: DISCONTINUED | OUTPATIENT
Start: 2023-11-10 | End: 2023-11-15 | Stop reason: HOSPADM

## 2023-11-10 RX ORDER — NICOTINE POLACRILEX 4 MG
15 LOZENGE BUCCAL
Status: DISCONTINUED | OUTPATIENT
Start: 2023-11-10 | End: 2023-11-12

## 2023-11-10 RX ORDER — INSULIN LISPRO 100 [IU]/ML
2-7 INJECTION, SOLUTION INTRAVENOUS; SUBCUTANEOUS
Status: DISCONTINUED | OUTPATIENT
Start: 2023-11-10 | End: 2023-11-12

## 2023-11-10 RX ORDER — FUROSEMIDE 10 MG/ML
60 INJECTION INTRAMUSCULAR; INTRAVENOUS ONCE
Status: COMPLETED | OUTPATIENT
Start: 2023-11-10 | End: 2023-11-10

## 2023-11-10 RX ORDER — SPIRONOLACTONE 25 MG/1
25 TABLET ORAL DAILY
Status: DISCONTINUED | OUTPATIENT
Start: 2023-11-10 | End: 2023-11-15 | Stop reason: HOSPADM

## 2023-11-10 RX ORDER — SODIUM CHLORIDE 0.9 % (FLUSH) 0.9 %
10 SYRINGE (ML) INJECTION AS NEEDED
Status: DISCONTINUED | OUTPATIENT
Start: 2023-11-10 | End: 2023-11-15 | Stop reason: HOSPADM

## 2023-11-10 RX ORDER — NITROGLYCERIN 0.4 MG/1
0.4 TABLET SUBLINGUAL
Status: DISCONTINUED | OUTPATIENT
Start: 2023-11-10 | End: 2023-11-15 | Stop reason: HOSPADM

## 2023-11-10 RX ORDER — LOSARTAN POTASSIUM 50 MG/1
50 TABLET ORAL DAILY
Status: DISCONTINUED | OUTPATIENT
Start: 2023-11-11 | End: 2023-11-10

## 2023-11-10 RX ORDER — SODIUM CHLORIDE 0.9 % (FLUSH) 0.9 %
10 SYRINGE (ML) INJECTION EVERY 12 HOURS SCHEDULED
Status: DISCONTINUED | OUTPATIENT
Start: 2023-11-10 | End: 2023-11-15 | Stop reason: HOSPADM

## 2023-11-10 RX ORDER — SODIUM CHLORIDE 9 MG/ML
40 INJECTION, SOLUTION INTRAVENOUS AS NEEDED
Status: DISCONTINUED | OUTPATIENT
Start: 2023-11-10 | End: 2023-11-15 | Stop reason: HOSPADM

## 2023-11-10 RX ORDER — FUROSEMIDE 10 MG/ML
40 INJECTION INTRAMUSCULAR; INTRAVENOUS
Status: DISCONTINUED | OUTPATIENT
Start: 2023-11-10 | End: 2023-11-13

## 2023-11-10 RX ORDER — IBUPROFEN 600 MG/1
1 TABLET ORAL
Status: DISCONTINUED | OUTPATIENT
Start: 2023-11-10 | End: 2023-11-12

## 2023-11-10 RX ORDER — LISINOPRIL 20 MG/1
40 TABLET ORAL
Status: DISCONTINUED | OUTPATIENT
Start: 2023-11-10 | End: 2023-11-15 | Stop reason: HOSPADM

## 2023-11-10 RX ORDER — ASPIRIN 81 MG/1
81 TABLET ORAL DAILY
Status: DISCONTINUED | OUTPATIENT
Start: 2023-11-10 | End: 2023-11-15 | Stop reason: HOSPADM

## 2023-11-10 RX ORDER — NICOTINE 21 MG/24HR
1 PATCH, TRANSDERMAL 24 HOURS TRANSDERMAL
Status: DISCONTINUED | OUTPATIENT
Start: 2023-11-11 | End: 2023-11-15 | Stop reason: HOSPADM

## 2023-11-10 RX ORDER — ACETAMINOPHEN 325 MG/1
650 TABLET ORAL EVERY 4 HOURS PRN
Status: DISCONTINUED | OUTPATIENT
Start: 2023-11-10 | End: 2023-11-15 | Stop reason: HOSPADM

## 2023-11-10 RX ORDER — DEXTROSE MONOHYDRATE 25 G/50ML
25 INJECTION, SOLUTION INTRAVENOUS
Status: DISCONTINUED | OUTPATIENT
Start: 2023-11-10 | End: 2023-11-12

## 2023-11-10 RX ORDER — ATORVASTATIN CALCIUM 40 MG/1
40 TABLET, FILM COATED ORAL DAILY
Status: DISCONTINUED | OUTPATIENT
Start: 2023-11-11 | End: 2023-11-15 | Stop reason: HOSPADM

## 2023-11-10 RX ORDER — ONDANSETRON 4 MG/1
4 TABLET, FILM COATED ORAL EVERY 6 HOURS PRN
Status: DISCONTINUED | OUTPATIENT
Start: 2023-11-10 | End: 2023-11-15 | Stop reason: HOSPADM

## 2023-11-10 RX ORDER — PANTOPRAZOLE SODIUM 40 MG/1
40 TABLET, DELAYED RELEASE ORAL DAILY
Status: DISCONTINUED | OUTPATIENT
Start: 2023-11-11 | End: 2023-11-15 | Stop reason: HOSPADM

## 2023-11-10 RX ORDER — ONDANSETRON 2 MG/ML
4 INJECTION INTRAMUSCULAR; INTRAVENOUS EVERY 6 HOURS PRN
Status: DISCONTINUED | OUTPATIENT
Start: 2023-11-10 | End: 2023-11-15 | Stop reason: HOSPADM

## 2023-11-10 RX ORDER — BISACODYL 10 MG
10 SUPPOSITORY, RECTAL RECTAL DAILY PRN
Status: DISCONTINUED | OUTPATIENT
Start: 2023-11-10 | End: 2023-11-15 | Stop reason: HOSPADM

## 2023-11-10 RX ORDER — AMOXICILLIN 250 MG
2 CAPSULE ORAL 2 TIMES DAILY PRN
Status: DISCONTINUED | OUTPATIENT
Start: 2023-11-10 | End: 2023-11-15 | Stop reason: HOSPADM

## 2023-11-10 RX ORDER — MELOXICAM 15 MG/1
15 TABLET ORAL DAILY
COMMUNITY

## 2023-11-10 RX ORDER — ALBUTEROL SULFATE 2.5 MG/3ML
2.5 SOLUTION RESPIRATORY (INHALATION)
Status: DISCONTINUED | OUTPATIENT
Start: 2023-11-10 | End: 2023-11-15 | Stop reason: HOSPADM

## 2023-11-10 RX ADMIN — CARVEDILOL 25 MG: 12.5 TABLET, FILM COATED ORAL at 22:26

## 2023-11-10 RX ADMIN — NITROGLYCERIN 1 INCH: 20 OINTMENT TOPICAL at 18:27

## 2023-11-10 RX ADMIN — Medication 10 ML: at 15:27

## 2023-11-10 RX ADMIN — FUROSEMIDE 40 MG: 10 INJECTION, SOLUTION INTRAMUSCULAR; INTRAVENOUS at 18:27

## 2023-11-10 RX ADMIN — Medication 10 ML: at 22:26

## 2023-11-10 RX ADMIN — WARFARIN SODIUM 7.5 MG: 7.5 TABLET ORAL at 18:27

## 2023-11-10 RX ADMIN — LISINOPRIL 40 MG: 20 TABLET ORAL at 16:15

## 2023-11-10 RX ADMIN — ENOXAPARIN SODIUM 80 MG: 100 INJECTION SUBCUTANEOUS at 22:26

## 2023-11-10 RX ADMIN — SPIRONOLACTONE 25 MG: 25 TABLET ORAL at 14:29

## 2023-11-10 RX ADMIN — FUROSEMIDE 60 MG: 10 INJECTION, SOLUTION INTRAMUSCULAR; INTRAVENOUS at 09:40

## 2023-11-10 RX ADMIN — ASPIRIN 81 MG: 81 TABLET, COATED ORAL at 14:29

## 2023-11-10 NOTE — H&P
[facility]   HOSPITALIST HISTORY AND PHYSICAL  Date: 11/10/2023   Patient Name: Regulo Sepulveda  : 1965  MRN: [id number]  Primary Care Physician:  Dickson Landry MD  Date of admission: 11/10/2023    Subjective   Subjective     Chief Complaint: Shortness of breath    HPI:    Regulo Sepulveda is a 58 y.o. male past medical history significant for chronic combined systolic and diastolic heart failure ejection fraction 20% in August, LV thrombus on warfarin, chronic paroxysmal atrial fibrillation, COPD, essential hypertension and hyperlipidemia who presented to ED with worsening shortness of breath.  Of note patient has been admitted twice in the past 2 months for similar symptoms found to be due to heart failure.  Patient states shortness of breath has been gradual in onset but is gotten worse to the point where he cannot walk more than a couple feet without getting short of breath.  Patient reports orthopnea and paroxysmal nocturnal dyspnea.  Denies lower extremity swelling.  Patient states that he has been taking his medications since previous discharge but took him some time to pick them up from the pharmacy.  Patient denies fever denies chills denies cough.  Patient presented to the emergency department due to continue shortness of breath.  Patient afebrile sinus rhythm 70s to 90s on telemetry review blood pressure elevated 160s over 110s.  Respiratory rate 22.  Patient satting 89% on room air required 2 L nasal cannula keep sats greater than 90%.  proBNP found to be elevated greater than 27,000.  INR subtherapeutic.  Blood sugars within normal limits.  White blood cell count within normal limits.  Chest x-ray demonstrates stable cardiomegaly with hazy groundglass opacities in the lung bases right greater than left and blunting of the right costophrenic angle concerning for pleural effusions.  Patient given 60 Lasix in the emergency department with little improvement in symptoms.  Hospitalist  service contacted for admission for further evaluation and treatment.  Cardiology consulted.  Continued on IV Lasix strict I's and O's Daily weights.  Continued on carvedilol Aldactone.  Losartan transition to lisinopril per cardiology.  Started on transdermal nitroglycerin to aid in blood pressure control and diuresis.      Personal History     Past Medical History:  Past Medical History:   Diagnosis Date   • Anxiety    • Asthma    • Bipolar affective    • Cardiac tamponade        • CHF (congestive heart failure)    • COPD (chronic obstructive pulmonary disease)    • Coronary artery disease    • Depression    • Diabetes mellitus    • Elevated cholesterol    • Hypertension         Past Surgical History:  Past Surgical History:   Procedure Laterality Date   • CARDIAC CATHETERIZATION Right 2023    Procedure: Right and Left Heart Cath;  Surgeon: Ben Grant MD;  Location: [facility] CATH [facility]VASIVE LOCATION;  Service: Cardiovascular;  Laterality: Right;   • TESTICLE SURGERY Left     extraction        Family History:   Family History   Problem Relation Age of Onset   • Diabetes Mother    • Depression Sister    • Depression Brother         Social History:   Social History     Socioeconomic History   • Marital status: Single   Tobacco Use   • Smoking status: Former     Packs/day: 1.00     Years: 50.00     Additional pack years: 0.00     Total pack years: 50.00     Types: Cigarettes     Start date: 1974     Quit date: 2023     Years since quittin.0   • Smokeless tobacco: Never   Vaping Use   • Vaping Use: Former   • Substances: Nicotine   Substance and Sexual Activity   • Alcohol use: Not Currently   • Drug use: Not Currently     Types: Methamphetamines     Comment: LAST USED 6 months ago   • Sexual activity: Defer        Home Medications:  ARIPiprazole ER, albuterol sulfate HFA, aspirin, atorvastatin, benzonatate, carvedilol, dapagliflozin, doxycycline, losartan, meloxicam, metFORMIN, nicotine,  pantoprazole, spironolactone, venlafaxine XR, and warfarin    Allergies:  Allergies   Allergen Reactions   • Penicillins Shortness Of Breath       Review of Systems   Constitutional: Negative for fatigue and fever.   HENT: Negative for sore throat and trouble swallowing.    Eyes: Negative for pain and discharge.   Respiratory: Negative for cough and positive shortness of breath.    Cardiovascular: Negative for chest pain and palpitations.   Gastrointestinal: Negative for abdominal pain, nausea and vomiting.   Endocrine: Negative for cold intolerance and heat intolerance.   Genitourinary: Negative for difficulty urinating and dysuria.   Musculoskeletal: Negative for back pain and neck stiffness.   Skin: Negative for color change and rash.   Neurological: Negative for syncope and headaches.   Hematological: Negative for adenopathy.   Psychiatric/Behavioral: Negative for confusion and hallucinations.    Objective   Objective     Vitals:   Temp:  [97.5 °F (36.4 °C)-97.7 °F (36.5 °C)] 97.7 °F (36.5 °C)  Heart Rate:  [80-93] 84  Resp:  [18-22] 18  BP: (164-171)/(119-128) 171/125  Flow (L/min):  [2] 2    Physical Exam   Gen. well-developed appearing stated age in no acute distress  HEENT: Normocephalic atraumatic moist membranes pupils equal round reactive light, no scleral icterus no conjunctival injection  Cardiovascular: regular rate and rhythm no murmurs rubs or gallops S1-S2, no lower extremity edema appreciated  Pulmonary: Crackles bilateral posterior bases, no wheezes or rhonchi symmetric chest expansion, unlabored, no conversational dyspnea appreciated  Gastrointestinal: Soft nontender nondistended positive bowel sounds all 4 quadrants no rebound or guarding  Musculoskeletal: No clubbing cyanosis, warm and well-perfused, calves soft symmetric nontender bilaterally  Skin: Clean dry without rashes  Neuro: Cranial nerves II through XII intact grossly no sensorimotor deficits appreciated bilateral upper and lower  extremities  Psych: Patient is calm cooperative and appropriate with exam not responding to internal stimuli  : No Poon catheter no bladder distention no suprapubic tenderness    Result Review    Result Review:  I have personally reviewed the results from the time of this admission to 11/10/2023 18:30 EST and agree with these findings:  [x]  Laboratory  LAB RESULTS:      Lab 11/10/23  0723   WBC 9.79   HEMOGLOBIN 13.4   HEMATOCRIT 42.4   PLATELETS 281   NEUTROS ABS 6.85   IMMATURE GRANS (ABS) 0.02   LYMPHS ABS 1.84   MONOS ABS 1.01*   EOS ABS 0.02   MCV 86.4   PROTIME 15.5*         Lab 11/10/23  0839   SODIUM 139   POTASSIUM 4.9   CHLORIDE 105   CO2 20.8*   ANION GAP 13.2   BUN 29*   CREATININE 1.11   EGFR 77.0   GLUCOSE 121*   CALCIUM 8.6         Lab 11/10/23  0839   TOTAL PROTEIN 6.2   ALBUMIN 3.6   GLOBULIN 2.6   ALT (SGPT) 52*   AST (SGOT) 32   BILIRUBIN 1.2   ALK PHOS 249*         Lab 11/10/23  0839 11/10/23  0723   PROBNP  --  27,650.0*   HSTROP T 18  --    PROTIME  --  15.5*   INR  --  1.20*                 Brief Urine Lab Results  (Last result in the past 365 days)        Color   Clarity   Blood   Leuk Est   Nitrite   Protein   CREAT   Urine HCG        08/27/23 1925 Yellow   Clear   Small (1+)   Negative   Negative   100 mg/dL (2+)                 Microbiology Results (last 10 days)       ** No results found for the last 240 hours. **            []  Microbiology  [x]  Radiology  XR Chest 1 View    Result Date: 11/10/2023    1. Stable cardiomegaly 2. Mild hazy and ground-glass opacity at the lung bases right greater than left with blunting of the right costophrenic angle compatible with pleural effusion.  Findings are similar with slight progression at the right base compared to the prior study.  Findings suggest pleural effusion with atelectasis or asymmetric pulmonary edema.  Pneumonia cannot be excluded in the correct clinical setting.       WARREN HALL MD       Electronically Signed and Approved  By: WARREN HALL MD on 11/10/2023 at 7:53              [x]  EKG/Telemetry   []  Cardiology/Vascular   []  Pathology  [x]  Old records discharge summary from 10/18/2023 when patient was treated for similar symptoms due to heart failure.  [x]  Other:  Scheduled Meds:aspirin, 81 mg, Oral, Daily  [START ON 11/11/2023] atorvastatin, 40 mg, Oral, Daily  carvedilol, 25 mg, Oral, Q12H  [START ON 11/11/2023] empagliflozin, 10 mg, Oral, Daily  enoxaparin, 80 mg, Subcutaneous, Q12H  furosemide, 40 mg, Intravenous, BID  insulin lispro, 2-7 Units, Subcutaneous, 4x Daily AC & at Bedtime  lisinopril, 40 mg, Oral, Q24H  [START ON 11/11/2023] nicotine, 1 patch, Transdermal, Q24H  nitroglycerin, 1 inch, Topical, Q6H  [START ON 11/11/2023] pantoprazole, 40 mg, Oral, Daily  sodium chloride, 10 mL, Intravenous, Q12H  spironolactone, 25 mg, Oral, Daily  [START ON 11/11/2023] venlafaxine XR, 37.5 mg, Oral, Daily  warfarin, 7.5 mg, Oral, Daily      Continuous Infusions:Pharmacy to dose warfarin,       PRN Meds:.•  acetaminophen  •  albuterol  •  senna-docusate sodium **AND** polyethylene glycol **AND** bisacodyl **AND** bisacodyl  •  dextrose  •  dextrose  •  glucagon (human recombinant)  •  influenza vaccine  •  nitroglycerin  •  ondansetron **OR** ondansetron  •  Pharmacy to dose warfarin  •  sodium chloride  •  sodium chloride  •  sodium chloride        Assessment & Plan   Assessment / Plan     Assessment/Plan:   Dyspnea  Acute on chronic combined systolic and diastolic congestive heart failure ejection fraction EF 20%  Well-controlled hypertension  Paroxysmal A-fib  Therapeutic drug monitoring Coumadin  LV thrombus on Coumadin  Bilateral pleural effusions  Cardiogenic pulmonary edema  History of drug abuse  Type 2 diabetes mellitus        Patient admitted for further evaluation and treatment  Cardiology consulted thank you for your assistance  Continue supplemental oxygen titrate to keep sats greater than 90% respiratory rate less  than 20  Continue Lasix 40 mg IV twice daily  Continue strict I's and O's  Continue daily weights  Continue Coreg 25 mg twice daily  Continue Jardiance  Continue lisinopril  Continue spironolactone  Monitor electrolytes and renal function during diuresis  Start therapeutic Lovenox to bridge Coumadin  Continue daily INRs  Pharmacy to dose Coumadin  Keep on telemetry  Pleural effusions too small to safely tap  Encourage incentive spirometry  Continue sliding scale insulin  Continue heart healthy diabetic diet  Further inpatient orders recommendations pending clinical course    Discussed case with patient's nurse at bedside.  Discussed case with emergency department attending.  Discussed case with cardiology attending Dr. Barroso.    Disposition: Likely be able to return home once shortness of breath improved.      DVT prophylaxis:  Medical and mechanical DVT prophylaxis orders are present.    CODE STATUS:    Code Status (Patient has no pulse and is not breathing): CPR (Attempt to Resuscitate)  Medical Interventions (Patient has pulse or is breathing): Full Support      Admission Status:  I believe this patient meets observation status.

## 2023-11-10 NOTE — PLAN OF CARE
Goal Outcome Evaluation:  Plan of Care Reviewed With: patient           Outcome Evaluation: Patient is alert and oriented. Pt BP elevated. Treated per MAR. Pt on 2L NC. Continue with plan of care

## 2023-11-10 NOTE — PROGRESS NOTES
Pharmacy to Dose: Warfarin  Consulting provider: JIMENA Mendieta  Indication for warfarin: A fib, LV Thrombus  Goal INR range: 2 - 3  Home warfarin dose: 7.5mg daily     Date INR Warfarin Dose Given   11/10 1.20 7.5 mg                         Any Vitamin K given?: No  Any major drug interactions: No  Is patient on bridging therapy with another anticoagulant?: No    Plan: Will restart on home regimen. INR from previous admission therapeutic. INR ordered daily.

## 2023-11-10 NOTE — CONSULTS
Cardiology Consult Note  [facility] EMERGENCY ROOM          Patient Identification:  Regulo Sepulveda      [id number]  58 y.o.        male  1965           Reason for Consultation: CHF    PCP: Dickson Landry MD    History of Present Illness:     Patient is a 58-year-old with a history of nonischemic congestive heart failure, bipolar disorder, diabetes type 2, essential hypertension, paroxysmal atrial fibrillation, COPD who presented with increased shortness of breath issues.  He had not been having any anginal chest discomfort issues he did report being out of his medications for a while before recently being able to pick him back up again due to transportation problems.  He does not report any alcohol or methamphetamine use.  He has been off of methamphetamines for the last 6 months.  He does have a nonproductive cough with a scratchy feeling in his lungs.  He has not had any overt fevers      Past History:  Past Medical History:   Diagnosis Date    Anxiety     Asthma     Bipolar affective     Cardiac tamponade         CHF (congestive heart failure)     COPD (chronic obstructive pulmonary disease)     Coronary artery disease     Depression     Diabetes mellitus     Elevated cholesterol     Hypertension      Past Surgical History:   Procedure Laterality Date    CARDIAC CATHETERIZATION Right 2023    Procedure: Right and Left Heart Cath;  Surgeon: Ben Grant MD;  Location: [facility] CATH INVASIVE LOCATION;  Service: Cardiovascular;  Laterality: Right;    TESTICLE SURGERY Left     extraction     Allergies   Allergen Reactions    Penicillins Shortness Of Breath     Social History     Socioeconomic History    Marital status: Single   Tobacco Use    Smoking status: Former     Packs/day: 1.00     Years: 50.00     Additional pack years: 0.00     Total pack years: 50.00     Types: Cigarettes     Quit date: 2023     Years since quittin.0    Smokeless tobacco: Never   Vaping Use    Vaping Use:  Former    Substances: Nicotine   Substance and Sexual Activity    Alcohol use: Not Currently    Drug use: Not Currently     Types: Methamphetamines     Comment: LAST USED 6 months ago    Sexual activity: Defer     Family History   Problem Relation Age of Onset    Diabetes Mother     Depression Sister     Depression Brother        Medications:  Prior to Admission medications    Medication Sig Start Date End Date Taking? Authorizing Provider   Abilify Maintena 300 MG Suspension Reconstituted ER IM injection ER Inject 300 mg into the appropriate muscle as directed by prescriber Every 30 (Thirty) Days. 11/2/23   Dickson Landry MD   albuterol sulfate  (90 Base) MCG/ACT inhaler Inhale 2 puffs Every 4 (Four) Hours As Needed for Wheezing or Shortness of Air. Indications: Chronic Obstructive Lung Disease 11/2/23   Dickson Landry MD   aspirin 81 MG EC tablet Take 1 tablet by mouth Daily for 30 days. 10/23/23 11/22/23  Santy Quan MD   atorvastatin (LIPITOR) 40 MG tablet Take 1 tablet by mouth every night at bedtime.    ProviderYuliya MD   benzonatate (TESSALON) 100 MG capsule Take 1 capsule by mouth 3 (Three) Times a Day As Needed for Cough. 11/6/23   Lenny Alegria MD   carvedilol (COREG) 25 MG tablet Take 1 tablet by mouth Every 12 (Twelve) Hours. 10/24/23   Yuliya Blanchard MD   carvedilol (COREG) 6.25 MG tablet Take 1 tablet by mouth 2 (Two) Times a Day With Meals for 30 days. 10/23/23 11/22/23  Santy Quan MD   doxycycline (MONODOX) 100 MG capsule Take 1 capsule by mouth 2 (Two) Times a Day. 11/6/23   Lenny Alegria MD   Farxiga 5 MG tablet tablet Take 1 tablet by mouth Daily. Indications: Type 2 Diabetes 9/19/23   Alex Angulo MD   losartan (COZAAR) 50 MG tablet Take 1 tablet by mouth Daily for 30 days. 10/23/23 11/22/23  Santy Quan MD   metFORMIN (GLUCOPHAGE) 1000 MG tablet Take 1 tablet by mouth 2 (Two) Times a Day.    ProviderYuliya MD   nicotine (NICODERM CQ) 21 MG/24HR  patch Place 1 patch on the skin as directed by provider Daily for 21 days. 10/23/23 11/13/23  Santy Quan MD   pantoprazole (Protonix) 40 MG EC tablet Take 1 tablet by mouth Daily. 11/2/23   Dickson Landry MD   potassium chloride (K-DUR,KLOR-CON) 20 MEQ tablet controlled-release ER tablet Take 1 tablet by mouth Daily for 30 days. 10/23/23 11/22/23  Santy Quan MD   spironolactone (ALDACTONE) 25 MG tablet Take 1 tablet by mouth Daily for 30 days. 10/23/23 11/22/23  Santy Quan MD   torsemide 40 MG tablet Take 40 mg by mouth Daily for 30 days. 10/23/23 11/22/23  Santy Quan MD   varenicline (Chantix) 0.5 MG tablet Take 1 tablet by mouth Every 30 (Thirty) Days for 4 days, THEN 1 tablet 2 (Two) Times a Day for 4 days, THEN 2 tablets 2 (Two) Times a Day for 30 days. 11/2/23 12/10/23  Dickson Landry MD   venlafaxine XR (EFFEXOR-XR) 37.5 MG 24 hr capsule Take 1 capsule by mouth Daily. 6/21/23   Provider, MD Yuliya   vitamin B-12 (VITAMIN B-12) 500 MCG tablet Take 1 tablet by mouth Daily. Indications: Inadequate Vitamin B12 3/4/23   Felton Garduno MD   warfarin (COUMADIN) 7.5 MG tablet Take 1 tablet by mouth Every Night for 30 days. 10/23/23 11/22/23  Santy Quan MD      Current medications:  aspirin, 81 mg, Oral, Daily  [START ON 11/11/2023] atorvastatin, 40 mg, Oral, Daily  [START ON 11/11/2023] empagliflozin, 10 mg, Oral, Daily  furosemide, 40 mg, Intravenous, BID  insulin lispro, 2-7 Units, Subcutaneous, 4x Daily AC & at Bedtime  nicotine, 1 patch, Transdermal, Q24H  sodium chloride, 10 mL, Intravenous, Q12H  spironolactone, 25 mg, Oral, Daily      Current IV drips:  Pharmacy to dose warfarin,         Review of Systems   Constitutional: Negative for chills, fever and weight loss.   HENT:  Negative for congestion and nosebleeds.    Cardiovascular:  Negative for orthopnea and paroxysmal nocturnal dyspnea.   Respiratory:  Positive for cough and shortness of breath.    Endocrine: Negative for cold  "intolerance and heat intolerance.   Skin:  Negative for rash.   Musculoskeletal:  Negative for back pain and muscle weakness.   Gastrointestinal:  Negative for abdominal pain, nausea and vomiting.   Genitourinary:  Negative for dysuria and nocturia.   Neurological:  Negative for dizziness and light-headedness.   Psychiatric/Behavioral:  Negative for altered mental status and hallucinations.          Physical Exam    BP (!) 169/119 (BP Location: Right arm, Patient Position: Sitting)   Pulse 93   Temp 97.5 °F (36.4 °C) (Oral)   Resp 22   Ht 184.4 cm (72.6\")   Wt 76.2 kg (167 lb 15.9 oz)   SpO2 98%   BMI 22.41 kg/m²  Body mass index is 22.41 kg/m².   Oxygen saturation   @FLOWAN(10::1)@ SpO2  Min: 89 %  Max: 99 %    General Appearance:   no acute distress  Alert and oriented x3  HENT:   lips not cyanotic  Atraumatic  Neck:  thyroid not enlarged  supple  Respiratory:  no respiratory distress  normal breath sounds  no rales  Cardiovascular:  Positive jugular venous distention  regular rhythm  apical impulse normal  S1 normal, S2 normal  no S3, no S4   no murmur  no rub, no thrill  no carotid bruit  pedal pulses normal  lower extremity edema: none    Gastrointestinal:   bowel sounds normal  non-tender  no hepatomegaly, no splenomegaly  Musculoskeletal:  no clubbing of fingers.   normocephalic, head atraumatic  Skin:   warm, dry  No rashes  Neuro/Psychiatric:  normal mood and affect  judgement and insight appropriate      Cardiographics:     ECG  (personally reviewed)    Telemetry:  (personally reviewed) normal sinus rhythm          Results for orders placed during the hospital encounter of 09/17/23    Cardiac Catheterization/Vascular Study    Narrative  OPERATORS  Ben Grant M.D. (Attending Cardiologist)      PROCEDURE PERFORMED  Ultrasound guided vascular access  Right heart catheterization  Coronary Angiogram  Left Heart Catheterization 60020  Moderate Sedation    INDICATIONS FOR PROCEDURE  58-year-old man with " multiple cardiovascular risk factors presented with acute on chronic HFrEF exacerbation.  He has not had any previous ischemic work-up.  He also has pulmonary hypertension.  After discussing the risk and benefit of the procedure he was brought in for right and left heart cath.    PROCEDURE IN DETAIL  Informed consent was obtained from the patient after explaining the risks, benefits, and alternative options of the procedure. After obtaining informed consent, the patient was brought to the cath lab and was prepped in a sterile fashion. Lidocaine 2% was used for local anesthesia into the right femoral venous access site. Right femoral vein was accessed using the micropuncture needle under ultrasound guidance and micropuncture wire advanced under flouroscopy. A 7 Khmer vascular sheath was put into place percutaneously over guide-wire. Guide wires were removed. A 6Fr swan sydnee catheter was advanced to wedge position. RA, RV and PA and wedge pressures were recorded.  PA sat and arterial sats recorded.  The patient tolerated the procedure well without any complications.    Lidocaine 2% was used for local anesthesia into the right femoral arterial access site. The right femoral artery was accessed with a micropuncture needle via modified Seldinger technique under ultrasound guidance. A 6F was inserted successfully.  Afterwards, 6F JR4 and JL4 diagnostic catheters were advanced over a wire into the ascending aorta and were used to engage the ostia of the left main and RCA respectively. JR4 used to cross the AV and obtain LV pressures and gradient across the AV measured via pullback technique. Images of the right and left coronary systems were obtained. All the catheters were exchanged over a wire and subsequently removed. Angiogram of the femoral access site was obtained and did not show complications. The patient tolerated the procedure well without any complications. The pictures were reviewed at the end of the  procedure. A Mynx closure device was applied for venous closure.  A 6 Taiwanese Angio-Seal device was applied for arterial closure.    HEMODYNAMICS    RHC  RA 8/5, 4 mmHg  RV 55/3, 8 mmHg  PA 59/24, 40 mmHg  PCW 34/27, 26 mmHg  AO Sat 96%  PA Sat 69%    Lydia CO 4.9 L/min    Lydia CI 2.53 L/min/m²    SVR 1958 D/S   D/S    LHC  LV: 137/27, 31 mmHg  AO: 136/96, 116 mmHg  No significant gradient across the aortic valve during pullback of JR4 catheter.  LV gram was not performed due to recently available echocardiogram.    FINDINGS  Coronary Angiogram    Right dominant circulation    Left main: Left main is a large caliber vessel which gives rise to the Left Anterior Descending and the Left circumflex.  Left main coronary artery is angiographically free from any significant disease.    Left Anterior Descending Artery: LAD is a medium caliber vessel which gives rise to several septal perforators and several diagonal branches.  LAD is angiographically free from any significant disease.    Left Circumflex: Left circumflex artery gives rise to marginals.  Left circumflex artery is angiographically free from any significant disease.    Right Coronary Artery: The RCA is a large caliber dominant vessel gives rise to PDA and PLV.  RCA is angiographically free from any significant disease    ESTIMATED BLOOD LOSS:  10 ml    COMPLICATIONS:  None    PROCEDURE DATA:  Contrast Used: 24 cc  Sedation Time: 30 minutes    IMPRESSIONS  Non obstructive CAD.  Nonischemic idiopathic dilated cardiomyopathy.  Normal cardiac output and index  Significantly elevated LVEDP and wedge pressure.  Pulmonary hypertension due to volume overload  Significantly elevated systemic vascular resistance      RECOMMENDATIONS  -Afterload reduction and diuretics  -Uptitrate GDMT  -Abstinence from drug abuse    Electronically signed by Ben Grant MD, 09/17/23, 2:51 PM EDT.     Cardiolite (Tc-99m Sestamibi) stress test                  Lab Review:       CBC   "        10/22/2023    04:01 10/23/2023    04:19 11/10/2023    07:23   CBC   WBC 16.61  16.38  9.79    RBC 5.06  5.70  4.91    Hemoglobin 14.4  15.7  13.4    Hematocrit 44.6  49.5  42.4    MCV 88.1  86.8  86.4    MCH 28.5  27.5  27.3    MCHC 32.3  31.7  31.6    RDW 13.5  13.3  14.9    Platelets 354  386  281        CMP          10/22/2023    04:01 10/23/2023    04:19 11/10/2023    08:39   CMP   Glucose 144  126  121    BUN 38  43  29    Creatinine 1.29  1.18  1.11    EGFR 64.3  71.5  77.0    Sodium 143  138  139    Potassium 3.9  3.7  4.9    Chloride 97  93  105    Calcium 9.3  9.7  8.6    Total Protein 6.4  6.4  6.2    Albumin 3.7  3.8  3.6    Globulin 2.7  2.6  2.6    Total Bilirubin 0.5  0.5  1.2    Alkaline Phosphatase 257  237  249    AST (SGOT) 135  104  32    ALT (SGPT) 495  416  52    Albumin/Globulin Ratio 1.4  1.5  1.4    BUN/Creatinine Ratio 29.5  36.4  26.1    Anion Gap 8.1  8.4  13.2         CARDIAC LABS:      Lab 11/10/23  0839 11/10/23  0723   PROBNP  --  27,650.0*   HSTROP T 18  --    PROTIME  --  15.5*   INR  --  1.20*      No results found for: \"DIGOXIN\"   Lab Results   Component Value Date    TSH 1.410 03/02/2023           Invalid input(s): \"LDLCALC\"  No results found for: \"POCTROP\"  No components found for: \"DDIMERQUAN\"  Lab Results   Component Value Date    MG 2.1 10/23/2023             CARDIAC LABS:      Lab 11/10/23  0839 11/10/23  0723   PROBNP  --  27,650.0*   HSTROP T 18  --    PROTIME  --  15.5*   INR  --  1.20*      Narrative & Impression   PROCEDURE:  XR CHEST 1 VW     COMPARISON: 10/18/2023 and prior     INDICATIONS:  SOA Triage Protocol     FINDINGS:          Study is limited by overlying support and monitoring apparatus.  There is stable cardiomegaly.    Pulmonary vascularity appears grossly unremarkable in appearance.  Mild increased hazy opacity   noted at the lung bases bilaterally with mild blunting at the right costophrenic angle.  Findings   are similar to slightly increased at " the right lung base compared to the previous study.  Osseous   structures are unremarkable     IMPRESSION:                 1. Stable cardiomegaly  2. Mild hazy and ground-glass opacity at the lung bases right greater than left with blunting of   the right costophrenic angle compatible with pleural effusion.  Findings are similar with slight   progression at the right base compared to the prior study.  Findings suggest pleural effusion with   atelectasis or asymmetric pulmonary edema.  Pneumonia cannot be excluded in the correct clinical   setting.        I  Assessment:    Acute on chronic congestive heart failure      HFrEF acute on chronic likely secondary partially to medical compliance as well as elevated blood pressure issues.  We will go ahead and continue patient on his Coreg 25 twice a day, the Aldactone 25 daily, and change his losartan to lisinopril 40 daily.  In addition we will go ahead and start on nitro 1 inch 3 times daily.  Agree with IV Lasix 40 twice daily with strict I's and O's goal urine output net of 1.5 L/day      Plan:  1.  Coreg 25 twice daily  2.  Aldactone 25 daily  3.  Lisinopril 40 mg daily  4.  Nitroglycerin 1 inch 3 times daily  5.  Lasix 40 IV twice daily      Thank you for allowing us to share in [facility].            Rogers Barroso MD   11/10/2023    10:37 EST

## 2023-11-10 NOTE — ED PROVIDER NOTES
Time: 11:39 AM EST  Date of encounter:  11/10/2023  Independent Historian/Clinical History and Information was obtained by:   Patient    History is limited by: N/A    Chief Complaint: Dyspnea      History of Present Illness:  Patient is a 58 y.o. year old male who presents to the emergency department for evaluation of worsening dyspnea.  The patient has had no cough or hemoptysis.  The patient denies chest pain.  Patient denies nausea, vomiting, and diarrhea.  Patient has no fever or chills.  Patient has no leg pain.  Patient denies abdominal pain.  Patient has no dysuria urinary frequency.    HPI    Patient Care Team  Primary Care Provider: Dickson Landry MD    Past Medical History:     Allergies   Allergen Reactions    Penicillins Shortness Of Breath     Past Medical History:   Diagnosis Date    Anxiety     Asthma     Bipolar affective     Cardiac tamponade     2023    CHF (congestive heart failure)     COPD (chronic obstructive pulmonary disease)     Coronary artery disease     Depression     Diabetes mellitus     Elevated cholesterol     Hypertension      Past Surgical History:   Procedure Laterality Date    CARDIAC CATHETERIZATION Right 9/17/2023    Procedure: Right and Left Heart Cath;  Surgeon: Ben Grant MD;  Location: [facility] CATH INVASIVE LOCATION;  Service: Cardiovascular;  Laterality: Right;    TESTICLE SURGERY Left     extraction     Family History   Problem Relation Age of Onset    Diabetes Mother     Depression Sister     Depression Brother        Home Medications:  Prior to Admission medications    Medication Sig Start Date End Date Taking? Authorizing Provider   Abilify Maintena 300 MG Suspension Reconstituted ER IM injection ER Inject 300 mg into the appropriate muscle as directed by prescriber Every 30 (Thirty) Days. 11/2/23  Yes Dickson Landry MD   albuterol sulfate  (90 Base) MCG/ACT inhaler Inhale 2 puffs Every 4 (Four) Hours As Needed for Wheezing or Shortness of Air. Indications:  Chronic Obstructive Lung Disease 11/2/23  Yes Dickson Landry MD   aspirin 81 MG EC tablet Take 1 tablet by mouth Daily for 30 days. 10/23/23 11/22/23 Yes Santy Quan MD   atorvastatin (LIPITOR) 40 MG tablet Take 1 tablet by mouth every night at bedtime.   Yes ProviderYuliya MD   benzonatate (TESSALON) 100 MG capsule Take 1 capsule by mouth 3 (Three) Times a Day As Needed for Cough. 11/6/23  Yes Lenny Alegria MD   carvedilol (COREG) 25 MG tablet Take 1 tablet by mouth Every 12 (Twelve) Hours. 10/24/23  Yes Yuliya Blanchard MD   doxycycline (MONODOX) 100 MG capsule Take 1 capsule by mouth 2 (Two) Times a Day. 11/6/23  Yes Lenny Alegria MD   Farxiga 5 MG tablet tablet Take 1 tablet by mouth Daily. Indications: Type 2 Diabetes 9/19/23  Yes Alex Angulo MD   losartan (COZAAR) 50 MG tablet Take 1 tablet by mouth Daily for 30 days. 10/23/23 11/22/23 Yes Santy Quan MD   meloxicam (MOBIC) 15 MG tablet Take 1 tablet by mouth Daily.   Yes ProviderYuliya MD   metFORMIN (GLUCOPHAGE) 1000 MG tablet Take 1 tablet by mouth 2 (Two) Times a Day.   Yes ProviderYuliya MD   nicotine (NICODERM CQ) 21 MG/24HR patch Place 1 patch on the skin as directed by provider Daily for 21 days. 10/23/23 11/13/23 Yes Santy Quan MD   pantoprazole (Protonix) 40 MG EC tablet Take 1 tablet by mouth Daily. 11/2/23  Yes Dickson Landry MD   spironolactone (ALDACTONE) 25 MG tablet Take 1 tablet by mouth Daily for 30 days. 10/23/23 11/22/23 Yes Santy Quan MD   venlafaxine XR (EFFEXOR-XR) 37.5 MG 24 hr capsule Take 1 capsule by mouth Daily. 6/21/23  Yes Yuliya Blanchard MD   warfarin (COUMADIN) 7.5 MG tablet Take 1 tablet by mouth Every Night for 30 days.  Patient taking differently: Take 1 tablet by mouth Every Night. Home Warfarin Notes    Home warfarin dose: 7.5 mg QD  Who monitors the patients INR? Unable to determine  Has the patient been consistent on their current dosing regimen? Unable to determine  10/23/23 11/22/23 Yes Santy Quan MD   carvedilol (COREG) 6.25 MG tablet Take 1 tablet by mouth 2 (Two) Times a Day With Meals for 30 days. 10/23/23 11/10/23  Santy Quan MD   potassium chloride (K-DUR,KLOR-CON) 20 MEQ tablet controlled-release ER tablet Take 1 tablet by mouth Daily for 30 days. 10/23/23 11/10/23  Santy Quan MD   torsemide 40 MG tablet Take 40 mg by mouth Daily for 30 days. 10/23/23 11/10/23  Santy Quan MD   varenicline (Chantix) 0.5 MG tablet Take 1 tablet by mouth Every 30 (Thirty) Days for 4 days, THEN 1 tablet 2 (Two) Times a Day for 4 days, THEN 2 tablets 2 (Two) Times a Day for 30 days. 11/2/23 11/10/23  Dickson Landry MD   vitamin B-12 (VITAMIN B-12) 500 MCG tablet Take 1 tablet by mouth Daily. Indications: Inadequate Vitamin B12 3/4/23 11/10/23  Felton Garduno MD        Social History:   Social History     Tobacco Use    Smoking status: Former     Packs/day: 1.00     Years: 50.00     Additional pack years: 0.00     Total pack years: 50.00     Types: Cigarettes     Quit date: 2023     Years since quittin.0    Smokeless tobacco: Never   Vaping Use    Vaping Use: Former    Substances: Nicotine   Substance Use Topics    Alcohol use: Not Currently    Drug use: Not Currently     Types: Methamphetamines     Comment: LAST USED 6 months ago         Review of Systems:  Review of Systems   Constitutional:  Negative for chills and fever.   HENT:  Negative for congestion, rhinorrhea and sore throat.    Eyes:  Negative for pain and visual disturbance.   Respiratory:  Positive for shortness of breath. Negative for apnea, cough and chest tightness.    Cardiovascular:  Negative for chest pain and palpitations.   Gastrointestinal:  Negative for abdominal pain, diarrhea, nausea and vomiting.   Genitourinary:  Negative for difficulty urinating and dysuria.   Musculoskeletal:  Negative for joint swelling and myalgias.   Skin:  Negative for color change.   Neurological:  Negative for  "seizures and headaches.   Psychiatric/Behavioral: Negative.     All other systems reviewed and are negative.       Physical Exam:  BP (!) 164/128   Pulse 80   Temp 97.5 °F (36.4 °C) (Oral)   Resp 22   Ht 184.4 cm (72.6\")   Wt 76.2 kg (167 lb 15.9 oz)   SpO2 98%   BMI 22.41 kg/m²     Physical Exam  Vitals and nursing note reviewed.   Constitutional:       General: He is not in acute distress.     Appearance: Normal appearance. He is not toxic-appearing.   HENT:      Head: Normocephalic and atraumatic.      Jaw: There is normal jaw occlusion.   Eyes:      General: Lids are normal.      Extraocular Movements: Extraocular movements intact.      Conjunctiva/sclera: Conjunctivae normal.      Pupils: Pupils are equal, round, and reactive to light.   Cardiovascular:      Rate and Rhythm: Normal rate and regular rhythm.      Pulses: Normal pulses.      Heart sounds: Normal heart sounds.   Pulmonary:      Effort: Pulmonary effort is normal. No respiratory distress.      Breath sounds: Normal breath sounds. No decreased breath sounds, wheezing or rhonchi.   Abdominal:      General: Abdomen is flat.      Palpations: Abdomen is soft.      Tenderness: There is no abdominal tenderness. There is no guarding or rebound.   Musculoskeletal:         General: Normal range of motion.      Cervical back: Normal range of motion and neck supple.      Right lower leg: No edema.      Left lower leg: No edema.   Skin:     General: Skin is warm and dry.   Neurological:      Mental Status: He is alert and oriented to person, place, and time. Mental status is at baseline.   Psychiatric:         Mood and Affect: Mood normal.                  Procedures:  Procedures      Medical Decision Making:      Comorbidities that affect care:    Congestive Heart Failure    External Notes reviewed:    Hospital Discharge Summary: Patient was recently admitted for acute on chronic congestive heart failure.      The following orders were placed and all " results were independently analyzed by me:  Orders Placed This Encounter   Procedures    XR Chest 1 View    [place] Draw    Comprehensive Metabolic Panel    BNP    Single High Sensitivity Troponin T    CBC Auto Differential    Basic Metabolic Panel    Protime-INR    Protime-INR    Diet: Cardiac Diets, Diabetic Diets; Healthy Heart (2-3 Na+); Consistent Carbohydrate; Texture: Regular Texture (IDDSI 7); Fluid Consistency: Thin (IDDSI 0)    Undress & Gown    Continuous Pulse Oximetry    Vital Signs    Vital Signs    Notify Provider (With Default Parameters)    Notify Provider (Specify Parameters)    Intake & Output    Weigh Patient    Oral Care    Place Sequential Compression Device    Maintain Sequential Compression Device    Telemetry - Maintain IV Access    Telemetry - Place Orders & Notify Provider of Results When Patient Experiences Acute Chest Pain, Dysrhythmia or Respiratory Distress    Strict Intake & Output    Daily Weights    Inpatient Cardiology Consult    Inpatient Hospitalist Consult    Oxygen Therapy- Nasal Cannula; Titrate 1-6 LPM Per SpO2; 90 - 95%    Incentive Spirometry    POC Glucose 4x Daily Before Meals & at Bedtime    ECG 12 Lead ED Triage Standing Order; SOA    Insert Peripheral IV    Insert Peripheral IV    Initiate Observation Status    CBC & Differential    Green Top (Gel)    Lavender Top    Gold Top - SST    Light Blue Top    CBC & Differential       Medications Given in the Emergency Department:  Medications   sodium chloride 0.9 % flush 10 mL (has no administration in time range)   sodium chloride 0.9 % flush 10 mL (has no administration in time range)   sodium chloride 0.9 % flush 10 mL (has no administration in time range)   sodium chloride 0.9 % infusion 40 mL (has no administration in time range)   acetaminophen (TYLENOL) tablet 650 mg (has no administration in time range)   sennosides-docusate (PERICOLACE) 8.6-50 MG per tablet 2 tablet (has no administration in time range)     And    polyethylene glycol (MIRALAX) packet 17 g (has no administration in time range)     And   bisacodyl (DULCOLAX) EC tablet 5 mg (has no administration in time range)     And   bisacodyl (DULCOLAX) suppository 10 mg (has no administration in time range)   ondansetron (ZOFRAN) tablet 4 mg (has no administration in time range)     Or   ondansetron (ZOFRAN) injection 4 mg (has no administration in time range)   Pharmacy to dose warfarin (has no administration in time range)   nitroglycerin (NITROSTAT) SL tablet 0.4 mg (has no administration in time range)   dextrose (GLUTOSE) oral gel 15 g (has no administration in time range)   dextrose (D50W) (25 g/50 mL) IV injection 25 g (has no administration in time range)   glucagon (GLUCAGEN) injection 1 mg (has no administration in time range)   Insulin Lispro (humaLOG) injection 2-7 Units (has no administration in time range)   albuterol (PROVENTIL) nebulizer solution 0.083% 2.5 mg/3mL (has no administration in time range)   aspirin EC tablet 81 mg (has no administration in time range)   atorvastatin (LIPITOR) tablet 40 mg (has no administration in time range)   empagliflozin (JARDIANCE) tablet 10 mg (has no administration in time range)   nicotine (NICODERM CQ) 21 MG/24HR patch 1 patch (has no administration in time range)   spironolactone (ALDACTONE) tablet 25 mg (has no administration in time range)   furosemide (LASIX) injection 40 mg (has no administration in time range)   furosemide (LASIX) injection 60 mg (60 mg Intravenous Given 11/10/23 0940)        ED Course:    ED Course as of 11/10/23 1143   Fri Nov 10, 2023   0723 ECG 12 Lead ED Triage Standing Order; SOA [AJ]   0928 EKG:    Rhythm: sinus  Rate: 79  Axis: normal  Intervals: normal  ST Segment: no elevations      Interpreted by me   [BN]      ED Course User Index  [AJ] Cori Perez PA-C  [BN] Kate Euceda MD       Labs:    Lab Results (last 24 hours)       Procedure Component Value Units Date/Time     CBC & Differential [[id number]]  (Abnormal) Collected: 11/10/23 0723    Specimen: Blood Updated: 11/10/23 0732    Narrative:      The following orders were created for panel order CBC & Differential.  Procedure                               Abnormality         Status                     ---------                               -----------         ------                     CBC Auto Differential[[id number]]        Abnormal            Final result                 Please view results for these tests on the individual orders.    BNP [[id number]]  (Abnormal) Collected: 11/10/23 0723    Specimen: Blood Updated: 11/10/23 0758     proBNP 27,650.0 pg/mL     Narrative:      This assay is used as an aid in the diagnosis of individuals suspected of having heart failure. It can be used as an aid in the diagnosis of acute decompensated heart failure (ADHF) in patients presenting with signs and symptoms of ADHF to the emergency department (ED). In addition, NT-proBNP of <300 pg/mL indicates ADHF is not likely.    Age Range Result Interpretation  NT-proBNP Concentration (pg/mL:      <50             Positive            >450                   Gray                 300-450                    Negative             <300    50-75           Positive            >900                  Gray                300-900                  Negative            <300      >75             Positive            >1800                  Gray                300-1800                  Negative            <300    CBC Auto Differential [[id number]]  (Abnormal) Collected: 11/10/23 0723    Specimen: Blood Updated: 11/10/23 0732     WBC 9.79 10*3/mm3      RBC 4.91 10*6/mm3      Hemoglobin 13.4 g/dL      Hematocrit 42.4 %      MCV 86.4 fL      MCH 27.3 pg      MCHC 31.6 g/dL      RDW 14.9 %      RDW-SD 46.5 fl      MPV 11.4 fL      Platelets 281 10*3/mm3      Neutrophil % 70.0 %      Lymphocyte % 18.8 %      Monocyte % 10.3 %      Eosinophil % 0.2 %      Basophil % 0.5 %       Immature Grans % 0.2 %      Neutrophils, Absolute 6.85 10*3/mm3      Lymphocytes, Absolute 1.84 10*3/mm3      Monocytes, Absolute 1.01 10*3/mm3      Eosinophils, Absolute 0.02 10*3/mm3      Basophils, Absolute 0.05 10*3/mm3      Immature Grans, Absolute 0.02 10*3/mm3      nRBC 0.0 /100 WBC     Protime-INR [[id number]]  (Abnormal) Collected: 11/10/23 0723    Specimen: Blood Updated: 11/10/23 1019     Protime 15.5 Seconds      INR 1.20    Narrative:      Suggested Therapeutic Ranges For Oral Anticoagulant Therapy:  Level of Therapy                      INR Target Range  Standard Dose                            2.0-3.0  High Dose                                2.5-3.5  Patients not receiving anticoagulant  Therapy Normal Range                     0.86-1.15    Comprehensive Metabolic Panel [[id number]]  (Abnormal) Collected: 11/10/23 0839    Specimen: Blood Updated: 11/10/23 0914     Glucose 121 mg/dL      BUN 29 mg/dL      Creatinine 1.11 mg/dL      Sodium 139 mmol/L      Potassium 4.9 mmol/L      Comment: Specimen hemolyzed.  Result may be falsely elevated.        Chloride 105 mmol/L      CO2 20.8 mmol/L      Calcium 8.6 mg/dL      Total Protein 6.2 g/dL      Albumin 3.6 g/dL      ALT (SGPT) 52 U/L      Comment: Specimen hemolyzed.  Result may  be falsely elevated.        AST (SGOT) 32 U/L      Comment: Specimen hemolyzed.  Result may be falsely elevated.        Alkaline Phosphatase 249 U/L      Total Bilirubin 1.2 mg/dL      Globulin 2.6 gm/dL      A/G Ratio 1.4 g/dL      BUN/Creatinine Ratio 26.1     Anion Gap 13.2 mmol/L      eGFR 77.0 mL/min/1.73     Narrative:      GFR Normal >60  Chronic Kidney Disease <60  Kidney Failure <15      Single High Sensitivity Troponin T [[id number]]  (Normal) Collected: 11/10/23 0839    Specimen: Blood Updated: 11/10/23 0915     HS Troponin T 18 ng/L      Comment: Specimen hemolyzed.  Results may be affected.       Narrative:      High Sensitive Troponin T Reference Range:  <14.0 ng/L-  Negative Female for AMI  <22.0 ng/L- Negative Male for AMI  >=14 - Abnormal Female indicating possible myocardial injury.  >=22 - Abnormal Male indicating possible myocardial injury.   Clinicians would have to utilize clinical acumen, EKG, Troponin, and serial changes to determine if it is an Acute Myocardial Infarction or myocardial injury due to an underlying chronic condition.                  Imaging:    XR Chest 1 View    Result Date: 11/10/2023  PROCEDURE: XR CHEST 1 VW  COMPARISON: 10/18/2023 and prior  INDICATIONS: SOA Triage Protocol  FINDINGS:  Study is limited by overlying support and monitoring apparatus.  There is stable cardiomegaly.  Pulmonary vascularity appears grossly unremarkable in appearance.  Mild increased hazy opacity noted at the lung bases bilaterally with mild blunting at the right costophrenic angle.  Findings are similar to slightly increased at the right lung base compared to the previous study.  Osseous structures are unremarkable        1. Stable cardiomegaly 2. Mild hazy and ground-glass opacity at the lung bases right greater than left with blunting of the right costophrenic angle compatible with pleural effusion.  Findings are similar with slight progression at the right base compared to the prior study.  Findings suggest pleural effusion with atelectasis or asymmetric pulmonary edema.  Pneumonia cannot be excluded in the correct clinical setting.       WARREN HALL MD       Electronically Signed and Approved By: WARREN HALL MD on 11/10/2023 at 7:53                Differential Diagnosis and Discussion:    Dyspnea: Differential diagnosis includes but is not limited to metabolic acidosis, neurological disorders, psychogenic, asthma, pneumothorax, upper airway obstruction, COPD, pneumonia, noncardiogenic pulmonary edema, interstitial lung disease, anemia, congestive heart failure, and pulmonary embolism    All labs were reviewed and interpreted by me.  All X-rays impressions  were independently interpreted by me.  EKG was interpreted by me.    MDM     The patient´s CBC that was reviewed and interpreted by me shows no abnormalities of critical concern. Of note, there is no anemia requiring a blood transfusion and the platelet count is acceptable.  The patient´s CMP that was reviewed and interpretted by me shows no abnormalities of critical concern. Of note, the patient´s sodium and potassium are acceptable. The patient´s liver enzymes are unremarkable. The patient´s renal function (creatinine) is preserved. The patient has a normal anion gap.  BNP is 27,000.  INR is 1.2.  Chest x-ray does show vascular congestion.    The patient was given Lasix IV in the emergency department.    Critical Care Note: Total Critical Care time of 45 minutes. Total critical care time documented does not include time spent on separately billed procedures for services of nurses or physician assistants. I personally saw and examined the patient. I have reviewed all diagnostic interpretations and treatment plans as written. I was present for the key portions of any procedures performed and the inclusive time noted in any critical care statement. Critical care time includes patient management by me, time spent at the patients bedside,  time to review lab and imaging results, discussing patient care, documentation in the medical record, and time spent with family or caregiver.        Patient Care Considerations:    SEPSIS IS NOT PRESENT IN THE EMERGENCY DEPARTMENT: Patient meets SIRS criteria in the emergency department however the patient does not have a known source of bacterial infection to confirm the diagnosis of sepsis.        Consultants/Shared Management Plan:    Case was discussed with Dr. Mendieta who agrees with admission.    Social Determinants of Health:    Patient is independent, reliable, and has access to care.       Disposition and Care Coordination:    Admit:   Through independent evaluation of the  patient's history, physical, and imperical data, the patient meets criteria for observation/admission to the hospital.        Final diagnoses:   Congestive heart failure, unspecified HF chronicity, unspecified heart failure type        ED Disposition       ED Disposition   Decision to Admit    Condition   --    Comment   Level of Care: Telemetry [5]   Diagnosis: Acute on chronic congestive heart failure [[id number]]   Admitting Physician: KRISTY MOYER [[id number]]   Attending Physician: KRISTY MOYER [[id number]]                 This medical record created using voice recognition software.             Kate Euceda MD  11/10/23 114

## 2023-11-11 LAB
ANION GAP SERPL CALCULATED.3IONS-SCNC: 9.1 MMOL/L (ref 5–15)
BASOPHILS # BLD AUTO: 0.05 10*3/MM3 (ref 0–0.2)
BASOPHILS NFR BLD AUTO: 0.6 % (ref 0–1.5)
BUN SERPL-MCNC: 26 MG/DL (ref 6–20)
BUN/CREAT SERPL: 20.5 (ref 7–25)
CALCIUM SPEC-SCNC: 8.1 MG/DL (ref 8.6–10.5)
CHLORIDE SERPL-SCNC: 100 MMOL/L (ref 98–107)
CO2 SERPL-SCNC: 30.9 MMOL/L (ref 22–29)
CREAT SERPL-MCNC: 1.27 MG/DL (ref 0.76–1.27)
DEPRECATED RDW RBC AUTO: 45.8 FL (ref 37–54)
EGFRCR SERPLBLD CKD-EPI 2021: 65.5 ML/MIN/1.73
EOSINOPHIL # BLD AUTO: 0.12 10*3/MM3 (ref 0–0.4)
EOSINOPHIL NFR BLD AUTO: 1.5 % (ref 0.3–6.2)
ERYTHROCYTE [DISTWIDTH] IN BLOOD BY AUTOMATED COUNT: 14.6 % (ref 12.3–15.4)
GLUCOSE BLDC GLUCOMTR-MCNC: 100 MG/DL (ref 70–99)
GLUCOSE BLDC GLUCOMTR-MCNC: 107 MG/DL (ref 70–99)
GLUCOSE BLDC GLUCOMTR-MCNC: 126 MG/DL (ref 70–99)
GLUCOSE BLDC GLUCOMTR-MCNC: 142 MG/DL (ref 70–99)
GLUCOSE SERPL-MCNC: 102 MG/DL (ref 65–99)
HCT VFR BLD AUTO: 39.1 % (ref 37.5–51)
HGB BLD-MCNC: 12.3 G/DL (ref 13–17.7)
IMM GRANULOCYTES # BLD AUTO: 0.02 10*3/MM3 (ref 0–0.05)
IMM GRANULOCYTES NFR BLD AUTO: 0.3 % (ref 0–0.5)
INR PPP: 1.23 (ref 0.86–1.15)
LYMPHOCYTES # BLD AUTO: 2.08 10*3/MM3 (ref 0.7–3.1)
LYMPHOCYTES NFR BLD AUTO: 26.3 % (ref 19.6–45.3)
MCH RBC QN AUTO: 27.2 PG (ref 26.6–33)
MCHC RBC AUTO-ENTMCNC: 31.5 G/DL (ref 31.5–35.7)
MCV RBC AUTO: 86.5 FL (ref 79–97)
MONOCYTES # BLD AUTO: 0.88 10*3/MM3 (ref 0.1–0.9)
MONOCYTES NFR BLD AUTO: 11.1 % (ref 5–12)
NEUTROPHILS NFR BLD AUTO: 4.76 10*3/MM3 (ref 1.7–7)
NEUTROPHILS NFR BLD AUTO: 60.2 % (ref 42.7–76)
NRBC BLD AUTO-RTO: 0 /100 WBC (ref 0–0.2)
PLATELET # BLD AUTO: 205 10*3/MM3 (ref 140–450)
PMV BLD AUTO: 11.1 FL (ref 6–12)
POTASSIUM SERPL-SCNC: 3.2 MMOL/L (ref 3.5–5.2)
PROTHROMBIN TIME: 15.7 SECONDS (ref 11.8–14.9)
RBC # BLD AUTO: 4.52 10*6/MM3 (ref 4.14–5.8)
SODIUM SERPL-SCNC: 140 MMOL/L (ref 136–145)
WBC NRBC COR # BLD: 7.91 10*3/MM3 (ref 3.4–10.8)

## 2023-11-11 PROCEDURE — 97116 GAIT TRAINING THERAPY: CPT

## 2023-11-11 PROCEDURE — 97161 PT EVAL LOW COMPLEX 20 MIN: CPT

## 2023-11-11 PROCEDURE — 82948 REAGENT STRIP/BLOOD GLUCOSE: CPT

## 2023-11-11 PROCEDURE — 85025 COMPLETE CBC W/AUTO DIFF WBC: CPT | Performed by: FAMILY MEDICINE

## 2023-11-11 PROCEDURE — 85610 PROTHROMBIN TIME: CPT | Performed by: FAMILY MEDICINE

## 2023-11-11 PROCEDURE — 25010000002 FUROSEMIDE PER 20 MG: Performed by: FAMILY MEDICINE

## 2023-11-11 PROCEDURE — 99233 SBSQ HOSP IP/OBS HIGH 50: CPT | Performed by: FAMILY MEDICINE

## 2023-11-11 PROCEDURE — 25010000002 ENOXAPARIN PER 10 MG: Performed by: FAMILY MEDICINE

## 2023-11-11 PROCEDURE — G0378 HOSPITAL OBSERVATION PER HR: HCPCS

## 2023-11-11 PROCEDURE — 80048 BASIC METABOLIC PNL TOTAL CA: CPT | Performed by: FAMILY MEDICINE

## 2023-11-11 PROCEDURE — 36415 COLL VENOUS BLD VENIPUNCTURE: CPT | Performed by: FAMILY MEDICINE

## 2023-11-11 RX ORDER — WARFARIN SODIUM 10 MG/1
10 TABLET ORAL
Status: COMPLETED | OUTPATIENT
Start: 2023-11-11 | End: 2023-11-11

## 2023-11-11 RX ORDER — POTASSIUM CHLORIDE 750 MG/1
40 CAPSULE, EXTENDED RELEASE ORAL
Status: COMPLETED | OUTPATIENT
Start: 2023-11-11 | End: 2023-11-11

## 2023-11-11 RX ADMIN — ACETAMINOPHEN 650 MG: 325 TABLET ORAL at 00:52

## 2023-11-11 RX ADMIN — Medication 10 ML: at 22:54

## 2023-11-11 RX ADMIN — NICOTINE 1 PATCH: 21 PATCH, EXTENDED RELEASE TRANSDERMAL at 08:48

## 2023-11-11 RX ADMIN — WARFARIN SODIUM 10 MG: 10 TABLET ORAL at 17:21

## 2023-11-11 RX ADMIN — NITROGLYCERIN 1 INCH: 20 OINTMENT TOPICAL at 12:23

## 2023-11-11 RX ADMIN — ENOXAPARIN SODIUM 80 MG: 100 INJECTION SUBCUTANEOUS at 22:53

## 2023-11-11 RX ADMIN — PANTOPRAZOLE SODIUM 40 MG: 40 TABLET, DELAYED RELEASE ORAL at 08:48

## 2023-11-11 RX ADMIN — NITROGLYCERIN 1 INCH: 20 OINTMENT TOPICAL at 17:21

## 2023-11-11 RX ADMIN — FUROSEMIDE 40 MG: 10 INJECTION, SOLUTION INTRAMUSCULAR; INTRAVENOUS at 17:21

## 2023-11-11 RX ADMIN — NITROGLYCERIN 1 INCH: 20 OINTMENT TOPICAL at 06:43

## 2023-11-11 RX ADMIN — LISINOPRIL 40 MG: 20 TABLET ORAL at 08:48

## 2023-11-11 RX ADMIN — CARVEDILOL 25 MG: 12.5 TABLET, FILM COATED ORAL at 22:53

## 2023-11-11 RX ADMIN — ENOXAPARIN SODIUM 80 MG: 100 INJECTION SUBCUTANEOUS at 08:48

## 2023-11-11 RX ADMIN — CARVEDILOL 25 MG: 12.5 TABLET, FILM COATED ORAL at 08:48

## 2023-11-11 RX ADMIN — VENLAFAXINE HYDROCHLORIDE 37.5 MG: 37.5 CAPSULE, EXTENDED RELEASE ORAL at 08:48

## 2023-11-11 RX ADMIN — SPIRONOLACTONE 25 MG: 25 TABLET ORAL at 08:48

## 2023-11-11 RX ADMIN — ATORVASTATIN CALCIUM 40 MG: 40 TABLET, FILM COATED ORAL at 08:48

## 2023-11-11 RX ADMIN — FUROSEMIDE 40 MG: 10 INJECTION, SOLUTION INTRAMUSCULAR; INTRAVENOUS at 08:48

## 2023-11-11 RX ADMIN — Medication 10 ML: at 08:48

## 2023-11-11 RX ADMIN — POTASSIUM CHLORIDE 40 MEQ: 10 CAPSULE, COATED, EXTENDED RELEASE ORAL at 12:23

## 2023-11-11 RX ADMIN — ASPIRIN 81 MG: 81 TABLET, COATED ORAL at 08:48

## 2023-11-11 RX ADMIN — POTASSIUM CHLORIDE 40 MEQ: 10 CAPSULE, COATED, EXTENDED RELEASE ORAL at 17:21

## 2023-11-11 RX ADMIN — EMPAGLIFLOZIN 10 MG: 10 TABLET, FILM COATED ORAL at 08:48

## 2023-11-11 NOTE — PLAN OF CARE
Problem: Adult Inpatient Plan of Care  Goal: Plan of Care Review  Outcome: Ongoing, Progressing  Flowsheets (Taken 11/11/2023 0622)  Progress: improving  Outcome Evaluation: Patient alert and oriented x 4. Patient on 2L Nasal cannula. Pain treated per MAR. Glucose monitored during shift. Cluster care utilized during shift to promote rest. Continue with plan of care.   Goal Outcome Evaluation:           Progress: improving  Outcome Evaluation: Patient alert and oriented x 4. Patient on 2L Nasal cannula. Pain treated per MAR. Glucose monitored during shift. Cluster care utilized during shift to promote rest. Continue with plan of care.

## 2023-11-11 NOTE — THERAPY EVALUATION
Acute Care - Physical Therapy Initial Evaluation   [place]     Patient Name: Regulo Sepulveda  : 1965  MRN: [id number]  Today's Date: 2023   Onset of Illness/Injury or Date of Surgery: 11/10/23  Visit Dx:     ICD-10-CM ICD-9-CM   1. Congestive heart failure, unspecified HF chronicity, unspecified heart failure type  I50.9 428.0   2. Difficulty in walking  R26.2 719.7     Patient Active Problem List   Diagnosis    Left groin pain    Major depressive disorder, recurrent episode, moderate    Chronic obstructive pulmonary disease    Diabetes mellitus    Hypertensive disorder    Hyperlipidemia    Hepatitis C    Cigarette nicotine dependence    BPH (benign prostatic hyperplasia)    GERD (gastroesophageal reflux disease)    B12 deficiency    LV (left ventricular) mural thrombus    Schizophrenia    Acute on chronic combined systolic and diastolic CHF (congestive heart failure)    Acute on chronic heart failure with reduced ejection fraction and diastolic dysfunction    Acute on chronic congestive heart failure    PAF (paroxysmal atrial fibrillation)     Past Medical History:   Diagnosis Date    Anxiety     Asthma     Bipolar affective     Cardiac tamponade         CHF (congestive heart failure)     COPD (chronic obstructive pulmonary disease)     Coronary artery disease     Depression     Diabetes mellitus     Elevated cholesterol     Hypertension      Past Surgical History:   Procedure Laterality Date    CARDIAC CATHETERIZATION Right 2023    Procedure: Right and Left Heart Cath;  Surgeon: Ben Grant MD;  Location: [facility] CATH INVASIVE LOCATION;  Service: Cardiovascular;  Laterality: Right;    TESTICLE SURGERY Left     extraction     PT Assessment (last 12 hours)       PT Evaluation and Treatment       Row Name 23 1408          Physical Therapy Time and Intention    Subjective Information complains of;fatigue  -DW     Document Type evaluation  -DW     Mode of Treatment individual  therapy;physical therapy  -DW     Patient Effort good  -DW     Symptoms Noted During/After Treatment fatigue  -DW       Row Name 11/11/23 1408          General Information    Patient Profile Reviewed yes  -DW     Onset of Illness/Injury or Date of Surgery 11/10/23  -DW     Referring Physician Dominic Mendieta  -DW     Patient Observations alert  -DW     Prior Level of Function independent:;all household mobility;community mobility;ADL's  -DW     Equipment Currently Used at Home none  -DW     Risks Reviewed patient:  -DW     Benefits Reviewed patient:;improve function;increase independence;increase strength;increase balance  -DW     Barriers to Rehab none identified  -DW       Row Name 11/11/23 1408          Previous Level of Function/Home Environm    BADLs, Premorbid Functional Level independent  -DW     IADLs, Premorbid Functional Level independent  -DW     Bed Mobility, Premorbid Functional Level independent  -DW     Transfers, Premorbid Functional Level independent  -DW     Household Ambulation, Premorbid Functional Level independent  -DW     Stairs, Premorbid Functional Level independent  -DW     Community Ambulation, Premorbid Functional Level independent  -DW       Row Name 11/11/23 1408          Living Environment    Current Living Arrangements home  -DW     People in Home significant other  -DW     Primary Care Provided by self;other (see comments)  -DW       Row Name 11/11/23 1408          Home Use of Assistive/Adaptive Equipment    Equipment Currently Used at Home none  -DW       Row Name 11/11/23 1408          Pain    Pretreatment Pain Rating 0/10 - no pain  -DW       Row Name 11/11/23 1408          Cognition    Affect/Mental Status (Cognition) WNL  -DW     Orientation Status (Cognition) oriented x 3  -DW     Follows Commands (Cognition) WNL  -DW     Cognitive Function WNL  -DW       Row Name 11/11/23 1408          Range of Motion (ROM)    Range of Motion ROM is WNL  -DW       Row Name 11/11/23 1408           Strength Comprehensive (MMT)    General Manual Muscle Testing (MMT) Assessment lower extremity strength deficits identified  -     Comment, General Manual Muscle Testing (MMT) Assessment BLE 4-/5 MMT BLE  -       Row Name 11/11/23 1408          Bed Mobility    Bed Mobility bed mobility (all) activities  -     All Activities, [place] (Bed Mobility) standby assist  -     Assistive Device (Bed Mobility) bed rails;draw sheet;head of bed elevated  -       Row Name 11/11/23 1408          Transfers    Transfers sit-stand transfer;stand-sit transfer  -       Row Name 11/11/23 1408          Sit-Stand Transfer    Sit-Stand [place] (Transfers) standby assist  -DW       Row Name 11/11/23 1408          Stand-Sit Transfer    Stand-Sit [place] (Transfers) standby assist  -       Row Name 11/11/23 1408          Gait/Stairs (Locomotion)    Gait/Stairs Locomotion gait/ambulation independence;gait/ambulation assistive device;distance ambulated  -     [place] Level (Gait) contact guard  -     Distance in Feet (Gait) 150  -       Row Name 11/11/23 1408          Safety Issues, Functional Mobility    Impairments Affecting Function (Mobility) balance;endurance/activity tolerance;strength  -       Row Name 11/11/23 1408          Balance    Balance Assessment standing dynamic balance  -     Dynamic Standing Balance contact guard  -     Position/Device Used, Standing Balance unsupported  -       Row Name 11/11/23 1408          Plan of Care Review    Plan of Care Reviewed With patient  -     Progress improving  -     Outcome Evaluation Pt presents with decreased functional mobility secondary to current hospitalization. Skilled PT intervention is needed to address noted deficits in order to restore to PLOF.  -       Row Name 11/11/23 1408          Therapy Assessment/Plan (PT)    PT Diagnosis (PT) Difficulty in walking  -     Rehab Potential (PT) good, to achieve stated therapy goals   -     Criteria for Skilled Interventions Met (PT) yes;meets criteria;skilled treatment is necessary  -     Therapy Frequency (PT) daily  -     Problem List (PT) problems related to;balance;strength  -     Activity Limitations Related to Problem List (PT) unable to ambulate safely;unable to transfer safely;BADLs not performed adequately or safely;IADLs not performed adequately or safely  -       Row Name 11/11/23 1408          PT Evaluation Complexity    History, PT Evaluation Complexity no personal factors and/or comorbidities  -     Examination of Body Systems (PT Eval Complexity) 1-2 elements  -DW     Clinical Presentation (PT Evaluation Complexity) stable  -     Clinical Decision Making (PT Evaluation Complexity) low complexity  -DW     Overall Complexity (PT Evaluation Complexity) low complexity  -       Row Name 11/11/23 1408          Therapy Plan Review/Discharge Plan (PT)    Therapy Plan Review (PT) evaluation/treatment results reviewed  -       Row Name 11/11/23 1408          Physical Therapy Goals    Bed Mobility Goal Selection (PT) bed mobility, PT goal 1  -     Transfer Goal Selection (PT) transfer, PT goal 1  -     Gait Training Goal Selection (PT) gait training, PT goal 1  -       Row Name 11/11/23 1408          Bed Mobility Goal 1 (PT)    Activity/Assistive Device (Bed Mobility Goal 1, PT) bed mobility activities, all  -DW     [place] Level/Cues Needed (Bed Mobility Goal 1, PT) independent  -DW     Time Frame (Bed Mobility Goal 1, PT) long term goal (LTG)  -       Row Name 11/11/23 1408          Transfer Goal 1 (PT)    Activity/Assistive Device (Transfer Goal 1, PT) transfers, all  -DW     [place] Level/Cues Needed (Transfer Goal 1, PT) independent  -DW     Time Frame (Transfer Goal 1, PT) long term goal (LTG);10 days  -       Row Name 11/11/23 1408          Gait Training Goal 1 (PT)    Activity/Assistive Device (Gait Training Goal 1, PT) gait (walking  locomotion);assistive device use  -DW     [place] Level (Gait Training Goal 1, PT) independent  -DW     Distance (Gait Training Goal 1, PT) 300  -DW     Time Frame (Gait Training Goal 1, PT) long term goal (LTG);10 days  -DW               User Key  (r) = Recorded By, (t) = Taken By, (c) = Cosigned By      Initials Name Provider Type    Marco Gamboa, PT Physical Therapist                    Physical Therapy Education       Title: PT OT SLP Therapies (Done)       Topic: Physical Therapy (Done)       Point: Mobility training (Done)       Learning Progress Summary             Patient Acceptance, E,TB, VU by  at 11/11/2023 1416                         Point: Home exercise program (Done)       Learning Progress Summary             Patient Acceptance, E,TB, VU by  at 11/11/2023 1416                         Point: Body mechanics (Done)       Learning Progress Summary             Patient Acceptance, E,TB, VU by  at 11/11/2023 1416                         Point: Precautions (Done)       Learning Progress Summary             Patient Acceptance, E,TB, VU by  at 11/11/2023 1416                                         User Key       Initials Effective Dates Name Provider Type Discipline     04/25/21 -  Marco Castillo, PT Physical Therapist PT                  PT Recommendation and Plan  Anticipated Discharge Disposition (PT): home with home health, home with assist  Planned Therapy Interventions (PT): balance training, gait training, strengthening  Therapy Frequency (PT): daily  Plan of Care Reviewed With: patient  Progress: improving  Outcome Evaluation: Pt presents with decreased functional mobility secondary to current hospitalization. Skilled PT intervention is needed to address noted deficits in order to restore to PLOF.   Outcome Measures       Row Name 11/11/23 1416             How much help from another person do you currently need...    Turning from your back to your side while in flat bed without  using bedrails? 3  -DW      Moving from lying on back to sitting on the side of a flat bed without bedrails? 3  -DW      Moving to and from a bed to a chair (including a wheelchair)? 3  -DW      Standing up from a chair using your arms (e.g., wheelchair, bedside chair)? 3  -DW      Climbing 3-5 steps with a railing? 3  -DW      To walk in hospital room? 3  -DW      AM-PAC 6 Clicks Score (PT) 18  -DW      Highest level of mobility 6 --> Walked 10 steps or more  -DW         Functional Assessment    Outcome Measure Options AM-PAC 6 Clicks Basic Mobility (PT)  -DW                User Key  (r) = Recorded By, (t) = Taken By, (c) = Cosigned By      Initials Name Provider Type    Marco Gamboa PT Physical Therapist                     Time Calculation:    PT Charges       Row Name 11/11/23 1418             Time Calculation    PT Received On 11/11/23  -DW      PT Goal Re-Cert Due Date 11/20/23  -DW         Timed Charges    24500 - Gait Training Minutes  15  -DW         Untimed Charges    PT Eval/Re-eval Minutes 8  -DW         Total Minutes    Timed Charges Total Minutes 15  -DW      Untimed Charges Total Minutes 8  -DW       Total Minutes 23  -DW                User Key  (r) = Recorded By, (t) = Taken By, (c) = Cosigned By      Initials Name Provider Type    Marco Gamboa PT Physical Therapist                  Therapy Charges for Today       Code Description Service Date Service Provider Modifiers Qty    87467597866 HC GAIT TRAINING EA 15 MIN 11/11/2023 Marco Castillo, PT GP 1    35367988124 HC PT EVAL LOW COMPLEXITY 2 11/11/2023 Marco Castillo, PT GP 1            PT G-Codes  Outcome Measure Options: AM-PAC 6 Clicks Basic Mobility (PT)  AM-PAC 6 Clicks Score (PT): 18    Marco Castillo PT  11/11/2023

## 2023-11-11 NOTE — PLAN OF CARE
Goal Outcome Evaluation:           Progress: no change  Outcome Evaluation: No acute changes throughout shift today, patient alert and oriented, vss, up in hallway with assistance of PT, patient sleeping intermittently between care. No acute changes to report throughout shift.

## 2023-11-11 NOTE — PROGRESS NOTES
CARDIOLOGY  INPATIENT PROGRESS NOTE                [facility] 4TH FLOOR MEDICAL TELEMETRY UNIT    11/11/2023      PATIENT IDENTIFICATION:   Name:  Regulo Sepulveda      MRN:  [id number]     58 y.o.  male                 SUBJECTIVE:   Patient feeling better but still complains of some shortness of breath  OBJECTIVE:  Vitals:    11/11/23 0258 11/11/23 0500 11/11/23 0720 11/11/23 0730   BP: 119/72  132/73    BP Location: Right arm  Right arm    Patient Position: Lying  Lying    Pulse: 55  68 60   Resp: 16  18    Temp: 97.7 °F (36.5 °C)  97.9 °F (36.6 °C)    TempSrc: Oral  Oral    SpO2: 95%  98%    Weight:  72.8 kg (160 lb 7.9 oz)     Height:               Body mass index is 21.41 kg/m².    Intake/Output Summary (Last 24 hours) at 11/11/2023 1037  Last data filed at 11/11/2023 0720  Gross per 24 hour   Intake 240 ml   Output 1400 ml   Net -1160 ml         Physical Exam  General Appearance:   no acute distress  Alert and oriented x3  HENT:   lips not cyanotic  Atraumatic  Neck:  No jvd   supple  Respiratory:  no respiratory distress  normal breath sounds  no rales  Cardiovascular:    regular rhythm  no S3, no S4   no murmur  no rub  lower extremity edema: none    Skin:   warm, dry  No rashes      Allergies   Allergen Reactions    Penicillins Shortness Of Breath     Scheduled meds:  aspirin, 81 mg, Oral, Daily  atorvastatin, 40 mg, Oral, Daily  carvedilol, 25 mg, Oral, Q12H  empagliflozin, 10 mg, Oral, Daily  enoxaparin, 80 mg, Subcutaneous, Q12H  furosemide, 40 mg, Intravenous, BID  insulin lispro, 2-7 Units, Subcutaneous, 4x Daily AC & at Bedtime  lisinopril, 40 mg, Oral, Q24H  nicotine, 1 patch, Transdermal, Q24H  nitroglycerin, 1 inch, Topical, Q6H  pantoprazole, 40 mg, Oral, Daily  potassium chloride, 40 mEq, Oral, TID With Meals  sodium chloride, 10 mL, Intravenous, Q12H  spironolactone, 25 mg, Oral, Daily  venlafaxine XR, 37.5 mg, Oral, Daily  warfarin, 10 mg, Oral, Once      IV meds:                    "   Pharmacy to dose warfarin,       Data Review:  CBC          10/23/2023    04:19 11/10/2023    07:23 11/11/2023    04:46   CBC   WBC 16.38  9.79  7.91    RBC 5.70  4.91  4.52    Hemoglobin 15.7  13.4  12.3    Hematocrit 49.5  42.4  39.1    MCV 86.8  86.4  86.5    MCH 27.5  27.3  27.2    MCHC 31.7  31.6  31.5    RDW 13.3  14.9  14.6    Platelets 386  281  205      CMP          10/23/2023    04:19 11/10/2023    08:39 11/11/2023    04:46   CMP   Glucose 126  121  102    BUN 43  29  26    Creatinine 1.18  1.11  1.27    EGFR 71.5  77.0  65.5    Sodium 138  139  140    Potassium 3.7  4.9  3.2    Chloride 93  105  100    Calcium 9.7  8.6  8.1    Total Protein 6.4  6.2     Albumin 3.8  3.6     Globulin 2.6  2.6     Total Bilirubin 0.5  1.2     Alkaline Phosphatase 237  249     AST (SGOT) 104  32     ALT (SGPT) 416  52     Albumin/Globulin Ratio 1.5  1.4     BUN/Creatinine Ratio 36.4  26.1  20.5    Anion Gap 8.4  13.2  9.1       CARDIAC LABS:      Lab 11/11/23  0446 11/10/23  0839 11/10/23  0723   PROBNP  --   --  27,650.0*   HSTROP T  --  18  --    PROTIME 15.7*  --  15.5*   INR 1.23*  --  1.20*        No results found for: \"DIGOXIN\"   Lab Results   Component Value Date    TSH 1.410 03/02/2023           Invalid input(s): \"LDLCALC\"  No results found for: \"POCTROP\"  Lab Results   Component Value Date    TROPONINT 18 11/10/2023   (  Lab Results   Component Value Date    MG 2.1 10/23/2023              ASSESSMENT:    Acute on chronic congestive heart failure    PAF (paroxysmal atrial fibrillation)        PLAN:  1.  Continue with IV Lasix 40 mg twice daily  2.  Coreg 25 twice daily  3.  Lisinopril 40 mg daily  4.  Aldactone 25 daily          Rogers Barroso MD  11/11/2023    10:37 EST           "

## 2023-11-11 NOTE — PROGRESS NOTES
Pharmacy to Dose: Warfarin  Consulting provider: JIMENA Mendieta  Indication for warfarin: A fib, LV Thrombus  Goal INR range: 2 - 3  Home warfarin dose: 7.5mg daily     Date Hgb INR Warfarin Dose Given   11/10 13.4 1.20 7.5 mg         11/11         12.3         1.23                  10 mg                       Any Vitamin K given?: No  Any major drug interactions: Venlafaxine (home med)  Is patient on bridging therapy with another anticoagulant?: Yes - lovenox 80 mg q12h    Plan: No change in INR today, still subtherapeutic. Will give small one time bolus of 10 mg today. Continue lovenox bridge. INRs ordered daily.

## 2023-11-11 NOTE — PLAN OF CARE
Goal Outcome Evaluation:  Plan of Care Reviewed With: patient        Progress: improving  Outcome Evaluation: Pt presents with decreased functional mobility secondary to current hospitalization. Skilled PT intervention is needed to address noted deficits in order to restore to PLOF.      Anticipated Discharge Disposition (PT): home with home health, home with assist

## 2023-11-12 LAB
ANION GAP SERPL CALCULATED.3IONS-SCNC: 9.1 MMOL/L (ref 5–15)
BASOPHILS # BLD AUTO: 0.05 10*3/MM3 (ref 0–0.2)
BASOPHILS NFR BLD AUTO: 0.6 % (ref 0–1.5)
BUN SERPL-MCNC: 27 MG/DL (ref 6–20)
BUN/CREAT SERPL: 21.1 (ref 7–25)
CALCIUM SPEC-SCNC: 8.5 MG/DL (ref 8.6–10.5)
CHLORIDE SERPL-SCNC: 100 MMOL/L (ref 98–107)
CO2 SERPL-SCNC: 31.9 MMOL/L (ref 22–29)
CREAT SERPL-MCNC: 1.28 MG/DL (ref 0.76–1.27)
DEPRECATED RDW RBC AUTO: 44.9 FL (ref 37–54)
EGFRCR SERPLBLD CKD-EPI 2021: 64.9 ML/MIN/1.73
EOSINOPHIL # BLD AUTO: 0.08 10*3/MM3 (ref 0–0.4)
EOSINOPHIL NFR BLD AUTO: 0.9 % (ref 0.3–6.2)
ERYTHROCYTE [DISTWIDTH] IN BLOOD BY AUTOMATED COUNT: 14.5 % (ref 12.3–15.4)
GLUCOSE BLDC GLUCOMTR-MCNC: 119 MG/DL (ref 70–99)
GLUCOSE SERPL-MCNC: 127 MG/DL (ref 65–99)
HCT VFR BLD AUTO: 42.4 % (ref 37.5–51)
HGB BLD-MCNC: 13.4 G/DL (ref 13–17.7)
IMM GRANULOCYTES # BLD AUTO: 0.02 10*3/MM3 (ref 0–0.05)
IMM GRANULOCYTES NFR BLD AUTO: 0.2 % (ref 0–0.5)
INR PPP: 1.21 (ref 0.86–1.15)
LYMPHOCYTES # BLD AUTO: 1.7 10*3/MM3 (ref 0.7–3.1)
LYMPHOCYTES NFR BLD AUTO: 19.8 % (ref 19.6–45.3)
MCH RBC QN AUTO: 27.1 PG (ref 26.6–33)
MCHC RBC AUTO-ENTMCNC: 31.6 G/DL (ref 31.5–35.7)
MCV RBC AUTO: 85.7 FL (ref 79–97)
MONOCYTES # BLD AUTO: 0.95 10*3/MM3 (ref 0.1–0.9)
MONOCYTES NFR BLD AUTO: 11 % (ref 5–12)
NEUTROPHILS NFR BLD AUTO: 5.8 10*3/MM3 (ref 1.7–7)
NEUTROPHILS NFR BLD AUTO: 67.5 % (ref 42.7–76)
NRBC BLD AUTO-RTO: 0 /100 WBC (ref 0–0.2)
PLATELET # BLD AUTO: 247 10*3/MM3 (ref 140–450)
PMV BLD AUTO: 10.8 FL (ref 6–12)
POTASSIUM SERPL-SCNC: 3.5 MMOL/L (ref 3.5–5.2)
PROTHROMBIN TIME: 15.5 SECONDS (ref 11.8–14.9)
RBC # BLD AUTO: 4.95 10*6/MM3 (ref 4.14–5.8)
SODIUM SERPL-SCNC: 141 MMOL/L (ref 136–145)
WBC NRBC COR # BLD: 8.6 10*3/MM3 (ref 3.4–10.8)

## 2023-11-12 PROCEDURE — 82948 REAGENT STRIP/BLOOD GLUCOSE: CPT

## 2023-11-12 PROCEDURE — 25010000002 FUROSEMIDE PER 20 MG: Performed by: FAMILY MEDICINE

## 2023-11-12 PROCEDURE — G0378 HOSPITAL OBSERVATION PER HR: HCPCS

## 2023-11-12 PROCEDURE — 99233 SBSQ HOSP IP/OBS HIGH 50: CPT | Performed by: FAMILY MEDICINE

## 2023-11-12 PROCEDURE — 80048 BASIC METABOLIC PNL TOTAL CA: CPT | Performed by: FAMILY MEDICINE

## 2023-11-12 PROCEDURE — 25010000002 ENOXAPARIN PER 10 MG: Performed by: FAMILY MEDICINE

## 2023-11-12 PROCEDURE — 85610 PROTHROMBIN TIME: CPT | Performed by: FAMILY MEDICINE

## 2023-11-12 PROCEDURE — 85025 COMPLETE CBC W/AUTO DIFF WBC: CPT | Performed by: FAMILY MEDICINE

## 2023-11-12 RX ORDER — POTASSIUM CHLORIDE 750 MG/1
40 CAPSULE, EXTENDED RELEASE ORAL
Status: COMPLETED | OUTPATIENT
Start: 2023-11-12 | End: 2023-11-12

## 2023-11-12 RX ORDER — BUTALBITAL, ACETAMINOPHEN AND CAFFEINE 300; 40; 50 MG/1; MG/1; MG/1
1 CAPSULE ORAL EVERY 4 HOURS PRN
Status: DISCONTINUED | OUTPATIENT
Start: 2023-11-12 | End: 2023-11-15 | Stop reason: HOSPADM

## 2023-11-12 RX ORDER — AMLODIPINE BESYLATE 2.5 MG/1
2.5 TABLET ORAL
Status: DISCONTINUED | OUTPATIENT
Start: 2023-11-12 | End: 2023-11-15 | Stop reason: HOSPADM

## 2023-11-12 RX ORDER — WARFARIN SODIUM 10 MG/1
10 TABLET ORAL
Status: COMPLETED | OUTPATIENT
Start: 2023-11-12 | End: 2023-11-12

## 2023-11-12 RX ADMIN — VENLAFAXINE HYDROCHLORIDE 37.5 MG: 37.5 CAPSULE, EXTENDED RELEASE ORAL at 09:17

## 2023-11-12 RX ADMIN — PANTOPRAZOLE SODIUM 40 MG: 40 TABLET, DELAYED RELEASE ORAL at 09:17

## 2023-11-12 RX ADMIN — ASPIRIN 81 MG: 81 TABLET, COATED ORAL at 09:17

## 2023-11-12 RX ADMIN — AMLODIPINE BESYLATE 2.5 MG: 2.5 TABLET ORAL at 12:32

## 2023-11-12 RX ADMIN — Medication 10 ML: at 20:19

## 2023-11-12 RX ADMIN — NITROGLYCERIN 1 INCH: 20 OINTMENT TOPICAL at 09:24

## 2023-11-12 RX ADMIN — FUROSEMIDE 40 MG: 10 INJECTION, SOLUTION INTRAMUSCULAR; INTRAVENOUS at 18:00

## 2023-11-12 RX ADMIN — BUTALBITAL, ACETAMINOPHEN AND CAFFEINE 1 CAPSULE: 300; 40; 50 CAPSULE ORAL at 16:29

## 2023-11-12 RX ADMIN — CARVEDILOL 25 MG: 12.5 TABLET, FILM COATED ORAL at 09:17

## 2023-11-12 RX ADMIN — POTASSIUM CHLORIDE 40 MEQ: 10 CAPSULE, COATED, EXTENDED RELEASE ORAL at 18:00

## 2023-11-12 RX ADMIN — WARFARIN SODIUM 10 MG: 10 TABLET ORAL at 18:00

## 2023-11-12 RX ADMIN — ATORVASTATIN CALCIUM 40 MG: 40 TABLET, FILM COATED ORAL at 09:17

## 2023-11-12 RX ADMIN — LISINOPRIL 40 MG: 20 TABLET ORAL at 09:17

## 2023-11-12 RX ADMIN — EMPAGLIFLOZIN 10 MG: 10 TABLET, FILM COATED ORAL at 09:17

## 2023-11-12 RX ADMIN — POTASSIUM CHLORIDE 40 MEQ: 10 CAPSULE, COATED, EXTENDED RELEASE ORAL at 12:32

## 2023-11-12 RX ADMIN — Medication 10 ML: at 09:17

## 2023-11-12 RX ADMIN — NITROGLYCERIN 1 INCH: 20 OINTMENT TOPICAL at 00:11

## 2023-11-12 RX ADMIN — FUROSEMIDE 40 MG: 10 INJECTION, SOLUTION INTRAMUSCULAR; INTRAVENOUS at 09:17

## 2023-11-12 RX ADMIN — NICOTINE 1 PATCH: 21 PATCH, EXTENDED RELEASE TRANSDERMAL at 09:23

## 2023-11-12 RX ADMIN — ACETAMINOPHEN 650 MG: 325 TABLET ORAL at 15:35

## 2023-11-12 RX ADMIN — SPIRONOLACTONE 25 MG: 25 TABLET ORAL at 09:17

## 2023-11-12 RX ADMIN — ENOXAPARIN SODIUM 80 MG: 100 INJECTION SUBCUTANEOUS at 09:17

## 2023-11-12 NOTE — PLAN OF CARE
Problem: Adult Inpatient Plan of Care  Goal: Plan of Care Review  Outcome: Ongoing, Progressing  Flowsheets (Taken 11/12/2023 0620)  Progress: no change  Plan of Care Reviewed With: patient  Outcome Evaluation: Pt alert and oriented X4. VSS. No complaints of pain or discomfort throughout shift. Patient rested well throughout the shift. Continue with plan of care.   Goal Outcome Evaluation:  Plan of Care Reviewed With: patient        Progress: no change  Outcome Evaluation: Pt alert and oriented X4. VSS. No complaints of pain or discomfort throughout shift. Patient rested well throughout the shift. Continue with plan of care.

## 2023-11-12 NOTE — PROGRESS NOTES
CARDIOLOGY  INPATIENT PROGRESS NOTE                [facility] 4TH FLOOR MEDICAL TELEMETRY UNIT    11/12/2023      PATIENT IDENTIFICATION:   Name:  Regulo Sepulveda      MRN:  [id number]     58 y.o.  male                 SUBJECTIVE:   Patient doing better still short of breath this morning as well as some elevation his blood pressures.  No dysrhythmias overnight    OBJECTIVE:  Vitals:    11/12/23 0348 11/12/23 0600 11/12/23 0715 11/12/23 0716   BP: 143/99  (!) 154/103 152/100   BP Location: Right arm  Right arm Right arm   Patient Position: Lying  Lying Lying   Pulse:   74    Resp: 18  20    Temp: 98.2 °F (36.8 °C)  97.7 °F (36.5 °C)    TempSrc: Oral  Oral    SpO2: 96%  99%    Weight:  68.5 kg (151 lb 0.2 oz)     Height:               Body mass index is 20.15 kg/m².    Intake/Output Summary (Last 24 hours) at 11/12/2023 1158  Last data filed at 11/12/2023 0800  Gross per 24 hour   Intake 1068 ml   Output 2715 ml   Net -1647 ml       Telemetry: Normal sinus    Physical Exam  General Appearance:   no acute distress  Alert and oriented x3  HENT:   lips not cyanotic  Atraumatic  Neck:  No jvd   supple  Respiratory:  no respiratory distress  normal breath sounds  no rales  Cardiovascular:    regular rhythm  no S3, no S4   no murmur  no rub  lower extremity edema: none    Skin:   warm, dry  No rashes      Allergies   Allergen Reactions    Penicillins Shortness Of Breath     Scheduled meds:  amLODIPine, 2.5 mg, Oral, Q24H  aspirin, 81 mg, Oral, Daily  atorvastatin, 40 mg, Oral, Daily  carvedilol, 25 mg, Oral, Q12H  empagliflozin, 10 mg, Oral, Daily  enoxaparin, 80 mg, Subcutaneous, Q12H  furosemide, 40 mg, Intravenous, BID  lisinopril, 40 mg, Oral, Q24H  nicotine, 1 patch, Transdermal, Q24H  nitroglycerin, 1 inch, Topical, Q6H  pantoprazole, 40 mg, Oral, Daily  potassium chloride, 40 mEq, Oral, TID With Meals  sodium chloride, 10 mL, Intravenous, Q12H  spironolactone, 25 mg, Oral, Daily  venlafaxine XR, 37.5  "mg, Oral, Daily  warfarin, 10 mg, Oral, Once      IV meds:                      Pharmacy to dose warfarin,       Data Review:  CBC          11/10/2023    07:23 11/11/2023    04:46 11/12/2023    04:31   CBC   WBC 9.79  7.91  8.60    RBC 4.91  4.52  4.95    Hemoglobin 13.4  12.3  13.4    Hematocrit 42.4  39.1  42.4    MCV 86.4  86.5  85.7    MCH 27.3  27.2  27.1    MCHC 31.6  31.5  31.6    RDW 14.9  14.6  14.5    Platelets 281  205  247      CMP          11/10/2023    08:39 11/11/2023    04:46 11/12/2023    04:31   CMP   Glucose 121  102  127    BUN 29  26  27    Creatinine 1.11  1.27  1.28    EGFR 77.0  65.5  64.9    Sodium 139  140  141    Potassium 4.9  3.2  3.5    Chloride 105  100  100    Calcium 8.6  8.1  8.5    Total Protein 6.2      Albumin 3.6      Globulin 2.6      Total Bilirubin 1.2      Alkaline Phosphatase 249      AST (SGOT) 32      ALT (SGPT) 52      Albumin/Globulin Ratio 1.4      BUN/Creatinine Ratio 26.1  20.5  21.1    Anion Gap 13.2  9.1  9.1       CARDIAC LABS:      Lab 11/12/23  0431 11/11/23  0446 11/10/23  0839 11/10/23  0723   PROBNP  --   --   --  27,650.0*   HSTROP T  --   --  18  --    PROTIME 15.5* 15.7*  --  15.5*   INR 1.21* 1.23*  --  1.20*        No results found for: \"DIGOXIN\"   Lab Results   Component Value Date    TSH 1.410 03/02/2023           Invalid input(s): \"LDLCALC\"  No results found for: \"POCTROP\"  Lab Results   Component Value Date    TROPONINT 18 11/10/2023   (  Lab Results   Component Value Date    MG 2.1 10/23/2023              ASSESSMENT:    Acute on chronic congestive heart failure    PAF (paroxysmal atrial fibrillation)        PLAN:  1.  Add Norvasc 2.5 daily for blood pressure control  2.  Coreg 25 twice daily  3.  Lasix 40 IV twice daily  4.  Jardiance 10 mg daily  5.  Lisinopril 40 mg daily  5.  Repeat BMP in a.m.          Rogers Barroso MD  11/12/2023    11:58 EST           "

## 2023-11-12 NOTE — PROGRESS NOTES
Pharmacy to Dose: Warfarin  Consulting provider: JIMENA Mendieta  Indication for warfarin: A fib, LV Thrombus  Goal INR range: 2 - 3  Home warfarin dose: 7.5mg daily     Date Hgb INR Warfarin Dose Given   11/10 13.4 1.20 7.5 mg         11/11         12.3         1.23                  10 mg         11/12        13.4         1.21                  10 mg                 Any Vitamin K given?: No  Any major drug interactions: Venlafaxine (home med)  Is patient on bridging therapy with another anticoagulant?: Yes - lovenox 80 mg q12h    Plan: No change in INR today, still subtherapeutic. Will give bolus of 10 mg today. Continue lovenox bridge. INRs ordered daily.

## 2023-11-12 NOTE — PROGRESS NOTES
[facility]   Hospitalist Progress Note  Date: 2023  Patient Name: Regulo Sepulveda  : 1965  MRN: [id number]  Date of admission: 11/10/2023      Subjective   Subjective     Chief Complaint: Follow-up shortness of breath    Summary:Regulo Sepulveda is a 58 y.o. male past medical history significant for chronic combined systolic and diastolic heart failure ejection fraction 20% in August, LV thrombus on warfarin, chronic paroxysmal atrial fibrillation, COPD, essential hypertension and hyperlipidemia who presented to ED with worsening shortness of breath.  Of note patient has been admitted twice in the past 2 months for similar symptoms found to be due to heart failure.  Patient states shortness of breath has been gradual in onset but is gotten worse to the point where he cannot walk more than a couple feet without getting short of breath.  Patient reports orthopnea and paroxysmal nocturnal dyspnea.  Denies lower extremity swelling.  Patient states that he has been taking his medications since previous discharge but took him some time to pick them up from the pharmacy.  Patient denies fever denies chills denies cough.  Patient presented to the emergency department due to continue shortness of breath.  Patient afebrile sinus rhythm 70s to 90s on telemetry review blood pressure elevated 160s over 110s.  Respiratory rate 22.  Patient satting 89% on room air required 2 L nasal cannula keep sats greater than 90%.  proBNP found to be elevated greater than 27,000.  INR subtherapeutic.  Blood sugars within normal limits.  White blood cell count within normal limits.  Chest x-ray demonstrates stable cardiomegaly with hazy groundglass opacities in the lung bases right greater than left and blunting of the right costophrenic angle concerning for pleural effusions.  Patient given 60 Lasix in the emergency department with little improvement in symptoms.  Hospitalist service contacted for admission for further  evaluation and treatment.  Cardiology consulted.  Continued on IV Lasix strict I's and O's Daily weights.  Continued on carvedilol Aldactone.  Losartan transitioned to lisinopril per cardiology.  Started on transdermal nitroglycerin to aid in blood pressure control and diuresis.     Interval Followup: Patient lying in bed appears to be resting comfortably.  Shortness of breath improving.  Is able to walk around more on O2.  Worked well with physical therapy occupational therapy.  Afebrile overnight.  Sinus rhythm 50s to 70s on telemetry review.  Blood pressure much improved.  Continues to sat well on 2 L nasal cannula.  Potassium low this morning.  Blood sugars well controlled.  INR remains subtherapeutic.  No other issues per nursing.    Review of Systems  Constitutional: Negative for fatigue and fever.   HENT: Negative for sore throat and trouble swallowing.    Eyes: Negative for pain and discharge.   Respiratory: Negative for cough and positive shortness of breath.    Cardiovascular: Negative for chest pain and palpitations.   Gastrointestinal: Negative for abdominal pain, nausea and vomiting.   Endocrine: Negative for cold intolerance and heat intolerance.   Genitourinary: Negative for difficulty urinating and dysuria.   Musculoskeletal: Negative for back pain and neck stiffness.   Skin: Negative for color change and rash.   Neurological: Negative for syncope and headaches.   Hematological: Negative for adenopathy.   Psychiatric/Behavioral: Negative for confusion and hallucinations.    Objective   Objective     Vitals:   Temp:  [97.7 °F (36.5 °C)-98.6 °F (37 °C)] 98.6 °F (37 °C)  Heart Rate:  [55-73] 65  Resp:  [16-18] 18  BP: (116-158)/() 116/66  Flow (L/min):  [2] 2  Physical Exam   Gen. well-developed appearing stated age in no acute distress  HEENT: Normocephalic atraumatic moist membranes pupils equal round reactive light, no scleral icterus no conjunctival injection  Cardiovascular: regular rate and  rhythm no murmurs rubs or gallops S1-S2, no lower extremity edema appreciated  Pulmonary: Crackles bilateral posterior bases, no wheezes or rhonchi symmetric chest expansion, unlabored, no conversational dyspnea appreciated  Gastrointestinal: Soft nontender nondistended positive bowel sounds all 4 quadrants no rebound or guarding  Musculoskeletal: No clubbing cyanosis, warm and well-perfused, calves soft symmetric nontender bilaterally  Skin: Clean dry without rashes  Neuro: Cranial nerves II through XII intact grossly no sensorimotor deficits appreciated bilateral upper and lower extremities  Psych: Patient is calm cooperative and appropriate with exam not responding to internal stimuli  : No Poon catheter no bladder distention no suprapubic tenderness     Result Review    Result Review:  I have personally reviewed these results and agree with these findings:  [x]  Laboratory  LAB RESULTS:      Lab 11/11/23  0446 11/10/23  0723   WBC 7.91 9.79   HEMOGLOBIN 12.3* 13.4   HEMATOCRIT 39.1 42.4   PLATELETS 205 281   NEUTROS ABS 4.76 6.85   IMMATURE GRANS (ABS) 0.02 0.02   LYMPHS ABS 2.08 1.84   MONOS ABS 0.88 1.01*   EOS ABS 0.12 0.02   MCV 86.5 86.4   PROTIME 15.7* 15.5*         Lab 11/11/23 0446 11/10/23  0839   SODIUM 140 139   POTASSIUM 3.2* 4.9   CHLORIDE 100 105   CO2 30.9* 20.8*   ANION GAP 9.1 13.2   BUN 26* 29*   CREATININE 1.27 1.11   EGFR 65.5 77.0   GLUCOSE 102* 121*   CALCIUM 8.1* 8.6         Lab 11/10/23  0839   TOTAL PROTEIN 6.2   ALBUMIN 3.6   GLOBULIN 2.6   ALT (SGPT) 52*   AST (SGOT) 32   BILIRUBIN 1.2   ALK PHOS 249*         Lab 11/11/23 0446 11/10/23  0839 11/10/23  0723   PROBNP  --   --  27,650.0*   HSTROP T  --  18  --    PROTIME 15.7*  --  15.5*   INR 1.23*  --  1.20*                 Brief Urine Lab Results  (Last result in the past 365 days)        Color   Clarity   Blood   Leuk Est   Nitrite   Protein   CREAT   Urine HCG        08/27/23 1925 Yellow   Clear   Small (1+)   Negative    Negative   100 mg/dL (2+)                 Microbiology Results (last 10 days)       ** No results found for the last 240 hours. **            []  Microbiology  [x]  Radiology  XR Chest 1 View    Result Date: 11/10/2023    1. Stable cardiomegaly 2. Mild hazy and ground-glass opacity at the lung bases right greater than left with blunting of the right costophrenic angle compatible with pleural effusion.  Findings are similar with slight progression at the right base compared to the prior study.  Findings suggest pleural effusion with atelectasis or asymmetric pulmonary edema.  Pneumonia cannot be excluded in the correct clinical setting.       WARREN HALL MD       Electronically Signed and Approved By: WARREN HALL MD on 11/10/2023 at 7:53              [x]  EKG/Telemetry   []  Cardiology/Vascular   []  Pathology  []  Old records  [x]  Other:  Scheduled Meds:aspirin, 81 mg, Oral, Daily  atorvastatin, 40 mg, Oral, Daily  carvedilol, 25 mg, Oral, Q12H  empagliflozin, 10 mg, Oral, Daily  enoxaparin, 80 mg, Subcutaneous, Q12H  furosemide, 40 mg, Intravenous, BID  insulin lispro, 2-7 Units, Subcutaneous, 4x Daily AC & at Bedtime  lisinopril, 40 mg, Oral, Q24H  nicotine, 1 patch, Transdermal, Q24H  nitroglycerin, 1 inch, Topical, Q6H  pantoprazole, 40 mg, Oral, Daily  sodium chloride, 10 mL, Intravenous, Q12H  spironolactone, 25 mg, Oral, Daily  venlafaxine XR, 37.5 mg, Oral, Daily      Continuous Infusions:Pharmacy to dose warfarin,       PRN Meds:.•  acetaminophen  •  albuterol  •  senna-docusate sodium **AND** polyethylene glycol **AND** bisacodyl **AND** bisacodyl  •  dextrose  •  dextrose  •  glucagon (human recombinant)  •  influenza vaccine  •  nitroglycerin  •  ondansetron **OR** ondansetron  •  Pharmacy to dose warfarin  •  sodium chloride  •  sodium chloride  •  sodium chloride      Assessment & Plan   Assessment / Plan     Assessment/Plan:  Dyspnea  Acute on chronic combined systolic and diastolic  congestive heart failure ejection fraction EF 20%  Well-controlled hypertension  Paroxysmal A-fib  Therapeutic drug monitoring Coumadin  LV thrombus on Coumadin  Bilateral pleural effusions  Cardiogenic pulmonary edema  History of drug abuse  Type 2 diabetes mellitus  Hypokalemia           Patient admitted for further evaluation and treatment  Cardiology consulted thank you for your assistance  Continue supplemental oxygen titrate to keep sats greater than 90% respiratory rate less than 20  Continue Lasix 40 mg IV twice daily  Continue Nitropaste per cardiology  Continue strict I's and O's  Continue daily weights  Continue Coreg 25 mg twice daily  Continue Jardiance  Continue lisinopril  Continue spironolactone  Continue to monitor electrolytes and renal function during diuresis  Replace potassium p.o. recheck in a.m.  Continue therapeutic Lovenox to bridge Coumadin  Continue daily INRs  Pharmacy to dose Coumadin  Keep on telemetry  Pleural effusions too small to safely tap  Continue to encourage incentive spirometry  Continue sliding scale insulin  Continue heart healthy diabetic diet  Further inpatient orders recommendations pending clinical course     Discussed case with patient's nurse at bedside.   Discussed case with cardiology attending Dr. Barroso.     Disposition: Likely be able to return home once shortness of breath improved.       DVT prophylaxis:  Medical and mechanical DVT prophylaxis orders are present.    CODE STATUS:   Code Status (Patient has no pulse and is not breathing): CPR (Attempt to Resuscitate)  Medical Interventions (Patient has pulse or is breathing): Full Support

## 2023-11-12 NOTE — PLAN OF CARE
Goal Outcome Evaluation:  Plan of Care Reviewed With: patient   Patient is alert and orientated x4. Denies pain or discomfort this shift. No acute changes noted. Will continue current plan of care.      Progress: improving

## 2023-11-12 NOTE — PROGRESS NOTES
[facility]   Hospitalist Progress Note  Date: 2023  Patient Name: Regulo Sepulveda  : 1965  MRN: [id number]  Date of admission: 11/10/2023      Subjective   Subjective     Chief Complaint: Follow-up shortness of breath    Summary:Regulo Sepulveda is a 58 y.o. male past medical history significant for chronic combined systolic and diastolic heart failure ejection fraction 20% in August, LV thrombus on warfarin, chronic paroxysmal atrial fibrillation, COPD, essential hypertension and hyperlipidemia who presented to ED with worsening shortness of breath.  Of note patient has been admitted twice in the past 2 months for similar symptoms found to be due to heart failure.  Patient states shortness of breath has been gradual in onset but is gotten worse to the point where he cannot walk more than a couple feet without getting short of breath.  Patient reports orthopnea and paroxysmal nocturnal dyspnea.  Denies lower extremity swelling.  Patient states that he has been taking his medications since previous discharge but took him some time to pick them up from the pharmacy.  Patient denies fever denies chills denies cough.  Patient presented to the emergency department due to continue shortness of breath.  Patient afebrile sinus rhythm 70s to 90s on telemetry review blood pressure elevated 160s over 110s.  Respiratory rate 22.  Patient satting 89% on room air required 2 L nasal cannula keep sats greater than 90%.  proBNP found to be elevated greater than 27,000.  INR subtherapeutic.  Blood sugars within normal limits.  White blood cell count within normal limits.  Chest x-ray demonstrates stable cardiomegaly with hazy groundglass opacities in the lung bases right greater than left and blunting of the right costophrenic angle concerning for pleural effusions.  Patient given 60 Lasix in the emergency department with little improvement in symptoms.  Hospitalist service contacted for admission for further  evaluation and treatment.  Cardiology consulted.  Continued on IV Lasix strict I's and O's Daily weights.  Continued on carvedilol Aldactone.  Losartan transitioned to lisinopril per cardiology.  Started on transdermal nitroglycerin to aid in blood pressure control and diuresis. Started on amlodipine for improved BP control. Need to clarify where patient follows for INR checks.    Interval Followup: Patient lying in bed appears to be resting comfortably.  Shortness of breath continues to improve.  Is able to walk around more on O2.    Afebrile overnight.  Sinus rhythm 60s to 70s on telemetry review.  Blood pressure trending higher.  Continues to sat well on 2 L nasal cannula.  Potassium low but improved this morning.  Blood sugars well controlled.  INR remains subtherapeutic.  No other issues per nursing.    Review of Systems  Constitutional: Negative for fatigue and fever.   HENT: Negative for sore throat and trouble swallowing.    Eyes: Negative for pain and discharge.   Respiratory: Negative for cough and positive shortness of breath.    Cardiovascular: Negative for chest pain and palpitations.   Gastrointestinal: Negative for abdominal pain, nausea and vomiting.   Endocrine: Negative for cold intolerance and heat intolerance.   Genitourinary: Negative for difficulty urinating and dysuria.   Musculoskeletal: Negative for back pain and neck stiffness.   Skin: Negative for color change and rash.   Neurological: Negative for syncope and headaches.   Hematological: Negative for adenopathy.   Psychiatric/Behavioral: Negative for confusion and hallucinations.    Objective   Objective     Vitals:   Temp:  [97.5 °F (36.4 °C)-98.6 °F (37 °C)] 97.5 °F (36.4 °C)  Heart Rate:  [65-74] 74  Resp:  [16-20] 20  BP: (116-154)/() 122/84  Flow (L/min):  [2] 2  Physical Exam   Gen. well-developed appearing stated age in no acute distress  HEENT: Normocephalic atraumatic moist membranes pupils equal round reactive light, no  scleral icterus no conjunctival injection  Cardiovascular: regular rate and rhythm no murmurs rubs or gallops S1-S2, no lower extremity edema appreciated  Pulmonary: Crackles bilateral posterior bases, no wheezes or rhonchi symmetric chest expansion, unlabored, no conversational dyspnea appreciated  Gastrointestinal: Soft nontender nondistended positive bowel sounds all 4 quadrants no rebound or guarding  Musculoskeletal: No clubbing cyanosis, warm and well-perfused, calves soft symmetric nontender bilaterally  Skin: Clean dry without rashes  Neuro: Cranial nerves II through XII intact grossly no sensorimotor deficits appreciated bilateral upper and lower extremities  Psych: Patient is calm cooperative and appropriate with exam not responding to internal stimuli  : No Poon catheter no bladder distention no suprapubic tenderness     Result Review    Result Review:  I have personally reviewed these results and agree with these findings:  [x]  Laboratory  LAB RESULTS:      Lab 11/12/23  0431 11/11/23  0446 11/10/23  0723   WBC 8.60 7.91 9.79   HEMOGLOBIN 13.4 12.3* 13.4   HEMATOCRIT 42.4 39.1 42.4   PLATELETS 247 205 281   NEUTROS ABS 5.80 4.76 6.85   IMMATURE GRANS (ABS) 0.02 0.02 0.02   LYMPHS ABS 1.70 2.08 1.84   MONOS ABS 0.95* 0.88 1.01*   EOS ABS 0.08 0.12 0.02   MCV 85.7 86.5 86.4   PROTIME 15.5* 15.7* 15.5*         Lab 11/12/23  0431 11/11/23  0446 11/10/23  0839   SODIUM 141 140 139   POTASSIUM 3.5 3.2* 4.9   CHLORIDE 100 100 105   CO2 31.9* 30.9* 20.8*   ANION GAP 9.1 9.1 13.2   BUN 27* 26* 29*   CREATININE 1.28* 1.27 1.11   EGFR 64.9 65.5 77.0   GLUCOSE 127* 102* 121*   CALCIUM 8.5* 8.1* 8.6         Lab 11/10/23  0839   TOTAL PROTEIN 6.2   ALBUMIN 3.6   GLOBULIN 2.6   ALT (SGPT) 52*   AST (SGOT) 32   BILIRUBIN 1.2   ALK PHOS 249*         Lab 11/12/23  0431 11/11/23  0446 11/10/23  0839 11/10/23  0723   PROBNP  --   --   --  27,650.0*   HSTROP T  --   --  18  --    PROTIME 15.5* 15.7*  --  15.5*   INR  1.21* 1.23*  --  1.20*                 Brief Urine Lab Results  (Last result in the past 365 days)        Color   Clarity   Blood   Leuk Est   Nitrite   Protein   CREAT   Urine HCG        08/27/23 1925 Yellow   Clear   Small (1+)   Negative   Negative   100 mg/dL (2+)                 Microbiology Results (last 10 days)       ** No results found for the last 240 hours. **            []  Microbiology  [x]  Radiology  XR Chest 1 View    Result Date: 11/10/2023    1. Stable cardiomegaly 2. Mild hazy and ground-glass opacity at the lung bases right greater than left with blunting of the right costophrenic angle compatible with pleural effusion.  Findings are similar with slight progression at the right base compared to the prior study.  Findings suggest pleural effusion with atelectasis or asymmetric pulmonary edema.  Pneumonia cannot be excluded in the correct clinical setting.       WARREN HALL MD       Electronically Signed and Approved By: WARREN HALL MD on 11/10/2023 at 7:53              [x]  EKG/Telemetry   []  Cardiology/Vascular   []  Pathology  []  Old records  [x]  Other:  Scheduled Meds:amLODIPine, 2.5 mg, Oral, Q24H  aspirin, 81 mg, Oral, Daily  atorvastatin, 40 mg, Oral, Daily  carvedilol, 25 mg, Oral, Q12H  empagliflozin, 10 mg, Oral, Daily  enoxaparin, 80 mg, Subcutaneous, Q12H  furosemide, 40 mg, Intravenous, BID  lisinopril, 40 mg, Oral, Q24H  nicotine, 1 patch, Transdermal, Q24H  nitroglycerin, 1 inch, Topical, Q6H  pantoprazole, 40 mg, Oral, Daily  potassium chloride, 40 mEq, Oral, TID With Meals  sodium chloride, 10 mL, Intravenous, Q12H  spironolactone, 25 mg, Oral, Daily  venlafaxine XR, 37.5 mg, Oral, Daily  warfarin, 10 mg, Oral, Once      Continuous Infusions:Pharmacy to dose warfarin,       PRN Meds:.•  acetaminophen  •  albuterol  •  senna-docusate sodium **AND** polyethylene glycol **AND** bisacodyl **AND** bisacodyl  •  influenza vaccine  •  nitroglycerin  •  ondansetron **OR**  ondansetron  •  Pharmacy to dose warfarin  •  sodium chloride  •  sodium chloride  •  sodium chloride      Assessment & Plan   Assessment / Plan     Assessment/Plan:  Dyspnea  Acute on chronic combined systolic and diastolic congestive heart failure ejection fraction EF 20%  Well-controlled hypertension  Paroxysmal A-fib  Therapeutic drug monitoring Coumadin  LV thrombus on Coumadin  Bilateral pleural effusions  Cardiogenic pulmonary edema  History of drug abuse  Type 2 diabetes mellitus  Hypokalemia           Patient admitted for further evaluation and treatment  Cardiology consulted thank you for your assistance  Continue supplemental oxygen titrate to keep sats greater than 90% respiratory rate less than 20  Continue Lasix 40 mg IV twice daily  Continue Nitropaste per cardiology  Continue strict I's and O's  Continue daily weights  Continue Coreg 25 mg twice daily  Continue Jardiance  Continue lisinopril  Start amlodipine per cardiology  Continue spironolactone  Continue to monitor electrolytes and renal function during diuresis  Replace potassium p.o. recheck in a.m.  Check Mag in AM  Continue therapeutic Lovenox to bridge Coumadin  Continue daily INRs  Pharmacy to dose Coumadin  Patient does not know where he gets his INRs checked. Will need to follow up tomorrow to arrange outpatient INR checks  Keep on telemetry  Pleural effusions too small to safely tap  Continue to encourage incentive spirometry  Continue sliding scale insulin  Continue heart healthy diabetic diet  Further inpatient orders recommendations pending clinical course     Discussed case with patient's nurse at bedside.   Discussed case with cardiology attending Dr. Barroso.     Disposition: Likely be able to return home once shortness of breath improved.       DVT prophylaxis:  Medical and mechanical DVT prophylaxis orders are present.    CODE STATUS:   Code Status (Patient has no pulse and is not breathing): CPR (Attempt to Resuscitate)  Medical  Interventions (Patient has pulse or is breathing): Full Support

## 2023-11-13 PROBLEM — I50.23 ACUTE ON CHRONIC HFREF (HEART FAILURE WITH REDUCED EJECTION FRACTION): Status: ACTIVE | Noted: 2023-11-13

## 2023-11-13 PROBLEM — I50.9 CHF (CONGESTIVE HEART FAILURE): Status: ACTIVE | Noted: 2023-11-13

## 2023-11-13 LAB
ANION GAP SERPL CALCULATED.3IONS-SCNC: 8.9 MMOL/L (ref 5–15)
BUN SERPL-MCNC: 24 MG/DL (ref 6–20)
BUN/CREAT SERPL: 18.3 (ref 7–25)
CALCIUM SPEC-SCNC: 9.4 MG/DL (ref 8.6–10.5)
CHLORIDE SERPL-SCNC: 97 MMOL/L (ref 98–107)
CO2 SERPL-SCNC: 33.1 MMOL/L (ref 22–29)
CREAT SERPL-MCNC: 1.31 MG/DL (ref 0.76–1.27)
EGFRCR SERPLBLD CKD-EPI 2021: 63.1 ML/MIN/1.73
GLUCOSE SERPL-MCNC: 120 MG/DL (ref 65–99)
INR PPP: 1.54 (ref 0.86–1.15)
MAGNESIUM SERPL-MCNC: 1.5 MG/DL (ref 1.6–2.6)
POTASSIUM SERPL-SCNC: 4.3 MMOL/L (ref 3.5–5.2)
PROTHROMBIN TIME: 18.7 SECONDS (ref 11.8–14.9)
SODIUM SERPL-SCNC: 139 MMOL/L (ref 136–145)

## 2023-11-13 PROCEDURE — 83735 ASSAY OF MAGNESIUM: CPT | Performed by: FAMILY MEDICINE

## 2023-11-13 PROCEDURE — 85610 PROTHROMBIN TIME: CPT | Performed by: FAMILY MEDICINE

## 2023-11-13 PROCEDURE — 25010000002 ENOXAPARIN PER 10 MG: Performed by: FAMILY MEDICINE

## 2023-11-13 PROCEDURE — 36415 COLL VENOUS BLD VENIPUNCTURE: CPT | Performed by: FAMILY MEDICINE

## 2023-11-13 PROCEDURE — 97165 OT EVAL LOW COMPLEX 30 MIN: CPT

## 2023-11-13 PROCEDURE — 80048 BASIC METABOLIC PNL TOTAL CA: CPT | Performed by: FAMILY MEDICINE

## 2023-11-13 PROCEDURE — 25010000002 FUROSEMIDE PER 20 MG: Performed by: FAMILY MEDICINE

## 2023-11-13 PROCEDURE — 99233 SBSQ HOSP IP/OBS HIGH 50: CPT | Performed by: FAMILY MEDICINE

## 2023-11-13 PROCEDURE — 94761 N-INVAS EAR/PLS OXIMETRY MLT: CPT

## 2023-11-13 RX ORDER — WARFARIN SODIUM 10 MG/1
10 TABLET ORAL
Status: COMPLETED | OUTPATIENT
Start: 2023-11-13 | End: 2023-11-13

## 2023-11-13 RX ORDER — FUROSEMIDE 40 MG/1
40 TABLET ORAL DAILY
Status: DISCONTINUED | OUTPATIENT
Start: 2023-11-13 | End: 2023-11-15 | Stop reason: HOSPADM

## 2023-11-13 RX ADMIN — AMLODIPINE BESYLATE 2.5 MG: 2.5 TABLET ORAL at 09:15

## 2023-11-13 RX ADMIN — WARFARIN SODIUM 10 MG: 10 TABLET ORAL at 18:21

## 2023-11-13 RX ADMIN — FUROSEMIDE 40 MG: 40 TABLET ORAL at 13:08

## 2023-11-13 RX ADMIN — Medication 10 ML: at 20:24

## 2023-11-13 RX ADMIN — Medication 400 MG: at 13:08

## 2023-11-13 RX ADMIN — CARVEDILOL 25 MG: 12.5 TABLET, FILM COATED ORAL at 20:19

## 2023-11-13 RX ADMIN — FUROSEMIDE 40 MG: 10 INJECTION, SOLUTION INTRAMUSCULAR; INTRAVENOUS at 09:14

## 2023-11-13 RX ADMIN — PANTOPRAZOLE SODIUM 40 MG: 40 TABLET, DELAYED RELEASE ORAL at 09:15

## 2023-11-13 RX ADMIN — NICOTINE 1 PATCH: 21 PATCH, EXTENDED RELEASE TRANSDERMAL at 09:16

## 2023-11-13 RX ADMIN — LISINOPRIL 40 MG: 20 TABLET ORAL at 09:16

## 2023-11-13 RX ADMIN — SPIRONOLACTONE 25 MG: 25 TABLET ORAL at 09:16

## 2023-11-13 RX ADMIN — ENOXAPARIN SODIUM 80 MG: 100 INJECTION SUBCUTANEOUS at 09:14

## 2023-11-13 RX ADMIN — VENLAFAXINE HYDROCHLORIDE 37.5 MG: 37.5 CAPSULE, EXTENDED RELEASE ORAL at 09:16

## 2023-11-13 RX ADMIN — Medication 10 ML: at 09:16

## 2023-11-13 RX ADMIN — ENOXAPARIN SODIUM 80 MG: 100 INJECTION SUBCUTANEOUS at 20:20

## 2023-11-13 RX ADMIN — ATORVASTATIN CALCIUM 40 MG: 40 TABLET, FILM COATED ORAL at 09:15

## 2023-11-13 RX ADMIN — ASPIRIN 81 MG: 81 TABLET, COATED ORAL at 09:15

## 2023-11-13 RX ADMIN — CARVEDILOL 25 MG: 12.5 TABLET, FILM COATED ORAL at 09:15

## 2023-11-13 RX ADMIN — EMPAGLIFLOZIN 10 MG: 10 TABLET, FILM COATED ORAL at 09:15

## 2023-11-13 NOTE — PLAN OF CARE
Goal Outcome Evaluation:   Pt refused medication throughout night, no complaints of pain, VSS, no acute changes, POC ongoing.

## 2023-11-13 NOTE — THERAPY EVALUATION
Patient Name: Regulo Sepulveda  : 1965    MRN: [id number]                              Today's Date: 2023       Admit Date: 11/10/2023    Visit Dx:     ICD-10-CM ICD-9-CM   1. Congestive heart failure, unspecified HF chronicity, unspecified heart failure type  I50.9 428.0   2. Difficulty in walking  R26.2 719.7   3. Decreased activities of daily living (ADL)  Z78.9 V49.89     Patient Active Problem List   Diagnosis    Left groin pain    Major depressive disorder, recurrent episode, moderate    Chronic obstructive pulmonary disease    Diabetes mellitus    Hypertensive disorder    Hyperlipidemia    Hepatitis C    Cigarette nicotine dependence    BPH (benign prostatic hyperplasia)    GERD (gastroesophageal reflux disease)    B12 deficiency    LV (left ventricular) mural thrombus    Schizophrenia    Acute on chronic combined systolic and diastolic CHF (congestive heart failure)    Acute on chronic heart failure with reduced ejection fraction and diastolic dysfunction    Acute on chronic congestive heart failure    PAF (paroxysmal atrial fibrillation)    CHF (congestive heart failure)    Acute on chronic HFrEF (heart failure with reduced ejection fraction)     Past Medical History:   Diagnosis Date    Anxiety     Asthma     Bipolar affective     Cardiac tamponade         CHF (congestive heart failure)     COPD (chronic obstructive pulmonary disease)     Coronary artery disease     Depression     Diabetes mellitus     Elevated cholesterol     Hypertension      Past Surgical History:   Procedure Laterality Date    CARDIAC CATHETERIZATION Right 2023    Procedure: Right and Left Heart Cath;  Surgeon: Ben Grant MD;  Location: [facility] CATH INVASIVE LOCATION;  Service: Cardiovascular;  Laterality: Right;    TESTICLE SURGERY Left     extraction      General Information       Row Name 23 1511          OT Time and Intention    Document Type evaluation  -ES     Mode of Treatment individual  therapy;occupational therapy  -ES       Row Name 11/13/23 1511          General Information    Patient Profile Reviewed yes  -ES     Prior Level of Function independent:;ADL's;all household mobility;community mobility  Patient independent with B and IADLs at baseline. No device for functional mobility. Tub/shower combo, standing shower completion. [place] in stance. No home O2 use. Denies recent falls.  -ES     Existing Precautions/Restrictions no known precautions/restrictions  -ES     Barriers to Rehab none identified  -ES       Row Name 11/13/23 1511          Occupational Profile    Reason for Services/Referral (Occupational Profile) Patient is 58 yr old male admitted to [facility] on 11/10/2023 with reports of shortness of breath. OT evaluation and treatment ordered d/t recent decline in ADLs/transfer ability and discharge planning recommendations. No previous OT services for current condition.  -ES       Row Name 11/13/23 1511          Living Environment    People in Home significant other  -ES       Row Name 11/13/23 1511          Cognition    Orientation Status (Cognition) oriented x 4  -ES               User Key  (r) = Recorded By, (t) = Taken By, (c) = Cosigned By      Initials Name Provider Type    ES Paige Azevedo, OTR/L, CSRS Occupational Therapist                     Mobility/ADL's       Row Name 11/13/23 1522          Bed Mobility    Bed Mobility sit-supine;supine-sit  -ES     Supine-Sit [place] (Bed Mobility) independent  -ES     Sit-Supine [place] (Bed Mobility) independent  -ES       Row Name 11/13/23 1522          Transfers    Transfers sit-stand transfer;stand-sit transfer  -ES       Row Name 11/13/23 1522          Sit-Stand Transfer    Sit-Stand [place] (Transfers) supervision  -ES     Assistive Device (Sit-Stand Transfers) other (see comments)  no AD  -ES       Row Name 11/13/23 1522          Stand-Sit Transfer    Stand-Sit [place] (Transfers) supervision  -ES      Assistive Device (Stand-Sit Transfers) other (see comments)  no AD  -ES       Row Name 11/13/23 1522          Functional Mobility    Functional Mobility- Ind. Level supervision required  -ES     Functional Mobility- Device other (see comments)  no AD  -ES       Row Name 11/13/23 1522          Activities of Daily Living    BADL Assessment/Intervention bathing;upper body dressing;lower body dressing;grooming;feeding;toileting  -ES       Row Name 11/13/23 1522          Bathing Assessment/Intervention    [place] Level (Bathing) bathing skills;independent  -ES       Row Name 11/13/23 1522          Upper Body Dressing Assessment/Training    [place] Level (Upper Body Dressing) upper body dressing skills;independent  -ES       Row Name 11/13/23 1522          Lower Body Dressing Assessment/Training    [place] Level (Lower Body Dressing) lower body dressing skills;independent  -ES       Row Name 11/13/23 1522          Grooming Assessment/Training    [place] Level (Grooming) grooming skills;independent  -ES       Row Name 11/13/23 1522          Self-Feeding Assessment/Training    [place] Level (Feeding) feeding skills;independent  -ES       Row Name 11/13/23 1522          Toileting Assessment/Training    [place] Level (Toileting) toileting skills;independent  -ES               User Key  (r) = Recorded By, (t) = Taken By, (c) = Cosigned By      Initials Name Provider Type    ES Paige Azevedo OTR/L, CSRS Occupational Therapist                   Obj/Interventions       Row Name 11/13/23 1523          Vision Assessment/Intervention    Visual Impairment/Limitations WFL  -ES       Row Name 11/13/23 1523          Range of Motion Comprehensive    General Range of Motion bilateral upper extremity ROM WNL  -ES       Row Name 11/13/23 1523          Strength Comprehensive (MMT)    General Manual Muscle Testing (MMT) Assessment no strength deficits identified  -ES     Comment, General Manual Muscle Testing  (MMT) Assessment BUEs 4/5  -ES       Row Name 11/13/23 1523          Motor Skills    Motor Skills functional endurance  -ES     Functional Endurance goof  -ES       Row Name 11/13/23 1523          Balance    Balance Assessment sitting dynamic balance;standing dynamic balance  -ES     Dynamic Sitting Balance independent  -ES     Position, Sitting Balance unsupported;sitting edge of bed  -ES     Dynamic Standing Balance supervision  -ES     Position/Device Used, Standing Balance unsupported;other (see comments)  no AD  -ES               User Key  (r) = Recorded By, (t) = Taken By, (c) = Cosigned By      Initials Name Provider Type    ES Paige Azevedo, OTR/L, CSRS Occupational Therapist                   Goals/Plan    No documentation.                  Clinical Impression       Row Name 11/13/23 1524          Plan of Care Review    Plan of Care Reviewed With patient  -ES     Outcome Evaluation Patient presents at or near baseline functional status at time of evaluation with no identified functional deficits that impede patient independence with activities of daily living.  No indicated need for skilled occupational therapy intervention in the acute care setting.  Occupational therapy will sign off at this time.  -ES       Row Name 11/13/23 1524          Therapy Assessment/Plan (OT)    Criteria for Skilled Therapeutic Interventions Met (OT) no problems identified which require skilled intervention  -ES     Therapy Frequency (OT) evaluation only  -ES       Row Name 11/13/23 1524          Therapy Plan Review/Discharge Plan (OT)    Anticipated Discharge Disposition (OT) home  -ES               User Key  (r) = Recorded By, (t) = Taken By, (c) = Cosigned By      Initials Name Provider Type    Paige Fan, OTR/L, CSRS Occupational Therapist                   Outcome Measures       Row Name 11/13/23 1525          How much help from another is currently needed...    Putting on and taking off regular lower body clothing? 4   -ES     Bathing (including washing, rinsing, and drying) 4  -ES     Toileting (which includes using toilet bed pan or urinal) 4  -ES     Putting on and taking off regular upper body clothing 4  -ES     Taking care of personal grooming (such as brushing teeth) 4  -ES     Eating meals 4  -ES     AM-PAC 6 Clicks Score (OT) 24  -ES       Row Name 11/13/23 1525          Functional Assessment    Outcome Measure Options AM-PAC 6 Clicks Daily Activity (OT);Optimal Instrument  -ES       Row Name 11/13/23 1525          Optimal Instrument    Optimal Instrument Optimal - 3  -ES     Bending/Stooping 1  -ES     Standing 1  -ES     Reaching 1  -ES     From the list, choose the 3 activities you would most like to be able to do without any difficulty Bending/stooping;Standing;Reaching  -ES     Total Score Optimal - 3 3  -ES               User Key  (r) = Recorded By, (t) = Taken By, (c) = Cosigned By      Initials Name Provider Type    ES Paige Azevedo, OTR/L, CSRS Occupational Therapist                      OT Recommendation and Plan  Therapy Frequency (OT): evaluation only  Plan of Care Review  Plan of Care Reviewed With: patient  Outcome Evaluation: Patient presents at or near baseline functional status at time of evaluation with no identified functional deficits that impede patient independence with activities of daily living.  No indicated need for skilled occupational therapy intervention in the acute care setting.  Occupational therapy will sign off at this time.     Time Calculation:   Evaluation Complexity (OT)  Review Occupational Profile/Medical/Therapy History Complexity: brief/low complexity  Assessment, Occupational Performance/Identification of Deficit Complexity: 1-3 performance deficits  Clinical Decision Making Complexity (OT): problem focused assessment/low complexity  Overall Complexity of Evaluation (OT): low complexity     Time Calculation- OT       Row Name 11/13/23 1525             Time Calculation- OT    OT  Received On 11/13/23  -ES         Untimed Charges    OT Eval/Re-eval Minutes 31  -ES         Total Minutes    Untimed Charges Total Minutes 31  -ES       Total Minutes 31  -ES                User Key  (r) = Recorded By, (t) = Taken By, (c) = Cosigned By      Initials Name Provider Type    Paige Fan OTR/L, CSRS Occupational Therapist                  Therapy Charges for Today       Code Description Service Date Service Provider Modifiers Qty    93628946882 HC OT EVAL LOW COMPLEXITY 3 11/13/2023 Paige Azevedo OTR/L, CSRS GO 1                 NANCY Gonzales/L, CSRS  11/13/2023

## 2023-11-13 NOTE — PROGRESS NOTES
CARDIOLOGY  INPATIENT PROGRESS NOTE                [facility] 4TH FLOOR MEDICAL TELEMETRY UNIT    11/13/2023      PATIENT IDENTIFICATION:   Name:  Regulo Sepulvdea      MRN:  [id number]     58 y.o.  male                 SUBJECTIVE:   Patient feeling better this morning no events overnight  OBJECTIVE:  Vitals:    11/13/23 0007 11/13/23 0438 11/13/23 0612 11/13/23 0737   BP: 133/88 147/94  142/94   BP Location: Right arm Right arm  Right arm   Patient Position: Lying Lying  Lying   Pulse: 87 85  88   Resp: 20 20  20   Temp: 98.4 °F (36.9 °C) 98.2 °F (36.8 °C)  98.2 °F (36.8 °C)   TempSrc: Oral Oral  Oral   SpO2: 94% 95%  98%   Weight:   66.9 kg (147 lb 7.8 oz)    Height:               Body mass index is 19.67 kg/m².    Intake/Output Summary (Last 24 hours) at 11/13/2023 0926  Last data filed at 11/12/2023 1628  Gross per 24 hour   Intake --   Output 600 ml   Net -600 ml       Physical Exam  General Appearance:   no acute distress  Alert and oriented x3  HENT:   lips not cyanotic  Atraumatic  Neck:  No jvd   supple  Respiratory:  no respiratory distress  normal breath sounds  no rales  Cardiovascular:    regular rhythm  no S3, no S4   no murmur  no rub  lower extremity edema: none    Skin:   warm, dry  No rashes      Allergies   Allergen Reactions    Penicillins Shortness Of Breath     Scheduled meds:  amLODIPine, 2.5 mg, Oral, Q24H  aspirin, 81 mg, Oral, Daily  atorvastatin, 40 mg, Oral, Daily  carvedilol, 25 mg, Oral, Q12H  empagliflozin, 10 mg, Oral, Daily  enoxaparin, 80 mg, Subcutaneous, Q12H  furosemide, 40 mg, Intravenous, BID  lisinopril, 40 mg, Oral, Q24H  nicotine, 1 patch, Transdermal, Q24H  nitroglycerin, 1 inch, Topical, Q6H  pantoprazole, 40 mg, Oral, Daily  sodium chloride, 10 mL, Intravenous, Q12H  spironolactone, 25 mg, Oral, Daily  venlafaxine XR, 37.5 mg, Oral, Daily      IV meds:                      Pharmacy to dose warfarin,       Data Review:  JOSÉ MIGUEL          11/10/2023    07:23  "11/11/2023    04:46 11/12/2023    04:31   CBC   WBC 9.79  7.91  8.60    RBC 4.91  4.52  4.95    Hemoglobin 13.4  12.3  13.4    Hematocrit 42.4  39.1  42.4    MCV 86.4  86.5  85.7    MCH 27.3  27.2  27.1    MCHC 31.6  31.5  31.6    RDW 14.9  14.6  14.5    Platelets 281  205  247      CMP          11/11/2023    04:46 11/12/2023    04:31 11/13/2023    04:35   CMP   Glucose 102  127  120    BUN 26  27  24    Creatinine 1.27  1.28  1.31    EGFR 65.5  64.9  63.1    Sodium 140  141  139    Potassium 3.2  3.5  4.3    Chloride 100  100  97    Calcium 8.1  8.5  9.4    BUN/Creatinine Ratio 20.5  21.1  18.3    Anion Gap 9.1  9.1  8.9       CARDIAC LABS:      Lab 11/13/23  0435 11/12/23  0431 11/11/23  0446 11/10/23  0839 11/10/23  0723   PROBNP  --   --   --   --  27,650.0*   HSTROP T  --   --   --  18  --    PROTIME 18.7* 15.5* 15.7*  --  15.5*   INR 1.54* 1.21* 1.23*  --  1.20*        No results found for: \"DIGOXIN\"   Lab Results   Component Value Date    TSH 1.410 03/02/2023           Invalid input(s): \"LDLCALC\"  No results found for: \"POCTROP\"  Lab Results   Component Value Date    TROPONINT 18 11/10/2023   (  Lab Results   Component Value Date    MG 1.5 (L) 11/13/2023                ASSESSMENT:    Acute on chronic congestive heart failure    Acute on chronic combined systolic and diastolic CHF (congestive heart failure)    PAF (paroxysmal atrial fibrillation)    CHF (congestive heart failure)        PLAN:  1.  Change Lasix to 40 p.o. daily  2.  Coreg 25 twice daily  3.  Aldactone 25 daily  4.  Jardiance 10 mg daily can be changed back to Farxiga on discharge  5.  Lisinopril 40 mg daily  6.  Amlodipine 2.5 daily feel the patient is stable from a cardiac standpoint for discharge can follow-up in heart failure clinic in the next week and in office with me in the next month.  Thanks          Rogers Barroso MD  11/13/2023    09:26 EST           "

## 2023-11-13 NOTE — PLAN OF CARE
Goal Outcome Evaluation:  Plan of Care Reviewed With: patient           Outcome Evaluation: Patient presents at or near baseline functional status at time of evaluation with no identified functional deficits that impede patient independence with activities of daily living.  No indicated need for skilled occupational therapy intervention in the acute care setting.  Occupational therapy will sign off at this time.      Anticipated Discharge Disposition (OT): home

## 2023-11-13 NOTE — PLAN OF CARE
Goal Outcome Evaluation:  Plan of Care Reviewed With: patient           Outcome Evaluation: Patient is alert and oriented. Pt sleeps most of the day. Pt did walk test today and remained above 90% o2. Patient is on room air. No complaints of pain or discomfort. Possible discharge tomorrow. Continue with plan of care.

## 2023-11-13 NOTE — PROGRESS NOTES
Pharmacy to Dose: Warfarin  Consulting provider: JIMENA Mendieta  Indication for warfarin: A fib, LV Thrombus ( August 2023)  (CHADS-VASC score: 6)  Goal INR range: 2 - 3  Home warfarin dose: 7.5 mg daily      Date Hgb INR Warfarin Dose Given   11/10 13.4 1.20 7.5 mg         11/11         12.3        1.23                  10 mg         11/12        13.4        1.21                  10 mg         11/13           -         1.54                  10 mg                Any Vitamin K given?: No  Any major drug interactions: Venlafaxine (home med)  Is patient on bridging therapy with another anticoagulant?: Yes - lovenox 80 mg q12h     Plan: Increase in INR today, still subtherapeutic. Will give bolus of 10 mg today. Continue lovenox bridge. INRs ordered daily.    Lab Results   Component Value Date     11/12/2023     11/11/2023     11/10/2023

## 2023-11-13 NOTE — SIGNIFICANT NOTE
11/13/23 1030   Coping/Psychosocial   Observed Emotional State calm;cooperative   Verbalized Emotional State hopefulness   Trust Relationship/Rapport empathic listening provided   Involvement in Care interacting with patient   Additional Documentation Spiritual Care (Group)   Spiritual Care   Use of Spiritual Resources non-Nondenominational use of spiritual care   Spiritual Care Source  initiative   Spiritual Care Follow-Up follow-up, none required as presently assessed   Response to Spiritual Care receptive of support;engaged in conversation   Spiritual Care Interventions supportive conversation provided   Spiritual Care Visit Type initial   Receptivity to Spiritual Care visit welcomed

## 2023-11-13 NOTE — PROCEDURES
Respiratory Therapist Walking Oximetry Progress Note      Patient Name:  Regulo Sepulveda  YOB: 1965  Date of Procedure: 11/13/23              ROOM AIR BASELINE   SpO2% - 95%   Heart Rate - 89     EXERCISE ON ROOM AIR SpO2% EXERCISE ON O2 LPM SpO2%   1 MINUTE 94 1 MINUTE     2 MINUTES 94 2 MINUTES     3 MINUTES 91 3 MINUTES     4 MINUTES 93 4 MINUTES     5 MINUTES 93 5 MINUTES     6 MINUTES 93 6 MINUTES                SpO2% Post Exercise  97%   HR Post Exercise  148   Time to Recovery       Comments:     Patient had no complaints of dyspnea during walk.  He did state that he was getting dizzy and weak in his legs after a few minutes of walking.  Patient sat in wheelchair and rested for a minute then resumed his walk.    Patient walked at a slow, steady pace.      After walking ,  patient heart rate 148-150 but after resting for a few minutes,  it came back down to 92.          Electronically signed by Julieth Hendrickson RRT, 11/13/23, 3:09 PM EST.

## 2023-11-14 LAB
INR PPP: 1.93 (ref 0.86–1.15)
PROTHROMBIN TIME: 22.4 SECONDS (ref 11.8–14.9)

## 2023-11-14 PROCEDURE — 99232 SBSQ HOSP IP/OBS MODERATE 35: CPT | Performed by: INTERNAL MEDICINE

## 2023-11-14 PROCEDURE — 25010000002 ENOXAPARIN PER 10 MG: Performed by: FAMILY MEDICINE

## 2023-11-14 PROCEDURE — 85610 PROTHROMBIN TIME: CPT | Performed by: FAMILY MEDICINE

## 2023-11-14 PROCEDURE — 36415 COLL VENOUS BLD VENIPUNCTURE: CPT | Performed by: FAMILY MEDICINE

## 2023-11-14 RX ORDER — WARFARIN SODIUM 7.5 MG/1
7.5 TABLET ORAL
Status: DISCONTINUED | OUTPATIENT
Start: 2023-11-14 | End: 2023-11-15 | Stop reason: HOSPADM

## 2023-11-14 RX ORDER — ENOXAPARIN SODIUM 100 MG/ML
80 INJECTION SUBCUTANEOUS EVERY 12 HOURS SCHEDULED
Qty: 8 ML | Refills: 0 | Status: SHIPPED | OUTPATIENT
Start: 2023-11-14 | End: 2023-11-15 | Stop reason: HOSPADM

## 2023-11-14 RX ADMIN — VENLAFAXINE HYDROCHLORIDE 37.5 MG: 37.5 CAPSULE, EXTENDED RELEASE ORAL at 08:30

## 2023-11-14 RX ADMIN — LISINOPRIL 40 MG: 20 TABLET ORAL at 08:30

## 2023-11-14 RX ADMIN — SPIRONOLACTONE 25 MG: 25 TABLET ORAL at 08:30

## 2023-11-14 RX ADMIN — EMPAGLIFLOZIN 10 MG: 10 TABLET, FILM COATED ORAL at 08:30

## 2023-11-14 RX ADMIN — CARVEDILOL 25 MG: 12.5 TABLET, FILM COATED ORAL at 08:30

## 2023-11-14 RX ADMIN — NICOTINE 1 PATCH: 21 PATCH, EXTENDED RELEASE TRANSDERMAL at 08:30

## 2023-11-14 RX ADMIN — ENOXAPARIN SODIUM 80 MG: 100 INJECTION SUBCUTANEOUS at 21:13

## 2023-11-14 RX ADMIN — PANTOPRAZOLE SODIUM 40 MG: 40 TABLET, DELAYED RELEASE ORAL at 08:30

## 2023-11-14 RX ADMIN — Medication 400 MG: at 08:30

## 2023-11-14 RX ADMIN — WARFARIN SODIUM 7.5 MG: 7.5 TABLET ORAL at 17:19

## 2023-11-14 RX ADMIN — CARVEDILOL 25 MG: 12.5 TABLET, FILM COATED ORAL at 21:12

## 2023-11-14 RX ADMIN — AMLODIPINE BESYLATE 2.5 MG: 2.5 TABLET ORAL at 08:29

## 2023-11-14 RX ADMIN — FUROSEMIDE 40 MG: 40 TABLET ORAL at 08:30

## 2023-11-14 RX ADMIN — ATORVASTATIN CALCIUM 40 MG: 40 TABLET, FILM COATED ORAL at 08:30

## 2023-11-14 RX ADMIN — Medication 10 ML: at 08:31

## 2023-11-14 RX ADMIN — ENOXAPARIN SODIUM 80 MG: 100 INJECTION SUBCUTANEOUS at 08:29

## 2023-11-14 RX ADMIN — ASPIRIN 81 MG: 81 TABLET, COATED ORAL at 08:29

## 2023-11-14 RX ADMIN — Medication 10 ML: at 21:13

## 2023-11-14 NOTE — PROGRESS NOTES
Pharmacy to Dose: Warfarin  Consulting provider: JIMENA Mendieta  Indication for warfarin: A fib, LV Thrombus ( August 2023)  (CHADS-VASC score: 6)  Goal INR range: 2 - 3  Home warfarin dose: 7.5 mg daily      Date INR Warfarin Dose Given   11/10 1.20 7.5 mg         11/11        1.23                  10 mg         11/12        1.21                  10 mg         11/13        1.54                  10 mg    11/14 1.93   (7.5 mg)      Any Vitamin K given?: No  Any major drug interactions: Venlafaxine (home med)  Is patient on bridging therapy with another anticoagulant?: Yes - lovenox 80 mg q12h     Lab Results   Component Value Date    HGB 13.4 11/12/2023    HGB 12.3 (L) 11/11/2023    HGB 13.4 11/10/2023     11/12/2023     11/11/2023     11/10/2023     Plan:   INR subtherapeutic but with an increase of 0.5 since yesterday.   Will decrease back to home dose of warfarin 7.5 mg daily and expect patient to be therapeutic tomorrow.  Continue lovenox bridge until INR therapeutic X2 days.   INRs ordered daily.    Thank you for this consult. Pharmacy will continue to monitor.   Doreen Puentes RPH

## 2023-11-14 NOTE — PROGRESS NOTES
[facility]   Hospitalist Progress Note  Date: 2023  Patient Name: Regulo Sepulveda  : 1965  MRN: [id number]  Date of admission: 11/10/2023      Subjective   Subjective     Chief Complaint: Follow-up shortness of breath    Summary:Regulo Sepulveda is a 58 y.o. male past medical history significant for chronic combined systolic and diastolic heart failure ejection fraction 20% in August, LV thrombus on warfarin, chronic paroxysmal atrial fibrillation, COPD, essential hypertension and hyperlipidemia who presented to ED with worsening shortness of breath.  Of note patient has been admitted twice in the past 2 months for similar symptoms found to be due to heart failure.  Patient states shortness of breath has been gradual in onset but is gotten worse to the point where he cannot walk more than a couple feet without getting short of breath.  Patient reports orthopnea and paroxysmal nocturnal dyspnea.  Denies lower extremity swelling.  Patient states that he has been taking his medications since previous discharge but took him some time to pick them up from the pharmacy.  Patient denies fever denies chills denies cough.  Patient presented to the emergency department due to continue shortness of breath.  Patient afebrile sinus rhythm 70s to 90s on telemetry review blood pressure elevated 160s over 110s.  Respiratory rate 22.  Patient satting 89% on room air required 2 L nasal cannula keep sats greater than 90%.  proBNP found to be elevated greater than 27,000.  INR subtherapeutic.  Blood sugars within normal limits.  White blood cell count within normal limits.  Chest x-ray demonstrates stable cardiomegaly with hazy groundglass opacities in the lung bases right greater than left and blunting of the right costophrenic angle concerning for pleural effusions.  Patient given 60 Lasix in the emergency department with little improvement in symptoms.  Hospitalist service contacted for admission for further  evaluation and treatment.  Cardiology consulted.  Continued on IV Lasix strict I's and O's Daily weights.  Continued on carvedilol Aldactone.  Losartan transitioned to lisinopril per cardiology.  Started on transdermal nitroglycerin to aid in blood pressure control and diuresis. Started on amlodipine for improved BP control.  Cleared by cardiology for discharge to follow-up in 1 month as well as follow-up with heart failure clinic as an outpatient the patient has no-showed to these appointments in the past.  INR remains subtherapeutic.  Patient not confident in giving himself Lovenox shots until INR becomes therapeutic as patient does not read or write and living situation is less than ideal with active drug use. Patient was supposed to have INR checks with his PCP but has refused labs in the past as an outpatient per social work and does not follow-up.  Though less than ideal could be safer for patient to potentially discharge on oral DOAC instead of Coumadin as patient's compliance with INR checks is limited.  Will need to confer with cardiology for approval.  Pending that decision patient could potentially discharge home on oral DOAC versus remain inpatient till INR therapeutic.    Interval Followup: Patient lying in bed appears to be resting comfortably.  Shortness of breath continues to improve.  Patient passed 6-minute walk test.  Afebrile overnight.  Sinus rhythm 60s to 90s on telemetry review.  Blood pressure within normal limits.  Satting well on room air.  Potassium within normal limits this morning.  INR remains subtherapeutic though improved.  No other issues per nursing.    Review of Systems  Constitutional: Negative for fatigue and fever.   HENT: Negative for sore throat and trouble swallowing.    Eyes: Negative for pain and discharge.   Respiratory: Negative for cough and positive shortness of breath.    Cardiovascular: Negative for chest pain and palpitations.   Gastrointestinal: Negative for  abdominal pain, nausea and vomiting.   Endocrine: Negative for cold intolerance and heat intolerance.   Genitourinary: Negative for difficulty urinating and dysuria.   Musculoskeletal: Negative for back pain and neck stiffness.   Skin: Negative for color change and rash.   Neurological: Negative for syncope and headaches.   Hematological: Negative for adenopathy.   Psychiatric/Behavioral: Negative for confusion and hallucinations.    Objective   Objective     Vitals:   Temp:  [97.2 °F (36.2 °C)-98.4 °F (36.9 °C)] 98.3 °F (36.8 °C)  Heart Rate:  [74-92] 83  Resp:  [20] 20  BP: (119-147)/(73-94) 119/76  Physical Exam   Gen. well-developed appearing stated age in no acute distress  HEENT: Normocephalic atraumatic moist membranes pupils equal round reactive light, no scleral icterus no conjunctival injection  Cardiovascular: regular rate and rhythm no murmurs rubs or gallops S1-S2, no lower extremity edema appreciated  Pulmonary: Crackles bilateral posterior bases, no wheezes or rhonchi symmetric chest expansion, unlabored, no conversational dyspnea appreciated  Gastrointestinal: Soft nontender nondistended positive bowel sounds all 4 quadrants no rebound or guarding  Musculoskeletal: No clubbing cyanosis, warm and well-perfused, calves soft symmetric nontender bilaterally  Skin: Clean dry without rashes  Neuro: Cranial nerves II through XII intact grossly no sensorimotor deficits appreciated bilateral upper and lower extremities  Psych: Patient is calm cooperative and appropriate with exam not responding to internal stimuli  : No Poon catheter no bladder distention no suprapubic tenderness     Result Review    Result Review:  I have personally reviewed these results and agree with these findings:  [x]  Laboratory  LAB RESULTS:      Lab 11/13/23  0435 11/12/23  0431 11/11/23  0446 11/10/23  0723   WBC  --  8.60 7.91 9.79   HEMOGLOBIN  --  13.4 12.3* 13.4   HEMATOCRIT  --  42.4 39.1 42.4   PLATELETS  --  247 205 281    NEUTROS ABS  --  5.80 4.76 6.85   IMMATURE GRANS (ABS)  --  0.02 0.02 0.02   LYMPHS ABS  --  1.70 2.08 1.84   MONOS ABS  --  0.95* 0.88 1.01*   EOS ABS  --  0.08 0.12 0.02   MCV  --  85.7 86.5 86.4   PROTIME 18.7* 15.5* 15.7* 15.5*         Lab 11/13/23 0435 11/12/23 0431 11/11/23 0446 11/10/23  0839   SODIUM 139 141 140 139   POTASSIUM 4.3 3.5 3.2* 4.9   CHLORIDE 97* 100 100 105   CO2 33.1* 31.9* 30.9* 20.8*   ANION GAP 8.9 9.1 9.1 13.2   BUN 24* 27* 26* 29*   CREATININE 1.31* 1.28* 1.27 1.11   EGFR 63.1 64.9 65.5 77.0   GLUCOSE 120* 127* 102* 121*   CALCIUM 9.4 8.5* 8.1* 8.6   MAGNESIUM 1.5*  --   --   --          Lab 11/10/23  0839   TOTAL PROTEIN 6.2   ALBUMIN 3.6   GLOBULIN 2.6   ALT (SGPT) 52*   AST (SGOT) 32   BILIRUBIN 1.2   ALK PHOS 249*         Lab 11/13/23 0435 11/12/23 0431 11/11/23 0446 11/10/23  0839 11/10/23  0723   PROBNP  --   --   --   --  27,650.0*   HSTROP T  --   --   --  18  --    PROTIME 18.7* 15.5* 15.7*  --  15.5*   INR 1.54* 1.21* 1.23*  --  1.20*                 Brief Urine Lab Results  (Last result in the past 365 days)        Color   Clarity   Blood   Leuk Est   Nitrite   Protein   CREAT   Urine HCG        08/27/23 1925 Yellow   Clear   Small (1+)   Negative   Negative   100 mg/dL (2+)                 Microbiology Results (last 10 days)       ** No results found for the last 240 hours. **            []  Microbiology  [x]  Radiology  XR Chest 1 View    Result Date: 11/10/2023    1. Stable cardiomegaly 2. Mild hazy and ground-glass opacity at the lung bases right greater than left with blunting of the right costophrenic angle compatible with pleural effusion.  Findings are similar with slight progression at the right base compared to the prior study.  Findings suggest pleural effusion with atelectasis or asymmetric pulmonary edema.  Pneumonia cannot be excluded in the correct clinical setting.       WARREN HALL MD       Electronically Signed and Approved By: WARREN HALL MD  on 11/10/2023 at 7:53              [x]  EKG/Telemetry   []  Cardiology/Vascular   []  Pathology  []  Old records  [x]  Other:  Scheduled Meds:amLODIPine, 2.5 mg, Oral, Q24H  aspirin, 81 mg, Oral, Daily  atorvastatin, 40 mg, Oral, Daily  carvedilol, 25 mg, Oral, Q12H  empagliflozin, 10 mg, Oral, Daily  enoxaparin, 80 mg, Subcutaneous, Q12H  furosemide, 40 mg, Oral, Daily  lisinopril, 40 mg, Oral, Q24H  magnesium oxide, 400 mg, Oral, Daily  nicotine, 1 patch, Transdermal, Q24H  nitroglycerin, 1 inch, Topical, Q6H  pantoprazole, 40 mg, Oral, Daily  sodium chloride, 10 mL, Intravenous, Q12H  spironolactone, 25 mg, Oral, Daily  venlafaxine XR, 37.5 mg, Oral, Daily      Continuous Infusions:Pharmacy to dose warfarin,       PRN Meds:.•  acetaminophen  •  albuterol  •  senna-docusate sodium **AND** polyethylene glycol **AND** bisacodyl **AND** bisacodyl  •  butalbital-acetaminophen-caffeine  •  influenza vaccine  •  nitroglycerin  •  ondansetron **OR** ondansetron  •  Pharmacy to dose warfarin  •  sodium chloride  •  sodium chloride  •  sodium chloride      Assessment & Plan   Assessment / Plan     Assessment/Plan:  Dyspnea  Acute on chronic combined systolic and diastolic congestive heart failure ejection fraction EF 20%  Well-controlled hypertension  Paroxysmal A-fib  Therapeutic drug monitoring Coumadin  LV thrombus on Coumadin  Bilateral pleural effusions  Cardiogenic pulmonary edema  History of drug abuse  Type 2 diabetes mellitus  Hypokalemia           Patient admitted for further evaluation and treatment  Cardiology consulted thank you for your assistance  Continue supplemental oxygen titrate to keep sats greater than 90% respiratory rate less than 20  Change Lasix to 40 mg p.o. daily  Continue strict I's and O's  Continue daily weights  Continue Coreg 25 mg twice daily  Continue Jardiance  Continue lisinopril  Continue amlodipine per cardiology  Continue spironolactone  Continue therapeutic Lovenox to bridge  Coumadin  Continue daily INRs  Pharmacy to dose Coumadin  Patient not confident in giving himself Lovenox shots until INR becomes therapeutic as patient does not read or write and living situation is less than ideal with active drug use.   Though less than ideal could be safer for patient to potentially discharge on oral DOAC instead of Coumadin as patient's compliance with INR checks is limited.  Will need to confer with cardiology for approval.   Keep on telemetry  Pleural effusions too small to safely tap  Continue to encourage incentive spirometry  Continue heart healthy diabetic diet  Further inpatient orders recommendations pending clinical course     Discussed case with patient's nurse at bedside.        Disposition: Likely be able to return home once INR therapeutic or cardiology approves DOAC instead of Coumadin due to patient compliance and medication safety issues.       DVT prophylaxis:  Medical and mechanical DVT prophylaxis orders are present.    CODE STATUS:   Code Status (Patient has no pulse and is not breathing): CPR (Attempt to Resuscitate)  Medical Interventions (Patient has pulse or is breathing): Full Support

## 2023-11-14 NOTE — CONSULTS
"Nutrition Services    Patient Name: Regulo Sepulveda  YOB: 1965  MRN: [id number]  Admission date: 11/10/2023      CLINICAL NUTRITION ASSESSMENT      Reason for Assessment  MST score 2+, BMI, Physician consult     H&P:    Past Medical History:   Diagnosis Date    Anxiety     Asthma     Bipolar affective     Cardiac tamponade     2023    CHF (congestive heart failure)     COPD (chronic obstructive pulmonary disease)     Coronary artery disease     Depression     Diabetes mellitus     Elevated cholesterol     Hypertension         Current Problems:   Active Hospital Problems    Diagnosis     **Acute on chronic congestive heart failure     CHF (congestive heart failure)     Acute on chronic HFrEF (heart failure with reduced ejection fraction)     PAF (paroxysmal atrial fibrillation)     Acute on chronic combined systolic and diastolic CHF (congestive heart failure)         Nutrition/Diet History         Narrative     Patient admitted with CHF.  Nutrition assessment related to MST score.  Patient reports he has lost 45 lbs in the past 3-4 months.  States has had decreased appetite.  Also reports decreased access to foods due to no longer receiving food stamps.  Reviewed high kcal, high pro options that are generally easier to purchase to keep on hand.      Patient agreeable to adding oral nutrition supplements while admitted to encourage increased kcal/pro intake.      NFPE performed with pertinent findings noted below.       Anthropometrics        Current Height, Weight Height: 184.4 cm (72.6\")  Weight: 67.1 kg (147 lb 14.9 oz)   Current BMI Body mass index is 19.73 kg/m².       Weight Hx  Wt Readings from Last 30 Encounters:   11/14/23 0619 67.1 kg (147 lb 14.9 oz)   11/13/23 0612 66.9 kg (147 lb 7.8 oz)   11/12/23 0600 68.5 kg (151 lb 0.2 oz)   11/11/23 0500 72.8 kg (160 lb 7.9 oz)   11/10/23 0710 76.2 kg (167 lb 15.9 oz)   11/06/23 1744 78.9 kg (174 lb)   11/02/23 1253 74.8 kg (165 lb)   10/23/23 0500 " 68.2 kg (150 lb 5.7 oz)   10/22/23 0355 69 kg (152 lb 1.9 oz)   10/21/23 0700 69.7 kg (153 lb 10.6 oz)   10/20/23 0300 72.8 kg (160 lb 7.9 oz)   10/19/23 0600 74.9 kg (165 lb 2 oz)   10/18/23 2204 74.7 kg (164 lb 10.9 oz)   10/18/23 1513 81.5 kg (179 lb 10.8 oz)   09/17/23 0345 71 kg (156 lb 8.4 oz)   09/02/23 0719 71.4 kg (157 lb 6.5 oz)   09/01/23 0500 71 kg (156 lb 8.4 oz)   08/31/23 0500 71.2 kg (156 lb 15.5 oz)   08/30/23 0542 74.2 kg (163 lb 9.3 oz)   08/29/23 0600 74 kg (163 lb 2.3 oz)   08/28/23 0522 79.3 kg (174 lb 13.2 oz)   08/27/23 2034 79.3 kg (174 lb 13.2 oz)   08/27/23 1504 76.6 kg (168 lb 14 oz)   02/27/23 2100 70.4 kg (155 lb 3.3 oz)   02/27/23 1151 70.4 kg (155 lb 3.3 oz)   09/18/22 2111 83.9 kg (184 lb 15.5 oz)   09/14/22 1636 83.6 kg (184 lb 4.9 oz)   05/24/22 1358 97.5 kg (215 lb)            Wt Change Observation -10.2% x 1 month  -12.4% x 3 months  -19.7% x 14 months      Estimated/Assessed Needs       Energy Requirements 35 kcal/kg    EST Needs (kcal/day) 2349 kcal        Protein Requirements 1.0-1.2 g/kg    EST Daily Needs (g/day) 67-81 g       Fluid Requirements 25 ml/kg     Estimated Needs (mL/day) 1678 ml      Labs/Medications         Pertinent Labs Reviewed.   Results from last 7 days   Lab Units 11/13/23  0435 11/12/23  0431 11/11/23  0446 11/10/23  0839   SODIUM mmol/L 139 141 140 139   POTASSIUM mmol/L 4.3 3.5 3.2* 4.9   CHLORIDE mmol/L 97* 100 100 105   CO2 mmol/L 33.1* 31.9* 30.9* 20.8*   BUN mg/dL 24* 27* 26* 29*   CREATININE mg/dL 1.31* 1.28* 1.27 1.11   CALCIUM mg/dL 9.4 8.5* 8.1* 8.6   BILIRUBIN mg/dL  --   --   --  1.2   ALK PHOS U/L  --   --   --  249*   ALT (SGPT) U/L  --   --   --  52*   AST (SGOT) U/L  --   --   --  32   GLUCOSE mg/dL 120* 127* 102* 121*     Results from last 7 days   Lab Units 11/13/23  0435 11/12/23  0431   MAGNESIUM mg/dL 1.5*  --    HEMOGLOBIN g/dL  --  13.4   HEMATOCRIT %  --  42.4     COVID19   Date Value Ref Range Status   10/17/2023 Not Detected Not  Detected - Ref. Range Final     Lab Results   Component Value Date    HGBA1C 6.10 (H) 08/29/2023         Pertinent Medications Reviewed.     Current Nutrition Orders & Evaluation of Intake       Oral Nutrition     Current PO Diet Diet: Cardiac Diets, Diabetic Diets; Healthy Heart (2-3 Na+); Consistent Carbohydrate; Texture: Regular Texture (IDDSI 7); Fluid Consistency: Thin (IDDSI 0)   Supplement No active supplement orders       Malnutrition Severity Assessment      Patient meets criteria for : Moderate (non-severe) Malnutrition  Malnutrition Type (last 8 hours)       Malnutrition Severity Assessment       Row Name 11/14/23 1547       Malnutrition Severity Assessment    Malnutrition Type Chronic Disease - Related Malnutrition      Row Name 11/14/23 1547       Unintentional Weight Loss     Unintentional Weight Loss Findings Severe    Unintentional Weight Loss  Weight loss greater than 7.5% in three months      Row Name 11/14/23 1547       Muscle Loss    Loss of Muscle Mass Findings Moderate    Carlin Region Moderate - slight depression    Clavicle Bone Region Moderate - some protrusion in females, visible in males    Patellar Region Moderate - patella more prominent, less muscle definition around patella    Posterior Calf Region Moderate - some roundness, slight firmness      Row Name 11/14/23 1547       Fat Loss    Subcutaneous Fat Loss Findings Moderate    Orbital Region  Moderate -  somewhat hollowness, slightly dark circles    Upper Arm Region Moderate - some fat tissue, not ample      Row Name 11/14/23 1547       Criteria Met (Must meet criteria for severity in at least 2 of these categories: M Wasting, Fat Loss, Fluid, Secondary Signs, Wt. Status, Intake)    Patient meets criteria for  Moderate (non-severe) Malnutrition                       Nutrition Diagnosis         Nutrition Dx Problem 1 Moderate malnutrition related to decreased ability to consume sufficient energy as evidenced by inadequate energy  intake., unintended wt change., and body composition changes.       Nutrition Intervention         Ensure Enlive TID   +1050 kcal, 60 g pro per day      Medical Nutrition Therapy/Nutrition Education          Learner     Readiness Patient  Acceptance     Method     Response Explanation  Verbalizes understanding     Monitor/Evaluation        Monitor Per protocol, PO intake, Supplement intake, Pertinent labs, Weight       Nutrition Discharge Plan         Recommend to continue high kcal, high pro diet on discharge with oral nutrition supplements as needed.         Electronically signed by:  Catrina Calvillo RD  11/14/23 15:47 EST

## 2023-11-14 NOTE — PROGRESS NOTES
CARDIOLOGY  INPATIENT PROGRESS NOTE                [facility] 4TH FLOOR MEDICAL TELEMETRY UNIT    11/14/2023      PATIENT IDENTIFICATION:   Name:  Regulo Sepulveda      MRN:  [id number]     58 y.o.  male                 SUBJECTIVE:   Patient with no events overnight resting comfortably in bed  OBJECTIVE:  Vitals:    11/14/23 0429 11/14/23 0619 11/14/23 0730 11/14/23 1045   BP: 133/86  129/77 106/83   BP Location: Left arm  Left arm Left arm   Patient Position: Lying  Lying Sitting   Pulse: 64  77 83   Resp: 20  18 18   Temp: 97.9 °F (36.6 °C)  97.5 °F (36.4 °C) 97.6 °F (36.4 °C)   TempSrc: Oral  Oral Oral   SpO2: 95%  96% 98%   Weight:  67.1 kg (147 lb 14.9 oz)     Height:               Body mass index is 19.73 kg/m².    Intake/Output Summary (Last 24 hours) at 11/14/2023 1407  Last data filed at 11/14/2023 1000  Gross per 24 hour   Intake 570 ml   Output 850 ml   Net -280 ml           Physical Exam  General Appearance:   no acute distress  Alert and oriented x3  HENT:   lips not cyanotic  Atraumatic  Neck:  No jvd   supple  Respiratory:  no respiratory distress  normal breath sounds  no rales  Cardiovascular:    regular rhythm  no S3, no S4   no murmur  no rub  lower extremity edema: none    Skin:   warm, dry  No rashes      Allergies   Allergen Reactions    Penicillins Shortness Of Breath     Scheduled meds:  amLODIPine, 2.5 mg, Oral, Q24H  aspirin, 81 mg, Oral, Daily  atorvastatin, 40 mg, Oral, Daily  carvedilol, 25 mg, Oral, Q12H  empagliflozin, 10 mg, Oral, Daily  enoxaparin, 80 mg, Subcutaneous, Q12H  furosemide, 40 mg, Oral, Daily  lisinopril, 40 mg, Oral, Q24H  magnesium oxide, 400 mg, Oral, Daily  nicotine, 1 patch, Transdermal, Q24H  nitroglycerin, 1 inch, Topical, Q6H  pantoprazole, 40 mg, Oral, Daily  sodium chloride, 10 mL, Intravenous, Q12H  spironolactone, 25 mg, Oral, Daily  venlafaxine XR, 37.5 mg, Oral, Daily      IV meds:                      Pharmacy to dose warfarin,       Data  "Review:  CBC          11/10/2023    07:23 11/11/2023    04:46 11/12/2023    04:31   CBC   WBC 9.79  7.91  8.60    RBC 4.91  4.52  4.95    Hemoglobin 13.4  12.3  13.4    Hematocrit 42.4  39.1  42.4    MCV 86.4  86.5  85.7    MCH 27.3  27.2  27.1    MCHC 31.6  31.5  31.6    RDW 14.9  14.6  14.5    Platelets 281  205  247      CMP          11/11/2023    04:46 11/12/2023    04:31 11/13/2023    04:35   CMP   Glucose 102  127  120    BUN 26  27  24    Creatinine 1.27  1.28  1.31    EGFR 65.5  64.9  63.1    Sodium 140  141  139    Potassium 3.2  3.5  4.3    Chloride 100  100  97    Calcium 8.1  8.5  9.4    BUN/Creatinine Ratio 20.5  21.1  18.3    Anion Gap 9.1  9.1  8.9       CARDIAC LABS:      Lab 11/14/23  0436 11/13/23  0435 11/12/23  0431 11/11/23  0446 11/10/23  0839 11/10/23  0723   PROBNP  --   --   --   --   --  27,650.0*   HSTROP T  --   --   --   --  18  --    PROTIME 22.4* 18.7* 15.5* 15.7*  --  15.5*   INR 1.93* 1.54* 1.21* 1.23*  --  1.20*        No results found for: \"DIGOXIN\"   Lab Results   Component Value Date    TSH 1.410 03/02/2023           Invalid input(s): \"LDLCALC\"  No results found for: \"POCTROP\"  Lab Results   Component Value Date    TROPONINT 18 11/10/2023   (  Lab Results   Component Value Date    MG 1.5 (L) 11/13/2023              ASSESSMENT:    Acute on chronic congestive heart failure    Acute on chronic combined systolic and diastolic CHF (congestive heart failure)    PAF (paroxysmal atrial fibrillation)    CHF (congestive heart failure)    Acute on chronic HFrEF (heart failure with reduced ejection fraction)        PLAN:  1.  Continue current GDMT therapy for CHF patient be discharged from a cardiac standpoint when other therapeutic.  We will sign off          Rogers Barroso MD  11/14/2023    14:07 EST           "

## 2023-11-14 NOTE — PLAN OF CARE
Goal Outcome Evaluation:  Plan of Care Reviewed With: patient        Progress: improving          Vital signs stable. Patient up walking in the halls multiple times today. No acute changes noted. Екатерина Don RN

## 2023-11-14 NOTE — PROGRESS NOTES
[facility]   Hospitalist Progress Note  Date: 2023  Patient Name: Regulo Conner  : 1965  MRN: [id number]  Date of admission: 11/10/2023      Subjective   Subjective     Chief Complaint: Follow up for shortness of breath    Summary: 57 y/o with combined CHF EF 20%, LV thrombus on warfin, afib, COPD, HTN, HLD who presented with SOB. Admitted for CHF exacerbation. Improved with diuresis.     Interval Followup:   NAEON. VSS  Ambulating in the conner without issue  No respiratory complaint    Review of Systems  Cardiovascular:  No Chest Pain, No Edema, No CORTEZ  Respiratory:  No Cough, No Dyspnea  Gastrointestinal:  No Nausea, No Vomiting      Objective   Objective     Vitals:   Temp:  [97.9 °F (36.6 °C)-98.3 °F (36.8 °C)] 97.9 °F (36.6 °C)  Heart Rate:  [64-92] 64  Resp:  [20] 20  BP: (118-133)/(67-86) 133/86  Physical Exam    Constitutional: Conversant, NAD   Respiratory: Clear to auscultation bilaterally, nonlabored respirations    Cardiovascular: RRR, no murmurs, no edema   Gastrointestinal: Positive bowel sounds, soft, nontender, nondistended   Neurologic: Alert, Cranial Nerves grossly intact, speech clear   Skin: Extremities warm, no rashes    Result Review    Result Review:  I have personally reviewed the following over the last 24 hours (07:00 to 07:00) and agree with the following findings  [x]  Laboratory  CBC          11/10/2023    07:23 2023    04:46 2023    04:31   CBC   WBC 9.79  7.91  8.60    RBC 4.91  4.52  4.95    Hemoglobin 13.4  12.3  13.4    Hematocrit 42.4  39.1  42.4    MCV 86.4  86.5  85.7    MCH 27.3  27.2  27.1    MCHC 31.6  31.5  31.6    RDW 14.9  14.6  14.5    Platelets 281  205  247      BMP          2023    04:46 2023    04:31 2023    04:35   BMP   BUN 26  27  24    Creatinine 1.27  1.28  1.31    Sodium 140  141  139    Potassium 3.2  3.5  4.3    Chloride 100  100  97    CO2 30.9  31.9  33.1    Calcium 8.1  8.5  9.4      INR 1.93    []   Microbiology  []  Radiology   [x]  EKG/Telemetry monitor personally reviewed: Afib, rate controlled  []  Cardiology/Vascular   []  Pathology  []  Old records  [x]  Other:    Intake/Output Summary (Last 24 hours) at 11/14/2023 0814  Last data filed at 11/14/2023 0627  Gross per 24 hour   Intake 570 ml   Output 850 ml   Net -280 ml         Assessment & Plan   Assessment / Plan     Assessment/Plan:  Acute on chronic combined systolic and diastolic congestive heart failure ejection fraction EF 20%  Bilateral pleural effusions  Cardiogenic pulmonary edema  Paroxysmal A-fib  Therapeutic drug monitoring Coumadin  Subtherapeutic INR  LV thrombus on Coumadin  Type 2 diabetes mellitus  Hypokalemia   Tobacco abuse ongoing  History of drug abuse        Appreciate cardiology recommendations.  Continue cardiac meds as ordered.  Okay to discharge from their standpoint.  Continue Coreg 25 mg every 12 hours  Continue amlodipine 2.5 mg daily  Continue lisinopril 40 mg daily  Continue p.o. Lasix 40 mg daily  Continue spironolactone 25 mg daily  Continue Jardiance 10 mg daily.  Switch back to Farxiga on discharge  INR uptrending but still subtherapeutic.  Continue nightly Coumadin with Lovenox bridge.  Monitor INR daily, (Goal 2-3).  Pharmacy following  Continue baby aspirin and atorvastatin  Continue high intensity nicotine patches  BMP in AM    Discussed plan with RN.    DVT prophylaxis:  Medical and mechanical DVT prophylaxis orders are present.    CODE STATUS:   Code Status (Patient has no pulse and is not breathing): CPR (Attempt to Resuscitate)  Medical Interventions (Patient has pulse or is breathing): Full Support        Electronically signed by Mario Wilson DO, 11/14/23, 8:11 AM EST.

## 2023-11-15 ENCOUNTER — READMISSION MANAGEMENT (OUTPATIENT)
Dept: CALL CENTER | Facility: HOSPITAL | Age: 58
End: 2023-11-15
Payer: COMMERCIAL

## 2023-11-15 VITALS
TEMPERATURE: 97.3 F | SYSTOLIC BLOOD PRESSURE: 113 MMHG | OXYGEN SATURATION: 100 % | HEIGHT: 73 IN | RESPIRATION RATE: 18 BRPM | HEART RATE: 82 BPM | DIASTOLIC BLOOD PRESSURE: 80 MMHG | BODY MASS INDEX: 19.55 KG/M2 | WEIGHT: 147.49 LBS

## 2023-11-15 PROBLEM — E44.0 MODERATE MALNUTRITION: Status: ACTIVE | Noted: 2023-11-15

## 2023-11-15 LAB
ANION GAP SERPL CALCULATED.3IONS-SCNC: 10.1 MMOL/L (ref 5–15)
BUN SERPL-MCNC: 34 MG/DL (ref 6–20)
BUN/CREAT SERPL: 27.2 (ref 7–25)
CALCIUM SPEC-SCNC: 9.3 MG/DL (ref 8.6–10.5)
CHLORIDE SERPL-SCNC: 98 MMOL/L (ref 98–107)
CO2 SERPL-SCNC: 29.9 MMOL/L (ref 22–29)
CREAT SERPL-MCNC: 1.25 MG/DL (ref 0.76–1.27)
EGFRCR SERPLBLD CKD-EPI 2021: 66.7 ML/MIN/1.73
GLUCOSE SERPL-MCNC: 186 MG/DL (ref 65–99)
INR PPP: 2.26 (ref 0.86–1.15)
POTASSIUM SERPL-SCNC: 4 MMOL/L (ref 3.5–5.2)
PROTHROMBIN TIME: 25.4 SECONDS (ref 11.8–14.9)
SODIUM SERPL-SCNC: 138 MMOL/L (ref 136–145)

## 2023-11-15 PROCEDURE — 85610 PROTHROMBIN TIME: CPT | Performed by: FAMILY MEDICINE

## 2023-11-15 PROCEDURE — 36415 COLL VENOUS BLD VENIPUNCTURE: CPT | Performed by: FAMILY MEDICINE

## 2023-11-15 PROCEDURE — 97116 GAIT TRAINING THERAPY: CPT

## 2023-11-15 PROCEDURE — 99239 HOSP IP/OBS DSCHRG MGMT >30: CPT | Performed by: INTERNAL MEDICINE

## 2023-11-15 PROCEDURE — 80048 BASIC METABOLIC PNL TOTAL CA: CPT | Performed by: INTERNAL MEDICINE

## 2023-11-15 PROCEDURE — 25010000002 ENOXAPARIN PER 10 MG: Performed by: FAMILY MEDICINE

## 2023-11-15 RX ORDER — LISINOPRIL 40 MG/1
40 TABLET ORAL
Qty: 30 TABLET | Refills: 0 | Status: SHIPPED | OUTPATIENT
Start: 2023-11-16 | End: 2023-12-16

## 2023-11-15 RX ORDER — FUROSEMIDE 40 MG/1
40 TABLET ORAL DAILY
Qty: 30 TABLET | Refills: 0 | Status: SHIPPED | OUTPATIENT
Start: 2023-11-16 | End: 2023-12-16

## 2023-11-15 RX ORDER — NICOTINE 21 MG/24HR
1 PATCH, TRANSDERMAL 24 HOURS TRANSDERMAL
Qty: 21 PATCH | Refills: 0 | Status: SHIPPED | OUTPATIENT
Start: 2023-11-15 | End: 2023-12-06

## 2023-11-15 RX ORDER — AMLODIPINE BESYLATE 2.5 MG/1
2.5 TABLET ORAL
Qty: 30 TABLET | Refills: 0 | Status: SHIPPED | OUTPATIENT
Start: 2023-11-16 | End: 2023-12-16

## 2023-11-15 RX ADMIN — VENLAFAXINE HYDROCHLORIDE 37.5 MG: 37.5 CAPSULE, EXTENDED RELEASE ORAL at 10:36

## 2023-11-15 RX ADMIN — Medication 10 ML: at 10:37

## 2023-11-15 RX ADMIN — AMLODIPINE BESYLATE 2.5 MG: 2.5 TABLET ORAL at 10:35

## 2023-11-15 RX ADMIN — EMPAGLIFLOZIN 10 MG: 10 TABLET, FILM COATED ORAL at 10:39

## 2023-11-15 RX ADMIN — SPIRONOLACTONE 25 MG: 25 TABLET ORAL at 10:36

## 2023-11-15 RX ADMIN — Medication 400 MG: at 10:35

## 2023-11-15 RX ADMIN — ASPIRIN 81 MG: 81 TABLET, COATED ORAL at 10:34

## 2023-11-15 RX ADMIN — CARVEDILOL 25 MG: 12.5 TABLET, FILM COATED ORAL at 10:35

## 2023-11-15 RX ADMIN — PANTOPRAZOLE SODIUM 40 MG: 40 TABLET, DELAYED RELEASE ORAL at 10:35

## 2023-11-15 RX ADMIN — ENOXAPARIN SODIUM 80 MG: 100 INJECTION SUBCUTANEOUS at 10:37

## 2023-11-15 RX ADMIN — NICOTINE 1 PATCH: 21 PATCH, EXTENDED RELEASE TRANSDERMAL at 10:37

## 2023-11-15 RX ADMIN — ATORVASTATIN CALCIUM 40 MG: 40 TABLET, FILM COATED ORAL at 10:34

## 2023-11-15 RX ADMIN — FUROSEMIDE 40 MG: 40 TABLET ORAL at 10:36

## 2023-11-15 RX ADMIN — LISINOPRIL 40 MG: 20 TABLET ORAL at 10:35

## 2023-11-15 NOTE — THERAPY TREATMENT NOTE
Acute Care - Physical Therapy Treatment Note   [place]     Patient Name: Regulo Sepulveda  : 1965  MRN: [id number]  Today's Date: 11/15/2023   Onset of Illness/Injury or Date of Surgery: 11/10/23  Visit Dx:     ICD-10-CM ICD-9-CM   1. Congestive heart failure, unspecified HF chronicity, unspecified heart failure type  I50.9 428.0   2. Difficulty in walking  R26.2 719.7   3. Decreased activities of daily living (ADL)  Z78.9 V49.89     Patient Active Problem List   Diagnosis    Left groin pain    Major depressive disorder, recurrent episode, moderate    Chronic obstructive pulmonary disease    Diabetes mellitus    Hypertensive disorder    Hyperlipidemia    Hepatitis C    Cigarette nicotine dependence    BPH (benign prostatic hyperplasia)    GERD (gastroesophageal reflux disease)    B12 deficiency    LV (left ventricular) mural thrombus    Schizophrenia    Acute on chronic combined systolic and diastolic CHF (congestive heart failure)    Acute on chronic heart failure with reduced ejection fraction and diastolic dysfunction    Acute on chronic congestive heart failure    PAF (paroxysmal atrial fibrillation)    CHF (congestive heart failure)    Acute on chronic HFrEF (heart failure with reduced ejection fraction)     Past Medical History:   Diagnosis Date    Anxiety     Asthma     Bipolar affective     Cardiac tamponade         CHF (congestive heart failure)     COPD (chronic obstructive pulmonary disease)     Coronary artery disease     Depression     Diabetes mellitus     Elevated cholesterol     Hypertension      Past Surgical History:   Procedure Laterality Date    CARDIAC CATHETERIZATION Right 2023    Procedure: Right and Left Heart Cath;  Surgeon: Ben Grant MD;  Location: formerly [facility] CATH INVASIVE LOCATION;  Service: Cardiovascular;  Laterality: Right;    TESTICLE SURGERY Left     extraction     PT Assessment (last 12 hours)       PT Evaluation and Treatment       Row Name 11/15/23 1100           Physical Therapy Time and Intention    Subjective Information no complaints (P)   -RR     Document Type therapy note (daily note) (P)   -RR     Mode of Treatment individual therapy;physical therapy (P)   -RR     Patient Effort excellent (P)   -RR     Symptoms Noted During/After Treatment shortness of breath;fatigue (P)   -RR       Row Name 11/15/23 1100          Bed Mobility    Bed Mobility supine-sit (P)   -RR     Supine-Sit [place] (Bed Mobility) supervision (P)   -RR       Row Name 11/15/23 1100          Transfers    Transfers sit-stand transfer (P)   -RR       Row Name 11/15/23 1100          Sit-Stand Transfer    Sit-Stand [place] (Transfers) supervision (P)   -RR     Assistive Device (Sit-Stand Transfers) -- (P)   None used  -RR       Row Name 11/15/23 1100          Gait/Stairs (Locomotion)    Gait/Stairs Locomotion gait/ambulation independence (P)   -RR     [place] Level (Gait) standby assist (P)   -RR     Assistive Device (Gait) -- (P)   None used  -RR     Patient was able to Ambulate yes (P)   -RR     Distance in Feet (Gait) 450 (P)   -RR     Pattern (Gait) step-through (P)   -RR     Comment, (Gait/Stairs) Pt reported SOB and fatigue towards end of ambulation (P)   -RR       Row Name 11/15/23 1100          Balance    Balance Assessment standing dynamic balance (P)   -RR     Dynamic Standing Balance standby assist (P)   -RR     Position/Device Used, Standing Balance unsupported (P)   -RR       Row Name 11/15/23 1100          Positioning and Restraints    Pre-Treatment Position in bed (P)   -RR     Post Treatment Position other (P)   -RR     Other Position -- (P)   Standing in room upon exit  -RR       Row Name 11/15/23 1100          Progress Summary (PT)    Progress Toward Functional Goals (PT) progress toward functional goals is good;prepare for discharge (P)   -RR     Daily Progress Summary (PT) Pt was able to ambulate 450' with no assistive device and SBA. Complaints of mild fatigue and SOB  around 400'. Pt is independent in room and functioning close to baseline. He has met all goals established on initial evaluation and no longer requires skilled inpatient PT services. Pt was educated on ambulating safely in room and hallway as tolerated. (P)   -RR       Row Name 11/15/23 1100          Bed Mobility Goal 1 (PT)    Activity/Assistive Device (Bed Mobility Goal 1, PT) bed mobility activities, all (P)   -RR     [place] Level/Cues Needed (Bed Mobility Goal 1, PT) independent (P)   -RR     Time Frame (Bed Mobility Goal 1, PT) long term goal (LTG) (P)   -RR     Progress/Outcomes (Bed Mobility Goal 1, PT) goal met (P)   -RR       Row Name 11/15/23 1100          Transfer Goal 1 (PT)    Activity/Assistive Device (Transfer Goal 1, PT) transfers, all (P)   -RR     [place] Level/Cues Needed (Transfer Goal 1, PT) independent (P)   -RR     Time Frame (Transfer Goal 1, PT) long term goal (LTG);10 days (P)   -RR     Progress/Outcome (Transfer Goal 1, PT) goal met (P)   -RR       Row Name 11/15/23 1100          Gait Training Goal 1 (PT)    Activity/Assistive Device (Gait Training Goal 1, PT) gait (walking locomotion);assistive device use (P)   -RR     [place] Level (Gait Training Goal 1, PT) independent (P)   -RR     Distance (Gait Training Goal 1, PT) 300 (P)   -RR     Time Frame (Gait Training Goal 1, PT) long term goal (LTG);10 days (P)   -RR     Progress/Outcome (Gait Training Goal 1, PT) goal met (P)   -RR               User Key  (r) = Recorded By, (t) = Taken By, (c) = Cosigned By      Initials Name Provider Type    RR Karrie Leong, PT Student PT Student                    Physical Therapy Education       Title: PT OT SLP Therapies (Done)       Topic: Physical Therapy (Done)       Point: Mobility training (Done)       Learning Progress Summary             Patient Acceptance, E,TB, VU by ROSEMARY at 11/11/2023 1416                         Point: Home exercise program (Done)       Learning Progress  Summary             Patient Acceptance, E,TB, VU by  at 11/11/2023 1416                         Point: Body mechanics (Done)       Learning Progress Summary             Patient Acceptance, E,TB, VU by  at 11/11/2023 1416                         Point: Precautions (Done)       Learning Progress Summary             Patient Acceptance, E,TB, VU by  at 11/11/2023 1416                                         User Key       Initials Effective Dates Name Provider Type Discipline     04/25/21 -  Marco Castillo, PT Physical Therapist PT                  PT Recommendation and Plan     Progress Summary (PT)  Progress Toward Functional Goals (PT): (P) progress toward functional goals is good, prepare for discharge  Daily Progress Summary (PT): (P) Pt was able to ambulate 450' with no assistive device and SBA. Complaints of mild fatigue and SOB around 400'. Pt is independent in room and functioning close to baseline. He has met all goals established on initial evaluation and no longer requires skilled inpatient PT services. Pt was educated on ambulating safely in room and hallway as tolerated.   Outcome Measures       Row Name 11/15/23 1100             How much help from another person do you currently need...    Turning from your back to your side while in flat bed without using bedrails? 4 (P)   -RR      Moving from lying on back to sitting on the side of a flat bed without bedrails? 4 (P)   -RR      Moving to and from a bed to a chair (including a wheelchair)? 4 (P)   -RR      Standing up from a chair using your arms (e.g., wheelchair, bedside chair)? 4 (P)   -RR      Climbing 3-5 steps with a railing? 4 (P)   -RR      To walk in hospital room? 4 (P)   -RR      AM-PAC 6 Clicks Score (PT) 24 (P)   -RR      Highest Level of Mobility Goal 8 --> Walked 250 feet or more (P)   -RR                User Key  (r) = Recorded By, (t) = Taken By, (c) = Cosigned By      Initials Name Provider Type    RR Karrie Leong, PT Student  PT Student                     Time Calculation:    PT Charges       Row Name 11/15/23 1128             Time Calculation    PT Received On 11/15/23 (P)   -RR         Timed Charges    59546 - Gait Training Minutes  10 (P)   -RR         Untimed Charges    PT Eval/Re-eval Minutes 2 (P)   -RR         Total Minutes    Timed Charges Total Minutes 10 (P)   -RR      Untimed Charges Total Minutes 2 (P)   -RR       Total Minutes 12 (P)   -RR                User Key  (r) = Recorded By, (t) = Taken By, (c) = Cosigned By      Initials Name Provider Type    Karrie Soria, PT Student PT Student                      PT G-Codes  Outcome Measure Options: AM-PAC 6 Clicks Daily Activity (OT), Optimal Instrument  AM-PAC 6 Clicks Score (PT): (P) 24  AM-PAC 6 Clicks Score (OT): 24    Karrie Leong, PT Student  11/15/2023

## 2023-11-15 NOTE — DISCHARGE SUMMARY
[facility]         HOSPITALIST  DISCHARGE SUMMARY    Patient Name: Regulo Sepulveda  : 1965  MRN: [id number]    Date of Admission: 11/10/2023  Date of Discharge:  11/15/23  Primary Care Physician: Dickson Landry MD    Consults       Date and Time Order Name Status Description    11/10/2023 10:10 AM Inpatient Hospitalist Consult      11/10/2023 10:06 AM Inpatient Cardiology Consult Completed     10/21/2023 12:49 PM Inpatient Gastroenterology Consult Completed     10/19/2023  7:19 AM Inpatient Cardiology Consult Completed             Active and Resolved Hospital Problems:  Acute on chronic combined systolic and diastolic congestive heart failure ejection fraction EF 20%  Bilateral pleural effusions  Cardiogenic pulmonary edema  Paroxysmal A-fib  Therapeutic drug monitoring Coumadin  Subtherapeutic INR  LV thrombus on Coumadin  Type 2 diabetes mellitus  Hypokalemia   Tobacco abuse ongoing  History of drug abuse    Hospital Course     Hospital Course:  Regulo Sepulveda is a 58 y.o. male with combined systolic and diastolic congestive heart failure with EF 20%, LV thrombus on warfarin, atrial fibrillation, COPD, hypertension who presented with difficulty breathing, orthopnea and PND.  On presentation blood pressure was elevated with SPO2 89% on 2 L.  proBNP elevated over 27,000.  INR was subtherapeutic.  Chest x-ray showed pleural effusions.  He was admitted for management of heart failure exacerbation.  Cardiology was consulted.  He was started on therapeutic Lovenox in the setting of subtherapeutic INR.  He improved with IV diuresis.  Cardiology assisted with medication changes per GDMT for systolic heart failure.  He was weaned to room air.  Coumadin was restarted until INR was at goal between 2-3.  Importance of medication compliance and follow-up with PCP next week for INR check was emphasized.  Discharged home in stable condition      Day of Discharge     Vital Signs:  Temp:  [97.3 °F  (36.3 °C)-99.1 °F (37.3 °C)] 97.3 °F (36.3 °C)  Heart Rate:  [75-84] 84  Resp:  [15-18] 16  BP: (106-132)/(59-86) 131/86  Physical Exam:   GENERAL: The patient is conversant and nontoxic.  HEENT: PERRLA, Oropharynx clear. Moist mucous membranes.   HEART: Regular rate and rhythm. No edema  LUNGS: Equal air entry, clear to auscultation bilaterally.  ABDOMEN: Soft, positive bowel sounds, nontender, nondistended  SKIN: No rash or open wounds   NEUROLOGIC: Alert, cranial nerves grossly intact, speech clear    Discharge Details        Discharge Medications        New Medications        Instructions Start Date   amLODIPine 2.5 MG tablet  Commonly known as: NORVASC   2.5 mg, Oral, Every 24 Hours Scheduled   Start Date: November 16, 2023     furosemide 40 MG tablet  Commonly known as: LASIX   40 mg, Oral, Daily   Start Date: November 16, 2023     lisinopril 40 MG tablet  Commonly known as: PRINIVIL,ZESTRIL   40 mg, Oral, Every 24 Hours Scheduled   Start Date: November 16, 2023            Changes to Medications        Instructions Start Date   warfarin 7.5 MG tablet  Commonly known as: COUMADIN  What changed: additional instructions   7.5 mg, Oral, Nightly             Continue These Medications        Instructions Start Date   Abilify Maintena 300 MG Suspension Reconstituted ER IM injection ER  Generic drug: ARIPiprazole ER   300 mg, Intramuscular, Every 30 Days      albuterol sulfate  (90 Base) MCG/ACT inhaler  Commonly known as: PROVENTIL HFA;VENTOLIN HFA;PROAIR HFA   2 puffs, Inhalation, Every 4 Hours PRN      aspirin 81 MG EC tablet   81 mg, Oral, Daily      atorvastatin 40 MG tablet  Commonly known as: LIPITOR   40 mg, Oral, Every Night at Bedtime      benzonatate 100 MG capsule  Commonly known as: TESSALON   100 mg, Oral, 3 Times Daily PRN      carvedilol 25 MG tablet  Commonly known as: COREG   1 tablet, Oral, Every 12 Hours Scheduled      Farxiga 5 MG tablet tablet  Generic drug: dapagliflozin   5 mg, Oral,  Daily      meloxicam 15 MG tablet  Commonly known as: MOBIC   15 mg, Oral, Daily      metFORMIN 1000 MG tablet  Commonly known as: GLUCOPHAGE   1,000 mg, Oral, 2 Times Daily      nicotine 21 MG/24HR patch  Commonly known as: NICODERM CQ   1 patch, Transdermal, Every 24 Hours Scheduled      pantoprazole 40 MG EC tablet  Commonly known as: Protonix   40 mg, Oral, Daily      spironolactone 25 MG tablet  Commonly known as: ALDACTONE   25 mg, Oral, Daily      venlafaxine XR 37.5 MG 24 hr capsule  Commonly known as: EFFEXOR-XR   1 capsule, Oral, Daily             Stop These Medications      doxycycline 100 MG capsule  Commonly known as: MONODOX     losartan 50 MG tablet  Commonly known as: COZAAR              Allergies   Allergen Reactions    Penicillins Shortness Of Breath       Discharge Disposition:  Home or Self Care    Diet:  Hospital:  Diet Order   Procedures    Diet: Cardiac Diets, Diabetic Diets; Healthy Heart (2-3 Na+); Consistent Carbohydrate; Texture: Regular Texture (IDDSI 7); Fluid Consistency: Thin (IDDSI 0)       Discharge Activity:   Activity Instructions       Activity as Tolerated              CODE STATUS:  Code Status and Medical Interventions:   Ordered at: 11/10/23 1830     Code Status (Patient has no pulse and is not breathing):    CPR (Attempt to Resuscitate)     Medical Interventions (Patient has pulse or is breathing):    Full Support         Future Appointments   Date Time Provider Department Center   11/27/2023  1:15 PM [facility]   11/29/2023  9:45 AM Veronica Lu [facility]N [facility]       Additional Instructions for the Follow-ups that You Need to Schedule       Discharge Follow-up with PCP   As directed       Currently Documented PCP:    Dickson Landry MD    PCP Phone Number:    [phone]     Follow Up Details: call office to schedule appointment for next week        Discharge Follow-up with Specified Provider: Dr Barroso; 1 Month   As directed      To: Dr Barroso    Follow Up: 1 Month                Pertinent  and/or Most Recent Results     PROCEDURES: NONE    LAB RESULTS:      Lab 11/15/23  0510 11/14/23  0436 11/13/23  0435 11/12/23  0431 11/11/23  0446 11/10/23  0723   WBC  --   --   --  8.60 7.91 9.79   HEMOGLOBIN  --   --   --  13.4 12.3* 13.4   HEMATOCRIT  --   --   --  42.4 39.1 42.4   PLATELETS  --   --   --  247 205 281   NEUTROS ABS  --   --   --  5.80 4.76 6.85   IMMATURE GRANS (ABS)  --   --   --  0.02 0.02 0.02   LYMPHS ABS  --   --   --  1.70 2.08 1.84   MONOS ABS  --   --   --  0.95* 0.88 1.01*   EOS ABS  --   --   --  0.08 0.12 0.02   MCV  --   --   --  85.7 86.5 86.4   PROTIME 25.4* 22.4* 18.7* 15.5* 15.7* 15.5*         Lab 11/15/23  0510 11/13/23  0435 11/12/23  0431 11/11/23  0446 11/10/23  0839   SODIUM 138 139 141 140 139   POTASSIUM 4.0 4.3 3.5 3.2* 4.9   CHLORIDE 98 97* 100 100 105   CO2 29.9* 33.1* 31.9* 30.9* 20.8*   ANION GAP 10.1 8.9 9.1 9.1 13.2   BUN 34* 24* 27* 26* 29*   CREATININE 1.25 1.31* 1.28* 1.27 1.11   EGFR 66.7 63.1 64.9 65.5 77.0   GLUCOSE 186* 120* 127* 102* 121*   CALCIUM 9.3 9.4 8.5* 8.1* 8.6   MAGNESIUM  --  1.5*  --   --   --          Lab 11/10/23  0839   TOTAL PROTEIN 6.2   ALBUMIN 3.6   GLOBULIN 2.6   ALT (SGPT) 52*   AST (SGOT) 32   BILIRUBIN 1.2   ALK PHOS 249*         Lab 11/15/23  0510 11/14/23  0436 11/13/23  0435 11/12/23  0431 11/11/23  0446 11/10/23  0839 11/10/23  0723   PROBNP  --   --   --   --   --   --  27,650.0*   HSTROP T  --   --   --   --   --  18  --    PROTIME 25.4* 22.4* 18.7* 15.5* 15.7*  --  15.5*   INR 2.26* 1.93* 1.54* 1.21* 1.23*  --  1.20*                 Brief Urine Lab Results  (Last result in the past 365 days)        Color   Clarity   Blood   Leuk Est   Nitrite   Protein   CREAT   Urine HCG        08/27/23 1925 Yellow   Clear   Small (1+)   Negative   Negative   100 mg/dL (2+)                 Microbiology Results (last 10 days)       ** No results found for the last 240 hours. **            XR Chest  1 View    Result Date: 11/10/2023    1. Stable cardiomegaly 2. Mild hazy and ground-glass opacity at the lung bases right greater than left with blunting of the right costophrenic angle compatible with pleural effusion.  Findings are similar with slight progression at the right base compared to the prior study.  Findings suggest pleural effusion with atelectasis or asymmetric pulmonary edema.  Pneumonia cannot be excluded in the correct clinical setting.       WARREN HALL MD       Electronically Signed and Approved By: WARREN HALL MD on 11/10/2023 at 7:53                       Results for orders placed during the hospital encounter of 08/27/23    Adult Transthoracic Echo Complete w/ Color, Spectral and Contrast if necessary per protocol    Interpretation Summary    Left ventricular systolic function is severely decreased. Calculated left ventricular EF = 20%    Left ventricular diastolic function is consistent with (grade II w/high LAP) pseudonormalization.    There is a solid thrombus located in the left ventricular apex. The thrombus measures 2.1 cm in diameter and is fixed.    The left atrial cavity is mild to moderately dilated.    Estimated right ventricular systolic pressure from tricuspid regurgitation is mildly elevated (35-45 mmHg).    Global hypokinesis of the LV with severely reduced ejection fraction and 2.1cm fixed, apical LV thrombus seen with difinity.      Labs Pending at Discharge:        Time spent on Discharge including face to face service: >30 minutes    Electronically signed by Mario Wilson DO, 11/15/23, 11:34 AM EST.

## 2023-11-15 NOTE — PLAN OF CARE
Goal Outcome Evaluation:  Plan of Care Reviewed With: patient         VSS. Patient is up ad riaz and tolerating well. Patient is being DC home per MD.

## 2023-11-15 NOTE — PLAN OF CARE
Goal Outcome Evaluation:  Plan of Care Reviewed With: patient   No c/o pain or soa.  Independent with mobility and adl's.

## 2023-11-15 NOTE — OUTREACH NOTE
Prep Survey      Flowsheet Row Responses   [facility] patient discharged from? [facility]   Is LACE score < 7 ? No   Eligibility [facility]   Date of Admission 11/10/23   Date of Discharge 11/15/23   Discharge Disposition Home-Health Care Sv   Discharge diagnosis Acute on chronic congestive heart failure   Does the patient have one of the following disease processes/diagnoses(primary or secondary)? CHF   Does the patient have Home health ordered? Yes   What is the Home health agency?  [facility]    Is there a DME ordered? No   Prep survey completed? Yes            Dorothy BOUDREAUX - Registered Nurse

## 2023-11-16 ENCOUNTER — TRANSITIONAL CARE MANAGEMENT TELEPHONE ENCOUNTER (OUTPATIENT)
Dept: CALL CENTER | Facility: HOSPITAL | Age: 58
End: 2023-11-16
Payer: COMMERCIAL

## 2023-11-16 NOTE — OUTREACH NOTE
Call Center TCM Note      Flowsheet Row Responses   [facility] patient discharged from? [facility]   Does the patient have one of the following disease processes/diagnoses(primary or secondary)? CHF   TCM attempt successful? Yes   Call start time 1135   Call end time 1151   Discharge diagnosis Acute on chronic congestive heart failure   Meds reviewed with patient/caregiver? Yes   Is the patient having any side effects they believe may be caused by any medication additions or changes? No   Does the patient have all medications ordered at discharge? Yes   Is the patient taking all medications as directed (includes completed medication regime)? Yes   Medication comments Reports Pt is picking up meds today   Comments Pt has an appt already on schedule for 11/27/23 115 pm, however is only 15 mins. No HOSP HI FU appts available.   Does the patient have an appointment with their PCP within 7-14 days of discharge? Yes   What is the Home health agency?  [facility]   Has home health visited the patient within 72 hours of discharge? No   Home health interventions Called Home Health agency   Home health comments Call placed to [facility] and they report that denied due to pt will be seen by different agency. Not info was available. [facility]   Psychosocial issues? No   Did the patient receive a copy of their discharge instructions? Yes   Nursing interventions Reviewed instructions with patient   What is the patient's perception of their health status since discharge? Improving   Nursing interventions Nurse provided patient education   Is the patient able to teach back signs and symptoms of worsening condition? (i.e. weight gain, shortness of air, etc.) Yes   Additional teach back comments Pt reports he nevers swells but get SOA, encouraged wts.   TCM call completed? Yes   Wrap up additional comments Pt reports that his sister helps with meds. reviewed meds with Pt. Encouraged Pt to have sister to get battery for  scale and for him to get wt every am. Educated Pt on increased wt and when to call Dr.   Call end time 1151   Is the patient interested in additional calls from an ambulatory ? Yes   What is the reason the patient needs support from an ACM? Disease Education, Care Coordination  [additional education and support. Also HH denied stating Pt will be seen by different agency- however unable to confirm.]             Gela Sepulveda RN    11/16/2023, 11:55 EST

## 2023-11-20 ENCOUNTER — PATIENT OUTREACH (OUTPATIENT)
Dept: CASE MANAGEMENT | Facility: OTHER | Age: 58
End: 2023-11-20
Payer: COMMERCIAL

## 2023-11-20 NOTE — OUTREACH NOTE
AMBULATORY CASE MANAGEMENT NOTE    Name and Relationship of Patient/Support Person: YamilkaAurora - Emergency Contact    Adult Patient Profile  Questions/Answers      Flowsheet Row Most Recent Value   Barriers to Managing Health understanding health advice, lack of transportation   Barriers to Taking Medication as Prescribed none  [patient reports he has all his medications and that his pharmacy delivered his medications,  he states his sister helps him with his medications]   Hearing Difficulty or Deaf yes   Wear Glasses or Blind no   Concentrating, Remembering or Making Decisions Difficulty yes  [states his sister helps him or his room mate]   Difficulty Communicating no   Difficulty Eating/Swallowing no   Walking or Climbing Stairs Difficulty no   Dressing/Bathing Difficulty no   Doing Errands Independently Difficulty (such as shopping) no          Adult Patient Profile  Questions/Answers      Flowsheet Row Most Recent Value   Barriers to Managing Health understanding health advice, lack of transportation   Barriers to Taking Medication as Prescribed none  [patient reports he has all his medications and that his pharmacy delivered his medications,  he states his sister helps him with his medications]   Hearing Difficulty or Deaf yes   Wear Glasses or Blind no   Concentrating, Remembering or Making Decisions Difficulty yes  [states his sister helps him or his room mate]   Difficulty Communicating no   Difficulty Eating/Swallowing no   Walking or Climbing Stairs Difficulty no   Dressing/Bathing Difficulty no   Doing Errands Independently Difficulty (such as shopping) no          SDOH updated and reviewed with the patient during this program:  Food Insecurity: Food Insecurity Present (11/20/2023)    Hunger Vital Sign     Worried About Running Out of Food in the Last Year: Sometimes true     Ran Out of Food in the Last Year: Sometimes true      Transportation Needs: Unmet Transportation Needs (11/20/2023)     PRAPARE - Transportation     Lack of Transportation (Medical): Yes     Lack of Transportation (Non-Medical): Yes     Patient Outreach    Outgoing call to the patient for follow up with a referral from the call center.  Introduced self and explained role and purpose of outreach.  Verified the PCP appointment with the patient.  At this time he plans to make this and states he will have his sister, Aurora Prather, transport him.  He states that his pharmacy delivered his medications.  He verified he does not have HH services.  He states that he does not want or need them at this time.  He states he is IADLs.  He states his sister helped him with his medications and that he has been taking them.  Discussed CCM services and he declines.  He was agreeable with assistance with HRCM and enrolled.  He verbalized understanding that there is no charge for services and he may opt out at anytime.  He prefers a follow up after his PCP appt on 11/27 and this outreach has been scheduled.    Care Coordination    Outgoing call was made to [facility] to clarify if they accepted the patient at [place].  Spoke with Lore at [facility].  She states that they declined and that per re evaluation they could not accept due to current staffing issues. Outgoing call to PCP office to notify PCP that the patient needs a one week follow up and an INR per medical records.  Spoke with KATHY Malcolm. She states this is the next available PCP appointment.  She states that they do not do INR labs at this clinic.  She would have an order for the [facility] outpatient lab for the patient.  She would notify PCP that the patient does not have HH at this time and declines it.  Notified that the patient states he is compliant with his medications but is not at home and declined reviewing medications. Did encourage patient to bring medications to his next appt with his PCP.  Notified patient that he will need to go to [facility] outpatient lab for INR lab to be done  before his appointment on 11/27 with his PCP.  He verbalized understanding.  He did give ACM permission to speak to his sister on his behalf about his appt and medications.  The number in EPIC [phone] for sisterAurora is not accepting calls when attempted outreach.  Provided ACM contact to the patient and encouraged he outreach with any needs.     Catrachita JEFFERSON  Ambulatory Case Management    11/20/2023, 15:45 EST

## 2023-11-21 ENCOUNTER — PATIENT OUTREACH (OUTPATIENT)
Dept: CASE MANAGEMENT | Facility: OTHER | Age: 58
End: 2023-11-21
Payer: COMMERCIAL

## 2023-11-21 NOTE — OUTREACH NOTE
Patient Outreach    MSW attempted to reach patient for resources needed. MSW unable to leave messages voicemail is full. MSW to continue to attempt.    Delmi GILMORE -   Ambulatory Case Management    11/21/2023, 16:29 EST

## 2023-11-27 ENCOUNTER — OFFICE VISIT (OUTPATIENT)
Dept: FAMILY MEDICINE CLINIC | Facility: CLINIC | Age: 58
End: 2023-11-27
Payer: COMMERCIAL

## 2023-11-27 ENCOUNTER — PATIENT OUTREACH (OUTPATIENT)
Dept: CASE MANAGEMENT | Facility: OTHER | Age: 58
End: 2023-11-27
Payer: COMMERCIAL

## 2023-11-27 VITALS
TEMPERATURE: 97 F | DIASTOLIC BLOOD PRESSURE: 80 MMHG | HEIGHT: 73 IN | WEIGHT: 147 LBS | OXYGEN SATURATION: 92 % | SYSTOLIC BLOOD PRESSURE: 140 MMHG | HEART RATE: 77 BPM | BODY MASS INDEX: 19.48 KG/M2

## 2023-11-27 DIAGNOSIS — U07.1 COVID-19 VIRUS DETECTED: Primary | ICD-10-CM

## 2023-11-27 DIAGNOSIS — F41.1 GAD (GENERALIZED ANXIETY DISORDER): ICD-10-CM

## 2023-11-27 DIAGNOSIS — R05.8 OTHER COUGH: ICD-10-CM

## 2023-11-27 LAB
EXPIRATION DATE: ABNORMAL
FLUAV AG UPPER RESP QL IA.RAPID: NOT DETECTED
FLUBV AG UPPER RESP QL IA.RAPID: NOT DETECTED
INTERNAL CONTROL: ABNORMAL
Lab: ABNORMAL
SARS-COV-2 AG UPPER RESP QL IA.RAPID: DETECTED

## 2023-11-27 RX ORDER — GUAIFENESIN 600 MG/1
1200 TABLET, EXTENDED RELEASE ORAL 2 TIMES DAILY
Qty: 28 TABLET | Refills: 0 | Status: SHIPPED | OUTPATIENT
Start: 2023-11-27 | End: 2023-12-04

## 2023-11-27 NOTE — OUTREACH NOTE
MSW has been UTR patient to discuss resources needed. MSW available should patient call back.    Delmi GILMORE -   Ambulatory Case Management    11/27/2023, 13:26 EST

## 2023-11-27 NOTE — PROGRESS NOTES
"Chief Complaint  Anxiety and Cough    Subjective      History of Present Illness  Regulo Sepulveda is a 58 y.o. male who presents to [facility] with a past medical history of combined systolic and diastolic congestive heart failure with EF 20%, LV thrombus on warfarin, atrial fibrillation, COPD, hypertension who was recently discharged from hospital On presentation blood pressure was elevated with SPO2 89% on 2 L.  proBNP elevated over 27,000.  INR was subtherapeutic.  Chest x-ray showed pleural effusions.  He was admitted for management of heart failure exacerbation.  Cardiology was consulted.  He was started on therapeutic Lovenox in the setting of subtherapeutic INR.  He improved with IV diuresis.  Cardiology assisted with medication changes per GDMT for systolic heart failure.  He was weaned to room air.  Coumadin was restarted until INR was at goal between 2-3.  Importance of medication compliance and follow-up with PCP next week for INR check was emphasized.   Pt lives at home with sister who helps width medications. He started feeling sick about 3 days ago so he presented today for covid test, he was found to be positive.   Last INR was noted to be 2.26, 12 days ago it is currently being monitored by DEV Ocampo.      Past Medical History:   Diagnosis Date    Anxiety     Asthma     Bipolar affective     Cardiac tamponade     2023    CHF (congestive heart failure)     COPD (chronic obstructive pulmonary disease)     Coronary artery disease     Depression     Diabetes mellitus     Elevated cholesterol     Hypertension          Objective   Vital Signs:   Vitals:    11/27/23 1311   BP: 140/80   Pulse: 77   Temp: 97 °F (36.1 °C)   SpO2: 92%   Weight: 66.7 kg (147 lb)   Height: 184.4 cm (72.6\")     Body mass index is 19.61 kg/m².    Wt Readings from Last 3 Encounters:   11/27/23 66.7 kg (147 lb)   11/15/23 66.9 kg (147 lb 7.8 oz)   11/06/23 78.9 kg (174 lb)     BP " Readings from Last 3 Encounters:   11/27/23 140/80   11/15/23 113/80   11/06/23 (!) 154/118       Health Maintenance   Topic Date Due    Hepatitis B (1 of 3 - 3-dose series) Never done    URINE MICROALBUMIN  Never done    COLORECTAL CANCER SCREENING  Never done    Pneumococcal Vaccine 0-64 (1 - PCV) Never done    ANNUAL PHYSICAL  Never done    DIABETIC FOOT EXAM  Never done    DIABETIC EYE EXAM  Never done    ZOSTER VACCINE (2 of 2) 08/16/2022    INFLUENZA VACCINE  08/01/2023    COVID-19 Vaccine (4 - 2023-24 season) 09/01/2023    HEMOGLOBIN A1C  02/29/2024    LIPID PANEL  08/30/2024    LUNG CANCER SCREENING  10/18/2024    TDAP/TD VACCINES (3 - Td or Tdap) 06/21/2032    HEPATITIS C SCREENING  Completed       Physical Exam   General:  AAO x3, no acute distress.  Eyes:  EOMI PERRLA  Ears/Nose/Mouth/Throat:  Moist mucous membranes  Respiratory:  CTA bilaterally, no wheezes rales or rhonchi  Cardiovascular:  RRR no murmur rubs or gallops  Gastrointestinal:  Abdomen soft nondistended nontender bowel sounds present x4 quadrants  Musculoskeletal:  Moves all 4 extremities spontaneously, no apparent weakness  Skin:  Warm, dry, no skin rashes or lesions  Neurologic:  AAO x3, cranial nerves 2-12 grossly intact, no focal neuro deficits  Psychiatric:  Normal mood and affect      Result Review :  The following data was reviewed by: Dickson Landry MD on 11/27/2023:      Procedures        Assessment and Plan   Diagnoses and all orders for this visit:    1. COVID-19 virus detected (Primary)    2. Other cough  -     POCT SARS-CoV-2 Antigen KAYLEN + Flu    3. DANIEL (generalized anxiety disorder)  -     Ambulatory Referral to Psychiatry    Other orders  -     guaiFENesin (Mucinex) 600 MG 12 hr tablet; Take 2 tablets by mouth 2 (Two) Times a Day for 7 days.  Dispense: 28 tablet; Refill: 0      Rest, drink plenty of fluids. Take OTC Tylenol and Motrin as needed for fever/body aches. Avoid spreading the viral infection by washing hands  frequently and stay away from others until fever free for 48 hours and or 5 days since the start of her symptoms according to CDC guidelines. Treat symptoms with OTC medications.  If you become short of breath or unable to breathe go to your nearest ER.  BMI is within normal parameters. No other follow-up for BMI required.         FOLLOW UP  Return in about 2 years (around 11/27/2025) for Recheck.  Patient was given instructions and counseling regarding his condition or for health maintenance advice. Please see specific information pulled into the AVS if appropriate.       Dickson Landry MD  11/27/23  14:14 EST    CURRENT & DISCONTINUED MEDICATIONS  Current Outpatient Medications   Medication Instructions    Abilify Maintena 300 mg, Intramuscular, Every 30 Days    albuterol sulfate  (90 Base) MCG/ACT inhaler 2 puffs, Inhalation, Every 4 Hours PRN    amLODIPine (NORVASC) 2.5 mg, Oral, Every 24 Hours Scheduled    atorvastatin (LIPITOR) 40 mg, Oral, Every Night at Bedtime    benzonatate (TESSALON) 100 mg, Oral, 3 Times Daily PRN    carvedilol (COREG) 25 MG tablet 1 tablet, Oral, Every 12 Hours Scheduled    Farxiga 5 mg, Oral, Daily    furosemide (LASIX) 40 mg, Oral, Daily    guaiFENesin (MUCINEX) 1,200 mg, Oral, 2 Times Daily    lisinopril (PRINIVIL,ZESTRIL) 40 mg, Oral, Every 24 Hours Scheduled    meloxicam (MOBIC) 15 mg, Oral, Daily    metFORMIN (GLUCOPHAGE) 1,000 mg, Oral, 2 Times Daily    nicotine (NICODERM CQ) 21 MG/24HR patch 1 patch, Transdermal, Every 24 Hours Scheduled    pantoprazole (PROTONIX) 40 mg, Oral, Daily    spironolactone (ALDACTONE) 25 mg, Oral, Daily    venlafaxine XR (EFFEXOR-XR) 37.5 MG 24 hr capsule 1 capsule, Oral, Daily    warfarin (COUMADIN) 7.5 mg, Oral, Nightly       There are no discontinued medications.

## 2023-11-28 ENCOUNTER — PATIENT OUTREACH (OUTPATIENT)
Dept: CASE MANAGEMENT | Facility: OTHER | Age: 58
End: 2023-11-28
Payer: COMMERCIAL

## 2023-11-28 NOTE — OUTREACH NOTE
AMBULATORY CASE MANAGEMENT NOTE    Name and Relationship of Patient/Support Person: Regulo Sepulveda - Self  Self    Patient Outreach    Follow up outreach call to the patient.  He kept his PCP appointment yesterday.  He states he has been compliant with his medications with his sister's assistance.  He was diagnosed with Covid yesterday, however.  He reports mild symptoms.  He would like to work with a  to address his multi SDOH needs.  He has utilized [employer] in the [facility].  He states that once out of isolation he will go have his INR lab completed at [facility] outpatient lab.  His PCP office does not do INR lab work.  He prefers a follow up in one week.  Message to MSW to outreach again per patient's request.      Catrachita JEFFERSON  Ambulatory Case Management    11/28/2023, 10:54 EST

## 2023-12-06 ENCOUNTER — PATIENT OUTREACH (OUTPATIENT)
Dept: CASE MANAGEMENT | Facility: OTHER | Age: 58
End: 2023-12-06
Payer: COMMERCIAL

## 2023-12-06 NOTE — OUTREACH NOTE
Social Work Assessment  Questions/Answers      Flowsheet Row Most Recent Value   Referral Source nursing   Reason for Consult community resources   Preferred Language English   Current Living Arrangements apartment   In the past 12 months has the electric, gas, oil, or water company threatened to shut off services in your home? No   Family Caregiver if Needed sibling(s)   Quality of Family Relationships helpful   Source of Income none   Financial/Environmental Concerns unable to afford food   Application for Public Assistance obtained public assistance pending number          SDOH updated and reviewed with the patient during this program:  Financial Resource Strain: Low Risk  (10/19/2023)    Overall Financial Resource Strain (CARDIA)     Difficulty of Paying Living Expenses: Not very hard   Recent Concern: Financial Resource Strain - Medium Risk (9/18/2023)    Overall Financial Resource Strain (CARDIA)     Difficulty of Paying Living Expenses: Somewhat hard      Food Insecurity: Food Insecurity Present (11/20/2023)    Hunger Vital Sign     Worried About Running Out of Food in the Last Year: Sometimes true     Ran Out of Food in the Last Year: Sometimes true      Transportation Needs: Unmet Transportation Needs (11/20/2023)    PRAPARE - Transportation     Lack of Transportation (Medical): Yes     Lack of Transportation (Non-Medical): Yes      Housing Stability: Not At Risk (12/6/2023)    Housing Stability     Current Living Arrangements: apartment     Potentially Unsafe Housing Conditions: none     Patient Outreach    MSW spoke with patient to discuss resources needed. Patient states that his sister assists with picking up medications and taking to doctor appointments. MSW did talk with patient about [employer] and sent his sister this information. MSW sent [employer] referral through [employer] for possible rental assistance. Patient thankful for this. Patient states that he sold his father's property and has  some money currently for bills, but awaiting his disability hearing at this time. MSW to continue to assist as needed.    Delmi GILMORE -   Ambulatory Case Management    12/6/2023, 15:58 EST

## 2023-12-07 ENCOUNTER — PATIENT OUTREACH (OUTPATIENT)
Dept: CASE MANAGEMENT | Facility: OTHER | Age: 58
End: 2023-12-07
Payer: COMMERCIAL

## 2023-12-07 NOTE — OUTREACH NOTE
AMBULATORY CASE MANAGEMENT NOTE    Name and Relationship of Patient/Support Person: Regulo Sepulveda - Self  Self    Adult Patient Profile  Questions/Answers      Flowsheet Row Most Recent Value   Symptoms/Conditions Managed at Home respiratory   Barriers to Managing Health understanding health advice   Respiratory Symptoms/Conditions COPD   Respiratory Self-Management Outcome 3 (uncertain)   Respiratory Comment initiated smoking cessation and education information   Missed Doses of Prescribed Medications During Past Week yes  [states he has missed his medications some days but taking them lately]   Barriers to Taking Medication as Prescribed --  [encouraged patient to take his medications to his next PCP appt on 12/15 and discuss and review with PCP as he is not able to review on the phone [facility] ACM]          SDOH updated and reviewed with the patient during this program:  Food Insecurity: Food Insecurity Present (11/20/2023)    Hunger Vital Sign     Worried About Running Out of Food in the Last Year: Sometimes true     Ran Out of Food in the Last Year: Sometimes true           Education Documentation  Tobacco Smoke, taught by Catrachita Lee, RN at 12/7/2023  2:27 PM.  Learner: Patient  Readiness: Acceptance  Method: Explanation  Response: Needs Reinforcement, Verbalizes Understanding    Severity of Underlying Copd, taught by Catrachita Lee, RN at 12/7/2023  2:27 PM.  Learner: Patient  Readiness: Acceptance  Method: Explanation  Response: Needs Reinforcement, Verbalizes Understanding    Nonadherence to Treatment Plan, taught by Catrachita Lee, RN at 12/7/2023  2:27 PM.  Learner: Patient  Readiness: Acceptance  Method: Explanation  Response: Needs Reinforcement, Verbalizes Understanding      Patient Outreach    Follow up outreach.  Patient upcomoing appointments reviewed with patient and he states his sister will transport him.  Discussed that the  provided him and his sister with information for  transportation and housing.  He states his sister will assist with applying for these resources.  He admits to forgetting or not taking some of his medications sometimes but states he has been taking them lately.  Attempted to review medications with the patient and he states that he needs assist with this.  Encouraged him to take his medication with him to his next PCP appointment.  Encouraged him to utilize food bank resources provided. Discussed smoking and initiated smoking cessation education.  He will consider stopping smoking. Outreach has been scheduled for one month as he prefers.     Catrachita JEFFERSON  Ambulatory Case Management    12/7/2023, 14:32 EST

## 2023-12-11 ENCOUNTER — TELEPHONE (OUTPATIENT)
Dept: CARDIOLOGY | Facility: CLINIC | Age: 58
End: 2023-12-11
Payer: COMMERCIAL

## 2023-12-11 NOTE — TELEPHONE ENCOUNTER
Name: Aurora Prather    Relationship: Emergency Contact    Best Callback Number: [phone]     Incoming call to the Hub, requesting to  Reschedule their HFU appointment on 12.15.23.     Per Hub workflow, further review is needed before the task can be completed.    Result of Call: Transferred to [facility] at the practice

## 2023-12-11 NOTE — TELEPHONE ENCOUNTER
Spoke with pt and since there were no later apts available  for Dec 15 stated that he would keep his apt

## 2023-12-15 ENCOUNTER — HOSPITAL ENCOUNTER (INPATIENT)
Facility: HOSPITAL | Age: 58
LOS: 3 days | Discharge: LEFT BEFORE DISCHARGE | DRG: 291 | End: 2023-12-18
Attending: EMERGENCY MEDICINE | Admitting: INTERNAL MEDICINE
Payer: COMMERCIAL

## 2023-12-15 ENCOUNTER — TELEPHONE (OUTPATIENT)
Dept: CARDIOLOGY | Facility: CLINIC | Age: 58
End: 2023-12-15

## 2023-12-15 ENCOUNTER — APPOINTMENT (OUTPATIENT)
Dept: GENERAL RADIOLOGY | Facility: HOSPITAL | Age: 58
DRG: 291 | End: 2023-12-15
Payer: COMMERCIAL

## 2023-12-15 DIAGNOSIS — R26.2 DIFFICULTY WALKING: ICD-10-CM

## 2023-12-15 DIAGNOSIS — I50.9 ACUTE CONGESTIVE HEART FAILURE, UNSPECIFIED HEART FAILURE TYPE: Primary | ICD-10-CM

## 2023-12-15 PROBLEM — I50.20 SYSTOLIC HEART FAILURE: Status: ACTIVE | Noted: 2023-12-15

## 2023-12-15 LAB
ALBUMIN SERPL-MCNC: 3.7 G/DL (ref 3.5–5.2)
ALBUMIN/GLOB SERPL: 1.2 G/DL
ALP SERPL-CCNC: 313 U/L (ref 39–117)
ALT SERPL W P-5'-P-CCNC: 50 U/L (ref 1–41)
AMPHET+METHAMPHET UR QL: POSITIVE
ANION GAP SERPL CALCULATED.3IONS-SCNC: 11.6 MMOL/L (ref 5–15)
AST SERPL-CCNC: 30 U/L (ref 1–40)
BARBITURATES UR QL SCN: NEGATIVE
BASOPHILS # BLD AUTO: 0.04 10*3/MM3 (ref 0–0.2)
BASOPHILS NFR BLD AUTO: 0.4 % (ref 0–1.5)
BENZODIAZ UR QL SCN: NEGATIVE
BILIRUB SERPL-MCNC: 1.3 MG/DL (ref 0–1.2)
BUN SERPL-MCNC: 24 MG/DL (ref 6–20)
BUN/CREAT SERPL: 21.4 (ref 7–25)
CALCIUM SPEC-SCNC: 9.2 MG/DL (ref 8.6–10.5)
CANNABINOIDS SERPL QL: POSITIVE
CHLORIDE SERPL-SCNC: 103 MMOL/L (ref 98–107)
CO2 SERPL-SCNC: 25.4 MMOL/L (ref 22–29)
COCAINE UR QL: NEGATIVE
CREAT SERPL-MCNC: 1.12 MG/DL (ref 0.76–1.27)
D DIMER PPP FEU-MCNC: 1.5 MCGFEU/ML (ref 0–0.58)
DEPRECATED RDW RBC AUTO: 51.6 FL (ref 37–54)
EGFRCR SERPLBLD CKD-EPI 2021: 76.1 ML/MIN/1.73
EOSINOPHIL # BLD AUTO: 0.02 10*3/MM3 (ref 0–0.4)
EOSINOPHIL NFR BLD AUTO: 0.2 % (ref 0.3–6.2)
ERYTHROCYTE [DISTWIDTH] IN BLOOD BY AUTOMATED COUNT: 16.6 % (ref 12.3–15.4)
FENTANYL UR-MCNC: NEGATIVE NG/ML
FLUAV SUBTYP SPEC NAA+PROBE: NOT DETECTED
FLUBV RNA ISLT QL NAA+PROBE: NOT DETECTED
GLOBULIN UR ELPH-MCNC: 3.2 GM/DL
GLUCOSE BLDC GLUCOMTR-MCNC: 105 MG/DL (ref 70–99)
GLUCOSE SERPL-MCNC: 134 MG/DL (ref 65–99)
HCT VFR BLD AUTO: 41.6 % (ref 37.5–51)
HGB BLD-MCNC: 12.9 G/DL (ref 13–17.7)
HOLD SPECIMEN: NORMAL
HOLD SPECIMEN: NORMAL
IMM GRANULOCYTES # BLD AUTO: 0.03 10*3/MM3 (ref 0–0.05)
IMM GRANULOCYTES NFR BLD AUTO: 0.3 % (ref 0–0.5)
INR PPP: 1.11 (ref 0.86–1.15)
INR PPP: 1.15 (ref 0.86–1.15)
LYMPHOCYTES # BLD AUTO: 1.51 10*3/MM3 (ref 0.7–3.1)
LYMPHOCYTES NFR BLD AUTO: 15.6 % (ref 19.6–45.3)
MCH RBC QN AUTO: 26.8 PG (ref 26.6–33)
MCHC RBC AUTO-ENTMCNC: 31 G/DL (ref 31.5–35.7)
MCV RBC AUTO: 86.5 FL (ref 79–97)
METHADONE UR QL SCN: NEGATIVE
MONOCYTES # BLD AUTO: 1.12 10*3/MM3 (ref 0.1–0.9)
MONOCYTES NFR BLD AUTO: 11.6 % (ref 5–12)
NEUTROPHILS NFR BLD AUTO: 6.97 10*3/MM3 (ref 1.7–7)
NEUTROPHILS NFR BLD AUTO: 71.9 % (ref 42.7–76)
NRBC BLD AUTO-RTO: 0 /100 WBC (ref 0–0.2)
NT-PROBNP SERPL-MCNC: ABNORMAL PG/ML (ref 0–900)
OPIATES UR QL: NEGATIVE
OXYCODONE UR QL SCN: NEGATIVE
PLATELET # BLD AUTO: 332 10*3/MM3 (ref 140–450)
PMV BLD AUTO: 10 FL (ref 6–12)
POTASSIUM SERPL-SCNC: 3.8 MMOL/L (ref 3.5–5.2)
PROCALCITONIN SERPL-MCNC: 0.05 NG/ML (ref 0–0.25)
PROT SERPL-MCNC: 6.9 G/DL (ref 6–8.5)
PROTHROMBIN TIME: 14.6 SECONDS (ref 11.8–14.9)
PROTHROMBIN TIME: 14.9 SECONDS (ref 11.8–14.9)
RBC # BLD AUTO: 4.81 10*6/MM3 (ref 4.14–5.8)
RSV RNA NPH QL NAA+NON-PROBE: NOT DETECTED
SARS-COV-2 RNA RESP QL NAA+PROBE: NOT DETECTED
SODIUM SERPL-SCNC: 140 MMOL/L (ref 136–145)
TROPONIN T SERPL HS-MCNC: 29 NG/L
WBC NRBC COR # BLD AUTO: 9.69 10*3/MM3 (ref 3.4–10.8)
WHOLE BLOOD HOLD COAG: NORMAL
WHOLE BLOOD HOLD SPECIMEN: NORMAL

## 2023-12-15 PROCEDURE — 99285 EMERGENCY DEPT VISIT HI MDM: CPT

## 2023-12-15 PROCEDURE — 80307 DRUG TEST PRSMV CHEM ANLYZR: CPT | Performed by: INTERNAL MEDICINE

## 2023-12-15 PROCEDURE — 99223 1ST HOSP IP/OBS HIGH 75: CPT | Performed by: INTERNAL MEDICINE

## 2023-12-15 PROCEDURE — 25010000002 FUROSEMIDE PER 20 MG: Performed by: INTERNAL MEDICINE

## 2023-12-15 PROCEDURE — 87637 SARSCOV2&INF A&B&RSV AMP PRB: CPT | Performed by: EMERGENCY MEDICINE

## 2023-12-15 PROCEDURE — 82948 REAGENT STRIP/BLOOD GLUCOSE: CPT

## 2023-12-15 PROCEDURE — 85610 PROTHROMBIN TIME: CPT | Performed by: EMERGENCY MEDICINE

## 2023-12-15 PROCEDURE — 93010 ELECTROCARDIOGRAM REPORT: CPT | Performed by: INTERNAL MEDICINE

## 2023-12-15 PROCEDURE — 84484 ASSAY OF TROPONIN QUANT: CPT | Performed by: EMERGENCY MEDICINE

## 2023-12-15 PROCEDURE — 25810000003 SODIUM CHLORIDE 0.9 % SOLUTION: Performed by: EMERGENCY MEDICINE

## 2023-12-15 PROCEDURE — 25010000002 ENOXAPARIN PER 10 MG: Performed by: INTERNAL MEDICINE

## 2023-12-15 PROCEDURE — 71045 X-RAY EXAM CHEST 1 VIEW: CPT

## 2023-12-15 PROCEDURE — 85610 PROTHROMBIN TIME: CPT | Performed by: INTERNAL MEDICINE

## 2023-12-15 PROCEDURE — 84145 PROCALCITONIN (PCT): CPT | Performed by: INTERNAL MEDICINE

## 2023-12-15 PROCEDURE — 85025 COMPLETE CBC W/AUTO DIFF WBC: CPT | Performed by: EMERGENCY MEDICINE

## 2023-12-15 PROCEDURE — 80053 COMPREHEN METABOLIC PANEL: CPT | Performed by: EMERGENCY MEDICINE

## 2023-12-15 PROCEDURE — 93005 ELECTROCARDIOGRAM TRACING: CPT | Performed by: EMERGENCY MEDICINE

## 2023-12-15 PROCEDURE — 25010000002 KETOROLAC TROMETHAMINE PER 15 MG: Performed by: EMERGENCY MEDICINE

## 2023-12-15 PROCEDURE — 83880 ASSAY OF NATRIURETIC PEPTIDE: CPT | Performed by: EMERGENCY MEDICINE

## 2023-12-15 PROCEDURE — 85379 FIBRIN DEGRADATION QUANT: CPT | Performed by: INTERNAL MEDICINE

## 2023-12-15 PROCEDURE — 25010000002 FUROSEMIDE PER 20 MG: Performed by: EMERGENCY MEDICINE

## 2023-12-15 RX ORDER — IBUPROFEN 600 MG/1
1 TABLET ORAL
Status: DISCONTINUED | OUTPATIENT
Start: 2023-12-15 | End: 2023-12-18 | Stop reason: HOSPADM

## 2023-12-15 RX ORDER — WARFARIN SODIUM 7.5 MG/1
7.5 TABLET ORAL
COMMUNITY

## 2023-12-15 RX ORDER — AMLODIPINE BESYLATE 2.5 MG/1
2.5 TABLET ORAL
Status: COMPLETED | OUTPATIENT
Start: 2023-12-15 | End: 2023-12-15

## 2023-12-15 RX ORDER — SODIUM CHLORIDE 0.9 % (FLUSH) 0.9 %
10 SYRINGE (ML) INJECTION AS NEEDED
Status: DISCONTINUED | OUTPATIENT
Start: 2023-12-15 | End: 2023-12-18 | Stop reason: HOSPADM

## 2023-12-15 RX ORDER — SODIUM CHLORIDE 9 MG/ML
40 INJECTION, SOLUTION INTRAVENOUS AS NEEDED
Status: DISCONTINUED | OUTPATIENT
Start: 2023-12-15 | End: 2023-12-18 | Stop reason: HOSPADM

## 2023-12-15 RX ORDER — KETOROLAC TROMETHAMINE 30 MG/ML
30 INJECTION, SOLUTION INTRAMUSCULAR; INTRAVENOUS ONCE
Status: COMPLETED | OUTPATIENT
Start: 2023-12-15 | End: 2023-12-15

## 2023-12-15 RX ORDER — DEXTROSE MONOHYDRATE 25 G/50ML
25 INJECTION, SOLUTION INTRAVENOUS
Status: DISCONTINUED | OUTPATIENT
Start: 2023-12-15 | End: 2023-12-18 | Stop reason: HOSPADM

## 2023-12-15 RX ORDER — NICOTINE POLACRILEX 4 MG
15 LOZENGE BUCCAL
Status: DISCONTINUED | OUTPATIENT
Start: 2023-12-15 | End: 2023-12-18 | Stop reason: HOSPADM

## 2023-12-15 RX ORDER — ACETAMINOPHEN 325 MG/1
650 TABLET ORAL EVERY 4 HOURS PRN
Status: DISCONTINUED | OUTPATIENT
Start: 2023-12-15 | End: 2023-12-18 | Stop reason: HOSPADM

## 2023-12-15 RX ORDER — INSULIN LISPRO 100 [IU]/ML
2-7 INJECTION, SOLUTION INTRAVENOUS; SUBCUTANEOUS
Status: DISCONTINUED | OUTPATIENT
Start: 2023-12-15 | End: 2023-12-18 | Stop reason: HOSPADM

## 2023-12-15 RX ORDER — ATORVASTATIN CALCIUM 40 MG/1
40 TABLET, FILM COATED ORAL DAILY
Status: DISCONTINUED | OUTPATIENT
Start: 2023-12-15 | End: 2023-12-18 | Stop reason: HOSPADM

## 2023-12-15 RX ORDER — WARFARIN SODIUM 7.5 MG/1
7.5 TABLET ORAL
Status: DISCONTINUED | OUTPATIENT
Start: 2023-12-15 | End: 2023-12-16

## 2023-12-15 RX ORDER — ONDANSETRON 2 MG/ML
4 INJECTION INTRAMUSCULAR; INTRAVENOUS EVERY 6 HOURS PRN
Status: DISCONTINUED | OUTPATIENT
Start: 2023-12-15 | End: 2023-12-18 | Stop reason: HOSPADM

## 2023-12-15 RX ORDER — ENOXAPARIN SODIUM 100 MG/ML
1 INJECTION SUBCUTANEOUS EVERY 12 HOURS
Status: DISCONTINUED | OUTPATIENT
Start: 2023-12-15 | End: 2023-12-18 | Stop reason: HOSPADM

## 2023-12-15 RX ORDER — SODIUM CHLORIDE 0.9 % (FLUSH) 0.9 %
10 SYRINGE (ML) INJECTION EVERY 12 HOURS SCHEDULED
Status: DISCONTINUED | OUTPATIENT
Start: 2023-12-15 | End: 2023-12-18 | Stop reason: HOSPADM

## 2023-12-15 RX ORDER — FUROSEMIDE 10 MG/ML
40 INJECTION INTRAMUSCULAR; INTRAVENOUS ONCE
Status: COMPLETED | OUTPATIENT
Start: 2023-12-15 | End: 2023-12-15

## 2023-12-15 RX ORDER — LISINOPRIL 20 MG/1
40 TABLET ORAL
Status: DISCONTINUED | OUTPATIENT
Start: 2023-12-15 | End: 2023-12-18 | Stop reason: HOSPADM

## 2023-12-15 RX ORDER — FUROSEMIDE 10 MG/ML
40 INJECTION INTRAMUSCULAR; INTRAVENOUS
Status: DISCONTINUED | OUTPATIENT
Start: 2023-12-15 | End: 2023-12-18 | Stop reason: HOSPADM

## 2023-12-15 RX ORDER — CARVEDILOL 25 MG/1
25 TABLET ORAL EVERY 12 HOURS SCHEDULED
Status: DISCONTINUED | OUTPATIENT
Start: 2023-12-15 | End: 2023-12-18 | Stop reason: HOSPADM

## 2023-12-15 RX ADMIN — SODIUM CHLORIDE 1000 ML: 9 INJECTION, SOLUTION INTRAVENOUS at 10:15

## 2023-12-15 RX ADMIN — KETOROLAC TROMETHAMINE 30 MG: 30 INJECTION, SOLUTION INTRAMUSCULAR; INTRAVENOUS at 10:14

## 2023-12-15 RX ADMIN — ENOXAPARIN SODIUM 80 MG: 100 INJECTION SUBCUTANEOUS at 18:40

## 2023-12-15 RX ADMIN — WARFARIN SODIUM 7.5 MG: 7.5 TABLET ORAL at 18:48

## 2023-12-15 RX ADMIN — CARVEDILOL 25 MG: 25 TABLET, FILM COATED ORAL at 21:20

## 2023-12-15 RX ADMIN — ATORVASTATIN CALCIUM 40 MG: 40 TABLET, FILM COATED ORAL at 21:20

## 2023-12-15 RX ADMIN — FUROSEMIDE 40 MG: 10 INJECTION, SOLUTION INTRAMUSCULAR; INTRAVENOUS at 10:14

## 2023-12-15 RX ADMIN — Medication 10 ML: at 21:22

## 2023-12-15 RX ADMIN — FUROSEMIDE 40 MG: 10 INJECTION, SOLUTION INTRAMUSCULAR; INTRAVENOUS at 18:41

## 2023-12-15 RX ADMIN — ACETAMINOPHEN 650 MG: 325 TABLET ORAL at 18:41

## 2023-12-15 RX ADMIN — AMLODIPINE BESYLATE 2.5 MG: 2.5 TABLET ORAL at 18:41

## 2023-12-15 RX ADMIN — LISINOPRIL 40 MG: 20 TABLET ORAL at 18:41

## 2023-12-15 NOTE — ED PROVIDER NOTES
Time: 9:18 AM EST  Date of encounter:  12/15/2023  Independent Historian/Clinical History and Information was obtained by:   Patient    History is limited by: N/A    Chief Complaint: Short of breath      History of Present Illness:  Patient is a 58 y.o. year old male who presents to the emergency department for evaluation of shortness of breath.  Patient has been short of breath for the past 2 days.  States he took 2 extra breathing treatments this morning but just could not take it anymore.  Patient had COVID several weeks ago but feels almost the same now.    HPI    Patient Care Team  Primary Care Provider: Dickson Landry MD    Past Medical History:     Allergies   Allergen Reactions    Penicillins Shortness Of Breath     Past Medical History:   Diagnosis Date    Anxiety     Asthma     Bipolar affective     Cardiac tamponade     2023    CHF (congestive heart failure)     COPD (chronic obstructive pulmonary disease)     Coronary artery disease     Depression     Diabetes mellitus     Elevated cholesterol     Hypertension      Past Surgical History:   Procedure Laterality Date    CARDIAC CATHETERIZATION Right 9/17/2023    Procedure: Right and Left Heart Cath;  Surgeon: Ben Grant MD;  Location: [facility] CATH INVASIVE LOCATION;  Service: Cardiovascular;  Laterality: Right;    TESTICLE SURGERY Left     extraction     Family History   Problem Relation Age of Onset    Diabetes Mother     Depression Sister     Depression Brother        Home Medications:  Prior to Admission medications    Medication Sig Start Date End Date Taking? Authorizing Provider   Abilify Maintena 300 MG Suspension Reconstituted ER IM injection ER Inject 300 mg into the appropriate muscle as directed by prescriber Every 30 (Thirty) Days. 11/2/23   Dickson Landry MD   albuterol sulfate  (90 Base) MCG/ACT inhaler Inhale 2 puffs Every 4 (Four) Hours As Needed for Wheezing or Shortness of Air. Indications: Chronic Obstructive Lung Disease 11/2/23    Dickson Landry MD   amLODIPine (NORVASC) 2.5 MG tablet Take 1 tablet by mouth Daily for 30 days. 23  Mario Wilson DO   atorvastatin (LIPITOR) 40 MG tablet Take 1 tablet by mouth every night at bedtime.    Yuliya Blanchard MD   benzonatate (TESSALON) 100 MG capsule Take 1 capsule by mouth 3 (Three) Times a Day As Needed for Cough. 23   Lenny Alegria MD   carvedilol (COREG) 25 MG tablet Take 1 tablet by mouth Every 12 (Twelve) Hours. 10/24/23   Yuliya Blanchard MD   Farxiga 5 MG tablet tablet Take 1 tablet by mouth Daily. Indications: Type 2 Diabetes 23   Alex Angulo MD   furosemide (LASIX) 40 MG tablet Take 1 tablet by mouth Daily for 30 days. 23  Mario Wilson DO   lisinopril (PRINIVIL,ZESTRIL) 40 MG tablet Take 1 tablet by mouth Daily for 30 days. 23  Mario Wilson DO   meloxicam (MOBIC) 15 MG tablet Take 1 tablet by mouth Daily.    Yuliya Blanchard MD   metFORMIN (GLUCOPHAGE) 1000 MG tablet Take 1 tablet by mouth 2 (Two) Times a Day.    Yuliya Blanchard MD   pantoprazole (Protonix) 40 MG EC tablet Take 1 tablet by mouth Daily. 23   Dickson Landry MD   spironolactone (ALDACTONE) 25 MG tablet Take 1 tablet by mouth Daily for 30 days. 10/23/23 11/22/23  Santy Quan MD   venlafaxine XR (EFFEXOR-XR) 37.5 MG 24 hr capsule Take 1 capsule by mouth Daily. 23   Yuliya Blanchard MD   warfarin (COUMADIN) 7.5 MG tablet Take 1 tablet by mouth Every Night for 30 days. 10/23/23 11/22/23  Santy Quan MD        Social History:   Social History     Tobacco Use    Smoking status: Every Day     Packs/day: 0.50     Years: 50.00     Additional pack years: 0.00     Total pack years: 25.00     Types: Cigarettes     Start date: 1974     Last attempt to quit: 2023     Years since quittin.1    Smokeless tobacco: Never   Vaping Use    Vaping Use: Former    Substances: Nicotine   Substance Use Topics    Alcohol use: Not  "Currently    Drug use: Yes     Types: Methamphetamines, Marijuana         Review of Systems:  Review of Systems   Respiratory:  Positive for shortness of breath.    Musculoskeletal:  Positive for myalgias.        Physical Exam:  /55 (BP Location: Right arm, Patient Position: Lying)   Pulse 72   Temp 97.3 °F (36.3 °C) (Oral)   Resp 22   Ht 182.9 cm (72\")   Wt 70.1 kg (154 lb 8.7 oz) Comment: RN notified  SpO2 97%   BMI 20.96 kg/m²     Physical Exam  Vitals and nursing note reviewed.   Constitutional:       General: He is not in acute distress.     Appearance: He is well-developed.   HENT:      Head: Normocephalic.   Cardiovascular:      Rate and Rhythm: Regular rhythm. Tachycardia present.      Heart sounds: Normal heart sounds.   Pulmonary:      Effort: Pulmonary effort is normal. Tachypnea present. No respiratory distress.      Breath sounds: Normal breath sounds.   Abdominal:      General: Abdomen is flat.      Palpations: Abdomen is soft.      Tenderness: There is no abdominal tenderness.   Musculoskeletal:         General: Normal range of motion.      Cervical back: Normal range of motion.   Skin:     General: Skin is warm and dry.   Neurological:      Mental Status: He is alert and oriented to person, place, and time. Mental status is at baseline.                  Procedures:  Procedures      Medical Decision Making:      Comorbidities that affect care:  Hypertension, diabetes, anxiety, asthma, COPD, CHF      External Notes reviewed:    Previous Clinic Note: Patient seen 11/27/2023 by PCP was diagnosed with COVID      The following orders were placed and all results were independently analyzed by me:  Orders Placed This Encounter   Procedures    COVID-19, FLU A/B, RSV PCR 1 HR TAT - Swab, Nasopharynx    XR Chest 1 View    CT Chest With Contrast Diagnostic    [place] Draw    Comprehensive Metabolic Panel    BNP    Single High Sensitivity Troponin T    CBC Auto Differential    Protime-INR    Urine " Drug Screen - Urine, Clean Catch    Procalcitonin    CBC Auto Differential    Comprehensive Metabolic Panel    Magnesium    Procalcitonin    D-dimer, Quantitative    CBC (No Diff)    Basic Metabolic Panel    Magnesium    CBC (No Diff)    Basic Metabolic Panel    Magnesium    Undress & Gown    Continuous Pulse Oximetry    Vital Signs    Discontinue Glucommander After Transition Complete    PT Consult: Eval & Treat Functional Mobility Below Baseline    POC Glucose Once    POC Glucose Once    POC Glucose Once    POC Glucose Once    POC Glucose Once    POC Glucose Once    POC Glucose Once    POC Glucose Once    POC Glucose Once    POC Glucose Once    POC Glucose Once    ECG 12 Lead ED Triage Standing Order; SOA    Inpatient Admission    CBC & Differential    Green Top (Gel)    Lavender Top    Gold Top - SST    Light Blue Top       Medications Given in the Emergency Department:  Medications   sodium chloride 0.9 % bolus 1,000 mL (0 mL Intravenous Stopped 12/15/23 1326)   ketorolac (TORADOL) injection 30 mg (30 mg Intravenous Given 12/15/23 1014)   furosemide (LASIX) injection 40 mg (40 mg Intravenous Given 12/15/23 1014)   amLODIPine (NORVASC) tablet 2.5 mg (2.5 mg Oral Given 12/15/23 1841)   magnesium sulfate 2g/50 mL (PREMIX) infusion (2 g Intravenous New Bag 12/16/23 1057)   potassium chloride (MICRO-K/KLOR-CON) CR capsule (40 mEq Oral Given 12/16/23 1401)   warfarin (COUMADIN) tablet 12 mg (12 mg Oral Given 12/16/23 1801)   iopamidol (ISOVUE-370) 76 % injection 100 mL (80 mL Intravenous Given 12/16/23 1643)   warfarin (COUMADIN) tablet 12 mg (12 mg Oral Given 12/17/23 1810)   magnesium sulfate in D5W 1g/100mL (PREMIX) (1 g Intravenous New Bag 12/17/23 0959)   potassium chloride (MICRO-K/KLOR-CON) CR capsule (40 mEq Oral Given 12/17/23 1335)   LORazepam (ATIVAN) tablet 0.5 mg (0.5 mg Oral Given 12/17/23 1942)   magnesium sulfate 2g/50 mL (PREMIX) infusion (2 g Intravenous New Bag 12/18/23 1011)        ED Course:          Labs:    Lab Results (last 24 hours)       ** No results found for the last 24 hours. **             Imaging:    No Radiology Exams Resulted Within Past 24 Hours      Differential Diagnosis and Discussion:    Dyspnea: Differential diagnosis includes but is not limited to metabolic acidosis, neurological disorders, psychogenic, asthma, pneumothorax, upper airway obstruction, COPD, pneumonia, noncardiogenic pulmonary edema, interstitial lung disease, anemia, congestive heart failure, and pulmonary embolism    All labs were reviewed and interpreted by me.  All X-rays impressions were independently interpreted by me.  EKG was interpreted by me.    MDM  Patient's workup shows acute congestive heart failure.  Patient received IV Lasix.  Patient will be admitted to the hospital service for further treatment and evaluation.        Patient Care Considerations:    CT CHEST: I considered ordering a CT scan of the chest, however patient's chest x-ray and clinical presentation are not indicative for PE      Consultants/Shared Management Plan:    Hospitalist: I have discussed the case with Dr. Persaud who agrees to accept the patient for admission.    Social Determinants of Health:    Patient is independent, reliable, and has access to care.       Disposition and Care Coordination:    Admit:   Through independent evaluation of the patient's history, physical, and imperical data, the patient meets criteria for observation/admission to the hospital.        Final diagnoses:   Acute congestive heart failure, unspecified heart failure type        ED Disposition       ED Disposition   Decision to Admit    Condition   --    Comment   Level of Care: Telemetry [5]   Diagnosis: Systolic heart failure [931683]   Isolate for COVID?: No [0]   Certification: I Certify That Inpatient Hospital Services Are Medically Necessary For Greater Than 2 Midnights                 This medical record created using voice recognition software.              Jacques Renteria,   12/24/23 1108

## 2023-12-15 NOTE — PLAN OF CARE
Goal Outcome Evaluation:  Plan of Care Reviewed With: patient        Progress: no change          Admit from the ED. HTN and tachypnea with c/o pain. On 2L NC sats 98%. MD notified, see new orders.

## 2023-12-15 NOTE — TELEPHONE ENCOUNTER
Name: Aurora Prather    Relationship: Emergency Contact    Best Callback Number: [phone]    Incoming call to the Hub, requesting to  Cancel their HFU appointment on 12/15/23.     Per Hub workflow, further review is needed before the task can be completed.    Result of Call: Please cancel this appointment.PATIENT WILL CALL BACK ONCE OUT OF HOSPITAL

## 2023-12-15 NOTE — H&P
[facility] HISTORY AND PHYSICAL  Date: 12/15/2023   Patient Name: Regulo Sepulveda  : 1965  MRN: [id number]  Primary Care Physician:  Dickson Landry MD  Date of admission: 12/15/2023    Subjective   Subjective     Chief Complaint: Shortness of breath    HPI:    Regulo Sepulveda is a 58 y.o. male past medical history of heart failure with reduced ejection fraction EF of 20% and grade 2 diastolic dysfunction, apical thrombus in left ventricle, hypertension, type 2 diabetes who presents to the emergency department for management of shortness of breath    Patient states that he had shortness of breath off and on for some time.  Had COVID about 2 to 3 weeks ago.  He is notices how he is felt in the passes, with heart failure.  Patient has little insight into how sick his heart actually is.  States he has not been taking his medications appropriately.  Wonders if he can be fixed.  No fevers or chills.  No leg swelling.  Says he can barely do anything because he gets short of breath.  As result he came the ER for further evaluation.    In the emergency department the patient's vital signs are as follows: Temperature is 97.7, pulse 105, respiratory is 36, blood pressure 160/16.  97% on room air.  CBC shows no concerning abnormalities.  CMP shows Phos of 313 and a ALT of 50 and a bilirubin of 1.3.  BNP is 25,000.  COVID-19 is negative.  Chest x-ray shows new interstitial and airspace opacity in the lower lung fields possible small right pleural effusion findings or represent heart failure versus pulmonary edema.  Patient admitted to hospital for acute exacerbation of systolic heart failure and diastolic heart failure    All systems reviewed abnormalities noted above    Personal History     Past Medical History:  Heart failure reduced ejection fraction EF of 20% and grade 2 diastolic dysfunction  Apical thrombus in left ventricle  Anxiety/depression  Hypertension  Type 2  diabetes  COPD  Dyslipidemia  Atrial fibrillation    Past Surgical History:  Cardiac catheterization  Testicle surgery    Family History:   Depression    Social History:   Smokes every day  No alcohol    Home Medications:  ARIPiprazole ER, albuterol sulfate HFA, amLODIPine, atorvastatin, benzonatate, carvedilol, dapagliflozin, furosemide, lisinopril, meloxicam, metFORMIN, pantoprazole, spironolactone, venlafaxine XR, and warfarin    Allergies:  Allergies   Allergen Reactions    Penicillins Shortness Of Breath         Objective   Objective     Vitals:   Temp:  [97.7 °F (36.5 °C)] 97.7 °F (36.5 °C)  Heart Rate:  [103-110] 105  Resp:  [36] 36  BP: (152-172)/(116-125) 168/116    Physical Exam    Constitutional: Chronically ill-appearing.  No acute distress   Eyes: Pupils equal, sclerae anicteric, no conjunctival injection   HENT: NCAT, mucous membranes moist   Neck: Supple, no thyromegaly, no lymphadenopathy, trachea midline   Respiratory: Crackles in bases   Cardiovascular: RRR, no murmurs, rubs, or gallops, palpable pedal pulses bilaterally   Gastrointestinal: Positive bowel sounds, soft, nontender, nondistended   Musculoskeletal: No bilateral ankle edema, no clubbing or cyanosis to extremities   Psychiatric: Appropriate affect, cooperative   Neurologic: Oriented x 3, strength symmetric in all extremities, Cranial Nerves grossly intact to confrontation, speech clear   Skin: No rashes     Result Review    Result Review:  I have personally reviewed the results from the time of this admission to 12/15/2023 13:37 EST and agree with these findings:    BNP is 25,000  UDS shows methamphetamine and THC use  Chest x-ray  Bilateral trace concerning for pulmonary edema      Assessment & Plan   Assessment / Plan     Assessment/Plan:   Systolic and diastolic heart failure with EF of 20% and grade 2 diastolic dysfunction  Apical thrombus  Medical noncompliance  Type 2 diabetes  COPD  Atrial fibrillation  Dyslipidemia  Amphetamine  and THC use    D-dimer is elevated at this time.  However due to the patient's apical thrombus I have restarted him on full dose anticoagulation with Lovenox and will bridge with Coumadin.  As result I will not pursue a CTA of his chest at this time.  However, this may need to be performed for better understanding of his respiratory status tomorrow.      Plan:  -- Adm elevated troponin due to demandit to hospital service  -- Oxygen for saturations less than 90%  -- Patient is tachycardic with some mild chest pain with inspiration so we will send D-dimer  --If dimer elevated since CTA of the lungs to rule out pulmonary embolism  -- Lasix 40 mg twice daily  -- Strict I's and O's  -- 2 g sodium diet  -- 2000 mL fluid restriction  -- Sliding scale insulin for type 2 diabetes  -- Will give therapeutic Lovenox due to apical thrombus  -- Restart Coumadin      DVT prophylaxis:  Lovenox    CODE STATUS:     Full code    Admission Status:  I believe this patient meets admission status.    Electronically signed by Quinten Persaud MD, 12/15/23, 1:37 PM EST.

## 2023-12-15 NOTE — PAYOR COMM NOTE
"Nancy Sepulveda (58 y.o. Male)       Inpatient Auth Request & Initial Clinicals   Admitted from ED    PATIENT INFORMATION  Name:  Nancy Sepulveda  MRN#:     [id number]  :  1965         ADMISSION INFORMATION  CLASS: Inpatient   DOS:  12/15/2023        CURRENT ATTENDING PROVIDER INFORMATION  Name/NPI: Quinten Persaud MD [[id number]]  Phone:  Phone: [phone]        RENDERING FACILITY  Name:  [facility]   NPI:  [id number]  TID:  [id number]  Address:      [facility] [place] [address]  Phone  [phone]        CASE MANAGEMENT CONTACT INFORMATION  Phone:      [phone]  Fax:           [phone]                Date of Birth   1965    Social Security Number       Address   [address] [place], [place] [address]    Home Phone   [phone]    MRN   [id number]       Pentecostalism   None    Marital Status   Single                            Admission Date   12/15/23    Admission Type   Emergency    Admitting Provider   Quinten Persaud MD    Attending Provider   Quinten Persaud MD    Department, Room/Bed   [facility] PROGRESSIVE CARE UNIT, 216/       Discharge Date       Discharge Disposition       Discharge Destination                                 Attending Provider: Quinten Persaud MD    Allergies: Penicillins    Isolation: None   Infection: COVID (History) (12/15/23)   Code Status: CPR    Ht: 182.9 cm (72\")   Wt: 77.8 kg (171 lb 8.3 oz)    Admission Cmt: None   Principal Problem: Systolic heart failure [I50.20]                   Active Insurance as of 12/15/2023       Primary Coverage       Payor Plan Insurance Group Employer/Plan Group    [facility] BY NITHIN [facility] BY NITHIN [id number]       Payor Plan Address Payor Plan Phone Number Payor Plan Fax Number Effective Dates    [address] [address]   2021 - None Entered    [facility] [phone]         Subscriber Name Subscriber Birth Date Member ID       NANCY SEPULVEDA 1965 [id number]          "             Heart Failure [facility] Inpatient Care       Indications Met   Last updated by Catrina Coyle RN on 12/15/2023 1544     Review Status Created By   Primary Completed Catrina Coyle RN      Criteria Review   Heart Failure [facility] Inpatient Care     Overall Determination: Indications Met     Criteria:  [×] Admission is indicated for  1 or more  of the following :      [×] Hemodynamic instability          12/15/2023  3:44 PM              -- 12/15/2023  3:44 PM by Catrina Coyle RN --                                    (X) Hemodynamic instability, as indicated by  1 or more  of the following  (1) (2) (3) (4) (5) (6):                  (X) Vital sign abnormality not readily corrected by appropriate treatment, as indicated by  1 or more  of the following  [A]:                  (X) Tachycardia that persists despite appropriate treatment (eg, volume repletion, treatment of pain, treatment of underlying cause)          12/15/2023  3:44 PM              -- 12/15/2023  3:44 PM by Catrina Coyle RN --                  -110, despite IV NS 1000ml bolus; Lasix 40 IV; Toradol IV.     Notes:  -- 12/15/2023  3:44 PM by Catrina Coyle RN --           To ED c/o SOA x2days. Took 2 nebs at home w/o relief.  On arrival: -110. Resp 36. Hypertensive w/BPs 172/112--149/117  PMHx: CHF; Cardiac tamponade; HTN; T2DM; A fib; COPD; HLD .  ED results: Trop 29; BNP 25,684.0; BUN 24; ; ALT 50; Bilirubin 1.3; COVID/Flu/RSV all neg. UDS: + Amphetamine, + THC. EKG: Sinus Tach Rate 110 w/LVH.  CXR:  new interstitial & airspace opacity in the lower lung fields possible small R pleural effusion --->heart failure vs pulmonary edema.  In ED: IV NS 1000ml bolus; Lasix 40 IV; Toradol IV.  Admit: Telemetry. NPO; O2; Lasix 40 IV bid; Strict I&Os; Zofran IV prn. Labs in AM.                            History & Physical        Quinten Persaud MD at 12/15/23 [address] [place], [place] [address]IST HISTORY AND PHYSICAL  Date: 12/15/2023   Patient  Name: Regulo Sepulveda  : 1965  MRN: [id number]  Primary Care Physician:  Dickson Landry MD  Date of admission: 12/15/2023    Subjective  Subjective     Chief Complaint: Shortness of breath    HPI:    Regulo Sepulveda is a 58 y.o. male past medical history of heart failure with reduced ejection fraction EF of 20% and grade 2 diastolic dysfunction, apical thrombus in left ventricle, hypertension, type 2 diabetes who presents to the emergency department for management of shortness of breath    Patient states that he had shortness of breath off and on for some time.  Had COVID about 2 to 3 weeks ago.  He is notices how he is felt in the passes, with heart failure.  Patient has little insight into how sick his heart actually is.  States he has not been taking his medications appropriately.  Wonders if he can be fixed.  No fevers or chills.  No leg swelling.  Says he can barely do anything because he gets short of breath.  As result he came the ER for further evaluation.    In the emergency department the patient's vital signs are as follows: Temperature is 97.7, pulse 105, respiratory is 36, blood pressure 160/16.  97% on room air.  CBC shows no concerning abnormalities.  CMP shows Phos of 313 and a ALT of 50 and a bilirubin of 1.3.  BNP is 25,000.  COVID-19 is negative.  Chest x-ray shows new interstitial and airspace opacity in the lower lung fields possible small right pleural effusion findings or represent heart failure versus pulmonary edema.  Patient admitted to hospital for acute exacerbation of systolic heart failure and diastolic heart failure    All systems reviewed abnormalities noted above    Personal History     Past Medical History:  Heart failure reduced ejection fraction EF of 20% and grade 2 diastolic dysfunction  Apical thrombus in left ventricle  Anxiety/depression  Hypertension  Type 2 diabetes  COPD  Dyslipidemia  Atrial fibrillation    Past Surgical History:  Cardiac  catheterization  Testicle surgery    Family History:   Depression    Social History:   Smokes every day  No alcohol    Home Medications:  ARIPiprazole ER, albuterol sulfate HFA, amLODIPine, atorvastatin, benzonatate, carvedilol, dapagliflozin, furosemide, lisinopril, meloxicam, metFORMIN, pantoprazole, spironolactone, venlafaxine XR, and warfarin    Allergies:  Allergies   Allergen Reactions   • Penicillins Shortness Of Breath         Objective  Objective     Vitals:   Temp:  [97.7 °F (36.5 °C)] 97.7 °F (36.5 °C)  Heart Rate:  [103-110] 105  Resp:  [36] 36  BP: (152-172)/(116-125) 168/116    Physical Exam    Constitutional: Chronically ill-appearing.  No acute distress   Eyes: Pupils equal, sclerae anicteric, no conjunctival injection   HENT: NCAT, mucous membranes moist   Neck: Supple, no thyromegaly, no lymphadenopathy, trachea midline   Respiratory: Crackles in bases   Cardiovascular: RRR, no murmurs, rubs, or gallops, palpable pedal pulses bilaterally   Gastrointestinal: Positive bowel sounds, soft, nontender, nondistended   Musculoskeletal: No bilateral ankle edema, no clubbing or cyanosis to extremities   Psychiatric: Appropriate affect, cooperative   Neurologic: Oriented x 3, strength symmetric in all extremities, Cranial Nerves grossly intact to confrontation, speech clear   Skin: No rashes     Result Review   Result Review:  I have personally reviewed the results from the time of this admission to 12/15/2023 13:37 EST and agree with these findings:    BNP is 25,000  UDS shows methamphetamine and THC use  Chest x-ray  Bilateral trace concerning for pulmonary edema      Assessment & Plan  Assessment / Plan     Assessment/Plan:   Systolic and diastolic heart failure with EF of 20% and grade 2 diastolic dysfunction  Apical thrombus  Medical noncompliance  Type 2 diabetes  COPD  Atrial fibrillation  Dyslipidemia  Amphetamine and THC use      Plan:  -- Adm elevated troponin due to demandit to hospital service  --  Oxygen for saturations less than 90%  -- Patient is tachycardic with some mild chest pain with inspiration so we will send D-dimer  --If dimer elevated since CTA of the lungs to rule out pulmonary embolism  -- Lasix 40 mg twice daily  -- Strict I's and O's  -- 2 g sodium diet  -- 2000 mL fluid restriction  -- Sliding scale insulin for type 2 diabetes  -- Will give therapeutic Lovenox due to apical thrombus  -- Restart Coumadin      DVT prophylaxis:  Lovenox    CODE STATUS:     Full code    Admission Status:  I believe this patient meets admission status.    Electronically signed by Quinten Persaud MD, 12/15/23, 1:37 PM EST.             Electronically signed by Quinten Persaud MD at 12/15/23 3568       Vital Signs (last day)       Date/Time Temp Temp src Pulse Resp BP Patient Position SpO2    12/15/23 1733 97.5 (36.4) Axillary -- 30 149/120 Sitting 98    12/15/23 1600 -- -- 112 -- 152/113 -- --    12/15/23 1530 -- -- 100 -- 149/117 -- --    12/15/23 1515 -- -- 101 -- 156/115 -- --    12/15/23 1500 -- -- 98 -- 148/118 -- --    12/15/23 1445 -- -- 99 -- 155/120 -- --    12/15/23 1430 -- -- 104 -- 146/102 -- --    12/15/23 1415 -- -- 99 -- 150/118 -- --    12/15/23 1400 -- -- 100 -- 148/116 -- --    12/15/23 1345 -- -- 95 -- 148/119 -- --    12/15/23 1330 -- -- 105 -- 155/109 -- --    12/15/23 1315 -- -- 109 -- 154/111 -- --    12/15/23 1300 -- -- 104 -- 168/112 -- --    12/15/23 1245 -- -- 102 -- 151/111 -- --    12/15/23 1230 -- -- 108 -- 128/105 -- --    12/15/23 1215 -- -- 105 -- 168/116 -- --    12/15/23 1200 -- -- 109 -- 169/117 -- --    12/15/23 1145 -- -- 99 -- 162/128 -- --    12/15/23 1130 -- -- 103 -- 172/122 -- --    12/15/23 1014 -- -- 110 -- 152/125 -- --    12/15/23 0914 97.7 (36.5) Oral 110 36 165/117 Lying 97          Lab Results (last 24 hours)       Procedure Component Value Units Date/Time    Urine Drug Screen - Urine, Clean Catch [[id number]]  (Abnormal) Collected: 12/15/23 1355    Specimen: Urine, Clean  "Catch Updated: 12/15/23 1422     Amphet/Methamphet, Screen Positive     Barbiturates Screen, Urine Negative     Benzodiazepine Screen, Urine Negative     Cocaine Screen, Urine Negative     Opiate Screen Negative     THC, Screen, Urine Positive     Methadone Screen, Urine Negative     Oxycodone Screen, Urine Negative     Fentanyl, Urine Negative    Narrative:      Negative Thresholds Per Drugs Screened:    Amphetamines                 500 ng/ml  Barbiturates                 200 ng/ml  Benzodiazepines              100 ng/ml  Cocaine                      300 ng/ml  Methadone                    300 ng/ml  Opiates                      300 ng/ml  Oxycodone                    100 ng/ml  THC                           50 ng/ml  Fentanyl                       5 ng/ml      The Normal Value for all drugs tested is negative. This report includes final unconfirmed screening results to be used for medical treatment purposes only. Unconfirmed results must not be used for non-medical purposes such as employment or legal testing. Clinical consideration should be applied to any drug of abuse test, particularly when unconfirmed results are used.            Procalcitonin [[id number]]  (Normal) Collected: 12/15/23 0925    Specimen: Blood Updated: 12/15/23 1400     Procalcitonin 0.05 ng/mL     Narrative:      As a Marker for Sepsis (Non-Neonates):    1. <0.5 ng/mL represents a low risk of severe sepsis and/or septic shock.  2. >2 ng/mL represents a high risk of severe sepsis and/or septic shock.    As a Marker for Lower Respiratory Tract Infections that require antibiotic therapy:    PCT on Admission    Antibiotic Therapy       6-12 Hrs later    >0.5                Strongly Recommended  >0.25 - <0.5        Recommended  0.1 - 0.25          Discouraged              Remeasure/reassess PCT  <0.1                Strongly Discouraged     Remeasure/reassess PCT    As 28 day mortality risk marker: \"Change in Procalcitonin Result\" (>80% or <=80%) if " Day 0 (or Day 1) and Day 4 values are available. Refer to http://www.[facility]-pct-calculator.com    Change in PCT <=80%  A decrease of PCT levels below or equal to 80% defines a positive change in PCT test result representing a higher risk for 28-day all-cause mortality of patients diagnosed with severe sepsis for septic shock.    Change in PCT >80%  A decrease of PCT levels of more than 80% defines a negative change in PCT result representing a lower risk for 28-day all-cause mortality of patients diagnosed with severe sepsis or septic shock.    This test is Prognostic not Diagnostic, if elevated correlate with clinical findings before administering antibiotic treatment.        COVID-19, FLU A/B, RSV PCR 1 HR TAT - Swab, Nasopharynx [[id number]]  (Normal) Collected: 12/15/23 1021    Specimen: Swab from Nasopharynx Updated: 12/15/23 1107     COVID19 Not Detected     Influenza A PCR Not Detected     Influenza B PCR Not Detected     RSV, PCR Not Detected    Narrative:      Fact sheet for providers: https://www.fda.gov/media/076221/download    Fact sheet for patients: https://www.fda.gov/media/264538/download    Test performed by PCR.    Protime-INR [[id number]]  (Normal) Collected: 12/15/23 0925    Specimen: Blood Updated: 12/15/23 1010     Protime 14.6 Seconds      INR 1.11    Narrative:      Suggested Therapeutic Ranges For Oral Anticoagulant Therapy:  Level of Therapy                      INR Target Range  Standard Dose                            2.0-3.0  High Dose                                2.5-3.5  Patients not receiving anticoagulant  Therapy Normal Range                     0.86-1.15    Comprehensive Metabolic Panel [[id number]]  (Abnormal) Collected: 12/15/23 0925    Specimen: Blood Updated: 12/15/23 0949     Glucose 134 mg/dL      BUN 24 mg/dL      Creatinine 1.12 mg/dL      Sodium 140 mmol/L      Potassium 3.8 mmol/L      Chloride 103 mmol/L      CO2 25.4 mmol/L      Calcium 9.2 mg/dL      Total Protein 6.9  g/dL      Albumin 3.7 g/dL      ALT (SGPT) 50 U/L      AST (SGOT) 30 U/L      Alkaline Phosphatase 313 U/L      Total Bilirubin 1.3 mg/dL      Globulin 3.2 gm/dL      A/G Ratio 1.2 g/dL      BUN/Creatinine Ratio 21.4     Anion Gap 11.6 mmol/L      eGFR 76.1 mL/min/1.73     Narrative:      GFR Normal >60  Chronic Kidney Disease <60  Kidney Failure <15      Single High Sensitivity Troponin T [[id number]]  (Abnormal) Collected: 12/15/23 0925    Specimen: Blood Updated: 12/15/23 0949     HS Troponin T 29 ng/L     Narrative:      High Sensitive Troponin T Reference Range:  <14.0 ng/L- Negative Female for AMI  <22.0 ng/L- Negative Male for AMI  >=14 - Abnormal Female indicating possible myocardial injury.  >=22 - Abnormal Male indicating possible myocardial injury.   Clinicians would have to utilize clinical acumen, EKG, Troponin, and serial changes to determine if it is an Acute Myocardial Infarction or myocardial injury due to an underlying chronic condition.         BNP [[id number]]  (Abnormal) Collected: 12/15/23 0925    Specimen: Blood Updated: 12/15/23 0947     proBNP 25,684.0 pg/mL     Narrative:      This assay is used as an aid in the diagnosis of individuals suspected of having heart failure. It can be used as an aid in the diagnosis of acute decompensated heart failure (ADHF) in patients presenting with signs and symptoms of ADHF to the emergency department (ED). In addition, NT-proBNP of <300 pg/mL indicates ADHF is not likely.    Age Range Result Interpretation  NT-proBNP Concentration (pg/mL:      <50             Positive            >450                   Gray                 300-450                    Negative             <300    50-75           Positive            >900                  Gray                300-900                  Negative            <300      >75             Positive            >1800                  Gray                300-1800                  Negative            <300    CBC & Differential  [[id number]]  (Abnormal) Collected: 12/15/23 0925    Specimen: Blood Updated: 12/15/23 0931    Narrative:      The following orders were created for panel order CBC & Differential.  Procedure                               Abnormality         Status                     ---------                               -----------         ------                     CBC Auto Differential[[id number]]        Abnormal            Final result                 Please view results for these tests on the individual orders.    CBC Auto Differential [[id number]]  (Abnormal) Collected: 12/15/23 0925    Specimen: Blood Updated: 12/15/23 0931     WBC 9.69 10*3/mm3      RBC 4.81 10*6/mm3      Hemoglobin 12.9 g/dL      Hematocrit 41.6 %      MCV 86.5 fL      MCH 26.8 pg      MCHC 31.0 g/dL      RDW 16.6 %      RDW-SD 51.6 fl      MPV 10.0 fL      Platelets 332 10*3/mm3      Neutrophil % 71.9 %      Lymphocyte % 15.6 %      Monocyte % 11.6 %      Eosinophil % 0.2 %      Basophil % 0.4 %      Immature Grans % 0.3 %      Neutrophils, Absolute 6.97 10*3/mm3      Lymphocytes, Absolute 1.51 10*3/mm3      Monocytes, Absolute 1.12 10*3/mm3      Eosinophils, Absolute 0.02 10*3/mm3      Basophils, Absolute 0.04 10*3/mm3      Immature Grans, Absolute 0.03 10*3/mm3      nRBC 0.0 /100 WBC     [place] Draw [[id number]] Collected: 12/15/23 0925    Specimen: Blood Updated: 12/15/23 0930    Narrative:      The following orders were created for panel order [place] Draw.  Procedure                               Abnormality         Status                     ---------                               -----------         ------                     Green Top (Gel)[[id number]]                                  Final result               Lavender Top[[id number]]                                     Final result               Gold Top - SST[[id number]]                                   Final result               Light Blue Top[[id number]]                                   Final  result                 Please view results for these tests on the individual orders.    Light Blue Top [[id number]] Collected: 12/15/23 0925    Specimen: Blood Updated: 12/15/23 0930     Extra Tube Hold for add-ons.     Comment: Auto resulted       Lavender Top [[id number]] Collected: 12/15/23 0925    Specimen: Blood Updated: 12/15/23 0930     Extra Tube hold for add-on     Comment: Auto resulted       Gold Top - SST [[id number]] Collected: 12/15/23 0925    Specimen: Blood Updated: 12/15/23 0930     Extra Tube Hold for add-ons.     Comment: Auto resulted.       Green Top (Gel) [[id number]] Collected: 12/15/23 0925    Specimen: Blood Updated: 12/15/23 0930     Extra Tube Hold for add-ons.     Comment: Auto resulted.             Imaging Results (Last 24 Hours)       Procedure Component Value Units Date/Time    XR Chest 1 View [[id number]] Collected: 12/15/23 0946     Updated: 12/15/23 0949    Narrative:      PROCEDURE: XR CHEST 1 VW     COMPARISON: [facility], CT, CT CHEST W CONTRAST DIAGNOSTIC, 8/27/2023, 16:39.  [facility], CR, XR CHEST 1 VW, 9/16/2023, 22:04.  [facility], CR, XR CHEST 1   VW, 11/10/2023, 7:38.     INDICATIONS: SOA Triage Protocol     FINDINGS:   The heart is enlarged.  Mediastinal contour appears within normal limits.  There appear to be new   interstitial and airspace opacities in the bilateral lower lungs, right greater than left.  There   may be a small right pleural effusion.  No definite pneumothorax.       Impression:         1. New interstitial and airspace opacities in the lower lungs and possible small right pleural   effusion.  Findings may represent heart failure/pulmonary edema or pneumonia.  2. Cardiomegaly.                  DARLINE IZQUIERDO MD         Electronically Signed and Approved By: DARLINE IZQUIERDO MD on 12/15/2023 at 9:46                           ECG/EMG Results (last 24 hours)       Procedure Component Value Units Date/Time    ECG 12 Lead ED  Triage Standing Order; SOA [[id number]] Collected: 12/15/23 0916     Updated: 12/15/23 0919     QT Interval 347 ms      QTC Interval 469 ms     Narrative:      HEART RATE= 110  bpm  RR Interval= 548  ms  OR Interval= 159  ms  P Horizontal Axis= -19  deg  P Front Axis= 43  deg  QRSD Interval= 106  ms  QT Interval= 347  ms  QTcB= 469  ms  QRS Axis= -42  deg  T Wave Axis= 114  deg  - ABNORMAL ECG -  Sinus tachycardia  Left atrial enlargement  LVH with secondary repolarization abnormality  Electronically Signed By:   Date and Time of Study: 2023-12-15 09:16:04

## 2023-12-16 ENCOUNTER — APPOINTMENT (OUTPATIENT)
Dept: CT IMAGING | Facility: HOSPITAL | Age: 58
DRG: 291 | End: 2023-12-16
Payer: COMMERCIAL

## 2023-12-16 LAB
ALBUMIN SERPL-MCNC: 3.1 G/DL (ref 3.5–5.2)
ALBUMIN/GLOB SERPL: 1 G/DL
ALP SERPL-CCNC: 259 U/L (ref 39–117)
ALT SERPL W P-5'-P-CCNC: 40 U/L (ref 1–41)
ANION GAP SERPL CALCULATED.3IONS-SCNC: 13.5 MMOL/L (ref 5–15)
AST SERPL-CCNC: 21 U/L (ref 1–40)
BASOPHILS # BLD AUTO: 0.04 10*3/MM3 (ref 0–0.2)
BASOPHILS NFR BLD AUTO: 0.4 % (ref 0–1.5)
BILIRUB SERPL-MCNC: 1.6 MG/DL (ref 0–1.2)
BUN SERPL-MCNC: 18 MG/DL (ref 6–20)
BUN/CREAT SERPL: 17.1 (ref 7–25)
CALCIUM SPEC-SCNC: 8.7 MG/DL (ref 8.6–10.5)
CHLORIDE SERPL-SCNC: 101 MMOL/L (ref 98–107)
CO2 SERPL-SCNC: 25.5 MMOL/L (ref 22–29)
CREAT SERPL-MCNC: 1.05 MG/DL (ref 0.76–1.27)
DEPRECATED RDW RBC AUTO: 50.4 FL (ref 37–54)
EGFRCR SERPLBLD CKD-EPI 2021: 82.3 ML/MIN/1.73
EOSINOPHIL # BLD AUTO: 0.04 10*3/MM3 (ref 0–0.4)
EOSINOPHIL NFR BLD AUTO: 0.4 % (ref 0.3–6.2)
ERYTHROCYTE [DISTWIDTH] IN BLOOD BY AUTOMATED COUNT: 16.2 % (ref 12.3–15.4)
GLOBULIN UR ELPH-MCNC: 3 GM/DL
GLUCOSE BLDC GLUCOMTR-MCNC: 115 MG/DL (ref 70–99)
GLUCOSE BLDC GLUCOMTR-MCNC: 129 MG/DL (ref 70–99)
GLUCOSE BLDC GLUCOMTR-MCNC: 135 MG/DL (ref 70–99)
GLUCOSE BLDC GLUCOMTR-MCNC: 152 MG/DL (ref 70–99)
GLUCOSE SERPL-MCNC: 180 MG/DL (ref 65–99)
HCT VFR BLD AUTO: 39.4 % (ref 37.5–51)
HGB BLD-MCNC: 12.3 G/DL (ref 13–17.7)
IMM GRANULOCYTES # BLD AUTO: 0.04 10*3/MM3 (ref 0–0.05)
IMM GRANULOCYTES NFR BLD AUTO: 0.4 % (ref 0–0.5)
INR PPP: 1.17 (ref 0.86–1.15)
LYMPHOCYTES # BLD AUTO: 1.06 10*3/MM3 (ref 0.7–3.1)
LYMPHOCYTES NFR BLD AUTO: 10.1 % (ref 19.6–45.3)
MAGNESIUM SERPL-MCNC: 1.5 MG/DL (ref 1.6–2.6)
MCH RBC QN AUTO: 26.8 PG (ref 26.6–33)
MCHC RBC AUTO-ENTMCNC: 31.2 G/DL (ref 31.5–35.7)
MCV RBC AUTO: 85.8 FL (ref 79–97)
MONOCYTES # BLD AUTO: 0.99 10*3/MM3 (ref 0.1–0.9)
MONOCYTES NFR BLD AUTO: 9.4 % (ref 5–12)
NEUTROPHILS NFR BLD AUTO: 79.3 % (ref 42.7–76)
NEUTROPHILS NFR BLD AUTO: 8.32 10*3/MM3 (ref 1.7–7)
NRBC BLD AUTO-RTO: 0 /100 WBC (ref 0–0.2)
PLATELET # BLD AUTO: 274 10*3/MM3 (ref 140–450)
PMV BLD AUTO: 10.6 FL (ref 6–12)
POTASSIUM SERPL-SCNC: 3.3 MMOL/L (ref 3.5–5.2)
PROCALCITONIN SERPL-MCNC: 0.06 NG/ML (ref 0–0.25)
PROT SERPL-MCNC: 6.1 G/DL (ref 6–8.5)
PROTHROMBIN TIME: 15.1 SECONDS (ref 11.8–14.9)
QT INTERVAL: 347 MS
QTC INTERVAL: 469 MS
RBC # BLD AUTO: 4.59 10*6/MM3 (ref 4.14–5.8)
SODIUM SERPL-SCNC: 140 MMOL/L (ref 136–145)
WBC NRBC COR # BLD AUTO: 10.49 10*3/MM3 (ref 3.4–10.8)

## 2023-12-16 PROCEDURE — 25010000002 FUROSEMIDE PER 20 MG: Performed by: INTERNAL MEDICINE

## 2023-12-16 PROCEDURE — 63710000001 INSULIN LISPRO (HUMAN) PER 5 UNITS: Performed by: INTERNAL MEDICINE

## 2023-12-16 PROCEDURE — 85025 COMPLETE CBC W/AUTO DIFF WBC: CPT | Performed by: INTERNAL MEDICINE

## 2023-12-16 PROCEDURE — 83735 ASSAY OF MAGNESIUM: CPT | Performed by: INTERNAL MEDICINE

## 2023-12-16 PROCEDURE — 25510000001 IOPAMIDOL PER 1 ML: Performed by: INTERNAL MEDICINE

## 2023-12-16 PROCEDURE — 99232 SBSQ HOSP IP/OBS MODERATE 35: CPT | Performed by: INTERNAL MEDICINE

## 2023-12-16 PROCEDURE — 85610 PROTHROMBIN TIME: CPT | Performed by: INTERNAL MEDICINE

## 2023-12-16 PROCEDURE — 84145 PROCALCITONIN (PCT): CPT | Performed by: INTERNAL MEDICINE

## 2023-12-16 PROCEDURE — 94640 AIRWAY INHALATION TREATMENT: CPT

## 2023-12-16 PROCEDURE — 94799 UNLISTED PULMONARY SVC/PX: CPT

## 2023-12-16 PROCEDURE — 25010000002 ENOXAPARIN PER 10 MG: Performed by: INTERNAL MEDICINE

## 2023-12-16 PROCEDURE — 25010000002 MAGNESIUM SULFATE 2 GM/50ML SOLUTION: Performed by: INTERNAL MEDICINE

## 2023-12-16 PROCEDURE — 80053 COMPREHEN METABOLIC PANEL: CPT | Performed by: INTERNAL MEDICINE

## 2023-12-16 PROCEDURE — 71260 CT THORAX DX C+: CPT

## 2023-12-16 PROCEDURE — 82948 REAGENT STRIP/BLOOD GLUCOSE: CPT

## 2023-12-16 RX ORDER — IPRATROPIUM BROMIDE AND ALBUTEROL SULFATE 2.5; .5 MG/3ML; MG/3ML
3 SOLUTION RESPIRATORY (INHALATION) EVERY 4 HOURS PRN
Status: DISCONTINUED | OUTPATIENT
Start: 2023-12-16 | End: 2023-12-18 | Stop reason: HOSPADM

## 2023-12-16 RX ORDER — POTASSIUM CHLORIDE 750 MG/1
40 CAPSULE, EXTENDED RELEASE ORAL EVERY 4 HOURS
Status: COMPLETED | OUTPATIENT
Start: 2023-12-16 | End: 2023-12-16

## 2023-12-16 RX ORDER — PANTOPRAZOLE SODIUM 40 MG/1
40 TABLET, DELAYED RELEASE ORAL
Status: DISCONTINUED | OUTPATIENT
Start: 2023-12-16 | End: 2023-12-18 | Stop reason: HOSPADM

## 2023-12-16 RX ORDER — WARFARIN SODIUM 6 MG/1
12 TABLET ORAL
Status: COMPLETED | OUTPATIENT
Start: 2023-12-16 | End: 2023-12-16

## 2023-12-16 RX ORDER — MAGNESIUM SULFATE HEPTAHYDRATE 40 MG/ML
2 INJECTION, SOLUTION INTRAVENOUS ONCE
Status: COMPLETED | OUTPATIENT
Start: 2023-12-16 | End: 2023-12-16

## 2023-12-16 RX ADMIN — CARVEDILOL 25 MG: 25 TABLET, FILM COATED ORAL at 09:32

## 2023-12-16 RX ADMIN — Medication 10 ML: at 09:33

## 2023-12-16 RX ADMIN — ENOXAPARIN SODIUM 80 MG: 100 INJECTION SUBCUTANEOUS at 18:02

## 2023-12-16 RX ADMIN — Medication 10 ML: at 22:05

## 2023-12-16 RX ADMIN — IOPAMIDOL 80 ML: 755 INJECTION, SOLUTION INTRAVENOUS at 16:43

## 2023-12-16 RX ADMIN — FUROSEMIDE 40 MG: 10 INJECTION, SOLUTION INTRAMUSCULAR; INTRAVENOUS at 09:32

## 2023-12-16 RX ADMIN — CARVEDILOL 25 MG: 25 TABLET, FILM COATED ORAL at 21:56

## 2023-12-16 RX ADMIN — LISINOPRIL 40 MG: 20 TABLET ORAL at 09:32

## 2023-12-16 RX ADMIN — ATORVASTATIN CALCIUM 40 MG: 40 TABLET, FILM COATED ORAL at 21:56

## 2023-12-16 RX ADMIN — WARFARIN SODIUM 12 MG: 6 TABLET ORAL at 18:01

## 2023-12-16 RX ADMIN — MAGNESIUM SULFATE HEPTAHYDRATE 2 G: 40 INJECTION, SOLUTION INTRAVENOUS at 10:57

## 2023-12-16 RX ADMIN — IPRATROPIUM BROMIDE AND ALBUTEROL SULFATE 3 ML: .5; 3 SOLUTION RESPIRATORY (INHALATION) at 11:10

## 2023-12-16 RX ADMIN — POTASSIUM CHLORIDE 40 MEQ: 10 CAPSULE, COATED, EXTENDED RELEASE ORAL at 10:57

## 2023-12-16 RX ADMIN — INSULIN LISPRO 2 UNITS: 100 INJECTION, SOLUTION INTRAVENOUS; SUBCUTANEOUS at 18:01

## 2023-12-16 RX ADMIN — PANTOPRAZOLE SODIUM 40 MG: 40 TABLET, DELAYED RELEASE ORAL at 10:57

## 2023-12-16 RX ADMIN — FUROSEMIDE 40 MG: 10 INJECTION, SOLUTION INTRAMUSCULAR; INTRAVENOUS at 18:01

## 2023-12-16 RX ADMIN — ENOXAPARIN SODIUM 80 MG: 100 INJECTION SUBCUTANEOUS at 06:07

## 2023-12-16 RX ADMIN — POTASSIUM CHLORIDE 40 MEQ: 10 CAPSULE, COATED, EXTENDED RELEASE ORAL at 14:01

## 2023-12-16 NOTE — PLAN OF CARE
Goal Outcome Evaluation:  Plan of Care Reviewed With: patient        Progress: no change          Thick sputum with blood this am and c/o pain with SOA notified MD. See orders. Prn breathing tx's for c/o SOA with improvement. Replaced K+ and Mg as per orders.

## 2023-12-16 NOTE — PROGRESS NOTES
Psychiatric   Hospitalist Progress Note  Date: 2023  Patient Name: Regulo Sepulveda  : 1965  MRN: [id number]  Date of admission: 12/15/2023      Subjective   Subjective     Chief Complaint:   Shortness of breath    Summary:   Regulo Sepulveda is a 58 y.o. male past medical history of heart failure with reduced ejection fraction EF of 20% and grade 2 diastolic dysfunction, apical thrombus in left ventricle, hypertension, type 2 diabetes who presents to the emergency department for management of shortness of breath     Patient states he had shortness of breath off and on for some time.  Had COVID about 2 to 3 weeks ago.  He is notices how he is felt in the passes, with heart failure.  Patient has little insight into how sick his heart actually is.  States he has not been taking his medications appropriately. No fevers or chills.  No leg swelling.  Says he can barely do anything because he gets short of breath.  As result he came the ER for further evaluation.      In the emergency department the patient's vital signs are as follows: Temperature is 97.7, pulse 105, respiratory is 36, blood pressure 160/16.  97% on room air.  CBC shows no concerning abnormalities.  CMP shows Phos of 313 and a ALT of 50 and a bilirubin of 1.3.  BNP is 25,000.  COVID-19 is negative.  Chest x-ray shows new interstitial and airspace opacity in the lower lung fields possible small right pleural effusion findings or represent heart failure versus pulmonary edema.  Patient admitted to hospital for acute exacerbation of systolic heart failure and diastolic heart failure    Interval Followup:   Chest pain, shortness of breath.  Complaining of coughing up pink-tinged sputum.  Remains on 2 L nasal cannula    Objective   Objective     Vitals:   Temp:  [97.5 °F (36.4 °C)-99 °F (37.2 °C)] 98.7 °F (37.1 °C)  Heart Rate:  [] 91  Resp:  [26-30] 28  BP: (120-156)/() 133/83  Flow (L/min):  [2] 2  Physical  Exam    Constitutional: Chronically ill-appearing, no acute distress, breathing comfortably with nasal cannula in place   Eyes: Pupils equal, sclerae anicteric, no conjunctival injection   HENT: NCAT, mucous membranes moist   Neck: Supple, no thyromegaly, no lymphadenopathy, trachea midline   Respiratory: No rhonchi no wheezing, bibasilar crackles   Cardiovascular: RRR, no murmurs, rubs, or gallops, palpable pedal pulses bilaterally   Gastrointestinal: Positive bowel sounds, soft, nontender, nondistended   Musculoskeletal: No bilateral ankle edema, no clubbing or cyanosis to extremities   Neurologic: Oriented x 3, strength symmetric in all extremities, Cranial Nerves grossly intact to confrontation, speech clear    Result Review    Result Review:  I have personally reviewed the results from 12/16/2023 and agree with these findings:  []  Laboratory  []  Microbiology  []  Radiology  []  EKG/Telemetry   []  Cardiology/Vascular   []  Pathology  []  Old records  []  Other:    Assessment & Plan   Assessment / Plan     Assessment/Plan:  Combined heart failure in acute exacerbation, EF of 20%, grade 2 diastolic dysfunction  Evaded D-dimer  Known apical thrombus  Known medical nonadherence  Type 2 diabetes  COPD  Atrial fibrillation  Hyperlipidemia  Substance abuse, amphetamines and THC    Plan:  Patient remains admitted to hospital for further care management  Continue on supplemental oxygen as needed  D-dimer is positive.  With continued complaints of shortness of breath and chest pain we will order CT scan to rule out pulmonary embolus.  Is currently therapeutic with anticoagulation either way, however information of a pulmonary embolus combined with other medical comorbidities would help in clinical picture.  Patient continues on therapeutic Lovenox while waiting for his INR to become therapeutic  Sliding scale insulin with hypoglycemia protocol  Continues on IV diuresis, twice daily dosing 40 mg IV  Close monitoring  renal function while on aggressive IV diuresis  Replacing potassium and magnesium today      Discussed plan with RN.    DVT prophylaxis:  Medical DVT prophylaxis orders are present.    CODE STATUS:   Level Of Support Discussed With: Patient  Code Status (Patient has no pulse and is not breathing): CPR (Attempt to Resuscitate)  Medical Interventions (Patient has pulse or is breathing): Full Support

## 2023-12-16 NOTE — PROGRESS NOTES
Pharmacy to Dose: Warfarin  Consulting provider: Quinten Persaud  Indication for warfarin: apical thrombus in left ventricle  Goal INR range: 2-3  Home warfarin dose: 7.5mg daily reported  Actions or monitoring: INR and signs/symptoms of bleeding    Date INR Warfarin Dose Given   12/15 1.15 7.5mg @1848   12/16 1.17 12 mg x 1                    Any Vitamin K given?: no (if yes list dose and date)  Any major drug interactions: none  Is patient on bridging therapy with another anticoagulant?: yes, enoxaparin 80mg sc q 12h    Plan:   Continue enoxaparin 80mg sc q 12 hours  INR reported as 1.17 today.   Will provide warfarin 12 mg x 1 today.  INR ordered for tomorrow AM.

## 2023-12-17 LAB
ANION GAP SERPL CALCULATED.3IONS-SCNC: 9.9 MMOL/L (ref 5–15)
BUN SERPL-MCNC: 25 MG/DL (ref 6–20)
BUN/CREAT SERPL: 22.7 (ref 7–25)
CALCIUM SPEC-SCNC: 8.5 MG/DL (ref 8.6–10.5)
CHLORIDE SERPL-SCNC: 103 MMOL/L (ref 98–107)
CO2 SERPL-SCNC: 29.1 MMOL/L (ref 22–29)
CREAT SERPL-MCNC: 1.1 MG/DL (ref 0.76–1.27)
DEPRECATED RDW RBC AUTO: 51.7 FL (ref 37–54)
EGFRCR SERPLBLD CKD-EPI 2021: 77.8 ML/MIN/1.73
ERYTHROCYTE [DISTWIDTH] IN BLOOD BY AUTOMATED COUNT: 16.7 % (ref 12.3–15.4)
GLUCOSE BLDC GLUCOMTR-MCNC: 105 MG/DL (ref 70–99)
GLUCOSE BLDC GLUCOMTR-MCNC: 118 MG/DL (ref 70–99)
GLUCOSE BLDC GLUCOMTR-MCNC: 126 MG/DL (ref 70–99)
GLUCOSE BLDC GLUCOMTR-MCNC: 155 MG/DL (ref 70–99)
GLUCOSE SERPL-MCNC: 113 MG/DL (ref 65–99)
HCT VFR BLD AUTO: 35.6 % (ref 37.5–51)
HGB BLD-MCNC: 11.1 G/DL (ref 13–17.7)
INR PPP: 1.4 (ref 0.86–1.15)
MAGNESIUM SERPL-MCNC: 1.9 MG/DL (ref 1.6–2.6)
MCH RBC QN AUTO: 26.7 PG (ref 26.6–33)
MCHC RBC AUTO-ENTMCNC: 31.2 G/DL (ref 31.5–35.7)
MCV RBC AUTO: 85.6 FL (ref 79–97)
PLATELET # BLD AUTO: 262 10*3/MM3 (ref 140–450)
PMV BLD AUTO: 10.8 FL (ref 6–12)
POTASSIUM SERPL-SCNC: 3.3 MMOL/L (ref 3.5–5.2)
PROTHROMBIN TIME: 17.5 SECONDS (ref 11.8–14.9)
RBC # BLD AUTO: 4.16 10*6/MM3 (ref 4.14–5.8)
SODIUM SERPL-SCNC: 142 MMOL/L (ref 136–145)
WBC NRBC COR # BLD AUTO: 15.67 10*3/MM3 (ref 3.4–10.8)

## 2023-12-17 PROCEDURE — 82948 REAGENT STRIP/BLOOD GLUCOSE: CPT

## 2023-12-17 PROCEDURE — 63710000001 INSULIN LISPRO (HUMAN) PER 5 UNITS: Performed by: INTERNAL MEDICINE

## 2023-12-17 PROCEDURE — 25010000002 FUROSEMIDE PER 20 MG: Performed by: INTERNAL MEDICINE

## 2023-12-17 PROCEDURE — 25010000002 MAGNESIUM SULFATE IN D5W 1G/100ML (PREMIX) 1-5 GM/100ML-% SOLUTION: Performed by: INTERNAL MEDICINE

## 2023-12-17 PROCEDURE — 83735 ASSAY OF MAGNESIUM: CPT | Performed by: INTERNAL MEDICINE

## 2023-12-17 PROCEDURE — 80048 BASIC METABOLIC PNL TOTAL CA: CPT | Performed by: INTERNAL MEDICINE

## 2023-12-17 PROCEDURE — 94799 UNLISTED PULMONARY SVC/PX: CPT

## 2023-12-17 PROCEDURE — 85027 COMPLETE CBC AUTOMATED: CPT | Performed by: INTERNAL MEDICINE

## 2023-12-17 PROCEDURE — 85610 PROTHROMBIN TIME: CPT | Performed by: INTERNAL MEDICINE

## 2023-12-17 PROCEDURE — 25010000002 ENOXAPARIN PER 10 MG: Performed by: INTERNAL MEDICINE

## 2023-12-17 PROCEDURE — 99232 SBSQ HOSP IP/OBS MODERATE 35: CPT | Performed by: INTERNAL MEDICINE

## 2023-12-17 RX ORDER — MAGNESIUM SULFATE 1 G/100ML
1 INJECTION INTRAVENOUS ONCE
Status: COMPLETED | OUTPATIENT
Start: 2023-12-17 | End: 2023-12-17

## 2023-12-17 RX ORDER — WARFARIN SODIUM 6 MG/1
12 TABLET ORAL
Status: COMPLETED | OUTPATIENT
Start: 2023-12-17 | End: 2023-12-17

## 2023-12-17 RX ORDER — LORAZEPAM 0.5 MG/1
0.5 TABLET ORAL ONCE
Status: COMPLETED | OUTPATIENT
Start: 2023-12-17 | End: 2023-12-17

## 2023-12-17 RX ORDER — POTASSIUM CHLORIDE 750 MG/1
40 CAPSULE, EXTENDED RELEASE ORAL EVERY 4 HOURS
Status: COMPLETED | OUTPATIENT
Start: 2023-12-17 | End: 2023-12-17

## 2023-12-17 RX ADMIN — CARVEDILOL 25 MG: 25 TABLET, FILM COATED ORAL at 09:08

## 2023-12-17 RX ADMIN — FUROSEMIDE 40 MG: 10 INJECTION, SOLUTION INTRAMUSCULAR; INTRAVENOUS at 18:09

## 2023-12-17 RX ADMIN — POTASSIUM CHLORIDE 40 MEQ: 10 CAPSULE, COATED, EXTENDED RELEASE ORAL at 13:35

## 2023-12-17 RX ADMIN — ACETAMINOPHEN 650 MG: 325 TABLET ORAL at 05:23

## 2023-12-17 RX ADMIN — MAGNESIUM SULFATE 1 G: 1 INJECTION INTRAVENOUS at 09:59

## 2023-12-17 RX ADMIN — PANTOPRAZOLE SODIUM 40 MG: 40 TABLET, DELAYED RELEASE ORAL at 05:23

## 2023-12-17 RX ADMIN — POTASSIUM CHLORIDE 40 MEQ: 10 CAPSULE, COATED, EXTENDED RELEASE ORAL at 09:08

## 2023-12-17 RX ADMIN — IPRATROPIUM BROMIDE AND ALBUTEROL SULFATE 3 ML: .5; 3 SOLUTION RESPIRATORY (INHALATION) at 05:27

## 2023-12-17 RX ADMIN — ENOXAPARIN SODIUM 80 MG: 100 INJECTION SUBCUTANEOUS at 18:09

## 2023-12-17 RX ADMIN — Medication 10 ML: at 09:08

## 2023-12-17 RX ADMIN — ATORVASTATIN CALCIUM 40 MG: 40 TABLET, FILM COATED ORAL at 20:37

## 2023-12-17 RX ADMIN — Medication 10 ML: at 20:37

## 2023-12-17 RX ADMIN — IPRATROPIUM BROMIDE AND ALBUTEROL SULFATE 3 ML: .5; 3 SOLUTION RESPIRATORY (INHALATION) at 18:42

## 2023-12-17 RX ADMIN — LISINOPRIL 40 MG: 20 TABLET ORAL at 09:08

## 2023-12-17 RX ADMIN — WARFARIN SODIUM 12 MG: 6 TABLET ORAL at 18:10

## 2023-12-17 RX ADMIN — CARVEDILOL 25 MG: 25 TABLET, FILM COATED ORAL at 20:37

## 2023-12-17 RX ADMIN — FUROSEMIDE 40 MG: 10 INJECTION, SOLUTION INTRAMUSCULAR; INTRAVENOUS at 09:08

## 2023-12-17 RX ADMIN — LORAZEPAM 0.5 MG: 0.5 TABLET ORAL at 19:42

## 2023-12-17 RX ADMIN — INSULIN LISPRO 2 UNITS: 100 INJECTION, SOLUTION INTRAVENOUS; SUBCUTANEOUS at 20:36

## 2023-12-17 RX ADMIN — ENOXAPARIN SODIUM 80 MG: 100 INJECTION SUBCUTANEOUS at 06:17

## 2023-12-17 NOTE — PROGRESS NOTES
Pharmacy to Dose: Warfarin  Consulting provider: Quinten Persaud  Indication for warfarin: apical thrombus in left ventricle  Goal INR range: 2-3  Home warfarin dose: 7.5mg daily reported  Actions or monitoring: INR and signs/symptoms of bleeding    Date INR Warfarin Dose Given   12/15 1.15 7.5mg @1848   12/16 1.17 12 mg x 1   12/17 1.40 12 mg x 1               Any Vitamin K given?: no (if yes list dose and date)  Any major drug interactions: none  Is patient on bridging therapy with another anticoagulant?: yes, enoxaparin 80mg sc q 12h    Plan:   Continue enoxaparin 80mg sc q 12 hours  INR reported as 1.40 today.   Will provide warfarin 12 mg x 1 again today while keeping in mind yesterdays 12 mg dose and home dose reported as 7.5 mg daily.  INR ordered for tomorrow AM.

## 2023-12-17 NOTE — PROGRESS NOTES
[facility]   Hospitalist Progress Note  Date: 2023  Patient Name: Regulo Sepulveda  : 1965  MRN: [id number]  Date of admission: 12/15/2023      Subjective   Subjective     Chief Complaint:   Shortness of breath    Summary:   Regulo Sepulveda is a 58 y.o. male past medical history of heart failure with reduced ejection fraction EF of 20% and grade 2 diastolic dysfunction, apical thrombus in left ventricle, hypertension, type 2 diabetes who presents to the emergency department for management of shortness of breath     Patient states he had shortness of breath off and on for some time.  Had COVID about 2 to 3 weeks ago.  He is notices how he is felt in the passes, with heart failure.  Patient has little insight into how sick his heart actually is.  States he has not been taking his medications appropriately. No fevers or chills.  No leg swelling.  Says he can barely do anything because he gets short of breath.  As result he came the ER for further evaluation.      In the emergency department the patient's vital signs are as follows: Temperature is 97.7, pulse 105, respiratory is 36, blood pressure 160/16.  97% on room air.  CBC shows no concerning abnormalities.  CMP shows Phos of 313 and a ALT of 50 and a bilirubin of 1.3.  BNP is 25,000.  COVID-19 is negative.  Chest x-ray shows new interstitial and airspace opacity in the lower lung fields possible small right pleural effusion findings or represent heart failure versus pulmonary edema.  Patient admitted to hospital for acute exacerbation of systolic heart failure and diastolic heart failure    Interval Followup:   CT chest results reviewed with patient.  INR still subtherapeutic.  No longer complaining of chest pain or cough    Objective   Objective     Vitals:   Temp:  [98.1 °F (36.7 °C)-99.5 °F (37.5 °C)] 98.1 °F (36.7 °C)  Heart Rate:  [69-89] 69  Resp:  [20-32] 22  BP: (100-126)/(53-79) 100/53  Flow (L/min):  [2] 2  Physical  Exam    Constitutional: Chronically ill-appearing, no acute distress, breathing comfortably with nasal cannula in place   Eyes: Pupils equal, sclerae anicteric, no conjunctival injection   HENT: NCAT, mucous membranes moist   Neck: Supple, no thyromegaly, no lymphadenopathy, trachea midline   Respiratory: No rhonchi no wheezing, minimal crackles   Cardiovascular: RRR, no murmurs, rubs, or gallops, palpable pedal pulses bilaterally   Gastrointestinal: Positive bowel sounds, soft, nontender, nondistended   Musculoskeletal: No bilateral ankle edema, no clubbing or cyanosis to extremities   Neurologic: Oriented x 3, strength symmetric in all extremities, Cranial Nerves grossly intact to confrontation, speech clear    Result Review    Result Review:  I have personally reviewed the results from 12/17/2023 and agree with these findings:  []  Laboratory  []  Microbiology  []  Radiology  []  EKG/Telemetry   []  Cardiology/Vascular   []  Pathology  []  Old records  []  Other:    Assessment & Plan   Assessment / Plan     Assessment/Plan:  Combined heart failure in acute exacerbation, EF of 20%, grade 2 diastolic dysfunction  Evaded D-dimer  Known apical thrombus  Known medical nonadherence  Type 2 diabetes  COPD  Atrial fibrillation  Hyperlipidemia  Substance abuse, amphetamines and THC    Plan:  Patient remains admitted to hospital for further care management  Continue on supplemental oxygen as needed  CTA chest reveals no pulmonary embolus, does mention pneumonia however patient with no symptoms.  Will continue without antibiotics at this time  Patient continues on therapeutic Lovenox while waiting for his INR to become therapeutic  Sliding scale insulin with hypoglycemia protocol  Continues on IV diuresis, twice daily dosing 40 mg IV  Close monitoring renal function while on aggressive IV diuresis  Replacing both potassium and magnesium today, recheck in the morning  Discussed with social work tomorrow assistance with  patient, financial issues appear to be getting in the way of patient getting his meds      Discussed plan with RN.    DVT prophylaxis:  Medical DVT prophylaxis orders are present.    CODE STATUS:   Level Of Support Discussed With: Patient  Code Status (Patient has no pulse and is not breathing): CPR (Attempt to Resuscitate)  Medical Interventions (Patient has pulse or is breathing): Full Support

## 2023-12-18 VITALS
BODY MASS INDEX: 20.93 KG/M2 | SYSTOLIC BLOOD PRESSURE: 104 MMHG | TEMPERATURE: 97.3 F | HEART RATE: 72 BPM | OXYGEN SATURATION: 97 % | RESPIRATION RATE: 22 BRPM | DIASTOLIC BLOOD PRESSURE: 55 MMHG | HEIGHT: 72 IN | WEIGHT: 154.54 LBS

## 2023-12-18 LAB
ANION GAP SERPL CALCULATED.3IONS-SCNC: 9.1 MMOL/L (ref 5–15)
BUN SERPL-MCNC: 27 MG/DL (ref 6–20)
BUN/CREAT SERPL: 26.7 (ref 7–25)
CALCIUM SPEC-SCNC: 8.7 MG/DL (ref 8.6–10.5)
CHLORIDE SERPL-SCNC: 98 MMOL/L (ref 98–107)
CO2 SERPL-SCNC: 29.9 MMOL/L (ref 22–29)
CREAT SERPL-MCNC: 1.01 MG/DL (ref 0.76–1.27)
DEPRECATED RDW RBC AUTO: 51.8 FL (ref 37–54)
EGFRCR SERPLBLD CKD-EPI 2021: 86.2 ML/MIN/1.73
ERYTHROCYTE [DISTWIDTH] IN BLOOD BY AUTOMATED COUNT: 16.8 % (ref 12.3–15.4)
GLUCOSE BLDC GLUCOMTR-MCNC: 127 MG/DL (ref 70–99)
GLUCOSE BLDC GLUCOMTR-MCNC: 132 MG/DL (ref 70–99)
GLUCOSE SERPL-MCNC: 126 MG/DL (ref 65–99)
HCT VFR BLD AUTO: 36.2 % (ref 37.5–51)
HGB BLD-MCNC: 11.6 G/DL (ref 13–17.7)
INR PPP: 1.75 (ref 0.86–1.15)
MAGNESIUM SERPL-MCNC: 1.8 MG/DL (ref 1.6–2.6)
MCH RBC QN AUTO: 27.3 PG (ref 26.6–33)
MCHC RBC AUTO-ENTMCNC: 32 G/DL (ref 31.5–35.7)
MCV RBC AUTO: 85.2 FL (ref 79–97)
PLATELET # BLD AUTO: 262 10*3/MM3 (ref 140–450)
PMV BLD AUTO: 10.5 FL (ref 6–12)
POTASSIUM SERPL-SCNC: 3.4 MMOL/L (ref 3.5–5.2)
PROTHROMBIN TIME: 20.8 SECONDS (ref 11.8–14.9)
RBC # BLD AUTO: 4.25 10*6/MM3 (ref 4.14–5.8)
SODIUM SERPL-SCNC: 137 MMOL/L (ref 136–145)
WBC NRBC COR # BLD AUTO: 14.4 10*3/MM3 (ref 3.4–10.8)

## 2023-12-18 PROCEDURE — 25010000002 ENOXAPARIN PER 10 MG: Performed by: INTERNAL MEDICINE

## 2023-12-18 PROCEDURE — 85610 PROTHROMBIN TIME: CPT | Performed by: INTERNAL MEDICINE

## 2023-12-18 PROCEDURE — 85027 COMPLETE CBC AUTOMATED: CPT | Performed by: INTERNAL MEDICINE

## 2023-12-18 PROCEDURE — 97116 GAIT TRAINING THERAPY: CPT

## 2023-12-18 PROCEDURE — 83735 ASSAY OF MAGNESIUM: CPT | Performed by: INTERNAL MEDICINE

## 2023-12-18 PROCEDURE — 80048 BASIC METABOLIC PNL TOTAL CA: CPT | Performed by: INTERNAL MEDICINE

## 2023-12-18 PROCEDURE — 97150 GROUP THERAPEUTIC PROCEDURES: CPT

## 2023-12-18 PROCEDURE — 25010000002 MAGNESIUM SULFATE 2 GM/50ML SOLUTION: Performed by: INTERNAL MEDICINE

## 2023-12-18 PROCEDURE — 25010000002 FUROSEMIDE PER 20 MG: Performed by: INTERNAL MEDICINE

## 2023-12-18 PROCEDURE — 97161 PT EVAL LOW COMPLEX 20 MIN: CPT

## 2023-12-18 PROCEDURE — 82948 REAGENT STRIP/BLOOD GLUCOSE: CPT

## 2023-12-18 RX ORDER — WARFARIN SODIUM 4 MG/1
8 TABLET ORAL
Status: DISCONTINUED | OUTPATIENT
Start: 2023-12-18 | End: 2023-12-18 | Stop reason: HOSPADM

## 2023-12-18 RX ORDER — MAGNESIUM SULFATE HEPTAHYDRATE 40 MG/ML
2 INJECTION, SOLUTION INTRAVENOUS ONCE
Status: COMPLETED | OUTPATIENT
Start: 2023-12-18 | End: 2023-12-18

## 2023-12-18 RX ORDER — POTASSIUM CHLORIDE 750 MG/1
40 CAPSULE, EXTENDED RELEASE ORAL EVERY 4 HOURS
Status: DISCONTINUED | OUTPATIENT
Start: 2023-12-18 | End: 2023-12-18 | Stop reason: HOSPADM

## 2023-12-18 RX ADMIN — MAGNESIUM SULFATE HEPTAHYDRATE 2 G: 40 INJECTION, SOLUTION INTRAVENOUS at 10:11

## 2023-12-18 RX ADMIN — Medication 10 ML: at 09:06

## 2023-12-18 RX ADMIN — PANTOPRAZOLE SODIUM 40 MG: 40 TABLET, DELAYED RELEASE ORAL at 05:53

## 2023-12-18 RX ADMIN — LISINOPRIL 40 MG: 20 TABLET ORAL at 09:05

## 2023-12-18 RX ADMIN — FUROSEMIDE 40 MG: 10 INJECTION, SOLUTION INTRAMUSCULAR; INTRAVENOUS at 09:06

## 2023-12-18 RX ADMIN — CARVEDILOL 25 MG: 25 TABLET, FILM COATED ORAL at 09:05

## 2023-12-18 RX ADMIN — POTASSIUM CHLORIDE 40 MEQ: 10 CAPSULE, COATED, EXTENDED RELEASE ORAL at 09:05

## 2023-12-18 RX ADMIN — ENOXAPARIN SODIUM 80 MG: 100 INJECTION SUBCUTANEOUS at 09:05

## 2023-12-18 NOTE — DISCHARGE SUMMARY
[facility]         HOSPITALIST  DISCHARGE SUMMARY.  AGAINST MEDICAL ADVICE discharge    Patient Name: Regulo Sepulveda  : 1965  MRN: [id number]    Date of Admission: 12/15/2023  Date of Discharge:  2023  Primary Care Physician: Dickson Landry MD    Active and Resolved Hospital Problems:  Combined heart failure in acute exacerbation, EF of 20%, grade 2 diastolic dysfunction  Evaded D-dimer  Known apical thrombus  Known medical nonadherence  Type 2 diabetes  COPD  Atrial fibrillation  Hyperlipidemia  Substance abuse, amphetamines and THC    Hospital Course     Hospital Course:  Regulo Sepulveda is a 58 y.o. male past medical history of heart failure with reduced ejection fraction EF of 20% and grade 2 diastolic dysfunction, apical thrombus in left ventricle, hypertension, type 2 diabetes who presents to the emergency department for management of shortness of breath     Patient states he had shortness of breath off and on for some time.  Had COVID about 2 to 3 weeks ago.  He is notices how he is felt in the passes, with heart failure.  Patient has little insight into how sick his heart actually is.  States he has not been taking his medications appropriately. No fevers or chills.  No leg swelling.  Says he can barely do anything because he gets short of breath.  As result he came the ER for further evaluation.      In the emergency department the patient's vital signs are as follows: Temperature is 97.7, pulse 105, respiratory is 36, blood pressure 160/16.  97% on room air.  CBC shows no concerning abnormalities.  CMP shows Phos of 313 and a ALT of 50 and a bilirubin of 1.3.  BNP is 25,000.  COVID-19 is negative.  Chest x-ray shows new interstitial and airspace opacity in the lower lung fields possible small right pleural effusion findings or represent heart failure versus pulmonary edema.  Patient admitted to hospital for acute exacerbation of systolic heart failure and diastolic  heart failure.  Patient was diuresed, started on therapeutic Lovenox while waiting therapeutic INR.  On the 18th, patient stated he wanted to leave before treatment was completed.  Tried to reason with patient ultimately he left AGAINST MEDICAL ADVICE.     DISCHARGE Follow Up Recommendations for labs and diagnostics:   Patient should follow-up with PCP cardiology soon as possible      Day of Discharge     Vital Signs:  Temp:  [97.3 °F (36.3 °C)-99.3 °F (37.4 °C)] 97.3 °F (36.3 °C)  Heart Rate:  [72-82] 72  Resp:  [18-22] 22  BP: (102-119)/(55-82) 104/55  Flow (L/min):  [2] 2  Physical Exam:               Constitutional: Chronically ill-appearing, no acute distress, breathing comfortably with nasal cannula in place              Eyes: Pupils equal, sclerae anicteric, no conjunctival injection              HENT: NCAT, mucous membranes moist              Neck: Supple, no thyromegaly, no lymphadenopathy, trachea midline              Respiratory: No rhonchi no wheezing, minimal crackles              Cardiovascular: RRR, no murmurs, rubs, or gallops, palpable pedal pulses bilaterally              Gastrointestinal: Positive bowel sounds, soft, nontender, nondistended              Musculoskeletal: No bilateral ankle edema, no clubbing or cyanosis to extremities              Neurologic: Oriented x 3, strength symmetric in all extremities, Cranial Nerves grossly intact to confrontation, speech clear    Discharge Details        Discharge Medications        ASK your doctor about these medications        Instructions Start Date   Abistanislaw Maintena 300 MG Suspension Reconstituted ER IM injection ER  Generic drug: ARIPiprazole ER   300 mg, Intramuscular, Every 30 Days      albuterol sulfate  (90 Base) MCG/ACT inhaler  Commonly known as: PROVENTIL HFA;VENTOLIN HFA;PROAIR HFA   2 puffs, Inhalation, Every 4 Hours PRN      amLODIPine 2.5 MG tablet  Commonly known as: NORVASC  Ask about: Should I take this medication?   2.5 mg,  Oral, Every 24 Hours Scheduled      atorvastatin 40 MG tablet  Commonly known as: LIPITOR   40 mg, Oral, Every Night at Bedtime      carvedilol 25 MG tablet  Commonly known as: COREG   1 tablet, Oral, Every 12 Hours Scheduled      Farxiga 5 MG tablet tablet  Generic drug: dapagliflozin   5 mg, Oral, Daily      furosemide 40 MG tablet  Commonly known as: LASIX   40 mg, Oral, Daily      lisinopril 40 MG tablet  Commonly known as: PRINIVIL,ZESTRIL   40 mg, Oral, Every 24 Hours Scheduled      metFORMIN 1000 MG tablet  Commonly known as: GLUCOPHAGE   1,000 mg, Oral, 2 Times Daily      pantoprazole 40 MG EC tablet  Commonly known as: Protonix   40 mg, Oral, Daily      venlafaxine XR 37.5 MG 24 hr capsule  Commonly known as: EFFEXOR-XR   1 capsule, Oral, Daily      warfarin 7.5 MG tablet  Commonly known as: COUMADIN  Ask about: Which instructions should I use?   7.5 mg, Oral, Daily Warfarin               Allergies   Allergen Reactions    Penicillins Shortness Of Breath       Discharge Disposition:  Left Against Medical Advice    Diet:  Hospital:No active diet order      Discharge Activity:       CODE STATUS:  Code Status and Medical Interventions:   Ordered at: 12/15/23 1806     Level Of Support Discussed With:    Patient     Code Status (Patient has no pulse and is not breathing):    CPR (Attempt to Resuscitate)     Medical Interventions (Patient has pulse or is breathing):    Full Support         Future Appointments   Date Time Provider Department Center   2/23/2024  8:40 AM Hailey Workman PA-C [facility] BH PCRAD [facility]   7/17/2024  8:45 AM Veronica Lu APRN [facility] GE ETW [facility]           Pertinent  and/or Most Recent Results     PROCEDURES:   NA    LAB RESULTS:      Lab 12/18/23  0419 12/17/23  0626 12/16/23  0429 12/15/23  1847 12/15/23  0925   WBC 14.40* 15.67* 10.49  --  9.69   HEMOGLOBIN 11.6* 11.1* 12.3*  --  12.9*   HEMATOCRIT 36.2* 35.6* 39.4  --  41.6   PLATELETS 262 262 274  --  332   NEUTROS ABS  --   --  8.32*  --   6.97   IMMATURE GRANS (ABS)  --   --  0.04  --  0.03   LYMPHS ABS  --   --  1.06  --  1.51   MONOS ABS  --   --  0.99*  --  1.12*   EOS ABS  --   --  0.04  --  0.02   MCV 85.2 85.6 85.8  --  86.5   PROCALCITONIN  --   --  0.06  --  0.05   PROTIME 20.8* 17.5* 15.1* 14.9 14.6   D DIMER QUANT  --   --   --  1.50*  --          Lab 12/18/23  0419 12/17/23  0626 12/16/23  0429 12/15/23  0925   SODIUM 137 142 140 140   POTASSIUM 3.4* 3.3* 3.3* 3.8   CHLORIDE 98 103 101 103   CO2 29.9* 29.1* 25.5 25.4   ANION GAP 9.1 9.9 13.5 11.6   BUN 27* 25* 18 24*   CREATININE 1.01 1.10 1.05 1.12   EGFR 86.2 77.8 82.3 76.1   GLUCOSE 126* 113* 180* 134*   CALCIUM 8.7 8.5* 8.7 9.2   MAGNESIUM 1.8 1.9 1.5*  --          Lab 12/16/23  0429 12/15/23  0925   TOTAL PROTEIN 6.1 6.9   ALBUMIN 3.1* 3.7   GLOBULIN 3.0 3.2   ALT (SGPT) 40 50*   AST (SGOT) 21 30   BILIRUBIN 1.6* 1.3*   ALK PHOS 259* 313*         Lab 12/18/23  0419 12/17/23  0626 12/16/23  0429 12/15/23  1847 12/15/23  0925   PROBNP  --   --   --   --  25,684.0*   HSTROP T  --   --   --   --  29*   PROTIME 20.8* 17.5* 15.1* 14.9 14.6   INR 1.75* 1.40* 1.17* 1.15 1.11                 Brief Urine Lab Results  (Last result in the past 365 days)        Color   Clarity   Blood   Leuk Est   Nitrite   Protein   CREAT   Urine HCG        08/27/23 1925 Yellow   Clear   Small (1+)   Negative   Negative   100 mg/dL (2+)                 Microbiology Results (last 10 days)       Procedure Component Value - Date/Time    COVID-19, FLU A/B, RSV PCR 1 HR TAT - Swab, Nasopharynx [[id number]]  (Normal) Collected: 12/15/23 1021    Lab Status: Final result Specimen: Swab from Nasopharynx Updated: 12/15/23 1107     COVID19 Not Detected     Influenza A PCR Not Detected     Influenza B PCR Not Detected     RSV, PCR Not Detected    Narrative:      Fact sheet for providers: https://www.fda.gov/media/170514/download    Fact sheet for patients: https://www.fda.gov/media/322347/download    Test performed by PCR.             CT Chest With Contrast Diagnostic    Result Date: 12/16/2023  Impression:   CT scan of the chest with IV contrast demonstrating no findings of PE.  Emphysema.  10 cm area of alveolar airspace disease in the posterior and inferior right lower lobe is consistent with pneumonia or aspiration.  Small to moderate right pleural effusion is seen.     ROBERTO HARKINS MD       Electronically Signed and Approved By: ROBERTO HARKINS MD on 12/16/2023 at 18:57             XR Chest 1 View    Result Date: 12/15/2023  Impression:   1. New interstitial and airspace opacities in the lower lungs and possible small right pleural effusion.  Findings may represent heart failure/pulmonary edema or pneumonia. 2. Cardiomegaly.       DARLINE IZQUIERDO MD       Electronically Signed and Approved By: DARLINE IZQUIERDO MD on 12/15/2023 at 9:46                      Labs Pending at Discharge:        Time spent on Discharge including face to face service:  24 minutes    Electronically signed by Felton Reynoso MD, 12/18/23, 6:45 PM EST.

## 2023-12-18 NOTE — PROGRESS NOTES
Pharmacy to Dose: Warfarin  Consulting provider: Quinten Persaud  Indication for warfarin: apical thrombus in left ventricle  Goal INR range: 2-3  Home warfarin dose: 7.5mg daily reported  Actions or monitoring: INR and signs/symptoms of bleeding    Date INR Warfarin Dose Given   12/15 1.15 7.5mg @1848   12/16 1.17 12 mg x 1   12/17 1.40 12 mg x 1   12/18 1.75  8 mg x 1          Any Vitamin K given?: no (if yes list dose and date)  Any major drug interactions: none  Is patient on bridging therapy with another anticoagulant?: yes, enoxaparin 80mg sc q 12h    Plan:   Continue enoxaparin 80mg sc q 12 hours  INR increased to 1.75, still subtherapeutic. Will give 8 mg today.  Daily INRs ordered.

## 2023-12-18 NOTE — THERAPY EVALUATION
Acute Care - Physical Therapy Initial Evaluation   [place]     Patient Name: Regulo Sepulveda  : 1965  MRN: [id number]  Today's Date: 2023      Visit Dx:     ICD-10-CM ICD-9-CM   1. Acute congestive heart failure, unspecified heart failure type  I50.9 428.0   2. Difficulty walking  R26.2 719.7     Patient Active Problem List   Diagnosis    Left groin pain    Major depressive disorder, recurrent episode, moderate    Chronic obstructive pulmonary disease    Diabetes mellitus    Hypertensive disorder    Hyperlipidemia    Hepatitis C    Cigarette nicotine dependence    BPH (benign prostatic hyperplasia)    GERD (gastroesophageal reflux disease)    B12 deficiency    LV (left ventricular) mural thrombus    Schizophrenia    Acute on chronic combined systolic and diastolic CHF (congestive heart failure)    Acute on chronic heart failure with reduced ejection fraction and diastolic dysfunction    Acute on chronic congestive heart failure    PAF (paroxysmal atrial fibrillation)    CHF (congestive heart failure)    Acute on chronic HFrEF (heart failure with reduced ejection fraction)    Moderate malnutrition    Systolic heart failure     Past Medical History:   Diagnosis Date    Anxiety     Asthma     Bipolar affective     Cardiac tamponade         CHF (congestive heart failure)     COPD (chronic obstructive pulmonary disease)     Coronary artery disease     Depression     Diabetes mellitus     Elevated cholesterol     Hypertension      Past Surgical History:   Procedure Laterality Date    CARDIAC CATHETERIZATION Right 2023    Procedure: Right and Left Heart Cath;  Surgeon: Ben Grant MD;  Location: [facility] CATH INVASIVE LOCATION;  Service: Cardiovascular;  Laterality: Right;    TESTICLE SURGERY Left     extraction     PT Assessment (last 12 hours)       PT Evaluation and Treatment       Row Name 23 1100          Physical Therapy Time and Intention    Subjective Information no complaints  -DP      Document Type evaluation  -DP     Mode of Treatment individual therapy;physical therapy  -DP     Patient Effort good  -DP       Row Name 12/18/23 1100          General Information    Patient Profile Reviewed yes  -DP     Patient Observations alert;cooperative;agree to therapy  -DP     Prior Level of Function independent:;gait;transfer;bed mobility;ADL's  -DP     Equipment Currently Used at Home none  -DP     Existing Precautions/Restrictions no known precautions/restrictions  -DP     Barriers to Rehab none identified  -DP       Row Name 12/18/23 1100          Living Environment    Current Living Arrangements home  -DP     Home Accessibility stairs to enter home  -DP     People in Home alone  but has a friend/ family who live snext door and can assist.  -DP     Primary Care Provided by self  -DP       Row Name 12/18/23 1100          Cognition    Orientation Status (Cognition) oriented x 3  -DP       Row Name 12/18/23 1100          Range of Motion Comprehensive    General Range of Motion bilateral lower extremity ROM WFL  -DP       Row Name 12/18/23 1100          Strength (Manual Muscle Testing)    Strength (Manual Muscle Testing) bilateral lower extremities;strength is WFL  -DP       Row Name 12/18/23 1100          Bed Mobility    Bed Mobility supine-sit-supine  -DP     Supine-Sit-Supine Dukes (Bed Mobility) independent  -DP       Row Name 12/18/23 1100          Transfers    Transfers sit-stand transfer  -DP       Row Name 12/18/23 1100          Sit-Stand Transfer    Sit-Stand Dukes (Transfers) independent  -DP       Row Name 12/18/23 1100          Gait/Stairs (Locomotion)    Gait/Stairs Locomotion gait/ambulation independence  -DP     Dukes Level (Gait) modified independence  -DP     Assistive Device (Gait) other (see comments)  none  -DP     Distance in Feet (Gait) 120  -DP       Row Name 12/18/23 1100          Balance    Balance Assessment standing dynamic balance  -DP     Dynamic Standing  Balance supervision  -DP     Position/Device Used, Standing Balance supported;walker, rolling  -DP       Row Name 12/18/23 1100          Plan of Care Review    Plan of Care Reviewed With patient  -DP     Outcome Evaluation Patient is able to complete all transfers and bed mobilities indepedently in the room. Pt is able to ambulate ad riaz in the room. Pt does not need inpatient PT services. Recommend DC home.  -DP       Row Name 12/18/23 1100          Therapy Assessment/Plan (PT)    Criteria for Skilled Interventions Met (PT) no problems identified which require skilled intervention  -DP     Therapy Frequency (PT) evaluation only  -DP       Row Name 12/18/23 1100          PT Evaluation Complexity    History, PT Evaluation Complexity no personal factors and/or comorbidities  -DP     Examination of Body Systems (PT Eval Complexity) total of 4 or more elements  -DP     Clinical Presentation (PT Evaluation Complexity) stable  -DP     Clinical Decision Making (PT Evaluation Complexity) low complexity  -DP     Overall Complexity (PT Evaluation Complexity) low complexity  -DP       Row Name 12/18/23 1100          Physical Therapy Goals    Problem Specific Goal Selection (PT) problem specific goal 1, PT  -DP       Row Name 12/18/23 1100          Problem Specific Goal 1 (PT)    Problem Specific Goal 1 (PT) Complete PT evaluation.  -DP     Time Frame (Problem Specific Goal 1, PT) 1 day  -DP     Progress/Outcome (Problem Specific Goal 1, PT) goal met  -DP               User Key  (r) = Recorded By, (t) = Taken By, (c) = Cosigned By      Initials Name Provider Type    Og Ragland, PT Physical Therapist                      PT Recommendation and Plan  Anticipated Discharge Disposition (PT): home  Therapy Frequency (PT): evaluation only  Plan of Care Reviewed With: patient  Outcome Evaluation: Patient is able to complete all transfers and bed mobilities indepedently in the room. Pt is able to ambulate ad riaz in the room. Pt  does not need inpatient PT services. Recommend DC home.   Outcome Measures       Row Name 12/18/23 1100             How much help from another person do you currently need...    Turning from your back to your side while in flat bed without using bedrails? 4  -DP      Moving from lying on back to sitting on the side of a flat bed without bedrails? 4  -DP      Moving to and from a bed to a chair (including a wheelchair)? 4  -DP      Standing up from a chair using your arms (e.g., wheelchair, bedside chair)? 4  -DP      Climbing 3-5 steps with a railing? 4  -DP      To walk in hospital room? 4  -DP      AM-PAC 6 Clicks Score (PT) 24  -DP      Highest Level of Mobility Goal 8 --> Walked 250 feet or more  -DP                User Key  (r) = Recorded By, (t) = Taken By, (c) = Cosigned By      Initials Name Provider Type    Og Ragland, PT Physical Therapist                     Time Calculation:    PT Charges       Row Name 12/18/23 1126             Time Calculation    PT Received On 12/18/23  -DP         Untimed Charges    PT Eval/Re-eval Minutes 30  -DP         Total Minutes    Untimed Charges Total Minutes 30  -DP       Total Minutes 30  -DP                User Key  (r) = Recorded By, (t) = Taken By, (c) = Cosigned By      Initials Name Provider Type    Og Ragland, PT Physical Therapist                      PT G-Codes  AM-PAC 6 Clicks Score (PT): 24    Og Myrick, PT  12/18/2023

## 2023-12-18 NOTE — PLAN OF CARE
Goal Outcome Evaluation:  Plan of Care Reviewed With: patient           Outcome Evaluation: Patient is able to complete all transfers and bed mobilities indepedently in the room. Pt is able to ambulate ad riaz in the room. Pt does not need inpatient PT services. Recommend DC home.      Anticipated Discharge Disposition (PT): home

## 2023-12-18 NOTE — PLAN OF CARE
Goal Outcome Evaluation:         Patient received a one time dose of ativan. Patient was able to calm down and rest through the shift with no acute events noted. Vital signs stable and call light with in reach.

## 2023-12-19 NOTE — THERAPY TREATMENT NOTE
Acute Care - Physical Therapy Treatment Note   [place]     Patient Name: Regulo Sepulveda  : 1965  MRN: [id number]  Today's Date: 2023      Visit Dx:     ICD-10-CM ICD-9-CM   1. Acute congestive heart failure, unspecified heart failure type  I50.9 428.0   2. Difficulty walking  R26.2 719.7     Patient Active Problem List   Diagnosis    Left groin pain    Major depressive disorder, recurrent episode, moderate    Chronic obstructive pulmonary disease    Diabetes mellitus    Hypertensive disorder    Hyperlipidemia    Hepatitis C    Cigarette nicotine dependence    BPH (benign prostatic hyperplasia)    GERD (gastroesophageal reflux disease)    B12 deficiency    LV (left ventricular) mural thrombus    Schizophrenia    Acute on chronic combined systolic and diastolic CHF (congestive heart failure)    Acute on chronic heart failure with reduced ejection fraction and diastolic dysfunction    Acute on chronic congestive heart failure    PAF (paroxysmal atrial fibrillation)    CHF (congestive heart failure)    Acute on chronic HFrEF (heart failure with reduced ejection fraction)    Moderate malnutrition    Systolic heart failure     Past Medical History:   Diagnosis Date    Anxiety     Asthma     Bipolar affective     Cardiac tamponade         CHF (congestive heart failure)     COPD (chronic obstructive pulmonary disease)     Coronary artery disease     Depression     Diabetes mellitus     Elevated cholesterol     Hypertension      Past Surgical History:   Procedure Laterality Date    CARDIAC CATHETERIZATION Right 2023    Procedure: Right and Left Heart Cath;  Surgeon: Ben Grant MD;  Location: [facility] CATH INVASIVE LOCATION;  Service: Cardiovascular;  Laterality: Right;    TESTICLE SURGERY Left     extraction     PT Assessment (last 12 hours)       PT Evaluation and Treatment       Row Name 23 1437          Physical Therapy Time and Intention    Subjective Information complains  of;weakness;pain  -     Document Type therapy note (daily note)  -     Mode of Treatment physical therapy;group therapy;individual therapy  -     Patient Effort good  -     Comment Gait performed individually; therapuetic exercises performed in a group session with * participants  -RH       Row Name 12/19/23 1437          General Information    Patient Observations alert;cooperative;agree to therapy  -RH       Row Name 12/19/23 1437          Pain    Pretreatment Pain Rating 8/10  -     Additional Documentation Pain Scale: FACES Pre/Post-Treatment (Group)  -RH       Row Name 12/19/23 1437          Pain Scale: FACES Pre/Post-Treatment    Posttreatment Pain Rating 2-->hurts little bit  -     Pain Location - Side/Orientation Left  -     Pain Location - knee  -RH       Row Name 12/19/23 1437          Range of Motion (ROM)    Range of Motion --  Pt L knee AAROM at 88 degrees flex and 8 degrees ext.  -RH       Row Name 12/19/23 1437          Strength (Manual Muscle Testing)    Strength (Manual Muscle Testing) --  Pt L knee ext strength at 2/5.  -RH       Row Name 12/19/23 1437          Mobility    Extremity Weight-bearing Status left lower extremity  -     Left Lower Extremity (Weight-bearing Status) weight-bearing as tolerated (WBAT)  -RH       Row Name 12/19/23 1437          Transfers    Transfers sit-stand transfer;stand-sit transfer  -RH       Row Name 12/19/23 1437          Sit-Stand Transfer    Sit-Stand Voss (Transfers) contact guard  -RH     Assistive Device (Sit-Stand Transfers) walker, front-wheeled  -RH       Row Name 12/19/23 1437          Stand-Sit Transfer    Stand-Sit Voss (Transfers) contact guard  -RH     Assistive Device (Stand-Sit Transfers) walker, front-wheeled  -RH       Row Name 12/19/23 1437          Gait/Stairs (Locomotion)    Gait/Stairs Locomotion gait/ambulation assistive device  -     Voss Level (Gait) contact guard  -     Assistive Device (Gait)  walker, front-wheeled  -     Patient was able to Ambulate yes  -RH     Distance in Feet (Gait) 115  -     Pattern (Gait) 3-point;step-through  -RH     Deviations/Abnormal Patterns (Gait) gait speed decreased;stride length decreased  -RH     Bilateral Gait Deviations heel strike decreased  -RH     Gait Assessment/Intervention Pt amb with RW and CGA with 2 point step-through gait pattern with decreased gait speed, stride length, and bilateral heel strike.  -       Row Name 12/19/23 [facility]7          Safety Issues, Functional Mobility    Impairments Affecting Function (Mobility) balance;pain;range of motion (ROM);strength  -       Row Name 12/19/23 1437          Balance    Balance Assessment standing dynamic balance  -     Dynamic Standing Balance contact guard  -     Position/Device Used, Standing Balance walker, front-wheeled  -       Row Name 12/19/23 1437          Motor Skills    Therapeutic Exercise hip;knee;ankle  -       Row Name 12/19/23 1437          Hip (Therapeutic Exercise)    Hip (Therapeutic Exercise) isometric exercises  -     Hip Isometrics (Therapeutic Exercise) left;gluteal sets;10 repetitions;2 sets  -       Row Name 12/19/23 1437          Knee (Therapeutic Exercise)    Knee (Therapeutic Exercise) strengthening exercise;isometric exercises  -     Knee Isometrics (Therapeutic Exercise) left;quad sets;10 repetitions;2 sets  -     Knee Strengthening (Therapeutic Exercise) left;heel slides;SLR (straight leg raise);SAQ (short arc quad);LAQ (long arc quad);10 repetitions;2 sets  -       Row Name 12/19/23 1437          Ankle (Therapeutic Exercise)    Ankle (Therapeutic Exercise) AROM (active range of motion)  -     Ankle AROM (Therapeutic Exercise) left;dorsiflexion;plantarflexion;10 repetitions;2 sets  -       Row Name 12/19/23 1437          Positioning and Restraints    Post Treatment Position chair  -     In Chair call light within reach;encouraged to call for assist;exit  alarm on;reclined  -       Row Name 12/19/23 0027          Progress Summary (PT)    Progress Toward Functional Goals (PT) progress toward functional goals is good  -     Daily Progress Summary (PT) Pt is progressing well with their exercise program.  Will continue current plan of care.  -               User Key  (r) = Recorded By, (t) = Taken By, (c) = Cosigned By      Initials Name Provider Type     Silvano Rdz PTA Physical Therapist Assistant                    Physical Therapy Education       Title: PT OT SLP Therapies (Resolved)       Topic: Physical Therapy (Resolved)       Point: Mobility training (Resolved)       Learner Progress:  Not documented in this visit.              Point: Home exercise program (Resolved)       Learner Progress:  Not documented in this visit.              Point: Body mechanics (Resolved)       Learner Progress:  Not documented in this visit.              Point: Precautions (Resolved)       Learner Progress:  Not documented in this visit.                                  PT Recommendation and Plan     Progress Summary (PT)  Progress Toward Functional Goals (PT): progress toward functional goals is good  Daily Progress Summary (PT): Pt is progressing well with their exercise program.  Will continue current plan of care.   Outcome Measures       Row Name 12/19/23 1400 12/18/23 1100          How much help from another person do you currently need...    Turning from your back to your side while in flat bed without using bedrails? 4  - 4  -DP     Moving from lying on back to sitting on the side of a flat bed without bedrails? 4  -RH 4  -DP     Moving to and from a bed to a chair (including a wheelchair)? 4  - 4  -DP     Standing up from a chair using your arms (e.g., wheelchair, bedside chair)? 4  - 4  -DP     Climbing 3-5 steps with a railing? 4  -RH 4  -DP     To walk in hospital room? 4  -RH 4  -DP     AM-PAC 6 Clicks Score (PT) 24  -RH 24  -DP     Highest Level of  Mobility Goal 8 --> Walked 250 feet or more  -RH 8 --> Walked 250 feet or more  -DP               User Key  (r) = Recorded By, (t) = Taken By, (c) = Cosigned By      Initials Name Provider Type    Silvano Song PTA Physical Therapist Assistant    DP Og Myrick, PT Physical Therapist                     Time Calculation:    PT Charges       Row Name 12/19/23 1436             Time Calculation    PT Received On 12/19/23  -RH         Timed Charges    44580 - Gait Training Minutes  9  -RH      68117 - PT Therapeutic Activity Minutes 4  -RH         Untimed Charges    PT Group Therapy Minutes 30  -RH         Total Minutes    Timed Charges Total Minutes 13  -RH      Untimed Charges Total Minutes 30  -RH       Total Minutes 43  -RH                User Key  (r) = Recorded By, (t) = Taken By, (c) = Cosigned By      Initials Name Provider Type    Silvano Song PTA Physical Therapist Assistant                  Therapy Charges for Today       Code Description Service Date Service Provider Modifiers Qty    01496554819 HC GAIT TRAINING EA 15 MIN 12/18/2023 Silvano Rdz PTA GP 1    86081681769 HC PT THER PROC GROUP 12/18/2023 Silvano Rdz PTA GP 1            PT G-Codes  AM-PAC 6 Clicks Score (PT): 24    Silvano Rdz PTA  12/19/2023

## 2023-12-21 ENCOUNTER — CLINICAL SUPPORT (OUTPATIENT)
Dept: FAMILY MEDICINE CLINIC | Facility: CLINIC | Age: 58
End: 2023-12-21
Payer: COMMERCIAL

## 2023-12-21 DIAGNOSIS — F33.1 MAJOR DEPRESSIVE DISORDER, RECURRENT EPISODE, MODERATE: Primary | ICD-10-CM

## 2024-01-01 ENCOUNTER — APPOINTMENT (OUTPATIENT)
Dept: GENERAL RADIOLOGY | Facility: HOSPITAL | Age: 59
End: 2024-01-01
Payer: COMMERCIAL

## 2024-01-01 ENCOUNTER — APPOINTMENT (OUTPATIENT)
Dept: CT IMAGING | Facility: HOSPITAL | Age: 59
End: 2024-01-01
Payer: COMMERCIAL

## 2024-01-01 ENCOUNTER — APPOINTMENT (OUTPATIENT)
Dept: MRI IMAGING | Facility: HOSPITAL | Age: 59
End: 2024-01-01
Payer: COMMERCIAL

## 2024-01-01 ENCOUNTER — PATIENT OUTREACH (OUTPATIENT)
Dept: CASE MANAGEMENT | Facility: OTHER | Age: 59
End: 2024-01-01
Payer: COMMERCIAL

## 2024-01-01 ENCOUNTER — TELEPHONE (OUTPATIENT)
Dept: FAMILY MEDICINE CLINIC | Facility: CLINIC | Age: 59
End: 2024-01-01

## 2024-01-01 ENCOUNTER — HOSPITAL ENCOUNTER (INPATIENT)
Facility: HOSPITAL | Age: 59
LOS: 3 days | End: 2024-07-13
Attending: EMERGENCY MEDICINE | Admitting: INTERNAL MEDICINE
Payer: COMMERCIAL

## 2024-01-01 ENCOUNTER — TELEPHONE (OUTPATIENT)
Dept: SLEEP MEDICINE | Facility: HOSPITAL | Age: 59
End: 2024-01-01
Payer: COMMERCIAL

## 2024-01-01 ENCOUNTER — APPOINTMENT (OUTPATIENT)
Dept: CARDIOLOGY | Facility: HOSPITAL | Age: 59
End: 2024-01-01
Payer: COMMERCIAL

## 2024-01-01 VITALS
HEIGHT: 73 IN | WEIGHT: 143.52 LBS | SYSTOLIC BLOOD PRESSURE: 101 MMHG | OXYGEN SATURATION: 69 % | TEMPERATURE: 99.1 F | BODY MASS INDEX: 19.02 KG/M2 | DIASTOLIC BLOOD PRESSURE: 72 MMHG | RESPIRATION RATE: 30 BRPM

## 2024-01-01 DIAGNOSIS — F32.A DEPRESSION, UNSPECIFIED DEPRESSION TYPE: ICD-10-CM

## 2024-01-01 DIAGNOSIS — Z78.9 DECREASED ACTIVITIES OF DAILY LIVING (ADL): ICD-10-CM

## 2024-01-01 DIAGNOSIS — R26.2 DIFFICULTY WALKING: ICD-10-CM

## 2024-01-01 DIAGNOSIS — I63.9 CEREBROVASCULAR ACCIDENT (CVA), UNSPECIFIED MECHANISM: Primary | ICD-10-CM

## 2024-01-01 DIAGNOSIS — R13.12 OROPHARYNGEAL DYSPHAGIA: ICD-10-CM

## 2024-01-01 DIAGNOSIS — R58 INTERNAL BLEEDING: Primary | ICD-10-CM

## 2024-01-01 LAB
ALBUMIN SERPL-MCNC: 2 G/DL (ref 3.5–5.2)
ALBUMIN SERPL-MCNC: 2.1 G/DL (ref 3.5–5.2)
ALBUMIN/GLOB SERPL: 0.8 G/DL
ALBUMIN/GLOB SERPL: 0.8 G/DL
ALP SERPL-CCNC: 150 U/L (ref 39–117)
ALP SERPL-CCNC: 151 U/L (ref 39–117)
ALT SERPL W P-5'-P-CCNC: 11 U/L (ref 1–41)
ALT SERPL W P-5'-P-CCNC: 13 U/L (ref 1–41)
AMORPH URATE CRY URNS QL MICRO: ABNORMAL /HPF
AMPHET+METHAMPHET UR QL: NEGATIVE
ANION GAP SERPL CALCULATED.3IONS-SCNC: 13.9 MMOL/L (ref 5–15)
ANION GAP SERPL CALCULATED.3IONS-SCNC: 14.8 MMOL/L (ref 5–15)
ANION GAP SERPL CALCULATED.3IONS-SCNC: 7.8 MMOL/L (ref 5–15)
APTT PPP: 25.8 SECONDS (ref 24.2–34.2)
APTT PPP: 37 SECONDS (ref 78–95.9)
APTT PPP: >200 SECONDS (ref 78–95.9)
ARTERIAL PATENCY WRIST A: ABNORMAL
ARTERIAL PATENCY WRIST A: ABNORMAL
AST SERPL-CCNC: 22 U/L (ref 1–40)
AST SERPL-CCNC: 30 U/L (ref 1–40)
ATMOSPHERIC PRESS: 743.4 MMHG
ATMOSPHERIC PRESS: 744 MMHG
BACTERIA BLD CULT: ABNORMAL
BACTERIA SPEC AEROBE CULT: NO GROWTH
BACTERIA UR QL AUTO: ABNORMAL /HPF
BARBITURATES UR QL SCN: NEGATIVE
BASE EXCESS BLDA CALC-SCNC: -4.3 MMOL/L (ref -2–2)
BASE EXCESS BLDA CALC-SCNC: -9 MMOL/L (ref -2–2)
BASOPHILS # BLD AUTO: 0.01 10*3/MM3 (ref 0–0.2)
BASOPHILS # BLD AUTO: 0.09 10*3/MM3 (ref 0–0.2)
BASOPHILS NFR BLD AUTO: 0.1 % (ref 0–1.5)
BASOPHILS NFR BLD AUTO: 0.6 % (ref 0–1.5)
BDY SITE: ABNORMAL
BDY SITE: ABNORMAL
BENZODIAZ UR QL SCN: NEGATIVE
BILIRUB SERPL-MCNC: 0.3 MG/DL (ref 0–1.2)
BILIRUB SERPL-MCNC: 0.4 MG/DL (ref 0–1.2)
BILIRUB UR QL STRIP: NEGATIVE
BOTTLE TYPE: ABNORMAL
BUN SERPL-MCNC: 46 MG/DL (ref 6–20)
BUN SERPL-MCNC: 51 MG/DL (ref 6–20)
BUN SERPL-MCNC: 64 MG/DL (ref 6–20)
BUN/CREAT SERPL: 24.5 (ref 7–25)
BUN/CREAT SERPL: 27.4 (ref 7–25)
BUN/CREAT SERPL: 29.5 (ref 7–25)
CA-I BLDA-SCNC: 1.02 MMOL/L (ref 1.13–1.32)
CALCIUM SPEC-SCNC: 7.2 MG/DL (ref 8.6–10.5)
CALCIUM SPEC-SCNC: 7.5 MG/DL (ref 8.6–10.5)
CALCIUM SPEC-SCNC: 7.7 MG/DL (ref 8.6–10.5)
CANNABINOIDS SERPL QL: NEGATIVE
CHLORIDE BLDA-SCNC: 109 MMOL/L (ref 98–107)
CHLORIDE SERPL-SCNC: 102 MMOL/L (ref 98–107)
CHLORIDE SERPL-SCNC: 105 MMOL/L (ref 98–107)
CHLORIDE SERPL-SCNC: 108 MMOL/L (ref 98–107)
CK SERPL-CCNC: 66 U/L (ref 20–200)
CLARITY UR: CLEAR
CO2 SERPL-SCNC: 17.2 MMOL/L (ref 22–29)
CO2 SERPL-SCNC: 18.1 MMOL/L (ref 22–29)
CO2 SERPL-SCNC: 24.2 MMOL/L (ref 22–29)
COCAINE UR QL: NEGATIVE
COLOR UR: YELLOW
CREAT SERPL-MCNC: 1.56 MG/DL (ref 0.76–1.27)
CREAT SERPL-MCNC: 1.86 MG/DL (ref 0.76–1.27)
CREAT SERPL-MCNC: 2.61 MG/DL (ref 0.76–1.27)
D-LACTATE SERPL-SCNC: 0.9 MMOL/L (ref 0.5–2)
D-LACTATE SERPL-SCNC: 1.2 MMOL/L
D-LACTATE SERPL-SCNC: 1.8 MMOL/L (ref 0.5–2)
D-LACTATE SERPL-SCNC: 1.9 MMOL/L (ref 0.5–2)
D-LACTATE SERPL-SCNC: 3.3 MMOL/L (ref 0.5–2)
D-LACTATE SERPL-SCNC: 3.4 MMOL/L (ref 0.5–2)
DEPRECATED RDW RBC AUTO: 57.9 FL (ref 37–54)
DEPRECATED RDW RBC AUTO: 59.2 FL (ref 37–54)
DEPRECATED RDW RBC AUTO: 63.3 FL (ref 37–54)
EGFRCR SERPLBLD CKD-EPI 2021: 27.6 ML/MIN/1.73
EGFRCR SERPLBLD CKD-EPI 2021: 41.4 ML/MIN/1.73
EGFRCR SERPLBLD CKD-EPI 2021: 51.2 ML/MIN/1.73
EOSINOPHIL # BLD AUTO: 0 10*3/MM3 (ref 0–0.4)
EOSINOPHIL # BLD AUTO: 0 10*3/MM3 (ref 0–0.4)
EOSINOPHIL NFR BLD AUTO: 0 % (ref 0.3–6.2)
EOSINOPHIL NFR BLD AUTO: 0 % (ref 0.3–6.2)
ERYTHROCYTE [DISTWIDTH] IN BLOOD BY AUTOMATED COUNT: 17.6 % (ref 12.3–15.4)
ERYTHROCYTE [DISTWIDTH] IN BLOOD BY AUTOMATED COUNT: 17.9 % (ref 12.3–15.4)
ERYTHROCYTE [DISTWIDTH] IN BLOOD BY AUTOMATED COUNT: 18.1 % (ref 12.3–15.4)
FENTANYL UR-MCNC: NEGATIVE NG/ML
GEN 5 2HR TROPONIN T REFLEX: 180 NG/L
GEN 5 2HR TROPONIN T REFLEX: 23 NG/L
GLOBULIN UR ELPH-MCNC: 2.4 GM/DL
GLOBULIN UR ELPH-MCNC: 2.7 GM/DL
GLUCOSE BLDC GLUCOMTR-MCNC: 105 MG/DL (ref 70–99)
GLUCOSE BLDC GLUCOMTR-MCNC: 116 MG/DL (ref 70–99)
GLUCOSE BLDC GLUCOMTR-MCNC: 119 MG/DL (ref 70–99)
GLUCOSE BLDC GLUCOMTR-MCNC: 23 MG/DL (ref 70–99)
GLUCOSE BLDC GLUCOMTR-MCNC: 76 MG/DL (ref 70–99)
GLUCOSE BLDC GLUCOMTR-MCNC: 77 MG/DL (ref 70–99)
GLUCOSE BLDC GLUCOMTR-MCNC: 80 MG/DL (ref 70–99)
GLUCOSE BLDC GLUCOMTR-MCNC: 86 MG/DL (ref 70–99)
GLUCOSE BLDC GLUCOMTR-MCNC: 96 MG/DL (ref 70–99)
GLUCOSE SERPL-MCNC: 120 MG/DL (ref 65–99)
GLUCOSE SERPL-MCNC: 129 MG/DL (ref 65–99)
GLUCOSE SERPL-MCNC: 81 MG/DL (ref 65–99)
GLUCOSE UR STRIP-MCNC: ABNORMAL MG/DL
HCO3 BLDA-SCNC: 13.4 MMOL/L (ref 22–26)
HCO3 BLDA-SCNC: 19 MMOL/L (ref 22–26)
HCT VFR BLD AUTO: 32.1 % (ref 37.5–51)
HCT VFR BLD AUTO: 35.1 % (ref 37.5–51)
HCT VFR BLD AUTO: 43.1 % (ref 37.5–51)
HCT VFR BLD CALC: 37 % (ref 38–51)
HCT VFR BLD CALC: 41 % (ref 38–51)
HEMODILUTION: NO
HEMODILUTION: NO
HGB BLD-MCNC: 10 G/DL (ref 13–17.7)
HGB BLD-MCNC: 11.1 G/DL (ref 13–17.7)
HGB BLD-MCNC: 12.6 G/DL (ref 13–17.7)
HGB BLDA-MCNC: 12.4 G/DL (ref 12–18)
HGB BLDA-MCNC: 13.8 G/DL (ref 12–18)
HGB UR QL STRIP.AUTO: ABNORMAL
HOLD SPECIMEN: NORMAL
HYALINE CASTS UR QL AUTO: ABNORMAL /LPF
IMM GRANULOCYTES # BLD AUTO: 0.07 10*3/MM3 (ref 0–0.05)
IMM GRANULOCYTES # BLD AUTO: 0.19 10*3/MM3 (ref 0–0.05)
IMM GRANULOCYTES NFR BLD AUTO: 0.4 % (ref 0–0.5)
IMM GRANULOCYTES NFR BLD AUTO: 1 % (ref 0–0.5)
INHALED O2 CONCENTRATION: 21 %
INR PPP: 1.16 (ref 0.86–1.15)
KETONES UR QL STRIP: NEGATIVE
LEUKOCYTE ESTERASE UR QL STRIP.AUTO: NEGATIVE
LIPASE SERPL-CCNC: 16 U/L (ref 13–60)
LYMPHOCYTES # BLD AUTO: 0.91 10*3/MM3 (ref 0.7–3.1)
LYMPHOCYTES # BLD AUTO: 0.91 10*3/MM3 (ref 0.7–3.1)
LYMPHOCYTES NFR BLD AUTO: 5 % (ref 19.6–45.3)
LYMPHOCYTES NFR BLD AUTO: 5.6 % (ref 19.6–45.3)
Lab: ABNORMAL
MAGNESIUM SERPL-MCNC: 1.8 MG/DL (ref 1.6–2.6)
MCH RBC QN AUTO: 27.9 PG (ref 26.6–33)
MCH RBC QN AUTO: 28.1 PG (ref 26.6–33)
MCH RBC QN AUTO: 28.2 PG (ref 26.6–33)
MCHC RBC AUTO-ENTMCNC: 29.2 G/DL (ref 31.5–35.7)
MCHC RBC AUTO-ENTMCNC: 31.2 G/DL (ref 31.5–35.7)
MCHC RBC AUTO-ENTMCNC: 31.6 G/DL (ref 31.5–35.7)
MCV RBC AUTO: 88.9 FL (ref 79–97)
MCV RBC AUTO: 90.4 FL (ref 79–97)
MCV RBC AUTO: 95.6 FL (ref 79–97)
METHADONE UR QL SCN: NEGATIVE
MODALITY: ABNORMAL
MODALITY: ABNORMAL
MONOCYTES # BLD AUTO: 0.43 10*3/MM3 (ref 0.1–0.9)
MONOCYTES # BLD AUTO: 0.75 10*3/MM3 (ref 0.1–0.9)
MONOCYTES NFR BLD AUTO: 2.3 % (ref 5–12)
MONOCYTES NFR BLD AUTO: 4.6 % (ref 5–12)
MRSA DNA SPEC QL NAA+PROBE: NORMAL
NEUTROPHILS NFR BLD AUTO: 14.51 10*3/MM3 (ref 1.7–7)
NEUTROPHILS NFR BLD AUTO: 16.77 10*3/MM3 (ref 1.7–7)
NEUTROPHILS NFR BLD AUTO: 88.8 % (ref 42.7–76)
NEUTROPHILS NFR BLD AUTO: 91.6 % (ref 42.7–76)
NITRITE UR QL STRIP: NEGATIVE
NOTIFIED WHO: ABNORMAL
NRBC BLD AUTO-RTO: 0 /100 WBC (ref 0–0.2)
NRBC BLD AUTO-RTO: 0 /100 WBC (ref 0–0.2)
NT-PROBNP SERPL-MCNC: 7108 PG/ML (ref 0–900)
OPIATES UR QL: POSITIVE
OXYCODONE UR QL SCN: NEGATIVE
PCO2 BLDA: 21.3 MM HG (ref 35–45)
PCO2 BLDA: 29.3 MM HG (ref 35–45)
PH BLDA: 7.41 PH UNITS (ref 7.35–7.45)
PH BLDA: 7.42 PH UNITS (ref 7.35–7.45)
PH UR STRIP.AUTO: 5.5 [PH] (ref 5–8)
PHOSPHATE SERPL-MCNC: 4.7 MG/DL (ref 2.5–4.5)
PLAT MORPH BLD: NORMAL
PLATELET # BLD AUTO: 404 10*3/MM3 (ref 140–450)
PLATELET # BLD AUTO: 459 10*3/MM3 (ref 140–450)
PLATELET # BLD AUTO: 543 10*3/MM3 (ref 140–450)
PMV BLD AUTO: 10 FL (ref 6–12)
PMV BLD AUTO: 10.1 FL (ref 6–12)
PMV BLD AUTO: 9.7 FL (ref 6–12)
PO2 BLD: 466 MM[HG] (ref 0–500)
PO2 BLDA: 88.8 MM HG (ref 80–100)
PO2 BLDA: 97.8 MM HG (ref 80–100)
POTASSIUM BLDA-SCNC: 4 MMOL/L (ref 3.5–5)
POTASSIUM SERPL-SCNC: 4 MMOL/L (ref 3.5–5.2)
POTASSIUM SERPL-SCNC: 4.5 MMOL/L (ref 3.5–5.2)
POTASSIUM SERPL-SCNC: 4.7 MMOL/L (ref 3.5–5.2)
PROCALCITONIN SERPL-MCNC: 1.8 NG/ML (ref 0–0.25)
PROCALCITONIN SERPL-MCNC: 4.14 NG/ML (ref 0–0.25)
PROT SERPL-MCNC: 4.4 G/DL (ref 6–8.5)
PROT SERPL-MCNC: 4.8 G/DL (ref 6–8.5)
PROT UR QL STRIP: ABNORMAL
PROTHROMBIN TIME: 15.1 SECONDS (ref 11.8–14.9)
QT INTERVAL: 324 MS
QT INTERVAL: 331 MS
QT INTERVAL: 340 MS
QT INTERVAL: 432 MS
QTC INTERVAL: 440 MS
QTC INTERVAL: 448 MS
QTC INTERVAL: 452 MS
QTC INTERVAL: 462 MS
RBC # BLD AUTO: 3.55 10*6/MM3 (ref 4.14–5.8)
RBC # BLD AUTO: 3.95 10*6/MM3 (ref 4.14–5.8)
RBC # BLD AUTO: 4.51 10*6/MM3 (ref 4.14–5.8)
RBC # UR STRIP: ABNORMAL /HPF
RBC MORPH BLD: NORMAL
READ BACK: YES
REF LAB TEST METHOD: ABNORMAL
SAO2 % BLDCOA: 97.2 % (ref 95–99)
SAO2 % BLDCOA: 97.9 % (ref 95–99)
SODIUM BLD-SCNC: 140 MMOL/L (ref 131–143)
SODIUM SERPL-SCNC: 134 MMOL/L (ref 136–145)
SODIUM SERPL-SCNC: 137 MMOL/L (ref 136–145)
SODIUM SERPL-SCNC: 140 MMOL/L (ref 136–145)
SP GR UR STRIP: >1.03 (ref 1–1.03)
SQUAMOUS #/AREA URNS HPF: ABNORMAL /HPF
TROPONIN T DELTA: 11 NG/L
TROPONIN T DELTA: 8 NG/L
TROPONIN T SERPL HS-MCNC: 13 NG/L
TROPONIN T SERPL HS-MCNC: 149 NG/L
TROPONIN T SERPL HS-MCNC: 15 NG/L
TROPONIN T SERPL HS-MCNC: 169 NG/L
TROPONIN T SERPL HS-MCNC: 40 NG/L
UROBILINOGEN UR QL STRIP: ABNORMAL
VANCOMYCIN SERPL-MCNC: 30.31 MCG/ML (ref 5–40)
WBC # UR STRIP: ABNORMAL /HPF
WBC MORPH BLD: NORMAL
WBC NRBC COR # BLD AUTO: 16.33 10*3/MM3 (ref 3.4–10.8)
WBC NRBC COR # BLD AUTO: 18.31 10*3/MM3 (ref 3.4–10.8)
WBC NRBC COR # BLD AUTO: 23.62 10*3/MM3 (ref 3.4–10.8)
WHOLE BLOOD HOLD COAG: NORMAL
WHOLE BLOOD HOLD SPECIMEN: NORMAL

## 2024-01-01 PROCEDURE — 80307 DRUG TEST PRSMV CHEM ANLYZR: CPT | Performed by: EMERGENCY MEDICINE

## 2024-01-01 PROCEDURE — 99232 SBSQ HOSP IP/OBS MODERATE 35: CPT | Performed by: INTERNAL MEDICINE

## 2024-01-01 PROCEDURE — 83605 ASSAY OF LACTIC ACID: CPT | Performed by: STUDENT IN AN ORGANIZED HEALTH CARE EDUCATION/TRAINING PROGRAM

## 2024-01-01 PROCEDURE — 25810000003 SODIUM CHLORIDE 0.9 % SOLUTION: Performed by: INTERNAL MEDICINE

## 2024-01-01 PROCEDURE — 99222 1ST HOSP IP/OBS MODERATE 55: CPT | Performed by: STUDENT IN AN ORGANIZED HEALTH CARE EDUCATION/TRAINING PROGRAM

## 2024-01-01 PROCEDURE — P9612 CATHETERIZE FOR URINE SPEC: HCPCS

## 2024-01-01 PROCEDURE — 25010000002 MAGNESIUM SULFATE 2 GM/50ML SOLUTION: Performed by: INTERNAL MEDICINE

## 2024-01-01 PROCEDURE — 82948 REAGENT STRIP/BLOOD GLUCOSE: CPT

## 2024-01-01 PROCEDURE — 92610 EVALUATE SWALLOWING FUNCTION: CPT

## 2024-01-01 PROCEDURE — 80202 ASSAY OF VANCOMYCIN: CPT | Performed by: STUDENT IN AN ORGANIZED HEALTH CARE EDUCATION/TRAINING PROGRAM

## 2024-01-01 PROCEDURE — 25810000003 LACTATED RINGERS PER 1000 ML: Performed by: INTERNAL MEDICINE

## 2024-01-01 PROCEDURE — 25810000003 LACTATED RINGERS SOLUTION: Performed by: INTERNAL MEDICINE

## 2024-01-01 PROCEDURE — 81001 URINALYSIS AUTO W/SCOPE: CPT | Performed by: EMERGENCY MEDICINE

## 2024-01-01 PROCEDURE — 25010000002 LEVOFLOXACIN PER 250 MG: Performed by: STUDENT IN AN ORGANIZED HEALTH CARE EDUCATION/TRAINING PROGRAM

## 2024-01-01 PROCEDURE — 80048 BASIC METABOLIC PNL TOTAL CA: CPT | Performed by: STUDENT IN AN ORGANIZED HEALTH CARE EDUCATION/TRAINING PROGRAM

## 2024-01-01 PROCEDURE — 80053 COMPREHEN METABOLIC PANEL: CPT | Performed by: STUDENT IN AN ORGANIZED HEALTH CARE EDUCATION/TRAINING PROGRAM

## 2024-01-01 PROCEDURE — 85025 COMPLETE CBC W/AUTO DIFF WBC: CPT | Performed by: EMERGENCY MEDICINE

## 2024-01-01 PROCEDURE — 25010000002 CEFEPIME PER 500 MG: Performed by: INTERNAL MEDICINE

## 2024-01-01 PROCEDURE — 93005 ELECTROCARDIOGRAM TRACING: CPT | Performed by: INTERNAL MEDICINE

## 2024-01-01 PROCEDURE — 84100 ASSAY OF PHOSPHORUS: CPT | Performed by: INTERNAL MEDICINE

## 2024-01-01 PROCEDURE — 4A133J1 MONITORING OF ARTERIAL PULSE, PERIPHERAL, PERCUTANEOUS APPROACH: ICD-10-PCS | Performed by: INTERNAL MEDICINE

## 2024-01-01 PROCEDURE — 25010000002 CALCIUM GLUCONATE-NACL 1-0.675 GM/50ML-% SOLUTION: Performed by: EMERGENCY MEDICINE

## 2024-01-01 PROCEDURE — 80053 COMPREHEN METABOLIC PANEL: CPT | Performed by: EMERGENCY MEDICINE

## 2024-01-01 PROCEDURE — 25010000002 VANCOMYCIN 5 G RECONSTITUTED SOLUTION: Performed by: STUDENT IN AN ORGANIZED HEALTH CARE EDUCATION/TRAINING PROGRAM

## 2024-01-01 PROCEDURE — 99223 1ST HOSP IP/OBS HIGH 75: CPT | Performed by: INTERNAL MEDICINE

## 2024-01-01 PROCEDURE — 25010000002 HEPARIN (PORCINE) 25000-0.45 UT/250ML-% SOLUTION: Performed by: INTERNAL MEDICINE

## 2024-01-01 PROCEDURE — 85007 BL SMEAR W/DIFF WBC COUNT: CPT | Performed by: EMERGENCY MEDICINE

## 2024-01-01 PROCEDURE — 83690 ASSAY OF LIPASE: CPT | Performed by: STUDENT IN AN ORGANIZED HEALTH CARE EDUCATION/TRAINING PROGRAM

## 2024-01-01 PROCEDURE — 97165 OT EVAL LOW COMPLEX 30 MIN: CPT

## 2024-01-01 PROCEDURE — 25510000001 IOPAMIDOL PER 1 ML: Performed by: EMERGENCY MEDICINE

## 2024-01-01 PROCEDURE — 83605 ASSAY OF LACTIC ACID: CPT | Performed by: INTERNAL MEDICINE

## 2024-01-01 PROCEDURE — 83605 ASSAY OF LACTIC ACID: CPT

## 2024-01-01 PROCEDURE — 82803 BLOOD GASES ANY COMBINATION: CPT

## 2024-01-01 PROCEDURE — 83880 ASSAY OF NATRIURETIC PEPTIDE: CPT | Performed by: STUDENT IN AN ORGANIZED HEALTH CARE EDUCATION/TRAINING PROGRAM

## 2024-01-01 PROCEDURE — 4A133B1 MONITORING OF ARTERIAL PRESSURE, PERIPHERAL, PERCUTANEOUS APPROACH: ICD-10-PCS | Performed by: INTERNAL MEDICINE

## 2024-01-01 PROCEDURE — 84484 ASSAY OF TROPONIN QUANT: CPT | Performed by: INTERNAL MEDICINE

## 2024-01-01 PROCEDURE — 25010000002 METRONIDAZOLE 500 MG/100ML SOLUTION: Performed by: INTERNAL MEDICINE

## 2024-01-01 PROCEDURE — 99285 EMERGENCY DEPT VISIT HI MDM: CPT

## 2024-01-01 PROCEDURE — 82330 ASSAY OF CALCIUM: CPT

## 2024-01-01 PROCEDURE — 25010000002 VANCOMYCIN 1 G RECONSTITUTED SOLUTION: Performed by: STUDENT IN AN ORGANIZED HEALTH CARE EDUCATION/TRAINING PROGRAM

## 2024-01-01 PROCEDURE — 87040 BLOOD CULTURE FOR BACTERIA: CPT | Performed by: STUDENT IN AN ORGANIZED HEALTH CARE EDUCATION/TRAINING PROGRAM

## 2024-01-01 PROCEDURE — 74018 RADEX ABDOMEN 1 VIEW: CPT

## 2024-01-01 PROCEDURE — 84145 PROCALCITONIN (PCT): CPT | Performed by: INTERNAL MEDICINE

## 2024-01-01 PROCEDURE — 87086 URINE CULTURE/COLONY COUNT: CPT | Performed by: EMERGENCY MEDICINE

## 2024-01-01 PROCEDURE — 85610 PROTHROMBIN TIME: CPT | Performed by: EMERGENCY MEDICINE

## 2024-01-01 PROCEDURE — 85730 THROMBOPLASTIN TIME PARTIAL: CPT | Performed by: EMERGENCY MEDICINE

## 2024-01-01 PROCEDURE — 25810000003 SODIUM CHLORIDE 0.9 % SOLUTION: Performed by: STUDENT IN AN ORGANIZED HEALTH CARE EDUCATION/TRAINING PROGRAM

## 2024-01-01 PROCEDURE — 84484 ASSAY OF TROPONIN QUANT: CPT | Performed by: STUDENT IN AN ORGANIZED HEALTH CARE EDUCATION/TRAINING PROGRAM

## 2024-01-01 PROCEDURE — 87641 MR-STAPH DNA AMP PROBE: CPT | Performed by: STUDENT IN AN ORGANIZED HEALTH CARE EDUCATION/TRAINING PROGRAM

## 2024-01-01 PROCEDURE — 25010000002 LORAZEPAM PER 2 MG: Performed by: STUDENT IN AN ORGANIZED HEALTH CARE EDUCATION/TRAINING PROGRAM

## 2024-01-01 PROCEDURE — 71045 X-RAY EXAM CHEST 1 VIEW: CPT

## 2024-01-01 PROCEDURE — 25010000002 MORPHINE PER 10 MG: Performed by: STUDENT IN AN ORGANIZED HEALTH CARE EDUCATION/TRAINING PROGRAM

## 2024-01-01 PROCEDURE — 99291 CRITICAL CARE FIRST HOUR: CPT | Performed by: INTERNAL MEDICINE

## 2024-01-01 PROCEDURE — 70551 MRI BRAIN STEM W/O DYE: CPT

## 2024-01-01 PROCEDURE — 36415 COLL VENOUS BLD VENIPUNCTURE: CPT

## 2024-01-01 PROCEDURE — 25810000003 SODIUM CHLORIDE 0.9 % SOLUTION: Performed by: EMERGENCY MEDICINE

## 2024-01-01 PROCEDURE — 74176 CT ABD & PELVIS W/O CONTRAST: CPT

## 2024-01-01 PROCEDURE — 82550 ASSAY OF CK (CPK): CPT | Performed by: INTERNAL MEDICINE

## 2024-01-01 PROCEDURE — 93005 ELECTROCARDIOGRAM TRACING: CPT | Performed by: EMERGENCY MEDICINE

## 2024-01-01 PROCEDURE — 25010000002 PHENYLEPHRINE 10 MG/ML SOLUTION: Performed by: INTERNAL MEDICINE

## 2024-01-01 PROCEDURE — 87185 SC STD ENZYME DETCJ PER NZM: CPT | Performed by: STUDENT IN AN ORGANIZED HEALTH CARE EDUCATION/TRAINING PROGRAM

## 2024-01-01 PROCEDURE — 85027 COMPLETE CBC AUTOMATED: CPT | Performed by: INTERNAL MEDICINE

## 2024-01-01 PROCEDURE — 88312 SPECIAL STAINS GROUP 1: CPT | Performed by: STUDENT IN AN ORGANIZED HEALTH CARE EDUCATION/TRAINING PROGRAM

## 2024-01-01 PROCEDURE — 83735 ASSAY OF MAGNESIUM: CPT | Performed by: INTERNAL MEDICINE

## 2024-01-01 PROCEDURE — 80051 ELECTROLYTE PANEL: CPT

## 2024-01-01 PROCEDURE — 0 DEXTROSE 5 % SOLUTION 250 ML FLEX CONT: Performed by: INTERNAL MEDICINE

## 2024-01-01 PROCEDURE — 93005 ELECTROCARDIOGRAM TRACING: CPT | Performed by: STUDENT IN AN ORGANIZED HEALTH CARE EDUCATION/TRAINING PROGRAM

## 2024-01-01 PROCEDURE — 93010 ELECTROCARDIOGRAM REPORT: CPT | Performed by: INTERNAL MEDICINE

## 2024-01-01 PROCEDURE — 70498 CT ANGIOGRAPHY NECK: CPT

## 2024-01-01 PROCEDURE — 70496 CT ANGIOGRAPHY HEAD: CPT

## 2024-01-01 PROCEDURE — 84145 PROCALCITONIN (PCT): CPT | Performed by: STUDENT IN AN ORGANIZED HEALTH CARE EDUCATION/TRAINING PROGRAM

## 2024-01-01 PROCEDURE — 85730 THROMBOPLASTIN TIME PARTIAL: CPT | Performed by: INTERNAL MEDICINE

## 2024-01-01 PROCEDURE — 87076 CULTURE ANAEROBE IDENT EACH: CPT | Performed by: STUDENT IN AN ORGANIZED HEALTH CARE EDUCATION/TRAINING PROGRAM

## 2024-01-01 PROCEDURE — 99253 IP/OBS CNSLTJ NEW/EST LOW 45: CPT | Performed by: INTERNAL MEDICINE

## 2024-01-01 PROCEDURE — 84484 ASSAY OF TROPONIN QUANT: CPT | Performed by: EMERGENCY MEDICINE

## 2024-01-01 PROCEDURE — 99222 1ST HOSP IP/OBS MODERATE 55: CPT | Performed by: INTERNAL MEDICINE

## 2024-01-01 PROCEDURE — 97161 PT EVAL LOW COMPLEX 20 MIN: CPT

## 2024-01-01 PROCEDURE — 70450 CT HEAD/BRAIN W/O DYE: CPT

## 2024-01-01 PROCEDURE — 85025 COMPLETE CBC W/AUTO DIFF WBC: CPT | Performed by: STUDENT IN AN ORGANIZED HEALTH CARE EDUCATION/TRAINING PROGRAM

## 2024-01-01 PROCEDURE — 0042T HC CT CEREBRAL PERFUSION W/WO CONTRAST: CPT

## 2024-01-01 PROCEDURE — 87154 CUL TYP ID BLD PTHGN 6+ TRGT: CPT | Performed by: STUDENT IN AN ORGANIZED HEALTH CARE EDUCATION/TRAINING PROGRAM

## 2024-01-01 PROCEDURE — 25010000002 ONDANSETRON PER 1 MG: Performed by: INTERNAL MEDICINE

## 2024-01-01 RX ORDER — LORAZEPAM 2 MG/ML
0.5 CONCENTRATE ORAL
Status: DISCONTINUED | OUTPATIENT
Start: 2024-01-01 | End: 2024-01-01 | Stop reason: HOSPADM

## 2024-01-01 RX ORDER — NOREPINEPHRINE BITARTRATE 0.03 MG/ML
.02-3 INJECTION, SOLUTION INTRAVENOUS
Status: DISPENSED | OUTPATIENT
Start: 2024-01-01 | End: 2024-01-01

## 2024-01-01 RX ORDER — SODIUM CHLORIDE, SODIUM LACTATE, POTASSIUM CHLORIDE, CALCIUM CHLORIDE 600; 310; 30; 20 MG/100ML; MG/100ML; MG/100ML; MG/100ML
100 INJECTION, SOLUTION INTRAVENOUS CONTINUOUS
Status: DISCONTINUED | OUTPATIENT
Start: 2024-01-01 | End: 2024-01-01

## 2024-01-01 RX ORDER — SODIUM CHLORIDE 0.9 % (FLUSH) 0.9 %
10 SYRINGE (ML) INJECTION AS NEEDED
Status: DISCONTINUED | OUTPATIENT
Start: 2024-01-01 | End: 2024-01-01 | Stop reason: HOSPADM

## 2024-01-01 RX ORDER — VORICONAZOLE 200 MG/1
200 TABLET, FILM COATED ORAL EVERY 12 HOURS SCHEDULED
Status: DISCONTINUED | OUTPATIENT
Start: 2024-01-01 | End: 2024-01-01

## 2024-01-01 RX ORDER — LEVOFLOXACIN 5 MG/ML
750 INJECTION, SOLUTION INTRAVENOUS
Status: DISCONTINUED | OUTPATIENT
Start: 2024-01-01 | End: 2024-01-01

## 2024-01-01 RX ORDER — MIRTAZAPINE 15 MG/1
15 TABLET, FILM COATED ORAL NIGHTLY
Status: DISCONTINUED | OUTPATIENT
Start: 2024-01-01 | End: 2024-01-01

## 2024-01-01 RX ORDER — TAMSULOSIN HYDROCHLORIDE 0.4 MG/1
1 CAPSULE ORAL DAILY
COMMUNITY
Start: 2024-01-01

## 2024-01-01 RX ORDER — ONDANSETRON 2 MG/ML
4 INJECTION INTRAMUSCULAR; INTRAVENOUS EVERY 6 HOURS PRN
Status: DISCONTINUED | OUTPATIENT
Start: 2024-01-01 | End: 2024-01-01 | Stop reason: HOSPADM

## 2024-01-01 RX ORDER — LORAZEPAM 2 MG/ML
0.5 INJECTION INTRAMUSCULAR
Status: DISCONTINUED | OUTPATIENT
Start: 2024-01-01 | End: 2024-01-01 | Stop reason: HOSPADM

## 2024-01-01 RX ORDER — MAGNESIUM SULFATE HEPTAHYDRATE 40 MG/ML
2 INJECTION, SOLUTION INTRAVENOUS ONCE
Status: COMPLETED | OUTPATIENT
Start: 2024-01-01 | End: 2024-01-01

## 2024-01-01 RX ORDER — DEXTROSE MONOHYDRATE 25 G/50ML
25 INJECTION, SOLUTION INTRAVENOUS
Status: DISCONTINUED | OUTPATIENT
Start: 2024-01-01 | End: 2024-01-01 | Stop reason: HOSPADM

## 2024-01-01 RX ORDER — MIRTAZAPINE 15 MG/1
15 TABLET, FILM COATED ORAL NIGHTLY
Status: DISCONTINUED | OUTPATIENT
Start: 2024-01-01 | End: 2024-01-01 | Stop reason: HOSPADM

## 2024-01-01 RX ORDER — NICOTINE POLACRILEX 4 MG
15 LOZENGE BUCCAL
Status: DISCONTINUED | OUTPATIENT
Start: 2024-01-01 | End: 2024-01-01 | Stop reason: HOSPADM

## 2024-01-01 RX ORDER — HALOPERIDOL 2 MG/ML
0.5 SOLUTION ORAL
Status: DISCONTINUED | OUTPATIENT
Start: 2024-01-01 | End: 2024-01-01 | Stop reason: HOSPADM

## 2024-01-01 RX ORDER — VORICONAZOLE 200 MG/1
200 TABLET, FILM COATED ORAL EVERY 12 HOURS SCHEDULED
Status: DISCONTINUED | OUTPATIENT
Start: 2024-01-01 | End: 2024-01-01 | Stop reason: HOSPADM

## 2024-01-01 RX ORDER — METRONIDAZOLE 500 MG/100ML
500 INJECTION, SOLUTION INTRAVENOUS EVERY 8 HOURS
Status: DISCONTINUED | OUTPATIENT
Start: 2024-01-01 | End: 2024-01-01 | Stop reason: HOSPADM

## 2024-01-01 RX ORDER — PHENYLEPHRINE HCL IN 0.9% NACL 0.5 MG/5ML
.5-3 SYRINGE (ML) INTRAVENOUS
Status: DISCONTINUED | OUTPATIENT
Start: 2024-01-01 | End: 2024-01-01 | Stop reason: HOSPADM

## 2024-01-01 RX ORDER — BISACODYL 5 MG/1
5 TABLET, DELAYED RELEASE ORAL DAILY PRN
Status: DISCONTINUED | OUTPATIENT
Start: 2024-01-01 | End: 2024-01-01

## 2024-01-01 RX ORDER — DOBUTAMINE HYDROCHLORIDE 200 MG/100ML
2-20 INJECTION INTRAVENOUS
Status: DISCONTINUED | OUTPATIENT
Start: 2024-01-01 | End: 2024-01-01 | Stop reason: HOSPADM

## 2024-01-01 RX ORDER — CALCIUM GLUCONATE 20 MG/ML
1000 INJECTION, SOLUTION INTRAVENOUS ONCE
Status: COMPLETED | OUTPATIENT
Start: 2024-01-01 | End: 2024-01-01

## 2024-01-01 RX ORDER — FAMOTIDINE 10 MG/ML
20 INJECTION, SOLUTION INTRAVENOUS DAILY
Status: DISCONTINUED | OUTPATIENT
Start: 2024-01-01 | End: 2024-01-01 | Stop reason: HOSPADM

## 2024-01-01 RX ORDER — MORPHINE SULFATE 2 MG/ML
2 INJECTION, SOLUTION INTRAMUSCULAR; INTRAVENOUS
Status: DISCONTINUED | OUTPATIENT
Start: 2024-01-01 | End: 2024-01-01 | Stop reason: HOSPADM

## 2024-01-01 RX ORDER — HEPARIN SODIUM 10000 [USP'U]/100ML
12 INJECTION, SOLUTION INTRAVENOUS
Status: DISCONTINUED | OUTPATIENT
Start: 2024-01-01 | End: 2024-01-01 | Stop reason: HOSPADM

## 2024-01-01 RX ORDER — POLYETHYLENE GLYCOL 3350 17 G/17G
17 POWDER, FOR SOLUTION ORAL DAILY PRN
Status: DISCONTINUED | OUTPATIENT
Start: 2024-01-01 | End: 2024-01-01

## 2024-01-01 RX ORDER — FUROSEMIDE 40 MG/1
40 TABLET ORAL DAILY
COMMUNITY

## 2024-01-01 RX ORDER — POLYETHYLENE GLYCOL 3350 17 G/17G
17 POWDER, FOR SOLUTION ORAL DAILY PRN
Status: DISCONTINUED | OUTPATIENT
Start: 2024-01-01 | End: 2024-01-01 | Stop reason: HOSPADM

## 2024-01-01 RX ORDER — HALOPERIDOL 5 MG/ML
0.5 INJECTION INTRAMUSCULAR
Status: DISCONTINUED | OUTPATIENT
Start: 2024-01-01 | End: 2024-01-01 | Stop reason: HOSPADM

## 2024-01-01 RX ORDER — METRONIDAZOLE 500 MG/1
500 TABLET ORAL EVERY 8 HOURS SCHEDULED
Status: DISCONTINUED | OUTPATIENT
Start: 2024-01-01 | End: 2024-01-01

## 2024-01-01 RX ORDER — SODIUM CHLORIDE 9 MG/ML
40 INJECTION, SOLUTION INTRAVENOUS AS NEEDED
Status: DISCONTINUED | OUTPATIENT
Start: 2024-01-01 | End: 2024-01-01 | Stop reason: HOSPADM

## 2024-01-01 RX ORDER — NITROGLYCERIN 0.4 MG/1
0.4 TABLET SUBLINGUAL
Status: DISCONTINUED | OUTPATIENT
Start: 2024-01-01 | End: 2024-01-01

## 2024-01-01 RX ORDER — MORPHINE SULFATE 100 MG/5ML
5 SOLUTION ORAL
Status: DISCONTINUED | OUTPATIENT
Start: 2024-01-01 | End: 2024-01-01 | Stop reason: HOSPADM

## 2024-01-01 RX ORDER — ATROPINE SULFATE 10 MG/ML
5 SOLUTION/ DROPS OPHTHALMIC
Status: DISCONTINUED | OUTPATIENT
Start: 2024-01-01 | End: 2024-01-01 | Stop reason: HOSPADM

## 2024-01-01 RX ORDER — AMOXICILLIN 250 MG
2 CAPSULE ORAL 2 TIMES DAILY PRN
Status: DISCONTINUED | OUTPATIENT
Start: 2024-01-01 | End: 2024-01-01

## 2024-01-01 RX ORDER — VENLAFAXINE 37.5 MG/1
18.75 TABLET ORAL 2 TIMES DAILY WITH MEALS
Status: DISCONTINUED | OUTPATIENT
Start: 2024-01-01 | End: 2024-01-01 | Stop reason: HOSPADM

## 2024-01-01 RX ORDER — APIXABAN 5 MG/1
1 TABLET, FILM COATED ORAL EVERY 12 HOURS SCHEDULED
COMMUNITY
Start: 2024-01-01

## 2024-01-01 RX ORDER — VENLAFAXINE HYDROCHLORIDE 37.5 MG/1
37.5 CAPSULE, EXTENDED RELEASE ORAL DAILY
Status: DISCONTINUED | OUTPATIENT
Start: 2024-01-01 | End: 2024-01-01

## 2024-01-01 RX ORDER — BISACODYL 10 MG
10 SUPPOSITORY, RECTAL RECTAL DAILY PRN
Status: DISCONTINUED | OUTPATIENT
Start: 2024-01-01 | End: 2024-01-01

## 2024-01-01 RX ORDER — SODIUM CHLORIDE 0.9 % (FLUSH) 0.9 %
10 SYRINGE (ML) INJECTION EVERY 12 HOURS SCHEDULED
Status: DISCONTINUED | OUTPATIENT
Start: 2024-01-01 | End: 2024-01-01 | Stop reason: HOSPADM

## 2024-01-01 RX ORDER — IBUPROFEN 600 MG/1
1 TABLET ORAL
Status: DISCONTINUED | OUTPATIENT
Start: 2024-01-01 | End: 2024-01-01 | Stop reason: HOSPADM

## 2024-01-01 RX ORDER — AMOXICILLIN 250 MG
2 CAPSULE ORAL 2 TIMES DAILY PRN
Status: DISCONTINUED | OUTPATIENT
Start: 2024-01-01 | End: 2024-01-01 | Stop reason: HOSPADM

## 2024-01-01 RX ORDER — VORICONAZOLE 200 MG/1
200 TABLET, FILM COATED ORAL EVERY 12 HOURS SCHEDULED
Status: CANCELLED | OUTPATIENT
Start: 2024-01-01 | End: 2024-01-01

## 2024-01-01 RX ORDER — DAPAGLIFLOZIN 5 MG/1
5 TABLET, FILM COATED ORAL DAILY
COMMUNITY

## 2024-01-01 RX ORDER — BISACODYL 10 MG
10 SUPPOSITORY, RECTAL RECTAL DAILY PRN
Status: DISCONTINUED | OUTPATIENT
Start: 2024-01-01 | End: 2024-01-01 | Stop reason: HOSPADM

## 2024-01-01 RX ORDER — HALOPERIDOL 0.5 MG/1
0.5 TABLET ORAL
Status: DISCONTINUED | OUTPATIENT
Start: 2024-01-01 | End: 2024-01-01 | Stop reason: HOSPADM

## 2024-01-01 RX ORDER — LORAZEPAM 0.5 MG/1
0.5 TABLET ORAL
Status: DISCONTINUED | OUTPATIENT
Start: 2024-01-01 | End: 2024-01-01 | Stop reason: HOSPADM

## 2024-01-01 RX ADMIN — VORICONAZOLE 200 MG: 200 TABLET ORAL at 08:38

## 2024-01-01 RX ADMIN — NOREPINEPHRINE BITARTRATE 0.38 MCG/KG/MIN: 0.03 INJECTION, SOLUTION INTRAVENOUS at 07:42

## 2024-01-01 RX ADMIN — ONDANSETRON 4 MG: 2 INJECTION INTRAMUSCULAR; INTRAVENOUS at 20:05

## 2024-01-01 RX ADMIN — Medication 10 ML: at 21:00

## 2024-01-01 RX ADMIN — IOPAMIDOL 100 ML: 755 INJECTION, SOLUTION INTRAVENOUS at 10:24

## 2024-01-01 RX ADMIN — MIRTAZAPINE 15 MG: 15 TABLET, FILM COATED ORAL at 22:12

## 2024-01-01 RX ADMIN — VASOPRESSIN IN 0.9% SODIUM CHLORIDE 0.04 UNITS/MIN: 20 INJECTION INTRAVENOUS at 17:55

## 2024-01-01 RX ADMIN — VASOPRESSIN IN 0.9% SODIUM CHLORIDE 0.04 UNITS/MIN: 20 INJECTION INTRAVENOUS at 01:51

## 2024-01-01 RX ADMIN — LORAZEPAM 0.5 MG: 2 INJECTION INTRAMUSCULAR; INTRAVENOUS at 23:59

## 2024-01-01 RX ADMIN — SODIUM CHLORIDE 500 ML: 9 INJECTION, SOLUTION INTRAVENOUS at 16:06

## 2024-01-01 RX ADMIN — VENLAFAXINE HYDROCHLORIDE 37.5 MG: 37.5 CAPSULE, EXTENDED RELEASE ORAL at 22:12

## 2024-01-01 RX ADMIN — CALCIUM GLUCONATE 1000 MG: 20 INJECTION, SOLUTION INTRAVENOUS at 12:12

## 2024-01-01 RX ADMIN — HEPARIN SODIUM 12 UNITS/KG/HR: 10000 INJECTION, SOLUTION INTRAVENOUS at 15:25

## 2024-01-01 RX ADMIN — NOREPINEPHRINE BITARTRATE 3 MCG/KG/MIN: 1 INJECTION, SOLUTION, CONCENTRATE INTRAVENOUS at 18:08

## 2024-01-01 RX ADMIN — NOREPINEPHRINE BITARTRATE 0.16 MCG/KG/MIN: 0.03 INJECTION, SOLUTION INTRAVENOUS at 16:07

## 2024-01-01 RX ADMIN — APIXABAN 5 MG: 5 TABLET, FILM COATED ORAL at 10:17

## 2024-01-01 RX ADMIN — IOPAMIDOL 50 ML: 755 INJECTION, SOLUTION INTRAVENOUS at 10:18

## 2024-01-01 RX ADMIN — LEVOFLOXACIN 750 MG: 750 INJECTION, SOLUTION INTRAVENOUS at 03:33

## 2024-01-01 RX ADMIN — MAGNESIUM SULFATE HEPTAHYDRATE 2 G: 40 INJECTION, SOLUTION INTRAVENOUS at 10:17

## 2024-01-01 RX ADMIN — SENNOSIDES AND DOCUSATE SODIUM 2 TABLET: 50; 8.6 TABLET ORAL at 08:38

## 2024-01-01 RX ADMIN — VASOPRESSIN IN 0.9% SODIUM CHLORIDE 0.04 UNITS/MIN: 20 INJECTION INTRAVENOUS at 18:07

## 2024-01-01 RX ADMIN — SODIUM CHLORIDE, POTASSIUM CHLORIDE, SODIUM LACTATE AND CALCIUM CHLORIDE 100 ML/HR: 600; 310; 30; 20 INJECTION, SOLUTION INTRAVENOUS at 10:12

## 2024-01-01 RX ADMIN — SODIUM CHLORIDE 1000 ML: 9 INJECTION, SOLUTION INTRAVENOUS at 12:12

## 2024-01-01 RX ADMIN — CEFEPIME HYDROCHLORIDE 1000 MG: 1 INJECTION, POWDER, FOR SOLUTION INTRAMUSCULAR; INTRAVENOUS at 07:21

## 2024-01-01 RX ADMIN — SODIUM CHLORIDE, POTASSIUM CHLORIDE, SODIUM LACTATE AND CALCIUM CHLORIDE 500 ML: 600; 310; 30; 20 INJECTION, SOLUTION INTRAVENOUS at 02:16

## 2024-01-01 RX ADMIN — NOREPINEPHRINE BITARTRATE 3 MCG/KG/MIN: 1 INJECTION, SOLUTION, CONCENTRATE INTRAVENOUS at 17:00

## 2024-01-01 RX ADMIN — VANCOMYCIN HYDROCHLORIDE 1250 MG: 5 INJECTION, POWDER, LYOPHILIZED, FOR SOLUTION INTRAVENOUS at 05:43

## 2024-01-01 RX ADMIN — VORICONAZOLE 200 MG: 200 TABLET ORAL at 20:05

## 2024-01-01 RX ADMIN — FAMOTIDINE 20 MG: 10 INJECTION INTRAVENOUS at 22:03

## 2024-01-01 RX ADMIN — NOREPINEPHRINE BITARTRATE 3 MCG/KG/MIN: 1 INJECTION, SOLUTION, CONCENTRATE INTRAVENOUS at 22:58

## 2024-01-01 RX ADMIN — METRONIDAZOLE 500 MG: 5 INJECTION, SOLUTION INTRAVENOUS at 15:31

## 2024-01-01 RX ADMIN — METRONIDAZOLE 500 MG: 500 TABLET ORAL at 07:20

## 2024-01-01 RX ADMIN — METRONIDAZOLE 500 MG: 5 INJECTION, SOLUTION INTRAVENOUS at 23:28

## 2024-01-01 RX ADMIN — METRONIDAZOLE 500 MG: 500 TABLET ORAL at 22:12

## 2024-01-01 RX ADMIN — Medication 10 ML: at 20:56

## 2024-01-01 RX ADMIN — Medication 10 ML: at 08:43

## 2024-01-01 RX ADMIN — PHENYLEPHRINE HYDROCHLORIDE 0.5 MCG/KG/MIN: 10 INJECTION INTRAVENOUS at 20:42

## 2024-01-01 RX ADMIN — VORICONAZOLE 200 MG: 200 TABLET ORAL at 20:56

## 2024-01-01 RX ADMIN — SODIUM CHLORIDE, POTASSIUM CHLORIDE, SODIUM LACTATE AND CALCIUM CHLORIDE 500 ML: 600; 310; 30; 20 INJECTION, SOLUTION INTRAVENOUS at 07:22

## 2024-01-01 RX ADMIN — METRONIDAZOLE 500 MG: 500 TABLET ORAL at 14:05

## 2024-01-01 RX ADMIN — NOREPINEPHRINE BITARTRATE 0.75 MCG/KG/MIN: 1 INJECTION, SOLUTION, CONCENTRATE INTRAVENOUS at 11:48

## 2024-01-01 RX ADMIN — VASOPRESSIN IN 0.9% SODIUM CHLORIDE 0.04 UNITS/MIN: 20 INJECTION INTRAVENOUS at 11:47

## 2024-01-01 RX ADMIN — CEFEPIME HYDROCHLORIDE 1000 MG: 1 INJECTION, POWDER, FOR SOLUTION INTRAMUSCULAR; INTRAVENOUS at 11:07

## 2024-01-01 RX ADMIN — MORPHINE SULFATE 2 MG: 2 INJECTION, SOLUTION INTRAMUSCULAR; INTRAVENOUS at 23:59

## 2024-01-01 RX ADMIN — VASOPRESSIN IN 0.9% SODIUM CHLORIDE 0.04 UNITS/MIN: 20 INJECTION INTRAVENOUS at 09:46

## 2024-01-01 RX ADMIN — CEFEPIME HYDROCHLORIDE 1000 MG: 1 INJECTION, POWDER, FOR SOLUTION INTRAMUSCULAR; INTRAVENOUS at 17:49

## 2024-01-01 RX ADMIN — MUPIROCIN 1 APPLICATION: 20 OINTMENT TOPICAL at 22:17

## 2024-01-01 RX ADMIN — FAMOTIDINE 20 MG: 10 INJECTION INTRAVENOUS at 08:43

## 2024-01-01 RX ADMIN — VANCOMYCIN HYDROCHLORIDE 1000 MG: 1 INJECTION, POWDER, LYOPHILIZED, FOR SOLUTION INTRAVENOUS at 20:08

## 2024-01-01 RX ADMIN — NOREPINEPHRINE BITARTRATE 0.02 MCG/KG/MIN: 0.03 INJECTION, SOLUTION INTRAVENOUS at 04:00

## 2024-01-01 RX ADMIN — Medication 10 ML: at 08:00

## 2024-01-01 RX ADMIN — Medication 10 ML: at 20:28

## 2024-01-01 RX ADMIN — MUPIROCIN 1 APPLICATION: 20 OINTMENT TOPICAL at 21:02

## 2024-01-01 RX ADMIN — MUPIROCIN 1 APPLICATION: 20 OINTMENT TOPICAL at 09:46

## 2024-01-01 RX ADMIN — MIRTAZAPINE 15 MG: 15 TABLET, FILM COATED ORAL at 20:56

## 2024-01-08 ENCOUNTER — OFFICE VISIT (OUTPATIENT)
Dept: FAMILY MEDICINE CLINIC | Facility: CLINIC | Age: 59
End: 2024-01-08
Payer: COMMERCIAL

## 2024-01-08 ENCOUNTER — PATIENT OUTREACH (OUTPATIENT)
Dept: CASE MANAGEMENT | Facility: OTHER | Age: 59
End: 2024-01-08
Payer: COMMERCIAL

## 2024-01-08 VITALS
WEIGHT: 158 LBS | SYSTOLIC BLOOD PRESSURE: 128 MMHG | HEIGHT: 72 IN | DIASTOLIC BLOOD PRESSURE: 76 MMHG | TEMPERATURE: 98 F | OXYGEN SATURATION: 98 % | BODY MASS INDEX: 21.4 KG/M2 | HEART RATE: 62 BPM

## 2024-01-08 DIAGNOSIS — Z09 HOSPITAL DISCHARGE FOLLOW-UP: Primary | ICD-10-CM

## 2024-01-08 DIAGNOSIS — F41.1 GAD (GENERALIZED ANXIETY DISORDER): ICD-10-CM

## 2024-01-08 DIAGNOSIS — J44.9 CHRONIC OBSTRUCTIVE PULMONARY DISEASE, UNSPECIFIED COPD TYPE: ICD-10-CM

## 2024-01-08 DIAGNOSIS — I50.31 ACUTE DIASTOLIC CHF (CONGESTIVE HEART FAILURE): ICD-10-CM

## 2024-01-08 DIAGNOSIS — I50.20 HFREF (HEART FAILURE WITH REDUCED EJECTION FRACTION): ICD-10-CM

## 2024-01-08 DIAGNOSIS — H61.23 BILATERAL IMPACTED CERUMEN: ICD-10-CM

## 2024-01-08 DIAGNOSIS — I51.3 APICAL MURAL THROMBUS WITHOUT MI: ICD-10-CM

## 2024-01-08 DIAGNOSIS — Z72.0 TOBACCO ABUSE: ICD-10-CM

## 2024-01-08 DIAGNOSIS — K21.9 GASTROESOPHAGEAL REFLUX DISEASE WITHOUT ESOPHAGITIS: ICD-10-CM

## 2024-01-08 DIAGNOSIS — F20.9 SCHIZOPHRENIA, UNSPECIFIED TYPE: ICD-10-CM

## 2024-01-08 DIAGNOSIS — I10 HYPERTENSION, UNSPECIFIED TYPE: ICD-10-CM

## 2024-01-08 DIAGNOSIS — E78.5 HYPERLIPIDEMIA, UNSPECIFIED HYPERLIPIDEMIA TYPE: ICD-10-CM

## 2024-01-08 DIAGNOSIS — F33.1 MAJOR DEPRESSIVE DISORDER, RECURRENT EPISODE, MODERATE: ICD-10-CM

## 2024-01-08 DIAGNOSIS — E11.9 TYPE 2 DIABETES MELLITUS WITHOUT COMPLICATION, WITHOUT LONG-TERM CURRENT USE OF INSULIN: ICD-10-CM

## 2024-01-08 RX ORDER — LISINOPRIL 40 MG/1
40 TABLET ORAL
Qty: 30 TABLET | Refills: 0 | Status: SHIPPED | OUTPATIENT
Start: 2024-01-08 | End: 2024-01-08 | Stop reason: SDUPTHER

## 2024-01-08 RX ORDER — FUROSEMIDE 40 MG/1
40 TABLET ORAL DAILY
Qty: 30 TABLET | Refills: 0 | Status: SHIPPED | OUTPATIENT
Start: 2024-01-08 | End: 2024-02-07

## 2024-01-08 RX ORDER — PANTOPRAZOLE SODIUM 40 MG/1
40 TABLET, DELAYED RELEASE ORAL DAILY
Qty: 90 TABLET | Refills: 1 | Status: SHIPPED | OUTPATIENT
Start: 2024-01-08

## 2024-01-08 RX ORDER — LISINOPRIL 40 MG/1
40 TABLET ORAL
Qty: 30 TABLET | Refills: 0 | Status: SHIPPED | OUTPATIENT
Start: 2024-01-08 | End: 2024-02-07

## 2024-01-08 RX ORDER — VENLAFAXINE HYDROCHLORIDE 37.5 MG/1
37.5 CAPSULE, EXTENDED RELEASE ORAL DAILY
Qty: 30 CAPSULE | Refills: 12 | Status: SHIPPED | OUTPATIENT
Start: 2024-01-08

## 2024-01-08 RX ORDER — CARVEDILOL 25 MG/1
25 TABLET ORAL EVERY 12 HOURS SCHEDULED
Qty: 90 TABLET | Refills: 1 | Status: SHIPPED | OUTPATIENT
Start: 2024-01-08 | End: 2024-01-08 | Stop reason: SDUPTHER

## 2024-01-08 RX ORDER — ATORVASTATIN CALCIUM 40 MG/1
40 TABLET, FILM COATED ORAL
Qty: 90 TABLET | Refills: 2 | Status: SHIPPED | OUTPATIENT
Start: 2024-01-08

## 2024-01-08 RX ORDER — ALBUTEROL SULFATE 90 UG/1
2 AEROSOL, METERED RESPIRATORY (INHALATION) EVERY 4 HOURS PRN
Qty: 18 G | Refills: 0 | Status: SHIPPED | OUTPATIENT
Start: 2024-01-08 | End: 2024-01-08 | Stop reason: SDUPTHER

## 2024-01-08 RX ORDER — ALBUTEROL SULFATE 90 UG/1
2 AEROSOL, METERED RESPIRATORY (INHALATION) EVERY 4 HOURS PRN
Qty: 18 G | Refills: 0 | Status: SHIPPED | OUTPATIENT
Start: 2024-01-08

## 2024-01-08 RX ORDER — PANTOPRAZOLE SODIUM 40 MG/1
40 TABLET, DELAYED RELEASE ORAL DAILY
Qty: 90 TABLET | Refills: 1 | Status: SHIPPED | OUTPATIENT
Start: 2024-01-08 | End: 2024-01-08 | Stop reason: SDUPTHER

## 2024-01-08 RX ORDER — WARFARIN SODIUM 7.5 MG/1
7.5 TABLET ORAL
Qty: 30 TABLET | Refills: 0 | Status: SHIPPED | OUTPATIENT
Start: 2024-01-08

## 2024-01-08 RX ORDER — VENLAFAXINE HYDROCHLORIDE 37.5 MG/1
37.5 CAPSULE, EXTENDED RELEASE ORAL DAILY
Qty: 30 CAPSULE | Refills: 12 | Status: SHIPPED | OUTPATIENT
Start: 2024-01-08 | End: 2024-01-08 | Stop reason: SDUPTHER

## 2024-01-08 RX ORDER — DAPAGLIFLOZIN 5 MG/1
5 TABLET, FILM COATED ORAL DAILY
Qty: 30 TABLET | Refills: 1 | Status: SHIPPED | OUTPATIENT
Start: 2024-01-08

## 2024-01-08 RX ORDER — WARFARIN SODIUM 7.5 MG/1
7.5 TABLET ORAL
Qty: 30 TABLET | Refills: 0 | Status: SHIPPED | OUTPATIENT
Start: 2024-01-08 | End: 2024-01-08 | Stop reason: SDUPTHER

## 2024-01-08 RX ORDER — CARVEDILOL 25 MG/1
25 TABLET ORAL EVERY 12 HOURS SCHEDULED
Qty: 90 TABLET | Refills: 1 | Status: SHIPPED | OUTPATIENT
Start: 2024-01-08

## 2024-01-08 RX ORDER — DAPAGLIFLOZIN 5 MG/1
5 TABLET, FILM COATED ORAL DAILY
Qty: 30 TABLET | Refills: 1 | Status: SHIPPED | OUTPATIENT
Start: 2024-01-08 | End: 2024-01-08 | Stop reason: SDUPTHER

## 2024-01-08 RX ORDER — ATORVASTATIN CALCIUM 40 MG/1
40 TABLET, FILM COATED ORAL
Qty: 90 TABLET | Refills: 2 | Status: SHIPPED | OUTPATIENT
Start: 2024-01-08 | End: 2024-01-08 | Stop reason: SDUPTHER

## 2024-01-08 RX ORDER — FUROSEMIDE 40 MG/1
40 TABLET ORAL DAILY
Qty: 30 TABLET | Refills: 0 | Status: SHIPPED | OUTPATIENT
Start: 2024-01-08 | End: 2024-01-08 | Stop reason: SDUPTHER

## 2024-01-08 NOTE — PROGRESS NOTES
"Chief Complaint  Follow-up and Anxiety    Subjective      History of Present Illness  Regulo Sepulveda is a 58 y.o. male who presents to [facility] with a past medical history of HFrEF (EF=20%), COPD, recently admitted for CHF exacerbation and left AMA on the 18th of December. Known apical thrombus in left ventricle, hypertension, type 2 diabetes.  Presents for follow-up today.  H/o of schizophrenia on abilify for the last year.  He is on coumadin but no one is watching the INR, he missed his cardiologist appt several times. Pt is un aware of him taking the medicine or not.    Past Medical History:   Diagnosis Date    Anxiety     Asthma     Bipolar affective     Cardiac tamponade     2023    CHF (congestive heart failure)     COPD (chronic obstructive pulmonary disease)     Coronary artery disease     Depression     Diabetes mellitus     Elevated cholesterol     Hypertension          Objective   Vital Signs:   Vitals:    01/08/24 1533   BP: 128/76   Pulse: 62   Temp: 98 °F (36.7 °C)   SpO2: 98%   Weight: 71.7 kg (158 lb)   Height: 182.9 cm (72\")     Body mass index is 21.43 kg/m².    Wt Readings from Last 3 Encounters:   01/08/24 71.7 kg (158 lb)   12/18/23 70.1 kg (154 lb 8.7 oz)   11/27/23 66.7 kg (147 lb)     BP Readings from Last 3 Encounters:   01/08/24 128/76   12/18/23 104/55   11/27/23 140/80       Health Maintenance   Topic Date Due    URINE MICROALBUMIN  Never done    COLORECTAL CANCER SCREENING  Never done    ZOSTER VACCINE (1 of 2) Never done    ANNUAL PHYSICAL  Never done    DIABETIC FOOT EXAM  Never done    DIABETIC EYE EXAM  Never done    Hepatitis B (2 of 3 - 19+ 3-dose series) 07/19/2022    Pneumococcal Vaccine 0-64 (2 of 2 - PCV) 10/13/2022    INFLUENZA VACCINE  08/01/2023    COVID-19 Vaccine (4 - 2023-24 season) 09/01/2023    HEMOGLOBIN A1C  02/29/2024    LIPID PANEL  08/30/2024    LUNG CANCER SCREENING  12/16/2024    TDAP/TD VACCINES (2 - Td or Tdap) 06/30/2031 "    HEPATITIS C SCREENING  Completed       Physical Exam       Result Review :  The following data was reviewed by: Dickson Landry MD on 01/08/2024:      Procedures        Assessment and Plan   Diagnoses and all orders for this visit:    1. Hospital discharge follow-up (Primary)    2. Schizophrenia, unspecified type  -     ARIPiprazole ER (ABILIFY MAINTENA) IM prefilled syringe 400 mg    3. Acute diastolic CHF (congestive heart failure)  -     albuterol sulfate  (90 Base) MCG/ACT inhaler; Inhale 2 puffs Every 4 (Four) Hours As Needed for Wheezing or Shortness of Air. Indications: Chronic Obstructive Lung Disease  Dispense: 18 g; Refill: 0    4. HFrEF (heart failure with reduced ejection fraction)  -     albuterol sulfate  (90 Base) MCG/ACT inhaler; Inhale 2 puffs Every 4 (Four) Hours As Needed for Wheezing or Shortness of Air. Indications: Chronic Obstructive Lung Disease  Dispense: 18 g; Refill: 0  -     Farxiga 5 MG tablet tablet; Take 1 tablet by mouth Daily. Indications: Type 2 Diabetes  Dispense: 30 tablet; Refill: 1  -     furosemide (LASIX) 40 MG tablet; Take 1 tablet by mouth Daily for 30 days.  Dispense: 30 tablet; Refill: 0  -     Ambulatory Referral to Cardiology    5. DANIEL (generalized anxiety disorder)  -     venlafaxine XR (EFFEXOR-XR) 37.5 MG 24 hr capsule; Take 1 capsule by mouth Daily.  Dispense: 30 capsule; Refill: 12    6. Gastroesophageal reflux disease without esophagitis  -     pantoprazole (Protonix) 40 MG EC tablet; Take 1 tablet by mouth Daily.  Dispense: 90 tablet; Refill: 1    7. Hyperlipidemia, unspecified hyperlipidemia type    8. Hypertension, unspecified type  -     lisinopril (PRINIVIL,ZESTRIL) 40 MG tablet; Take 1 tablet by mouth Daily for 30 days.  Dispense: 30 tablet; Refill: 0    9. Type 2 diabetes mellitus without complication, without long-term current use of insulin  -     Farxiga 5 MG tablet tablet; Take 1 tablet by mouth Daily. Indications: Type 2 Diabetes   Dispense: 30 tablet; Refill: 1  -     metFORMIN (GLUCOPHAGE) 1000 MG tablet; Take 1 tablet by mouth 2 (Two) Times a Day.  Dispense: 60 tablet; Refill: 12    10. Tobacco abuse    11. Major depressive disorder, recurrent episode, moderate    12. Chronic obstructive pulmonary disease, unspecified COPD type    13. Apical mural thrombus without MI  -     warfarin (COUMADIN) 7.5 MG tablet; Take 1 tablet by mouth Daily.  Dispense: 30 tablet; Refill: 0  -     Ambulatory Referral to Cardiology  -     Protime-INR; Future    Other orders  -     atorvastatin (LIPITOR) 40 MG tablet; Take 1 tablet by mouth every night at bedtime.  Dispense: 90 tablet; Refill: 2  -     carvedilol (COREG) 25 MG tablet; Take 1 tablet by mouth Every 12 (Twelve) Hours.  Dispense: 90 tablet; Refill: 1        BMI is within normal parameters. No other follow-up for BMI required.     Check INR, will see him in 2 weeks, he will bring all his meds in, he is not sure what he truly takes.     FOLLOW UP  Return in about 2 weeks (around 1/22/2024), or INR.  Patient was given instructions and counseling regarding his condition or for health maintenance advice. Please see specific information pulled into the AVS if appropriate.       Dickson Landry MD  01/08/24  16:07 EST    CURRENT & DISCONTINUED MEDICATIONS  Current Outpatient Medications   Medication Instructions    albuterol sulfate  (90 Base) MCG/ACT inhaler 2 puffs, Inhalation, Every 4 Hours PRN    atorvastatin (LIPITOR) 40 mg, Oral, Every Night at Bedtime    carvedilol (COREG) 25 mg, Oral, Every 12 Hours Scheduled    Farxiga 5 mg, Oral, Daily    furosemide (LASIX) 40 mg, Oral, Daily    lisinopril (PRINIVIL,ZESTRIL) 40 mg, Oral, Every 24 Hours Scheduled    metFORMIN (GLUCOPHAGE) 1,000 mg, Oral, 2 Times Daily    pantoprazole (PROTONIX) 40 mg, Oral, Daily    venlafaxine XR (EFFEXOR-XR) 37.5 mg, Oral, Daily    warfarin (COUMADIN) 7.5 mg, Oral, Daily Warfarin       Medications Discontinued During This  Encounter   Medication Reason    ARIPiprazole ER (ABILIFY MAINTENA) IM prefilled syringe 300 mg     ARIPiprazole ER (ABILIFY MAINTENA) IM prefilled syringe 300 mg     Abilify Maintena 300 MG Suspension Reconstituted ER IM injection ER     venlafaxine XR (EFFEXOR-XR) 37.5 MG 24 hr capsule Reorder    metFORMIN (GLUCOPHAGE) 1000 MG tablet Reorder    Farxiga 5 MG tablet tablet Reorder    atorvastatin (LIPITOR) 40 MG tablet Reorder    pantoprazole (Protonix) 40 MG EC tablet Reorder    albuterol sulfate  (90 Base) MCG/ACT inhaler Reorder    carvedilol (COREG) 25 MG tablet Reorder    furosemide (LASIX) 40 MG tablet Reorder    lisinopril (PRINIVIL,ZESTRIL) 40 MG tablet Reorder    warfarin (COUMADIN) 7.5 MG tablet Reorder

## 2024-01-08 NOTE — OUTREACH NOTE
AMBULATORY CASE MANAGEMENT NOTE    Name and Relationship of Patient/Support Person: Regulo Sepulveda - Self  Self    Patient Outreach    Outgoing call to the patient for follow up.  Patient had recent admission at [facility] and missed his follow up appointment. He states that he has arrived at the doctor office today and is waiting to be seen.  He prefers an outreach in about one week.  This outreach has been scheduled.     Catrachita JEFFERSON  Ambulatory Case Management    1/8/2024, 15:11 EST   0

## 2024-01-20 ENCOUNTER — LAB (OUTPATIENT)
Dept: LAB | Facility: HOSPITAL | Age: 59
End: 2024-01-20
Payer: COMMERCIAL

## 2024-01-20 DIAGNOSIS — I51.3 APICAL MURAL THROMBUS WITHOUT MI: ICD-10-CM

## 2024-01-20 LAB
INR PPP: 1.42 (ref 0.86–1.15)
PROTHROMBIN TIME: 17.7 SECONDS (ref 11.8–14.9)

## 2024-01-20 PROCEDURE — 36415 COLL VENOUS BLD VENIPUNCTURE: CPT

## 2024-01-20 PROCEDURE — 85610 PROTHROMBIN TIME: CPT

## 2024-01-22 ENCOUNTER — TELEPHONE (OUTPATIENT)
Dept: FAMILY MEDICINE CLINIC | Facility: CLINIC | Age: 59
End: 2024-01-22

## 2024-01-22 ENCOUNTER — PATIENT OUTREACH (OUTPATIENT)
Dept: CASE MANAGEMENT | Facility: OTHER | Age: 59
End: 2024-01-22
Payer: COMMERCIAL

## 2024-01-22 NOTE — OUTREACH NOTE
AMBULATORY CASE MANAGEMENT NOTE    Name and Relationship of Patient/Support Person: Regulo Sepulveda Edgil - Self  Self    Adult Patient Profile  Questions/Answers      Flowsheet Row Most Recent Value   Respiratory Symptoms/Conditions COPD, shortness of breath   Respiratory Management Strategies medication therapy   Respiratory Self-Management Outcome 3 (uncertain)   Respiratory Comment declines smoking cessation at this time   Barriers to Taking Medication as Prescribed none  [His sister helps with his medications and his pill reminder, states he has no trouble getting his medications or affording them]          Patient Outreach    Outgoing call to the patient for follow up.  He states he has been compliant with his medications.  His sister helps with his medications and fill his pill reminder.  He reports having some shortness of breathing and initiated smoking cessation education.  He states he has cut back smoking at this time but not sure if he wants to quit.  He will conisder.      Care Coordination    PCP office contacted and a follow up appointment made for him for 1/23 at 2:45 pm.  Notified him via phone call and his sister states she will be able to transport him to this appointment.       Follow up outreach has been scheduled for about one month.  Encouraged him to outreach with any needs.     Catrachita JEFFERSON  Ambulatory Case Management    1/22/2024, 11:56 EST

## 2024-01-22 NOTE — TELEPHONE ENCOUNTER
HUB TO READ PT NEEDS TO RESCHEDULE APPT FOR 1/22 UNABLE TO LEAVE [facility] NOT ACCEPTING CALLS AT THIS TIME.

## 2024-01-24 DIAGNOSIS — I51.3 APICAL MURAL THROMBUS WITHOUT MI: Primary | ICD-10-CM

## 2024-01-24 RX ORDER — WARFARIN SODIUM 10 MG/1
TABLET ORAL
Qty: 30 TABLET | Refills: 2 | Status: SHIPPED | OUTPATIENT
Start: 2024-01-24

## 2024-01-31 ENCOUNTER — OFFICE VISIT (OUTPATIENT)
Dept: FAMILY MEDICINE CLINIC | Facility: CLINIC | Age: 59
End: 2024-01-31
Payer: COMMERCIAL

## 2024-01-31 VITALS
RESPIRATION RATE: 16 BRPM | WEIGHT: 152 LBS | TEMPERATURE: 98 F | HEART RATE: 91 BPM | HEIGHT: 72 IN | OXYGEN SATURATION: 97 % | BODY MASS INDEX: 20.59 KG/M2 | DIASTOLIC BLOOD PRESSURE: 78 MMHG | SYSTOLIC BLOOD PRESSURE: 130 MMHG

## 2024-01-31 DIAGNOSIS — H61.23 BILATERAL HEARING LOSS DUE TO CERUMEN IMPACTION: ICD-10-CM

## 2024-01-31 DIAGNOSIS — I50.20 HFREF (HEART FAILURE WITH REDUCED EJECTION FRACTION): ICD-10-CM

## 2024-01-31 DIAGNOSIS — I51.3 APICAL MURAL THROMBUS WITHOUT MI: Primary | ICD-10-CM

## 2024-01-31 RX ORDER — WARFARIN SODIUM 10 MG/1
TABLET ORAL
Qty: 30 TABLET | Refills: 1 | Status: SHIPPED | OUTPATIENT
Start: 2024-01-31

## 2024-01-31 RX ORDER — WARFARIN SODIUM 10 MG/1
TABLET ORAL
Qty: 30 TABLET | Refills: 1 | Status: SHIPPED | OUTPATIENT
Start: 2024-01-31 | End: 2024-01-31 | Stop reason: SDUPTHER

## 2024-01-31 NOTE — PROGRESS NOTES
"Chief Complaint  Annual Exam (2 week follow-up)    Subjective      History of Present Illness  Regulo Sepulveda is a 58 y.o. male who presents to [facility] with a past medical history of combined systolic and diastolic congestive heart failure with EF 20%, LV thrombus on warfarin, non-adherence to medications , atrial fibrillation. Presents today for follow-up was supposed to bring all his medications with him today but did not do so.  He was also supposed to start coumadin 10 mg did not start it or pick it up from pharmacy, pt also states he doesn't know what medications to take, I had this problem at the last visit and he was told to bring meds with him but he states he forgot.   H/o of schizophrenia on abilify for the last year.   INR goal between 2-3.  Importance of medication compliance was counseled today.   Pt lives at home with sister who helps with medications.        Past Medical History:   Diagnosis Date    Anxiety     Asthma     Bipolar affective     Cardiac tamponade     2023    CHF (congestive heart failure)     COPD (chronic obstructive pulmonary disease)     Coronary artery disease     Depression     Diabetes mellitus     Elevated cholesterol     Hypertension          Objective   Vital Signs:   Vitals:    01/31/24 1544   BP: 130/78   Pulse: 91   Resp: 16   Temp: 98 °F (36.7 °C)   SpO2: 97%   Weight: 68.9 kg (152 lb)   Height: 182.9 cm (72\")   PainSc: 0-No pain     Body mass index is 20.61 kg/m².    Wt Readings from Last 3 Encounters:   01/31/24 68.9 kg (152 lb)   01/08/24 71.7 kg (158 lb)   12/18/23 70.1 kg (154 lb 8.7 oz)     BP Readings from Last 3 Encounters:   01/31/24 130/78   01/08/24 128/76   12/18/23 104/55       Health Maintenance   Topic Date Due    URINE MICROALBUMIN  Never done    COLORECTAL CANCER SCREENING  Never done    ANNUAL PHYSICAL  Never done    DIABETIC FOOT EXAM  Never done    DIABETIC EYE EXAM  Never done    Hepatitis B (2 of 3 - 19+ 3-dose " series) 07/19/2022    Pneumococcal Vaccine 0-64 (2 of 2 - PCV) 10/13/2022    COVID-19 Vaccine (4 - 2023-24 season) 09/01/2023    ZOSTER VACCINE (2 of 2) 01/31/2025 (Originally 8/16/2022)    HEMOGLOBIN A1C  02/29/2024    LIPID PANEL  08/30/2024    LUNG CANCER SCREENING  12/16/2024    TDAP/TD VACCINES (3 - Td or Tdap) 06/21/2032    HEPATITIS C SCREENING  Completed    INFLUENZA VACCINE  Completed       Physical Exam  Constitutional:       General: He is not in acute distress.     Appearance: Normal appearance. He is not toxic-appearing.   HENT:      Head: Normocephalic and atraumatic.      Right Ear: Tympanic membrane normal.      Left Ear: Tympanic membrane normal.      Nose: Nose normal.      Mouth/Throat:      Mouth: Mucous membranes are moist.   Eyes:      General: No scleral icterus.     Extraocular Movements: Extraocular movements intact.      Conjunctiva/sclera: Conjunctivae normal.      Pupils: Pupils are equal, round, and reactive to light.   Cardiovascular:      Rate and Rhythm: Normal rate and regular rhythm.      Pulses: Normal pulses.      Heart sounds: Normal heart sounds. No murmur heard.     No gallop.   Pulmonary:      Effort: Pulmonary effort is normal. No respiratory distress.      Breath sounds: Decreased breath sounds present.   Abdominal:      General: Abdomen is flat. Bowel sounds are normal. There is no distension.      Palpations: Abdomen is soft.   Musculoskeletal:         General: Normal range of motion.      Cervical back: Normal range of motion.   Skin:     General: Skin is warm and dry.      Capillary Refill: Capillary refill takes less than 2 seconds.   Neurological:      General: No focal deficit present.      Mental Status: He is alert.   Psychiatric:         Mood and Affect: Mood normal.            Result Review :  The following data was reviewed by: Dickson Landry MD on 01/31/2024:      Procedures        Assessment and Plan   Diagnoses and all orders for this visit:    1. Apical mural  thrombus without MI (Primary)  -     Protime-INR; Future    2. HFrEF (heart failure with reduced ejection fraction)    3. Bilateral hearing loss due to cerumen impaction    Other orders  -     Discontinue: warfarin (Coumadin) 10 MG tablet; Tke one tablet daily  Dispense: 30 tablet; Refill: 1  -     warfarin (Coumadin) 10 MG tablet; Tke one tablet daily  Dispense: 30 tablet; Refill: 1    Will send his Coumadin again, Send Labs over for repeat INR,and pt will bring his Abilify shot   We will see patient back in 2 weeks he will bring all his medications to this appointment I stressed the importance of taking his Coumadin and also obtaining an INR in 4 days after starting Coumadin his sister and him heard my instructions and agree with me at my above plan.  BMI is within normal parameters. No other follow-up for BMI required.         FOLLOW UP  Return in about 2 weeks (around 2/14/2024).  Patient was given instructions and counseling regarding his condition or for health maintenance advice. Please see specific information pulled into the AVS if appropriate.       Dickson Landry MD  01/31/24  16:03 EST    CURRENT & DISCONTINUED MEDICATIONS  Current Outpatient Medications   Medication Instructions    albuterol sulfate  (90 Base) MCG/ACT inhaler 2 puffs, Inhalation, Every 4 Hours PRN    atorvastatin (LIPITOR) 40 mg, Oral, Every Night at Bedtime    carvedilol (COREG) 25 mg, Oral, Every 12 Hours Scheduled    Farxiga 5 mg, Oral, Daily    furosemide (LASIX) 40 mg, Oral, Daily    lisinopril (PRINIVIL,ZESTRIL) 40 mg, Oral, Every 24 Hours Scheduled    metFORMIN (GLUCOPHAGE) 1,000 mg, Oral, 2 Times Daily    pantoprazole (PROTONIX) 40 mg, Oral, Daily    venlafaxine XR (EFFEXOR-XR) 37.5 mg, Oral, Daily    warfarin (Coumadin) 10 MG tablet Tke one tablet daily       Medications Discontinued During This Encounter   Medication Reason    warfarin (Coumadin) 10 MG tablet     warfarin (Coumadin) 10 MG tablet Reorder

## 2024-02-05 ENCOUNTER — TELEPHONE (OUTPATIENT)
Dept: FAMILY MEDICINE CLINIC | Facility: CLINIC | Age: 59
End: 2024-02-05
Payer: COMMERCIAL

## 2024-02-05 NOTE — TELEPHONE ENCOUNTER
Called and spoke to pts sister and she wanted to let me know that they wouldn't be able to go get his INR checked today. I told her that was absolutely okay just at there earliest convince.

## 2024-02-05 NOTE — TELEPHONE ENCOUNTER
Caller: JORGE WHITTAKER      Relationship:   SELF     Best call back number:  [phone]     Who are you requesting to speak with (clinical staff, provider,  specific staff member):   CLINICAL     What was the call regarding:   PATIENT IS NEEDING TO ASK QUESTIONS ABOUT HIS   INR.  PLEASE CALL AND ADVISE.

## 2024-02-16 ENCOUNTER — CLINICAL SUPPORT (OUTPATIENT)
Dept: FAMILY MEDICINE CLINIC | Facility: CLINIC | Age: 59
End: 2024-02-16
Payer: COMMERCIAL

## 2024-02-16 DIAGNOSIS — F33.1 MAJOR DEPRESSIVE DISORDER, RECURRENT EPISODE, MODERATE: Primary | ICD-10-CM

## 2024-02-16 DIAGNOSIS — F20.9 SCHIZOPHRENIA, UNSPECIFIED TYPE: ICD-10-CM

## 2024-02-16 PROCEDURE — 96372 THER/PROPH/DIAG INJ SC/IM: CPT | Performed by: STUDENT IN AN ORGANIZED HEALTH CARE EDUCATION/TRAINING PROGRAM

## 2024-02-21 ENCOUNTER — LAB (OUTPATIENT)
Dept: LAB | Facility: HOSPITAL | Age: 59
End: 2024-02-21
Payer: COMMERCIAL

## 2024-02-21 DIAGNOSIS — I51.3 APICAL MURAL THROMBUS WITHOUT MI: ICD-10-CM

## 2024-02-21 LAB
INR PPP: 1.1 (ref 0.86–1.15)
PROTHROMBIN TIME: 14.5 SECONDS (ref 11.8–14.9)

## 2024-02-21 PROCEDURE — 36415 COLL VENOUS BLD VENIPUNCTURE: CPT

## 2024-02-21 PROCEDURE — 85610 PROTHROMBIN TIME: CPT

## 2024-02-22 ENCOUNTER — OFFICE VISIT (OUTPATIENT)
Dept: FAMILY MEDICINE CLINIC | Facility: CLINIC | Age: 59
End: 2024-02-22
Payer: COMMERCIAL

## 2024-02-22 VITALS
WEIGHT: 152 LBS | HEIGHT: 72 IN | OXYGEN SATURATION: 99 % | HEART RATE: 102 BPM | BODY MASS INDEX: 20.59 KG/M2 | DIASTOLIC BLOOD PRESSURE: 80 MMHG | SYSTOLIC BLOOD PRESSURE: 130 MMHG | TEMPERATURE: 97.5 F

## 2024-02-22 DIAGNOSIS — Z72.0 TOBACCO ABUSE: ICD-10-CM

## 2024-02-22 DIAGNOSIS — J43.9 PULMONARY EMPHYSEMA, UNSPECIFIED EMPHYSEMA TYPE: ICD-10-CM

## 2024-02-22 DIAGNOSIS — I51.3 LEFT VENTRICULAR APICAL THROMBUS: Primary | ICD-10-CM

## 2024-02-22 DIAGNOSIS — E11.9 TYPE 2 DIABETES MELLITUS WITHOUT COMPLICATION, WITHOUT LONG-TERM CURRENT USE OF INSULIN: ICD-10-CM

## 2024-02-22 DIAGNOSIS — I48.0 PAROXYSMAL ATRIAL FIBRILLATION: ICD-10-CM

## 2024-02-22 DIAGNOSIS — R79.1 SUBTHERAPEUTIC INTERNATIONAL NORMALIZED RATIO (INR): Primary | ICD-10-CM

## 2024-02-22 DIAGNOSIS — I10 HYPERTENSION, UNSPECIFIED TYPE: ICD-10-CM

## 2024-02-22 DIAGNOSIS — I51.3 APICAL MURAL THROMBUS WITHOUT MI: ICD-10-CM

## 2024-02-22 DIAGNOSIS — I50.20 HFREF (HEART FAILURE WITH REDUCED EJECTION FRACTION): ICD-10-CM

## 2024-02-22 RX ORDER — ARIPIPRAZOLE 300 MG
KIT INTRAMUSCULAR
COMMUNITY
Start: 2024-02-09

## 2024-02-22 RX ORDER — FUROSEMIDE 40 MG/1
40 TABLET ORAL DAILY
Qty: 90 TABLET | Refills: 2 | Status: SHIPPED | OUTPATIENT
Start: 2024-02-22

## 2024-02-22 RX ORDER — LISINOPRIL 40 MG/1
40 TABLET ORAL
Qty: 90 TABLET | Refills: 2 | Status: SHIPPED | OUTPATIENT
Start: 2024-02-22

## 2024-02-22 RX ORDER — DAPAGLIFLOZIN 5 MG/1
5 TABLET, FILM COATED ORAL DAILY
Qty: 90 TABLET | Refills: 2 | Status: SHIPPED | OUTPATIENT
Start: 2024-02-22

## 2024-02-22 NOTE — PROGRESS NOTES
"Chief Complaint  Follow-up (INR check)    Subjective      History of Present Illness    Regulo Sepulveda is a 58 y.o. male who presents to [facility] with a past medical history of combined systolic and diastolic congestive heart failure with EF 20%, LV thrombus on warfarin (non-compliance with this), atrial fibrillation, COPD, Presents for follow-up today.  Patient has an LV thrombus and is unsure why he was started on Coumadin he states he can no longer get to the hospital easily to get his Coumadin level checked he would like to try a medication different for his, LV thrombus.  He frequently misses appointments due to not having transportation.  Can bring his medications in today I did refill the medications that he did not have with him he understands he needs to comply with all the medications that are prescribed to him and his sister is with him today and states she is going to help him be more compliant with medications.  Been off of the Coumadin for over 2 months now she states he is never even had it prior to getting the INR this week.    Objective   Vital Signs:   Vitals:    02/22/24 1414   BP: 130/80   Pulse: 102   Temp: 97.5 °F (36.4 °C)   SpO2: 99%   Weight: 68.9 kg (152 lb)   Height: 182.9 cm (72\")     Body mass index is 20.61 kg/m².    Wt Readings from Last 3 Encounters:   02/22/24 68.9 kg (152 lb)   01/31/24 68.9 kg (152 lb)   01/08/24 71.7 kg (158 lb)     BP Readings from Last 3 Encounters:   02/22/24 130/80   01/31/24 130/78   01/08/24 128/76       Health Maintenance   Topic Date Due    URINE MICROALBUMIN  Never done    COLORECTAL CANCER SCREENING  Never done    ANNUAL PHYSICAL  Never done    DIABETIC FOOT EXAM  Never done    DIABETIC EYE EXAM  Never done    Hepatitis B (2 of 3 - 19+ 3-dose series) 07/19/2022    Pneumococcal Vaccine 0-64 (2 of 2 - PCV) 10/13/2022    COVID-19 Vaccine (4 - 2023-24 season) 09/01/2023    ZOSTER VACCINE (2 of 2) 01/31/2025 (Originally " 8/16/2022)    HEMOGLOBIN A1C  02/29/2024    LIPID PANEL  08/30/2024    LUNG CANCER SCREENING  12/16/2024    TDAP/TD VACCINES (3 - Td or Tdap) 06/21/2032    HEPATITIS C SCREENING  Completed    INFLUENZA VACCINE  Completed       Physical Exam   General:  AAO x3, no acute distress.  Eyes:  EOMI, PERRLA  Ears/Nose/Mouth/Throat:  Moist mucous membranes  Respiratory:  CTA bilaterally, no wheezes rales or rhonchi  Cardiovascular:  RRR no murmur rubs or gallops  Gastrointestinal:  Abdomen soft nondistended nontender bowel sounds present x4 quadrants  Musculoskeletal:  Moves all 4 extremities spontaneously, no apparent weakness  Skin:  Warm, dry, no skin rashes or lesions  Neurologic:  AAO x3, cranial nerves 2-12 grossly intact, no focal neuro deficits  Psychiatric:  Normal mood and affect    Result Review :     The following data was reviewed by: Dickson Landry MD on 02/22/2024:      Procedures          Diagnoses and all orders for this visit:    1. Left ventricular apical thrombus (Primary)  -     apixaban (ELIQUIS) 5 MG tablet tablet; Take 1 tablet by mouth 2 (Two) Times a Day for 120 days.  Dispense: 60 tablet; Refill: 3    2. HFrEF (heart failure with reduced ejection fraction)  -     Farxiga 5 MG tablet tablet; Take 1 tablet by mouth Daily. Indications: Type 2 Diabetes  Dispense: 90 tablet; Refill: 2  -     furosemide (LASIX) 40 MG tablet; Take 1 tablet by mouth Daily.  Dispense: 90 tablet; Refill: 2    3. Type 2 diabetes mellitus without complication, without long-term current use of insulin  -     Farxiga 5 MG tablet tablet; Take 1 tablet by mouth Daily. Indications: Type 2 Diabetes  Dispense: 90 tablet; Refill: 2    4. Hypertension, unspecified type  -     lisinopril (PRINIVIL,ZESTRIL) 40 MG tablet; Take 1 tablet by mouth Daily.  Dispense: 90 tablet; Refill: 2    5. Tobacco abuse    6. Paroxysmal atrial fibrillation    7. Pulmonary emphysema, unspecified emphysema type      38  minutes were spent caring for Regulo  on this date of service. This time spent by me includes preparing for the visit, reviewing tests, obtaining/reviewing separately obtained history, performing medically appropriate exam/evaluation, counseling/educating the patient/family/caregiver, ordering medications/tests/procedures, referring/communicating with other health care professionals, documenting information in the medical record, independently interpreting results and communicating that with the patient/family/caregiver and/or care coordination.     BMI is within normal parameters. No other follow-up for BMI required.       Start Eliquis today, refilled his Farxiga I am unsure if he ever started this medication so we will go ahead and start at 5 will increase to 10 mg at the next visit.  Sent refill for Lasix  FOLLOW UP  Return in about 4 weeks (around 3/21/2024).  Patient was given instructions and counseling regarding his condition or for health maintenance advice. Please see specific information pulled into the AVS if appropriate.       Dickson Landry MD  02/22/24  16:36 EST    CURRENT & DISCONTINUED MEDICATIONS  Current Outpatient Medications   Medication Instructions    Abilify Maintena 300 MG Suspension Reconstituted ER IM injection ER inject 300mg into the appropriate muscle AS DIRECTED by prescriber every 30 days    albuterol sulfate  (90 Base) MCG/ACT inhaler 2 puffs, Inhalation, Every 4 Hours PRN    apixaban (ELIQUIS) 5 mg, Oral, 2 Times Daily    atorvastatin (LIPITOR) 40 mg, Oral, Every Night at Bedtime    carvedilol (COREG) 25 mg, Oral, Every 12 Hours Scheduled    Farxiga 5 mg, Oral, Daily    furosemide (LASIX) 40 mg, Oral, Daily    lisinopril (PRINIVIL,ZESTRIL) 40 mg, Oral, Every 24 Hours Scheduled    metFORMIN (GLUCOPHAGE) 1,000 mg, Oral, 2 Times Daily    pantoprazole (PROTONIX) 40 mg, Oral, Daily    venlafaxine XR (EFFEXOR-XR) 37.5 mg, Oral, Daily       Medications Discontinued During This Encounter   Medication Reason    warfarin  (Coumadin) 10 MG tablet     furosemide (LASIX) 40 MG tablet Reorder    Farxiga 5 MG tablet tablet Reorder    lisinopril (PRINIVIL,ZESTRIL) 40 MG tablet Reorder

## 2024-02-23 ENCOUNTER — OFFICE VISIT (OUTPATIENT)
Dept: BEHAVIORAL HEALTH | Facility: CLINIC | Age: 59
End: 2024-02-23
Payer: COMMERCIAL

## 2024-02-23 DIAGNOSIS — F20.0 SCHIZOPHRENIA, PARANOID TYPE: ICD-10-CM

## 2024-02-23 DIAGNOSIS — F41.1 GENERALIZED ANXIETY DISORDER: ICD-10-CM

## 2024-02-23 DIAGNOSIS — Z79.899 MEDICATION MANAGEMENT: ICD-10-CM

## 2024-02-23 DIAGNOSIS — G47.00 INSOMNIA, UNSPECIFIED TYPE: ICD-10-CM

## 2024-02-23 DIAGNOSIS — F31.5 BIPOLAR DISORDER, CURRENT EPISODE DEPRESSED, SEVERE, WITH PSYCHOTIC FEATURES: Primary | ICD-10-CM

## 2024-02-23 RX ORDER — DIVALPROEX SODIUM 250 MG/1
250 TABLET, DELAYED RELEASE ORAL
Qty: 30 TABLET | Refills: 0 | Status: SHIPPED | OUTPATIENT
Start: 2024-02-23 | End: 2024-03-24

## 2024-02-23 NOTE — PROGRESS NOTES
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 145 mmol/L Final    Potassium 11/10/2023 4.9  3.5 - 5.2 mmol/L Final    Specimen hemolyzed.  Result may be falsely elevated.    Chloride 11/10/2023 105  98 - 107 mmol/L Final    CO2 11/10/2023 20.8 (L)  22.0 - 29.0 mmol/L Final    Calcium 11/10/2023 8.6  8.6 - 10.5 mg/dL Final    Total Protein 11/10/2023 6.2  6.0 - 8.5 g/dL Final    Albumin 11/10/2023 3.6  3.5 - 5.2 g/dL Final    ALT (SGPT) 11/10/2023 52 (H)  1 - 41 U/L Final    Specimen hemolyzed.  Result may  be falsely elevated.    AST (SGOT) 11/10/2023 32  1 - 40 U/L Final    Specimen hemolyzed.  Result may be falsely elevated.    Alkaline Phosphatase 11/10/2023 249 (H)  39 - 117 U/L Final    Total Bilirubin 11/10/2023 1.2  0.0 - 1.2 mg/dL Final    Globulin 11/10/2023 2.6  gm/dL Final    A/G Ratio 11/10/2023 1.4  g/dL Final    BUN/Creatinine Ratio 11/10/2023 26.1 (H)  7.0 - 25.0 Final    Anion Gap 11/10/2023 13.2  5.0 - 15.0 mmol/L Final    eGFR 11/10/2023 77.0  >60.0 mL/min/1.73 Final    proBNP 11/10/2023 27,650.0 (H)  0.0 - 900.0 pg/mL Final    HS Troponin T 11/10/2023 18  <22 ng/L Final    Specimen hemolyzed.  Results may be affected.    Extra Tube 11/10/2023 Hold for add-ons.   Final    Auto resulted.    Extra Tube 11/10/2023 hold for add-on   Final    Auto resulted    Extra Tube 11/10/2023 Hold for add-ons.   Final    Auto resulted.    Extra Tube 11/10/2023 Hold for  add-ons.   Final    Auto resulted    WBC 11/10/2023 9.79  3.40 - 10.80 10*3/mm3 Final    RBC 11/10/2023 4.91  4.14 - 5.80 10*6/mm3 Final    Hemoglobin 11/10/2023 13.4  13.0 - 17.7 g/dL Final    Hematocrit 11/10/2023 42.4  37.5 - 51.0 % Final    MCV 11/10/2023 86.4  79.0 - 97.0 fL Final    MCH 11/10/2023 27.3  26.6 - 33.0 pg Final    MCHC 11/10/2023 31.6  31.5 - 35.7 g/dL Final    RDW 11/10/2023 14.9  12.3 - 15.4 % Final    RDW-SD 11/10/2023 46.5  37.0 - 54.0 fl Final    MPV 11/10/2023 11.4  6.0 - 12.0 fL Final    Platelets 11/10/2023 281  140 - 450 10*3/mm3 Final    Neutrophil % 11/10/2023 70.0  42.7 - 76.0 % Final    Lymphocyte % 11/10/2023 18.8 (L)  19.6 - 45.3 % Final    Monocyte % 11/10/2023 10.3  5.0 - 12.0 % Final    Eosinophil % 11/10/2023 0.2 (L)  0.3 - 6.2 % Final    Basophil % 11/10/2023 0.5  0.0 - 1.5 % Final    Immature Grans % 11/10/2023 0.2  0.0 - 0.5 % Final    Neutrophils, Absolute 11/10/2023 6.85  1.70 - 7.00 10*3/mm3 Final    Lymphocytes, Absolute 11/10/2023 1.84  0.70 - 3.10 10*3/mm3 Final    Monocytes, Absolute 11/10/2023 1.01 (H)  0.10 - 0.90 10*3/mm3 Final    Eosinophils, Absolute 11/10/2023 0.02  0.00 - 0.40 10*3/mm3 Final    Basophils, Absolute 11/10/2023 0.05  0.00 - 0.20 10*3/mm3 Final    Immature Grans, Absolute 11/10/2023 0.02  0.00 - 0.05 10*3/mm3 Final    nRBC 11/10/2023 0.0  0.0 - 0.2 /100 WBC Final    Protime 11/10/2023 15.5 (H)  11.8 - 14.9 Seconds Final    INR 11/10/2023 1.20 (H)  0.86 - 1.15 Final    Glucose 11/10/2023 146 (H)  70 - 99 mg/dL Final    Serial Number: [id number]:  [id number]    Glucose 11/10/2023 102 (H)  70 - 99 mg/dL Final    Serial Number: [id number]:  [id number]    Glucose 11/10/2023 106 (H)  70 - 99 mg/dL Final    Serial Number: [id number]:  [id number]    Glucose 11/11/2023 102 (H)  65 - 99 mg/dL Final    BUN 11/11/2023 26 (H)  6 - 20 mg/dL Final    Creatinine 11/11/2023 1.27  0.76 - 1.27 mg/dL Final    Sodium 11/11/2023 140  136 - 145 mmol/L  Final    Potassium 11/11/2023 3.2 (L)  3.5 - 5.2 mmol/L Final    Chloride 11/11/2023 100  98 - 107 mmol/L Final    CO2 11/11/2023 30.9 (H)  22.0 - 29.0 mmol/L Final    Calcium 11/11/2023 8.1 (L)  8.6 - 10.5 mg/dL Final    BUN/Creatinine Ratio 11/11/2023 20.5  7.0 - 25.0 Final    Anion Gap 11/11/2023 9.1  5.0 - 15.0 mmol/L Final    eGFR 11/11/2023 65.5  >60.0 mL/min/1.73 Final    Protime 11/11/2023 15.7 (H)  11.8 - 14.9 Seconds Final    INR 11/11/2023 1.23 (H)  0.86 - 1.15 Final    WBC 11/11/2023 7.91  3.40 - 10.80 10*3/mm3 Final    RBC 11/11/2023 4.52  4.14 - 5.80 10*6/mm3 Final    Hemoglobin 11/11/2023 12.3 (L)  13.0 - 17.7 g/dL Final    Hematocrit 11/11/2023 39.1  37.5 - 51.0 % Final    MCV 11/11/2023 86.5  79.0 - 97.0 fL Final    MCH 11/11/2023 27.2  26.6 - 33.0 pg Final    MCHC 11/11/2023 31.5  31.5 - 35.7 g/dL Final    RDW 11/11/2023 14.6  12.3 - 15.4 % Final    RDW-SD 11/11/2023 45.8  37.0 - 54.0 fl Final    MPV 11/11/2023 11.1  6.0 - 12.0 fL Final    Platelets 11/11/2023 205  140 - 450 10*3/mm3 Final    Neutrophil % 11/11/2023 60.2  42.7 - 76.0 % Final    Lymphocyte % 11/11/2023 26.3  19.6 - 45.3 % Final    Monocyte % 11/11/2023 11.1  5.0 - 12.0 % Final    Eosinophil % 11/11/2023 1.5  0.3 - 6.2 % Final    Basophil % 11/11/2023 0.6  0.0 - 1.5 % Final    Immature Grans % 11/11/2023 0.3  0.0 - 0.5 % Final    Neutrophils, Absolute 11/11/2023 4.76  1.70 - 7.00 10*3/mm3 Final    Lymphocytes, Absolute 11/11/2023 2.08  0.70 - 3.10 10*3/mm3 Final    Monocytes, Absolute 11/11/2023 0.88  0.10 - 0.90 10*3/mm3 Final    Eosinophils, Absolute 11/11/2023 0.12  0.00 - 0.40 10*3/mm3 Final    Basophils, Absolute 11/11/2023 0.05  0.00 - 0.20 10*3/mm3 Final    Immature Grans, Absolute 11/11/2023 0.02  0.00 - 0.05 10*3/mm3 Final    nRBC 11/11/2023 0.0  0.0 - 0.2 /100 WBC Final    Glucose 11/11/2023 100 (H)  70 - 99 mg/dL Final    Serial Number: [id number]:  [id number]    Glucose 11/11/2023 107 (H)  70 - 99 mg/dL Final     Serial Number: [id number]:  [id number]    Glucose 11/11/2023 126 (H)  70 - 99 mg/dL Final    Serial Number: [id number]:  [id number]    Glucose 11/12/2023 127 (H)  65 - 99 mg/dL Final    BUN 11/12/2023 27 (H)  6 - 20 mg/dL Final    Creatinine 11/12/2023 1.28 (H)  0.76 - 1.27 mg/dL Final    Sodium 11/12/2023 141  136 - 145 mmol/L Final    Potassium 11/12/2023 3.5  3.5 - 5.2 mmol/L Final    Chloride 11/12/2023 100  98 - 107 mmol/L Final    CO2 11/12/2023 31.9 (H)  22.0 - 29.0 mmol/L Final    Calcium 11/12/2023 8.5 (L)  8.6 - 10.5 mg/dL Final    BUN/Creatinine Ratio 11/12/2023 21.1  7.0 - 25.0 Final    Anion Gap 11/12/2023 9.1  5.0 - 15.0 mmol/L Final    eGFR 11/12/2023 64.9  >60.0 mL/min/1.73 Final    Protime 11/12/2023 15.5 (H)  11.8 - 14.9 Seconds Final    INR 11/12/2023 1.21 (H)  0.86 - 1.15 Final    WBC 11/12/2023 8.60  3.40 - 10.80 10*3/mm3 Final    RBC 11/12/2023 4.95  4.14 - 5.80 10*6/mm3 Final    Hemoglobin 11/12/2023 13.4  13.0 - 17.7 g/dL Final    Hematocrit 11/12/2023 42.4  37.5 - 51.0 % Final    MCV 11/12/2023 85.7  79.0 - 97.0 fL Final    MCH 11/12/2023 27.1  26.6 - 33.0 pg Final    MCHC 11/12/2023 31.6  31.5 - 35.7 g/dL Final    RDW 11/12/2023 14.5  12.3 - 15.4 % Final    RDW-SD 11/12/2023 44.9  37.0 - 54.0 fl Final    MPV 11/12/2023 10.8  6.0 - 12.0 fL Final    Platelets 11/12/2023 247  140 - 450 10*3/mm3 Final    Neutrophil % 11/12/2023 67.5  42.7 - 76.0 % Final    Lymphocyte % 11/12/2023 19.8  19.6 - 45.3 % Final    Monocyte % 11/12/2023 11.0  5.0 - 12.0 % Final    Eosinophil % 11/12/2023 0.9  0.3 - 6.2 % Final    Basophil % 11/12/2023 0.6  0.0 - 1.5 % Final    Immature Grans % 11/12/2023 0.2  0.0 - 0.5 % Final    Neutrophils, Absolute 11/12/2023 5.80  1.70 - 7.00 10*3/mm3 Final    Lymphocytes, Absolute 11/12/2023 1.70  0.70 - 3.10 10*3/mm3 Final    Monocytes, Absolute 11/12/2023 0.95 (H)  0.10 - 0.90 10*3/mm3 Final    Eosinophils, Absolute 11/12/2023 0.08  0.00 - 0.40 10*3/mm3 Final     Basophils, Absolute 11/12/2023 0.05  0.00 - 0.20 10*3/mm3 Final    Immature Grans, Absolute 11/12/2023 0.02  0.00 - 0.05 10*3/mm3 Final    nRBC 11/12/2023 0.0  0.0 - 0.2 /100 WBC Final    Glucose 11/11/2023 142 (H)  70 - 99 mg/dL Final    Serial Number: [id number]:  [id number]    Glucose 11/12/2023 119 (H)  70 - 99 mg/dL Final    Serial Number: [id number]:  [id number]    Glucose 11/13/2023 120 (H)  65 - 99 mg/dL Final    BUN 11/13/2023 24 (H)  6 - 20 mg/dL Final    Creatinine 11/13/2023 1.31 (H)  0.76 - 1.27 mg/dL Final    Sodium 11/13/2023 139  136 - 145 mmol/L Final    Potassium 11/13/2023 4.3  3.5 - 5.2 mmol/L Final    Chloride 11/13/2023 97 (L)  98 - 107 mmol/L Final    CO2 11/13/2023 33.1 (H)  22.0 - 29.0 mmol/L Final    Calcium 11/13/2023 9.4  8.6 - 10.5 mg/dL Final    BUN/Creatinine Ratio 11/13/2023 18.3  7.0 - 25.0 Final    Anion Gap 11/13/2023 8.9  5.0 - 15.0 mmol/L Final    eGFR 11/13/2023 63.1  >60.0 mL/min/1.73 Final    Protime 11/13/2023 18.7 (H)  11.8 - 14.9 Seconds Final    INR 11/13/2023 1.54 (H)  0.86 - 1.15 Final    Magnesium 11/13/2023 1.5 (L)  1.6 - 2.6 mg/dL Final    Protime 11/14/2023 22.4 (H)  11.8 - 14.9 Seconds Final    INR 11/14/2023 1.93 (H)  0.86 - 1.15 Final    Protime 11/15/2023 25.4 (H)  11.8 - 14.9 Seconds Final    INR 11/15/2023 2.26 (H)  0.86 - 1.15 Final    Glucose 11/15/2023 186 (H)  65 - 99 mg/dL Final    BUN 11/15/2023 34 (H)  6 - 20 mg/dL Final    Creatinine 11/15/2023 1.25  0.76 - 1.27 mg/dL Final    Sodium 11/15/2023 138  136 - 145 mmol/L Final    Potassium 11/15/2023 4.0  3.5 - 5.2 mmol/L Final    Chloride 11/15/2023 98  98 - 107 mmol/L Final    CO2 11/15/2023 29.9 (H)  22.0 - 29.0 mmol/L Final    Calcium 11/15/2023 9.3  8.6 - 10.5 mg/dL Final    BUN/Creatinine Ratio 11/15/2023 27.2 (H)  7.0 - 25.0 Final    Anion Gap 11/15/2023 10.1  5.0 - 15.0 mmol/L Final    eGFR 11/15/2023 66.7  >60.0 mL/min/1.73 Final   Admission on 11/06/2023, Discharged on 11/06/2023    Component Date Value Ref Range Status    SARS Antigen 11/06/2023 Not Detected  Not Detected, Presumptive Negative Final    Influenza A Antigen KAYLEN 11/06/2023 Not Detected  Not Detected Final    Influenza B Antigen KAYLEN 11/06/2023 Not Detected  Not Detected Final    Internal Control 11/06/2023 Passed  Passed Final    Lot Number 11/06/2023 [id number]   Final    Expiration Date 11/06/2023 10/27/24   Final   No results displayed because visit has over 200 results.      Admission on 10/17/2023, Discharged on 10/17/2023   Component Date Value Ref Range Status    QT Interval 10/17/2023 352  ms Final    QTC Interval 10/17/2023 472  ms Final    HS Troponin T 10/17/2023 29 (H)  <15 ng/L Final    Glucose 10/17/2023 121 (H)  65 - 99 mg/dL Final    BUN 10/17/2023 20  6 - 20 mg/dL Final    Creatinine 10/17/2023 0.90  0.76 - 1.27 mg/dL Final    Sodium 10/17/2023 132 (L)  136 - 145 mmol/L Final    Potassium 10/17/2023 4.0  3.5 - 5.2 mmol/L Final    Chloride 10/17/2023 99  98 - 107 mmol/L Final    CO2 10/17/2023 22.9  22.0 - 29.0 mmol/L Final    Calcium 10/17/2023 9.0  8.6 - 10.5 mg/dL Final    Total Protein 10/17/2023 6.3  6.0 - 8.5 g/dL Final    Albumin 10/17/2023 3.6  3.5 - 5.2 g/dL Final    ALT (SGPT) 10/17/2023 59 (H)  1 - 41 U/L Final    AST (SGOT) 10/17/2023 35  1 - 40 U/L Final    Alkaline Phosphatase 10/17/2023 256 (H)  39 - 117 U/L Final    Total Bilirubin 10/17/2023 0.8  0.0 - 1.2 mg/dL Final    Globulin 10/17/2023 2.7  gm/dL Final    A/G Ratio 10/17/2023 1.3  g/dL Final    BUN/Creatinine Ratio 10/17/2023 22.2  7.0 - 25.0 Final    Anion Gap 10/17/2023 10.1  5.0 - 15.0 mmol/L Final    eGFR 10/17/2023 99.0  >60.0 mL/min/1.73 Final    Lipase 10/17/2023 15  13 - 60 U/L Final    proBNP 10/17/2023 19,337.0 (H)  0.0 - 900.0 pg/mL Final    Magnesium 10/17/2023 1.9  1.6 - 2.6 mg/dL Final    Extra Tube 10/17/2023 Hold for add-ons.   Final    Auto resulted.    Extra Tube 10/17/2023 hold for add-on   Final    Auto resulted    Extra  Tube 10/17/2023 Hold for add-ons.   Final    Auto resulted.    Extra Tube 10/17/2023 Hold for add-ons.   Final    Auto resulted    WBC 10/17/2023 10.04  3.40 - 10.80 10*3/mm3 Final    RBC 10/17/2023 4.51  4.14 - 5.80 10*6/mm3 Final    Hemoglobin 10/17/2023 12.9 (L)  13.0 - 17.7 g/dL Final    Hematocrit 10/17/2023 40.3  37.5 - 51.0 % Final    MCV 10/17/2023 89.4  79.0 - 97.0 fL Final    MCH 10/17/2023 28.6  26.6 - 33.0 pg Final    MCHC 10/17/2023 32.0  31.5 - 35.7 g/dL Final    RDW 10/17/2023 13.9  12.3 - 15.4 % Final    RDW-SD 10/17/2023 45.2  37.0 - 54.0 fl Final    MPV 10/17/2023 9.8  6.0 - 12.0 fL Final    Platelets 10/17/2023 393  140 - 450 10*3/mm3 Final    Neutrophil % 10/17/2023 68.9  42.7 - 76.0 % Final    Lymphocyte % 10/17/2023 17.7 (L)  19.6 - 45.3 % Final    Monocyte % 10/17/2023 11.6  5.0 - 12.0 % Final    Eosinophil % 10/17/2023 0.8  0.3 - 6.2 % Final    Basophil % 10/17/2023 0.7  0.0 - 1.5 % Final    Immature Grans % 10/17/2023 0.3  0.0 - 0.5 % Final    Neutrophils, Absolute 10/17/2023 6.92  1.70 - 7.00 10*3/mm3 Final    Lymphocytes, Absolute 10/17/2023 1.78  0.70 - 3.10 10*3/mm3 Final    Monocytes, Absolute 10/17/2023 1.16 (H)  0.10 - 0.90 10*3/mm3 Final    Eosinophils, Absolute 10/17/2023 0.08  0.00 - 0.40 10*3/mm3 Final    Basophils, Absolute 10/17/2023 0.07  0.00 - 0.20 10*3/mm3 Final    Immature Grans, Absolute 10/17/2023 0.03  0.00 - 0.05 10*3/mm3 Final    nRBC 10/17/2023 0.0  0.0 - 0.2 /100 WBC Final    COVID19 10/17/2023 Not Detected  Not Detected - Ref. Range Final    Influenza A PCR 10/17/2023 Not Detected  Not Detected Final    Influenza B PCR 10/17/2023 Not Detected  Not Detected Final    RSV, PCR 10/17/2023 Not Detected  Not Detected Final   No results displayed because visit has over 200 results.      No results displayed because visit has over 200 results.      There may be more visits with results that are not included.       EKG Results:  No orders to display       Imaging  Results:  CT Chest With Contrast Diagnostic    Result Date: 12/16/2023    CT scan of the chest with IV contrast demonstrating no findings of PE.  Emphysema.  10 cm area of alveolar airspace disease in the posterior and inferior right lower lobe is consistent with pneumonia or aspiration.  Small to moderate right pleural effusion is seen.     ROBERTO HARKINS MD       Electronically Signed and Approved By: ROBERTO HARKINS MD on 12/16/2023 at 18:57             XR Chest 1 View    Result Date: 12/15/2023    1. New interstitial and airspace opacities in the lower lungs and possible small right pleural effusion.  Findings may represent heart failure/pulmonary edema or pneumonia. 2. Cardiomegaly.       DARLINE IZQUIERDO MD       Electronically Signed and Approved By: DARLINE IZQUIERDO MD on 12/15/2023 at 9:46             XR Chest 1 View    Result Date: 11/10/2023    1. Stable cardiomegaly 2. Mild hazy and ground-glass opacity at the lung bases right greater than left with blunting of the right costophrenic angle compatible with pleural effusion.  Findings are similar with slight progression at the right base compared to the prior study.  Findings suggest pleural effusion with atelectasis or asymmetric pulmonary edema.  Pneumonia cannot be excluded in the correct clinical setting.       WARREN HALL MD       Electronically Signed and Approved By: WARREN HALL MD on 11/10/2023 at 7:53                 Assessment & Plan   Diagnoses and all orders for this visit:    1. Bipolar disorder, current episode depressed, severe, with psychotic features (Primary)  -     divalproex (Depakote) 250 MG DR tablet; Take 1 tablet by mouth every night at bedtime for 30 days.  Dispense: 30 tablet; Refill: 0    2. Generalized anxiety disorder    3. Schizophrenia, paranoid type    4. Insomnia, unspecified type  -     Ambulatory Referral to Sleep Medicine    5. Medication management  -     Valproic Acid (Total+Free)      Presentation seems most  consistent with bipolar disorder, DANIEL, schizophrenia, insomnia.  We will discontinue Effexor due to diagnosis of bipolar.  Patient has been receiving Abilify Maintena 300 mg injections, which she has been getting the injections in this office by his PCP.  Patient sister states they are unable to afford with paying for the injection to be done at [facility].  Spoke with patient's PCP who is agreeable to continue giving patient Abilify injection once a month in the office.  Patient recently had Abilify injection done on 2/19/2024.  We will increase Abilify dose to 400 mg in 1 month for the next injection and for management of bipolar, anxiety, schizophrenia, and overall mood.  We will start on Depakote for management of bipolar and overall mood.  We will refer to sleep medicine for insomnia.  Extensively counseled the patient on the need to be compliant with only his prescribed medications and to not take any medications that are not prescribed for him.  Patient verbalized understanding and is agreeable to this plan.  Instructed patient to contact the office for any new or worsening symptoms or any other concerns.  Follow-up in 3 weeks.  Addressed all questions and concerns.      Visit Diagnoses:    ICD-10-CM ICD-9-CM   1. Bipolar disorder, current episode depressed, severe, with psychotic features  F31.5 296.54   2. Generalized anxiety disorder  F41.1 300.02   3. Schizophrenia, paranoid type  F20.0 295.30   4. Insomnia, unspecified type  G47.00 780.52   5. Medication management  Z79.899 V58.69       PLAN:  Safety: No acute safety concerns at this time.  Therapy: Declines  Risk Assessment: Risk of self-harm acutely is severe.  Risk factors include anxiety disorder, mood disorder, family history, h/o suicide attempts and self harm in the past, and recent psychosocial stressors (pandemic). Protective factors include denies access to guns/weapons, no present SI, minimal AODA, healthcare seeking, future orientation,  willingness to engage in care.  Risk of self-harm chronically is also severe, but could be further elevated in the event of treatment noncompliance and/or AODA.  Labs/Diagnostics Ordered:   Orders Placed This Encounter   Procedures    Valproic Acid (Total+Free)    Ambulatory Referral to Sleep Medicine     Medications:   New Medications Ordered This Visit   Medications    divalproex (Depakote) 250 MG DR tablet     Sig: Take 1 tablet by mouth every night at bedtime for 30 days.     Dispense:  30 tablet     Refill:  0       Discussed all risks, benefits, alternatives, and side effects of Aripiprazole, including but not limited to GI upset, headache, activating/sedating effects, dyslipidemia, extrapyramidal symptoms (dystonia, drug-induced parkinsonism, akathisia, tardive dyskinesia), lowering of seizure threshold, hematologic abnormalities, hyperglycemia, impulse control disorders, increased mortality in elderly patients with dementia-related psychosis, neuroleptic malignant syndrome, sexual dysfunction, orthostatic hypotension, falls risk in older adults, and temperature dysregulation. Pt instructed to avoid driving and doing other tasks or actions that require to be alert until knowing how the drug affects them. Pt educated on the need to practice safe sex while taking this med. Discussed the need for pt to immediately call the office for any new or worsening symptoms, such as worsening depression; feeling nervous or restless; suicidal thoughts or actions; or other changes changes in mood or behavior, and all other concerns. Pt educated on med compliance and the risks of suddenly stopping this medication or missing doses. Pt verbalized understanding and is agreeable to taking Aripiprazole. Addressed all questions and concerns.     Depakote, Risks, benefits, alternatives discussed with patient including nausea and vomiting, GI upset, sedation, dizziness/falls risk, increased appetite. Use care when operating vehicle,  vessel, or machine. After discussion of these risks and benefits, the patient voiced understanding and agreed to proceed. Patient also informed of the need to take as prescribed, and for periodic labwork.      Follow up: F/u in 3 weeks.      TREATMENT PLAN/GOALS: Continue supportive psychotherapy efforts and medications as indicated. Treatment and medication options discussed during today's visit. Patient ackowledged and verbally consented to continue with current treatment plan and was educated on the importance of compliance with treatment and follow-up appointments.    MEDICATION ISSUES:  DEVYN reviewed as expected.  Discussed medication options and treatment plan of prescribed medication as well as the risks, benefits, and side effects including potential falls, possible impaired driving and metabolic adversities among others. Patient is agreeable to call the office with any worsening of symptoms or onset of side effects. Patient is agreeable to call 911 or go to the nearest ER should he/she begin having SI/HI. No medication side effects or related complaints today.            This document has been electronically signed by Hailey Workman PA-C  February 23, 2024 16:01 EST      Part of this note may be an electronic transcription/translation of spoken language to printed text using the Dragon Dictation System.

## 2024-03-04 ENCOUNTER — OFFICE VISIT (OUTPATIENT)
Dept: SLEEP MEDICINE | Facility: HOSPITAL | Age: 59
End: 2024-03-04
Payer: COMMERCIAL

## 2024-03-04 VITALS
WEIGHT: 153.6 LBS | OXYGEN SATURATION: 99 % | HEART RATE: 81 BPM | DIASTOLIC BLOOD PRESSURE: 93 MMHG | SYSTOLIC BLOOD PRESSURE: 134 MMHG | HEIGHT: 72 IN | BODY MASS INDEX: 20.8 KG/M2

## 2024-03-04 DIAGNOSIS — J43.9 PULMONARY EMPHYSEMA, UNSPECIFIED EMPHYSEMA TYPE: ICD-10-CM

## 2024-03-04 DIAGNOSIS — I50.20 HFREF (HEART FAILURE WITH REDUCED EJECTION FRACTION): ICD-10-CM

## 2024-03-04 DIAGNOSIS — G47.9 RESTLESS SLEEPER: ICD-10-CM

## 2024-03-04 DIAGNOSIS — R29.818 SUSPECTED SLEEP APNEA: Primary | ICD-10-CM

## 2024-03-04 PROCEDURE — 3080F DIAST BP >= 90 MM HG: CPT | Performed by: NURSE PRACTITIONER

## 2024-03-04 PROCEDURE — 1159F MED LIST DOCD IN RCRD: CPT | Performed by: NURSE PRACTITIONER

## 2024-03-04 PROCEDURE — 99244 OFF/OP CNSLTJ NEW/EST MOD 40: CPT | Performed by: NURSE PRACTITIONER

## 2024-03-04 PROCEDURE — 1160F RVW MEDS BY RX/DR IN RCRD: CPT | Performed by: NURSE PRACTITIONER

## 2024-03-04 PROCEDURE — G0463 HOSPITAL OUTPT CLINIC VISIT: HCPCS

## 2024-03-04 PROCEDURE — 3075F SYST BP GE 130 - 139MM HG: CPT | Performed by: NURSE PRACTITIONER

## 2024-03-04 NOTE — PROGRESS NOTES
[facility]  [address] [place] [address]  [place]   [place] [address]  Phone: [phone]  Fax: [phone]      Regulo Sepulveda  [id number]   1965  58 y.o.  male      Referring Provider: Hailey Workman PA-C-- behavioral health  PCP: Dickson Landry MD    Type of service: Initial Sleep Medicine Consult  Date of service: 3/4/2024          CHIEF COMPLAINT: Suspected sleep apnea      HISTORY OF PRESENT ILLNESS:  Thank you for asking us to see Regulo Sepulveda.  Regulo Sepulveda 58 y.o. was seen today on 3/4/2024 at [facility].  Patient presents today with symptoms of snoring, trouble sleeping, and suspected sleep apnea.  Also with hx waking up gasping for air at night.  The symptoms are chronic in nature.  Patient has no history of tonsillectomy, adenoidectomy, nasal surgery, UPPP.  Wakes up frequently at night, every 1-2 hours or so.  If he does not sleep well at night he may doze off during the day.  Aware not to do any tired or drowsy driving, verbalized understanding of risks.  Behavioral health specialist recommended ruling out sleep apnea.  He is on Depakote at night per behavioral health specialist.  We discussed trying to avoid daytime naps to aid in overall nighttime sleep.  Patient's sister, Aurora, is with him in the office today, per patient preference.  She helps to manage his health.          SLEEP HISTORY:  Sleep schedule:  Bedtime: 9pm   Wake time: 5am   Time it takes to fall asleep: Varies  Average hours of sleep: 1-2 hours at a time?  Number of naps per day: ???    Symptoms:   In addition to the above, patient reports the following associated symptoms:  Have you ever awakened gasping for breath, coughing, choking: Yes   Change in weight:  Yes , lost 10 pounds  Morning headaches:  No   Awaken with a sore throat or dry mouth:  Yes   Leg jerking at night:  No   Creepy crawly feeling in legs/urge to move legs: No   Teeth grinding: No   Have you ever awakened at night with a sour taste  "or burning sensation in your chest:  Yes   Do you have muscle weakness with laughing or anger:  No   Have you ever felt paralyzed while going to sleep or waking up:  Yes   Sleepwalking: No   Nightmares: Yes   Trouble falling asleep: Yes  Trouble staying asleep: Yes  Restless sleeper: Yes  Pain at night: Yes, joint pain  Nocturia (urination at night): 0 times per night  Memory Problem: No     Medical Conditions (PMH):   Hypertension  Heart failure  Anxiety  Depression  COPD    Social history:  Do you drive a commercial vehicle:  No   Shift work:  No   Tobacco use:  Yes, half pack per day  Alcohol use: 0 per week  Caffeinated drinks: 2-3 per day    Family History (parents and siblings) (pertaining to sleep medicine):  Restless legs  Insomnia  Sleepwalking or sleep terrors    Medications: reviewed    Allergies:  Penicillins      REVIEW OF SYSTEMS:  Pertinent positive symptoms are:  Snoring  [place] Sleepiness Scale of   3  Fatigue  Nasal congestion  Heartburn  Anxiety  Depression  Wheezing  Dyspnea          PHYSICAL EXAM:  CONSTITUTINONAL:   Vitals:    03/04/24 1300   BP: 134/93   Pulse: 81   SpO2: 99%   Weight: 69.7 kg (153 lb 9.6 oz)   Height: 182.9 cm (72\")    Body mass index is 20.83 kg/m².   HEAD: atraumatic, normocephalic   THROAT: tonsils are not enlarged, Mallampati class IV, missing teeth  NECK: Neck Circumference: 13 inches, trachea is midline  RESPIRATORY SYSTEM: Respirations even, unlabored, normal rate  CARDIOVASULAR SYSTEM: Normal rate  NEUROLOGICAL SYSTEM: Alert and oriented x 3  PSYCHIATRIC SYSTEM: Mood is normal/ appropriate     Office note(s) from care team reviewed. Office note(s) reviewed: 2/23/2024 behavioral health note, 2/22/2024 PCP note    Labs/ Test Results Reviewed:     Most Recent A1C          8/29/2023    05:49   HGBA1C Most Recent   Hemoglobin A1C 6.10    Reviewed 2023 echo and heart cath          ASSESSMENT AND PLAN:   Suspected sleep apnea: patient's symptoms and physical examination are " concerning for possible sleep apnea.   I discussed the signs, symptoms, and pathophysiology of sleep apnea with this patient.  I also discussed the possible complications of untreated sleep apnea including but not limited to potential risk of resistant hypertension, insulin resistance, pulmonary hypertension, atrial fibrillation or other arrhythmias, heart attack, stroke, nonrestorative sleep with hypersomnia which can increase risk for motor vehicle accidents, etc.   Different testing methods including home-based and lab based sleep studies were discussed with this patient.   Based on patient history and physical examination, will proceed with in-lab polysomnogram.  The order for the sleep study is placed in [facility].  The test will be scheduled after prior authorization has been obtained through patient's insurance.  Discussed overview of treatment options for sleep apnea in the office today including PAP therapy, and treatment/ management will be discussed in more detail with this patient after the test is completed.  All questions were answered to patient's satisfaction.   Snoring: snoring is the sound created by turbulent airflow vibrating upper airway soft tissue.  I have also discussed factors affecting snoring including sleep deprivation, sleeping on the back and alcohol ingestion. To minimize snoring, patient is advised to have adequate sleep, sleep on their side, and avoid alcohol and sedative medications around bedtime.   Bipolar disorder, schizophrenia, anxiety w/ secondary INSOMNIA: Follows with behavioral health specialist  Nonischemic cardiomyopathy/ HFrEF, nonobstructive CAD, pulmonary hypertension: Follows with cardiology  Hypertension  Type 2 diabetes  Emphysema COPD    Patient will follow-up after study, 31 to 90 days after PAP therapy initiated if applicable, or contact the office sooner for questions or concerns. Patient's questions were answered.            Thank you again for asking me to consult  on this patient.  Please do not hesitate to call me if you have additional questions or concerns.       Jessica Stephens DNP, APRN  [facility]

## 2024-03-08 ENCOUNTER — LAB (OUTPATIENT)
Dept: LAB | Facility: HOSPITAL | Age: 59
End: 2024-03-08
Payer: COMMERCIAL

## 2024-03-08 PROCEDURE — 80165 DIPROPYLACETIC ACID FREE: CPT | Performed by: PHYSICIAN ASSISTANT

## 2024-03-08 PROCEDURE — 80164 ASSAY DIPROPYLACETIC ACD TOT: CPT | Performed by: PHYSICIAN ASSISTANT

## 2024-03-11 LAB
VALPROATE FREE SERPL-MCNC: ABNORMAL UG/ML (ref 6–22)
VALPROATE SERPL-MCNC: 6 UG/ML (ref 50–100)

## 2024-03-21 ENCOUNTER — OFFICE VISIT (OUTPATIENT)
Dept: FAMILY MEDICINE CLINIC | Facility: CLINIC | Age: 59
End: 2024-03-21
Payer: COMMERCIAL

## 2024-03-21 VITALS
HEIGHT: 73 IN | WEIGHT: 152.1 LBS | OXYGEN SATURATION: 98 % | SYSTOLIC BLOOD PRESSURE: 144 MMHG | HEART RATE: 110 BPM | TEMPERATURE: 97.9 F | DIASTOLIC BLOOD PRESSURE: 88 MMHG | BODY MASS INDEX: 20.16 KG/M2

## 2024-03-21 DIAGNOSIS — E44.0 MODERATE PROTEIN-CALORIE MALNUTRITION: ICD-10-CM

## 2024-03-21 DIAGNOSIS — Z12.11 SCREEN FOR COLON CANCER: ICD-10-CM

## 2024-03-21 DIAGNOSIS — Z09 HOSPITAL DISCHARGE FOLLOW-UP: Primary | ICD-10-CM

## 2024-03-21 RX ORDER — METOPROLOL SUCCINATE 25 MG/1
1 TABLET, EXTENDED RELEASE ORAL DAILY
COMMUNITY
Start: 2024-03-16

## 2024-03-21 NOTE — PROGRESS NOTES
"Chief Complaint  Hospital Follow Up Visit (SOA) and Anxiety    Subjective      History of Present Illness    Regulo Sepulveda is a 58 y.o. male who presents to [facility]   58 y.o. male who presents to [facility] with a past medical history of combined systolic and diastolic congestive heart failure with EF 20%, LV thrombus on Eliquis, atrial fibrillation, COPD, Presents or hospital follow-up today.  He was admitted to [facility] for about 1 week he does not have any discharge papers on him he reports he is feeling very weak short of breath he cannot lay flat.  They did offer him an open heart surgery according to his sister but he refused it because he is afraid.  I do not have any records I discussed with them to bring the records with him on Monday morning we will see them then.    Objective   Vital Signs:   Vitals:    03/21/24 1435   BP: 144/88   Pulse: 110   Temp: 97.9 °F (36.6 °C)   SpO2: 98%   Weight: 69 kg (152 lb 1.6 oz)   Height: 185.4 cm (73\")   PainSc:   6   PainLoc: Back     Body mass index is 20.07 kg/m².    Wt Readings from Last 3 Encounters:   03/21/24 69 kg (152 lb 1.6 oz)   03/04/24 69.7 kg (153 lb 9.6 oz)   02/22/24 68.9 kg (152 lb)     BP Readings from Last 3 Encounters:   03/21/24 144/88   03/04/24 134/93   02/22/24 130/80       Health Maintenance   Topic Date Due    URINE MICROALBUMIN  Never done    COLORECTAL CANCER SCREENING  Never done    DIABETIC EYE EXAM  Never done    ANNUAL PHYSICAL  Never done    DIABETIC FOOT EXAM  Never done    Hepatitis B (2 of 3 - 19+ 3-dose series) 07/19/2022    Pneumococcal Vaccine 0-64 (2 of 2 - PCV) 10/13/2022    COVID-19 Vaccine (4 - 2023-24 season) 09/01/2023    HEMOGLOBIN A1C  02/29/2024    ZOSTER VACCINE (2 of 2) 01/31/2025 (Originally 8/16/2022)    LIPID PANEL  08/30/2024    TDAP/TD VACCINES (3 - Td or Tdap) 06/21/2032    HEPATITIS C SCREENING  Completed    INFLUENZA VACCINE  Completed    " LUNG CANCER SCREENING  Discontinued       Physical Exam \    General: Hectic appearing chronically ill appears older than stated age AAO x3, no acute distress.  Eyes:  EOMI, PERRLA  Ears/Nose/Mouth/Throat:  Moist mucous membranes  Respiratory: Right-sided crackles noted bilaterally, no wheezes rales or rhonchi  Cardiovascular:  RRR no murmur rubs or gallops  Gastrointestinal:  Abdomen soft nondistended nontender bowel sounds present x4 quadrants  Musculoskeletal:  Moves all 4 extremities spontaneously, no apparent weakness  Skin:  Warm, dry, no skin rashes or lesions  Neurologic:  AAO x3, cranial nerves 2-12 grossly intact, no focal neuro deficits  Psychiatric:  Normal mood and affect        Result Review :     The following data was reviewed by: Dickson Landry MD on 03/21/2024:      Procedures          Diagnoses and all orders for this visit:    1. Hospital discharge follow-up (Primary)    2. Screen for colon cancer    3. Moderate protein-calorie malnutrition  -     DME ORAL SUPPLEMENTS    Please return to the office I spent 30 minutes counseling patient and his sister about open heart surgery on the benefits of getting the surgery done and compliance with medication and bring medications to every appointment I have already discussed with this multiple times in previous visit to bring all medications to the appointment so we can ensure he is taking the right ones he currently cannot name any medications that he is taking.    BMI is within normal parameters. No other follow-up for BMI required.      30 minutes were spent caring for Regulo on this date of service. This time spent by me includes preparing for the visit, reviewing tests, obtaining/reviewing separately obtained history, performing medically appropriate exam/evaluation, counseling/educating the patient/family/caregiver, ordering medications/tests/procedures, referring/communicating with other health care professionals, documenting information in the  medical record, independently interpreting results and communicating that with the patient/family/caregiver and/or care coordination.     FOLLOW UP  Return in about 3 days (around 3/24/2024).  Patient was given instructions and counseling regarding his condition or for health maintenance advice. Please see specific information pulled into the AVS if appropriate.       Dickson Landry MD  03/21/24  15:35 EDT    CURRENT & DISCONTINUED MEDICATIONS  Current Outpatient Medications   Medication Instructions    Abilify Maintena 300 MG Suspension Reconstituted ER IM injection ER inject 300mg into the appropriate muscle AS DIRECTED by prescriber every 30 days    albuterol sulfate  (90 Base) MCG/ACT inhaler 2 puffs, Inhalation, Every 4 Hours PRN    apixaban (ELIQUIS) 5 mg, Oral, 2 Times Daily    atorvastatin (LIPITOR) 40 mg, Oral, Every Night at Bedtime    carvedilol (COREG) 25 mg, Oral, Every 12 Hours Scheduled    divalproex (DEPAKOTE) 250 mg, Oral, Every Night at Bedtime    Farxiga 5 mg, Oral, Daily    furosemide (LASIX) 40 mg, Oral, Daily    lisinopril (PRINIVIL,ZESTRIL) 40 mg, Oral, Every 24 Hours Scheduled    metFORMIN (GLUCOPHAGE) 1,000 mg, Oral, 2 Times Daily    metoprolol succinate XL (TOPROL-XL) 25 MG 24 hr tablet 1 tablet, Oral, Daily    pantoprazole (PROTONIX) 40 mg, Oral, Daily       There are no discontinued medications.

## 2024-03-23 ENCOUNTER — APPOINTMENT (OUTPATIENT)
Dept: GENERAL RADIOLOGY | Facility: HOSPITAL | Age: 59
End: 2024-03-23
Payer: COMMERCIAL

## 2024-03-23 ENCOUNTER — HOSPITAL ENCOUNTER (OUTPATIENT)
Facility: HOSPITAL | Age: 59
Setting detail: OBSERVATION
Discharge: HOME | End: 2024-03-25
Attending: EMERGENCY MEDICINE | Admitting: INTERNAL MEDICINE
Payer: COMMERCIAL

## 2024-03-23 DIAGNOSIS — I50.9 ACUTE CONGESTIVE HEART FAILURE, UNSPECIFIED HEART FAILURE TYPE: Primary | ICD-10-CM

## 2024-03-23 DIAGNOSIS — R07.9 CHEST PAIN, UNSPECIFIED TYPE: ICD-10-CM

## 2024-03-23 DIAGNOSIS — R06.09 DYSPNEA ON EXERTION: ICD-10-CM

## 2024-03-23 DIAGNOSIS — I50.9 ACUTE ON CHRONIC CONGESTIVE HEART FAILURE, UNSPECIFIED HEART FAILURE TYPE: ICD-10-CM

## 2024-03-23 DIAGNOSIS — R26.2 DIFFICULTY IN WALKING: ICD-10-CM

## 2024-03-23 LAB
ALBUMIN SERPL-MCNC: 3.3 G/DL (ref 3.5–5.2)
ALBUMIN/GLOB SERPL: 1 G/DL
ALP SERPL-CCNC: 250 U/L (ref 39–117)
ALT SERPL W P-5'-P-CCNC: 45 U/L (ref 1–41)
ANION GAP SERPL CALCULATED.3IONS-SCNC: 14.7 MMOL/L (ref 5–15)
AST SERPL-CCNC: 30 U/L (ref 1–40)
BASOPHILS # BLD AUTO: 0.1 10*3/MM3 (ref 0–0.2)
BASOPHILS NFR BLD AUTO: 1 % (ref 0–1.5)
BILIRUB SERPL-MCNC: 0.6 MG/DL (ref 0–1.2)
BUN SERPL-MCNC: 20 MG/DL (ref 6–20)
BUN/CREAT SERPL: 17.7 (ref 7–25)
CALCIUM SPEC-SCNC: 9.1 MG/DL (ref 8.6–10.5)
CHLORIDE SERPL-SCNC: 101 MMOL/L (ref 98–107)
CO2 SERPL-SCNC: 24.3 MMOL/L (ref 22–29)
CREAT SERPL-MCNC: 1.13 MG/DL (ref 0.76–1.27)
DEPRECATED RDW RBC AUTO: 49.8 FL (ref 37–54)
EGFRCR SERPLBLD CKD-EPI 2021: 75.3 ML/MIN/1.73
EOSINOPHIL # BLD AUTO: 0.16 10*3/MM3 (ref 0–0.4)
EOSINOPHIL NFR BLD AUTO: 1.6 % (ref 0.3–6.2)
ERYTHROCYTE [DISTWIDTH] IN BLOOD BY AUTOMATED COUNT: 15.6 % (ref 12.3–15.4)
GLOBULIN UR ELPH-MCNC: 3.3 GM/DL
GLUCOSE SERPL-MCNC: 190 MG/DL (ref 65–99)
HCT VFR BLD AUTO: 41.3 % (ref 37.5–51)
HGB BLD-MCNC: 12.8 G/DL (ref 13–17.7)
HOLD SPECIMEN: NORMAL
HOLD SPECIMEN: NORMAL
IMM GRANULOCYTES # BLD AUTO: 0.01 10*3/MM3 (ref 0–0.05)
IMM GRANULOCYTES NFR BLD AUTO: 0.1 % (ref 0–0.5)
INR PPP: 1.23 (ref 0.86–1.15)
LIPASE SERPL-CCNC: 39 U/L (ref 13–60)
LYMPHOCYTES # BLD AUTO: 2.01 10*3/MM3 (ref 0.7–3.1)
LYMPHOCYTES NFR BLD AUTO: 20 % (ref 19.6–45.3)
MAGNESIUM SERPL-MCNC: 1.7 MG/DL (ref 1.6–2.6)
MCH RBC QN AUTO: 27.4 PG (ref 26.6–33)
MCHC RBC AUTO-ENTMCNC: 31 G/DL (ref 31.5–35.7)
MCV RBC AUTO: 88.4 FL (ref 79–97)
MONOCYTES # BLD AUTO: 0.67 10*3/MM3 (ref 0.1–0.9)
MONOCYTES NFR BLD AUTO: 6.7 % (ref 5–12)
NEUTROPHILS NFR BLD AUTO: 7.08 10*3/MM3 (ref 1.7–7)
NEUTROPHILS NFR BLD AUTO: 70.6 % (ref 42.7–76)
NRBC BLD AUTO-RTO: 0 /100 WBC (ref 0–0.2)
NT-PROBNP SERPL-MCNC: ABNORMAL PG/ML (ref 0–900)
PLATELET # BLD AUTO: 395 10*3/MM3 (ref 140–450)
PMV BLD AUTO: 10.2 FL (ref 6–12)
POTASSIUM SERPL-SCNC: 3.9 MMOL/L (ref 3.5–5.2)
PROT SERPL-MCNC: 6.6 G/DL (ref 6–8.5)
PROTHROMBIN TIME: 15.8 SECONDS (ref 11.8–14.9)
RBC # BLD AUTO: 4.67 10*6/MM3 (ref 4.14–5.8)
SODIUM SERPL-SCNC: 140 MMOL/L (ref 136–145)
TROPONIN T SERPL HS-MCNC: 23 NG/L
WBC NRBC COR # BLD AUTO: 10.03 10*3/MM3 (ref 3.4–10.8)
WHOLE BLOOD HOLD COAG: NORMAL
WHOLE BLOOD HOLD SPECIMEN: NORMAL

## 2024-03-23 PROCEDURE — 83735 ASSAY OF MAGNESIUM: CPT | Performed by: EMERGENCY MEDICINE

## 2024-03-23 PROCEDURE — 36415 COLL VENOUS BLD VENIPUNCTURE: CPT

## 2024-03-23 PROCEDURE — 96374 THER/PROPH/DIAG INJ IV PUSH: CPT

## 2024-03-23 PROCEDURE — 99285 EMERGENCY DEPT VISIT HI MDM: CPT

## 2024-03-23 PROCEDURE — 71045 X-RAY EXAM CHEST 1 VIEW: CPT

## 2024-03-23 PROCEDURE — 84484 ASSAY OF TROPONIN QUANT: CPT | Performed by: EMERGENCY MEDICINE

## 2024-03-23 PROCEDURE — 93005 ELECTROCARDIOGRAM TRACING: CPT

## 2024-03-23 PROCEDURE — 80053 COMPREHEN METABOLIC PANEL: CPT | Performed by: EMERGENCY MEDICINE

## 2024-03-23 PROCEDURE — 25010000002 FUROSEMIDE PER 20 MG: Performed by: EMERGENCY MEDICINE

## 2024-03-23 PROCEDURE — 93005 ELECTROCARDIOGRAM TRACING: CPT | Performed by: EMERGENCY MEDICINE

## 2024-03-23 PROCEDURE — 96375 TX/PRO/DX INJ NEW DRUG ADDON: CPT

## 2024-03-23 PROCEDURE — G0378 HOSPITAL OBSERVATION PER HR: HCPCS

## 2024-03-23 PROCEDURE — 83880 ASSAY OF NATRIURETIC PEPTIDE: CPT | Performed by: EMERGENCY MEDICINE

## 2024-03-23 PROCEDURE — 83690 ASSAY OF LIPASE: CPT | Performed by: EMERGENCY MEDICINE

## 2024-03-23 PROCEDURE — 85025 COMPLETE CBC W/AUTO DIFF WBC: CPT

## 2024-03-23 PROCEDURE — 85610 PROTHROMBIN TIME: CPT | Performed by: EMERGENCY MEDICINE

## 2024-03-23 RX ORDER — FUROSEMIDE 10 MG/ML
80 INJECTION INTRAMUSCULAR; INTRAVENOUS ONCE
Status: COMPLETED | OUTPATIENT
Start: 2024-03-23 | End: 2024-03-23

## 2024-03-23 RX ORDER — SODIUM CHLORIDE 0.9 % (FLUSH) 0.9 %
10 SYRINGE (ML) INJECTION AS NEEDED
Status: DISCONTINUED | OUTPATIENT
Start: 2024-03-23 | End: 2024-03-25 | Stop reason: HOSPADM

## 2024-03-23 RX ORDER — ASPIRIN 81 MG/1
324 TABLET, CHEWABLE ORAL ONCE
Status: DISCONTINUED | OUTPATIENT
Start: 2024-03-23 | End: 2024-03-24

## 2024-03-23 RX ADMIN — FUROSEMIDE 80 MG: 10 INJECTION, SOLUTION INTRAMUSCULAR; INTRAVENOUS at 23:41

## 2024-03-24 ENCOUNTER — APPOINTMENT (OUTPATIENT)
Dept: CARDIOLOGY | Facility: HOSPITAL | Age: 59
End: 2024-03-24
Payer: COMMERCIAL

## 2024-03-24 PROBLEM — I50.9 ACUTE EXACERBATION OF CHF (CONGESTIVE HEART FAILURE): Status: ACTIVE | Noted: 2024-03-24

## 2024-03-24 LAB
ALBUMIN SERPL-MCNC: 3.5 G/DL (ref 3.5–5.2)
ALBUMIN/GLOB SERPL: 1 G/DL
ALP SERPL-CCNC: 249 U/L (ref 39–117)
ALT SERPL W P-5'-P-CCNC: 44 U/L (ref 1–41)
ANION GAP SERPL CALCULATED.3IONS-SCNC: 13.3 MMOL/L (ref 5–15)
AST SERPL-CCNC: 27 U/L (ref 1–40)
BASOPHILS # BLD AUTO: 0.09 10*3/MM3 (ref 0–0.2)
BASOPHILS NFR BLD AUTO: 1 % (ref 0–1.5)
BH CV ECHO MEAS - AO ROOT DIAM: 3.4 CM
BH CV ECHO MEAS - EDV(CUBED): 250 ML
BH CV ECHO MEAS - EDV(MOD-SP2): 150 ML
BH CV ECHO MEAS - EDV(MOD-SP4): 127 ML
BH CV ECHO MEAS - EF(MOD-BP): 22.7 %
BH CV ECHO MEAS - EF(MOD-SP2): 18 %
BH CV ECHO MEAS - EF(MOD-SP4): 24.2 %
BH CV ECHO MEAS - ESV(CUBED): 185.2 ML
BH CV ECHO MEAS - ESV(MOD-SP2): 123 ML
BH CV ECHO MEAS - ESV(MOD-SP4): 96.3 ML
BH CV ECHO MEAS - FS: 9.5 %
BH CV ECHO MEAS - IVS/LVPW: 0.83 CM
BH CV ECHO MEAS - IVSD: 1 CM
BH CV ECHO MEAS - LA DIMENSION: 3.6 CM
BH CV ECHO MEAS - LAT PEAK E' VEL: 9.5 CM/SEC
BH CV ECHO MEAS - LV DIASTOLIC VOL/BSA (35-75): 67.3 CM2
BH CV ECHO MEAS - LV MASS(C)D: 303.5 GRAMS
BH CV ECHO MEAS - LV SYSTOLIC VOL/BSA (12-30): 51 CM2
BH CV ECHO MEAS - LVIDD: 6.3 CM
BH CV ECHO MEAS - LVIDS: 5.7 CM
BH CV ECHO MEAS - LVOT AREA: 3.1 CM2
BH CV ECHO MEAS - LVOT DIAM: 2 CM
BH CV ECHO MEAS - LVPWD: 1.2 CM
BH CV ECHO MEAS - MED PEAK E' VEL: 7 CM/SEC
BH CV ECHO MEAS - MV DEC TIME: 0.16 SEC
BH CV ECHO MEAS - RVDD: 2.9 CM
BH CV ECHO MEAS - SI(MOD-SP2): 14.3 ML/M2
BH CV ECHO MEAS - SI(MOD-SP4): 16.3 ML/M2
BH CV ECHO MEAS - SV(MOD-SP2): 27 ML
BH CV ECHO MEAS - SV(MOD-SP4): 30.7 ML
BH CV ECHO MEAS - TAPSE (>1.6): 1.46 CM
BILIRUB SERPL-MCNC: 0.6 MG/DL (ref 0–1.2)
BUN SERPL-MCNC: 21 MG/DL (ref 6–20)
BUN/CREAT SERPL: 20 (ref 7–25)
CALCIUM SPEC-SCNC: 9.1 MG/DL (ref 8.6–10.5)
CHLORIDE SERPL-SCNC: 100 MMOL/L (ref 98–107)
CO2 SERPL-SCNC: 26.7 MMOL/L (ref 22–29)
CREAT SERPL-MCNC: 1.05 MG/DL (ref 0.76–1.27)
DEPRECATED RDW RBC AUTO: 48.8 FL (ref 37–54)
EGFRCR SERPLBLD CKD-EPI 2021: 82.3 ML/MIN/1.73
EOSINOPHIL # BLD AUTO: 0.23 10*3/MM3 (ref 0–0.4)
EOSINOPHIL NFR BLD AUTO: 2.6 % (ref 0.3–6.2)
ERYTHROCYTE [DISTWIDTH] IN BLOOD BY AUTOMATED COUNT: 15.4 % (ref 12.3–15.4)
GEN 5 2HR TROPONIN T REFLEX: 23 NG/L
GLOBULIN UR ELPH-MCNC: 3.4 GM/DL
GLUCOSE BLDC GLUCOMTR-MCNC: 107 MG/DL (ref 70–99)
GLUCOSE BLDC GLUCOMTR-MCNC: 119 MG/DL (ref 70–99)
GLUCOSE BLDC GLUCOMTR-MCNC: 127 MG/DL (ref 70–99)
GLUCOSE BLDC GLUCOMTR-MCNC: 95 MG/DL (ref 70–99)
GLUCOSE SERPL-MCNC: 103 MG/DL (ref 65–99)
HCT VFR BLD AUTO: 42.5 % (ref 37.5–51)
HGB BLD-MCNC: 13.3 G/DL (ref 13–17.7)
IMM GRANULOCYTES # BLD AUTO: 0.02 10*3/MM3 (ref 0–0.05)
IMM GRANULOCYTES NFR BLD AUTO: 0.2 % (ref 0–0.5)
LEFT ATRIUM VOLUME INDEX: 38.3 ML/M2
LV EF 2D ECHO EST: 30 %
LYMPHOCYTES # BLD AUTO: 2.3 10*3/MM3 (ref 0.7–3.1)
LYMPHOCYTES NFR BLD AUTO: 25.8 % (ref 19.6–45.3)
MAGNESIUM SERPL-MCNC: 1.7 MG/DL (ref 1.6–2.6)
MCH RBC QN AUTO: 27.2 PG (ref 26.6–33)
MCHC RBC AUTO-ENTMCNC: 31.3 G/DL (ref 31.5–35.7)
MCV RBC AUTO: 86.9 FL (ref 79–97)
MONOCYTES # BLD AUTO: 0.74 10*3/MM3 (ref 0.1–0.9)
MONOCYTES NFR BLD AUTO: 8.3 % (ref 5–12)
NEUTROPHILS NFR BLD AUTO: 5.53 10*3/MM3 (ref 1.7–7)
NEUTROPHILS NFR BLD AUTO: 62.1 % (ref 42.7–76)
NRBC BLD AUTO-RTO: 0 /100 WBC (ref 0–0.2)
PHOSPHATE SERPL-MCNC: 3.5 MG/DL (ref 2.5–4.5)
PLATELET # BLD AUTO: 426 10*3/MM3 (ref 140–450)
PMV BLD AUTO: 10 FL (ref 6–12)
POTASSIUM SERPL-SCNC: 3.2 MMOL/L (ref 3.5–5.2)
PROT SERPL-MCNC: 6.9 G/DL (ref 6–8.5)
QT INTERVAL: 375 MS
QT INTERVAL: 382 MS
QTC INTERVAL: 475 MS
QTC INTERVAL: 487 MS
RBC # BLD AUTO: 4.89 10*6/MM3 (ref 4.14–5.8)
SODIUM SERPL-SCNC: 140 MMOL/L (ref 136–145)
TROPONIN T DELTA: 0 NG/L
WBC NRBC COR # BLD AUTO: 8.91 10*3/MM3 (ref 3.4–10.8)

## 2024-03-24 PROCEDURE — 25010000002 FUROSEMIDE PER 20 MG: Performed by: INTERNAL MEDICINE

## 2024-03-24 PROCEDURE — 83735 ASSAY OF MAGNESIUM: CPT | Performed by: STUDENT IN AN ORGANIZED HEALTH CARE EDUCATION/TRAINING PROGRAM

## 2024-03-24 PROCEDURE — 96376 TX/PRO/DX INJ SAME DRUG ADON: CPT

## 2024-03-24 PROCEDURE — 85025 COMPLETE CBC W/AUTO DIFF WBC: CPT | Performed by: STUDENT IN AN ORGANIZED HEALTH CARE EDUCATION/TRAINING PROGRAM

## 2024-03-24 PROCEDURE — 84100 ASSAY OF PHOSPHORUS: CPT | Performed by: STUDENT IN AN ORGANIZED HEALTH CARE EDUCATION/TRAINING PROGRAM

## 2024-03-24 PROCEDURE — 93306 TTE W/DOPPLER COMPLETE: CPT

## 2024-03-24 PROCEDURE — G0378 HOSPITAL OBSERVATION PER HR: HCPCS

## 2024-03-24 PROCEDURE — 82948 REAGENT STRIP/BLOOD GLUCOSE: CPT | Performed by: STUDENT IN AN ORGANIZED HEALTH CARE EDUCATION/TRAINING PROGRAM

## 2024-03-24 PROCEDURE — 94799 UNLISTED PULMONARY SVC/PX: CPT

## 2024-03-24 PROCEDURE — 80053 COMPREHEN METABOLIC PANEL: CPT | Performed by: STUDENT IN AN ORGANIZED HEALTH CARE EDUCATION/TRAINING PROGRAM

## 2024-03-24 PROCEDURE — 82948 REAGENT STRIP/BLOOD GLUCOSE: CPT

## 2024-03-24 PROCEDURE — 96366 THER/PROPH/DIAG IV INF ADDON: CPT

## 2024-03-24 PROCEDURE — 93306 TTE W/DOPPLER COMPLETE: CPT | Performed by: SPECIALIST

## 2024-03-24 PROCEDURE — 99223 1ST HOSP IP/OBS HIGH 75: CPT | Performed by: STUDENT IN AN ORGANIZED HEALTH CARE EDUCATION/TRAINING PROGRAM

## 2024-03-24 PROCEDURE — 96365 THER/PROPH/DIAG IV INF INIT: CPT

## 2024-03-24 PROCEDURE — 99253 IP/OBS CNSLTJ NEW/EST LOW 45: CPT | Performed by: SPECIALIST

## 2024-03-24 PROCEDURE — 25010000002 MAGNESIUM SULFATE 2 GM/50ML SOLUTION: Performed by: INTERNAL MEDICINE

## 2024-03-24 RX ORDER — AMOXICILLIN 250 MG
2 CAPSULE ORAL 2 TIMES DAILY PRN
Status: DISCONTINUED | OUTPATIENT
Start: 2024-03-24 | End: 2024-03-25 | Stop reason: HOSPADM

## 2024-03-24 RX ORDER — NICOTINE POLACRILEX 4 MG
15 LOZENGE BUCCAL
Status: DISCONTINUED | OUTPATIENT
Start: 2024-03-24 | End: 2024-03-25 | Stop reason: HOSPADM

## 2024-03-24 RX ORDER — SODIUM CHLORIDE 0.9 % (FLUSH) 0.9 %
10 SYRINGE (ML) INJECTION EVERY 12 HOURS SCHEDULED
Status: DISCONTINUED | OUTPATIENT
Start: 2024-03-24 | End: 2024-03-25 | Stop reason: HOSPADM

## 2024-03-24 RX ORDER — POLYETHYLENE GLYCOL 3350 17 G/17G
17 POWDER, FOR SOLUTION ORAL DAILY PRN
Status: DISCONTINUED | OUTPATIENT
Start: 2024-03-24 | End: 2024-03-25 | Stop reason: HOSPADM

## 2024-03-24 RX ORDER — SODIUM CHLORIDE 0.9 % (FLUSH) 0.9 %
10 SYRINGE (ML) INJECTION AS NEEDED
Status: DISCONTINUED | OUTPATIENT
Start: 2024-03-24 | End: 2024-03-25 | Stop reason: HOSPADM

## 2024-03-24 RX ORDER — VENLAFAXINE HYDROCHLORIDE 37.5 MG/1
37.5 CAPSULE, EXTENDED RELEASE ORAL DAILY
Status: DISCONTINUED | OUTPATIENT
Start: 2024-03-24 | End: 2024-03-25

## 2024-03-24 RX ORDER — BUDESONIDE 0.5 MG/2ML
0.5 INHALANT ORAL
Status: DISCONTINUED | OUTPATIENT
Start: 2024-03-24 | End: 2024-03-25 | Stop reason: HOSPADM

## 2024-03-24 RX ORDER — MAGNESIUM SULFATE HEPTAHYDRATE 40 MG/ML
2 INJECTION, SOLUTION INTRAVENOUS ONCE
Status: COMPLETED | OUTPATIENT
Start: 2024-03-24 | End: 2024-03-24

## 2024-03-24 RX ORDER — ARFORMOTEROL TARTRATE 15 UG/2ML
15 SOLUTION RESPIRATORY (INHALATION)
Status: DISCONTINUED | OUTPATIENT
Start: 2024-03-24 | End: 2024-03-25 | Stop reason: HOSPADM

## 2024-03-24 RX ORDER — SODIUM CHLORIDE 9 MG/ML
40 INJECTION, SOLUTION INTRAVENOUS AS NEEDED
Status: DISCONTINUED | OUTPATIENT
Start: 2024-03-24 | End: 2024-03-25 | Stop reason: HOSPADM

## 2024-03-24 RX ORDER — FUROSEMIDE 10 MG/ML
40 INJECTION INTRAMUSCULAR; INTRAVENOUS
Status: DISCONTINUED | OUTPATIENT
Start: 2024-03-24 | End: 2024-03-25 | Stop reason: HOSPADM

## 2024-03-24 RX ORDER — VENLAFAXINE HYDROCHLORIDE 37.5 MG/1
37.5 CAPSULE, EXTENDED RELEASE ORAL DAILY
Status: ON HOLD | COMMUNITY
Start: 2024-03-22 | End: 2024-03-25

## 2024-03-24 RX ORDER — INSULIN LISPRO 100 [IU]/ML
2-7 INJECTION, SOLUTION INTRAVENOUS; SUBCUTANEOUS
Status: DISCONTINUED | OUTPATIENT
Start: 2024-03-24 | End: 2024-03-25 | Stop reason: HOSPADM

## 2024-03-24 RX ORDER — ATORVASTATIN CALCIUM 40 MG/1
40 TABLET, FILM COATED ORAL NIGHTLY
Status: DISCONTINUED | OUTPATIENT
Start: 2024-03-24 | End: 2024-03-25 | Stop reason: HOSPADM

## 2024-03-24 RX ORDER — BISACODYL 5 MG/1
5 TABLET, DELAYED RELEASE ORAL DAILY PRN
Status: DISCONTINUED | OUTPATIENT
Start: 2024-03-24 | End: 2024-03-25 | Stop reason: HOSPADM

## 2024-03-24 RX ORDER — DEXTROSE MONOHYDRATE 25 G/50ML
25 INJECTION, SOLUTION INTRAVENOUS
Status: DISCONTINUED | OUTPATIENT
Start: 2024-03-24 | End: 2024-03-25 | Stop reason: HOSPADM

## 2024-03-24 RX ORDER — POTASSIUM CHLORIDE 750 MG/1
40 CAPSULE, EXTENDED RELEASE ORAL EVERY 4 HOURS
Status: COMPLETED | OUTPATIENT
Start: 2024-03-24 | End: 2024-03-24

## 2024-03-24 RX ORDER — IBUPROFEN 600 MG/1
1 TABLET ORAL
Status: DISCONTINUED | OUTPATIENT
Start: 2024-03-24 | End: 2024-03-25 | Stop reason: HOSPADM

## 2024-03-24 RX ORDER — BISACODYL 10 MG
10 SUPPOSITORY, RECTAL RECTAL DAILY PRN
Status: DISCONTINUED | OUTPATIENT
Start: 2024-03-24 | End: 2024-03-25 | Stop reason: HOSPADM

## 2024-03-24 RX ORDER — LISINOPRIL 20 MG/1
40 TABLET ORAL
Status: DISCONTINUED | OUTPATIENT
Start: 2024-03-24 | End: 2024-03-25 | Stop reason: HOSPADM

## 2024-03-24 RX ORDER — NITROGLYCERIN 0.4 MG/1
0.4 TABLET SUBLINGUAL
Status: DISCONTINUED | OUTPATIENT
Start: 2024-03-24 | End: 2024-03-25 | Stop reason: HOSPADM

## 2024-03-24 RX ORDER — DIVALPROEX SODIUM 250 MG/1
250 TABLET, DELAYED RELEASE ORAL NIGHTLY
Status: DISCONTINUED | OUTPATIENT
Start: 2024-03-24 | End: 2024-03-25 | Stop reason: HOSPADM

## 2024-03-24 RX ORDER — METOPROLOL SUCCINATE 25 MG/1
25 TABLET, EXTENDED RELEASE ORAL DAILY
Status: DISCONTINUED | OUTPATIENT
Start: 2024-03-24 | End: 2024-03-25 | Stop reason: HOSPADM

## 2024-03-24 RX ORDER — IPRATROPIUM BROMIDE AND ALBUTEROL SULFATE 2.5; .5 MG/3ML; MG/3ML
3 SOLUTION RESPIRATORY (INHALATION) EVERY 6 HOURS PRN
Status: DISCONTINUED | OUTPATIENT
Start: 2024-03-24 | End: 2024-03-25 | Stop reason: HOSPADM

## 2024-03-24 RX ADMIN — Medication 10 ML: at 20:26

## 2024-03-24 RX ADMIN — Medication 10 ML: at 08:42

## 2024-03-24 RX ADMIN — METOPROLOL SUCCINATE 25 MG: 25 TABLET, EXTENDED RELEASE ORAL at 08:39

## 2024-03-24 RX ADMIN — LISINOPRIL 40 MG: 20 TABLET ORAL at 08:39

## 2024-03-24 RX ADMIN — FUROSEMIDE 40 MG: 10 INJECTION, SOLUTION INTRAMUSCULAR; INTRAVENOUS at 18:15

## 2024-03-24 RX ADMIN — VENLAFAXINE HYDROCHLORIDE 37.5 MG: 37.5 CAPSULE, EXTENDED RELEASE ORAL at 13:06

## 2024-03-24 RX ADMIN — FUROSEMIDE 40 MG: 10 INJECTION, SOLUTION INTRAMUSCULAR; INTRAVENOUS at 08:39

## 2024-03-24 RX ADMIN — POTASSIUM CHLORIDE 40 MEQ: 10 CAPSULE, COATED, EXTENDED RELEASE ORAL at 07:42

## 2024-03-24 RX ADMIN — APIXABAN 5 MG: 5 TABLET, FILM COATED ORAL at 20:26

## 2024-03-24 RX ADMIN — DIVALPROEX SODIUM 250 MG: 250 TABLET, DELAYED RELEASE ORAL at 20:26

## 2024-03-24 RX ADMIN — EMPAGLIFLOZIN 10 MG: 10 TABLET, FILM COATED ORAL at 11:01

## 2024-03-24 RX ADMIN — ATORVASTATIN CALCIUM 40 MG: 40 TABLET, FILM COATED ORAL at 20:26

## 2024-03-24 RX ADMIN — MAGNESIUM SULFATE HEPTAHYDRATE 2 G: 40 INJECTION, SOLUTION INTRAVENOUS at 07:40

## 2024-03-24 RX ADMIN — POTASSIUM CHLORIDE 40 MEQ: 10 CAPSULE, COATED, EXTENDED RELEASE ORAL at 11:47

## 2024-03-24 RX ADMIN — APIXABAN 5 MG: 5 TABLET, FILM COATED ORAL at 08:39

## 2024-03-24 NOTE — H&P
[facility] HISTORY AND PHYSICAL  Date: 3/24/2024   Patient Name: Regulo Sepulveda  : 1965  MRN: [id number]  Primary Care Physician:  Dickson Landry MD  Date of admission: 3/23/2024    Subjective   Subjective     Chief Complaint: Shortness of breath    HPI:    Regulo Sepulveda is a 58 y.o. male with a past medical history of hypertension, hyperlipidemia, diabetes, A-fib on Eliquis, combined systolic and diastolic heart failure, EF 20% in 2023, apical thrombus in the left ventricle, COPD presented to the ED for evaluation of shortness of breath since last few days and today noted to have some mild left-sided chest pain.  Patient states that he has recently been discharged from [facility] last week where he has been treated for shortness of breath.  After discharge he did continues to have dyspnea with exertion that has been progressively worsening, associated with orthopnea, yesterday evening he noticed mild left-sided chest pain.  Nonradiating.  Improved after coming to the ED.  Denies any fevers, chills worsening cough, nausea, vomiting, abdominal pain.  States that he has been taking all his medications as prescribed.    In the ED, vital signs temperature 98, pulse 98, respiratory 16, blood pressure 148/120 on room air saturating around 94%.  Labs showed initial troponin 23, repeat troponin 23, proBNP 80898, CMP with no significant findings except ALT elevated to 50, AST/ALT 30/45, normal lipase, hemoglobin 12.8, normal WBC, platelets.  EKG showed no significant ST-T wave changes.  Chest x-ray showed no acute infiltrate.  No change in the mild to moderate cardiomegaly.  Received IV Lasix in the ED.  Patient has been admitted for further evaluation and management of dyspnea on exertion, likely CHF exacerbation.      Personal History     Past Medical History:   Diagnosis Date    Anxiety     Asthma     Bipolar affective     Cardiac tamponade         CHF (congestive heart failure)      COPD (chronic obstructive pulmonary disease)     Coronary artery disease     Depression     Diabetes mellitus     Elevated cholesterol     Hypertension     Parkinson disease          Past Surgical History:   Procedure Laterality Date    CARDIAC CATHETERIZATION Right 9/17/2023    Procedure: Right and Left Heart Cath;  Surgeon: Ben Grant MD;  Location: [facility] CATH INVASIVE LOCATION;  Service: Cardiovascular;  Laterality: Right;    TESTICLE SURGERY Left     extraction         Family History   Problem Relation Age of Onset    Diabetes Mother     Depression Sister     Restless legs syndrome Sister     Insomnia Sister     Sleep walking Sister     Sleep disorder Sister         Night Terrors    Depression Brother          Social History     Socioeconomic History    Marital status: Single   Tobacco Use    Smoking status: Every Day     Current packs/day: 0.50     Average packs/day: 0.5 packs/day for 50.1 years (25.0 ttl pk-yrs)     Types: Cigarettes     Start date: 1/1/1974     Last attempt to quit: 11/2/2023    Smokeless tobacco: Never   Vaping Use    Vaping status: Former    Substances: Nicotine   Substance and Sexual Activity    Alcohol use: Not Currently    Drug use: Yes     Types: Methamphetamines, Marijuana    Sexual activity: Defer         Home Medications:  ARIPiprazole ER, albuterol sulfate HFA, apixaban, atorvastatin, carvedilol, dapagliflozin, divalproex, furosemide, lisinopril, metFORMIN, metoprolol succinate XL, and pantoprazole    Allergies:  Allergies   Allergen Reactions    Penicillins Shortness Of Breath       Review of Systems   All other systems reviewed and negative except as mentioned above in the HPI    Objective   Objective     Vitals:   Temp:  [98 °F (36.7 °C)-99.4 °F (37.4 °C)] 99.4 °F (37.4 °C)  Heart Rate:  [85-99] 95  Resp:  [16-18] 18  BP: (123-148)/() 143/105    Physical Exam    Constitutional: Awake, alert, no acute distress   Eyes: Pupils equal, sclerae anicteric, no conjunctival  injection   HENT: NCAT, mucous membranes moist   Neck: Supple, no thyromegaly, no lymphadenopathy, trachea midline   Respiratory: Bilateral air entry present, mild rhonchi noted diffusely, nonlabored respirations    Cardiovascular: RRR, no murmurs, rubs, or gallops, palpable pedal pulses bilaterally   Gastrointestinal: Positive bowel sounds, soft, nontender, nondistended   Musculoskeletal: No bilateral ankle edema, no clubbing or cyanosis to extremities   Neurologic: Oriented x 3, speech clear   Skin: No rashes     Result Review    Result Review:  I have personally reviewed the results from the time of this admission to 3/24/2024 02:51 EDT and agree with these findings:  [x]  Laboratory  []  Microbiology  [x]  Radiology  [x]  EKG/Telemetry   []  Cardiology/Vascular   []  Pathology  []  Old records  []  Other:        Imaging Results (Last 24 Hours)       Procedure Component Value Units Date/Time    XR Chest 1 View [[id number]] Collected: 03/23/24 2256     Updated: 03/23/24 2300    Narrative:      XR CHEST 1 VW-     Date of Exam: 3/23/2024 9:42 PM     Indication: Chest Pain Triage Protocol.     Comparison: 12/15/2023.     Findings:  A single AP upright portable chest radiograph is provided for review.  Interval decrease in pulmonary edema with vascular congestion is noted  since 12/15/2023. There is emphysematous contour of the lungs. Minimal  if any pleural effusion is seen. No pneumothorax. There is stable mild  to moderate cardiomegaly. Degenerative changes involve the shoulders and  spine. External artifacts obscure detail. The thoracic aorta is  atherosclerotic. Probably no significant acute infiltrate is seen on the  current study.       Impression:      Impression:  Probably no acute infiltrate. No change in the mild to moderate  cardiomegaly.        Electronically Signed By-Angel Snell MD On:3/23/2024 10:58 PM                aspirin, 324 mg, Oral, Once         Assessment & Plan   Assessment / Plan      Assessment/Plan:   Dyspnea with exertion   CHF exacerbation  History of combined systolic and diastolic heart failure, EF 20% in 08/2023  Nonischemic dilated cardiomyopathy  Pulmonary hypertension  Hypertension  Hyperlipidemia  Type 2 diabetes mellitus  A-fib on Eliquis  Apical thrombus in the left ventricle  COPD  Bipolar disorder  Anxiety  Schizophrenia    Plan  Admit to inpatient, telemetry  Received IV Lasix in the ED, reassess and redose in the a.m.  Strict I&O's  Daily weights  Cardiac echo  Cardiology consult in the a.m.  Continue metoprolol, empagliflozin, lisinopril, Eliquis  Monitor electrolytes, renal function, hemoglobin, platelets with a.m. labs  Heart healthy, consistent carb diet  Low-dose sliding scale insulin  Continue Eliquis  Brovana, pulmicort  Bronchodilator protocol  DuoNebs every 6 hours as needed  Resume other appropriate home medications once reconciled    DVT prophylaxis:  Medical DVT prophylaxis orders are signed and held.      CODE STATUS:    Level Of Support Discussed With: Patient  Code Status (Patient has no pulse and is not breathing): CPR (Attempt to Resuscitate)  Medical Interventions (Patient has pulse or is breathing): Full Support      Admission Status:  I believe this patient meets observation status.    Part of this note may be an electronic transcription/translation of spoken language to printed text using the Dragon Dictation System    Rajat Shea MD

## 2024-03-24 NOTE — PLAN OF CARE
Problem: Adult Inpatient Plan of Care  Goal: Plan of Care Review  Outcome: Ongoing, Progressing  Goal: Patient-Specific Goal (Individualized)  Outcome: Ongoing, Progressing  Goal: Absence of Hospital-Acquired Illness or Injury  Outcome: Ongoing, Progressing  Intervention: Identify and Manage Fall Risk  Goal: Optimal Comfort and Wellbeing  Outcome: Ongoing, Progressing  Goal: Readiness for Transition of Care  Outcome: Ongoing, Progressing   Goal Outcome Evaluation:  Plan of Care Reviewed With: patient        Progress: no change

## 2024-03-24 NOTE — PROGRESS NOTES
[facility]   Hospitalist Progress Note  Date: 3/24/2024  Patient Name: Regulo Sepulveda  : 1965  MRN: [id number]  Date of admission: 3/23/2024  Room/Bed: [facility]/1      Subjective   Subjective     Chief Complaint:   Shortness of breath    Summary:  Regulo Sepulveda is a 58 y.o. male with a past medical history of hypertension, hyperlipidemia, diabetes, A-fib on Eliquis, combined systolic and diastolic heart failure, EF 20% in 2023, apical thrombus in the left ventricle, COPD presented to the ED for evaluation of shortness of breath.  Patient recently discharged from [facility], around 1 week prior for shortness of breath.  Patient denies any changes to his medication on discharge.  Patient states his dyspnea started quick after leaving the hospital.  Patient endorses progressively worsening shortness of breath associated with orthopnea and mild left-sided chest pain nonradiating.  Endorses taking all medications as prescribed.    Interval Followup:   Seen by cardiology this morning.  Continuing home medications as indicated.  Repeat echocardiogram has been ordered.  Continue diuresis with IV Lasix, strict I's and O's    Objective   Objective     Vitals:   Temp:  [97.7 °F (36.5 °C)-99.4 °F (37.4 °C)] 97.7 °F (36.5 °C)  Heart Rate:  [] 93  Resp:  [16-18] 18  BP: (114-148)/() 114/89    Physical Exam               Constitutional: Awake, alert, no acute distress              Eyes: Pupils equal, sclerae anicteric, no conjunctival injection              HENT: NCAT, mucous membranes moist              Neck: Supple, no thyromegaly, no lymphadenopathy, trachea midline              Respiratory: Mild bibasilar crackles, no wheezing              Cardiovascular: RRR, no murmurs, rubs, or gallops, palpable pedal pulses bilaterally              Gastrointestinal: Positive bowel sounds, soft, nontender, nondistended              Musculoskeletal: No bilateral ankle edema, no clubbing or cyanosis to extremities               Neurologic: Oriented x 3, speech clear              Skin: No rashes        Result Review    Result Review:  I have personally reviewed these results:  [x]  Laboratory      Lab 03/24/24  0348 03/23/24  2130   WBC 8.91 10.03   HEMOGLOBIN 13.3 12.8*   HEMATOCRIT 42.5 41.3   PLATELETS 426 395   NEUTROS ABS 5.53 7.08*   IMMATURE GRANS (ABS) 0.02 0.01   LYMPHS ABS 2.30 2.01   MONOS ABS 0.74 0.67   EOS ABS 0.23 0.16   MCV 86.9 88.4   PROTIME  --  15.8*         Lab 03/24/24  0348 03/23/24  2130   SODIUM 140 140   POTASSIUM 3.2* 3.9   CHLORIDE 100 101   CO2 26.7 24.3   ANION GAP 13.3 14.7   BUN 21* 20   CREATININE 1.05 1.13   EGFR 82.3 75.3   GLUCOSE 103* 190*   CALCIUM 9.1 9.1   MAGNESIUM 1.7 1.7   PHOSPHORUS 3.5  --          Lab 03/24/24  0348 03/23/24  2130   TOTAL PROTEIN 6.9 6.6   ALBUMIN 3.5 3.3*   GLOBULIN 3.4 3.3   ALT (SGPT) 44* 45*   AST (SGOT) 27 30   BILIRUBIN 0.6 0.6   ALK PHOS 249* 250*   LIPASE  --  39         Lab 03/23/24  2341 03/23/24  2130   PROBNP  --  27,932.0*   HSTROP T 23* 23*   PROTIME  --  15.8*   INR  --  1.23*                 Brief Urine Lab Results  (Last result in the past 365 days)        Color   Clarity   Blood   Leuk Est   Nitrite   Protein   CREAT   Urine HCG        08/27/23 1925 Yellow   Clear   Small (1+)   Negative   Negative   100 mg/dL (2+)                 [x]  Microbiology   Microbiology Results (last 10 days)       ** No results found for the last 240 hours. **          [x]  Radiology  XR Chest 1 View    Result Date: 3/23/2024  Impression: Probably no acute infiltrate. No change in the mild to moderate cardiomegaly.   Electronically Signed By-Angel Snell MD On:3/23/2024 10:58 PM     []  EKG/Telemetry   []  Cardiology/Vascular   []  Pathology  []  Old records  []  Other:    Assessment & Plan   Assessment / Plan     Assessment:  Dyspnea with exertion   Acute on chronic systolic and diastolic heart failure, EF 20% in 08/2023  Nonischemic dilated cardiomyopathy  Pulmonary  hypertension  Hypertension  Hyperlipidemia  Type 2 diabetes mellitus  A-fib on Eliquis  Apical thrombus in the left ventricle  COPD  Bipolar disorder  Anxiety  Schizophrenia     Plan  Patient remains admitted to hospital for further care and management  Continue on scheduled IV diuresis  Cardiology consulted  Echocardiogram is been ordered  Strict I's and O's  Resumed on remainder of cardiac medications  Continue monitor renal function electrolytes closely on IV diuresis  Sliding scale insulin with hypoglycemia protocol     Discussed with RN.    DVT prophylaxis:  Medical DVT prophylaxis orders are present.        CODE STATUS:   Level Of Support Discussed With: Patient  Code Status (Patient has no pulse and is not breathing): CPR (Attempt to Resuscitate)  Medical Interventions (Patient has pulse or is breathing): Full Support

## 2024-03-24 NOTE — ED PROVIDER NOTES
Time: 9:41 PM EDT  Date of encounter:  3/23/2024  Independent Historian/Clinical History and Information was obtained by:   Patient and Chart    History is limited by:  Poor understanding of past and recent medical history    Chief Complaint: Chest pain      History of Present Illness:  Patient is a 58 y.o. year old male who presents to the emergency department for evaluation of chest pain    Patient states he presents to the emergency department for worsening shortness of breath and now left-sided chest pain developing over the past several days.  Patient states he is unable to even walk across the room without becoming acutely dyspneic.  He is unable to lie flat due to shortness of breath.  He states his pain is in his left chest that began today.  He denies any specific radiation of his pain.  No nausea or vomiting.  Patient states he was recently hospitalized at [facility] and ultimately followed up with his PCP 2 days ago.  He states he needs some type of heart surgery but he is uncertain what.  He is uncertain exactly what medications he is taking but states that he has been compliant with them recently.    HPI    Patient Care Team  Primary Care Provider: Dickson Landry MD    Past Medical History:     Allergies   Allergen Reactions    Penicillins Shortness Of Breath     Past Medical History:   Diagnosis Date    Anxiety     Asthma     Bipolar affective     Cardiac tamponade     2023    CHF (congestive heart failure)     COPD (chronic obstructive pulmonary disease)     Coronary artery disease     Depression     Diabetes mellitus     Elevated cholesterol     Hypertension     Parkinson disease      Past Surgical History:   Procedure Laterality Date    CARDIAC CATHETERIZATION Right 9/17/2023    Procedure: Right and Left Heart Cath;  Surgeon: Ben Grant MD;  Location: [facility] INVASIVE LOCATION;  Service: Cardiovascular;  Laterality: Right;    TESTICLE SURGERY Left     extraction     Family History    Problem Relation Age of Onset    Diabetes Mother     Depression Sister     Restless legs syndrome Sister     Insomnia Sister     Sleep walking Sister     Sleep disorder Sister         Night Terrors    Depression Brother        Home Medications:  Prior to Admission medications    Medication Sig Start Date End Date Taking? Authorizing Provider   Abilify Maintena 300 MG Suspension Reconstituted ER IM injection ER inject 300mg into the appropriate muscle AS DIRECTED by prescriber every 30 days 2/9/24   ProviderYuliya MD   albuterol sulfate  (90 Base) MCG/ACT inhaler Inhale 2 puffs Every 4 (Four) Hours As Needed for Wheezing or Shortness of Air. Indications: Chronic Obstructive Lung Disease 1/8/24   Dickson Landry MD   apixaban (ELIQUIS) 5 MG tablet tablet Take 1 tablet by mouth 2 (Two) Times a Day for 120 days. 2/22/24 6/21/24  Dickson Landry MD   atorvastatin (LIPITOR) 40 MG tablet Take 1 tablet by mouth every night at bedtime. 1/8/24   Dickson Landry MD   carvedilol (COREG) 25 MG tablet Take 1 tablet by mouth Every 12 (Twelve) Hours. 1/8/24   Dickson Landry MD   divalproex (Depakote) 250 MG DR tablet Take 1 tablet by mouth every night at bedtime for 30 days. 2/23/24 3/24/24  Hailey Workman PA-C   Farxiga 5 MG tablet tablet Take 1 tablet by mouth Daily. Indications: Type 2 Diabetes 2/22/24   Dickson Landry MD   furosemide (LASIX) 40 MG tablet Take 1 tablet by mouth Daily. 2/22/24   Dickson Landry MD   lisinopril (PRINIVIL,ZESTRIL) 40 MG tablet Take 1 tablet by mouth Daily. 2/22/24   Dickson Landry MD   metFORMIN (GLUCOPHAGE) 1000 MG tablet Take 1 tablet by mouth 2 (Two) Times a Day. 1/8/24   Dickson Landry MD   metoprolol succinate XL (TOPROL-XL) 25 MG 24 hr tablet Take 1 tablet by mouth Daily. 3/16/24   Yuliya Blanchard MD   pantoprazole (Protonix) 40 MG EC tablet Take 1 tablet by mouth Daily. 1/8/24   Dickson Landry MD        Social History:   Social History     Tobacco Use    Smoking status: Every Day  "    Current packs/day: 0.50     Average packs/day: 0.5 packs/day for 50.1 years (25.0 ttl pk-yrs)     Types: Cigarettes     Start date: 1/1/1974     Last attempt to quit: 11/2/2023    Smokeless tobacco: Never   Vaping Use    Vaping status: Former    Substances: Nicotine   Substance Use Topics    Alcohol use: Not Currently    Drug use: Yes     Types: Methamphetamines, Marijuana         Review of Systems:  Review of Systems   Constitutional:  Positive for fatigue. Negative for chills and fever.   HENT:  Negative for congestion, ear pain and sore throat.    Eyes:  Negative for pain.   Respiratory:  Positive for shortness of breath. Negative for cough and chest tightness.    Cardiovascular:  Positive for chest pain.   Gastrointestinal:  Negative for abdominal pain, diarrhea, nausea and vomiting.   Genitourinary:  Negative for flank pain and hematuria.   Musculoskeletal:  Negative for joint swelling.   Skin:  Negative for pallor.   Neurological:  Negative for seizures and headaches.   All other systems reviewed and are negative.       Physical Exam:  BP (!) 143/105 (BP Location: Right arm, Patient Position: Lying)   Pulse 95   Temp 99.4 °F (37.4 °C) (Oral)   Resp 18   Ht 185.4 cm (73\")   Wt 67 kg (147 lb 11.3 oz)   SpO2 95%   BMI 19.49 kg/m²     Physical Exam  Vitals and nursing note reviewed.   Constitutional:       General: He is not in acute distress.     Appearance: Normal appearance. He is ill-appearing. He is not toxic-appearing.      Comments: Thin and frail   HENT:      Head: Normocephalic and atraumatic.      Jaw: There is normal jaw occlusion.   Eyes:      General: Lids are normal.      Extraocular Movements: Extraocular movements intact.      Conjunctiva/sclera: Conjunctivae normal.      Pupils: Pupils are equal, round, and reactive to light.   Cardiovascular:      Rate and Rhythm: Normal rate and regular rhythm.      Pulses: Normal pulses.      Heart sounds: Normal heart sounds.   Pulmonary:      " Effort: Pulmonary effort is normal. Tachypnea present. No respiratory distress.      Breath sounds: Examination of the right-middle field reveals rhonchi. Examination of the left-middle field reveals rhonchi. Examination of the right-lower field reveals rhonchi. Examination of the left-lower field reveals rhonchi. Rhonchi present. No wheezing.   Abdominal:      General: Abdomen is flat.      Palpations: Abdomen is soft.      Tenderness: There is no abdominal tenderness. There is no guarding or rebound.   Musculoskeletal:         General: Normal range of motion.      Cervical back: Normal range of motion and neck supple.      Right lower leg: No edema.      Left lower leg: No edema.   Skin:     General: Skin is warm and dry.   Neurological:      Mental Status: He is alert and oriented to person, place, and time. Mental status is at baseline.   Psychiatric:         Mood and Affect: Mood normal.                Procedures:  Procedures      Medical Decision Making:      Comorbidities that affect care:    Hypertension, COPD, diabetes, bipolar affective, CHF, coronary artery disease, Parkinson's disease, chronic anticoagulation    External Notes reviewed:    Hospital Discharge Summary: Outpatient office visit for hospital discharge follow-up 3/21/2024      The following orders were placed and all results were independently analyzed by me:  Orders Placed This Encounter   Procedures    XR Chest 1 View    Story Draw    High Sensitivity Troponin T    Comprehensive Metabolic Panel    Lipase    BNP    Magnesium    CBC Auto Differential    Protime-INR    High Sensitivity Troponin T 2Hr    Phosphorus    Magnesium    Comprehensive Metabolic Panel    CBC Auto Differential    Diet: Cardiac, Diabetic; Healthy Heart (2-3 Na+); Consistent Carbohydrate; Fluid Consistency: Thin (IDDSI 0)    Undress & Gown    Continuous Pulse Oximetry    Vital Signs    Intake & Output    Weigh Patient    Oral Care    Maintain IV Access    Telemetry -  Place Orders & Notify Provider of Results When Patient Experiences Acute Chest Pain, Dysrhythmia or Respiratory Distress    Strict Intake & Output    Daily Weights    Please obtain medical records [facility], discharged last week  Nursing Communication    Code Status and Medical Interventions:    Hospitalist (on-call MD unless specified)    Inpatient Cardiology Consult    OT Consult: Eval & Treat ADL Performance Below Baseline    PT Consult: Eval & Treat Functional Mobility Below Baseline    Oxygen Therapy- Nasal Cannula; Titrate 1-6 LPM Per SpO2; 90 - 95%    RT to Initiate Bronchodilator Protocol    POC Glucose 4x Daily Before Meals & at Bedtime    ECG 12 Lead ED Triage Standing Order; Chest Pain    ECG 12 Lead ED Triage Standing Order; Chest Pain    Adult Transthoracic Echo Complete W/ Cont if Necessary Per Protocol    Insert Peripheral IV    Insert Peripheral IV    Initiate Observation Status    CBC & Differential    Green Top (Gel)    Lavender Top    Gold Top - SST    Light Blue Top    CBC & Differential       Medications Given in the Emergency Department:  Medications   sodium chloride 0.9 % flush 10 mL (has no administration in time range)   apixaban (ELIQUIS) tablet 5 mg (has no administration in time range)   empagliflozin (JARDIANCE) tablet 10 mg (has no administration in time range)   lisinopril (PRINIVIL,ZESTRIL) tablet 40 mg (has no administration in time range)   metoprolol succinate XL (TOPROL-XL) 24 hr tablet 25 mg (has no administration in time range)   sodium chloride 0.9 % flush 10 mL (has no administration in time range)   sodium chloride 0.9 % flush 10 mL (has no administration in time range)   sodium chloride 0.9 % infusion 40 mL (has no administration in time range)   nitroglycerin (NITROSTAT) SL tablet 0.4 mg (has no administration in time range)   sennosides-docusate (PERICOLACE) 8.6-50 MG per tablet 2 tablet (has no administration in time range)     And   polyethylene glycol  (MIRALAX) packet 17 g (has no administration in time range)     And   bisacodyl (DULCOLAX) EC tablet 5 mg (has no administration in time range)     And   bisacodyl (DULCOLAX) suppository 10 mg (has no administration in time range)   dextrose (GLUTOSE) oral gel 15 g (has no administration in time range)   dextrose (D50W) (25 g/50 mL) IV injection 25 g (has no administration in time range)   glucagon (GLUCAGEN) injection 1 mg (has no administration in time range)   Insulin Lispro (humaLOG) injection 2-7 Units (has no administration in time range)   arformoterol (BROVANA) nebulizer solution 15 mcg (has no administration in time range)   budesonide (PULMICORT) nebulizer solution 0.5 mg (has no administration in time range)   ipratropium-albuterol (DUO-NEB) nebulizer solution 3 mL (has no administration in time range)   furosemide (LASIX) injection 40 mg (has no administration in time range)   potassium chloride (MICRO-K/KLOR-CON) CR capsule (has no administration in time range)   magnesium sulfate 2g/50 mL (PREMIX) infusion (has no administration in time range)   furosemide (LASIX) injection 80 mg (80 mg Intravenous Given 3/23/24 2341)        ED Course:    ED Course as of 03/24/24 0727   Sat Mar 23, 2024   2144 My interpretation of EKG: Sinus rhythm 98, anterior lateral ST depression, no ST elevation, essentially unchanged from previous of 12/15/2023 [JS]      ED Course User Index  [JS] Dave Calzada MD       Labs:    Lab Results (last 24 hours)       Procedure Component Value Units Date/Time    High Sensitivity Troponin T [[id number]]  (Abnormal) Collected: 03/23/24 2130    Specimen: Blood Updated: 03/23/24 2211     HS Troponin T 23 ng/L     Narrative:      High Sensitive Troponin T Reference Range:  <14.0 ng/L- Negative Female for AMI  <22.0 ng/L- Negative Male for AMI  >=14 - Abnormal Female indicating possible myocardial injury.  >=22 - Abnormal Male indicating possible myocardial injury.   Clinicians would have to  utilize clinical acumen, EKG, Troponin, and serial changes to determine if it is an Acute Myocardial Infarction or myocardial injury due to an underlying chronic condition.         CBC & Differential [[id number]]  (Abnormal) Collected: 03/23/24 2130    Specimen: Blood Updated: 03/23/24 2140    Narrative:      The following orders were created for panel order CBC & Differential.  Procedure                               Abnormality         Status                     ---------                               -----------         ------                     CBC Auto Differential[[id number]]        Abnormal            Final result                 Please view results for these tests on the individual orders.    Comprehensive Metabolic Panel [[id number]]  (Abnormal) Collected: 03/23/24 2130    Specimen: Blood Updated: 03/23/24 2206     Glucose 190 mg/dL      BUN 20 mg/dL      Creatinine 1.13 mg/dL      Sodium 140 mmol/L      Potassium 3.9 mmol/L      Chloride 101 mmol/L      CO2 24.3 mmol/L      Calcium 9.1 mg/dL      Total Protein 6.6 g/dL      Albumin 3.3 g/dL      ALT (SGPT) 45 U/L      AST (SGOT) 30 U/L      Alkaline Phosphatase 250 U/L      Total Bilirubin 0.6 mg/dL      Globulin 3.3 gm/dL      A/G Ratio 1.0 g/dL      BUN/Creatinine Ratio 17.7     Anion Gap 14.7 mmol/L      eGFR 75.3 mL/min/1.73     Narrative:      GFR Normal >60  Chronic Kidney Disease <60  Kidney Failure <15      Lipase [[id number]]  (Normal) Collected: 03/23/24 2130    Specimen: Blood Updated: 03/23/24 2206     Lipase 39 U/L     BNP [[id number]]  (Abnormal) Collected: 03/23/24 2130    Specimen: Blood Updated: 03/23/24 2211     proBNP 27,932.0 pg/mL     Narrative:      This assay is used as an aid in the diagnosis of individuals suspected of having heart failure. It can be used as an aid in the diagnosis of acute decompensated heart failure (ADHF) in patients presenting with signs and symptoms of ADHF to the emergency department (ED). In addition, NT-proBNP  of <300 pg/mL indicates ADHF is not likely.    Age Range Result Interpretation  NT-proBNP Concentration (pg/mL:      <50             Positive            >450                   Gray                 300-450                    Negative             <300    50-75           Positive            >900                  Gray                300-900                  Negative            <300      >75             Positive            >1800                  Gray                300-1800                  Negative            <300    Magnesium [[id number]]  (Normal) Collected: 03/23/24 2130    Specimen: Blood Updated: 03/23/24 2206     Magnesium 1.7 mg/dL     CBC Auto Differential [[id number]]  (Abnormal) Collected: 03/23/24 2130    Specimen: Blood Updated: 03/23/24 2140     WBC 10.03 10*3/mm3      RBC 4.67 10*6/mm3      Hemoglobin 12.8 g/dL      Hematocrit 41.3 %      MCV 88.4 fL      MCH 27.4 pg      MCHC 31.0 g/dL      RDW 15.6 %      RDW-SD 49.8 fl      MPV 10.2 fL      Platelets 395 10*3/mm3      Neutrophil % 70.6 %      Lymphocyte % 20.0 %      Monocyte % 6.7 %      Eosinophil % 1.6 %      Basophil % 1.0 %      Immature Grans % 0.1 %      Neutrophils, Absolute 7.08 10*3/mm3      Lymphocytes, Absolute 2.01 10*3/mm3      Monocytes, Absolute 0.67 10*3/mm3      Eosinophils, Absolute 0.16 10*3/mm3      Basophils, Absolute 0.10 10*3/mm3      Immature Grans, Absolute 0.01 10*3/mm3      nRBC 0.0 /100 WBC     Protime-INR [[id number]]  (Abnormal) Collected: 03/23/24 2130    Specimen: Blood Updated: 03/23/24 2227     Protime 15.8 Seconds      INR 1.23    Narrative:      Suggested Therapeutic Ranges For Oral Anticoagulant Therapy:  Level of Therapy                      INR Target Range  Standard Dose                            2.0-3.0  High Dose                                2.5-3.5  Patients not receiving anticoagulant  Therapy Normal Range                     0.86-1.15    High Sensitivity Troponin T 2Hr [[id number]]  (Abnormal) Collected:  03/23/24 2341    Specimen: Blood Updated: 03/24/24 0008     HS Troponin T 23 ng/L      Troponin T Delta 0 ng/L     Narrative:      High Sensitive Troponin T Reference Range:  <14.0 ng/L- Negative Female for AMI  <22.0 ng/L- Negative Male for AMI  >=14 - Abnormal Female indicating possible myocardial injury.  >=22 - Abnormal Male indicating possible myocardial injury.   Clinicians would have to utilize clinical acumen, EKG, Troponin, and serial changes to determine if it is an Acute Myocardial Infarction or myocardial injury due to an underlying chronic condition.         Phosphorus [[id number]]  (Normal) Collected: 03/24/24 0348    Specimen: Blood from Arm, Left Updated: 03/24/24 0435     Phosphorus 3.5 mg/dL     Magnesium [[id number]]  (Normal) Collected: 03/24/24 0348    Specimen: Blood from Arm, Left Updated: 03/24/24 0435     Magnesium 1.7 mg/dL     Comprehensive Metabolic Panel [[id number]]  (Abnormal) Collected: 03/24/24 0348    Specimen: Blood from Arm, Left Updated: 03/24/24 0435     Glucose 103 mg/dL      BUN 21 mg/dL      Creatinine 1.05 mg/dL      Sodium 140 mmol/L      Potassium 3.2 mmol/L      Chloride 100 mmol/L      CO2 26.7 mmol/L      Calcium 9.1 mg/dL      Total Protein 6.9 g/dL      Albumin 3.5 g/dL      ALT (SGPT) 44 U/L      AST (SGOT) 27 U/L      Alkaline Phosphatase 249 U/L      Total Bilirubin 0.6 mg/dL      Globulin 3.4 gm/dL      A/G Ratio 1.0 g/dL      BUN/Creatinine Ratio 20.0     Anion Gap 13.3 mmol/L      eGFR 82.3 mL/min/1.73     Narrative:      GFR Normal >60  Chronic Kidney Disease <60  Kidney Failure <15      CBC & Differential [[id number]]  (Abnormal) Collected: 03/24/24 0348    Specimen: Blood from Arm, Left Updated: 03/24/24 0414    Narrative:      The following orders were created for panel order CBC & Differential.  Procedure                               Abnormality         Status                     ---------                               -----------         ------                      CBC Auto Differential[[id number]]        Abnormal            Final result                 Please view results for these tests on the individual orders.    CBC Auto Differential [[id number]]  (Abnormal) Collected: 03/24/24 0348    Specimen: Blood from Arm, Left Updated: 03/24/24 0414     WBC 8.91 10*3/mm3      RBC 4.89 10*6/mm3      Hemoglobin 13.3 g/dL      Hematocrit 42.5 %      MCV 86.9 fL      MCH 27.2 pg      MCHC 31.3 g/dL      RDW 15.4 %      RDW-SD 48.8 fl      MPV 10.0 fL      Platelets 426 10*3/mm3      Neutrophil % 62.1 %      Lymphocyte % 25.8 %      Monocyte % 8.3 %      Eosinophil % 2.6 %      Basophil % 1.0 %      Immature Grans % 0.2 %      Neutrophils, Absolute 5.53 10*3/mm3      Lymphocytes, Absolute 2.30 10*3/mm3      Monocytes, Absolute 0.74 10*3/mm3      Eosinophils, Absolute 0.23 10*3/mm3      Basophils, Absolute 0.09 10*3/mm3      Immature Grans, Absolute 0.02 10*3/mm3      nRBC 0.0 /100 WBC              Imaging:    XR Chest 1 View    Result Date: 3/23/2024  XR CHEST 1 VW-  Date of Exam: 3/23/2024 9:42 PM  Indication: Chest Pain Triage Protocol.  Comparison: 12/15/2023.  Findings: A single AP upright portable chest radiograph is provided for review. Interval decrease in pulmonary edema with vascular congestion is noted since 12/15/2023. There is emphysematous contour of the lungs. Minimal if any pleural effusion is seen. No pneumothorax. There is stable mild to moderate cardiomegaly. Degenerative changes involve the shoulders and spine. External artifacts obscure detail. The thoracic aorta is atherosclerotic. Probably no significant acute infiltrate is seen on the current study.      Impression: Probably no acute infiltrate. No change in the mild to moderate cardiomegaly.   Electronically Signed By-Angel Snell MD On:3/23/2024 10:58 PM         Differential Diagnosis and Discussion:    Chest Pain:  Based on the patient's signs and symptoms, I considered aortic dissection, myocardial  infaction, pulmonary embolism, cardiac tamponade, pericarditis, pneumothorax, musculoskeletal chest pain and other differential diagnosis as an etiology of the patient's chest pain.   Dyspnea: Differential diagnosis includes but is not limited to metabolic acidosis, neurological disorders, psychogenic, asthma, pneumothorax, upper airway obstruction, COPD, pneumonia, noncardiogenic pulmonary edema, interstitial lung disease, anemia, congestive heart failure, and pulmonary embolism    All labs were reviewed and interpreted by me.  All X-rays impressions were independently interpreted by me.  EKG was interpreted by me.    MDM     Amount and/or Complexity of Data Reviewed  Decide to obtain previous medical records or to obtain history from someone other than the patient: yes                 Patient Care Considerations:    SEPSIS was considered but is NOT present in the emergency department as SIRS criteria is not present.      Consultants/Shared Management Plan:    Hospitalist: I have discussed the case with the hospitalist who agrees to accept the patient for admission.    Social Determinants of Health:    Patient is independent, reliable, and has access to care.       Disposition and Care Coordination:    Admit:   Through independent evaluation of the patient's history, physical, and imperical data, the patient meets criteria for inpatient admission to the hospital.        Final diagnoses:   Acute congestive heart failure, unspecified heart failure type   Dyspnea on exertion   Chest pain, unspecified type        ED Disposition       ED Disposition   Decision to Admit    Condition   --    Comment   Level of Care: Telemetry [5]   Diagnosis: Acute exacerbation of CHF (congestive heart failure) [660744]                 This medical record created using voice recognition software.             Dave Calzada MD  03/24/24 2956

## 2024-03-24 NOTE — ED TRIAGE NOTES
Pt comes to the ER tonight for chest pain starting yesterday. Family states that he has CHF and doctor told them to come be seen.

## 2024-03-24 NOTE — CONSULTS
[facility]   Cardiology Consult Note    Patient Name: Regulo Sepulveda  : 1965  MRN: [id number]  Primary Care Physician:  Dickson Landry MD  Referring Physician: No ref. provider found  Date of admission: 3/23/2024    Subjective   Subjective     Reason for Consult/ Chief Complaint: CHF/cardiomyopathy    HPI:  Regulo Sepulveda is a 58 y.o. male with history of nonischemic cardiomyopathy and CHF.  Noncompliant with his follow-up visits and that admitted with increased shortness of breath and CHF.  No chest pain.    Review of Systems:      Constitutional no fever,  no weight loss   Skin no rash   Otolaryngeal no difficulty swallowing   Cardiovascular See HPI   Pulmonary no cough, no sputum production   Gastrointestinal no constipation, no diarrhea   Genitourinary no dysuria, no hematuria   Hematologic no easy bruisability, no abnormal bleeding   Musculoskeletal no muscle pain   Neurologic no dizziness, no falls     Personal History       Past Medical/Surgical History:   Past Medical History:   Diagnosis Date    Anxiety     Asthma     Bipolar affective     Cardiac tamponade         CHF (congestive heart failure)     COPD (chronic obstructive pulmonary disease)     Coronary artery disease     Depression     Diabetes mellitus     Elevated cholesterol     Hypertension     Parkinson disease      Past Surgical History:   Procedure Laterality Date    CARDIAC CATHETERIZATION Right 2023    Procedure: Right and Left Heart Cath;  Surgeon: Ben Grant MD;  Location: [facility] CATH INVASIVE LOCATION;  Service: Cardiovascular;  Laterality: Right;    TESTICLE SURGERY Left     extraction         Family History: No Family History of CAD.    Social History:  reports that he has been smoking cigarettes. He started smoking about 50 years ago. He has a 25 pack-year smoking history. He has never used smokeless tobacco. He reports that he does not currently use alcohol. He reports current drug use. Drugs: Methamphetamines and  Marijuana.    Medications:  Facility-Administered Medications Prior to Admission   Medication Dose Route Frequency Provider Last Rate Last Admin    ARIPiprazole ER (ABILIFY MAINTENA) IM prefilled syringe 300 mg  300 mg Intramuscular Q30 Days Dickson Landry MD        ARIPiprazole ER (ABILIFY MAINTENA) IM prefilled syringe 400 mg  400 mg Intramuscular Q30 Days Dickson Landry MD         Medications Prior to Admission   Medication Sig Dispense Refill Last Dose    Abilify Maintena 300 MG Suspension Reconstituted ER IM injection ER inject 300mg into the appropriate muscle AS DIRECTED by prescriber every 30 days       albuterol sulfate  (90 Base) MCG/ACT inhaler Inhale 2 puffs Every 4 (Four) Hours As Needed for Wheezing or Shortness of Air. Indications: Chronic Obstructive Lung Disease 18 g 0     apixaban (ELIQUIS) 5 MG tablet tablet Take 1 tablet by mouth 2 (Two) Times a Day for 120 days. 60 tablet 3     atorvastatin (LIPITOR) 40 MG tablet Take 1 tablet by mouth every night at bedtime. 90 tablet 2     carvedilol (COREG) 25 MG tablet Take 1 tablet by mouth Every 12 (Twelve) Hours. 90 tablet 1     divalproex (Depakote) 250 MG DR tablet Take 1 tablet by mouth every night at bedtime for 30 days. 30 tablet 0     Farxiga 5 MG tablet tablet Take 1 tablet by mouth Daily. Indications: Type 2 Diabetes 90 tablet 2     furosemide (LASIX) 40 MG tablet Take 1 tablet by mouth Daily. 90 tablet 2     lisinopril (PRINIVIL,ZESTRIL) 40 MG tablet Take 1 tablet by mouth Daily. 90 tablet 2     metFORMIN (GLUCOPHAGE) 1000 MG tablet Take 1 tablet by mouth 2 (Two) Times a Day. 60 tablet 12     metoprolol succinate XL (TOPROL-XL) 25 MG 24 hr tablet Take 1 tablet by mouth Daily.       pantoprazole (Protonix) 40 MG EC tablet Take 1 tablet by mouth Daily. 90 tablet 1      Current medications:  apixaban, 5 mg, Oral, BID  arformoterol, 15 mcg, Nebulization, BID - RT  budesonide, 0.5 mg, Nebulization, BID - RT  empagliflozin, 10 mg, Oral,  "Daily  furosemide, 40 mg, Intravenous, BID  insulin lispro, 2-7 Units, Subcutaneous, 4x Daily AC & at Bedtime  lisinopril, 40 mg, Oral, Q24H  metoprolol succinate XL, 25 mg, Oral, Daily  potassium chloride, 40 mEq, Oral, Q4H  sodium chloride, 10 mL, Intravenous, Q12H      Current IV drips:       Allergies:  Allergies   Allergen Reactions    Penicillins Shortness Of Breath       Objective    Objective     Vitals:   Temp:  [98 °F (36.7 °C)-99.4 °F (37.4 °C)] 99.4 °F (37.4 °C)  Heart Rate:  [] 102  Resp:  [16-18] 18  BP: (123-148)/() 143/105      Physical Exam:    Constitutional: Awake, alert, No acute distress   Eyes: PERRLA, sclerae anicteric, no conjunctival injection  HENT: NCAT, mucous membranes moist  Neck: Supple, no thyromegaly, no lymphadenopathy, trachea midline  Respiratory: Decreased to auscultation bilaterally, nonlabored respirations   Cardiovascular: RRR, no murmurs, rubs, or gallops, palpable pedal pulses bilaterally  Gastrointestinal: Positive bowel sounds, soft, nontender, nondistended  .    Result Review    Result Review:  I have personally reviewed the results from the time of this admission to 3/24/2024 08:01 EDT and agree with these findings:  [x]  Laboratory  [x]  EKG/Telemetry   [x]  Cardiology/Vascular   []  Pathology  [x]  Old records  [x]  Medications  Basic Metabolic Panel    Sodium Sodium   Date Value Ref Range Status   03/24/2024 140 136 - 145 mmol/L Final   03/23/2024 140 136 - 145 mmol/L Final      Potassium Potassium   Date Value Ref Range Status   03/24/2024 3.2 (L) 3.5 - 5.2 mmol/L Final   03/23/2024 3.9 3.5 - 5.2 mmol/L Final      Chloride Chloride   Date Value Ref Range Status   03/24/2024 100 98 - 107 mmol/L Final   03/23/2024 101 98 - 107 mmol/L Final      Bicarbonate No results found for: \"PLASMABICARB\"   BUN BUN   Date Value Ref Range Status   03/24/2024 21 (H) 6 - 20 mg/dL Final   03/23/2024 20 6 - 20 mg/dL Final      Creatinine Creatinine   Date Value Ref Range " "Status   03/24/2024 1.05 0.76 - 1.27 mg/dL Final   03/23/2024 1.13 0.76 - 1.27 mg/dL Final      Calcium Calcium   Date Value Ref Range Status   03/24/2024 9.1 8.6 - 10.5 mg/dL Final   03/23/2024 9.1 8.6 - 10.5 mg/dL Final      Glucose      No components found for: \"GLUCOSE.*\"     Results for orders placed during the hospital encounter of 09/17/23    Cardiac Catheterization/Vascular Study    Conclusion  OPERATORS  Ben Grant M.D. (Attending Cardiologist)      PROCEDURE PERFORMED  Ultrasound guided vascular access  Right heart catheterization  Coronary Angiogram  Left Heart Catheterization 36479  Moderate Sedation    INDICATIONS FOR PROCEDURE  58-year-old man with multiple cardiovascular risk factors presented with acute on chronic HFrEF exacerbation.  He has not had any previous ischemic work-up.  He also has pulmonary hypertension.  After discussing the risk and benefit of the procedure he was brought in for right and left heart cath.    PROCEDURE IN DETAIL  Informed consent was obtained from the patient after explaining the risks, benefits, and alternative options of the procedure. After obtaining informed consent, the patient was brought to the cath lab and was prepped in a sterile fashion. Lidocaine 2% was used for local anesthesia into the right femoral venous access site. Right femoral vein was accessed using the micropuncture needle under ultrasound guidance and micropuncture wire advanced under flouroscopy. A 7 Serbian vascular sheath was put into place percutaneously over guide-wire. Guide wires were removed. A 6Fr swan sydnee catheter was advanced to wedge position. RA, RV and PA and wedge pressures were recorded.  PA sat and arterial sats recorded.  The patient tolerated the procedure well without any complications.    Lidocaine 2% was used for local anesthesia into the right femoral arterial access site. The right femoral artery was accessed with a micropuncture needle via modified Seldinger technique " under ultrasound guidance. A 6F was inserted successfully.  Afterwards, 6F JR4 and JL4 diagnostic catheters were advanced over a wire into the ascending aorta and were used to engage the ostia of the left main and RCA respectively. JR4 used to cross the AV and obtain LV pressures and gradient across the AV measured via pullback technique. Images of the right and left coronary systems were obtained. All the catheters were exchanged over a wire and subsequently removed. Angiogram of the femoral access site was obtained and did not show complications. The patient tolerated the procedure well without any complications. The pictures were reviewed at the end of the procedure. A Mynx closure device was applied for venous closure.  A 6 Swazi Angio-Seal device was applied for arterial closure.    HEMODYNAMICS    RHC  RA 8/5, 4 mmHg  RV 55/3, 8 mmHg  PA 59/24, 40 mmHg  PCW 34/27, 26 mmHg  AO Sat 96%  PA Sat 69%    Lydia CO 4.9 L/min    Lydia CI 2.53 L/min/m²    SVR 1958 D/S   D/S    LHC  LV: 137/27, 31 mmHg  AO: 136/96, 116 mmHg  No significant gradient across the aortic valve during pullback of JR4 catheter.  LV gram was not performed due to recently available echocardiogram.    FINDINGS  Coronary Angiogram    Right dominant circulation    Left main: Left main is a large caliber vessel which gives rise to the Left Anterior Descending and the Left circumflex.  Left main coronary artery is angiographically free from any significant disease.    Left Anterior Descending Artery: LAD is a medium caliber vessel which gives rise to several septal perforators and several diagonal branches.  LAD is angiographically free from any significant disease.    Left Circumflex: Left circumflex artery gives rise to marginals.  Left circumflex artery is angiographically free from any significant disease.    Right Coronary Artery: The RCA is a large caliber dominant vessel gives rise to PDA and PLV.  RCA is angiographically free from any  significant disease    ESTIMATED BLOOD LOSS:  10 ml    COMPLICATIONS:  None    PROCEDURE DATA:  Contrast Used: 24 cc  Sedation Time: 30 minutes    IMPRESSIONS  Non obstructive CAD.  Nonischemic idiopathic dilated cardiomyopathy.  Normal cardiac output and index  Significantly elevated LVEDP and wedge pressure.  Pulmonary hypertension due to volume overload  Significantly elevated systemic vascular resistance      RECOMMENDATIONS  -Afterload reduction and diuretics  -Uptitrate GDMT  -Abstinence from drug abuse    Electronically signed by Ben Grant MD, 09/17/23, 2:51 PM EDT.     Lab Results   Component Value Date    PROBNP 27,932.0 (H) 03/23/2024          EKG shows sinus rhythm with no acute changes.  Telemetry reviewed    Assessment / Plan     Impression:   1.  Nonischemic cardiomyopathy.  2.  Acute on chronic systolic CHF secondary to nonischemic cardiomyopathy.  3.  Positive nicotine use:  4.  LV thrombus    Plan:   1.  Continue IV Lasix.  Monitor electrolytes.  2.  Continue Eliquis.  3.  Continue lisinopril and metoprolol.  4.  Strict I's and O's.  5.  Repeat echocardiogram.    Electronically signed by Jaun Carreno MD, 03/24/24, 8:01 AM EDT.

## 2024-03-25 ENCOUNTER — TELEPHONE (OUTPATIENT)
Dept: FAMILY MEDICINE CLINIC | Facility: CLINIC | Age: 59
End: 2024-03-25

## 2024-03-25 ENCOUNTER — READMISSION MANAGEMENT (OUTPATIENT)
Dept: CALL CENTER | Facility: HOSPITAL | Age: 59
End: 2024-03-25
Payer: COMMERCIAL

## 2024-03-25 VITALS
HEIGHT: 73 IN | RESPIRATION RATE: 16 BRPM | WEIGHT: 142.2 LBS | BODY MASS INDEX: 18.85 KG/M2 | OXYGEN SATURATION: 97 % | HEART RATE: 88 BPM | TEMPERATURE: 97.9 F | SYSTOLIC BLOOD PRESSURE: 119 MMHG | DIASTOLIC BLOOD PRESSURE: 89 MMHG

## 2024-03-25 LAB
DEPRECATED RDW RBC AUTO: 51.8 FL (ref 37–54)
ERYTHROCYTE [DISTWIDTH] IN BLOOD BY AUTOMATED COUNT: 15.4 % (ref 12.3–15.4)
GLUCOSE BLDC GLUCOMTR-MCNC: 112 MG/DL (ref 70–99)
GLUCOSE BLDC GLUCOMTR-MCNC: 137 MG/DL (ref 70–99)
HCT VFR BLD AUTO: 49.4 % (ref 37.5–51)
HGB BLD-MCNC: 14.6 G/DL (ref 13–17.7)
MCH RBC QN AUTO: 27.4 PG (ref 26.6–33)
MCHC RBC AUTO-ENTMCNC: 29.6 G/DL (ref 31.5–35.7)
MCV RBC AUTO: 92.7 FL (ref 79–97)
PLATELET # BLD AUTO: 475 10*3/MM3 (ref 140–450)
PMV BLD AUTO: 10.5 FL (ref 6–12)
RBC # BLD AUTO: 5.33 10*6/MM3 (ref 4.14–5.8)
WBC NRBC COR # BLD AUTO: 10.89 10*3/MM3 (ref 3.4–10.8)

## 2024-03-25 PROCEDURE — 25010000002 FUROSEMIDE PER 20 MG: Performed by: INTERNAL MEDICINE

## 2024-03-25 PROCEDURE — 94799 UNLISTED PULMONARY SVC/PX: CPT

## 2024-03-25 PROCEDURE — 99232 SBSQ HOSP IP/OBS MODERATE 35: CPT | Performed by: SPECIALIST

## 2024-03-25 PROCEDURE — 99239 HOSP IP/OBS DSCHRG MGMT >30: CPT | Performed by: INTERNAL MEDICINE

## 2024-03-25 PROCEDURE — 96376 TX/PRO/DX INJ SAME DRUG ADON: CPT

## 2024-03-25 PROCEDURE — G0378 HOSPITAL OBSERVATION PER HR: HCPCS

## 2024-03-25 PROCEDURE — 97161 PT EVAL LOW COMPLEX 20 MIN: CPT

## 2024-03-25 PROCEDURE — 85027 COMPLETE CBC AUTOMATED: CPT | Performed by: INTERNAL MEDICINE

## 2024-03-25 PROCEDURE — 82948 REAGENT STRIP/BLOOD GLUCOSE: CPT

## 2024-03-25 PROCEDURE — 82948 REAGENT STRIP/BLOOD GLUCOSE: CPT | Performed by: STUDENT IN AN ORGANIZED HEALTH CARE EDUCATION/TRAINING PROGRAM

## 2024-03-25 RX ORDER — ONDANSETRON 2 MG/ML
4 INJECTION INTRAMUSCULAR; INTRAVENOUS EVERY 4 HOURS PRN
Status: DISCONTINUED | OUTPATIENT
Start: 2024-03-25 | End: 2024-03-25 | Stop reason: HOSPADM

## 2024-03-25 RX ORDER — VENLAFAXINE HYDROCHLORIDE 37.5 MG/1
75 CAPSULE, EXTENDED RELEASE ORAL DAILY
Qty: 60 CAPSULE | Refills: 0 | Status: SHIPPED | OUTPATIENT
Start: 2024-03-25 | End: 2024-04-24

## 2024-03-25 RX ORDER — VENLAFAXINE HYDROCHLORIDE 75 MG/1
75 CAPSULE, EXTENDED RELEASE ORAL DAILY
Status: DISCONTINUED | OUTPATIENT
Start: 2024-03-26 | End: 2024-03-25 | Stop reason: HOSPADM

## 2024-03-25 RX ADMIN — APIXABAN 5 MG: 5 TABLET, FILM COATED ORAL at 08:27

## 2024-03-25 RX ADMIN — METOPROLOL SUCCINATE 25 MG: 25 TABLET, EXTENDED RELEASE ORAL at 08:27

## 2024-03-25 RX ADMIN — VENLAFAXINE HYDROCHLORIDE 37.5 MG: 37.5 CAPSULE, EXTENDED RELEASE ORAL at 08:28

## 2024-03-25 RX ADMIN — Medication 10 ML: at 08:31

## 2024-03-25 RX ADMIN — EMPAGLIFLOZIN 10 MG: 10 TABLET, FILM COATED ORAL at 08:27

## 2024-03-25 RX ADMIN — LISINOPRIL 40 MG: 20 TABLET ORAL at 08:28

## 2024-03-25 RX ADMIN — FUROSEMIDE 40 MG: 10 INJECTION, SOLUTION INTRAMUSCULAR; INTRAVENOUS at 08:28

## 2024-03-25 NOTE — SIGNIFICANT NOTE
03/25/24 0945   OT Time and Intention   Session Not Performed patient unavailable for evaluation  (Patient sleeping. Close watch request OT return at later time. OT will follow up as patient available.)

## 2024-03-25 NOTE — THERAPY EVALUATION
Acute Care - Physical Therapy Initial Evaluation  [facility]     Patient Name: Regulo Sepulveda  : 1965  MRN: [id number]  Today's Date: 3/25/2024      Admit date: 3/23/2024     Referring Physician: Felton Reynoso MD     Surgery Date:* No surgery found *        Visit Dx:     ICD-10-CM ICD-9-CM   1. Acute congestive heart failure, unspecified heart failure type  I50.9 428.0   2. Dyspnea on exertion  R06.09 786.09   3. Chest pain, unspecified type  R07.9 786.50   4. Acute on chronic congestive heart failure, unspecified heart failure type  I50.9 428.0   5. Difficulty in walking  R26.2 719.7     Patient Active Problem List   Diagnosis    Left groin pain    Major depressive disorder, recurrent episode, moderate    Chronic obstructive pulmonary disease    Diabetes mellitus    Hypertensive disorder    Hyperlipidemia    Hepatitis C    Cigarette nicotine dependence    BPH (benign prostatic hyperplasia)    GERD (gastroesophageal reflux disease)    B12 deficiency    LV (left ventricular) mural thrombus    Schizophrenia    Acute on chronic combined systolic and diastolic CHF (congestive heart failure)    Acute on chronic heart failure with reduced ejection fraction and diastolic dysfunction    Acute on chronic congestive heart failure    PAF (paroxysmal atrial fibrillation)    CHF (congestive heart failure)    Acute on chronic HFrEF (heart failure with reduced ejection fraction)    Moderate malnutrition    Systolic heart failure    Acute exacerbation of CHF (congestive heart failure)     Past Medical History:   Diagnosis Date    Anxiety     Asthma     Bipolar affective     Cardiac tamponade         CHF (congestive heart failure)     COPD (chronic obstructive pulmonary disease)     Coronary artery disease     Depression     Diabetes mellitus     Elevated cholesterol     Hypertension     Parkinson disease      Past Surgical History:   Procedure Laterality Date    CARDIAC CATHETERIZATION Right 2023    Procedure: Right  and Left Heart Cath;  Surgeon: Ben Grant MD;  Location:  [facility] CATH INVASIVE LOCATION;  Service: Cardiovascular;  Laterality: Right;    TESTICLE SURGERY Left     extraction     PT Assessment (Last 12 Hours)       PT Evaluation and Treatment       Row Name 03/25/24 1500          Physical Therapy Time and Intention    Subjective Information no complaints  -HYUN (r) MH (t) HYUN (c)     Document Type evaluation  -HYUN (r) MH (t) HYUN (c)     Mode of Treatment individual therapy;physical therapy  -HYUN (r) MH (t) HYUN (c)     Patient Effort good  -HYUN (r) MH (t) HYUN (c)     Symptoms Noted During/After Treatment none  -HYUN (r) MH (t) HYUN (c)       Row Name 03/25/24 1500          General Information    Patient Profile Reviewed yes  -HYUN (r) MH (t) HYUN (c)     Patient Observations alert;cooperative;agree to therapy  -HYUN (r) MH (t) HYUN (c)     Prior Level of Function independent:;all household mobility;community mobility  -HYUN (r) MH (t) HYUN (c)     Equipment Currently Used at Home none  -HYUN (r) MH (t) HYUN (c)     Benefits Reviewed patient:;improve function;increase independence;increase strength;patient and family:  -HYUN (r) MH (t) HYUN (c)       Row Name 03/25/24 1500          Living Environment    Current Living Arrangements home  -HYUN (r) MH (t) HYUN (c)     Home Accessibility stairs to enter home  -HYUN (r) MH (t) HYUN (c)     People in Home sibling(s)  -HYUN (r) MH (t) HYUN (c)     Primary Care Provided by self  -HYUN (r) MH (t) HYUN (c)       Row Name 03/25/24 1500          Home Main Entrance    Number of Stairs, Main Entrance six  -HYUN (r) MH (t) HYUN (c)     Surface of Stairs, Main Entrance hardwood  -HYUN (r) MH (t) HYUN (c)     Stair Railings, Main Entrance railings on both sides of stairs  -HYUN (r) MH (t) HYUN (c)       Row Name 03/25/24 1500          Range of Motion (ROM)    Range of Motion ROM is WFL  no deficits noted  -HYUN (r) MH (t) HYUN (c)       Row Name 03/25/24 1500          Strength (Manual Muscle Testing)    Strength (Manual Muscle Testing) bilateral  lower extremities  4+/5 for all B LE MMT tested  -HYUN (r) MH (t) HYUN (c)       Row Name 03/25/24 1500          Bed Mobility    Bed Mobility bed mobility (all) activities  -HYUN (r) MH (t) HYUN (c)     All Activities, [place] (Bed Mobility) standby assist  -HYUN (r) MH (t) HYUN (c)       Row Name 03/25/24 1500          Transfers    Transfers sit-stand transfer;stand-sit transfer  -HYUN (r) MH (t) HYUN (c)     Maintains Weight-bearing Status (Transfers) able to maintain  -HYUN (r) MH (t) HYUN (c)       Row Name 03/25/24 1500          Sit-Stand Transfer    Sit-Stand [place] (Transfers) standby assist  -HYUN (r) MH (t) HYUN (c)       Row Name 03/25/24 1500          Stand-Sit Transfer    Stand-Sit [place] (Transfers) standby assist  -HYUN (r) MH (t) HYUN (c)       Row Name 03/25/24 1500          Gait/Stairs (Locomotion)    Gait/Stairs Locomotion gait/ambulation independence  -HYUN (r) MH (t) HYUN (c)     [place] Level (Gait) standby assist  -HYUN (r) MH (t) HYUN (c)     Patient was able to Ambulate yes  -HYUN (r) MH (t) HYUN (c)     Distance in Feet (Gait) 250  -HYUN (r) MH (t) HYUN (c)     Pattern (Gait) 2-point  -HYUN (r) MH (t) HYUN (c)     Comment, (Gait/Stairs) Pt able to ambulate well with no deficits noted. Pt able to carry conversation throughout bout of gait with no impact on gait mechanics.  -HYUN (r) MH (t) HYUN (c)       Row Name 03/25/24 1500          Plan of Care Review    Plan of Care Reviewed With patient;sibling  -HYUN (r) MH (t) HYUN (c)     Progress improving  -HYUN (r) MH (t) HYUN (c)     Outcome Evaluation Pt presents to PT with deficits related to acute exacerbation fo CHF. Pt deomnstrates good functional mobility for all motions tested. Skilled services not needed at this time. Plan to DC to home.  -HYUN (r) MH (t) HYUN (c)       Row Name 03/25/24 1500          Positioning and Restraints    Pre-Treatment Position in bed  -HYUN (r) MARISSA (lily) HYUN (hayes)     Post Treatment Position bed  -HYUN (r) MARISSA (lily) HYUN (hayes)     In Bed supine;call light within  reach;with family/caregiver  -HYUN (r) MH (t) HYUN (c)       Row Name 03/25/24 1500          Therapy Assessment/Plan (PT)    Criteria for Skilled Interventions Met (PT) no problems identified which require skilled intervention  -HYUN (r) MH (t) HYUN (c)     Therapy Frequency (PT) evaluation only  -HYUN (r) MH (t) HYUN (c)       Row Name 03/25/24 1500          PT Evaluation Complexity    History, PT Evaluation Complexity no personal factors and/or comorbidities  -HYUN (r) MH (t) HYUN (c)     Examination of Body Systems (PT Eval Complexity) 1-2 elements  -HYUN (r) MH (t) HYUN (c)     Clinical Presentation (PT Evaluation Complexity) stable  -HYUN (r) MH (t) HYUN (c)     Clinical Decision Making (PT Evaluation Complexity) low complexity  -HYUN (r) MH (t) HYUN (c)     Overall Complexity (PT Evaluation Complexity) low complexity  -HYUN (r) MH (t)       Row Name 03/25/24 1500          Therapy Plan Review/Discharge Plan (PT)    Therapy Plan Review (PT) evaluation/treatment results reviewed  -HYUN (r) MH (t) HYUN (c)               User Key  (r) = Recorded By, (t) = Taken By, (c) = Cosigned By      Initials Name Provider Type    HYUN Drew Mayes, PT Physical Therapist     Prasanna Mead, PT Student PT Student                    Physical Therapy Education       Title: PT OT SLP Therapies (Done)       Topic: Physical Therapy (Done)       Point: Mobility training (Done)       Learning Progress Summary             Patient Acceptance, E,TB, VU by  at 3/25/2024 1525                         Point: Precautions (Done)       Learning Progress Summary             Patient Acceptance, E,TB, VU by  at 3/25/2024 1525                                         User Key       Initials Effective Dates Name Provider Type Discipline     10/12/23 -  Prasanna Mead, PT Student PT Student PT                  PT Recommendation and Plan  Anticipated Discharge Disposition (PT): home  Therapy Frequency (PT): evaluation only  Plan of Care Reviewed With: patient,  sibling  Progress: improving  Outcome Evaluation: Pt presents to PT with deficits related to acute exacerbation fo CHF. Pt deomnstrates good functional mobility for all motions tested. Skilled services not needed at this time. Plan to DC to home.   Outcome Measures       Row Name 03/25/24 1500             How much help from another person do you currently need...    Turning from your back to your side while in flat bed without using bedrails? 4  -HYUN (r) MH (t) HYUN (c)      Moving from lying on back to sitting on the side of a flat bed without bedrails? 4  -HYUN (r) MH (t) HYUN (c)      Moving to and from a bed to a chair (including a wheelchair)? 4  -HYUN (r) MH (t) HYUN (c)      Standing up from a chair using your arms (e.g., wheelchair, bedside chair)? 4  -HYUN (r) MH (t) HYUN (c)      Climbing 3-5 steps with a railing? 4  -HYUN (r) MH (t) HYUN (c)      To walk in hospital room? 4  -HYUN (r) MH (t) HYUN (c)      AM-PAC 6 Clicks Score (PT) 24  -HYUN (r) MH (t)      Highest Level of Mobility Goal 8 --> Walked 250 feet or more  -HYUN (r) MH (t)         Functional Assessment    Outcome Measure Options AM-PAC 6 Clicks Basic Mobility (PT)  -HYUN (r) MH (t) HYUN (c)                User Key  (r) = Recorded By, (t) = Taken By, (c) = Cosigned By      Initials Name Provider Type    Drew Shay, PT Physical Therapist    Prasanna Sanchez, PT Student PT Student                     Time Calculation:    PT Charges       Row Name 03/25/24 6539             Time Calculation    PT Received On 03/25/24  -HYUN (r) MH (t) HYUN (c)      PT Goal Re-Cert Due Date 04/03/24  -HYUN (r) MH (t) HYUN (c)         Untimed Charges    PT Eval/Re-eval Minutes 25  -YHUN (r) MH (t) HYUN (c)         Total Minutes    Untimed Charges Total Minutes 25  -HYUN (r) MH (t)       Total Minutes 25  -HYUN (r) MH (t)                User Key  (r) = Recorded By, (t) = Taken By, (c) = Cosigned By      Initials Name Provider Type    Drew Shay, PT Physical Therapist    Prasanna Sanchez, PT  Student PT Student                      PT G-Codes  Outcome Measure Options: AM-PAC 6 Clicks Basic Mobility (PT)  AM-PAC 6 Clicks Score (PT): 24    Prasanna Mead, PT Student  3/25/2024

## 2024-03-25 NOTE — PLAN OF CARE
"Goal Outcome Evaluation:           Progress: no change   Pt made suicidal comments, stating \" Im going to take this belt and put it around my neck\". Pt was upset that he is ill and wants a surgery to \"fix my heart or I am just going to kill himself\". Pt placed on a close watch and belt removed from his jeans.                               "

## 2024-03-25 NOTE — PLAN OF CARE
Goal Outcome Evaluation:  Plan of Care Reviewed With: (P) patient, sibling        Progress: (P) improving  Outcome Evaluation: (P) Pt presents to PT with deficits related to acute exacerbation fo CHF. Pt deomnstrates good functional mobility for all motions tested. Skilled services not needed at this time. Plan to DC to home.      Anticipated Discharge Disposition (PT): (P) home

## 2024-03-25 NOTE — CONSULTS
[facility]   PSYCHIATRIC CONSULTATION    Patient Name: Regulo Sepulveda  : 1965  MRN: [id number]  Primary Care Physician:  Dickson Landry MD  Date of admission: 3/23/2024    Referring Provider: Dr. Reynoso  Reason for Consultation: Depression, suicidal ideations    Subjective   Subjective     Chief Complaint: Shortness of air    HPI:     Regulo Sepulveda is a 58 y.o. male with a complicated medical history and a long history of depression.  Patient known to me from hospitalization at [facility] as well as a previous consultation for similar presentation.    Patient states psychiatry was asked to see him because he was feeling very down yesterday and made a statement about wanting to kill himself.  Patient states he has had these thoughts in the past.  Patient reports he was just very tired and aggravated yesterday and said something he did not mean.  He denied suicidal ideation on numerous occasions during our interview today.      Reports that he feels a little better today physically.  States that he has an okay mood and that he has no suicidal ideations.  States that he does feel down and has some depression but this is chronic in nature.    Acknowledges depressed mood.  Has low energy.  Has difficulty sleeping.  But both these issues could be due to his physical ailments.  Reports little interest in things.  Has little desire to do things and has poor motivation.    Patient is pleasant and engaging today.  No psychomotor restlessness or agitation.  He makes good eye contact.    Toxicology screen obtained has a history of amphetamine and other substance use      Review of Systems   All systems were reviewed and negative except for: Trouble breathing    Personal History     Past Medical History:   Diagnosis Date    Anxiety     Asthma     Bipolar affective     Cardiac tamponade         CHF (congestive heart failure)     COPD (chronic obstructive pulmonary disease)     Coronary artery disease     Depression      Diabetes mellitus     Elevated cholesterol     Hypertension     Parkinson disease        Past Surgical History:   Procedure Laterality Date    CARDIAC CATHETERIZATION Right 9/17/2023    Procedure: Right and Left Heart Cath;  Surgeon: Ben Grant MD;  Location: [facility] CATH INVASIVE LOCATION;  Service: Cardiovascular;  Laterality: Right;    TESTICLE SURGERY Left     extraction       Past Psychiatric History: History of depression and bipolar disorder.  Has a history of substance use    Psychiatric Hospitalizations: Multiple hospitalizations    Suicide Attempts: History of suicide attempts    Prior Treatment and Medications Tried: Multiple medication trials        Family History: family history includes Depression in his brother and sister; Diabetes in his mother; Insomnia in his sister; Restless legs syndrome in his sister; Sleep disorder in his sister; Sleep walking in his sister. Otherwise pertinent FHx was reviewed and not pertinent to current issue.    Social History:     Social History     Socioeconomic History    Marital status: Single   Tobacco Use    Smoking status: Every Day     Current packs/day: 0.50     Average packs/day: 0.5 packs/day for 50.1 years (25.0 ttl pk-yrs)     Types: Cigarettes     Start date: 1/1/1974     Last attempt to quit: 11/2/2023    Smokeless tobacco: Never   Vaping Use    Vaping status: Former    Substances: Nicotine   Substance and Sexual Activity    Alcohol use: Not Currently    Drug use: Yes     Types: Methamphetamines, Marijuana    Sexual activity: Defer       Substance Abuse History: reports that he has been smoking cigarettes. He started smoking about 50 years ago. He has a 25 pack-year smoking history. He has never used smokeless tobacco. He reports that he does not currently use alcohol. He reports current drug use. Drugs: Methamphetamines and Marijuana.    Home Medications:  ARIPiprazole ER, albuterol sulfate HFA, apixaban, atorvastatin, carvedilol, dapagliflozin,  "divalproex, furosemide, lisinopril, metFORMIN, metoprolol succinate XL, pantoprazole, and venlafaxine XR      Allergies:  Allergies   Allergen Reactions    Penicillins Shortness Of Breath       Objective   Objective     Vitals:   Temp:  [97.3 °F (36.3 °C)-97.9 °F (36.6 °C)] 97.3 °F (36.3 °C)  Heart Rate:  [] 88  Resp:  [16] 16  BP: (118-134)/(79-92) 121/79  Physical Exam       Mental Status Exam:     Awake, alert, oriented male appears appropriate for stated age.  Lying calmly in bed with no restlessness or agitation.  Participates in interview.  Appears to be of average intelligence and is a fair historian.    Hygiene:   good  Cooperation:  Cooperative  Eye Contact:  Good  Psychomotor Behavior:  Appropriate  Mood: \"Alright\"  Affect:  Blunted  Speech:  Normal  Language: Normal rate and volume  Thought Process:  Goal directed and Linear  Thought Content:  Normal  Suicidal:  None and denies today  Homicidal:  None  Hallucinations:  None  Delusion:  None  Memory:  Intact  Orientation:  Person, Place, Time, and Situation  Reliability:  fair  Insight:  Fair  Judgement:  Fair  Impulse Control:  Fair    Result Review    Result Review:  I have personally reviewed the results from the time of this admission to 3/25/2024 12:37 EDT and agree with these findings:  [x]  Laboratory  []  Microbiology  []  Radiology  []  EKG/Telemetry   []  Cardiology/Vascular   []  Pathology  []  Old records  []  Other:  Most notable findings include:     Assessment & Plan   Assessment / Plan     Brief Patient Summary:  Regulo Sepulveda is a 58 y.o. male who admitted to the [facility] for exacerbation of lung and cardiac issues.  Expressed suicidal ideation or frustration for ongoing medical issues.    Active Hospital Problems:  Active Hospital Problems    Diagnosis     **Acute exacerbation of CHF (congestive heart failure)          Plan:   1) patient denies suicidal ideation multiple times throughout the conversation.  He is engaging " cooperative.  There is no acute agitation or restlessness.  Patient is an outpatient provider that he sees.  He is calm and cooperative today.  2) he is on venlafaxine 37.5 mg we will increase the dose to 75 mg for depression.  3) discontinue close watch and suicide precautions  4) at this point would not need inpatient psychiatric care  5) we will follow make treatment recommendations as indicated        Part of this note may be an electronic transcription/translation of spoken language to printed text using the Dragon Dictation System.    Electronically signed by Felton Garduno MD, 03/25/24, 12:37 PM EDT.

## 2024-03-25 NOTE — OUTREACH NOTE
Prep Survey      Flowsheet Row Responses   [facility] patient discharged from? [facility]   Is LACE score < 7 ? No   Eligibility [facility]   Date of Admission 03/23/24   Date of Discharge 03/25/24   Discharge Disposition Home or Self Care   Discharge diagnosis Acute exacerbation of CHF (congestive heart failure)   Does the patient have one of the following disease processes/diagnoses(primary or secondary)? CHF   Does the patient have Home health ordered? No   Is there a DME ordered? No   Prep survey completed? Yes            Lenore BOUDREAUX - Registered Nurse

## 2024-03-25 NOTE — DISCHARGE SUMMARY
[facility]         HOSPITALIST  DISCHARGE SUMMARY    Patient Name: Regulo Sepulveda  : 1965  MRN: [id number]    Date of Admission: 3/23/2024  Date of Discharge:  3/25/2024  Primary Care Physician: Dickson Landry MD    Consults:  Cardiology  Psychiatry    Active and Resolved Hospital Problems:  Dyspnea with exertion   Acute on chronic systolic and diastolic heart failure, EF 20% in 2023  Nonischemic dilated cardiomyopathy  Pulmonary hypertension  Hypertension  Hyperlipidemia  Type 2 diabetes mellitus  A-fib on Eliquis  Apical thrombus in the left ventricle  COPD  Bipolar disorder  Anxiety  Schizophrenia      Hospital Course     Hospital Course:  Regulo Sepulveda is a 58 y.o. male with a past medical history of hypertension, hyperlipidemia, diabetes, A-fib on Eliquis, combined systolic and diastolic heart failure, EF 20% in 2023, apical thrombus in the left ventricle, COPD presented to the ED for evaluation of shortness of breath.  Patient recently discharged from [facility], around 1 week prior for shortness of breath.  Patient denies any changes to his medication on discharge.  Patient states his dyspnea started quick after leaving the hospital.  Patient endorses progressively worsening shortness of breath associated with orthopnea and mild left-sided chest pain nonradiating.  Endorses taking all medications as prescribed.  Patient received IV diuretics while in the hospital, not requiring supplemental oxygen.  Discussed with cardiology, patient has a history of no-shows to appointments.  Stressed heavily with patient he needs to follow-up with his appointments with cardiology as well as taking his medications as prescribed.  Patient will be scheduled to follow-up with cardiology as well as heart failure clinic as an outpatient.  While inpatient he endorses suicidal thoughts with a plan.  Patient was seen by psychiatry patient's home venlafaxine was increased, this new prescription sent  on discharge.  But otherwise cleared from psychiatric standpoint to discharge home safely.  Patient seen on date of discharge, clinically and hemodynamically stable.  Patient provided concerning signs and symptoms prompting immediate medical attention, patient understanding and agreeable    DISCHARGE Follow Up Recommendations for labs and diagnostics:   Follow-up PCP soon as possible  Follow-up with heart failure clinic  Follow-up with cardiologist as scheduled as outpatient      Day of Discharge     Vital Signs:  Temp:  [97.3 °F (36.3 °C)-97.9 °F (36.6 °C)] 97.9 °F (36.6 °C)  Heart Rate:  [] 88  Resp:  [16] 16  BP: (118-134)/(79-92) 119/89  Physical Exam:               Constitutional: Awake, alert, no acute distress              Eyes: Pupils equal, sclerae anicteric, no conjunctival injection              HENT: NCAT, mucous membranes moist              Neck: Supple, no thyromegaly, no lymphadenopathy, trachea midline              Respiratory: No bibasilar crackles, no wheezing              Cardiovascular: RRR, no murmurs, rubs, or gallops, palpable pedal pulses bilaterally              Gastrointestinal: Positive bowel sounds, soft, nontender, nondistended              Musculoskeletal: No bilateral ankle edema, no clubbing or cyanosis to extremities              Neurologic: Oriented x 3, speech clear              Skin: No rashes     Discharge Details        Discharge Medications        Changes to Medications        Instructions Start Date   venlafaxine XR 37.5 MG 24 hr capsule  Commonly known as: EFFEXOR-XR  What changed: how much to take   75 mg, Oral, Daily             Continue These Medications        Instructions Start Date   Abilify Maintena 300 MG Suspension Reconstituted ER IM injection ER  Generic drug: ARIPiprazole ER   Inject 300 mg into the appropriate muscle as directed by prescriber Every 30 (Thirty) Days.      albuterol sulfate  (90 Base) MCG/ACT inhaler  Commonly known as: PROVENTIL  HFA;VENTOLIN HFA;PROAIR HFA   2 puffs, Inhalation, Every 4 Hours PRN      apixaban 5 MG tablet tablet  Commonly known as: ELIQUIS   5 mg, Oral, 2 Times Daily      atorvastatin 40 MG tablet  Commonly known as: LIPITOR   40 mg, Oral, Every Night at Bedtime      carvedilol 25 MG tablet  Commonly known as: COREG   25 mg, Oral, Every 12 Hours Scheduled      divalproex 250 MG DR tablet  Commonly known as: Depakote   250 mg, Oral, Every Night at Bedtime      Farxiga 5 MG tablet tablet  Generic drug: dapagliflozin   5 mg, Oral, Daily      furosemide 40 MG tablet  Commonly known as: LASIX   40 mg, Oral, Daily      lisinopril 40 MG tablet  Commonly known as: PRINIVIL,ZESTRIL   40 mg, Oral, Every 24 Hours Scheduled      metFORMIN 1000 MG tablet  Commonly known as: GLUCOPHAGE   1,000 mg, Oral, 2 Times Daily      metoprolol succinate XL 25 MG 24 hr tablet  Commonly known as: TOPROL-XL   1 tablet, Oral, Daily      pantoprazole 40 MG EC tablet  Commonly known as: Protonix   40 mg, Oral, Daily               Allergies   Allergen Reactions    Penicillins Shortness Of Breath       Discharge Disposition:  Home or Self Care    Diet:  Hospital:  Diet Order   Procedures    Diet: Cardiac, Diabetic; Healthy Heart (2-3 Na+); Consistent Carbohydrate; Fluid Consistency: Thin (IDDSI 0)       Discharge Activity:   Activity Instructions       Activity as Tolerated              CODE STATUS:  Code Status and Medical Interventions:   Ordered at: 03/24/24 0251     Level Of Support Discussed With:    Patient     Code Status (Patient has no pulse and is not breathing):    CPR (Attempt to Resuscitate)     Medical Interventions (Patient has pulse or is breathing):    Full Support         Future Appointments   Date Time Provider Department Center   4/14/2024  7:00 PM [facility] SLEEP ROOM 4 [facility] SLEEP [facility]   4/15/2024  9:30 AM Lin Tamez APRN [facility]   7/17/2024  8:45 AM Veronica Lu APRN [facility] GE [facility]  Instructions for the Follow-ups that You Need to Schedule       Discharge Follow-up with PCP   As directed       Currently Documented PCP:    Dickson Landry MD    PCP Phone Number:    [phone]     Follow Up Details: In less than one week        Discharge Follow-up with Specified Provider: Dr Barroso; 2 Weeks   As directed      To: Dr Barroso   Follow Up: 2 Weeks                Pertinent  and/or Most Recent Results     PROCEDURES:   NA    LAB RESULTS:      Lab 03/25/24  0520 03/24/24  0348 03/23/24  2130   WBC 10.89* 8.91 10.03   HEMOGLOBIN 14.6 13.3 12.8*   HEMATOCRIT 49.4 42.5 41.3   PLATELETS 475* 426 395   NEUTROS ABS  --  5.53 7.08*   IMMATURE GRANS (ABS)  --  0.02 0.01   LYMPHS ABS  --  2.30 2.01   MONOS ABS  --  0.74 0.67   EOS ABS  --  0.23 0.16   MCV 92.7 86.9 88.4   PROTIME  --   --  15.8*         Lab 03/24/24  0348 03/23/24  2130   SODIUM 140 140   POTASSIUM 3.2* 3.9   CHLORIDE 100 101   CO2 26.7 24.3   ANION GAP 13.3 14.7   BUN 21* 20   CREATININE 1.05 1.13   EGFR 82.3 75.3   GLUCOSE 103* 190*   CALCIUM 9.1 9.1   MAGNESIUM 1.7 1.7   PHOSPHORUS 3.5  --          Lab 03/24/24  0348 03/23/24  2130   TOTAL PROTEIN 6.9 6.6   ALBUMIN 3.5 3.3*   GLOBULIN 3.4 3.3   ALT (SGPT) 44* 45*   AST (SGOT) 27 30   BILIRUBIN 0.6 0.6   ALK PHOS 249* 250*   LIPASE  --  39         Lab 03/23/24  2341 03/23/24  2130   PROBNP  --  27,932.0*   HSTROP T 23* 23*   PROTIME  --  15.8*   INR  --  1.23*                 Brief Urine Lab Results  (Last result in the past 365 days)        Color   Clarity   Blood   Leuk Est   Nitrite   Protein   CREAT   Urine HCG        08/27/23 1925 Yellow   Clear   Small (1+)   Negative   Negative   100 mg/dL (2+)                 Microbiology Results (last 10 days)       ** No results found for the last 240 hours. **            XR Chest 1 View    Result Date: 3/23/2024  Impression: Impression: Probably no acute infiltrate. No change in the mild to moderate cardiomegaly.   Electronically Signed By-Angel  MD Channing On:3/23/2024 10:58 PM               Results for orders placed during the hospital encounter of 03/23/24    Adult Transthoracic Echo Complete W/ Cont if Necessary Per Protocol    Interpretation Summary  Diffuse hypokinesis with severely reduced left ventricular systolic function ejection fraction around 25 to 30%.  Mild MR and mild TR.  Organized thrombus still present in the apex of the left ventricle.  Present on previous echo.      Labs Pending at Discharge:        Time spent on Discharge including face to face service:  38 minutes    Electronically signed by Felton Reynoso MD, 03/25/24, 3:11 PM EDT.

## 2024-03-25 NOTE — PROGRESS NOTES
[facility]   Cardiology Progress Note      Patient Name: Regulo Sepulveda  : 1965  MRN: [id number]  Primary Care Physician:  Dickson Landry MD  Referring Physician: No ref. provider found  Date of admission: 3/23/2024    Subjective   Subjective     Chief Complaint: Shortness of breath    HPI:  Regulo Sepulveda is a 58 y.o. male with history of cardiomyopathy and CHF.  Admitted with worsening CHF.  Improved now.        Objective    Objective     Vitals:   Vitals:    24 0245 24 0600 24 0625 24 0713   BP: 134/92   126/86   BP Location: Left arm   Right arm   Patient Position: Lying   Lying   Pulse: 91  92    Resp: 16  16 16   Temp:    97.6 °F (36.4 °C)   TempSrc:    Oral   SpO2: 96%  96% 97%   Weight:  64.5 kg (142 lb 3.2 oz)     Height:                Physical Exam:   Constitutional: Awake, alert, No acute distress    Eyes: PERRLA, sclerae anicteric, no conjunctival injection   HENT: NCAT, mucous membranes moist   Neck: Supple, no thyromegaly, no lymphadenopathy, trachea midline   Respiratory: Clear to auscultation bilaterally, nonlabored respirations    Cardiovascular: RRR, no murmurs, rubs, or gallops, palpable pedal pulses bilaterally        Current medications:  apixaban, 5 mg, Oral, BID  arformoterol, 15 mcg, Nebulization, BID - RT  atorvastatin, 40 mg, Oral, Nightly  budesonide, 0.5 mg, Nebulization, BID - RT  divalproex, 250 mg, Oral, Nightly  empagliflozin, 10 mg, Oral, Daily  furosemide, 40 mg, Intravenous, BID  insulin lispro, 2-7 Units, Subcutaneous, 4x Daily AC & at Bedtime  lisinopril, 40 mg, Oral, Q24H  metoprolol succinate XL, 25 mg, Oral, Daily  sodium chloride, 10 mL, Intravenous, Q12H  venlafaxine XR, 37.5 mg, Oral, Daily      Current IV drips:       Result Review    Result Review:  I have personally reviewed the results from the time of this admission to 3/25/2024 08:35 EDT and agree with these findings:  []  Laboratory  []  EKG/Telemetry   []  Cardiology/Vascular   []   Radiology         CBC          3/23/2024    21:30 3/24/2024    03:48 3/25/2024    05:20   CBC   WBC 10.03  8.91  10.89    RBC 4.67  4.89  5.33    Hemoglobin 12.8  13.3  14.6    Hematocrit 41.3  42.5  49.4    MCV 88.4  86.9  92.7    MCH 27.4  27.2  27.4    MCHC 31.0  31.3  29.6    RDW 15.6  15.4  15.4    Platelets 395  426  475      CMP          12/18/2023    04:19 3/23/2024    21:30 3/24/2024    03:48   CMP   Glucose 126  190  103    BUN 27  20  21    Creatinine 1.01  1.13  1.05    EGFR 86.2  75.3  82.3    Sodium 137  140  140    Potassium 3.4  3.9  3.2    Chloride 98  101  100    Calcium 8.7  9.1  9.1    Total Protein  6.6  6.9    Albumin  3.3  3.5    Globulin  3.3  3.4    Total Bilirubin  0.6  0.6    Alkaline Phosphatase  250  249    AST (SGOT)  30  27    ALT (SGPT)  45  44    Albumin/Globulin Ratio  1.0  1.0    BUN/Creatinine Ratio 26.7  17.7  20.0    Anion Gap 9.1  14.7  13.3      Results for orders placed during the hospital encounter of 03/23/24    Adult Transthoracic Echo Complete W/ Cont if Necessary Per Protocol    Interpretation Summary  Diffuse hypokinesis with severely reduced left ventricular systolic function ejection fraction around 25 to 30%.  Mild MR and mild TR.  Organized thrombus still present in the apex of the left ventricle.  Present on previous echo.     Results for orders placed during the hospital encounter of 09/17/23    Cardiac Catheterization/Vascular Study    Conclusion  OPERATORS  Ben Grant M.D. (Attending Cardiologist)      PROCEDURE PERFORMED  Ultrasound guided vascular access  Right heart catheterization  Coronary Angiogram  Left Heart Catheterization 87474  Moderate Sedation    INDICATIONS FOR PROCEDURE  58-year-old man with multiple cardiovascular risk factors presented with acute on chronic HFrEF exacerbation.  He has not had any previous ischemic work-up.  He also has pulmonary hypertension.  After discussing the risk and benefit of the procedure he was brought in for right  and left heart cath.    PROCEDURE IN DETAIL  Informed consent was obtained from the patient after explaining the risks, benefits, and alternative options of the procedure. After obtaining informed consent, the patient was brought to the cath lab and was prepped in a sterile fashion. Lidocaine 2% was used for local anesthesia into the right femoral venous access site. Right femoral vein was accessed using the micropuncture needle under ultrasound guidance and micropuncture wire advanced under flouroscopy. A 7 Lao vascular sheath was put into place percutaneously over guide-wire. Guide wires were removed. A 6Fr swan sydnee catheter was advanced to wedge position. RA, RV and PA and wedge pressures were recorded.  PA sat and arterial sats recorded.  The patient tolerated the procedure well without any complications.    Lidocaine 2% was used for local anesthesia into the right femoral arterial access site. The right femoral artery was accessed with a micropuncture needle via modified Seldinger technique under ultrasound guidance. A 6F was inserted successfully.  Afterwards, 6F JR4 and JL4 diagnostic catheters were advanced over a wire into the ascending aorta and were used to engage the ostia of the left main and RCA respectively. JR4 used to cross the AV and obtain LV pressures and gradient across the AV measured via pullback technique. Images of the right and left coronary systems were obtained. All the catheters were exchanged over a wire and subsequently removed. Angiogram of the femoral access site was obtained and did not show complications. The patient tolerated the procedure well without any complications. The pictures were reviewed at the end of the procedure. A Mynx closure device was applied for venous closure.  A 6 Maori Angio-Seal device was applied for arterial closure.    HEMODYNAMICS    RHC  RA 8/5, 4 mmHg  RV 55/3, 8 mmHg  PA 59/24, 40 mmHg  PCW 34/27, 26 mmHg  AO Sat 96%  PA Sat 69%    Lydia CO 4.9  L/min    Lydia CI 2.53 L/min/m²    SVR 1958 D/S   D/S    C  LV: 137/27, 31 mmHg  AO: 136/96, 116 mmHg  No significant gradient across the aortic valve during pullback of JR4 catheter.  LV gram was not performed due to recently available echocardiogram.    FINDINGS  Coronary Angiogram    Right dominant circulation    Left main: Left main is a large caliber vessel which gives rise to the Left Anterior Descending and the Left circumflex.  Left main coronary artery is angiographically free from any significant disease.    Left Anterior Descending Artery: LAD is a medium caliber vessel which gives rise to several septal perforators and several diagonal branches.  LAD is angiographically free from any significant disease.    Left Circumflex: Left circumflex artery gives rise to marginals.  Left circumflex artery is angiographically free from any significant disease.    Right Coronary Artery: The RCA is a large caliber dominant vessel gives rise to PDA and PLV.  RCA is angiographically free from any significant disease    ESTIMATED BLOOD LOSS:  10 ml    COMPLICATIONS:  None    PROCEDURE DATA:  Contrast Used: 24 cc  Sedation Time: 30 minutes    IMPRESSIONS  Non obstructive CAD.  Nonischemic idiopathic dilated cardiomyopathy.  Normal cardiac output and index  Significantly elevated LVEDP and wedge pressure.  Pulmonary hypertension due to volume overload  Significantly elevated systemic vascular resistance      RECOMMENDATIONS  -Afterload reduction and diuretics  -Uptitrate GDMT  -Abstinence from drug abuse    Electronically signed by Ben Grant MD, 09/17/23, 2:51 PM EDT.     Lab Results   Component Value Date    PROBNP 27,932.0 (H) 03/23/2024         Telemetry reviewed     Assessment / Plan     ASSESSMENT:    Acute exacerbation of CHF (congestive heart failure)  Acute on chronic systolic CHF secondary to nonischemic cardiomyopathy.  Nonischemic cardiomyopathy  LV thrombus      PLAN:  1.  Compliance with medications  and follow-up visit advised.  2.  Continue Eliquis.  3.  Continue lisinopril metoprolol.  4.  Might benefit from ICD implantation.  Follow-up in the office in 2 to 3 weeks.  5.  Continue p.o. Lasix on discharge    Electronically signed by Jaun Carreno MD, 03/25/24, 8:35 AM EDT.

## 2024-03-26 ENCOUNTER — TRANSITIONAL CARE MANAGEMENT TELEPHONE ENCOUNTER (OUTPATIENT)
Dept: CALL CENTER | Facility: HOSPITAL | Age: 59
End: 2024-03-26
Payer: COMMERCIAL

## 2024-03-26 NOTE — OUTREACH NOTE
Call Center TCM Note      Flowsheet Row Responses   [facility] patient discharged from? [facility]   Does the patient have one of the following disease processes/diagnoses(primary or secondary)? CHF   TCM attempt successful? No   Unsuccessful attempts Attempt 2   Call Status Voice mail issues             Kristen White RN    3/26/2024, 16:40 EDT

## 2024-03-26 NOTE — OUTREACH NOTE
Call Center TCM Note      Flowsheet Row Responses   [facility] patient discharged from? [facility]   Does the patient have one of the following disease processes/diagnoses(primary or secondary)? CHF   TCM attempt successful? No  [Note states to call pt only for f/u calls.]   Unsuccessful attempts Attempt 1   Call Status Voice mail issues             Kristen White RN    3/26/2024, 16:04 EDT

## 2024-03-27 ENCOUNTER — TRANSITIONAL CARE MANAGEMENT TELEPHONE ENCOUNTER (OUTPATIENT)
Dept: CALL CENTER | Facility: HOSPITAL | Age: 59
End: 2024-03-27
Payer: COMMERCIAL

## 2024-03-27 NOTE — OUTREACH NOTE
Call Center TCM Note      Flowsheet Row Responses   [facility] [place] patient discharged from? [facility]   Does the patient have one of the following disease processes/diagnoses(primary or secondary)? CHF   TCM attempt successful? Yes  [VR- Sister [facility]- however chart states to call Pt only]   Call start time 0946   Call end time 0952   Discharge diagnosis Acute exacerbation of CHF (congestive heart failure)   Meds reviewed with patient/caregiver? Yes   Is the patient having any side effects they believe may be caused by any medication additions or changes? No   Does the patient have all medications ordered at discharge? Yes   Is the patient taking all medications as directed (includes completed medication regime)? Yes   Comments HOSP DC FU appt 3/29/24 1030 am   Does the patient have an appointment with their PCP within 7-14 days of discharge? Yes   Has home health visited the patient within 72 hours of discharge? N/A   Psychosocial issues? No   Did the patient receive a copy of their discharge instructions? Yes   Nursing interventions Reviewed instructions with patient   What is the patient's perception of their health status since discharge? Improving   Nursing interventions Nurse provided patient education   Is the patient able to teach back signs and symptoms of worsening condition? (i.e. weight gain, shortness of air, etc.) Yes   Is the patient/caregiver able to teach back the hierarchy of who to call/visit for symptoms/problems? PCP, Specialist, Home health nurse, Urgent Care, ED, 911 Yes   CHF Zone this Call [facility]   TCM call completed? Yes   Wrap up additional comments Discussed with Pt s/s of CHF and to contact cardio when s/s first start. Pt reports he has been in the hospital over and over. Will reffer to [facility] for on going education and support.   Call end time 0952   Is the patient interested in additional calls from an ambulatory ? Yes   What is the reason the patient needs support from  an ACM? Disease Education, High Risk For ED/Readmission             Gela Sepulveda RN    3/27/2024, 09:53 EDT

## 2024-03-29 ENCOUNTER — TELEPHONE (OUTPATIENT)
Dept: CARDIOLOGY | Facility: CLINIC | Age: 59
End: 2024-03-29
Payer: COMMERCIAL

## 2024-03-29 ENCOUNTER — OFFICE VISIT (OUTPATIENT)
Dept: FAMILY MEDICINE CLINIC | Facility: CLINIC | Age: 59
End: 2024-03-29
Payer: COMMERCIAL

## 2024-03-29 VITALS
BODY MASS INDEX: 19.09 KG/M2 | WEIGHT: 144 LBS | TEMPERATURE: 97.3 F | OXYGEN SATURATION: 99 % | DIASTOLIC BLOOD PRESSURE: 84 MMHG | HEART RATE: 111 BPM | HEIGHT: 73 IN | SYSTOLIC BLOOD PRESSURE: 130 MMHG

## 2024-03-29 DIAGNOSIS — Z09 HOSPITAL DISCHARGE FOLLOW-UP: Primary | ICD-10-CM

## 2024-03-29 DIAGNOSIS — I50.20 HFREF (HEART FAILURE WITH REDUCED EJECTION FRACTION): ICD-10-CM

## 2024-03-29 DIAGNOSIS — E11.9 TYPE 2 DIABETES MELLITUS WITHOUT COMPLICATION, WITHOUT LONG-TERM CURRENT USE OF INSULIN: ICD-10-CM

## 2024-03-29 DIAGNOSIS — N40.1 BENIGN PROSTATIC HYPERPLASIA WITH POST-VOID DRIBBLING: ICD-10-CM

## 2024-03-29 DIAGNOSIS — J44.9 CHRONIC OBSTRUCTIVE PULMONARY DISEASE, UNSPECIFIED COPD TYPE: ICD-10-CM

## 2024-03-29 DIAGNOSIS — F20.9 SCHIZOPHRENIA, UNSPECIFIED TYPE: ICD-10-CM

## 2024-03-29 DIAGNOSIS — Z72.0 TOBACCO ABUSE: ICD-10-CM

## 2024-03-29 DIAGNOSIS — N39.43 BENIGN PROSTATIC HYPERPLASIA WITH POST-VOID DRIBBLING: ICD-10-CM

## 2024-03-29 DIAGNOSIS — E44.0 MODERATE PROTEIN-CALORIE MALNUTRITION: ICD-10-CM

## 2024-03-29 LAB
ALBUMIN SERPL-MCNC: 3.7 G/DL (ref 3.5–5.2)
ALBUMIN UR-MCNC: 18.3 MG/DL
ALBUMIN/GLOB SERPL: 1.2 G/DL
ALP SERPL-CCNC: 197 U/L (ref 39–117)
ALT SERPL W P-5'-P-CCNC: 34 U/L (ref 1–41)
ANION GAP SERPL CALCULATED.3IONS-SCNC: 10.5 MMOL/L (ref 5–15)
AST SERPL-CCNC: 31 U/L (ref 1–40)
BASOPHILS # BLD AUTO: 0.1 10*3/MM3 (ref 0–0.2)
BASOPHILS NFR BLD AUTO: 1.3 % (ref 0–1.5)
BILIRUB SERPL-MCNC: 0.5 MG/DL (ref 0–1.2)
BUN SERPL-MCNC: 22 MG/DL (ref 6–20)
BUN/CREAT SERPL: 15.8 (ref 7–25)
CALCIUM SPEC-SCNC: 9.4 MG/DL (ref 8.6–10.5)
CHLORIDE SERPL-SCNC: 104 MMOL/L (ref 98–107)
CO2 SERPL-SCNC: 26.5 MMOL/L (ref 22–29)
CREAT SERPL-MCNC: 1.39 MG/DL (ref 0.76–1.27)
DEPRECATED RDW RBC AUTO: 47 FL (ref 37–54)
EGFRCR SERPLBLD CKD-EPI 2021: 58.8 ML/MIN/1.73
EOSINOPHIL # BLD AUTO: 0.16 10*3/MM3 (ref 0–0.4)
EOSINOPHIL NFR BLD AUTO: 2.1 % (ref 0.3–6.2)
ERYTHROCYTE [DISTWIDTH] IN BLOOD BY AUTOMATED COUNT: 14.5 % (ref 12.3–15.4)
GLOBULIN UR ELPH-MCNC: 3.2 GM/DL
GLUCOSE SERPL-MCNC: 60 MG/DL (ref 65–99)
HBA1C MFR BLD: 6.4 % (ref 4.8–5.6)
HCT VFR BLD AUTO: 43.4 % (ref 37.5–51)
HGB BLD-MCNC: 13.6 G/DL (ref 13–17.7)
IMM GRANULOCYTES # BLD AUTO: 0.01 10*3/MM3 (ref 0–0.05)
IMM GRANULOCYTES NFR BLD AUTO: 0.1 % (ref 0–0.5)
LYMPHOCYTES # BLD AUTO: 1.69 10*3/MM3 (ref 0.7–3.1)
LYMPHOCYTES NFR BLD AUTO: 21.9 % (ref 19.6–45.3)
MCH RBC QN AUTO: 27.7 PG (ref 26.6–33)
MCHC RBC AUTO-ENTMCNC: 31.3 G/DL (ref 31.5–35.7)
MCV RBC AUTO: 88.4 FL (ref 79–97)
MONOCYTES # BLD AUTO: 0.64 10*3/MM3 (ref 0.1–0.9)
MONOCYTES NFR BLD AUTO: 8.3 % (ref 5–12)
NEUTROPHILS NFR BLD AUTO: 5.12 10*3/MM3 (ref 1.7–7)
NEUTROPHILS NFR BLD AUTO: 66.3 % (ref 42.7–76)
NRBC BLD AUTO-RTO: 0 /100 WBC (ref 0–0.2)
PLATELET # BLD AUTO: 542 10*3/MM3 (ref 140–450)
PMV BLD AUTO: 10.1 FL (ref 6–12)
POTASSIUM SERPL-SCNC: 4.6 MMOL/L (ref 3.5–5.2)
PROT SERPL-MCNC: 6.9 G/DL (ref 6–8.5)
RBC # BLD AUTO: 4.91 10*6/MM3 (ref 4.14–5.8)
SODIUM SERPL-SCNC: 141 MMOL/L (ref 136–145)
WBC NRBC COR # BLD AUTO: 7.72 10*3/MM3 (ref 3.4–10.8)

## 2024-03-29 PROCEDURE — 80053 COMPREHEN METABOLIC PANEL: CPT | Performed by: STUDENT IN AN ORGANIZED HEALTH CARE EDUCATION/TRAINING PROGRAM

## 2024-03-29 PROCEDURE — 83036 HEMOGLOBIN GLYCOSYLATED A1C: CPT | Performed by: STUDENT IN AN ORGANIZED HEALTH CARE EDUCATION/TRAINING PROGRAM

## 2024-03-29 PROCEDURE — 85025 COMPLETE CBC W/AUTO DIFF WBC: CPT | Performed by: STUDENT IN AN ORGANIZED HEALTH CARE EDUCATION/TRAINING PROGRAM

## 2024-03-29 PROCEDURE — 82043 UR ALBUMIN QUANTITATIVE: CPT | Performed by: STUDENT IN AN ORGANIZED HEALTH CARE EDUCATION/TRAINING PROGRAM

## 2024-03-29 RX ORDER — TAMSULOSIN HYDROCHLORIDE 0.4 MG/1
1 CAPSULE ORAL DAILY
Qty: 30 CAPSULE | Refills: 1 | Status: SHIPPED | OUTPATIENT
Start: 2024-03-29 | End: 2024-04-28

## 2024-03-29 NOTE — TELEPHONE ENCOUNTER
Spoke with pt sister and confirmed appt for Monday 4/1 @ 9:30 and to make sure to bring all medications.

## 2024-03-29 NOTE — PROGRESS NOTES
"Chief Complaint  Hospital Follow Up Visit    Subjective      History of Present Illness    Regulo Sepulveda is a 58 y.o. male who presents to [facility]   with a past medical history of combined systolic and diastolic HFrEF 20%, LV thrombus on Eliquis, AFib, COPD, Presents or hospital follow-up today, he was admitted multiple times in the last 2 months of CHF exacerbation, he also has a history of non-adherence and he is not bale to read on his own.  His sister helps him with medications and getting to and from appointments today she is not her with him,  I asked him to bring all his medications with him today and he again did not bring them. He w3as just discharged from [facility] Monday and he states he feels much better today. He does not have food stamps yet and he states he can not afford to eat and that worries him today.  He also forgot to bring his Abilify injections today.  He is requesting medication for \"stress\", he states he was on klonopin in the past and it helped him tremendously. He complains of LUTS today he is not sure if he ever tried flomax.       Objective   Vital Signs:   Vitals:    03/29/24 1031   BP: 130/84   Pulse: 111   Temp: 97.3 °F (36.3 °C)   SpO2: 99%   Weight: 65.3 kg (144 lb)   Height: 185.4 cm (73\")     Body mass index is 19 kg/m².    Wt Readings from Last 3 Encounters:   03/29/24 65.3 kg (144 lb)   03/25/24 64.5 kg (142 lb 3.2 oz)   03/21/24 69 kg (152 lb 1.6 oz)     BP Readings from Last 3 Encounters:   03/29/24 130/84   03/25/24 119/89   03/21/24 144/88       Health Maintenance   Topic Date Due    URINE MICROALBUMIN  Never done    COLORECTAL CANCER SCREENING  Never done    DIABETIC EYE EXAM  Never done    ANNUAL PHYSICAL  Never done    DIABETIC FOOT EXAM  Never done    Hepatitis B (2 of 3 - 19+ 3-dose series) 07/19/2022    Pneumococcal Vaccine 0-64 (2 of 2 - PCV) 10/13/2022    COVID-19 Vaccine (4 - 2023-24 season) 09/01/2023    HEMOGLOBIN A1C  02/29/2024    " ZOSTER VACCINE (2 of 2) 01/31/2025 (Originally 8/16/2022)    LIPID PANEL  08/30/2024    LUNG CANCER SCREENING  03/11/2025    TDAP/TD VACCINES (3 - Td or Tdap) 06/21/2032    HEPATITIS C SCREENING  Completed    INFLUENZA VACCINE  Completed       Physical Exam   General: Hectic appearing chronically ill appears older than stated age AAO x3, no acute distress.  Eyes:  EOMI, PERRLA  Ears/Nose/Mouth/Throat:  Moist mucous membranes  Respiratory: CTABL, no wheezes rales or rhonchi  Cardiovascular:  RRR no murmur rubs or gallops  Gastrointestinal:  Abdomen soft nondistended nontender bowel sounds present x4 quadrants  Musculoskeletal:  Moves all 4 extremities spontaneously, no apparent weakness  Skin:  Warm, dry, no skin rashes or lesions  Neurologic:  AAO x3, cranial nerves 2-12 grossly intact, no focal neuro deficits  Psychiatric:  Normal mood and affect     Result Review :     The following data was reviewed by: Dickson Landry MD on 03/29/2024:      Procedures          Diagnoses and all orders for this visit:    1. Hospital discharge follow-up (Primary)    2. HFrEF (heart failure with reduced ejection fraction)    3. Tobacco abuse    4. Chronic obstructive pulmonary disease, unspecified COPD type    5. Type 2 diabetes mellitus without complication, without long-term current use of insulin  -     Hemoglobin A1c; Future  -     MicroAlbumin, Urine, Random - Urine, Clean Catch; Future  -     CBC w AUTO Differential; Future  -     Comprehensive metabolic panel; Future    6. Moderate protein-calorie malnutrition    7. Schizophrenia, unspecified type    8. Benign prostatic hyperplasia with post-void dribbling  -     Ambulatory Referral to Urology  -     tamsulosin (FLOMAX) 0.4 MG capsule 24 hr capsule; Take 1 capsule by mouth Daily for 30 days.  Dispense: 30 capsule; Refill: 1     Bring medication to next visit will see in 4 weeks, he has appt with Cardiology on 4/1/24.   He requests a referral to urology today.   Start  Flomax  A1c,cbc,cmp,urine micro.    BMI is within normal parameters. No other follow-up for BMI required.        31 minutes were spent caring for Regulo on this date of service. This time spent by me includes preparing for the visit, reviewing tests, obtaining/reviewing separately obtained history, performing medically appropriate exam/evaluation, counseling/educating the patient/family/caregiver, ordering medications/tests/procedures, referring/communicating with other health care professionals, documenting information in the medical record, independently interpreting results and communicating that with the patient/family/caregiver and/or care coordination.   FOLLOW UP  Return in about 2 weeks (around 4/12/2024).  Patient was given instructions and counseling regarding his condition or for health maintenance advice. Please see specific information pulled into the AVS if appropriate.       Dickson Landry MD  03/29/24  11:10 EDT    CURRENT & DISCONTINUED MEDICATIONS  Current Outpatient Medications   Medication Instructions    Abilify Maintena 300 MG Suspension Reconstituted ER IM injection ER Inject 300 mg into the appropriate muscle as directed by prescriber Every 30 (Thirty) Days.    albuterol sulfate  (90 Base) MCG/ACT inhaler 2 puffs, Inhalation, Every 4 Hours PRN    apixaban (ELIQUIS) 5 mg, Oral, 2 Times Daily    atorvastatin (LIPITOR) 40 mg, Oral, Every Night at Bedtime    carvedilol (COREG) 25 mg, Oral, Every 12 Hours Scheduled    divalproex (DEPAKOTE) 250 mg, Oral, Every Night at Bedtime    Farxiga 5 mg, Oral, Daily    furosemide (LASIX) 40 mg, Oral, Daily    lisinopril (PRINIVIL,ZESTRIL) 40 mg, Oral, Every 24 Hours Scheduled    metFORMIN (GLUCOPHAGE) 1,000 mg, Oral, 2 Times Daily    metoprolol succinate XL (TOPROL-XL) 25 MG 24 hr tablet 1 tablet, Oral, Daily    pantoprazole (PROTONIX) 40 mg, Oral, Daily    tamsulosin (FLOMAX) 0.4 mg, Oral, Daily    venlafaxine XR (EFFEXOR-XR) 75 mg, Oral, Daily        There are no discontinued medications.

## 2024-04-01 ENCOUNTER — PATIENT OUTREACH (OUTPATIENT)
Dept: CASE MANAGEMENT | Facility: OTHER | Age: 59
End: 2024-04-01
Payer: COMMERCIAL

## 2024-04-01 ENCOUNTER — TELEPHONE (OUTPATIENT)
Dept: CASE MANAGEMENT | Facility: OTHER | Age: 59
End: 2024-04-01
Payer: COMMERCIAL

## 2024-04-01 DIAGNOSIS — I50.43 ACUTE ON CHRONIC COMBINED SYSTOLIC AND DIASTOLIC CHF (CONGESTIVE HEART FAILURE): ICD-10-CM

## 2024-04-01 DIAGNOSIS — J44.9 CHRONIC OBSTRUCTIVE PULMONARY DISEASE, UNSPECIFIED COPD TYPE: Primary | ICD-10-CM

## 2024-04-01 NOTE — TELEPHONE ENCOUNTER
Called patient to discuss CCM program. Patient stated he is currently en route to Dr Barroso's office for a follow up cardiology appt. This RN stated she will call him tomorrow to discuss CCM program and discuss outcome from visit with Dr Barroso. Patient verbalized agreement.

## 2024-04-02 ENCOUNTER — PATIENT OUTREACH (OUTPATIENT)
Dept: CASE MANAGEMENT | Facility: OTHER | Age: 59
End: 2024-04-02
Payer: COMMERCIAL

## 2024-04-02 ENCOUNTER — APPOINTMENT (OUTPATIENT)
Dept: GENERAL RADIOLOGY | Facility: HOSPITAL | Age: 59
DRG: 291 | End: 2024-04-02
Payer: COMMERCIAL

## 2024-04-02 ENCOUNTER — READMISSION MANAGEMENT (OUTPATIENT)
Dept: CALL CENTER | Facility: HOSPITAL | Age: 59
End: 2024-04-02
Payer: COMMERCIAL

## 2024-04-02 ENCOUNTER — HOSPITAL ENCOUNTER (INPATIENT)
Facility: HOSPITAL | Age: 59
LOS: 2 days | Discharge: HOME HEALTH | DRG: 291 | End: 2024-04-06
Attending: EMERGENCY MEDICINE | Admitting: INTERNAL MEDICINE
Payer: COMMERCIAL

## 2024-04-02 DIAGNOSIS — I50.9 ACUTE ON CHRONIC CONGESTIVE HEART FAILURE, UNSPECIFIED HEART FAILURE TYPE: Primary | ICD-10-CM

## 2024-04-02 DIAGNOSIS — I50.43 ACUTE ON CHRONIC COMBINED SYSTOLIC AND DIASTOLIC CHF (CONGESTIVE HEART FAILURE): Primary | ICD-10-CM

## 2024-04-02 DIAGNOSIS — I10 HYPERTENSION, UNSPECIFIED TYPE: ICD-10-CM

## 2024-04-02 DIAGNOSIS — I51.3 LV (LEFT VENTRICULAR) MURAL THROMBUS: ICD-10-CM

## 2024-04-02 DIAGNOSIS — R79.89 ELEVATED TROPONIN: ICD-10-CM

## 2024-04-02 DIAGNOSIS — J44.9 CHRONIC OBSTRUCTIVE PULMONARY DISEASE, UNSPECIFIED COPD TYPE: ICD-10-CM

## 2024-04-02 DIAGNOSIS — I42.0 DILATED CARDIOMYOPATHY: ICD-10-CM

## 2024-04-02 LAB
ALBUMIN SERPL-MCNC: 3.7 G/DL (ref 3.5–5.2)
ALBUMIN/GLOB SERPL: 1.2 G/DL
ALP SERPL-CCNC: 286 U/L (ref 39–117)
ALT SERPL W P-5'-P-CCNC: 48 U/L (ref 1–41)
AMPHET+METHAMPHET UR QL: POSITIVE
ANION GAP SERPL CALCULATED.3IONS-SCNC: 13.7 MMOL/L (ref 5–15)
AST SERPL-CCNC: 34 U/L (ref 1–40)
BARBITURATES UR QL SCN: NEGATIVE
BASOPHILS # BLD AUTO: 0.07 10*3/MM3 (ref 0–0.2)
BASOPHILS NFR BLD AUTO: 0.7 % (ref 0–1.5)
BENZODIAZ UR QL SCN: NEGATIVE
BILIRUB SERPL-MCNC: 0.7 MG/DL (ref 0–1.2)
BUN SERPL-MCNC: 23 MG/DL (ref 6–20)
BUN/CREAT SERPL: 17.3 (ref 7–25)
CALCIUM SPEC-SCNC: 9.2 MG/DL (ref 8.6–10.5)
CANNABINOIDS SERPL QL: NEGATIVE
CHLORIDE SERPL-SCNC: 102 MMOL/L (ref 98–107)
CO2 SERPL-SCNC: 23.3 MMOL/L (ref 22–29)
COCAINE UR QL: NEGATIVE
CREAT SERPL-MCNC: 1.33 MG/DL (ref 0.76–1.27)
DEPRECATED RDW RBC AUTO: 50.5 FL (ref 37–54)
EGFRCR SERPLBLD CKD-EPI 2021: 62 ML/MIN/1.73
EOSINOPHIL # BLD AUTO: 0.04 10*3/MM3 (ref 0–0.4)
EOSINOPHIL NFR BLD AUTO: 0.4 % (ref 0.3–6.2)
ERYTHROCYTE [DISTWIDTH] IN BLOOD BY AUTOMATED COUNT: 15.7 % (ref 12.3–15.4)
FENTANYL UR-MCNC: NEGATIVE NG/ML
FLUAV SUBTYP SPEC NAA+PROBE: NOT DETECTED
FLUBV RNA ISLT QL NAA+PROBE: NOT DETECTED
GEN 5 2HR TROPONIN T REFLEX: 324 NG/L
GLOBULIN UR ELPH-MCNC: 3.2 GM/DL
GLUCOSE BLDC GLUCOMTR-MCNC: 106 MG/DL (ref 70–99)
GLUCOSE SERPL-MCNC: 135 MG/DL (ref 65–99)
HCT VFR BLD AUTO: 41.8 % (ref 37.5–51)
HGB BLD-MCNC: 13.2 G/DL (ref 13–17.7)
HOLD SPECIMEN: NORMAL
HOLD SPECIMEN: NORMAL
IMM GRANULOCYTES # BLD AUTO: 0.02 10*3/MM3 (ref 0–0.05)
IMM GRANULOCYTES NFR BLD AUTO: 0.2 % (ref 0–0.5)
LYMPHOCYTES # BLD AUTO: 1.64 10*3/MM3 (ref 0.7–3.1)
LYMPHOCYTES NFR BLD AUTO: 17.1 % (ref 19.6–45.3)
MCH RBC QN AUTO: 28.1 PG (ref 26.6–33)
MCHC RBC AUTO-ENTMCNC: 31.6 G/DL (ref 31.5–35.7)
MCV RBC AUTO: 89.1 FL (ref 79–97)
METHADONE UR QL SCN: NEGATIVE
MONOCYTES # BLD AUTO: 0.88 10*3/MM3 (ref 0.1–0.9)
MONOCYTES NFR BLD AUTO: 9.2 % (ref 5–12)
NEUTROPHILS NFR BLD AUTO: 6.96 10*3/MM3 (ref 1.7–7)
NEUTROPHILS NFR BLD AUTO: 72.4 % (ref 42.7–76)
NRBC BLD AUTO-RTO: 0 /100 WBC (ref 0–0.2)
NT-PROBNP SERPL-MCNC: ABNORMAL PG/ML (ref 0–900)
OPIATES UR QL: NEGATIVE
OXYCODONE UR QL SCN: NEGATIVE
PLATELET # BLD AUTO: 414 10*3/MM3 (ref 140–450)
PMV BLD AUTO: 10 FL (ref 6–12)
POTASSIUM SERPL-SCNC: 4.7 MMOL/L (ref 3.5–5.2)
PROT SERPL-MCNC: 6.9 G/DL (ref 6–8.5)
RBC # BLD AUTO: 4.69 10*6/MM3 (ref 4.14–5.8)
RSV RNA NPH QL NAA+NON-PROBE: NOT DETECTED
SARS-COV-2 RNA RESP QL NAA+PROBE: NOT DETECTED
SODIUM SERPL-SCNC: 139 MMOL/L (ref 136–145)
TROPONIN T DELTA: -44 NG/L
TROPONIN T SERPL HS-MCNC: 368 NG/L
TSH SERPL DL<=0.05 MIU/L-ACNC: 1.46 UIU/ML (ref 0.27–4.2)
WBC NRBC COR # BLD AUTO: 9.61 10*3/MM3 (ref 3.4–10.8)
WHOLE BLOOD HOLD COAG: NORMAL
WHOLE BLOOD HOLD SPECIMEN: NORMAL

## 2024-04-02 PROCEDURE — 80307 DRUG TEST PRSMV CHEM ANLYZR: CPT | Performed by: INTERNAL MEDICINE

## 2024-04-02 PROCEDURE — 36415 COLL VENOUS BLD VENIPUNCTURE: CPT

## 2024-04-02 PROCEDURE — 71045 X-RAY EXAM CHEST 1 VIEW: CPT

## 2024-04-02 PROCEDURE — 85025 COMPLETE CBC W/AUTO DIFF WBC: CPT | Performed by: EMERGENCY MEDICINE

## 2024-04-02 PROCEDURE — G0378 HOSPITAL OBSERVATION PER HR: HCPCS

## 2024-04-02 PROCEDURE — 87637 SARSCOV2&INF A&B&RSV AMP PRB: CPT | Performed by: EMERGENCY MEDICINE

## 2024-04-02 PROCEDURE — 93005 ELECTROCARDIOGRAM TRACING: CPT

## 2024-04-02 PROCEDURE — 84484 ASSAY OF TROPONIN QUANT: CPT

## 2024-04-02 PROCEDURE — 84443 ASSAY THYROID STIM HORMONE: CPT | Performed by: INTERNAL MEDICINE

## 2024-04-02 PROCEDURE — 83880 ASSAY OF NATRIURETIC PEPTIDE: CPT

## 2024-04-02 PROCEDURE — 80053 COMPREHEN METABOLIC PANEL: CPT

## 2024-04-02 PROCEDURE — 93005 ELECTROCARDIOGRAM TRACING: CPT | Performed by: EMERGENCY MEDICINE

## 2024-04-02 PROCEDURE — 25010000002 FUROSEMIDE PER 20 MG: Performed by: EMERGENCY MEDICINE

## 2024-04-02 PROCEDURE — 99285 EMERGENCY DEPT VISIT HI MDM: CPT

## 2024-04-02 PROCEDURE — 99223 1ST HOSP IP/OBS HIGH 75: CPT | Performed by: INTERNAL MEDICINE

## 2024-04-02 PROCEDURE — 82948 REAGENT STRIP/BLOOD GLUCOSE: CPT

## 2024-04-02 RX ORDER — DEXTROSE MONOHYDRATE 25 G/50ML
25 INJECTION, SOLUTION INTRAVENOUS
Status: DISCONTINUED | OUTPATIENT
Start: 2024-04-02 | End: 2024-04-06 | Stop reason: HOSPADM

## 2024-04-02 RX ORDER — IBUPROFEN 600 MG/1
1 TABLET ORAL
Status: DISCONTINUED | OUTPATIENT
Start: 2024-04-02 | End: 2024-04-06 | Stop reason: HOSPADM

## 2024-04-02 RX ORDER — SODIUM CHLORIDE 0.9 % (FLUSH) 0.9 %
10 SYRINGE (ML) INJECTION AS NEEDED
Status: DISCONTINUED | OUTPATIENT
Start: 2024-04-02 | End: 2024-04-06 | Stop reason: HOSPADM

## 2024-04-02 RX ORDER — FUROSEMIDE 10 MG/ML
80 INJECTION INTRAMUSCULAR; INTRAVENOUS ONCE
Status: COMPLETED | OUTPATIENT
Start: 2024-04-02 | End: 2024-04-02

## 2024-04-02 RX ORDER — INSULIN LISPRO 100 [IU]/ML
2-7 INJECTION, SOLUTION INTRAVENOUS; SUBCUTANEOUS
Status: DISCONTINUED | OUTPATIENT
Start: 2024-04-02 | End: 2024-04-06 | Stop reason: HOSPADM

## 2024-04-02 RX ORDER — SODIUM CHLORIDE 9 MG/ML
40 INJECTION, SOLUTION INTRAVENOUS AS NEEDED
Status: DISCONTINUED | OUTPATIENT
Start: 2024-04-02 | End: 2024-04-06 | Stop reason: HOSPADM

## 2024-04-02 RX ORDER — FUROSEMIDE 10 MG/ML
80 INJECTION INTRAMUSCULAR; INTRAVENOUS ONCE
Status: DISCONTINUED | OUTPATIENT
Start: 2024-04-02 | End: 2024-04-02

## 2024-04-02 RX ORDER — FUROSEMIDE 10 MG/ML
40 INJECTION INTRAMUSCULAR; INTRAVENOUS
Status: DISCONTINUED | OUTPATIENT
Start: 2024-04-03 | End: 2024-04-05

## 2024-04-02 RX ORDER — NICOTINE POLACRILEX 4 MG
15 LOZENGE BUCCAL
Status: DISCONTINUED | OUTPATIENT
Start: 2024-04-02 | End: 2024-04-06 | Stop reason: HOSPADM

## 2024-04-02 RX ORDER — ACETAMINOPHEN 325 MG/1
650 TABLET ORAL EVERY 4 HOURS PRN
Status: DISCONTINUED | OUTPATIENT
Start: 2024-04-02 | End: 2024-04-06 | Stop reason: HOSPADM

## 2024-04-02 RX ORDER — SODIUM CHLORIDE 0.9 % (FLUSH) 0.9 %
10 SYRINGE (ML) INJECTION EVERY 12 HOURS SCHEDULED
Status: DISCONTINUED | OUTPATIENT
Start: 2024-04-02 | End: 2024-04-06 | Stop reason: HOSPADM

## 2024-04-02 RX ADMIN — APIXABAN 5 MG: 5 TABLET, FILM COATED ORAL at 21:59

## 2024-04-02 RX ADMIN — Medication 10 ML: at 21:59

## 2024-04-02 RX ADMIN — FUROSEMIDE 80 MG: 10 INJECTION, SOLUTION INTRAMUSCULAR; INTRAVENOUS at 20:01

## 2024-04-02 NOTE — ED PROVIDER NOTES
Time: 6:03 PM EDT  Date of encounter:  4/2/2024  Independent Historian/Clinical History and Information was obtained by:   Patient    History is limited by: N/A    Chief Complaint: Shortness of air      History of Present Illness:  Patient is a 58 y.o. year old male who presents to the emergency department for evaluation of shortness of air.  Patient has history of COPD and CHF presents to the emergency department by ambulance for increased shortness of air.    HPI    Patient Care Team  Primary Care Provider: Dickson Landry MD    Past Medical History:     Allergies   Allergen Reactions    Penicillins Shortness Of Breath     Past Medical History:   Diagnosis Date    Anxiety     Asthma     Bipolar affective     Cardiac tamponade     2023    CHF (congestive heart failure)     COPD (chronic obstructive pulmonary disease)     Coronary artery disease     Depression     Diabetes mellitus     Elevated cholesterol     Hypertension     Parkinson disease      Past Surgical History:   Procedure Laterality Date    CARDIAC CATHETERIZATION Right 9/17/2023    Procedure: Right and Left Heart Cath;  Surgeon: Ben Grant MD;  Location: [facility] CATH INVASIVE LOCATION;  Service: Cardiovascular;  Laterality: Right;    TESTICLE SURGERY Left     extraction     Family History   Problem Relation Age of Onset    Diabetes Mother     Depression Sister     Restless legs syndrome Sister     Insomnia Sister     Sleep walking Sister     Sleep disorder Sister         Night Terrors    Depression Brother        Home Medications:  Prior to Admission medications    Medication Sig Start Date End Date Taking? Authorizing Provider   Abilify Maintena 300 MG Suspension Reconstituted ER IM injection ER Inject 300 mg into the appropriate muscle as directed by prescriber Every 30 (Thirty) Days. 2/9/24   Provider, MD Yuliya   albuterol sulfate  (90 Base) MCG/ACT inhaler Inhale 2 puffs Every 4 (Four) Hours As Needed for Wheezing or Shortness of Air.  Indications: Chronic Obstructive Lung Disease 1/8/24   Dickson Landry MD   apixaban (ELIQUIS) 5 MG tablet tablet Take 1 tablet by mouth 2 (Two) Times a Day for 120 days. 2/22/24 6/21/24  Dickson Landry MD   atorvastatin (LIPITOR) 40 MG tablet Take 1 tablet by mouth every night at bedtime. 1/8/24   Dickson Landry MD   carvedilol (COREG) 25 MG tablet Take 1 tablet by mouth Every 12 (Twelve) Hours. 1/8/24   Dickson Landry MD   divalproex (Depakote) 250 MG DR tablet Take 1 tablet by mouth every night at bedtime for 30 days. 2/23/24 3/24/24  Hailey Workman PA-C   Farxiga 5 MG tablet tablet Take 1 tablet by mouth Daily. Indications: Type 2 Diabetes 2/22/24   Dickson Landry MD   furosemide (LASIX) 40 MG tablet Take 1 tablet by mouth Daily. 2/22/24   Dickson Landry MD   lisinopril (PRINIVIL,ZESTRIL) 40 MG tablet Take 1 tablet by mouth Daily. 2/22/24   Dickson Landry MD   metFORMIN (GLUCOPHAGE) 1000 MG tablet Take 1 tablet by mouth 2 (Two) Times a Day. 1/8/24   Dickson Landry MD   metoprolol succinate XL (TOPROL-XL) 25 MG 24 hr tablet Take 1 tablet by mouth Daily. 3/16/24   ProviderYuliya MD   pantoprazole (Protonix) 40 MG EC tablet Take 1 tablet by mouth Daily. 1/8/24   Dickson Landry MD   tamsulosin (FLOMAX) 0.4 MG capsule 24 hr capsule Take 1 capsule by mouth Daily for 30 days. 3/29/24 4/28/24  Dickson Landry MD   venlafaxine XR (EFFEXOR-XR) 37.5 MG 24 hr capsule Take 2 capsules by mouth Daily for 30 days. 3/25/24 4/24/24  Felton Reynoso MD        Social History:   Social History     Tobacco Use    Smoking status: Every Day     Current packs/day: 0.50     Average packs/day: 0.5 packs/day for 50.1 years (25.0 ttl pk-yrs)     Types: Cigarettes     Start date: 1/1/1974     Last attempt to quit: 11/2/2023    Smokeless tobacco: Never   Vaping Use    Vaping status: Former    Substances: Nicotine   Substance Use Topics    Alcohol use: Not Currently    Drug use: Yes     Types: Methamphetamines, Marijuana         Review of  "Systems:  Review of Systems   Constitutional:  Negative for chills and fever.   HENT:  Negative for congestion, rhinorrhea and sore throat.    Eyes:  Negative for pain and visual disturbance.   Respiratory:  Positive for shortness of breath. Negative for apnea, cough and chest tightness.    Cardiovascular:  Negative for chest pain and palpitations.   Gastrointestinal:  Negative for abdominal pain, diarrhea, nausea and vomiting.   Genitourinary:  Negative for difficulty urinating and dysuria.   Musculoskeletal:  Negative for joint swelling and myalgias.   Skin:  Negative for color change.   Neurological:  Negative for seizures and headaches.   Psychiatric/Behavioral: Negative.     All other systems reviewed and are negative.       Physical Exam:  /94   Pulse 93   Temp 97.7 °F (36.5 °C) (Oral)   Resp 20   Ht 185.4 cm (73\")   Wt 63.8 kg (140 lb 10.5 oz)   SpO2 98%   BMI 18.56 kg/m²     Physical Exam  Vitals and nursing note reviewed.   Constitutional:       General: He is not in acute distress.     Appearance: Normal appearance. He is not toxic-appearing.   HENT:      Head: Normocephalic and atraumatic.      Jaw: There is normal jaw occlusion.   Eyes:      General: Lids are normal.      Extraocular Movements: Extraocular movements intact.      Conjunctiva/sclera: Conjunctivae normal.      Pupils: Pupils are equal, round, and reactive to light.   Cardiovascular:      Rate and Rhythm: Normal rate and regular rhythm.      Pulses: Normal pulses.      Heart sounds: Normal heart sounds.   Pulmonary:      Effort: Pulmonary effort is normal. Tachypnea present. No respiratory distress.      Breath sounds: Normal breath sounds. No wheezing or rhonchi.   Abdominal:      General: Abdomen is flat.      Palpations: Abdomen is soft.      Tenderness: There is no abdominal tenderness. There is no guarding or rebound.   Musculoskeletal:         General: Normal range of motion.      Cervical back: Normal range of motion and " neck supple.      Right lower leg: No edema.      Left lower leg: No edema.   Skin:     General: Skin is warm and dry.   Neurological:      Mental Status: He is alert and oriented to person, place, and time. Mental status is at baseline.   Psychiatric:         Mood and Affect: Mood normal.                  Procedures:  Procedures      Medical Decision Making:      Comorbidities that affect care:    COPD, CHF    External Notes reviewed:    Previous Clinic Note: Family medicine office visit for heart failure and COPD management      The following orders were placed and all results were independently analyzed by me:  Orders Placed This Encounter   Procedures    COVID-19, FLU A/B, RSV PCR 1 HR TAT - Swab, Nasopharynx    XR Chest 1 View    [place] Draw    Comprehensive Metabolic Panel    BNP    Single High Sensitivity Troponin T    CBC Auto Differential    High Sensitivity Troponin T 2Hr    Urine Drug Screen - Urine, Clean Catch    NPO Diet NPO Type: Strict NPO    Undress & Gown    Continuous Pulse Oximetry    Vital Signs    Inpatient Hospitalist Consult    Oxygen Therapy- Nasal Cannula; Titrate 1-6 LPM Per SpO2; 90 - 95%    ECG 12 Lead ED Triage Standing Order; SOA    Insert Peripheral IV    CBC & Differential    Green Top (Gel)    Lavender Top    Gold Top - SST    Light Blue Top       Medications Given in the Emergency Department:  Medications   sodium chloride 0.9 % flush 10 mL (has no administration in time range)   furosemide (LASIX) injection 80 mg (80 mg Intravenous Not Given 4/2/24 1756)        ED Course:         Labs:    Lab Results (last 24 hours)       Procedure Component Value Units Date/Time    COVID-19, FLU A/B, RSV PCR 1 HR TAT - Swab, Nasopharynx [[id number]]  (Normal) Collected: 04/02/24 1327    Specimen: Swab from Nasopharynx Updated: 04/02/24 1505     COVID19 Not Detected     Influenza A PCR Not Detected     Influenza B PCR Not Detected     RSV, PCR Not Detected    Narrative:      Fact sheet for  providers: https://www.fda.gov/media/606997/download    Fact sheet for patients: https://www.fda.gov/media/085992/download    Test performed by PCR.    CBC & Differential [[id number]]  (Abnormal) Collected: 04/02/24 1350    Specimen: Blood Updated: 04/02/24 1353    Narrative:      The following orders were created for panel order CBC & Differential.  Procedure                               Abnormality         Status                     ---------                               -----------         ------                     CBC Auto Differential[[id number]]        Abnormal            Final result                 Please view results for these tests on the individual orders.    Comprehensive Metabolic Panel [[id number]]  (Abnormal) Collected: 04/02/24 1350    Specimen: Blood Updated: 04/02/24 1438     Glucose 135 mg/dL      BUN 23 mg/dL      Creatinine 1.33 mg/dL      Sodium 139 mmol/L      Potassium 4.7 mmol/L      Chloride 102 mmol/L      CO2 23.3 mmol/L      Calcium 9.2 mg/dL      Total Protein 6.9 g/dL      Albumin 3.7 g/dL      ALT (SGPT) 48 U/L      AST (SGOT) 34 U/L      Alkaline Phosphatase 286 U/L      Total Bilirubin 0.7 mg/dL      Globulin 3.2 gm/dL      A/G Ratio 1.2 g/dL      BUN/Creatinine Ratio 17.3     Anion Gap 13.7 mmol/L      eGFR 62.0 mL/min/1.73     Narrative:      GFR Normal >60  Chronic Kidney Disease <60  Kidney Failure <15      BNP [[id number]]  (Abnormal) Collected: 04/02/24 1350    Specimen: Blood Updated: 04/02/24 1517     proBNP 30,138.0 pg/mL     Narrative:      This assay is used as an aid in the diagnosis of individuals suspected of having heart failure. It can be used as an aid in the diagnosis of acute decompensated heart failure (ADHF) in patients presenting with signs and symptoms of ADHF to the emergency department (ED). In addition, NT-proBNP of <300 pg/mL indicates ADHF is not likely.    Age Range Result Interpretation  NT-proBNP Concentration (pg/mL:      <50             Positive             >450                   Gray                 300-450                    Negative             <300    50-75           Positive            >900                  Gray                300-900                  Negative            <300      >75             Positive            >1800                  Gray                300-1800                  Negative            <300    Single High Sensitivity Troponin T [[id number]]  (Abnormal) Collected: 04/02/24 1350    Specimen: Blood Updated: 04/02/24 1458     HS Troponin T 368 ng/L     Narrative:      High Sensitive Troponin T Reference Range:  <14.0 ng/L- Negative Female for AMI  <22.0 ng/L- Negative Male for AMI  >=14 - Abnormal Female indicating possible myocardial injury.  >=22 - Abnormal Male indicating possible myocardial injury.   Clinicians would have to utilize clinical acumen, EKG, Troponin, and serial changes to determine if it is an Acute Myocardial Infarction or myocardial injury due to an underlying chronic condition.         CBC Auto Differential [[id number]]  (Abnormal) Collected: 04/02/24 1350    Specimen: Blood Updated: 04/02/24 1353     WBC 9.61 10*3/mm3      RBC 4.69 10*6/mm3      Hemoglobin 13.2 g/dL      Hematocrit 41.8 %      MCV 89.1 fL      MCH 28.1 pg      MCHC 31.6 g/dL      RDW 15.7 %      RDW-SD 50.5 fl      MPV 10.0 fL      Platelets 414 10*3/mm3      Neutrophil % 72.4 %      Lymphocyte % 17.1 %      Monocyte % 9.2 %      Eosinophil % 0.4 %      Basophil % 0.7 %      Immature Grans % 0.2 %      Neutrophils, Absolute 6.96 10*3/mm3      Lymphocytes, Absolute 1.64 10*3/mm3      Monocytes, Absolute 0.88 10*3/mm3      Eosinophils, Absolute 0.04 10*3/mm3      Basophils, Absolute 0.07 10*3/mm3      Immature Grans, Absolute 0.02 10*3/mm3      nRBC 0.0 /100 WBC     High Sensitivity Troponin T 2Hr [[id number]]  (Abnormal) Collected: 04/02/24 1557    Specimen: Blood from Arm, Left Updated: 04/02/24 1642     HS Troponin T 324 ng/L      Troponin T Delta -44 ng/L      Narrative:      High Sensitive Troponin T Reference Range:  <14.0 ng/L- Negative Female for AMI  <22.0 ng/L- Negative Male for AMI  >=14 - Abnormal Female indicating possible myocardial injury.  >=22 - Abnormal Male indicating possible myocardial injury.   Clinicians would have to utilize clinical acumen, EKG, Troponin, and serial changes to determine if it is an Acute Myocardial Infarction or myocardial injury due to an underlying chronic condition.                  Imaging:    XR Chest 1 View    Result Date: 4/2/2024   DATE OF EXAM: 4/2/2024 1:15 PM  PROCEDURE: XR CHEST 1 VW-  INDICATIONS: SOA Triage Protocol  COMPARISON: 3/23/2024 and prior  TECHNIQUE: Portable Chest  FINDINGS:  Study is limited by overlying support and monitoring apparatus. Heart size is enlarged, stable. Pulmonary vascularity appears within normal limits. No focal consolidation or significant pleural effusion noted. Osseous structures are unremarkable      IMPRESSION : Cardiomegaly  No focal consolidation [  Electronically Signed By-Alireza Noyola On:4/2/2024 1:44 PM         Differential Diagnosis and Discussion:    Dyspnea: Differential diagnosis includes but is not limited to metabolic acidosis, neurological disorders, psychogenic, asthma, pneumothorax, upper airway obstruction, COPD, pneumonia, noncardiogenic pulmonary edema, interstitial lung disease, anemia, congestive heart failure, and pulmonary embolism    All labs were reviewed and interpreted by me.  All X-rays impressions were independently interpreted by me.  EKG was interpreted by me.    MDM  Number of Diagnoses or Management Options  Acute on chronic congestive heart failure, unspecified heart failure type  Elevated troponin  Diagnosis management comments: In summary this is a 58-year-old male patient with a history of COPD and CHF who presents to the emergency department for evaluation of shortness of breath.  CBC independently reviewed by me and shows no critical  abnormalities.  CMP independently reviewed by me and shows no critical abnormalities.  BNP elevated, sensitive troponin elevated likely due to cardiac stretching.  Minimal edema on chest x-ray.  Patient was ordered IV Lasix.  Patient case has been discussed with the hospitalist team who will admit to the hospital for further evaluation and continuation of treatment.       Amount and/or Complexity of Data Reviewed  Decide to obtain previous medical records or to obtain history from someone other than the patient: yes                 Patient Care Considerations:    CONSULT: I considered consulting cardiology, however no emergent indication      Consultants/Shared Management Plan:    Hospitalist: I have discussed the case with Dr. Persaud who agrees to accept the patient for admission.    Social Determinants of Health:    Patient is independent, reliable, and has access to care.       Disposition and Care Coordination:    Admit:   Through independent evaluation of the patient's history, physical, and imperical data, the patient meets criteria for inpatient admission to the hospital.        Final diagnoses:   Acute on chronic congestive heart failure, unspecified heart failure type   Elevated troponin        ED Disposition       ED Disposition   Decision to Admit    Condition   --    Comment   --               This medical record created using voice recognition software.             Maverick Del Cid MD  04/02/24 0238

## 2024-04-02 NOTE — ED NOTES
"While I was speaking to the provider, pt was overheard screaming and yelling into the hallway. When I went into his room, he slamming things in the bed and yelling that he wanted to leave. I informed him that I would get the AMA paperwork and return to take out his IV so he could leave. Pt then stated, \"you wont touch my IV, you bitch, I am leaving, and I hope your man beats you tonight\". Security called to bedside to assist.   "

## 2024-04-02 NOTE — ED NOTES
"I went in to speak with the patient to try to encourage him to take the lasix due to his heart condition. I informed him that he would not urinate on himself because we would bring in several urinals and he would have the call light at bedside if needed. Pt continued to refuse the lasix and then kicked the covers off the bed towards me and stated, \"I dont give a fuck, I am going to leave\". MD notified.   "

## 2024-04-02 NOTE — OUTREACH NOTE
AMBULATORY CASE MANAGEMENT NOTE    Name and Relationship of Patient/Support Person: YamilkaAurora - Emergency Contact    Patient Outreach    Called and spoke with patient's sister Aurora. She stated she is moving out of trailer with her brother. She stated the patient is living in a trailer with another roommate named Yun. Aurora stated the patient has discovered his insurance has been cancelled effective 4.1. Patient was at Dr Barroso's office when they were notified, so he did not have dr chappell completed. Aurora stated they are not sure why his insurance was cancelled, she is taking him today to try and get it reinstated quickly. Aurora stated that patient lives in a trailer. She stated the land is owned by the patient, however the trailer he rents is not. He received a payout and back paid the rent on the trailer for 2 years, but still owes rent currently.    Aurora stated he will not be able to afford to pay out of pocket for any services. This RN educated on HRCM program and Aurora stated he needs assistance. RN discussed follow up for next week to determine status of insurance. Placed  referral for additional support.     Aurora verbalized agreement to plan. Verified her cell and his cell to this RN.     Education Documentation  No documentation found.        Alesha HERBERT  Ambulatory Case Management    4/2/2024, 12:27 EDT          Alesha GOMEZ RN  Ambulatory Case Management

## 2024-04-02 NOTE — ED NOTES
Found pt sitting out by the exit doors slamped over, I went to check on pt and he was crying and had urinated all over himself. Pt said that he was told he only had 6 mo to live. Charge nurse called due to this nurse  did not know anything about the pt. Pt was then helped back to his room.

## 2024-04-02 NOTE — H&P
[facility] HISTORY AND PHYSICAL  Date: 2024   Patient Name: Regulo Sepulveda  : 1965  MRN: [id number]  Primary Care Physician:  Dickson Landry MD  Date of admission: 2024    Subjective   Subjective     Chief Complaint: Shortness of breath    HPI:    Regulo Sepulveda is a 58 y.o. male past medical history of heart failure reduced ejection fraction of 25 to 30%, apical thrombus on echo, anxiety/depression, COPD, nonobstructive coronary disease on cath from , type 2 diabetes, and schizophrenia who presents to the emergency department with shortness of breath    It is difficult to get a clear history from the patient as he is agitated and not cooperative.  He initially called one of the nurses derogatory terms and tried to leave AMA.  However, he was found sitting outside urinating on himself and asked to be taken back to his room.  States that he has been short of breath for a couple of days.  No lower extremity edema.  No cough.  No fever.  No chills.    Emergency department patient's vital signs are as follows: Temperature 97.7, pulse 93, respiratory is 20, blood pressure 120/94.  90% on room air.  CBC shows no significant abnormalities.  CMP shows a creatinine 1.33.  proBNP is slightly elevated at 30,000.  Troponin was initially 368 and trended down to 324.  Of note, the patient has had mildly elevated troponins in the past but nothing this high.  However the patient was cathed in 2023 with no obstructive disease.  EKG shows no ischemia but does show a mildly prolonged QTc.  Patient admitted to hospital for decompensation of heart failure.    All systems reviewed abnormal as noted above    Personal History     Past Medical History:  Heart failure with reduced ejection fraction with EF of 25 to 30%  Apical thrombus on echo from 3/29  Anxiety/depression  COPD  Nonobstructive coronary artery disease from cath done in 2023  Type 2  diabetes  Schizophrenia  Dyslipidemia  Hypertension    Past Surgical History:  Cardiac cath  Testicular surgery    Family History:   Depression and sleep disorder    Social History:   Smokes every day  No alcohol  Meth and marijuana    Home Medications:  ARIPiprazole ER, albuterol sulfate HFA, apixaban, atorvastatin, carvedilol, dapagliflozin, divalproex, furosemide, lisinopril, metFORMIN, metoprolol succinate XL, pantoprazole, tamsulosin, and venlafaxine XR    Allergies:  Allergies   Allergen Reactions   • Penicillins Shortness Of Breath           Objective   Objective     Vitals:   Temp:  [97.7 °F (36.5 °C)] 97.7 °F (36.5 °C)  Heart Rate:  [88-97] 93  Resp:  [18-20] 20  BP: (118-142)/() 123/94    Physical Exam    Constitutional: Chronically ill-appearing   Eyes: Pupils equal, sclerae anicteric, no conjunctival injection   HENT: NCAT, mucous membranes moist   Neck: Supple, no thyromegaly, no lymphadenopathy, trachea midline   Respiratory: Crackles in bases   Cardiovascular: RRR, no murmurs, rubs, or gallops, palpable pedal pulses bilaterally   Gastrointestinal: Positive bowel sounds, soft, nontender, nondistended   Musculoskeletal: No bilateral ankle edema, no clubbing or cyanosis to extremities   Psychiatric: Appropriate affect, cooperative   Neurologic: Oriented x 3, strength symmetric in all extremities, Cranial Nerves grossly intact to confrontation, speech clear   Skin: No rashes     Result Review    Result Review:  I have personally reviewed the results from the time of this admission to 4/2/2024 17:57 EDT and agree with these findings:  BNP 30    Troponin down trended to 60 8324  Creatinine 1.33    Assessment & Plan   Assessment / Plan     Assessment/Plan:   Acute systolic heart failure exacerbation with EF of 25 to 30%  Type 2 diabetes with hyperglycemia  Apical thrombus  Nonobstructive coronary disease on cath done in September 2023  Downtrending troponin  Affective disorder    Plan:  -- Admit to  hospital service  -- Patient had is a 80 IV Lasix in the ER  -- Will give 40 IV twice daily  -- Heart healthy diet  -- 2 g sodium restriction  -- Strict I's and O's  -- 2000 mL fluid restriction  -- Sliding scale for type 2 diabetes  -- Restart home medications once reconciled      DVT prophylaxis:  Eliquis        CODE STATUS:     Full code    Admission Status:  I believe this patient meets observation status.    Electronically signed by Quinten Persaud MD, 04/02/24, 5:57 PM EDT.

## 2024-04-02 NOTE — ED NOTES
"I went into the pts room to tell him I was going to print his AMA paperwork and we would help him our to the lobby. Pt then started yelling and stated \"well then when the fuck are you going to give me my medications, or are you going to stand there and keep yapping your mouth\". I informed him that I had spoken with the doctor and he said he could leave AMA. Pt then stated, \"well then give me my God damn medicine and I will just piss all over the floor and you can clean it bitch\". MD notified of pts change of mind on the lasix and admission.   "

## 2024-04-02 NOTE — ED NOTES
"Attempted to give Lasix to patient, patient refused due to not wanting to urinate \"every three seconds\" and he doesn't want \"to piss all over himself.\" Patient said he hasn't slept in 3 days and doesn't want to be interrupted. Provider notified.   "

## 2024-04-02 NOTE — ED NOTES
"Another nurse had gone into the patients room to administer the IV lasix. Pt refused the lasix stating he will not \"stay up all night pissing myself\". Nurse offered a bedside urinal. Pt still refused. MD notified.       "

## 2024-04-03 ENCOUNTER — APPOINTMENT (OUTPATIENT)
Dept: CT IMAGING | Facility: HOSPITAL | Age: 59
DRG: 291 | End: 2024-04-03
Payer: COMMERCIAL

## 2024-04-03 LAB
ALBUMIN SERPL-MCNC: 3.2 G/DL (ref 3.5–5.2)
ALBUMIN/GLOB SERPL: 1.1 G/DL
ALP SERPL-CCNC: 236 U/L (ref 39–117)
ALT SERPL W P-5'-P-CCNC: 42 U/L (ref 1–41)
ANION GAP SERPL CALCULATED.3IONS-SCNC: 12.6 MMOL/L (ref 5–15)
AST SERPL-CCNC: 30 U/L (ref 1–40)
BASOPHILS # BLD AUTO: 0.08 10*3/MM3 (ref 0–0.2)
BASOPHILS NFR BLD AUTO: 1 % (ref 0–1.5)
BILIRUB SERPL-MCNC: 1 MG/DL (ref 0–1.2)
BUN SERPL-MCNC: 22 MG/DL (ref 6–20)
BUN/CREAT SERPL: 17.2 (ref 7–25)
CALCIUM SPEC-SCNC: 8.5 MG/DL (ref 8.6–10.5)
CHLORIDE SERPL-SCNC: 104 MMOL/L (ref 98–107)
CO2 SERPL-SCNC: 24.4 MMOL/L (ref 22–29)
CREAT SERPL-MCNC: 1.28 MG/DL (ref 0.76–1.27)
DEPRECATED RDW RBC AUTO: 49.5 FL (ref 37–54)
EGFRCR SERPLBLD CKD-EPI 2021: 64.9 ML/MIN/1.73
EOSINOPHIL # BLD AUTO: 0.09 10*3/MM3 (ref 0–0.4)
EOSINOPHIL NFR BLD AUTO: 1.1 % (ref 0.3–6.2)
ERYTHROCYTE [DISTWIDTH] IN BLOOD BY AUTOMATED COUNT: 15.8 % (ref 12.3–15.4)
GLOBULIN UR ELPH-MCNC: 2.9 GM/DL
GLUCOSE BLDC GLUCOMTR-MCNC: 105 MG/DL (ref 70–99)
GLUCOSE BLDC GLUCOMTR-MCNC: 117 MG/DL (ref 70–99)
GLUCOSE BLDC GLUCOMTR-MCNC: 146 MG/DL (ref 70–99)
GLUCOSE BLDC GLUCOMTR-MCNC: 76 MG/DL (ref 70–99)
GLUCOSE BLDC GLUCOMTR-MCNC: 85 MG/DL (ref 70–99)
GLUCOSE BLDC GLUCOMTR-MCNC: 87 MG/DL (ref 70–99)
GLUCOSE SERPL-MCNC: 97 MG/DL (ref 65–99)
HCT VFR BLD AUTO: 40 % (ref 37.5–51)
HGB BLD-MCNC: 12.6 G/DL (ref 13–17.7)
IMM GRANULOCYTES # BLD AUTO: 0.02 10*3/MM3 (ref 0–0.05)
IMM GRANULOCYTES NFR BLD AUTO: 0.2 % (ref 0–0.5)
LYMPHOCYTES # BLD AUTO: 1.7 10*3/MM3 (ref 0.7–3.1)
LYMPHOCYTES NFR BLD AUTO: 20.5 % (ref 19.6–45.3)
MAGNESIUM SERPL-MCNC: 1.7 MG/DL (ref 1.6–2.6)
MCH RBC QN AUTO: 27.5 PG (ref 26.6–33)
MCHC RBC AUTO-ENTMCNC: 31.5 G/DL (ref 31.5–35.7)
MCV RBC AUTO: 87.1 FL (ref 79–97)
MONOCYTES # BLD AUTO: 0.89 10*3/MM3 (ref 0.1–0.9)
MONOCYTES NFR BLD AUTO: 10.7 % (ref 5–12)
NEUTROPHILS NFR BLD AUTO: 5.53 10*3/MM3 (ref 1.7–7)
NEUTROPHILS NFR BLD AUTO: 66.5 % (ref 42.7–76)
NRBC BLD AUTO-RTO: 0 /100 WBC (ref 0–0.2)
PLATELET # BLD AUTO: 373 10*3/MM3 (ref 140–450)
PMV BLD AUTO: 10.2 FL (ref 6–12)
POTASSIUM SERPL-SCNC: 3.7 MMOL/L (ref 3.5–5.2)
PROT SERPL-MCNC: 6.1 G/DL (ref 6–8.5)
QT INTERVAL: 373 MS
QTC INTERVAL: 468 MS
RBC # BLD AUTO: 4.59 10*6/MM3 (ref 4.14–5.8)
SODIUM SERPL-SCNC: 141 MMOL/L (ref 136–145)
TROPONIN T SERPL HS-MCNC: 416 NG/L
WBC NRBC COR # BLD AUTO: 8.31 10*3/MM3 (ref 3.4–10.8)

## 2024-04-03 PROCEDURE — 71260 CT THORAX DX C+: CPT

## 2024-04-03 PROCEDURE — 99233 SBSQ HOSP IP/OBS HIGH 50: CPT | Performed by: INTERNAL MEDICINE

## 2024-04-03 PROCEDURE — G0378 HOSPITAL OBSERVATION PER HR: HCPCS

## 2024-04-03 PROCEDURE — 85025 COMPLETE CBC W/AUTO DIFF WBC: CPT | Performed by: INTERNAL MEDICINE

## 2024-04-03 PROCEDURE — 84484 ASSAY OF TROPONIN QUANT: CPT | Performed by: PHYSICIAN ASSISTANT

## 2024-04-03 PROCEDURE — 93005 ELECTROCARDIOGRAM TRACING: CPT | Performed by: INTERNAL MEDICINE

## 2024-04-03 PROCEDURE — 25510000001 IOPAMIDOL PER 1 ML: Performed by: INTERNAL MEDICINE

## 2024-04-03 PROCEDURE — 80053 COMPREHEN METABOLIC PANEL: CPT | Performed by: INTERNAL MEDICINE

## 2024-04-03 PROCEDURE — 82948 REAGENT STRIP/BLOOD GLUCOSE: CPT

## 2024-04-03 PROCEDURE — 94799 UNLISTED PULMONARY SVC/PX: CPT

## 2024-04-03 PROCEDURE — 83735 ASSAY OF MAGNESIUM: CPT | Performed by: INTERNAL MEDICINE

## 2024-04-03 PROCEDURE — 25010000002 FUROSEMIDE PER 20 MG: Performed by: INTERNAL MEDICINE

## 2024-04-03 RX ORDER — FUROSEMIDE 40 MG/1
40 TABLET ORAL 2 TIMES DAILY
COMMUNITY

## 2024-04-03 RX ORDER — CARVEDILOL 6.25 MG/1
6.25 TABLET ORAL 2 TIMES DAILY
Status: DISCONTINUED | OUTPATIENT
Start: 2024-04-03 | End: 2024-04-06 | Stop reason: HOSPADM

## 2024-04-03 RX ORDER — VENLAFAXINE HYDROCHLORIDE 75 MG/1
75 CAPSULE, EXTENDED RELEASE ORAL
Status: DISCONTINUED | OUTPATIENT
Start: 2024-04-04 | End: 2024-04-06 | Stop reason: HOSPADM

## 2024-04-03 RX ORDER — BUDESONIDE 0.5 MG/2ML
0.5 INHALANT ORAL
Status: DISCONTINUED | OUTPATIENT
Start: 2024-04-03 | End: 2024-04-06 | Stop reason: HOSPADM

## 2024-04-03 RX ORDER — ARFORMOTEROL TARTRATE 15 UG/2ML
15 SOLUTION RESPIRATORY (INHALATION)
Status: DISCONTINUED | OUTPATIENT
Start: 2024-04-03 | End: 2024-04-06 | Stop reason: HOSPADM

## 2024-04-03 RX ORDER — METOPROLOL SUCCINATE 25 MG/1
25 TABLET, EXTENDED RELEASE ORAL DAILY
Status: DISCONTINUED | OUTPATIENT
Start: 2024-04-03 | End: 2024-04-03

## 2024-04-03 RX ORDER — TAMSULOSIN HYDROCHLORIDE 0.4 MG/1
0.4 CAPSULE ORAL NIGHTLY
Status: DISCONTINUED | OUTPATIENT
Start: 2024-04-03 | End: 2024-04-06 | Stop reason: HOSPADM

## 2024-04-03 RX ORDER — DIVALPROEX SODIUM 250 MG/1
250 TABLET, DELAYED RELEASE ORAL DAILY
COMMUNITY

## 2024-04-03 RX ORDER — ATORVASTATIN CALCIUM 40 MG/1
40 TABLET, FILM COATED ORAL NIGHTLY
Status: DISCONTINUED | OUTPATIENT
Start: 2024-04-03 | End: 2024-04-06 | Stop reason: HOSPADM

## 2024-04-03 RX ORDER — IPRATROPIUM BROMIDE AND ALBUTEROL SULFATE 2.5; .5 MG/3ML; MG/3ML
3 SOLUTION RESPIRATORY (INHALATION) EVERY 4 HOURS PRN
Status: DISCONTINUED | OUTPATIENT
Start: 2024-04-03 | End: 2024-04-06 | Stop reason: HOSPADM

## 2024-04-03 RX ADMIN — CARVEDILOL 6.25 MG: 6.25 TABLET, FILM COATED ORAL at 14:38

## 2024-04-03 RX ADMIN — IOPAMIDOL 85 ML: 755 INJECTION, SOLUTION INTRAVENOUS at 15:39

## 2024-04-03 RX ADMIN — Medication 10 ML: at 21:17

## 2024-04-03 RX ADMIN — TAMSULOSIN HYDROCHLORIDE 0.4 MG: 0.4 CAPSULE ORAL at 21:16

## 2024-04-03 RX ADMIN — APIXABAN 5 MG: 5 TABLET, FILM COATED ORAL at 08:00

## 2024-04-03 RX ADMIN — ATORVASTATIN CALCIUM 40 MG: 40 TABLET, FILM COATED ORAL at 21:16

## 2024-04-03 RX ADMIN — APIXABAN 5 MG: 5 TABLET, FILM COATED ORAL at 21:16

## 2024-04-03 RX ADMIN — FUROSEMIDE 40 MG: 10 INJECTION, SOLUTION INTRAMUSCULAR; INTRAVENOUS at 17:20

## 2024-04-03 RX ADMIN — CARVEDILOL 6.25 MG: 6.25 TABLET, FILM COATED ORAL at 21:16

## 2024-04-03 RX ADMIN — FUROSEMIDE 40 MG: 10 INJECTION, SOLUTION INTRAMUSCULAR; INTRAVENOUS at 08:00

## 2024-04-03 NOTE — PLAN OF CARE
Goal Outcome Evaluation:  Plan of Care Reviewed With: patient        Progress: no change  Outcome Evaluation: Pt admitted to floor last night. He is agreeable to treatment plan. Good urinary output following IV Lasix. Mora Brady RN

## 2024-04-03 NOTE — OUTREACH NOTE
CHF Week 2 Survey      Flowsheet Row Responses   [facility] facility patient discharged from? [facility]   Does the patient have one of the following disease processes/diagnoses(primary or secondary)? CHF   Week 2 attempt successful? No   Unsuccessful attempts Attempt 1   Revoke Readmitted            KENA BURROWS - Registered Nurse

## 2024-04-03 NOTE — OUTREACH NOTE
AMBULATORY CASE MANAGEMENT NOTE    Name and Relationship of Patient/Support Person: Aurora Prather - Emergency Contact    CCM Interim Update  Attempted to contact patient. Sister stated they are on way to dr wiggins office for cardiology appt. Requested call back 4.2        Education Documentation  No documentation found.        Alesha HERBERT  Ambulatory Case Management    4/3/2024, 13:20 EDT

## 2024-04-03 NOTE — PROGRESS NOTES
[facility]   Hospitalist Progress Note       Patient Name: Regulo Sepulveda  : 1965  MRN: [id number]  Primary Care Physician: Dickson Landry MD  Date of admission: 2024  Today's Date: 4/3/2024  Room / Bed:   [facility]  Subjective   Chief Complaint: Shortness of breath     HPI:     Regulo Sepulveda is a 58 y.o. male past medical history of heart failure reduced ejection fraction of 25 to 30%, apical thrombus on echo, anxiety/depression, COPD, nonobstructive coronary disease on cath from , type 2 diabetes, and schizophrenia who presents to the emergency department with shortness of breath     It is difficult to get a clear history from the patient as he is agitated and not cooperative.  He initially called one of the nurses derogatory terms and tried to leave AMA.  However, he was found sitting outside urinating on himself and asked to be taken back to his room.  States that he has been short of breath for a couple of days.  No lower extremity edema.  No cough.  No fever.  No chills.     Emergency department patient's vital signs are as follows: Temperature 97.7, pulse 93, respiratory is 20, blood pressure 120/94.  90% on room air.  CBC shows no significant abnormalities.  CMP shows a creatinine 1.33.  proBNP is slightly elevated at 30,000.  Troponin was initially 368 and trended down to 324.  Of note, the patient has had mildly elevated troponins in the past but nothing this high.  However the patient was cathed in 2023 with no obstructive disease.  EKG shows no ischemia but does show a mildly prolonged QTc.  Patient admitted to hospital for decompensation of heart failure.       Interval Followup: 4/3/2024    Resting comfortably in bed  Normal sinus rhythm on telemetry  Patient concerned about CORTEZ with ambulating short distances or stairs.  No orthopnea currently  Denies chest discomfort currently  Elevated troponins noted: 368 --> 324 --> 416  UA positive for methamphetamine        REVIEW OF  SYSTEMS:   CORTEZ  Objective   Temp:  [97.3 °F (36.3 °C)-98.2 °F (36.8 °C)] 97.3 °F (36.3 °C)  Heart Rate:  [] 105  Resp:  [18-22] 18  BP: (118-140)/() 136/107  PHYSICAL EXAM   CON: WN. WD. NAD.  Poor dentition.  Cachectic.  NECK:  No thyromegaly. No stridor.   RESP:  CTA. No wheezes. No crackles.  No work of breathing or tachypnea.   CV:  Rhythm regular. Rate WNL. No murmur noted.  No edema.  GI:  Soft and nontender. Nondistended.    EXT: Peripheral pulses intact.  No cyanosis.  PSYCH:  Alert. Oriented. Normal affect and mood.  NEURO:  No dysarthria or aphasia. No unilateral weakness or paresthesia.  SKIN: No chronic venous stasis changes or varicosities.  No cellulitis  Results from last 7 days   Lab Units 04/03/24  0450 04/02/24  1350 03/29/24  1154   WBC 10*3/mm3 8.31 9.61 7.72   HEMOGLOBIN g/dL 12.6* 13.2 13.6   HEMATOCRIT % 40.0 41.8 43.4   PLATELETS 10*3/mm3 373 414 542*     Results from last 7 days   Lab Units 04/03/24  0450 04/02/24  1350 03/29/24  1154   SODIUM mmol/L 141 139 141   POTASSIUM mmol/L 3.7 4.7 4.6   CO2 mmol/L 24.4 23.3 26.5   CHLORIDE mmol/L 104 102 104   ANION GAP mmol/L 12.6 13.7 10.5   BUN mg/dL 22* 23* 22*   CREATININE mg/dL 1.28* 1.33* 1.39*   GLUCOSE mg/dL 97 135* 60*         COMPLEXITY OF DATA / DECISION MAKING     []  Moderate: One acute illness or mild exacerbation of chronic or 2 stable chronic or tx side effects   []  High:  Severe acute illness or severe exacerbation of chronic - potential for major debility / life threatening         I have personally reviewed the results from the time of this admission to 4/3/2024 13:35 EDT:  []  Laboratory:  []  Microbiology: []  Radiology:  []  Telemetry:   []  Cardiology/Vascular:  []  Pathology:  []  Prior external records:  []  Independent historian provided additional details:      []  Discussed case with specialists:    []  Independent interpretation of ECG/Imaging etc:             []  Moderate: Rx management, low risk surgery,  suboptimal social situation   []  High:  Rx with close monitoring for toxicity, mod-high risk surgery, DNR decision, Comfort initiated, IV pain meds    Assessment / Plan   Assessment:    Shortness of breath, CORTEZ  Elevated troponins greater than 400  History of dilated nonischemic cardiomyopathy (amphetamine associated?)  History of cardiac cath September 2023 (nonobstructive CAD)  COPD and continued tobacco use  History of apical thrombus; currently on Eliquis  Anxiety/depression/schizophrenia  DM  Substance abuse/amphetamine +     Plan:    Monitored bed  Proceed with CT chest with contrast; concern for PE  Continue diuresis  Trend troponins/ECG  Continue home Eliquis  Add carvedilol  Cardiology consulted  Add as needed DuoNebs.  Scheduled Brovana and Pulmicort.  Discussed plan with RN.  DVT prophylaxis:  Medical DVT prophylaxis orders are present.      CODE STATUS:      Level Of Support Discussed With: Patient  Code Status (Patient has no pulse and is not breathing): CPR (Attempt to Resuscitate)  Medical Interventions (Patient has pulse or is breathing): Full Support       Electronically signed by ARIEL Rivero, 04/03/24, 1:35 PM EDT.      Patient independently seen and evaluated, agree with assessment and plan, above documentation reflects plan put forth during bedside rounds.  More than 51% of the time of this patient encounter was performed by me.    Interval history:  No acute events overnight, patient states his breathing is improved, patient denies chest pain    GEN: No acute distress  HEENT: Moist mucous membranes  LUNGS: Equal chest rise bilaterally  CARDIAC: Regular rate and rhythm  NEURO: Moving all 4 extremities spontaneously  SKIN: No obvious breakdown    Plan:  Agree with assessment plan as above  Add carvedilol to help with blood pressure  Cardiac cath from 6 months ago reviewed, consistent with nonischemic cardiomyopathy  Patient should avoid amphetamines, positive UDS  Check CT scan of the  chest with contrast to rule out pulmonary embolism  Consult cardiology given persistently elevated troponins  Continue aggressive IV diuresis  CBC, CMP reviewed  Repeat CBC, CMP, mag and Phos in a.m.    Reviewed patients labs and imaging, and discussed with patient and nurse at bedside.    Time spent: Time spent involving patient care including face-to-face encounter 51 minutes      Electronically signed by Olayinka Manzo MD, 04/03/24, 2:04 PM EDT.

## 2024-04-04 PROBLEM — I42.9 CARDIOMYOPATHY: Status: ACTIVE | Noted: 2024-04-04

## 2024-04-04 LAB
ANION GAP SERPL CALCULATED.3IONS-SCNC: 10.1 MMOL/L (ref 5–15)
BUN SERPL-MCNC: 24 MG/DL (ref 6–20)
BUN/CREAT SERPL: 19.7 (ref 7–25)
CALCIUM SPEC-SCNC: 8.5 MG/DL (ref 8.6–10.5)
CHLORIDE SERPL-SCNC: 102 MMOL/L (ref 98–107)
CO2 SERPL-SCNC: 28.9 MMOL/L (ref 22–29)
CREAT SERPL-MCNC: 1.22 MG/DL (ref 0.76–1.27)
DEPRECATED RDW RBC AUTO: 46.7 FL (ref 37–54)
EGFRCR SERPLBLD CKD-EPI 2021: 68.7 ML/MIN/1.73
ERYTHROCYTE [DISTWIDTH] IN BLOOD BY AUTOMATED COUNT: 15.3 % (ref 12.3–15.4)
GLUCOSE BLDC GLUCOMTR-MCNC: 114 MG/DL (ref 70–99)
GLUCOSE BLDC GLUCOMTR-MCNC: 120 MG/DL (ref 70–99)
GLUCOSE BLDC GLUCOMTR-MCNC: 126 MG/DL (ref 70–99)
GLUCOSE BLDC GLUCOMTR-MCNC: 145 MG/DL (ref 70–99)
GLUCOSE SERPL-MCNC: 135 MG/DL (ref 65–99)
HCT VFR BLD AUTO: 39 % (ref 37.5–51)
HGB BLD-MCNC: 12.7 G/DL (ref 13–17.7)
MAGNESIUM SERPL-MCNC: 1.7 MG/DL (ref 1.6–2.6)
MCH RBC QN AUTO: 27.6 PG (ref 26.6–33)
MCHC RBC AUTO-ENTMCNC: 32.6 G/DL (ref 31.5–35.7)
MCV RBC AUTO: 84.8 FL (ref 79–97)
PLATELET # BLD AUTO: 330 10*3/MM3 (ref 140–450)
PMV BLD AUTO: 9.9 FL (ref 6–12)
POTASSIUM SERPL-SCNC: 3.1 MMOL/L (ref 3.5–5.2)
QT INTERVAL: 393 MS
QTC INTERVAL: 493 MS
RBC # BLD AUTO: 4.6 10*6/MM3 (ref 4.14–5.8)
SODIUM SERPL-SCNC: 141 MMOL/L (ref 136–145)
WBC NRBC COR # BLD AUTO: 7.49 10*3/MM3 (ref 3.4–10.8)

## 2024-04-04 PROCEDURE — 94799 UNLISTED PULMONARY SVC/PX: CPT

## 2024-04-04 PROCEDURE — 25010000002 MAGNESIUM SULFATE IN D5W 1G/100ML (PREMIX) 1-5 GM/100ML-% SOLUTION: Performed by: PHYSICIAN ASSISTANT

## 2024-04-04 PROCEDURE — 99233 SBSQ HOSP IP/OBS HIGH 50: CPT | Performed by: INTERNAL MEDICINE

## 2024-04-04 PROCEDURE — 82948 REAGENT STRIP/BLOOD GLUCOSE: CPT | Performed by: INTERNAL MEDICINE

## 2024-04-04 PROCEDURE — 25010000002 FUROSEMIDE PER 20 MG: Performed by: INTERNAL MEDICINE

## 2024-04-04 PROCEDURE — 82948 REAGENT STRIP/BLOOD GLUCOSE: CPT

## 2024-04-04 PROCEDURE — 80048 BASIC METABOLIC PNL TOTAL CA: CPT | Performed by: PHYSICIAN ASSISTANT

## 2024-04-04 PROCEDURE — 83735 ASSAY OF MAGNESIUM: CPT | Performed by: PHYSICIAN ASSISTANT

## 2024-04-04 PROCEDURE — 94640 AIRWAY INHALATION TREATMENT: CPT

## 2024-04-04 PROCEDURE — 85027 COMPLETE CBC AUTOMATED: CPT | Performed by: PHYSICIAN ASSISTANT

## 2024-04-04 RX ORDER — MAGNESIUM SULFATE 1 G/100ML
1 INJECTION INTRAVENOUS
Status: COMPLETED | OUTPATIENT
Start: 2024-04-04 | End: 2024-04-04

## 2024-04-04 RX ORDER — POTASSIUM CHLORIDE 750 MG/1
40 CAPSULE, EXTENDED RELEASE ORAL EVERY 4 HOURS
Status: COMPLETED | OUTPATIENT
Start: 2024-04-04 | End: 2024-04-04

## 2024-04-04 RX ADMIN — CARVEDILOL 6.25 MG: 6.25 TABLET, FILM COATED ORAL at 20:24

## 2024-04-04 RX ADMIN — TAMSULOSIN HYDROCHLORIDE 0.4 MG: 0.4 CAPSULE ORAL at 20:24

## 2024-04-04 RX ADMIN — Medication 10 ML: at 20:24

## 2024-04-04 RX ADMIN — BUDESONIDE 0.5 MG: 0.5 INHALANT RESPIRATORY (INHALATION) at 19:23

## 2024-04-04 RX ADMIN — APIXABAN 5 MG: 5 TABLET, FILM COATED ORAL at 09:20

## 2024-04-04 RX ADMIN — MAGNESIUM SULFATE 1 G: 1 INJECTION INTRAVENOUS at 16:45

## 2024-04-04 RX ADMIN — FUROSEMIDE 40 MG: 10 INJECTION, SOLUTION INTRAMUSCULAR; INTRAVENOUS at 18:01

## 2024-04-04 RX ADMIN — ATORVASTATIN CALCIUM 40 MG: 40 TABLET, FILM COATED ORAL at 20:24

## 2024-04-04 RX ADMIN — POTASSIUM CHLORIDE 40 MEQ: 10 CAPSULE, COATED, EXTENDED RELEASE ORAL at 20:23

## 2024-04-04 RX ADMIN — MAGNESIUM SULFATE 1 G: 1 INJECTION INTRAVENOUS at 15:37

## 2024-04-04 RX ADMIN — POTASSIUM CHLORIDE 40 MEQ: 10 CAPSULE, COATED, EXTENDED RELEASE ORAL at 11:59

## 2024-04-04 RX ADMIN — FUROSEMIDE 40 MG: 10 INJECTION, SOLUTION INTRAMUSCULAR; INTRAVENOUS at 09:20

## 2024-04-04 RX ADMIN — APIXABAN 5 MG: 5 TABLET, FILM COATED ORAL at 20:24

## 2024-04-04 RX ADMIN — CARVEDILOL 6.25 MG: 6.25 TABLET, FILM COATED ORAL at 09:20

## 2024-04-04 RX ADMIN — Medication 10 ML: at 09:21

## 2024-04-04 RX ADMIN — ARFORMOTEROL TARTRATE 15 MCG: 15 SOLUTION RESPIRATORY (INHALATION) at 19:23

## 2024-04-04 RX ADMIN — MAGNESIUM SULFATE 1 G: 1 INJECTION INTRAVENOUS at 18:01

## 2024-04-04 RX ADMIN — POTASSIUM CHLORIDE 40 MEQ: 10 CAPSULE, COATED, EXTENDED RELEASE ORAL at 16:45

## 2024-04-04 RX ADMIN — VENLAFAXINE HYDROCHLORIDE 75 MG: 75 CAPSULE, EXTENDED RELEASE ORAL at 09:20

## 2024-04-04 NOTE — PLAN OF CARE
Goal Outcome Evaluation:  Plan of Care Reviewed With: patient        Progress: no change  Outcome Evaluation: VSS.  NSR on telemetry.  denies pain.  rested well overnight.

## 2024-04-04 NOTE — PROGRESS NOTES
[facility]   Hospitalist Progress Note       Patient Name: Regulo Sepulveda  : 1965  MRN: [id number]  Primary Care Physician: Dickson Landry MD  Date of admission: 2024  Today's Date: 2024  Room / Bed:   [facility]  Subjective   Chief Complaint: Shortness of breath     HPI:     Regulo Sepulveda is a 58 y.o. male past medical history of heart failure reduced ejection fraction of 25 to 30%, apical thrombus on echo, anxiety/depression, COPD, nonobstructive coronary disease on cath from , type 2 diabetes, and schizophrenia who presents to the emergency department with shortness of breath     It is difficult to get a clear history from the patient as he is agitated and not cooperative.  He initially called one of the nurses derogatory terms and tried to leave AMA.  However, he was found sitting outside urinating on himself and asked to be taken back to his room.  States that he has been short of breath for a couple of days.  No lower extremity edema.  No cough.  No fever.  No chills.     Emergency department patient's vital signs are as follows: Temperature 97.7, pulse 93, respiratory is 20, blood pressure 120/94.  90% on room air.  CBC shows no significant abnormalities.  CMP shows a creatinine 1.33.  proBNP is slightly elevated at 30,000.  Troponin was initially 368 and trended down to 324.  Of note, the patient has had mildly elevated troponins in the past but nothing this high.  However the patient was cathed in 2023 with no obstructive disease.  EKG shows no ischemia but does show a mildly prolonged QTc.  Patient admitted to hospital for decompensation of heart failure.       Interval Followup: 2024    Resting comfortably in bed.  Up in room at times.  Shortness of breath getting better.  Had 23 beat run of NSVT this morning.  Patient denies palpitations lightheadedness or syncope.  No chest pains.  UA positive for methamphetamine  Tolerating Coreg.  /77.  On room air.        REVIEW OF  SYSTEMS:   CORTEZ  Objective   Temp:  [97.3 °F (36.3 °C)-98.2 °F (36.8 °C)] 97.5 °F (36.4 °C)  Heart Rate:  [69-97] 80  Resp:  [18-22] 18  BP: (113-124)/(74-83) 115/77  PHYSICAL EXAM   CON: WN. WD. NAD.  Poor dentition.  Cachectic.  NECK:  No thyromegaly. No stridor.   RESP:  CTA. No wheezes. No crackles.  No work of breathing or tachypnea.   CV:  Rhythm regular. Rate WNL. No murmur noted.  No edema.  GI:  Soft and nontender. Nondistended.    EXT: Peripheral pulses intact.  No cyanosis.  PSYCH:  Alert. Oriented. Normal affect and mood.  NEURO:  No dysarthria or aphasia. No unilateral weakness or paresthesia.  SKIN: No chronic venous stasis changes or varicosities.  No cellulitis  Results from last 7 days   Lab Units 04/04/24  0759 04/03/24  0450 04/02/24  1350 03/29/24  1154   WBC 10*3/mm3 7.49 8.31 9.61 7.72   HEMOGLOBIN g/dL 12.7* 12.6* 13.2 13.6   HEMATOCRIT % 39.0 40.0 41.8 43.4   PLATELETS 10*3/mm3 330 373 414 542*     Results from last 7 days   Lab Units 04/04/24  0759 04/03/24  0450 04/02/24  1350 03/29/24  1154   SODIUM mmol/L 141 141 139 141   POTASSIUM mmol/L 3.1* 3.7 4.7 4.6   CO2 mmol/L 28.9 24.4 23.3 26.5   CHLORIDE mmol/L 102 104 102 104   ANION GAP mmol/L 10.1 12.6 13.7 10.5   BUN mg/dL 24* 22* 23* 22*   CREATININE mg/dL 1.22 1.28* 1.33* 1.39*   GLUCOSE mg/dL 135* 97 135* 60*         COMPLEXITY OF DATA / DECISION MAKING     []  Moderate: One acute illness or mild exacerbation of chronic or 2 stable chronic or tx side effects   []  High:  Severe acute illness or severe exacerbation of chronic - potential for major debility / life threatening         I have personally reviewed the results from the time of this admission to 4/4/2024 10:41 EDT:  []  Laboratory:  []  Microbiology: []  Radiology:  []  Telemetry:   []  Cardiology/Vascular:  []  Pathology:  []  Prior external records:  []  Independent historian provided additional details:      []  Discussed case with specialists:    []  Independent  interpretation of ECG/Imaging etc:             []  Moderate: Rx management, low risk surgery, suboptimal social situation   []  High:  Rx with close monitoring for toxicity, mod-high risk surgery, DNR decision, Comfort initiated, IV pain meds    Assessment / Plan   Assessment:    Shortness of breath, CORTEZ  Elevated troponins greater than 400  History of dilated nonischemic cardiomyopathy (amphetamine associated?)  History of cardiac cath September 2023 (nonobstructive CAD)  COPD and continued tobacco use  History of apical thrombus; currently on Eliquis  Anxiety/depression/schizophrenia  DM  Substance abuse/amphetamine +  NSVT     Plan:    Monitored bed  Keep potassium >4 and magnesium >2  Continue carvedilol  Appreciate input from cardiology consult.  CT chest with contrast ... Negative for PE  Continue diuresis  Trend troponins/ECG  Continue home Eliquis  Add as needed DuoNebs.  Scheduled Brovana and Pulmicort.  Discussed plan with RN.  DVT prophylaxis:  Medical DVT prophylaxis orders are present.      CODE STATUS:      Level Of Support Discussed With: Patient  Code Status (Patient has no pulse and is not breathing): CPR (Attempt to Resuscitate)  Medical Interventions (Patient has pulse or is breathing): Full Support         Patient independently seen and evaluated, agree with assessment and plan, above documentation reflects plan put forth during bedside rounds.  More than 51% of the time of this patient encounter was performed by me.     Interval history:  No acute events overnight, patient resting comfortably in bed.     GEN: No acute distress  HEENT: Moist mucous membranes  LUNGS: Equal chest rise bilaterally  CARDIAC: Regular rate and rhythm  NEURO: Moving all 4 extremities spontaneously  SKIN: No obvious breakdown     Plan:  Agree with assessment plan as above  Continue carvedilol to help with blood pressure  Cardiac cath from 6 months ago reviewed, consistent with nonischemic cardiomyopathy  Patient should  avoid amphetamines, positive UDS, discussed abstaining from methamphetamine  CT scan reviewed, negative for PE  Consult cardiology given persistently elevated troponins and evaluation for AICD  Continue aggressive IV diuresis, no orthopnea  CBC, CMP reviewed 4/4/2024   Repeat CBC, CMP, mag and Phos in a.m. 4/4/2024      Reviewed patients labs and imaging, and discussed with patient and nurse at bedside.     Time spent: Time spent involving patient care including face-to-face encounter 52 minutes       Electronically signed by Olayinka Manzo MD, 04/04/24, 11:10 AM EDT.

## 2024-04-04 NOTE — CONSULTS
[facility]   Cardiology Consult Note    Patient Name: Regulo Sepulveda  : 1965  MRN: [id number]  Primary Care Physician:  Dickson Landry MD  Referring Physician: No ref. provider found  Date of admission: 2024    Subjective   Subjective     Reason for Consult/ Chief Complaint: Cardiomyopathy    HPI:  Regulo Sepulveda is a 58 y.o. male with history of nonischemic cardiomyopathy and CHF.,  Noncompliant.  No chest pain.  Shortness of breath is improved.  Alert oriented no distress.    Review of Systems:      Constitutional no fever,  no weight loss   Skin no rash   Otolaryngeal no difficulty swallowing   Cardiovascular See HPI   Pulmonary no cough, no sputum production   Gastrointestinal no constipation, no diarrhea   Genitourinary no dysuria, no hematuria   Hematologic no easy bruisability, no abnormal bleeding   Musculoskeletal no muscle pain   Neurologic no dizziness, no falls     Personal History       Past Medical/Surgical History:   Past Medical History:   Diagnosis Date   • Anxiety    • Asthma    • Bipolar affective    • Cardiac tamponade        • CHF (congestive heart failure)    • COPD (chronic obstructive pulmonary disease)    • Coronary artery disease    • Depression    • Diabetes mellitus    • Elevated cholesterol    • Hypertension    • Parkinson disease      Past Surgical History:   Procedure Laterality Date   • CARDIAC CATHETERIZATION Right 2023    Procedure: Right and Left Heart Cath;  Surgeon: Ben Grant MD;  Location: [facility] CATH INVASIVE LOCATION;  Service: Cardiovascular;  Laterality: Right;   • TESTICLE SURGERY Left     extraction         Family History: No Family History of CAD.    Social History:  reports that he has been smoking cigarettes. He started smoking about 50 years ago. He has a 25 pack-year smoking history. He has never used smokeless tobacco. He reports that he does not currently use alcohol. He reports current drug use. Drugs: Methamphetamines and  Marijuana.    Medications:  Facility-Administered Medications Prior to Admission   Medication Dose Route Frequency Provider Last Rate Last Admin   • ARIPiprazole ER (ABILIFY MAINTENA) IM prefilled syringe 300 mg  300 mg Intramuscular Q30 Days Dickson Landry MD       • ARIPiprazole ER (ABILIFY MAINTENA) IM prefilled syringe 400 mg  400 mg Intramuscular Q30 Days Dickson Landry MD         Medications Prior to Admission   Medication Sig Dispense Refill Last Dose   • Abilify Maintena 300 MG Suspension Reconstituted ER IM injection ER Inject 300 mg into the appropriate muscle as directed by prescriber Every 30 (Thirty) Days.   Unknown   • albuterol sulfate  (90 Base) MCG/ACT inhaler Inhale 2 puffs Every 4 (Four) Hours As Needed for Wheezing or Shortness of Air. Indications: Chronic Obstructive Lung Disease 18 g 0 Unknown   • apixaban (ELIQUIS) 5 MG tablet tablet Take 1 tablet by mouth 2 (Two) Times a Day for 120 days. 60 tablet 3 Unknown   • atorvastatin (LIPITOR) 40 MG tablet Take 1 tablet by mouth every night at bedtime. 90 tablet 2 Unknown   • divalproex (DEPAKOTE) 250 MG DR tablet Take 1 tablet by mouth Daily.   Unknown   • Farxiga 5 MG tablet tablet Take 1 tablet by mouth Daily. Indications: Type 2 Diabetes 90 tablet 2 Unknown   • furosemide (LASIX) 40 MG tablet Take 1 tablet by mouth 2 (Two) Times a Day.   Unknown   • lisinopril (PRINIVIL,ZESTRIL) 40 MG tablet Take 1 tablet by mouth Daily. 90 tablet 2 Unknown   • metoprolol succinate XL (TOPROL-XL) 25 MG 24 hr tablet Take 1 tablet by mouth Daily.   Unknown   • tamsulosin (FLOMAX) 0.4 MG capsule 24 hr capsule Take 1 capsule by mouth Daily for 30 days. 30 capsule 1 Unknown   • venlafaxine XR (EFFEXOR-XR) 37.5 MG 24 hr capsule Take 2 capsules by mouth Daily for 30 days. 60 capsule 0 Unknown     Current medications:  apixaban, 5 mg, Oral, Q12H  arformoterol, 15 mcg, Nebulization, BID - RT  atorvastatin, 40 mg, Oral, Nightly  budesonide, 0.5 mg, Nebulization, BID -  "RT  carvedilol, 6.25 mg, Oral, BID  furosemide, 40 mg, Intravenous, BID  insulin lispro, 2-7 Units, Subcutaneous, 4x Daily AC & at Bedtime  sodium chloride, 10 mL, Intravenous, Q12H  tamsulosin, 0.4 mg, Oral, Nightly  venlafaxine XR, 75 mg, Oral, Daily With Breakfast      Current IV drips:       Allergies:  Allergies   Allergen Reactions   • Penicillins Shortness Of Breath       Objective    Objective     Vitals:   Temp:  [97.3 °F (36.3 °C)-98.2 °F (36.8 °C)] 97.5 °F (36.4 °C)  Heart Rate:  [] 80  Resp:  [18-22] 18  BP: (113-136)/() 115/77      Physical Exam:    Constitutional: Awake, alert, No acute distress   Eyes: PERRLA, sclerae anicteric, no conjunctival injection  HENT: NCAT, mucous membranes moist  Neck: Supple, no thyromegaly, no lymphadenopathy, trachea midline  Respiratory: Decreased to auscultation bilaterally, nonlabored respirations   Cardiovascular: RRR, no murmurs, rubs, or gallops, palpable pedal pulses bilaterally  Gastrointestinal: Positive bowel sounds, soft, nontender, nondistended    Result Review    Result Review:  I have personally reviewed the results from the time of this admission to 4/4/2024 09:08 EDT and agree with these findings:  [x]  Laboratory  [x]  EKG/Telemetry   [x]  Cardiology/Vascular   []  Pathology  [x]  Old records  [x]  Medications  Basic Metabolic Panel    Sodium Sodium   Date Value Ref Range Status   04/04/2024 141 136 - 145 mmol/L Final   04/03/2024 141 136 - 145 mmol/L Final   04/02/2024 139 136 - 145 mmol/L Final      Potassium Potassium   Date Value Ref Range Status   04/04/2024 3.1 (L) 3.5 - 5.2 mmol/L Final   04/03/2024 3.7 3.5 - 5.2 mmol/L Final   04/02/2024 4.7 3.5 - 5.2 mmol/L Final      Chloride Chloride   Date Value Ref Range Status   04/04/2024 102 98 - 107 mmol/L Final   04/03/2024 104 98 - 107 mmol/L Final   04/02/2024 102 98 - 107 mmol/L Final      Bicarbonate No results found for: \"PLASMABICARB\"   BUN BUN   Date Value Ref Range Status " "  04/04/2024 24 (H) 6 - 20 mg/dL Final   04/03/2024 22 (H) 6 - 20 mg/dL Final   04/02/2024 23 (H) 6 - 20 mg/dL Final      Creatinine Creatinine   Date Value Ref Range Status   04/04/2024 1.22 0.76 - 1.27 mg/dL Final   04/03/2024 1.28 (H) 0.76 - 1.27 mg/dL Final   04/02/2024 1.33 (H) 0.76 - 1.27 mg/dL Final      Calcium Calcium   Date Value Ref Range Status   04/04/2024 8.5 (L) 8.6 - 10.5 mg/dL Final   04/03/2024 8.5 (L) 8.6 - 10.5 mg/dL Final   04/02/2024 9.2 8.6 - 10.5 mg/dL Final      Glucose      No components found for: \"GLUCOSE.*\"  Results for orders placed during the hospital encounter of 03/23/24    Adult Transthoracic Echo Complete W/ Cont if Necessary Per Protocol    Interpretation Summary  Diffuse hypokinesis with severely reduced left ventricular systolic function ejection fraction around 25 to 30%.  Mild MR and mild TR.  Organized thrombus still present in the apex of the left ventricle.  Present on previous echo.     Results for orders placed during the hospital encounter of 09/17/23    Cardiac Catheterization/Vascular Study    Conclusion  OPERATORS  Ben Grant M.D. (Attending Cardiologist)      PROCEDURE PERFORMED  Ultrasound guided vascular access  Right heart catheterization  Coronary Angiogram  Left Heart Catheterization 43875  Moderate Sedation    INDICATIONS FOR PROCEDURE  58-year-old man with multiple cardiovascular risk factors presented with acute on chronic HFrEF exacerbation.  He has not had any previous ischemic work-up.  He also has pulmonary hypertension.  After discussing the risk and benefit of the procedure he was brought in for right and left heart cath.    PROCEDURE IN DETAIL  Informed consent was obtained from the patient after explaining the risks, benefits, and alternative options of the procedure. After obtaining informed consent, the patient was brought to the cath lab and was prepped in a sterile fashion. Lidocaine 2% was used for local anesthesia into the right femoral venous " access site. Right femoral vein was accessed using the micropuncture needle under ultrasound guidance and micropuncture wire advanced under flouroscopy. A 7 English vascular sheath was put into place percutaneously over guide-wire. Guide wires were removed. A 6Fr swan sydnee catheter was advanced to wedge position. RA, RV and PA and wedge pressures were recorded.  PA sat and arterial sats recorded.  The patient tolerated the procedure well without any complications.    Lidocaine 2% was used for local anesthesia into the right femoral arterial access site. The right femoral artery was accessed with a micropuncture needle via modified Seldinger technique under ultrasound guidance. A 6F was inserted successfully.  Afterwards, 6F JR4 and JL4 diagnostic catheters were advanced over a wire into the ascending aorta and were used to engage the ostia of the left main and RCA respectively. JR4 used to cross the AV and obtain LV pressures and gradient across the AV measured via pullback technique. Images of the right and left coronary systems were obtained. All the catheters were exchanged over a wire and subsequently removed. Angiogram of the femoral access site was obtained and did not show complications. The patient tolerated the procedure well without any complications. The pictures were reviewed at the end of the procedure. A Mynx closure device was applied for venous closure.  A 6 Nepalese Angio-Seal device was applied for arterial closure.    HEMODYNAMICS    RHC  RA 8/5, 4 mmHg  RV 55/3, 8 mmHg  PA 59/24, 40 mmHg  PCW 34/27, 26 mmHg  AO Sat 96%  PA Sat 69%    Lydia CO 4.9 L/min    Lydia CI 2.53 L/min/m²    SVR 1958 D/S   D/S    LHC  LV: 137/27, 31 mmHg  AO: 136/96, 116 mmHg  No significant gradient across the aortic valve during pullback of JR4 catheter.  LV gram was not performed due to recently available echocardiogram.    FINDINGS  Coronary Angiogram    Right dominant circulation    Left main: Left main is a large  caliber vessel which gives rise to the Left Anterior Descending and the Left circumflex.  Left main coronary artery is angiographically free from any significant disease.    Left Anterior Descending Artery: LAD is a medium caliber vessel which gives rise to several septal perforators and several diagonal branches.  LAD is angiographically free from any significant disease.    Left Circumflex: Left circumflex artery gives rise to marginals.  Left circumflex artery is angiographically free from any significant disease.    Right Coronary Artery: The RCA is a large caliber dominant vessel gives rise to PDA and PLV.  RCA is angiographically free from any significant disease    ESTIMATED BLOOD LOSS:  10 ml    COMPLICATIONS:  None    PROCEDURE DATA:  Contrast Used: 24 cc  Sedation Time: 30 minutes    IMPRESSIONS  Non obstructive CAD.  Nonischemic idiopathic dilated cardiomyopathy.  Normal cardiac output and index  Significantly elevated LVEDP and wedge pressure.  Pulmonary hypertension due to volume overload  Significantly elevated systemic vascular resistance      RECOMMENDATIONS  -Afterload reduction and diuretics  -Uptitrate GDMT  -Abstinence from drug abuse    Electronically signed by Ben Grant MD, 09/17/23, 2:51 PM EDT.     Lab Results   Component Value Date    PROBNP 30,138.0 (H) 04/02/2024              Assessment / Plan     Impression:   1.  Nonischemic cardiomyopathy.  2.  Acute on chronic systolic CHF secondary to above.  3.  Positive nicotine use.  4.  LV thrombus  Plan:   1.  Continue IV Lasix.  Monitor electrolytes.  2.  Continue Eliquis.  3.  Continue lisinopril and metoprolol.  4.  Compliance advised.  Low-salt diet and fluid restriction.    Electronically signed by Jaun Carreno MD, 04/04/24, 9:08 AM EDT.

## 2024-04-05 LAB
ANION GAP SERPL CALCULATED.3IONS-SCNC: 12.4 MMOL/L (ref 5–15)
BASOPHILS # BLD AUTO: 0.08 10*3/MM3 (ref 0–0.2)
BASOPHILS NFR BLD AUTO: 0.9 % (ref 0–1.5)
BUN SERPL-MCNC: 27 MG/DL (ref 6–20)
BUN/CREAT SERPL: 25.2 (ref 7–25)
CALCIUM SPEC-SCNC: 8.8 MG/DL (ref 8.6–10.5)
CHLORIDE SERPL-SCNC: 100 MMOL/L (ref 98–107)
CO2 SERPL-SCNC: 26.6 MMOL/L (ref 22–29)
CREAT SERPL-MCNC: 1.07 MG/DL (ref 0.76–1.27)
DEPRECATED RDW RBC AUTO: 47.4 FL (ref 37–54)
EGFRCR SERPLBLD CKD-EPI 2021: 80.4 ML/MIN/1.73
EOSINOPHIL # BLD AUTO: 0.1 10*3/MM3 (ref 0–0.4)
EOSINOPHIL NFR BLD AUTO: 1.1 % (ref 0.3–6.2)
ERYTHROCYTE [DISTWIDTH] IN BLOOD BY AUTOMATED COUNT: 14.9 % (ref 12.3–15.4)
GLUCOSE BLDC GLUCOMTR-MCNC: 117 MG/DL (ref 70–99)
GLUCOSE BLDC GLUCOMTR-MCNC: 118 MG/DL (ref 70–99)
GLUCOSE BLDC GLUCOMTR-MCNC: 182 MG/DL (ref 70–99)
GLUCOSE BLDC GLUCOMTR-MCNC: 90 MG/DL (ref 70–99)
GLUCOSE SERPL-MCNC: 117 MG/DL (ref 65–99)
HCT VFR BLD AUTO: 41.2 % (ref 37.5–51)
HGB BLD-MCNC: 12.8 G/DL (ref 13–17.7)
IMM GRANULOCYTES # BLD AUTO: 0.03 10*3/MM3 (ref 0–0.05)
IMM GRANULOCYTES NFR BLD AUTO: 0.3 % (ref 0–0.5)
LYMPHOCYTES # BLD AUTO: 1.81 10*3/MM3 (ref 0.7–3.1)
LYMPHOCYTES NFR BLD AUTO: 20.2 % (ref 19.6–45.3)
MAGNESIUM SERPL-MCNC: 2.2 MG/DL (ref 1.6–2.6)
MCH RBC QN AUTO: 27.1 PG (ref 26.6–33)
MCHC RBC AUTO-ENTMCNC: 31.1 G/DL (ref 31.5–35.7)
MCV RBC AUTO: 87.1 FL (ref 79–97)
MONOCYTES # BLD AUTO: 0.96 10*3/MM3 (ref 0.1–0.9)
MONOCYTES NFR BLD AUTO: 10.7 % (ref 5–12)
NEUTROPHILS NFR BLD AUTO: 5.99 10*3/MM3 (ref 1.7–7)
NEUTROPHILS NFR BLD AUTO: 66.8 % (ref 42.7–76)
NRBC BLD AUTO-RTO: 0 /100 WBC (ref 0–0.2)
PHOSPHATE SERPL-MCNC: 2.7 MG/DL (ref 2.5–4.5)
PLATELET # BLD AUTO: 353 10*3/MM3 (ref 140–450)
PMV BLD AUTO: 10.4 FL (ref 6–12)
POTASSIUM SERPL-SCNC: 3.6 MMOL/L (ref 3.5–5.2)
RBC # BLD AUTO: 4.73 10*6/MM3 (ref 4.14–5.8)
SODIUM SERPL-SCNC: 139 MMOL/L (ref 136–145)
WBC NRBC COR # BLD AUTO: 8.97 10*3/MM3 (ref 3.4–10.8)

## 2024-04-05 PROCEDURE — 63710000001 INSULIN LISPRO (HUMAN) PER 5 UNITS: Performed by: INTERNAL MEDICINE

## 2024-04-05 PROCEDURE — 80048 BASIC METABOLIC PNL TOTAL CA: CPT | Performed by: PHYSICIAN ASSISTANT

## 2024-04-05 PROCEDURE — 82948 REAGENT STRIP/BLOOD GLUCOSE: CPT | Performed by: INTERNAL MEDICINE

## 2024-04-05 PROCEDURE — 94664 DEMO&/EVAL PT USE INHALER: CPT

## 2024-04-05 PROCEDURE — 99233 SBSQ HOSP IP/OBS HIGH 50: CPT | Performed by: INTERNAL MEDICINE

## 2024-04-05 PROCEDURE — 94799 UNLISTED PULMONARY SVC/PX: CPT

## 2024-04-05 PROCEDURE — 84100 ASSAY OF PHOSPHORUS: CPT | Performed by: PHYSICIAN ASSISTANT

## 2024-04-05 PROCEDURE — 83735 ASSAY OF MAGNESIUM: CPT | Performed by: PHYSICIAN ASSISTANT

## 2024-04-05 PROCEDURE — 85025 COMPLETE CBC W/AUTO DIFF WBC: CPT | Performed by: PHYSICIAN ASSISTANT

## 2024-04-05 PROCEDURE — 25010000002 FUROSEMIDE PER 20 MG: Performed by: INTERNAL MEDICINE

## 2024-04-05 PROCEDURE — 82948 REAGENT STRIP/BLOOD GLUCOSE: CPT

## 2024-04-05 RX ORDER — AMIODARONE HYDROCHLORIDE 200 MG/1
200 TABLET ORAL 2 TIMES DAILY
Status: DISCONTINUED | OUTPATIENT
Start: 2024-04-05 | End: 2024-04-06 | Stop reason: HOSPADM

## 2024-04-05 RX ORDER — POTASSIUM CHLORIDE 750 MG/1
40 CAPSULE, EXTENDED RELEASE ORAL EVERY 4 HOURS
Status: DISCONTINUED | OUTPATIENT
Start: 2024-04-05 | End: 2024-04-05

## 2024-04-05 RX ORDER — FUROSEMIDE 20 MG/1
TABLET ORAL DAILY
Status: CANCELLED | OUTPATIENT
Start: 2024-04-05

## 2024-04-05 RX ORDER — AMIODARONE HYDROCHLORIDE 200 MG/1
200 TABLET ORAL
Status: DISCONTINUED | OUTPATIENT
Start: 2024-04-05 | End: 2024-04-05

## 2024-04-05 RX ORDER — FUROSEMIDE 40 MG/1
40 TABLET ORAL DAILY
Status: DISCONTINUED | OUTPATIENT
Start: 2024-04-05 | End: 2024-04-06 | Stop reason: HOSPADM

## 2024-04-05 RX ORDER — POTASSIUM CHLORIDE 750 MG/1
40 CAPSULE, EXTENDED RELEASE ORAL EVERY 4 HOURS
Status: COMPLETED | OUTPATIENT
Start: 2024-04-05 | End: 2024-04-05

## 2024-04-05 RX ADMIN — APIXABAN 5 MG: 5 TABLET, FILM COATED ORAL at 22:00

## 2024-04-05 RX ADMIN — BUDESONIDE 0.5 MG: 0.5 INHALANT RESPIRATORY (INHALATION) at 07:58

## 2024-04-05 RX ADMIN — FUROSEMIDE 40 MG: 40 TABLET ORAL at 14:58

## 2024-04-05 RX ADMIN — POTASSIUM CHLORIDE 40 MEQ: 10 CAPSULE, COATED, EXTENDED RELEASE ORAL at 14:58

## 2024-04-05 RX ADMIN — INSULIN LISPRO 2 UNITS: 100 INJECTION, SOLUTION INTRAVENOUS; SUBCUTANEOUS at 17:05

## 2024-04-05 RX ADMIN — CARVEDILOL 6.25 MG: 6.25 TABLET, FILM COATED ORAL at 22:00

## 2024-04-05 RX ADMIN — TAMSULOSIN HYDROCHLORIDE 0.4 MG: 0.4 CAPSULE ORAL at 22:00

## 2024-04-05 RX ADMIN — ARFORMOTEROL TARTRATE 15 MCG: 15 SOLUTION RESPIRATORY (INHALATION) at 07:58

## 2024-04-05 RX ADMIN — APIXABAN 5 MG: 5 TABLET, FILM COATED ORAL at 08:38

## 2024-04-05 RX ADMIN — AMIODARONE HYDROCHLORIDE 200 MG: 200 TABLET ORAL at 10:33

## 2024-04-05 RX ADMIN — AMIODARONE HYDROCHLORIDE 200 MG: 200 TABLET ORAL at 22:00

## 2024-04-05 RX ADMIN — Medication 10 ML: at 08:39

## 2024-04-05 RX ADMIN — POTASSIUM CHLORIDE 40 MEQ: 10 CAPSULE, COATED, EXTENDED RELEASE ORAL at 08:38

## 2024-04-05 RX ADMIN — ATORVASTATIN CALCIUM 40 MG: 40 TABLET, FILM COATED ORAL at 22:00

## 2024-04-05 RX ADMIN — CARVEDILOL 6.25 MG: 6.25 TABLET, FILM COATED ORAL at 08:39

## 2024-04-05 RX ADMIN — Medication 10 ML: at 22:01

## 2024-04-05 RX ADMIN — FUROSEMIDE 40 MG: 10 INJECTION, SOLUTION INTRAMUSCULAR; INTRAVENOUS at 08:38

## 2024-04-05 RX ADMIN — VENLAFAXINE HYDROCHLORIDE 75 MG: 75 CAPSULE, EXTENDED RELEASE ORAL at 08:38

## 2024-04-05 NOTE — PROGRESS NOTES
[facility]   Cardiology Progress Note      Patient Name: Regulo Sepulveda  : 1965  MRN: [id number]  Primary Care Physician:  Dickson Landry MD  Referring Physician: No ref. provider found  Date of admission: 2024    Subjective   Subjective     Chief Complaint: Shortness of breath    HPI:  Regulo Sepulveda is a 58 y.o. male with history of cardiomyopathy, apical thrombus admitted with shortness of breath.  Feels better.    REVIEW OF SYSTEMS    Constitutional:    No fever, no weight loss  Skin:     No rash  Otolaryngeal:    No difficulty swallowing  Cardiovascular:  No chest pain or shortness of breath  Pulmonary:    No cough, no sputum production    Objective    Objective     Vitals:   Vitals:    24 0238 24 0559 24 0751 24 0758   BP: 121/85  137/95    BP Location:   Right arm    Patient Position:   Lying    Pulse: 87  84 80   Resp: 18  16    Temp: 98.4 °F (36.9 °C)  97.5 °F (36.4 °C)    TempSrc: Oral  Oral    SpO2: 93%  92% 93%   Weight:  65 kg (143 lb 4.8 oz)     Height:                Physical Exam:   Constitutional: Awake, alert, No acute distress    Eyes: PERRLA, sclerae anicteric, no conjunctival injection   HENT: NCAT, mucous membranes moist   Neck: Supple, no thyromegaly, no lymphadenopathy, trachea midline   Respiratory: Decreased to auscultation bilaterally, nonlabored respirations    Cardiovascular: RRR, no murmurs, rubs, or gallops, palpable pedal pulses bilaterally       Current medications:  apixaban, 5 mg, Oral, Q12H  arformoterol, 15 mcg, Nebulization, BID - RT  atorvastatin, 40 mg, Oral, Nightly  budesonide, 0.5 mg, Nebulization, BID - RT  carvedilol, 6.25 mg, Oral, BID  furosemide, 40 mg, Intravenous, BID  insulin lispro, 2-7 Units, Subcutaneous, 4x Daily AC & at Bedtime  potassium chloride, 40 mEq, Oral, Q4H  sodium chloride, 10 mL, Intravenous, Q12H  tamsulosin, 0.4 mg, Oral, Nightly  venlafaxine XR, 75 mg, Oral, Daily With Breakfast      Current IV drips:        Result Review    Result Review:  I have personally reviewed the results from the time of this admission to 4/5/2024 08:19 EDT and agree with these findings:  []  Laboratory  []  EKG/Telemetry   []  Cardiology/Vascular   []  Radiology         CBC          4/3/2024    04:50 4/4/2024    07:59 4/5/2024    04:12   CBC   WBC 8.31  7.49  8.97    RBC 4.59  4.60  4.73    Hemoglobin 12.6  12.7  12.8    Hematocrit 40.0  39.0  41.2    MCV 87.1  84.8  87.1    MCH 27.5  27.6  27.1    MCHC 31.5  32.6  31.1    RDW 15.8  15.3  14.9    Platelets 373  330  353      CMP          4/3/2024    04:50 4/4/2024    07:59 4/5/2024    04:12   CMP   Glucose 97  135  117    BUN 22  24  27    Creatinine 1.28  1.22  1.07    EGFR 64.9  68.7  80.4    Sodium 141  141  139    Potassium 3.7  3.1  3.6    Chloride 104  102  100    Calcium 8.5  8.5  8.8    Total Protein 6.1      Albumin 3.2      Globulin 2.9      Total Bilirubin 1.0      Alkaline Phosphatase 236      AST (SGOT) 30      ALT (SGPT) 42      Albumin/Globulin Ratio 1.1      BUN/Creatinine Ratio 17.2  19.7  25.2    Anion Gap 12.6  10.1  12.4      Results for orders placed during the hospital encounter of 03/23/24    Adult Transthoracic Echo Complete W/ Cont if Necessary Per Protocol    Interpretation Summary  Diffuse hypokinesis with severely reduced left ventricular systolic function ejection fraction around 25 to 30%.  Mild MR and mild TR.  Organized thrombus still present in the apex of the left ventricle.  Present on previous echo.     Results for orders placed during the hospital encounter of 09/17/23    Cardiac Catheterization/Vascular Study    Conclusion  OPERATORS  Ben Grant M.D. (Attending Cardiologist)      PROCEDURE PERFORMED  Ultrasound guided vascular access  Right heart catheterization  Coronary Angiogram  Left Heart Catheterization 80992  Moderate Sedation    INDICATIONS FOR PROCEDURE  58-year-old man with multiple cardiovascular risk factors presented with acute on chronic  HFrEF exacerbation.  He has not had any previous ischemic work-up.  He also has pulmonary hypertension.  After discussing the risk and benefit of the procedure he was brought in for right and left heart cath.    PROCEDURE IN DETAIL  Informed consent was obtained from the patient after explaining the risks, benefits, and alternative options of the procedure. After obtaining informed consent, the patient was brought to the cath lab and was prepped in a sterile fashion. Lidocaine 2% was used for local anesthesia into the right femoral venous access site. Right femoral vein was accessed using the micropuncture needle under ultrasound guidance and micropuncture wire advanced under flouroscopy. A 7 [place]yr[place]z vascular sheath was put into place percutaneously over guide-wire. Guide wires were removed. A 6Fr swan sydnee catheter was advanced to wedge position. RA, RV and PA and wedge pressures were recorded.  PA sat and arterial sats recorded.  The patient tolerated the procedure well without any complications.    Lidocaine 2% was used for local anesthesia into the right femoral arterial access site. The right femoral artery was accessed with a micropuncture needle via modified Seldinger technique under ultrasound guidance. A 6F was inserted successfully.  Afterwards, 6F JR4 and JL4 diagnostic catheters were advanced over a wire into the ascending aorta and were used to engage the ostia of the left main and RCA respectively. JR4 used to cross the AV and obtain LV pressures and gradient across the AV measured via pullback technique. Images of the right and left coronary systems were obtained. All the catheters were exchanged over a wire and subsequently removed. Angiogram of the femoral access site was obtained and did not show complications. The patient tolerated the procedure well without any complications. The pictures were reviewed at the end of the procedure. A Mynx closure device was applied for venous closure.  A 6 Japanese  Angio-Seal device was applied for arterial closure.    HEMODYNAMICS    RHC  RA 8/5, 4 mmHg  RV 55/3, 8 mmHg  PA 59/24, 40 mmHg  PCW 34/27, 26 mmHg  AO Sat 96%  PA Sat 69%    Lydia CO 4.9 L/min    Lydia CI 2.53 L/min/m²    SVR 1958 D/S   D/S    LHC  LV: 137/27, 31 mmHg  AO: 136/96, 116 mmHg  No significant gradient across the aortic valve during pullback of JR4 catheter.  LV gram was not performed due to recently available echocardiogram.    FINDINGS  Coronary Angiogram    Right dominant circulation    Left main: Left main is a large caliber vessel which gives rise to the Left Anterior Descending and the Left circumflex.  Left main coronary artery is angiographically free from any significant disease.    Left Anterior Descending Artery: LAD is a medium caliber vessel which gives rise to several septal perforators and several diagonal branches.  LAD is angiographically free from any significant disease.    Left Circumflex: Left circumflex artery gives rise to marginals.  Left circumflex artery is angiographically free from any significant disease.    Right Coronary Artery: The RCA is a large caliber dominant vessel gives rise to PDA and PLV.  RCA is angiographically free from any significant disease    ESTIMATED BLOOD LOSS:  10 ml    COMPLICATIONS:  None    PROCEDURE DATA:  Contrast Used: 24 cc  Sedation Time: 30 minutes    IMPRESSIONS  Non obstructive CAD.  Nonischemic idiopathic dilated cardiomyopathy.  Normal cardiac output and index  Significantly elevated LVEDP and wedge pressure.  Pulmonary hypertension due to volume overload  Significantly elevated systemic vascular resistance      RECOMMENDATIONS  -Afterload reduction and diuretics  -Uptitrate GDMT  -Abstinence from drug abuse    Electronically signed by Ben Grant MD, 09/17/23, 2:51 PM EDT.     Lab Results   Component Value Date    PROBNP 30,138.0 (H) 04/02/2024         Telemetry reviewed     Assessment / Plan     ASSESSMENT:  1.  Nonischemic  cardiomyopathy.  2.  Acute on chronic systolic heart failure  3.  LV thrombus  4.  Positive nicotine use      PLAN:  1.  Continue IV Lasix.  Change to p.o. Lasix on discharge.  2.  Continue Eliquis  3.  Continue lisinopril and metoprolol.  4.  Compliance with diet and fluid restriction advised.  5.  Follow-up in office 2 to 3 weeks after discharge.  Will sign off.    Electronically signed by Jaun Carreno MD, 04/05/24, 8:19 AM EDT.

## 2024-04-05 NOTE — PROGRESS NOTES
[facility]   Hospitalist Progress Note       Patient Name: Regulo Sepulveda  : 1965  MRN: [id number]  Primary Care Physician: Dickson Landry MD  Date of admission: 2024  Today's Date: 2024  Room / Bed:   [facility]  Subjective   Chief Complaint: Shortness of breath     HPI:     Regulo Sepulveda is a 58 y.o. male past medical history of heart failure reduced ejection fraction of 25 to 30%, apical thrombus on echo, anxiety/depression, COPD, nonobstructive coronary disease on cath from , type 2 diabetes, and schizophrenia who presents to the emergency department with shortness of breath     It is difficult to get a clear history from the patient as he is agitated and not cooperative.  He initially called one of the nurses derogatory terms and tried to leave AMA.  However, he was found sitting outside urinating on himself and asked to be taken back to his room.  States that he has been short of breath for a couple of days.  No lower extremity edema.  No cough.  No fever.  No chills.     Emergency department patient's vital signs are as follows: Temperature 97.7, pulse 93, respiratory is 20, blood pressure 120/94.  90% on room air.  CBC shows no significant abnormalities.  CMP shows a creatinine 1.33.  proBNP is slightly elevated at 30,000.  Troponin was initially 368 and trended down to 324.  Of note, the patient has had mildly elevated troponins in the past but nothing this high.  However the patient was cathed in 2023 with no obstructive disease.  EKG shows no ischemia but does show a mildly prolonged QTc.  Patient admitted to hospital for decompensation of heart failure.       Interval Followup: 2024    Patient feeling better and breathing better.  Brief NSVT this morning.  Also had a 23 beat run of NSVT yesterday.    He denies any new complaints.  Specifically, no CP, palpitations, lightheadedness, syncope.  CORTEZ better.    VSS: -130/80-90; HR 80s bpm  Potassium 3.6 (target  4.0-4.5)  Magnesium 2.2 (target>2.0)  Patient would like sister called.        REVIEW OF SYSTEMS:   CORTEZ  Objective   Temp:  [97.5 °F (36.4 °C)-98.4 °F (36.9 °C)] 97.5 °F (36.4 °C)  Heart Rate:  [80-93] 90  Resp:  [16-18] 18  BP: (121-140)/(83-99) 137/95  PHYSICAL EXAM   CON: WN. WD. NAD.  Poor dentition.  Cachectic.  NECK:  No thyromegaly. No stridor.   RESP:  CTA. No wheezes. No crackles.  No work of breathing or tachypnea.   CV:  Rhythm regular. Rate WNL. No murmur noted.  No edema.  GI:  Soft and nontender. Nondistended.    EXT: Peripheral pulses intact.  No cyanosis.  PSYCH:  Alert. Oriented. Normal affect and mood.  NEURO:  No dysarthria or aphasia. No unilateral weakness or paresthesia.  SKIN: No chronic venous stasis changes or varicosities.  No cellulitis  Results from last 7 days   Lab Units 04/05/24 0412 04/04/24 0759 04/03/24  0450 04/02/24  1350 03/29/24  1154   WBC 10*3/mm3 8.97 7.49 8.31 9.61 7.72   HEMOGLOBIN g/dL 12.8* 12.7* 12.6* 13.2 13.6   HEMATOCRIT % 41.2 39.0 40.0 41.8 43.4   PLATELETS 10*3/mm3 353 330 373 414 542*     Results from last 7 days   Lab Units 04/05/24 0412 04/04/24 0759 04/03/24  0450 04/02/24  1350 03/29/24  1154   SODIUM mmol/L 139 141 141 139 141   POTASSIUM mmol/L 3.6 3.1* 3.7 4.7 4.6   CO2 mmol/L 26.6 28.9 24.4 23.3 26.5   CHLORIDE mmol/L 100 102 104 102 104   ANION GAP mmol/L 12.4 10.1 12.6 13.7 10.5   BUN mg/dL 27* 24* 22* 23* 22*   CREATININE mg/dL 1.07 1.22 1.28* 1.33* 1.39*   GLUCOSE mg/dL 117* 135* 97 135* 60*         COMPLEXITY OF DATA / DECISION MAKING     []  Moderate: One acute illness or mild exacerbation of chronic or 2 stable chronic or tx side effects   []  High:  Severe acute illness or severe exacerbation of chronic - potential for major debility / life threatening         I have personally reviewed the results from the time of this admission to 4/5/2024 10:06 EDT:  []  Laboratory:  []  Microbiology: []  Radiology:  []  Telemetry:   []  Cardiology/Vascular:   []  Pathology:  []  Prior external records:  []  Independent historian provided additional details:      []  Discussed case with specialists:    []  Independent interpretation of ECG/Imaging etc:             []  Moderate: Rx management, low risk surgery, suboptimal social situation   []  High:  Rx with close monitoring for toxicity, mod-high risk surgery, DNR decision, Comfort initiated, IV pain meds    Assessment / Plan   Assessment:    Shortness of breath, CORTEZ  Elevated troponins greater than 400  History of dilated nonischemic cardiomyopathy (amphetamine associated?)  History of cardiac cath September 2023 (nonobstructive CAD)  COPD and continued tobacco use  History of apical thrombus; currently on Eliquis  Anxiety/depression/schizophrenia  DM  Substance abuse/amphetamine +  NSVT     Plan:    Appreciate cardiology input.  Start amiodarone 200 mg BID.    Disposition: Likely DC home 24 hours if continues to do well.  Will need close follow-up cardiology (will need to see Dr. Barroso to discuss ICD)  Continue to keep potassium >4 and magnesium >2  Continue carvedilol  CT chest with contrast ... Negative for PE  Continue diuresis .... Changed to oral Lasix  Trend troponins/ECG  Continue home Eliquis  Add as needed DuoNebs.  Scheduled Brovana and Pulmicort.  Discussed plan with RN.  DVT prophylaxis:  Medical DVT prophylaxis orders are present.      CODE STATUS:      Level Of Support Discussed With: Patient  Code Status (Patient has no pulse and is not breathing): CPR (Attempt to Resuscitate)  Medical Interventions (Patient has pulse or is breathing): Full Support         Patient independently seen and evaluated, agree with assessment and plan, above documentation reflects plan put forth during bedside rounds.  More than 51% of the time of this patient encounter was performed by me.     Interval history:  No acute events overnight, patient states his breathing is improved, patient denies chest pain     GEN: No acute  distress  HEENT: Moist mucous membranes  LUNGS: Equal chest rise bilaterally  CARDIAC: Regular rate and rhythm  NEURO: Moving all 4 extremities spontaneously  SKIN: No obvious breakdown     Plan:  Agree with assessment plan as above  Continue carvedilol to help with blood pressure and NSVT  Start amiodarone  Needs to follow-up with Dr. Barroso for AICD placement as an outpatient  Cardiac cath from 6 months ago reviewed, consistent with nonischemic cardiomyopathy  Patient should avoid amphetamines, positive UDS  CT scan of the chest negative for pulmonary embolism  Consult cardiology given persistently elevated troponins  Continue aggressive IV diuresis  CBC, CMP reviewed 4/5/2024   Repeat CBC, CMP, mag and Phos in a.m. 4/5/2024      Reviewed patients labs and imaging, and discussed with patient and nurse at bedside.     Time spent: Time spent involving patient care including face-to-face encounter 52 minutes      Electronically signed by Olayinka Manzo MD, 04/05/24, 12:09 PM EDT.

## 2024-04-05 NOTE — PLAN OF CARE
Goal Outcome Evaluation:      Patient cooperative with care. Noted with good appetite this shift, no complaints of pain or nausea. Up in room ad riaz. NSR on telemetry. VSS.

## 2024-04-06 ENCOUNTER — READMISSION MANAGEMENT (OUTPATIENT)
Dept: CALL CENTER | Facility: HOSPITAL | Age: 59
End: 2024-04-06
Payer: COMMERCIAL

## 2024-04-06 VITALS
SYSTOLIC BLOOD PRESSURE: 114 MMHG | DIASTOLIC BLOOD PRESSURE: 81 MMHG | HEIGHT: 73 IN | BODY MASS INDEX: 18.96 KG/M2 | RESPIRATION RATE: 16 BRPM | WEIGHT: 143.08 LBS | HEART RATE: 85 BPM | TEMPERATURE: 98.1 F | OXYGEN SATURATION: 96 %

## 2024-04-06 LAB
ALBUMIN SERPL-MCNC: 3.4 G/DL (ref 3.5–5.2)
ALBUMIN/GLOB SERPL: 1 G/DL
ALP SERPL-CCNC: 251 U/L (ref 39–117)
ALT SERPL W P-5'-P-CCNC: 29 U/L (ref 1–41)
ANION GAP SERPL CALCULATED.3IONS-SCNC: 8 MMOL/L (ref 5–15)
AST SERPL-CCNC: 18 U/L (ref 1–40)
BASOPHILS # BLD AUTO: 0.06 10*3/MM3 (ref 0–0.2)
BASOPHILS NFR BLD AUTO: 0.7 % (ref 0–1.5)
BILIRUB SERPL-MCNC: 0.6 MG/DL (ref 0–1.2)
BUN SERPL-MCNC: 27 MG/DL (ref 6–20)
BUN/CREAT SERPL: 25 (ref 7–25)
CALCIUM SPEC-SCNC: 9.1 MG/DL (ref 8.6–10.5)
CHLORIDE SERPL-SCNC: 101 MMOL/L (ref 98–107)
CO2 SERPL-SCNC: 30 MMOL/L (ref 22–29)
CREAT SERPL-MCNC: 1.08 MG/DL (ref 0.76–1.27)
DEPRECATED RDW RBC AUTO: 47.8 FL (ref 37–54)
EGFRCR SERPLBLD CKD-EPI 2021: 79.5 ML/MIN/1.73
EOSINOPHIL # BLD AUTO: 0.14 10*3/MM3 (ref 0–0.4)
EOSINOPHIL NFR BLD AUTO: 1.7 % (ref 0.3–6.2)
ERYTHROCYTE [DISTWIDTH] IN BLOOD BY AUTOMATED COUNT: 15.1 % (ref 12.3–15.4)
GLOBULIN UR ELPH-MCNC: 3.3 GM/DL
GLUCOSE BLDC GLUCOMTR-MCNC: 107 MG/DL (ref 70–99)
GLUCOSE BLDC GLUCOMTR-MCNC: 112 MG/DL (ref 70–99)
GLUCOSE SERPL-MCNC: 119 MG/DL (ref 65–99)
HCT VFR BLD AUTO: 44 % (ref 37.5–51)
HGB BLD-MCNC: 13.5 G/DL (ref 13–17.7)
IMM GRANULOCYTES # BLD AUTO: 0.02 10*3/MM3 (ref 0–0.05)
IMM GRANULOCYTES NFR BLD AUTO: 0.2 % (ref 0–0.5)
LYMPHOCYTES # BLD AUTO: 2.09 10*3/MM3 (ref 0.7–3.1)
LYMPHOCYTES NFR BLD AUTO: 25.9 % (ref 19.6–45.3)
MAGNESIUM SERPL-MCNC: 2 MG/DL (ref 1.6–2.6)
MCH RBC QN AUTO: 27 PG (ref 26.6–33)
MCHC RBC AUTO-ENTMCNC: 30.7 G/DL (ref 31.5–35.7)
MCV RBC AUTO: 88 FL (ref 79–97)
MONOCYTES # BLD AUTO: 0.89 10*3/MM3 (ref 0.1–0.9)
MONOCYTES NFR BLD AUTO: 11 % (ref 5–12)
NEUTROPHILS NFR BLD AUTO: 4.87 10*3/MM3 (ref 1.7–7)
NEUTROPHILS NFR BLD AUTO: 60.5 % (ref 42.7–76)
NRBC BLD AUTO-RTO: 0 /100 WBC (ref 0–0.2)
PHOSPHATE SERPL-MCNC: 2.9 MG/DL (ref 2.5–4.5)
PLATELET # BLD AUTO: 368 10*3/MM3 (ref 140–450)
PMV BLD AUTO: 10.1 FL (ref 6–12)
POTASSIUM SERPL-SCNC: 4.1 MMOL/L (ref 3.5–5.2)
PROT SERPL-MCNC: 6.7 G/DL (ref 6–8.5)
RBC # BLD AUTO: 5 10*6/MM3 (ref 4.14–5.8)
SODIUM SERPL-SCNC: 139 MMOL/L (ref 136–145)
WBC NRBC COR # BLD AUTO: 8.07 10*3/MM3 (ref 3.4–10.8)

## 2024-04-06 PROCEDURE — 99239 HOSP IP/OBS DSCHRG MGMT >30: CPT | Performed by: INTERNAL MEDICINE

## 2024-04-06 PROCEDURE — 82948 REAGENT STRIP/BLOOD GLUCOSE: CPT | Performed by: INTERNAL MEDICINE

## 2024-04-06 PROCEDURE — 83735 ASSAY OF MAGNESIUM: CPT | Performed by: PHYSICIAN ASSISTANT

## 2024-04-06 PROCEDURE — 85025 COMPLETE CBC W/AUTO DIFF WBC: CPT | Performed by: PHYSICIAN ASSISTANT

## 2024-04-06 PROCEDURE — 84100 ASSAY OF PHOSPHORUS: CPT | Performed by: PHYSICIAN ASSISTANT

## 2024-04-06 PROCEDURE — 80053 COMPREHEN METABOLIC PANEL: CPT | Performed by: PHYSICIAN ASSISTANT

## 2024-04-06 RX ORDER — CARVEDILOL 6.25 MG/1
6.25 TABLET ORAL 2 TIMES DAILY
Qty: 60 TABLET | Refills: 0 | Status: SHIPPED | OUTPATIENT
Start: 2024-04-06

## 2024-04-06 RX ORDER — AMIODARONE HYDROCHLORIDE 200 MG/1
TABLET ORAL
Qty: 35 TABLET | Refills: 0 | Status: SHIPPED | OUTPATIENT
Start: 2024-04-06 | End: 2024-05-04

## 2024-04-06 RX ADMIN — VENLAFAXINE HYDROCHLORIDE 75 MG: 75 CAPSULE, EXTENDED RELEASE ORAL at 09:03

## 2024-04-06 RX ADMIN — APIXABAN 5 MG: 5 TABLET, FILM COATED ORAL at 09:03

## 2024-04-06 RX ADMIN — Medication 10 ML: at 09:05

## 2024-04-06 RX ADMIN — AMIODARONE HYDROCHLORIDE 200 MG: 200 TABLET ORAL at 09:03

## 2024-04-06 RX ADMIN — CARVEDILOL 6.25 MG: 6.25 TABLET, FILM COATED ORAL at 09:03

## 2024-04-06 RX ADMIN — FUROSEMIDE 40 MG: 40 TABLET ORAL at 09:03

## 2024-04-06 NOTE — OUTREACH NOTE
Prep Survey      Flowsheet Row Responses   [facility]? [facility]   Is LACE score < 7 ? No   Eligibility [facility]   Date of Admission 04/02/24   Date of Discharge 04/06/24   Discharge Disposition Home-Health Care Svc   Discharge diagnosis Systolic heart failure   Does the patient have one of the following disease processes/diagnoses(primary or secondary)? CHF   Does the patient have Home health ordered? No   Is there a DME ordered? No   Prep survey completed? Yes            JULI A - Registered Nurse

## 2024-04-06 NOTE — DISCHARGE SUMMARY
[facility]         HOSPITALIST  DISCHARGE SUMMARY    Patient Name: Regulo Sepulveda  : 1965  MRN: [id number]    Date of Admission: 2024  Date of Discharge:  2024  Primary Care Physician: Dickson Landry MD    Consults       Date and Time Order Name Status Description    4/3/2024  1:28 PM Inpatient Cardiology Consult      2024  5:41 PM Inpatient Hospitalist Consult      3/24/2024  8:41 PM Inpatient Psychiatrist Consult Completed             Active and Resolved Hospital Problems:  Shortness of breath, CORTEZ  Elevated troponins greater than 400  History of dilated nonischemic cardiomyopathy (amphetamine associated?)  History of cardiac cath 2023 (nonobstructive CAD)  COPD and continued tobacco use  History of apical thrombus; currently on Eliquis  Anxiety/depression/schizophrenia  DM  Substance abuse/amphetamine +  NSVT    Hospital Course     Hospital Course:  Regulo Sepulveda is a 58 y.o. male past medical history of heart failure reduced ejection fraction of 25 to 30%, apical thrombus on echo, anxiety/depression, COPD, nonobstructive coronary disease on cath from , type 2 diabetes, and schizophrenia who presents to the emergency department with shortness of breath     It is difficult to get a clear history from the patient as he is agitated and not cooperative.  He initially called one of the nurses derogatory terms and tried to leave AMA.  However, he was found sitting outside urinating on himself and asked to be taken back to his room.  States that he has been short of breath for a couple of days.  No lower extremity edema.  No cough.  No fever.  No chills.     Emergency department patient's vital signs are as follows: Temperature 97.7, pulse 93, respiratory is 20, blood pressure 120/94.  90% on room air.  CBC shows no significant abnormalities.  CMP shows a creatinine 1.33.  proBNP is slightly elevated at 30,000.  Troponin was initially 368 and trended down to 324.  Of note,  the patient has had mildly elevated troponins in the past but nothing this high.  However the patient was cathed in September 2023 with no obstructive disease.  EKG shows no ischemia but does show a mildly prolonged QTc.  Patient admitted to hospital for decompensation of heart failure.    While in the hospital patient was placed on telemetry, he did have multiple episodes of nonsustained V. tach.  Patient was mostly asymptomatic from this.  Cardiology was consulted, recommending initiation of amnio.  Patient will need cardiology follow-up as he will need AICD, he is instructed to follow-up with Dr. Barroso within the next 1 week.  Patient's breathing is greatly improved, he diuresed well.  He should continue with oral diuretics.  Patient will discharge home today in stable condition.    Day of Discharge     Vital Signs:  Temp:  [97.3 °F (36.3 °C)-98.2 °F (36.8 °C)] 97.7 °F (36.5 °C)  Heart Rate:  [78-88] 78  Resp:  [16-20] 20  BP: (117-131)/(83-96) 131/90    Physical Exam:   GEN: No acute distress  HEENT: Moist mucous membranes  LUNGS: Equal chest rise bilaterally  CARDIAC: Regular rate and rhythm  NEURO: Moving all 4 extremities spontaneously  SKIN: No obvious breakdown    Discharge Details        Discharge Medications        New Medications        Instructions Start Date   amiodarone 200 MG tablet  Commonly known as: PACERONE   Take 1 tablet by mouth 2 (Two) Times a Day for 7 days, THEN 1 tablet Daily for 21 days.   Start Date: April 6, 2024     carvedilol 6.25 MG tablet  Commonly known as: COREG   6.25 mg, Oral, 2 Times Daily             Continue These Medications        Instructions Start Date   Abilify Maintena 300 MG Suspension Reconstituted ER IM injection ER  Generic drug: ARIPiprazole ER   Inject 300 mg into the appropriate muscle as directed by prescriber Every 30 (Thirty) Days.      albuterol sulfate  (90 Base) MCG/ACT inhaler  Commonly known as: PROVENTIL HFA;VENTOLIN HFA;PROAIR HFA   2 puffs,  Inhalation, Every 4 Hours PRN      apixaban 5 MG tablet tablet  Commonly known as: ELIQUIS   5 mg, Oral, 2 Times Daily      atorvastatin 40 MG tablet  Commonly known as: LIPITOR   40 mg, Oral, Every Night at Bedtime      divalproex 250 MG DR tablet  Commonly known as: DEPAKOTE   250 mg, Oral, Daily      Farxiga 5 MG tablet tablet  Generic drug: dapagliflozin   5 mg, Oral, Daily      furosemide 40 MG tablet  Commonly known as: LASIX   40 mg, Oral, 2 Times Daily      lisinopril 40 MG tablet  Commonly known as: PRINIVIL,ZESTRIL   40 mg, Oral, Every 24 Hours Scheduled      tamsulosin 0.4 MG capsule 24 hr capsule  Commonly known as: FLOMAX   0.4 mg, Oral, Daily      venlafaxine XR 37.5 MG 24 hr capsule  Commonly known as: EFFEXOR-XR   75 mg, Oral, Daily             Stop These Medications      metoprolol succinate XL 25 MG 24 hr tablet  Commonly known as: TOPROL-XL              Allergies   Allergen Reactions    Penicillins Shortness Of Breath       Discharge Disposition:  Home-Health Care [facility]    Diet:  Hospital:  Diet Order   Procedures    Diet: Cardiac, Fluid Restriction (240 mL/tray); Low Sodium (2g); 2000 mL/day; Fluid Consistency: Thin (IDDSI 0)       Discharge Activity:       CODE STATUS:  Code Status and Medical Interventions:   Ordered at: 04/02/24 1954     Level Of Support Discussed With:    Patient     Code Status (Patient has no pulse and is not breathing):    CPR (Attempt to Resuscitate)     Medical Interventions (Patient has pulse or is breathing):    Full Support       Future Appointments   Date Time Provider Department Center   4/14/2024  7:00 PM [facility] SLEEP ROOM 4 [facility] SLEEP [facility]   4/15/2024  9:30 AM Lin Tamez APRN [facility] [place] [facility]   4/15/2024  2:30 PM Dickson Landry MD [facility] [place] RADCL [facility]   4/16/2024  1:00 PM eBto [place] MD [facility] HRTFL HA None   7/17/2024  8:45 AM Veronica Lu APRN [facility] GE ETW [facility]           Pertinent  and/or Most Recent Results     IMAGING:  CT Chest With Contrast  Diagnostic    Result Date: 4/3/2024  CT CHEST W CONTRAST DIAGNOSTIC-  Date of Exam: 4/3/2024 3:39 PM  Indication: Pulmonary embolism (PE) suspected, unknown D-dimer; I50.9-Heart failure, unspecified; R79.89-Other specified abnormal findings of blood chemistry.  Comparison: 12/16/2023  Technique: Axial CT images were obtained of the chest after the uneventful intravenous administration of 85 cc Isovue-370. Reconstructed coronal and sagittal images were also obtained. Automated exposure control and iterative construction methods were used.   Findings: No pulmonary emboli identified. Cardiomegaly is present. Moderate right pleural effusion similar to the prior examination. Emphysema is present in the lung fields. No evidence of pneumothorax. No evidence of pericardial effusion. There is mild airspace consolidation in the right lower lobe likely atelectasis. Pneumonia is also possible. This has improved compared to the prior examination. Degenerative changes are present in the thoracic spine. Images of the visualized upper abdomen are unremarkable.      Impression:  1. No pulmonary emboli identified.  2. Cardiomegaly.  3. Moderate right pleural effusion. Adjacent airspace consolidation is likely atelectasis. Pneumonia is also possible.    Electronically Signed By-TOMAS ARMAS MD On:4/3/2024 3:51 PM      XR Chest 1 View    Result Date: 4/2/2024   DATE OF EXAM: 4/2/2024 1:15 PM  PROCEDURE: XR CHEST 1 VW-  INDICATIONS: SOA Triage Protocol  COMPARISON: 3/23/2024 and prior  TECHNIQUE: Portable Chest  FINDINGS:  Study is limited by overlying support and monitoring apparatus. Heart size is enlarged, stable. Pulmonary vascularity appears within normal limits. No focal consolidation or significant pleural effusion noted. Osseous structures are unremarkable      IMPRESSION : Cardiomegaly  No focal consolidation [  Electronically Signed By-Alireza Noyola On:4/2/2024 1:44 PM      Adult Transthoracic Echo Complete W/ Cont if  Necessary Per Protocol    Result Date: 3/24/2024  Diffuse hypokinesis with severely reduced left ventricular systolic function ejection fraction around 25 to 30%. Mild MR and mild TR. Organized thrombus still present in the apex of the left ventricle.  Present on previous echo.     XR Chest 1 View    Result Date: 3/23/2024  XR CHEST 1 VW-  Date of Exam: 3/23/2024 9:42 PM  Indication: Chest Pain Triage Protocol.  Comparison: 12/15/2023.  Findings: A single AP upright portable chest radiograph is provided for review. Interval decrease in pulmonary edema with vascular congestion is noted since 12/15/2023. There is emphysematous contour of the lungs. Minimal if any pleural effusion is seen. No pneumothorax. There is stable mild to moderate cardiomegaly. Degenerative changes involve the shoulders and spine. External artifacts obscure detail. The thoracic aorta is atherosclerotic. Probably no significant acute infiltrate is seen on the current study.      Impression: Probably no acute infiltrate. No change in the mild to moderate cardiomegaly.   Electronically Signed By-Angel Snell MD On:3/23/2024 10:58 PM      CT chest PE Protocol w    Result Date: 3/11/2024  ACCESSION NUMBER: [id number] DATE: 3/11/2024 13:18 EXAMINATION: CT Chest PE Protocol W PROVIDED INDICATION: Pulmonary embolism (PE) suspected, positive D-dimer COMPARISON: CT chest 10/20/2021 TECHNIQUE: Axial computed tomographic images of the chest were acquired following intravenous administration of 80 mL Isovue-370 in pulmonary arterial phase using bolus tracking technique. Images were reconstructed in the axial plane and reformatted as coronal MIP and sagittal MPR. FINDINGS: Diagnostic quality: Adequate Pulmonary arteries: No evidence of pulmonary embolism in the main, lobar and segmental arteries. Lungs and large airways: The central airways are clear without endobronchial lesions. Mild upper lobe predominant centrilobular emphysema. Diffuse groundglass  opacities and interlobular septal thickening. Bibasilar dependent atelectasis. Pleura: Moderate right and trace left-sided pleural effusions. No pneumothorax. Mediastinum and Giana: Multiple diffusely hypoattenuating mediastinal lymph nodes are favored to be reactive. Heart and Pericardium: Diffuse four-chamber cardiomegaly. No pericardial effusion. Vessels: The ascending aorta is nonaneurysmal.  The main pulmonary artery is nondilated. Chest Wall and Lower Neck: Thyroid gland appears normal. No enlarged supraclavicular or axillary lymph nodes. Included Upper Abdomen: No acute abnormality. Bones: No acute or aggressive bony lesions. IMPRESSION: 1.  No evidence of acute pulmonary embolism. 2.  Diffuse groundglass opacities and interlobular septal thickening in both lungs, consistent with pulmonary edema. 3.  Moderate sized right and trace left pleural effusions with bibasilar dependent atelectasis. 4.  Diffuse four-chamber cardiomegaly. 5.  Mildly prominent mediastinal lymph nodes are favored to be reactive. Recommend attention on follow-up imaging. Dictated by: Elana Lin MD Signed by Elana Lin MD on 3/11/2024 13:41 ##### Final ##### Dictated by:    ELANA LIN MD-RAD Dictated DT/TM: 03/11/2024 1:41 pm Interpreted and electronically signed by:  ELANA LIN MD-RAD Signed DT/TM:  03/11/2024 1:41 pm    XR chest 1 view    Result Date: 3/11/2024  EXAM: Single view chest radiograph DATE:  3/11/2024 10:02 ACCESSION:  [id number] CLINICAL HISTORY:  shortness of breath COMPARISON:  CT chest 10/20/2021 TECHNIQUE: Single portable semiupright AP view chest radiograph was obtained for review.   FINDINGS: Diffuse patchy airspace opacities in both lungs and interstitial edema. Central airways are clear. Stable mild cardiomegaly. No pleural effusion or pneumothorax. No acute osseous abnormality. IMPRESSION: Findings concerning for pulmonary edema. Pneumonia is a differential consideration. Dictated by: Elana Lin  MD Signed by Elana Lin MD on 3/11/2024 11:03 ##### Final ##### Dictated by:    ELANA LIN MD-RAD Dictated DT/TM: 03/11/2024 11:03 am Interpreted and electronically signed by:  ELANA LIN MD-RAD Signed DT/TM:  03/11/2024 11:03 am      LAB RESULTS:      Lab 04/06/24  0405 04/05/24  0412 04/04/24  0759 04/03/24  0450 04/02/24  1350   WBC 8.07 8.97 7.49 8.31 9.61   HEMOGLOBIN 13.5 12.8* 12.7* 12.6* 13.2   HEMATOCRIT 44.0 41.2 39.0 40.0 41.8   PLATELETS 368 353 330 373 414   NEUTROS ABS 4.87 5.99  --  5.53 6.96   IMMATURE GRANS (ABS) 0.02 0.03  --  0.02 0.02   LYMPHS ABS 2.09 1.81  --  1.70 1.64   MONOS ABS 0.89 0.96*  --  0.89 0.88   EOS ABS 0.14 0.10  --  0.09 0.04   MCV 88.0 87.1 84.8 87.1 89.1         Lab 04/06/24  0405 04/05/24  0412 04/04/24  0759 04/03/24  0450 04/02/24  1557 04/02/24  1350   SODIUM 139 139 141 141  --  139   POTASSIUM 4.1 3.6 3.1* 3.7  --  4.7   CHLORIDE 101 100 102 104  --  102   CO2 30.0* 26.6 28.9 24.4  --  23.3   ANION GAP 8.0 12.4 10.1 12.6  --  13.7   BUN 27* 27* 24* 22*  --  23*   CREATININE 1.08 1.07 1.22 1.28*  --  1.33*   EGFR 79.5 80.4 68.7 64.9  --  62.0   GLUCOSE 119* 117* 135* 97  --  135*   CALCIUM 9.1 8.8 8.5* 8.5*  --  9.2   MAGNESIUM 2.0 2.2 1.7 1.7  --   --    PHOSPHORUS 2.9 2.7  --   --   --   --    TSH  --   --   --   --  1.460  --          Lab 04/06/24  0405 04/03/24  0450 04/02/24  1350   TOTAL PROTEIN 6.7 6.1 6.9   ALBUMIN 3.4* 3.2* 3.7   GLOBULIN 3.3 2.9 3.2   ALT (SGPT) 29 42* 48*   AST (SGOT) 18 30 34   BILIRUBIN 0.6 1.0 0.7   ALK PHOS 251* 236* 286*         Lab 04/03/24  1114 04/02/24  1557 04/02/24  1350   PROBNP  --   --  30,138.0*   HSTROP T 416* 324* 368*                 Brief Urine Lab Results  (Last result in the past 365 days)        Color   Clarity   Blood   Leuk Est   Nitrite   Protein   CREAT   Urine HCG        08/27/23 1925 Yellow   Clear   Small (1+)   Negative   Negative   100 mg/dL (2+)                 Microbiology Results (last 10 days)        Procedure Component Value - Date/Time    COVID-19, FLU A/B, RSV PCR 1 HR TAT - Swab, Nasopharynx [[id number]]  (Normal) Collected: 04/02/24 1327    Lab Status: Final result Specimen: Swab from Nasopharynx Updated: 04/02/24 1505     COVID19 Not Detected     Influenza A PCR Not Detected     Influenza B PCR Not Detected     RSV, PCR Not Detected    Narrative:      Fact sheet for providers: https://www.fda.gov/media/766973/download    Fact sheet for patients: https://www.fda.gov/media/662487/download    Test performed by PCR.              Results for orders placed during the hospital encounter of 03/23/24    Adult Transthoracic Echo Complete W/ Cont if Necessary Per Protocol    Interpretation Summary  Diffuse hypokinesis with severely reduced left ventricular systolic function ejection fraction around 25 to 30%.  Mild MR and mild TR.  Organized thrombus still present in the apex of the left ventricle.  Present on previous echo.      Time spent on Discharge including face to face service: Greater than 30 minutes      Electronically signed by Olayinka Manzo MD, 04/06/24, 11:08 AM EDT.

## 2024-04-08 ENCOUNTER — HOSPITAL ENCOUNTER (INPATIENT)
Facility: HOSPITAL | Age: 59
LOS: 1 days | Discharge: LEFT BEFORE DISCHARGE | DRG: 065 | End: 2024-04-10
Attending: EMERGENCY MEDICINE | Admitting: STUDENT IN AN ORGANIZED HEALTH CARE EDUCATION/TRAINING PROGRAM
Payer: COMMERCIAL

## 2024-04-08 ENCOUNTER — APPOINTMENT (OUTPATIENT)
Dept: CT IMAGING | Facility: HOSPITAL | Age: 59
DRG: 065 | End: 2024-04-08
Payer: COMMERCIAL

## 2024-04-08 ENCOUNTER — TRANSITIONAL CARE MANAGEMENT TELEPHONE ENCOUNTER (OUTPATIENT)
Dept: CALL CENTER | Facility: HOSPITAL | Age: 59
End: 2024-04-08
Payer: COMMERCIAL

## 2024-04-08 ENCOUNTER — APPOINTMENT (OUTPATIENT)
Dept: GENERAL RADIOLOGY | Facility: HOSPITAL | Age: 59
DRG: 065 | End: 2024-04-08
Payer: COMMERCIAL

## 2024-04-08 DIAGNOSIS — Z78.9 DECREASED ACTIVITIES OF DAILY LIVING (ADL): ICD-10-CM

## 2024-04-08 DIAGNOSIS — R13.10 DYSPHAGIA, UNSPECIFIED TYPE: ICD-10-CM

## 2024-04-08 DIAGNOSIS — R41.82 ALTERED MENTAL STATUS, UNSPECIFIED ALTERED MENTAL STATUS TYPE: Primary | ICD-10-CM

## 2024-04-08 DIAGNOSIS — F20.9 SCHIZOPHRENIA, UNSPECIFIED TYPE: ICD-10-CM

## 2024-04-08 DIAGNOSIS — F33.1 MAJOR DEPRESSIVE DISORDER, RECURRENT EPISODE, MODERATE: ICD-10-CM

## 2024-04-08 DIAGNOSIS — R26.2 DIFFICULTY IN WALKING: ICD-10-CM

## 2024-04-08 DIAGNOSIS — R79.89 ELEVATED TROPONIN: ICD-10-CM

## 2024-04-08 PROBLEM — G45.9 TIA (TRANSIENT ISCHEMIC ATTACK): Status: ACTIVE | Noted: 2024-04-08

## 2024-04-08 LAB
ABO GROUP BLD: NORMAL
ABO GROUP BLD: NORMAL
ALBUMIN SERPL-MCNC: 3.8 G/DL (ref 3.5–5.2)
ALBUMIN/GLOB SERPL: 1.4 G/DL
ALP SERPL-CCNC: 226 U/L (ref 39–117)
ALT SERPL W P-5'-P-CCNC: 25 U/L (ref 1–41)
AMMONIA BLD-SCNC: 23 UMOL/L (ref 16–60)
AMPHET+METHAMPHET UR QL: NEGATIVE
ANION GAP SERPL CALCULATED.3IONS-SCNC: 11.3 MMOL/L (ref 5–15)
APTT PPP: 33.2 SECONDS (ref 24.2–34.2)
AST SERPL-CCNC: 23 U/L (ref 1–40)
BACTERIA UR QL AUTO: ABNORMAL /HPF
BARBITURATES UR QL SCN: NEGATIVE
BASOPHILS # BLD AUTO: 0.07 10*3/MM3 (ref 0–0.2)
BASOPHILS NFR BLD AUTO: 0.9 % (ref 0–1.5)
BENZODIAZ UR QL SCN: NEGATIVE
BILIRUB SERPL-MCNC: 0.5 MG/DL (ref 0–1.2)
BILIRUB UR QL STRIP: NEGATIVE
BLD GP AB SCN SERPL QL: NEGATIVE
BUN SERPL-MCNC: 27 MG/DL (ref 6–20)
BUN/CREAT SERPL: 21.1 (ref 7–25)
CALCIUM SPEC-SCNC: 9.1 MG/DL (ref 8.6–10.5)
CANNABINOIDS SERPL QL: NEGATIVE
CHLORIDE SERPL-SCNC: 97 MMOL/L (ref 98–107)
CLARITY UR: CLEAR
CO2 SERPL-SCNC: 30.7 MMOL/L (ref 22–29)
COCAINE UR QL: NEGATIVE
COLOR UR: YELLOW
CREAT SERPL-MCNC: 1.28 MG/DL (ref 0.76–1.27)
DEPRECATED RDW RBC AUTO: 48.2 FL (ref 37–54)
EGFRCR SERPLBLD CKD-EPI 2021: 64.9 ML/MIN/1.73
EOSINOPHIL # BLD AUTO: 0.16 10*3/MM3 (ref 0–0.4)
EOSINOPHIL NFR BLD AUTO: 2 % (ref 0.3–6.2)
ERYTHROCYTE [DISTWIDTH] IN BLOOD BY AUTOMATED COUNT: 15.2 % (ref 12.3–15.4)
ETHANOL BLD-MCNC: <10 MG/DL (ref 0–10)
ETHANOL UR QL: <0.01 %
FENTANYL UR-MCNC: NEGATIVE NG/ML
GEN 5 2HR TROPONIN T REFLEX: 95 NG/L
GLOBULIN UR ELPH-MCNC: 2.8 GM/DL
GLUCOSE SERPL-MCNC: 93 MG/DL (ref 65–99)
GLUCOSE UR STRIP-MCNC: ABNORMAL MG/DL
HCT VFR BLD AUTO: 42.8 % (ref 37.5–51)
HGB BLD-MCNC: 13.3 G/DL (ref 13–17.7)
HGB UR QL STRIP.AUTO: ABNORMAL
HOLD SPECIMEN: NORMAL
HOLD SPECIMEN: NORMAL
HYALINE CASTS UR QL AUTO: ABNORMAL /LPF
IMM GRANULOCYTES # BLD AUTO: 0.02 10*3/MM3 (ref 0–0.05)
IMM GRANULOCYTES NFR BLD AUTO: 0.3 % (ref 0–0.5)
INR PPP: 1.16 (ref 0.86–1.15)
KETONES UR QL STRIP: NEGATIVE
LEUKOCYTE ESTERASE UR QL STRIP.AUTO: NEGATIVE
LYMPHOCYTES # BLD AUTO: 2.37 10*3/MM3 (ref 0.7–3.1)
LYMPHOCYTES NFR BLD AUTO: 30.3 % (ref 19.6–45.3)
MAGNESIUM SERPL-MCNC: 1.5 MG/DL (ref 1.6–2.6)
MCH RBC QN AUTO: 27.3 PG (ref 26.6–33)
MCHC RBC AUTO-ENTMCNC: 31.1 G/DL (ref 31.5–35.7)
MCV RBC AUTO: 87.9 FL (ref 79–97)
METHADONE UR QL SCN: NEGATIVE
MONOCYTES # BLD AUTO: 0.96 10*3/MM3 (ref 0.1–0.9)
MONOCYTES NFR BLD AUTO: 12.3 % (ref 5–12)
NEUTROPHILS NFR BLD AUTO: 4.24 10*3/MM3 (ref 1.7–7)
NEUTROPHILS NFR BLD AUTO: 54.2 % (ref 42.7–76)
NITRITE UR QL STRIP: NEGATIVE
NRBC BLD AUTO-RTO: 0 /100 WBC (ref 0–0.2)
OPIATES UR QL: NEGATIVE
OXYCODONE UR QL SCN: NEGATIVE
PH UR STRIP.AUTO: 7 [PH] (ref 5–8)
PLATELET # BLD AUTO: 378 10*3/MM3 (ref 140–450)
PMV BLD AUTO: 10 FL (ref 6–12)
POTASSIUM SERPL-SCNC: 3.2 MMOL/L (ref 3.5–5.2)
PROT SERPL-MCNC: 6.6 G/DL (ref 6–8.5)
PROT UR QL STRIP: NEGATIVE
PROTHROMBIN TIME: 15.1 SECONDS (ref 11.8–14.9)
RBC # BLD AUTO: 4.87 10*6/MM3 (ref 4.14–5.8)
RBC # UR STRIP: ABNORMAL /HPF
REF LAB TEST METHOD: ABNORMAL
RH BLD: POSITIVE
RH BLD: POSITIVE
SODIUM SERPL-SCNC: 139 MMOL/L (ref 136–145)
SP GR UR STRIP: 1.02 (ref 1–1.03)
SQUAMOUS #/AREA URNS HPF: ABNORMAL /HPF
T&S EXPIRATION DATE: NORMAL
T4 FREE SERPL-MCNC: 1.39 NG/DL (ref 0.92–1.68)
TROPONIN T DELTA: -1 NG/L
TROPONIN T SERPL HS-MCNC: 96 NG/L
TSH SERPL DL<=0.05 MIU/L-ACNC: 1.02 UIU/ML (ref 0.27–4.2)
UROBILINOGEN UR QL STRIP: ABNORMAL
VALPROATE SERPL-MCNC: <2.8 MCG/ML (ref 50–125)
WBC # UR STRIP: ABNORMAL /HPF
WBC NRBC COR # BLD AUTO: 7.82 10*3/MM3 (ref 3.4–10.8)
WHOLE BLOOD HOLD COAG: NORMAL
WHOLE BLOOD HOLD SPECIMEN: NORMAL

## 2024-04-08 PROCEDURE — 85025 COMPLETE CBC W/AUTO DIFF WBC: CPT | Performed by: EMERGENCY MEDICINE

## 2024-04-08 PROCEDURE — 96375 TX/PRO/DX INJ NEW DRUG ADDON: CPT

## 2024-04-08 PROCEDURE — 99222 1ST HOSP IP/OBS MODERATE 55: CPT | Performed by: STUDENT IN AN ORGANIZED HEALTH CARE EDUCATION/TRAINING PROGRAM

## 2024-04-08 PROCEDURE — 83735 ASSAY OF MAGNESIUM: CPT | Performed by: EMERGENCY MEDICINE

## 2024-04-08 PROCEDURE — 25010000002 LEVETIRACETAM IN NACL 0.75% 1000 MG/100ML SOLUTION: Performed by: PSYCHIATRY & NEUROLOGY

## 2024-04-08 PROCEDURE — 70450 CT HEAD/BRAIN W/O DYE: CPT

## 2024-04-08 PROCEDURE — 99222 1ST HOSP IP/OBS MODERATE 55: CPT | Performed by: PSYCHIATRY & NEUROLOGY

## 2024-04-08 PROCEDURE — 25010000002 NALOXONE HCL 2 MG/2ML SOLUTION PREFILLED SYRINGE: Performed by: EMERGENCY MEDICINE

## 2024-04-08 PROCEDURE — 80053 COMPREHEN METABOLIC PANEL: CPT | Performed by: EMERGENCY MEDICINE

## 2024-04-08 PROCEDURE — 82077 ASSAY SPEC XCP UR&BREATH IA: CPT | Performed by: EMERGENCY MEDICINE

## 2024-04-08 PROCEDURE — 82607 VITAMIN B-12: CPT | Performed by: STUDENT IN AN ORGANIZED HEALTH CARE EDUCATION/TRAINING PROGRAM

## 2024-04-08 PROCEDURE — 80307 DRUG TEST PRSMV CHEM ANLYZR: CPT | Performed by: EMERGENCY MEDICINE

## 2024-04-08 PROCEDURE — 71045 X-RAY EXAM CHEST 1 VIEW: CPT

## 2024-04-08 PROCEDURE — 84439 ASSAY OF FREE THYROXINE: CPT | Performed by: EMERGENCY MEDICINE

## 2024-04-08 PROCEDURE — 86900 BLOOD TYPING SEROLOGIC ABO: CPT

## 2024-04-08 PROCEDURE — 70496 CT ANGIOGRAPHY HEAD: CPT

## 2024-04-08 PROCEDURE — 82140 ASSAY OF AMMONIA: CPT | Performed by: EMERGENCY MEDICINE

## 2024-04-08 PROCEDURE — 84484 ASSAY OF TROPONIN QUANT: CPT | Performed by: EMERGENCY MEDICINE

## 2024-04-08 PROCEDURE — 81001 URINALYSIS AUTO W/SCOPE: CPT | Performed by: EMERGENCY MEDICINE

## 2024-04-08 PROCEDURE — 25510000001 IOPAMIDOL PER 1 ML: Performed by: EMERGENCY MEDICINE

## 2024-04-08 PROCEDURE — 70498 CT ANGIOGRAPHY NECK: CPT

## 2024-04-08 PROCEDURE — 99285 EMERGENCY DEPT VISIT HI MDM: CPT

## 2024-04-08 PROCEDURE — 0042T HC CT CEREBRAL PERFUSION W/WO CONTRAST: CPT

## 2024-04-08 PROCEDURE — 85610 PROTHROMBIN TIME: CPT | Performed by: EMERGENCY MEDICINE

## 2024-04-08 PROCEDURE — 86900 BLOOD TYPING SEROLOGIC ABO: CPT | Performed by: EMERGENCY MEDICINE

## 2024-04-08 PROCEDURE — 86850 RBC ANTIBODY SCREEN: CPT | Performed by: EMERGENCY MEDICINE

## 2024-04-08 PROCEDURE — G0378 HOSPITAL OBSERVATION PER HR: HCPCS

## 2024-04-08 PROCEDURE — 85730 THROMBOPLASTIN TIME PARTIAL: CPT | Performed by: EMERGENCY MEDICINE

## 2024-04-08 PROCEDURE — 82948 REAGENT STRIP/BLOOD GLUCOSE: CPT | Performed by: STUDENT IN AN ORGANIZED HEALTH CARE EDUCATION/TRAINING PROGRAM

## 2024-04-08 PROCEDURE — 93005 ELECTROCARDIOGRAM TRACING: CPT | Performed by: EMERGENCY MEDICINE

## 2024-04-08 PROCEDURE — 86901 BLOOD TYPING SEROLOGIC RH(D): CPT | Performed by: EMERGENCY MEDICINE

## 2024-04-08 PROCEDURE — 80164 ASSAY DIPROPYLACETIC ACD TOT: CPT | Performed by: EMERGENCY MEDICINE

## 2024-04-08 PROCEDURE — 96374 THER/PROPH/DIAG INJ IV PUSH: CPT

## 2024-04-08 PROCEDURE — 93010 ELECTROCARDIOGRAM REPORT: CPT | Performed by: INTERNAL MEDICINE

## 2024-04-08 PROCEDURE — 86901 BLOOD TYPING SEROLOGIC RH(D): CPT

## 2024-04-08 PROCEDURE — 84443 ASSAY THYROID STIM HORMONE: CPT | Performed by: EMERGENCY MEDICINE

## 2024-04-08 RX ORDER — SODIUM CHLORIDE 0.9 % (FLUSH) 0.9 %
10 SYRINGE (ML) INJECTION AS NEEDED
Status: DISCONTINUED | OUTPATIENT
Start: 2024-04-08 | End: 2024-04-10 | Stop reason: HOSPADM

## 2024-04-08 RX ORDER — MAGNESIUM SULFATE HEPTAHYDRATE 40 MG/ML
2 INJECTION, SOLUTION INTRAVENOUS ONCE
Status: COMPLETED | OUTPATIENT
Start: 2024-04-08 | End: 2024-04-09

## 2024-04-08 RX ORDER — NALOXONE HYDROCHLORIDE 1 MG/ML
INJECTION INTRAMUSCULAR; INTRAVENOUS; SUBCUTANEOUS
Status: COMPLETED | OUTPATIENT
Start: 2024-04-08 | End: 2024-04-08

## 2024-04-08 RX ORDER — LEVETIRACETAM 10 MG/ML
1000 INJECTION INTRAVASCULAR EVERY 12 HOURS SCHEDULED
Status: DISCONTINUED | OUTPATIENT
Start: 2024-04-08 | End: 2024-04-10

## 2024-04-08 RX ADMIN — LEVETIRACETAM 1000 MG: 10 INJECTION, SOLUTION INTRAVENOUS at 21:15

## 2024-04-08 RX ADMIN — IOPAMIDOL 80 ML: 755 INJECTION, SOLUTION INTRAVENOUS at 19:37

## 2024-04-08 RX ADMIN — NALOXONE HYDROCHLORIDE 2 MG: 1 INJECTION PARENTERAL at 19:21

## 2024-04-08 RX ADMIN — IOPAMIDOL 100 ML: 755 INJECTION, SOLUTION INTRAVENOUS at 19:47

## 2024-04-08 NOTE — ED PROVIDER NOTES
Time: 7:19 PM EDT  Date of encounter:  4/8/2024  Independent Historian/Clinical History and Information was obtained by:   EMS  Chief Complaint: Altered mental status    History is limited by: Altered Mental Status    History of Present Illness:  Patient is a 58 y.o. year old male who presents to the emergency department for evaluation of altered mental status.  According to EMS the patient's last known well was 45 minutes prior to arrival.  Patient is not able to give history.  According to EMS the family notes acute mental status changes 45 minutes prior to arrival they also noted right-sided facial droop and right-sided weakness.  EMS states this is progressed since being transported here.  No other history is obtainable.  Family did tell the EMS that the patient is on Eliquis.    HPI    Patient Care Team  Primary Care Provider: Dickson Landry MD    Past Medical History:     Allergies   Allergen Reactions    Penicillins Shortness Of Breath     Past Medical History:   Diagnosis Date    Anxiety     Asthma     Bipolar affective     Cardiac tamponade     2023    CHF (congestive heart failure)     COPD (chronic obstructive pulmonary disease)     Coronary artery disease     Depression     Diabetes mellitus     Elevated cholesterol     Hypertension     Parkinson disease      Past Surgical History:   Procedure Laterality Date    CARDIAC CATHETERIZATION Right 9/17/2023    Procedure: Right and Left Heart Cath;  Surgeon: Ben Grant MD;  Location: [facility] CATH INVASIVE LOCATION;  Service: Cardiovascular;  Laterality: Right;    TESTICLE SURGERY Left     extraction     Family History   Problem Relation Age of Onset    Diabetes Mother     Depression Sister     Restless legs syndrome Sister     Insomnia Sister     Sleep walking Sister     Sleep disorder Sister         Night Terrors    Depression Brother        Home Medications:  Prior to Admission medications    Medication Sig Start Date End Date Taking? Authorizing Provider    Abilify Maintena 300 MG Suspension Reconstituted ER IM injection ER Inject 300 mg into the appropriate muscle as directed by prescriber Every 30 (Thirty) Days. 2/9/24   Yuliya Blanchard MD   albuterol sulfate  (90 Base) MCG/ACT inhaler Inhale 2 puffs Every 4 (Four) Hours As Needed for Wheezing or Shortness of Air. Indications: Chronic Obstructive Lung Disease 1/8/24   Dickson Landry MD   amiodarone (PACERONE) 200 MG tablet Take 1 tablet by mouth 2 (Two) Times a Day for 7 days, THEN 1 tablet Daily for 21 days. 4/6/24 5/4/24  Guicho Chase PA   apixaban (ELIQUIS) 5 MG tablet tablet Take 1 tablet by mouth 2 (Two) Times a Day for 120 days. 2/22/24 6/21/24  Dickson Landry MD   atorvastatin (LIPITOR) 40 MG tablet Take 1 tablet by mouth every night at bedtime. 1/8/24   Dickson Landry MD   carvedilol (COREG) 6.25 MG tablet Take 1 tablet by mouth 2 (Two) Times a Day. 4/6/24   Guicho Chase PA   divalproex (DEPAKOTE) 250 MG DR tablet Take 1 tablet by mouth Daily.    Yuliya Blanchard MD   Farxiga 5 MG tablet tablet Take 1 tablet by mouth Daily. Indications: Type 2 Diabetes 2/22/24   Dickson Landry MD   furosemide (LASIX) 40 MG tablet Take 1 tablet by mouth 2 (Two) Times a Day.    Yuliya Blanchard MD   lisinopril (PRINIVIL,ZESTRIL) 40 MG tablet Take 1 tablet by mouth Daily. 2/22/24   Dickson Landry MD   tamsulosin (FLOMAX) 0.4 MG capsule 24 hr capsule Take 1 capsule by mouth Daily for 30 days. 3/29/24 4/28/24  Dickson Landry MD   venlafaxine XR (EFFEXOR-XR) 37.5 MG 24 hr capsule Take 2 capsules by mouth Daily for 30 days. 3/25/24 4/24/24  Felton Reynoso MD        Social History:   Social History     Tobacco Use    Smoking status: Every Day     Current packs/day: 0.50     Average packs/day: 0.5 packs/day for 50.1 years (25.1 ttl pk-yrs)     Types: Cigarettes     Start date: 1/1/1974     Last attempt to quit: 11/2/2023    Smokeless tobacco: Never    Tobacco comments:     Extremely drowsy   Vaping Use     "Vaping status: Former    Substances: Nicotine   Substance Use Topics    Alcohol use: Not Currently    Drug use: Yes     Types: Methamphetamines, Marijuana         Review of Systems:  Review of Systems   Unable to perform ROS: Mental status change        Physical Exam:  /65 (BP Location: Right arm, Patient Position: Lying)   Pulse 66   Temp 97.7 °F (36.5 °C) (Oral)   Resp 18   Ht 195.6 cm (77.01\")   Wt 68.6 kg (151 lb 3.8 oz)   SpO2 100%   BMI 17.93 kg/m²     Physical Exam  Vitals and nursing note reviewed.   Constitutional:       General: He is not in acute distress.     Appearance: He is ill-appearing. He is not toxic-appearing or diaphoretic.   HENT:      Head: Normocephalic and atraumatic.      Mouth/Throat:      Mouth: Mucous membranes are moist.   Eyes:      Comments: Pupils appear somewhat constricted and sluggish   Cardiovascular:      Rate and Rhythm: Normal rate and regular rhythm.      Pulses: Normal pulses.           Carotid pulses are 2+ on the right side and 2+ on the left side.       Radial pulses are 2+ on the right side and 2+ on the left side.        Femoral pulses are 2+ on the right side and 2+ on the left side.       Popliteal pulses are 2+ on the right side and 2+ on the left side.        Dorsalis pedis pulses are 2+ on the right side and 2+ on the left side.        Posterior tibial pulses are 2+ on the right side and 2+ on the left side.      Heart sounds: Normal heart sounds. No murmur heard.  Pulmonary:      Effort: Pulmonary effort is normal. No accessory muscle usage, respiratory distress or retractions.      Breath sounds: Examination of the right-upper field reveals decreased breath sounds. Examination of the left-upper field reveals decreased breath sounds. Examination of the right-middle field reveals decreased breath sounds. Examination of the left-middle field reveals decreased breath sounds. Examination of the right-lower field reveals decreased breath sounds. " Examination of the left-lower field reveals decreased breath sounds. Decreased breath sounds present. No wheezing, rhonchi or rales.   Abdominal:      General: Abdomen is flat. There is no distension.      Palpations: Abdomen is soft. There is no mass or pulsatile mass.      Tenderness: There is no abdominal tenderness. There is no right CVA tenderness, left CVA tenderness, guarding or rebound.      Comments: No rigidity   Musculoskeletal:         General: No swelling, tenderness or deformity.      Cervical back: Neck supple. No tenderness.      Right lower leg: No edema.      Left lower leg: No edema.   Skin:     General: Skin is warm and dry.      Capillary Refill: Capillary refill takes less than 2 seconds.      Coloration: Skin is not jaundiced or pale.      Findings: No erythema.   Neurological:      Mental Status: He is alert. He is disoriented and confused.      Cranial Nerves: Dysarthria and facial asymmetry present.      Sensory: Sensation is intact. No sensory deficit.      Motor: Weakness and pronator drift present.      Comments: Patient was unable to cooperate with a full neurological exam    NIH Stroke Scale/Score (NIHSS) - MDCalc  Calculated on Apr 08 2024 7:30 PM  17 points -> NIH Stroke Scale       Psychiatric:         Mood and Affect: Mood normal.         Behavior: Behavior normal.                  Procedures:  Procedures      Medical Decision Making:      Comorbidities that affect care:    Hypertension, COPD, diabetes, depression, anxiety, cardiac tamponade, congestive heart failure, coronary disease, substance abuse, current smoker    External Notes reviewed:    None      The following orders were placed and all results were independently analyzed by me:  Orders Placed This Encounter   Procedures    CT Head Without Contrast Stroke Protocol    CT Angiogram Head w AI Analysis of LVO    CT Angiogram Neck    CT CEREBRAL PERFUSION WITH & WITHOUT CONTRAST    XR Chest 1 View    MRI Brain Without  Contrast    CT Head Without Contrast    [place] Draw    Comprehensive Metabolic Panel    Protime-INR    aPTT    Single High Sensitivity Troponin T    Urinalysis With Microscopic If Indicated (No Culture) - Urine, Clean Catch    CBC Auto Differential    Magnesium    Urine Drug Screen - Urine, Clean Catch    Ethanol    Ammonia    T4, Free    TSH    High Sensitivity Troponin T 2Hr    Urinalysis, Microscopic Only - Urine, Clean Catch    Valproic Acid Level, Total    Hemoglobin A1c    Lipid Panel    Basic Metabolic Panel    Magnesium    Vitamin B12    CBC Auto Differential    CBC Auto Differential    Diet: Cardiac; Healthy Heart (2-3 Na+); Texture: Mechanical Ground (NDD 2); Fluid Consistency: Thin (IDDSI 0)    Initiate Department's Acute Stroke Process (Team D, Code 19, etc)    Perform NIH Stroke Scale    Measure Actual Weight    Head of Bed 30 Degrees or Less    Undress and Gown    Vital Signs    Neuro Checks    Notify Provider for SBP <80 or >200    Notify Provider for SBP >140 (For Hemorrhagic Stroke)    No Hypotonic Fluids    Nursing Dysphagia Screening (Complete Prior to Giving anything PO)    RN to Place Order SLP Consult (IF swallow screen failed) - Eval & Treat Choosing Reason of RN Dysphagia Screen Failed    Vital Signs    Continuous Pulse Oximetry    Telemetry - Place Orders & Notify Provider of Results When Patient Experiences Acute Chest Pain, Dysrhythmia or Respiratory Distress    Notify Provider    Nursing Dysphagia Screening (Complete Prior to Giving Anything By Mouth)    RN to Place Order SLP Consult - Eval & Treat Choosing Reason of RN Dysphagia Screen Failed    Nurse to Call MD or Nutrition Services for Diet if Patient Passes Dysphagia Screen    Intake and Output    Neuro Checks    NIHSS Assessment    Order CT Head Without Contrast for Neurological Decline    Provide Stroke Education Material    Saline Lock & Maintain IV Access    Place Sequential Compression Device    Maintain Sequential Compression  Device    Activity As Tolerated    Code Status and Medical Interventions:    Inpatient Hospitalist Consult    Inpatient Case Management  Consult    Inpatient Diabetes Educator Consult    Inpatient Neurology Consult Stroke    OT Consult: Eval & Treat    OT Plan of Care Cert / Re-Cert    PT Consult: Eval & Treat As Tolerated    Oxygen Therapy- Nasal Cannula; Titrate 1-6 LPM Per SpO2; 90 - 95%    SLP Consult: Eval & Treat Communication Disorder    SLP Consult: Eval & Treat RN Dysphagia Screen Failed    SLP Plan of Care Cert / Re-Cert    POC Glucose Once    POC Glucose Once    POC Glucose Once    ECG 12 Lead ED Triage Standing Order; Acute Stroke (Onset <12 hrs)    Type & Screen    ABO RH Specimen Verification    EEG    Insert Large Bore Peripheral IV - Right AC Preferred    Insert Peripheral IV    Initiate Observation Status    Inpatient Admission    CBC & Differential    Green Top (Gel)    Lavender Top    Gold Top - SST    Light Blue Top    CBC & Differential       Medications Given in the Emergency Department:  Medications   sodium chloride 0.9 % flush 10 mL (has no administration in time range)   levETIRAcetam in NaCl 0.75% (KEPPRA) IVPB 1,000 mg (1,000 mg Intravenous New Bag 4/9/24 2046)   albuterol sulfate HFA (PROVENTIL HFA;VENTOLIN HFA;PROAIR HFA) inhaler 2 puff (has no administration in time range)   atorvastatin (LIPITOR) tablet 40 mg (40 mg Oral Given 4/9/24 2046)   lisinopril (PRINIVIL,ZESTRIL) tablet 40 mg ( Oral Dose Auto Held 4/25/24 0900)   venlafaxine XR (EFFEXOR-XR) 24 hr capsule 75 mg (75 mg Oral Given 4/9/24 0950)   tamsulosin (FLOMAX) 24 hr capsule 0.4 mg (0.4 mg Oral Given 4/9/24 0950)   divalproex (DEPAKOTE) DR tablet 250 mg (250 mg Oral Given 4/9/24 0950)   empagliflozin (JARDIANCE) tablet 10 mg (10 mg Oral Given 4/9/24 0950)   amiodarone (PACERONE) tablet 200 mg (200 mg Oral Given 4/9/24 2046)   sodium chloride 0.9 % flush 10 mL (10 mL Intravenous Given 4/9/24 2046)   sodium  chloride 0.9 % flush 10 mL (has no administration in time range)   sodium chloride 0.9 % infusion 40 mL (has no administration in time range)   carvedilol (COREG) tablet 6.25 mg (6.25 mg Oral Given 4/9/24 2046)   potassium chloride (MICRO-K/KLOR-CON) CR capsule (20 mEq Oral Given 4/9/24 1746)   furosemide (LASIX) tablet 40 mg (40 mg Oral Not Given 4/9/24 1748)   ARIPiprazole ER (ABILIFY MAINTENA) IM injection  mg (300 mg Intramuscular Given 4/9/24 1606)   Naloxone HCl (NARCAN) injection (2 mg Intravenous Given 4/8/24 1921)   iopamidol (ISOVUE-370) 76 % injection 100 mL (80 mL Intravenous Given 4/8/24 1937)   iopamidol (ISOVUE-370) 76 % injection 100 mL (100 mL Intravenous Given 4/8/24 1947)   magnesium sulfate 2g/50 mL (PREMIX) infusion (0 g Intravenous Stopped 4/9/24 0210)   potassium chloride (MICRO-K/KLOR-CON) CR capsule (40 mEq Oral Given 4/9/24 0949)        ED Course:    ED Course as of 04/10/24 0134   Mon Apr 08, 2024 1950 EKG:    Rhythm: Normal sinus rhythm  Rate: 81  Intervals: No long MI and normal QT interval  Left bundle branch block  T-wave: T wave inversion V5, V6, I, aVL  ST Segment: Nonspecific ST segments V2, V3, V5, V6, no obvious pathological ST elevation and reciprocal ST depression to suggest STEMI    EKG Comparison: There is no change in the QRS and ST morphology from the EKG that was performed April 3, 2024    Interpreted by me   [SD]      ED Course User Index  [SD] Abel Bowden DO       Labs:    Lab Results (last 24 hours)       Procedure Component Value Units Date/Time    POC Glucose Once [[id number]]  (Abnormal) Collected: 04/09/24 0200    Specimen: Blood Updated: 04/09/24 0722     Glucose 102 mg/dL      Comment: Serial Number: [id number]:  [id number]       Hemoglobin A1c [[id number]]  (Abnormal) Collected: 04/09/24 0430    Specimen: Blood from Arm, Right Updated: 04/09/24 1007     Hemoglobin A1C 6.30 %     Narrative:      Hemoglobin A1C Ranges:    Increased Risk for Diabetes   5.7% to 6.4%  Diabetes                     >= 6.5%  Diabetic Goal                < 7.0%    Lipid Panel [[id number]]  (Abnormal) Collected: 04/09/24 0430    Specimen: Blood from Arm, Right Updated: 04/09/24 0559     Total Cholesterol 108 mg/dL      Triglycerides 88 mg/dL      HDL Cholesterol 36 mg/dL      LDL Cholesterol  55 mg/dL      VLDL Cholesterol 17 mg/dL      LDL/HDL Ratio 1.51    Narrative:      Cholesterol Reference Ranges  (U.S. Department of Health and Human Services ATP III Classifications)    Desirable          <200 mg/dL  Borderline High    200-239 mg/dL  High Risk          >240 mg/dL      Triglyceride Reference Ranges  (U.S. Department of Health and Human Services ATP III Classifications)    Normal           <150 mg/dL  Borderline High  150-199 mg/dL  High             200-499 mg/dL  Very High        >500 mg/dL    HDL Reference Ranges  (U.S. Department of Health and Human Services ATP III Classifications)    Low     <40 mg/dl (major risk factor for CHD)  High    >60 mg/dl ('negative' risk factor for CHD)        LDL Reference Ranges  (U.S. Department of Health and Human Services ATP III Classifications)    Optimal          <100 mg/dL  Near Optimal     100-129 mg/dL  Borderline High  130-159 mg/dL  High             160-189 mg/dL  Very High        >189 mg/dL    Basic Metabolic Panel [[id number]]  (Abnormal) Collected: 04/09/24 0430    Specimen: Blood from Arm, Right Updated: 04/09/24 0559     Glucose 87 mg/dL      BUN 22 mg/dL      Creatinine 1.10 mg/dL      Sodium 137 mmol/L      Potassium 3.2 mmol/L      Chloride 97 mmol/L      CO2 29.3 mmol/L      Calcium 9.0 mg/dL      BUN/Creatinine Ratio 20.0     Anion Gap 10.7 mmol/L      eGFR 77.8 mL/min/1.73     Narrative:      GFR Normal >60  Chronic Kidney Disease <60  Kidney Failure <15      CBC & Differential [[id number]]  (Abnormal) Collected: 04/09/24 0430    Specimen: Blood from Arm, Right Updated: 04/09/24 0533    Narrative:      The following orders were  created for panel order CBC & Differential.  Procedure                               Abnormality         Status                     ---------                               -----------         ------                     CBC Auto Differential[[id number]]        Abnormal            Final result                 Please view results for these tests on the individual orders.    Magnesium [[id number]]  (Normal) Collected: 24    Specimen: Blood from Arm, Right Updated: 2459     Magnesium 2.0 mg/dL     CBC Auto Differential [[id number]]  (Abnormal) Collected: 24    Specimen: Blood from Arm, Right Updated: 2433     WBC 6.73 10*3/mm3      RBC 4.81 10*6/mm3      Hemoglobin 13.2 g/dL      Hematocrit 42.0 %      MCV 87.3 fL      MCH 27.4 pg      MCHC 31.4 g/dL      RDW 15.1 %      RDW-SD 48.5 fl      MPV 10.0 fL      Platelets 368 10*3/mm3      Neutrophil % 49.1 %      Lymphocyte % 36.1 %      Monocyte % 10.8 %      Eosinophil % 2.7 %      Basophil % 1.2 %      Immature Grans % 0.1 %      Neutrophils, Absolute 3.30 10*3/mm3      Lymphocytes, Absolute 2.43 10*3/mm3      Monocytes, Absolute 0.73 10*3/mm3      Eosinophils, Absolute 0.18 10*3/mm3      Basophils, Absolute 0.08 10*3/mm3      Immature Grans, Absolute 0.01 10*3/mm3      nRBC 0.0 /100 WBC     POC Glucose Once [[id number]]  (Abnormal) Collected: 2448    Specimen: Blood Updated: 2450     Glucose 106 mg/dL      Comment: Serial Number: [id number]:  [id number]                Imaging:    EEG    Result Date: 2024  Table formatting from the original result was not included. Images from the original result were not included. [address] [place] [address] [phone] ELECTROENCEPHALOGRAPHIC REPORT Patient: Regulo Sepulveda EEG # :    BGT-O- : 1965  ID:      [id number]    Age: 58 y.o. male    Primary  Physician: Dominic Vides MD  Technician: Kate Foley Ordering  Physician: Eliot Urbano MD     Recording Date: April 9, 2024 Report  Date: 4/9/2024     History:  Rule out seizures Medications: ARIPiprazole ER, albuterol sulfate HFA, amiodarone, apixaban, atorvastatin, carvedilol, dapagliflozin, divalproex, furosemide, lisinopril, tamsulosin, and venlafaxine XR Reading: The EEG was obtained portable in his room during which, he was asleep with brief waking periods and on photic stimulation with video monitoring.  We do not know how much sleep he has had the night before the testing. The dominant background activity consist of symmetric and irregular 20 to 40 µV 6 to 7 cps which were prominent on both parieto-occipital regions and were reactive to changes in states.  There were symmetric spindles and vertex activities during the light stages of sleep.  There were no discernible responses on photic stimulation at various frequencies.  There was no amplitude asymmetry.  No paroxysmal discharges.  Impression: Abnormal EEG because of slow background activity compatible with mild diffuse encephalopathy. There were no epileptiform discharges noted today. Electronically signed by Ga Hair Jr., MD, 04/09/24, 11:22 PM EDT. Please note that portions of this note were completed with a voice recognition program.  Part of this note is an electric or electronic transcription/translation of spoken language to printed text using the dragon dictating system.     MRI Brain Without Contrast    Result Date: 4/9/2024  MRI BRAIN WO CONTRAST-  Date of Exam: 4/9/2024 6:50 AM  Indication: Stroke, follow up; R41.82-Altered mental status, unspecified; R79.89-Other specified abnormal findings of blood chemistry; F20.9-Schizophrenia, unspecified; F33.1-Major depressive disorder, recurrent, moderate.  Comparison: CT head April 8, 2024  Technique:  Routine multiplanar/multisequence sequence images of the brain were obtained without contrast administration.   Findings: There is signal on diffusion in the left thalamic area that  could reflect subacute ischemia. There are T2 signal change involving the periventricular and deep white matter areas compatible with more chronic small vessel ischemic change. There is evidence for old insult in the left frontal lobe adjacent to the frontal horn left lateral ventricle. There also is evidence for old lacunar type infarct around the caudate nucleus on the left. There is no unusual blooming on susceptibility imaging. There is no definite orbital abnormality. There is some cerebral atrophy. There is a tiny focus of signal in the region of the left frontal sinus which could reflect a small mucous retention cyst or polyp.      Impression: 1.  Some signal on diffusion in the left thalamic area that could relate to subacute ischemia to this area. 2.  There are scattered signal change involving the cerebral hemispheres which could reflect more chronic small vessel ischemic change. 3.  Probable small mucous retention cyst or polyp left frontal sinus 4.  Evidence for some cerebral atrophy.    Electronically Signed By-Rodriguez Milligan MD On:4/9/2024 8:36 AM         Differential Diagnosis and Discussion:    Stroke: Differential diagnosis in this patient with signs of possible ischemic stroke include TIA or ischemic stroke, hemorrhagic stroke, hypoglycemic episode, toxic or metabolic encephalopathy, paresthesias.    All labs were reviewed and interpreted by me.  All X-rays impressions were independently interpreted by me.  EKG was interpreted by me.  CT scan radiology impression was interpreted by me.    MDM  Number of Diagnoses or Management Options  Altered mental status, unspecified altered mental status type  Elevated troponin  Diagnosis management comments:   Due to the altered mental status and possible focal neurological deficits a stroke alert was notified as soon as the patient arrived by EMS.  The patient was evaluated by  immediately after arrival.  Initial CT scan of the brain was read as no  acute disease with no intracranial hemorrhage.  The patient was not thought to be a TNKase candidate due to the fact that the patient is on Eliquis.  The patient subsequently had a CT perfusion and CT angiogram performed.  There is no obvious perfusion deficits and no evidence of LVO.  Seizure was in the differential and the patient was given 1 g of Keppra per neurology.  She request the patient be admitted to the hospital for further evaluation of the altered mental status, including MRI of the brain.  Neurology will follow the patient in the hospital.    The urine toxicology screen was negative    The patient's urinalysis was negative for obvious infection or blood    The patient's Depakote level was subtherapeutic    The patient's CMP was reviewed and shows no abnormalities of critical concern.  Of note, the patient's sodium and potassium are acceptable.  The patient's liver enzymes are unremarkable.  The patient's renal function including creatinine is preserved.  The patient has a normal anion gap.    The patient's CBC was reviewed and shows no abnormalities of critical concern.  Of note, there is no anemia requiring a blood transfusion and the platelet count is acceptable    The patient's TSH and free T4 were normal.  I do not feel that the patient had thyrotoxicosis or thyroid storm and thus not the cause of the patient's symptoms    The patient's EKG demonstrated a normal sinus rhythm.  The EKG demonstrated no evidence of dysrhythmia.  The patient had no acute ST abnormalities or acute T wave abnormalities to indicate STEMI or other acute cardiac pathology, injury or ischemia    Patient's initial troponin was 96.  Patient's troponin was repeated 2 hours later and it was 95.  This is a delta of -1.  The patient's troponin is actually going down and trending downwards from the previous admission.    The patient's CT scan of the brain demonstrated diffuse white matter changes and remote infarct involving the  left basal ganglia.  There is no evidence of acute stroke or hemorrhage          22:05 EDT  I reevaluated the patient at this time.  The patient is alert and orient x 3, cooperative and pleasant.  I repeated the NIH stroke scale and it was 0.  The patient has had significant improvement in mental status and has no neurodeficits at this time.  NIH Stroke Scale/Score (NIHSS) - MDCalc  Calculated on Apr 08 2024 10:05 PM  0 points -> NIH Stroke Scale       Amount and/or Complexity of Data Reviewed  Clinical lab tests: reviewed  Tests in the radiology section of CPT®: reviewed  Tests in the medicine section of CPT®: reviewed  Discuss the patient with other providers: yes (I discussed the case with , neurology, tele-specialist.  Please see my note in regards to my discussion with her.  She did order 1 g of Keppra for possible seizure.  She is requesting the hospitalist for admission and neurology will continue to follow the patient.    22:11 EDT  I discussed the case with the hospitalist, Dr. Urbano.  We have discussed the patient's presenting symptoms, laboratory values, imaging and condition at the time of admission.  They will evaluate the patient in the emergency room and admit the patient to the hospital)           Social Determinants of Health:    Patient is independent, reliable, and has access to care.       Disposition and Care Coordination:    Admit:   Through independent evaluation of the patient's history, physical, and imperical data, the patient meets criteria for inpatient admission to the hospital.        Final diagnoses:   Altered mental status, unspecified altered mental status type   Elevated troponin        ED Disposition       ED Disposition   Decision to Admit    Condition   --    Comment   Level of Care: Telemetry [5]   Diagnosis: TIA (transient ischemic attack) [222725]   Admitting Physician: SUSIE URBANO [[id number]]   Attending Physician: SUSIE URBANO [[id number]]                 This medical  record created using voice recognition software.             Abel Bowden DO  04/10/24 0131

## 2024-04-08 NOTE — OUTREACH NOTE
Call Center TCM Note      Flowsheet Row Responses   [facility], [place], operated by [facility] patient discharged from? [facility]   Does the patient have one of the following disease processes/diagnoses(primary or secondary)? CHF   TCM attempt successful? Yes  [verbal release for Aurora cordova but chart reports to call patient only for f/u calls]   Call start time 1522   Call end time 1526   Discharge diagnosis Systolic heart failure   Person spoke with today (if not patient) and relationship spoke with pt who  handed phone to sister Aurora to complete call   Medication alerts for this patient ELIQUIS   Meds reviewed with patient/caregiver? Yes   Is the patient having any side effects they believe may be caused by any medication additions or changes? No   Does the patient have all medications ordered at discharge? Yes   Is the patient taking all medications as directed (includes completed medication regime)? Yes   Medication comments pt taking new meds   Comments F/U appt on 4/15/24--no other appts to offer pt within TCM, will send a message   Does the patient have an appointment with their PCP within 7-14 days of discharge? Yes   Has home health visited the patient within 72 hours of discharge? N/A   Comments sister to purchase pt a scale today   Did the patient receive a copy of their discharge instructions? Yes   Nursing interventions Reviewed instructions with patient   What is the patient's perception of their health status since discharge? Improving  [Sister reports pt is doing well for him--denies edema, shortness of air today, sister helping with meds, and purchase of scale.]   Nursing interventions Nurse provided patient education   Is the patient/caregiver able to teach back the hierarchy of who to call/visit for symptoms/problems? PCP, Specialist, Home health nurse, Urgent Care, ED, 911 Yes   CHF Zone this Call Green Zone   Green Zone Patient reports doing well, No new or worsening shortness of breath, No new swelling -  feet, ankles  and legs look normal for you   Green Zone Interventions Daily weight check   TCM call completed? Yes   Wrap up additional comments Unable to complete call in full detail as phone with poor connectivity,  appears pt followed by ACM   Call end time 1526             Lore Jones RN    4/8/2024, 15:31 EDT

## 2024-04-09 ENCOUNTER — PATIENT OUTREACH (OUTPATIENT)
Dept: CASE MANAGEMENT | Facility: OTHER | Age: 59
End: 2024-04-09
Payer: COMMERCIAL

## 2024-04-09 ENCOUNTER — APPOINTMENT (OUTPATIENT)
Dept: MRI IMAGING | Facility: HOSPITAL | Age: 59
DRG: 065 | End: 2024-04-09
Payer: COMMERCIAL

## 2024-04-09 ENCOUNTER — APPOINTMENT (OUTPATIENT)
Dept: NEUROLOGY | Facility: HOSPITAL | Age: 59
DRG: 065 | End: 2024-04-09
Payer: COMMERCIAL

## 2024-04-09 DIAGNOSIS — J44.9 CHRONIC OBSTRUCTIVE PULMONARY DISEASE, UNSPECIFIED COPD TYPE: ICD-10-CM

## 2024-04-09 DIAGNOSIS — I50.43 ACUTE ON CHRONIC COMBINED SYSTOLIC AND DIASTOLIC CHF (CONGESTIVE HEART FAILURE): Primary | ICD-10-CM

## 2024-04-09 LAB
ANION GAP SERPL CALCULATED.3IONS-SCNC: 10.7 MMOL/L (ref 5–15)
BASOPHILS # BLD AUTO: 0.08 10*3/MM3 (ref 0–0.2)
BASOPHILS NFR BLD AUTO: 1.2 % (ref 0–1.5)
BUN SERPL-MCNC: 22 MG/DL (ref 6–20)
BUN/CREAT SERPL: 20 (ref 7–25)
CALCIUM SPEC-SCNC: 9 MG/DL (ref 8.6–10.5)
CHLORIDE SERPL-SCNC: 97 MMOL/L (ref 98–107)
CHOLEST SERPL-MCNC: 108 MG/DL (ref 0–200)
CO2 SERPL-SCNC: 29.3 MMOL/L (ref 22–29)
CREAT SERPL-MCNC: 1.1 MG/DL (ref 0.76–1.27)
DEPRECATED RDW RBC AUTO: 48.5 FL (ref 37–54)
EGFRCR SERPLBLD CKD-EPI 2021: 77.8 ML/MIN/1.73
EOSINOPHIL # BLD AUTO: 0.18 10*3/MM3 (ref 0–0.4)
EOSINOPHIL NFR BLD AUTO: 2.7 % (ref 0.3–6.2)
ERYTHROCYTE [DISTWIDTH] IN BLOOD BY AUTOMATED COUNT: 15.1 % (ref 12.3–15.4)
GLUCOSE BLDC GLUCOMTR-MCNC: 102 MG/DL (ref 70–99)
GLUCOSE BLDC GLUCOMTR-MCNC: 106 MG/DL (ref 70–99)
GLUCOSE SERPL-MCNC: 87 MG/DL (ref 65–99)
HBA1C MFR BLD: 6.3 % (ref 4.8–5.6)
HCT VFR BLD AUTO: 42 % (ref 37.5–51)
HDLC SERPL-MCNC: 36 MG/DL (ref 40–60)
HGB BLD-MCNC: 13.2 G/DL (ref 13–17.7)
IMM GRANULOCYTES # BLD AUTO: 0.01 10*3/MM3 (ref 0–0.05)
IMM GRANULOCYTES NFR BLD AUTO: 0.1 % (ref 0–0.5)
LDLC SERPL CALC-MCNC: 55 MG/DL (ref 0–100)
LDLC/HDLC SERPL: 1.51 {RATIO}
LYMPHOCYTES # BLD AUTO: 2.43 10*3/MM3 (ref 0.7–3.1)
LYMPHOCYTES NFR BLD AUTO: 36.1 % (ref 19.6–45.3)
MAGNESIUM SERPL-MCNC: 2 MG/DL (ref 1.6–2.6)
MCH RBC QN AUTO: 27.4 PG (ref 26.6–33)
MCHC RBC AUTO-ENTMCNC: 31.4 G/DL (ref 31.5–35.7)
MCV RBC AUTO: 87.3 FL (ref 79–97)
MONOCYTES # BLD AUTO: 0.73 10*3/MM3 (ref 0.1–0.9)
MONOCYTES NFR BLD AUTO: 10.8 % (ref 5–12)
NEUTROPHILS NFR BLD AUTO: 3.3 10*3/MM3 (ref 1.7–7)
NEUTROPHILS NFR BLD AUTO: 49.1 % (ref 42.7–76)
NRBC BLD AUTO-RTO: 0 /100 WBC (ref 0–0.2)
PLATELET # BLD AUTO: 368 10*3/MM3 (ref 140–450)
PMV BLD AUTO: 10 FL (ref 6–12)
POTASSIUM SERPL-SCNC: 3.2 MMOL/L (ref 3.5–5.2)
QT INTERVAL: 430 MS
QTC INTERVAL: 501 MS
RBC # BLD AUTO: 4.81 10*6/MM3 (ref 4.14–5.8)
SODIUM SERPL-SCNC: 137 MMOL/L (ref 136–145)
TRIGL SERPL-MCNC: 88 MG/DL (ref 0–150)
VIT B12 BLD-MCNC: 654 PG/ML (ref 211–946)
VLDLC SERPL-MCNC: 17 MG/DL (ref 5–40)
WBC NRBC COR # BLD AUTO: 6.73 10*3/MM3 (ref 3.4–10.8)

## 2024-04-09 PROCEDURE — 70551 MRI BRAIN STEM W/O DYE: CPT

## 2024-04-09 PROCEDURE — 97161 PT EVAL LOW COMPLEX 20 MIN: CPT

## 2024-04-09 PROCEDURE — 25010000002 LEVETIRACETAM IN NACL 0.75% 1000 MG/100ML SOLUTION: Performed by: STUDENT IN AN ORGANIZED HEALTH CARE EDUCATION/TRAINING PROGRAM

## 2024-04-09 PROCEDURE — 95819 EEG AWAKE AND ASLEEP: CPT

## 2024-04-09 PROCEDURE — 25010000002 MAGNESIUM SULFATE 2 GM/50ML SOLUTION: Performed by: STUDENT IN AN ORGANIZED HEALTH CARE EDUCATION/TRAINING PROGRAM

## 2024-04-09 PROCEDURE — 99233 SBSQ HOSP IP/OBS HIGH 50: CPT | Performed by: INTERNAL MEDICINE

## 2024-04-09 PROCEDURE — 80048 BASIC METABOLIC PNL TOTAL CA: CPT | Performed by: STUDENT IN AN ORGANIZED HEALTH CARE EDUCATION/TRAINING PROGRAM

## 2024-04-09 PROCEDURE — 97165 OT EVAL LOW COMPLEX 30 MIN: CPT

## 2024-04-09 PROCEDURE — 80061 LIPID PANEL: CPT | Performed by: STUDENT IN AN ORGANIZED HEALTH CARE EDUCATION/TRAINING PROGRAM

## 2024-04-09 PROCEDURE — 92610 EVALUATE SWALLOWING FUNCTION: CPT

## 2024-04-09 PROCEDURE — 83735 ASSAY OF MAGNESIUM: CPT | Performed by: STUDENT IN AN ORGANIZED HEALTH CARE EDUCATION/TRAINING PROGRAM

## 2024-04-09 PROCEDURE — 83036 HEMOGLOBIN GLYCOSYLATED A1C: CPT | Performed by: STUDENT IN AN ORGANIZED HEALTH CARE EDUCATION/TRAINING PROGRAM

## 2024-04-09 PROCEDURE — 94761 N-INVAS EAR/PLS OXIMETRY MLT: CPT

## 2024-04-09 PROCEDURE — 94799 UNLISTED PULMONARY SVC/PX: CPT

## 2024-04-09 PROCEDURE — 82948 REAGENT STRIP/BLOOD GLUCOSE: CPT

## 2024-04-09 PROCEDURE — 99231 SBSQ HOSP IP/OBS SF/LOW 25: CPT | Performed by: PSYCHIATRY & NEUROLOGY

## 2024-04-09 PROCEDURE — 85025 COMPLETE CBC W/AUTO DIFF WBC: CPT | Performed by: STUDENT IN AN ORGANIZED HEALTH CARE EDUCATION/TRAINING PROGRAM

## 2024-04-09 RX ORDER — ALBUTEROL SULFATE 90 UG/1
2 AEROSOL, METERED RESPIRATORY (INHALATION) EVERY 4 HOURS PRN
Status: DISCONTINUED | OUTPATIENT
Start: 2024-04-09 | End: 2024-04-10 | Stop reason: HOSPADM

## 2024-04-09 RX ORDER — CARVEDILOL 6.25 MG/1
6.25 TABLET ORAL 2 TIMES DAILY
Status: DISCONTINUED | OUTPATIENT
Start: 2024-04-09 | End: 2024-04-10 | Stop reason: HOSPADM

## 2024-04-09 RX ORDER — SODIUM CHLORIDE 0.9 % (FLUSH) 0.9 %
10 SYRINGE (ML) INJECTION AS NEEDED
Status: DISCONTINUED | OUTPATIENT
Start: 2024-04-09 | End: 2024-04-10 | Stop reason: HOSPADM

## 2024-04-09 RX ORDER — VENLAFAXINE HYDROCHLORIDE 75 MG/1
75 CAPSULE, EXTENDED RELEASE ORAL DAILY
Status: DISCONTINUED | OUTPATIENT
Start: 2024-04-09 | End: 2024-04-10 | Stop reason: HOSPADM

## 2024-04-09 RX ORDER — SODIUM CHLORIDE 0.9 % (FLUSH) 0.9 %
10 SYRINGE (ML) INJECTION EVERY 12 HOURS SCHEDULED
Status: DISCONTINUED | OUTPATIENT
Start: 2024-04-09 | End: 2024-04-10 | Stop reason: HOSPADM

## 2024-04-09 RX ORDER — POTASSIUM CHLORIDE 750 MG/1
40 CAPSULE, EXTENDED RELEASE ORAL ONCE
Status: COMPLETED | OUTPATIENT
Start: 2024-04-09 | End: 2024-04-09

## 2024-04-09 RX ORDER — DIVALPROEX SODIUM 250 MG/1
250 TABLET, DELAYED RELEASE ORAL DAILY
Status: DISCONTINUED | OUTPATIENT
Start: 2024-04-09 | End: 2024-04-10 | Stop reason: HOSPADM

## 2024-04-09 RX ORDER — AMIODARONE HYDROCHLORIDE 200 MG/1
200 TABLET ORAL 2 TIMES DAILY
Status: DISCONTINUED | OUTPATIENT
Start: 2024-04-09 | End: 2024-04-10 | Stop reason: HOSPADM

## 2024-04-09 RX ORDER — SODIUM CHLORIDE 9 MG/ML
40 INJECTION, SOLUTION INTRAVENOUS AS NEEDED
Status: DISCONTINUED | OUTPATIENT
Start: 2024-04-09 | End: 2024-04-10 | Stop reason: HOSPADM

## 2024-04-09 RX ORDER — TAMSULOSIN HYDROCHLORIDE 0.4 MG/1
0.4 CAPSULE ORAL DAILY
Status: DISCONTINUED | OUTPATIENT
Start: 2024-04-09 | End: 2024-04-10 | Stop reason: HOSPADM

## 2024-04-09 RX ORDER — POTASSIUM CHLORIDE 750 MG/1
20 CAPSULE, EXTENDED RELEASE ORAL 2 TIMES DAILY WITH MEALS
Status: DISCONTINUED | OUTPATIENT
Start: 2024-04-09 | End: 2024-04-10 | Stop reason: HOSPADM

## 2024-04-09 RX ORDER — LISINOPRIL 20 MG/1
40 TABLET ORAL
Status: DISCONTINUED | OUTPATIENT
Start: 2024-04-09 | End: 2024-04-10 | Stop reason: HOSPADM

## 2024-04-09 RX ORDER — FUROSEMIDE 40 MG/1
40 TABLET ORAL
Status: DISCONTINUED | OUTPATIENT
Start: 2024-04-09 | End: 2024-04-10 | Stop reason: HOSPADM

## 2024-04-09 RX ORDER — ATORVASTATIN CALCIUM 40 MG/1
40 TABLET, FILM COATED ORAL NIGHTLY
Status: DISCONTINUED | OUTPATIENT
Start: 2024-04-09 | End: 2024-04-10 | Stop reason: HOSPADM

## 2024-04-09 RX ORDER — FUROSEMIDE 40 MG/1
40 TABLET ORAL 2 TIMES DAILY
Status: DISCONTINUED | OUTPATIENT
Start: 2024-04-09 | End: 2024-04-09

## 2024-04-09 RX ADMIN — LISINOPRIL 40 MG: 20 TABLET ORAL at 09:50

## 2024-04-09 RX ADMIN — Medication 10 ML: at 09:52

## 2024-04-09 RX ADMIN — TAMSULOSIN HYDROCHLORIDE 0.4 MG: 0.4 CAPSULE ORAL at 09:50

## 2024-04-09 RX ADMIN — AMIODARONE HYDROCHLORIDE 200 MG: 200 TABLET ORAL at 20:46

## 2024-04-09 RX ADMIN — LEVETIRACETAM 1000 MG: 10 INJECTION, SOLUTION INTRAVENOUS at 20:46

## 2024-04-09 RX ADMIN — POTASSIUM CHLORIDE 40 MEQ: 750 CAPSULE, EXTENDED RELEASE ORAL at 09:49

## 2024-04-09 RX ADMIN — CARVEDILOL 6.25 MG: 6.25 TABLET, FILM COATED ORAL at 20:46

## 2024-04-09 RX ADMIN — ATORVASTATIN CALCIUM 40 MG: 40 TABLET, FILM COATED ORAL at 20:46

## 2024-04-09 RX ADMIN — EMPAGLIFLOZIN 10 MG: 10 TABLET, FILM COATED ORAL at 09:50

## 2024-04-09 RX ADMIN — CARVEDILOL 6.25 MG: 6.25 TABLET, FILM COATED ORAL at 09:50

## 2024-04-09 RX ADMIN — MAGNESIUM SULFATE HEPTAHYDRATE 2 G: 40 INJECTION, SOLUTION INTRAVENOUS at 02:09

## 2024-04-09 RX ADMIN — FUROSEMIDE 40 MG: 40 TABLET ORAL at 09:50

## 2024-04-09 RX ADMIN — LEVETIRACETAM 1000 MG: 10 INJECTION, SOLUTION INTRAVENOUS at 09:50

## 2024-04-09 RX ADMIN — POTASSIUM CHLORIDE 20 MEQ: 750 CAPSULE, EXTENDED RELEASE ORAL at 17:46

## 2024-04-09 RX ADMIN — Medication 10 ML: at 20:46

## 2024-04-09 RX ADMIN — DIVALPROEX SODIUM 250 MG: 250 TABLET, DELAYED RELEASE ORAL at 09:50

## 2024-04-09 RX ADMIN — AMIODARONE HYDROCHLORIDE 200 MG: 200 TABLET ORAL at 09:50

## 2024-04-09 RX ADMIN — Medication 10 ML: at 02:07

## 2024-04-09 RX ADMIN — VENLAFAXINE HYDROCHLORIDE 75 MG: 75 CAPSULE, EXTENDED RELEASE ORAL at 09:50

## 2024-04-09 NOTE — H&P
[facility]   HOSPITALIST HISTORY AND PHYSICAL  Date: 2024   Patient Name: Regulo Sepulveda  : 1965  MRN: [id number]  Primary Care Physician:  Dickson Landry MD  Date of admission: 2024    Subjective   Subjective     Chief Complaint: Altered mental status    HPI:    Regulo Sepulveda is a 58 y.o. male past medical history of CAD status post PCI, heart failure reduced ejection fraction secondary to dilated nonischemic cardiomyopathy, schizophrenia on monthly Abilify Maintena, apical thrombus on Eliquis, anxiety/depression, COPD, nonobstructive coronary artery disease, type 2 diabetes presents to the ER due to altered mental status.  Due to patient's altered mental status he was unable to provide any useful history and history supplemented by discussion with ER staff and family.  Appears patient at some point earlier today was with his sister when he had acute development of right-sided flaccid paralysis and decreased responsiveness mentally.  He was brought into the ER subsequently as a stroke alert.  Upon arrival here he was hemodynamically stable and found to have an NIH of 17.  He was initially worked up extensively with CT perfusion that was negative as well as CT angiogram the head and neck that was negative for an LVO.  Teleneurology evaluated the patient and advised initiation of antiepileptics for possible seizure as well as workup for definitive stroke rule out with an MRI brain.  Hospitalist was then contacted for admission.  Patient has already returned to baseline prior to my examination.  He does not member the events leading to his arrival.  He states he has been adherent to all his medications at home.      Personal History     Past Medical History:  Past Medical History:   Diagnosis Date    Anxiety     Asthma     Bipolar affective     Cardiac tamponade         CHF (congestive heart failure)     COPD (chronic obstructive pulmonary disease)     Coronary artery disease     Depression      Diabetes mellitus     Elevated cholesterol     Hypertension     Parkinson disease          Past Surgical History:  Past Surgical History:   Procedure Laterality Date    CARDIAC CATHETERIZATION Right 9/17/2023    Procedure: Right and Left Heart Cath;  Surgeon: Ben Grant MD;  Location: [facility] CATH INVASIVE [facility];  Service: Cardiovascular;  Laterality: Right;    TESTICLE SURGERY Left     extraction         Family History:   Reviewed and noncontributory except as mentioned in HPI    Social History:   Social Determinants of Health     Tobacco Use: High Risk (4/8/2024)    Patient History     Smoking Tobacco Use: Every Day     Smokeless Tobacco Use: Never     Passive Exposure: Not on file   Alcohol Use: Not At Risk (4/5/2024)    AUDIT-C     Frequency of Alcohol Consumption: Never     Average Number of Drinks: Patient does not drink     Frequency of Binge Drinking: Never   Financial Resource Strain: Low Risk  (4/5/2024)    Overall Financial Resource Strain (CARDIA)     Difficulty of Paying Living Expenses: Not hard at all   Food Insecurity: Patient Declined (4/5/2024)    Hunger Vital Sign     Worried About Running Out of Food in the Last Year: Patient declined     Ran Out of Food in the Last Year: Patient declined   Transportation Needs: Patient Declined (4/5/2024)    PRAPARE - Transportation     Lack of Transportation (Medical): Patient declined     Lack of Transportation (Non-Medical): Patient declined   Physical Activity: Inactive (4/5/2024)    Exercise Vital Sign     Days of Exercise per Week: 0 days     Minutes of Exercise per Session: 0 min   Stress: No Stress Concern Present (4/5/2024)    [place] [employer] - Occupational Stress Questionnaire     Feeling of Stress : Not at all   Social Connections: Unknown (4/5/2024)    Family and Community Support     Help with Day-to-Day Activities: I don't need any help     Lonely or Isolated: Patient declined   Recent Concern: Social Connections - At  Risk (3/24/2024)    Family and Community Support     Help with Day-to-Day Activities: I don't need any help     Lonely or Isolated: Often   Interpersonal Safety: Not At Risk (4/8/2024)    Abuse Screen     Unsafe at Home or Work/School: no     Feels Threatened by Someone?: no     Does Anyone Keep You from Contacting Others or Doint Things Outside the Home?: no     Physical Sign of Abuse Present: no   Depression: Not at risk (4/5/2024)    PHQ-2     PHQ-2 Score: 0   Recent Concern: Depression - At risk (3/24/2024)    PHQ-2     PHQ-2 Score: 6   Housing Stability: Not At Risk (4/5/2024)    Housing Stability     Current Living Arrangements: home     Potentially Unsafe Housing Conditions: none   Recent Concern: Housing Stability - High Risk (4/2/2024)    Housing Stability Vital Sign     Unable to Pay for Housing in the Last Year: Yes     Number of Times Moved in the Last Year: Not on file     Homeless in the Last Year: No   Utilities: Not At Risk (4/5/2024)    [facility] Utilities     Threatened with loss of utilities: No   Health Literacy: Not At Risk (4/5/2024)    Education     Help with school or training?: No     Preferred Language: English   Employment: Not At Risk (4/5/2024)    Employment     Do you want help finding or keeping work or a job?: I do not need or want help   Disabilities: Not At Risk (4/5/2024)    Disabilities     Concentrating, Remembering, or Making Decisions Difficulty: no     Doing Errands Independently Difficulty: no   Recent Concern: Disabilities - At Risk (4/3/2024)    Disabilities     Concentrating, Remembering, or Making Decisions Difficulty: yes     Doing Errands Independently Difficulty: no         Home Medications:  ARIPiprazole ER, albuterol sulfate HFA, amiodarone, apixaban, atorvastatin, carvedilol, dapagliflozin, divalproex, furosemide, lisinopril, tamsulosin, and venlafaxine XR    Allergies:  Allergies   Allergen Reactions    Penicillins Shortness Of Breath       Review of Systems   All  systems were reviewed and negative except for: Right-sided weakness and numbness    Objective   Objective     Vitals:   Temp:  [98.4 °F (36.9 °C)] 98.4 °F (36.9 °C)  Heart Rate:  [79-83] 81  Resp:  [16-23] 17  BP: (122-153)/() 122/88    Physical Exam    Constitutional: Awake, alert, no acute distress   Eyes: Pupils equal, sclerae anicteric, no conjunctival injection   HENT: NCAT, mucous membranes moist   Neck: Supple, no thyromegaly, no lymphadenopathy, trachea midline   Respiratory: Clear to auscultation bilaterally, nonlabored respirations    Cardiovascular: RRR, no murmurs, rubs, or gallops, palpable pedal pulses bilaterally   Gastrointestinal: Positive bowel sounds, soft, nontender, nondistended   Musculoskeletal: No bilateral ankle edema, no clubbing or cyanosis to extremities   Psychiatric: Appropriate affect, cooperative   Neurologic: Oriented x 3, strength symmetric in all extremities, Cranial Nerves grossly intact to confrontation, speech clear   Skin: No rashes     Result Review    Result Review:  I have personally reviewed the results from the time of this admission to 4/8/2024 22:59 EDT and agree with these findings:  [x]  Laboratory  [x]  Microbiology  [x]  Radiology  [x]  EKG/Telemetry   []  Cardiology/Vascular   []  Pathology  [x]  Old records  []  Other:      Assessment & Plan   Assessment / Plan     Assessment/Plan:   Transient right upper and lower extremity weakness, rule out CVA  Hypomagnesemia  Chronic elevated troponins, downtrending  History of apical LV thrombus on Eliquis  Chronic heart failure reduced ejection fraction not in acute exacerbation  Anxiety/depression/schizophrenia  Type II diabetes  History of substance abuse      Plan  - Admit to hospitalist service for observation  - Continue stroke protocol.  NIH checks ordered.  Seizure precautions.  Continue Keppra per teleneurology recommendations.  EEG ordered.  MRI brain ordered.  No need to evaluate PFO as echo was just done  recently on 03/24/2024.   -Replace magnesium, recheck level in a.m.  -Resume Eliquis if MRI negative for CVA  - No need to repeat further troponins as EKG is nonischemic and it is downtrending from his admission just 5 days ago  - Continue home medications for anxiety depression and schizophrenia.  Patient is due for his next injection of Abilify on 04/09/2024, will likely need to bring it in from home  - insulin sliding scale and Accu-Cheks for type 2 diabetes  - Clarify other chronic comorbidities as appropriate.  We will allow permissive hypertension overnight.  Will have to hold some of his heart failure medications      Discussed with ER Physician and Nurse    All labs/imaging studies were personally reviewed and findings are as noted above      DVT Prophylaxis: Eliquis    CODE STATUS:    Code Status (Patient has no pulse and is not breathing): CPR (Attempt to Resuscitate)  Medical Interventions (Patient has pulse or is breathing): Full Support      Admission Status:  I believe this patient meets observation status.    Electronically signed by Eliot Urbano MD, 04/08/24, 10:59 PM EDT.

## 2024-04-09 NOTE — CONSULTS
Consult received per Stroke Protocol. Patient with a noted DM diagnosis, with a current HbA1c of 6.3%, and an estimated average glucose of 134 mg/dL; indicative of controlled diabetes. Patient's home regimen consists of Farxiga (5mg QDAY).

## 2024-04-09 NOTE — PLAN OF CARE
Goal Outcome Evaluation:  Plan of Care Reviewed With: patient           Outcome Evaluation: Patient initially found asleep but was easy to wake. Once patient was awake, he was lethargic and confused. During treatment patient removed their pulse ox, nsg notified. Patient has strength deficits of HI LEs affecting ability to safely ambulate and transfer independently. Skilled therapy is needed at this time to address the above deficits.      Anticipated Discharge Disposition (PT): home with home health

## 2024-04-09 NOTE — CONSULTS
[facility]  [facility] TeleNeurology Consult Services      Patient Name:   Regulo Sepulveda  YOB: 1965  Identification Number:   MRN - [id number]  Date of Service:   04/08/2024 19:18:12    Diagnosis:        I63.30 - Cerebrovascular accident (CVA) due to thrombosis of cerebral artery ([facility])    Impression:       Pt presents with sudden onset decreased responsiveness and R sided weakness not involving the face. Pt is intermittently able to answer some questions and follow simple commands. He is on Eliquis therefore not a candidate for thrombolytics. Differential includes acute ischemia vs seizure/post-ictal state vs toxic/metabolic factors. WOuld recommend MRI brain and EEG alongside toxic/metabolic work-up to evaluate.    Our recommendations are outlined below.    Recommendations:          Stroke/Telemetry Floor        Neuro Checks        Bedside Swallow Eval        DVT Prophylaxis        IV Fluids, Normal Saline        Head of Bed 30 Degrees        Euglycemia and Avoid Hyperthermia (PRN Acetaminophen)        Hold Anticoagulation for Now        Initiate or continue Aspirin 325 MG daily        Antihypertensives PRN if Blood pressure is greater than 220/120 or there is a concern for End organ damage/contraindications for permissive HTN. If blood pressure is greater than 220/120 give labetalol PO or IV or Vasotec IV with a goal of 15% reduction in BP during the first 24 hours.    Recommended Scan:       MRI Head Without Contrast    Lipid Panel to Be Obtained, if Not Done in the Last 30 Days    Therapies:        Physical Therapy, Occupational Therapy, Speech Therapy Assessment When Applicable    Dysphagia:        Swallow Evaluation, Bedside        NPO Until Swallow Evaluation    DVT prophylaxis:        SCDs, Pneumatic Compression        Lovenox or LMW Heparin    Disposition:        Neurology Follow Up Recommended    Sign Out:        Discussed with Emergency Department  Provider        ------------------------------------------------------------------------------    Advanced Imaging:  CTA Head and Neck Completed.    CTP Completed.    LVO:No    Patient in not a candidate for CHARAN      Metrics:  Last Known Well: 04/08/2024 18:30:29  [facility] Notification Time: 04/08/2024 19:17:28  Arrival Time: 04/08/2024 19:17:20  Stamp Time: 04/08/2024 19:18:12  Initial Response Time: 04/08/2024 19:20:26  Symptoms: R sided weakness.  Initial patient interaction: 04/08/2024 19:35:20  NIHSS Assessment Completed: 04/08/2024 19:45:20  Patient is not a candidate for Thrombolytic.  Thrombolytic Medical Decision: 04/08/2024 19:45:50  Patient was not deemed candidate for Thrombolytic because of following reasons:  Use of NOAs within 48 hours.    I personally Reviewed the CT Head and it Showed no hemorrhage,    Primary Provider Notified of Diagnostic Impression and Management Plan on: 04/08/2024 21:25:43        ------------------------------------------------------------------------------    History of Present Illness:  Patient is a 58 year old Male.    Patient was brought by EMS for symptoms of R sided weakness.  LKW 45 minutes ago with altered mental status and R sided weakness. Pt is not speaking or responding or interacting at all. Pt is on Eliquis, indication likely a mural thrombus in the LV. Pt is intermittently able to answer questions and follow commands. He is generally weak but much worse on the R side, unclear if it involves the face.       Past Medical History:       Hypertension       Diabetes Mellitus       Hyperlipidemia       Atrial Fibrillation  [place] PMH:  depression, COPD, BPH, GERD, LV mural thrombus likely indication for eliquis, CHF, Schizophrenia, CM    Medications:    Anticoagulant use:  Yes Eliquis  No Antiplatelet use  Reviewed EMR for current medications    Allergies:   Reviewed  Description: PCN    Social History:  Current Employee : meth abuse  Smoking: Yes  Alcohol Use:  No  Drug Use: Yes    Family History:    There is no family history of premature cerebrovascular disease pertinent to this consultation    ROS :  14 Points Review of Systems was performed and was negative except mentioned in HPI.    Past Surgical History:  There Is No Surgical History Contributory To Today’s Visit         Examination:  BP(152/94), Pulse(83), Blood Glucose(139)  1A: Level of Consciousness - Requires repeated stimulation to arouse + 2  1B: Ask Month and Age - 1 Question Right + 1  1C: Blink Eyes & Squeeze Hands - Performs 1 Task + 1  2: Test Horizontal Extraocular Movements - Normal + 0  3: Test Visual Fields - No Visual Loss + 0  4: Test Facial Palsy (Use Grimace if Obtunded) - Normal symmetry + 0  5A: Test Left Arm Motor Drift - No Drift for 10 Seconds + 0  5B: Test Right Arm Motor Drift - Drift, hits bed + 2  6A: Test Left Leg Motor Drift - Drift, but doesn't hit bed + 1  6B: Test Right Leg Motor Drift - No Effort Against Gravity + 3  7: Test Limb Ataxia (FNF/Heel-Shin) - No Ataxia + 0  8: Test Sensation - Normal; No sensory loss + 0  9: Test Language/Aphasia - Normal; No aphasia + 0  10: Test Dysarthria - Normal + 0  11: Test Extinction/Inattention - No abnormality + 0    NIHSS Score: 10    Pre-Morbid Modified Spring Scale:  Unable to assess    Spoke with :     Patient/Family was informed the Neurology Consult would occur via TeleHealth consult by way of interactive audio and video telecommunications and consented to receiving care in this manner.      Patient is being evaluated for possible acute neurologic impairment and high probability of imminent or life-threatening deterioration. I spent total of 35 minutes providing care to this patient, including time for face to face visit via telemedicine, review of medical records, imaging studies and discussion of findings with providers, the patient and/or family.      Dr Zeina Pratted      [facility]  For Inpatient follow-up with  [facility] physician please call [facility] [phone]. This is not an outpatient service. Post hospital discharge, please contact hospital directly.    Please do not communicate with [facility] physicians via secure chat. If you have any questions, Please contact [facility].  Please call or reconsult our service if there are any clinical or diagnostic changes.

## 2024-04-09 NOTE — THERAPY EVALUATION
Acute Care - Physical Therapy Initial Evaluation  [facility]     Patient Name: Regulo Sepulveda  : 1965  MRN: [id number]  Today's Date: 2024    Admit date: 2024     Referring Physician: Dominic Vides MD     Surgery Date:* No surgery found *            Visit Dx:     ICD-10-CM ICD-9-CM   1. Altered mental status, unspecified altered mental status type  R41.82 780.97   2. Elevated troponin  R79.89 790.6   3. Schizophrenia, unspecified type  F20.9 295.90   4. Major depressive disorder, recurrent episode, moderate  F33.1 296.32   5. Dysphagia, unspecified type  R13.10 787.20   6. Difficulty in walking  R26.2 719.7     Patient Active Problem List   Diagnosis    Left groin pain    Major depressive disorder, recurrent episode, moderate    Chronic obstructive pulmonary disease    Diabetes mellitus    Hypertensive disorder    Hyperlipidemia    Hepatitis C    Cigarette nicotine dependence    BPH (benign prostatic hyperplasia)    GERD (gastroesophageal reflux disease)    B12 deficiency    LV (left ventricular) mural thrombus    Schizophrenia    Acute on chronic combined systolic and diastolic CHF (congestive heart failure)    Acute on chronic heart failure with reduced ejection fraction and diastolic dysfunction    Acute on chronic congestive heart failure    PAF (paroxysmal atrial fibrillation)    CHF (congestive heart failure)    Acute on chronic HFrEF (heart failure with reduced ejection fraction)    Moderate malnutrition    Systolic heart failure    Acute exacerbation of CHF (congestive heart failure)    Cardiomyopathy    TIA (transient ischemic attack)     Past Medical History:   Diagnosis Date    Anxiety     Asthma     Bipolar affective     Cardiac tamponade         CHF (congestive heart failure)     COPD (chronic obstructive pulmonary disease)     Coronary artery disease     Depression     Diabetes mellitus     Elevated cholesterol     Hypertension     Parkinson disease      Past Surgical History:    Procedure Laterality Date    CARDIAC CATHETERIZATION Right 9/17/2023    Procedure: Right and Left Heart Cath;  Surgeon: Ben Grant MD;  Location: [facility] CATH INVASIVE LOCATION;  Service: Cardiovascular;  Laterality: Right;    TESTICLE SURGERY Left     extraction     PT Assessment (Last 12 Hours)       PT Evaluation and Treatment       Row Name 04/09/24 1418          Physical Therapy Time and Intention    Subjective Information no complaints  -DP (r) MF (t) DP (c)     Document Type evaluation  -DP (r) MF (t) DP (c)     Mode of Treatment individual therapy;physical therapy  -DP (r) MF (t) DP (c)     Patient Effort adequate  -DP (r) MF (t) DP (c)     Symptoms Noted During/After Treatment none  -DP (r) MF (t) DP (c)       Row Name 04/09/24 1418          General Information    Patient Profile Reviewed yes  -DP (r) MF (t) DP (c)     Patient Observations lethargic;cooperative;agree to therapy  -DP (r) MF (t) DP (c)     Prior Level of Function independent:;gait;transfer;ADL's  -DP (r) MF (t) DP (c)     Equipment Currently Used at Home none  -DP (r) MF (t) DP (c)     Existing Precautions/Restrictions fall  -DP (r) MF (t) DP (c)     Barriers to Rehab none identified  -DP (r) MF (t) DP (c)       Row Name 04/09/24 1418          Living Environment    Current Living Arrangements home  -DP (r) MF (t) DP (c)     Home Accessibility stairs to enter home  -DP (r) MF (t) DP (c)     People in Home friend(s)  -DP (r) MF (t) DP (c)     Primary Care Provided by self  -DP (r) MF (t) DP (c)       Row Name 04/09/24 1418          Home Main Entrance    Number of Stairs, Main Entrance five  -DP (r) MF (t) DP (c)     Stair Railings, Main Entrance railings on both sides of stairs  -DP (r) MF (t) DP (c)       Row Name 04/09/24 1418          Home Use of Assistive/Adaptive Equipment    Equipment Currently Used at Home none  -DP (r) MF (t) DP (c)       Row Name 04/09/24 1418          Pain    Pretreatment Pain Rating 0/10 - no pain  -DP (r) MF (t)  DP (c)     Posttreatment Pain Rating 0/10 - no pain  -DP (r) MF (t) DP (c)       Row Name 04/09/24 1418          Cognition    Orientation Status (Cognition) oriented x 3  -DP (r) MF (t) DP (c)       Row Name 04/09/24 1418          Range of Motion (ROM)    Range of Motion bilateral lower extremities;ROM is WNL  -DP (r) MF (t) DP (c)       Row Name 04/09/24 1418          Strength (Manual Muscle Testing)    Strength (Manual Muscle Testing) bilateral lower extremities  MMT HI LEs 4-/5  -DP (r) MF (t) DP (c)       Row Name 04/09/24 1418          Bed Mobility    Bed Mobility supine-sit;sit-supine  -DP (r) MF (t) DP (c)     Supine-Sit [place] (Bed Mobility) supervision  -DP (r) MF (t) DP (c)     Sit-Supine [place] (Bed Mobility) supervision  -DP (r) MF (t) DP (c)       Row Name 04/09/24 1418          Transfers    Transfers sit-stand transfer;stand-sit transfer  -DP (r) MF (t) DP (c)       Row Name 04/09/24 1418          Sit-Stand Transfer    Sit-Stand [place] (Transfers) supervision  -DP (r) MF (t) DP (c)     Assistive Device (Sit-Stand Transfers) walker, front-wheeled  -DP (r) MF (t) DP (c)       Row Name 04/09/24 1418          Stand-Sit Transfer    Stand-Sit [place] (Transfers) supervision  -DP (r) MF (t) DP (c)     Assistive Device (Stand-Sit Transfers) walker, front-wheeled  -DP (r) MF (t) DP (c)       Row Name 04/09/24 1418          Gait/Stairs (Locomotion)    Gait/Stairs Locomotion gait/ambulation assistive device  -DP (r) MF (t) DP (c)     [place] Level (Gait) standby assist  -DP (r) MF (t) DP (c)     Assistive Device (Gait) walker, front-wheeled  -DP (r) MF (t) DP (c)     Patient was able to Ambulate yes  -DP (r) MF (t) DP (c)     Distance in Feet (Gait) 200  -DP (r) MF (t) DP (c)     Pattern (Gait) step-through  -DP (r) MF (t) DP (c)       Row Name 04/09/24 1418          Safety Issues, Functional Mobility    Safety Issues Affecting Function (Mobility) impulsivity;insight into  deficits/self-awareness  -DP (r) MF (t) DP (c)     Impairments Affecting Function (Mobility) strength;cognition  -DP (r) MF (t) DP (c)     Cognitive Impairments, Mobility Safety/Performance impulsivity;insight into deficits/self-awareness  -DP (r) MF (t) DP (c)       Row Name 04/09/24 1418          Balance    Balance Assessment standing dynamic balance  -DP (r) MF (t) DP (c)     Dynamic Standing Balance standby assist  -DP (r) MF (t) DP (c)     Position/Device Used, Standing Balance walker, front-wheeled  -DP (r) MF (t) DP (c)       Row Name 04/09/24 1418          Plan of Care Review    Plan of Care Reviewed With patient  -DP (r) MF (t) DP (c)     Outcome Evaluation Patient initially found asleep but was easy to wake. Once patient was awake, he was lethargic and confused. During treatment patient removed their pulse ox, nsg notified. Patient has strength deficits of HI LEs affecting ability to safely ambulate and transfer independently. Skilled therapy is needed at this time to address the above deficits.  -DP (r) MF (t) DP (c)       Row Name 04/09/24 1418          Positioning and Restraints    Pre-Treatment Position in bed  -DP (r) MF (t) DP (c)     Post Treatment Position bed  -DP (r) MF (t) DP (c)     In Bed supine;call light within reach;encouraged to call for assist;exit alarm on  -DP (r) MF (t) DP (c)       Row Name 04/09/24 1411          Therapy Assessment/Plan (PT)    Rehab Potential (PT) good, to achieve stated therapy goals  -DP (r) MF (t) DP (c)     Criteria for Skilled Interventions Met (PT) yes  -DP (r) MF (t) DP (c)     Therapy Frequency (PT) daily  -DP (r) MF (t) DP (c)     Predicted Duration of Therapy Intervention (PT) 10 days  -DP (r) MF (t) DP (c)     Problem List (PT) problems related to;strength;mobility;balance  -DP (r) MF (t) DP (c)     Activity Limitations Related to Problem List (PT) unable to ambulate safely;unable to transfer safely  -DP (r) MF (t) DP (c)       Row Name 04/09/24 1418           PT Evaluation Complexity    History, PT Evaluation Complexity 3 or more personal factors and/or comorbidities  -DP (r) MF (t) DP (c)     Examination of Body Systems (PT Eval Complexity) total of 4 or more elements  -DP (r) MF (t) DP (c)     Clinical Presentation (PT Evaluation Complexity) stable  -DP (r) MF (t) DP (c)     Clinical Decision Making (PT Evaluation Complexity) low complexity  -DP (r) MF (t) DP (c)     Overall Complexity (PT Evaluation Complexity) low complexity  -DP (r) MF (t)       Row Name 04/09/24 1418          Therapy Plan Review/Discharge Plan (PT)    Therapy Plan Review (PT) evaluation/treatment results reviewed  -DP (r) MF (t) DP (c)       Row Name 04/09/24 1418          Physical Therapy Goals    Transfer Goal Selection (PT) transfer, PT goal 1  -DP (r) MF (t) DP (c)     Gait Training Goal Selection (PT) gait training, PT goal 1  -DP (r) MF (t) DP (c)     Strength Goal Selection (PT) strength, PT goal 1  -DP (r) MF (t) DP (c)       Row Name 04/09/24 1418          Transfer Goal 1 (PT)    Activity/Assistive Device (Transfer Goal 1, PT) sit-to-stand/stand-to-sit  -DP (r) MF (t) DP (c)     [place] Level/Cues Needed (Transfer Goal 1, PT) independent  -DP (r) MF (t) DP (c)     Time Frame (Transfer Goal 1, PT) 10 days  -DP (r) MF (t) DP (c)     Strategies/Barriers (Transfers Goal 1, PT) No barriers identified  -DP (r) MF (t) DP (c)       Row Name 04/09/24 1418          Gait Training Goal 1 (PT)    Activity/Assistive Device (Gait Training Goal 1, PT) gait (walking locomotion)  -DP (r) MF (t) DP (c)     [place] Level (Gait Training Goal 1, PT) independent  -DP (r) MF (t) DP (c)     Distance (Gait Training Goal 1, PT) 200  -DP (r) MF (t) DP (c)     Time Frame (Gait Training Goal 1, PT) 10 days  -DP (r) MF (t) DP (c)     Strategies/Barriers (Gait Training Goal 1, PT) No barriers identified  -DP (r) MF (t) DP (c)       Row Name 04/09/24 1410          Strength Goal 1 (PT)    Strength Goal 1  (PT) Increase strength of HI LEs to MMT 4+/5  -DP (r) MF (t) DP (c)     Time Frame (Strength Goal 1, PT) 10 days  -DP (r) MF (t) DP (c)     Strategies/Barriers (Strength Goal 1, PT) No barriers identified  -DP (r) MF (t) DP (c)               User Key  (r) = Recorded By, (t) = Taken By, (c) = Cosigned By      Initials Name Provider Type    DP Og Myrick, PT Physical Therapist     Nam Monet, PT Student PT Student                    Physical Therapy Education       Title: PT OT SLP Therapies (Done)       Topic: Physical Therapy (Done)       Point: Mobility training (Done)       Learning Progress Summary             Patient Acceptance, E,TB, VU by  at 4/9/2024 1438                         Point: Precautions (Done)       Learning Progress Summary             Patient Acceptance, E,TB, VU by  at 4/9/2024 1438                                         User Key       Initials Effective Dates Name Provider Type Discipline     04/01/24 -  Nam Monet, PT Student PT Student PT                  PT Recommendation and Plan  Anticipated Discharge Disposition (PT): home with home health  Planned Therapy Interventions (PT): strengthening, gait training, bed mobility training  Therapy Frequency (PT): daily  Plan of Care Reviewed With: patient  Outcome Evaluation: Patient initially found asleep but was easy to wake. Once patient was awake, he was lethargic and confused. During treatment patient removed their pulse ox, nsg notified. Patient has strength deficits of HI LEs affecting ability to safely ambulate and transfer independently. Skilled therapy is needed at this time to address the above deficits.   Outcome Measures       Row Name 04/09/24 1400             How much help from another person do you currently need...    Turning from your back to your side while in flat bed without using bedrails? 4  -DP (r) MF (t) DP (c)      Moving from lying on back to sitting on the side of a flat bed without bedrails? 4  -DP  (r) MF (t) DP (c)      Moving to and from a bed to a chair (including a wheelchair)? 3  -DP (r) MF (t) DP (c)      Standing up from a chair using your arms (e.g., wheelchair, bedside chair)? 4  -DP (r) MF (t) DP (c)      Climbing 3-5 steps with a railing? 3  -DP (r) MF (t) DP (c)      To walk in hospital room? 3  -DP (r) MF (t) DP (c)      AM-PAC 6 Clicks Score (PT) 21  -DP (r) MF (t)      Highest Level of Mobility Goal 6 --> Walk 10 steps or more  -DP (r) MF (t)         Functional Assessment    Outcome Measure Options AM-PAC 6 Clicks Basic Mobility (PT)  -DP (r) MF (t) DP (c)                User Key  (r) = Recorded By, (t) = Taken By, (c) = Cosigned By      Initials Name Provider Type    Og Ragland, PT Physical Therapist    Nam Shelton, PT Student PT Student                     Time Calculation:    PT Charges       Row Name 04/09/24 1417             Time Calculation    PT Received On 04/09/24  -DP (r) MF (t) DP (c)      PT Goal Re-Cert Due Date 04/18/24  -DP (r) MF (t) DP (c)         Untimed Charges    PT Eval/Re-eval Minutes 30  -DP (r) MF (t) DP (c)         Total Minutes    Untimed Charges Total Minutes 30  -DP (r) MF (t)       Total Minutes 30  -DP (r) MF (t)                User Key  (r) = Recorded By, (t) = Taken By, (c) = Cosigned By      Initials Name Provider Type    Og Ragland PT Physical Therapist    Nam Shelton, PT Student PT Student                  Therapy Charges for Today       Code Description Service Date Service Provider Modifiers Qty    [id number] HC PT EVAL LOW COMPLEXITY 3 4/9/2024 Nam Monet, PT Student GP 1            PT G-Codes  Outcome Measure Options: AM-PAC 6 Clicks Basic Mobility (PT)  AM-PAC 6 Clicks Score (PT): 21    GOE Tan  4/9/2024

## 2024-04-09 NOTE — THERAPY EVALUATION
Patient Name: Regulo Sepulveda  : 1965    MRN: [id number]                              Today's Date: 2024       Admit Date: 2024    Visit Dx:     ICD-10-CM ICD-9-CM   1. Altered mental status, unspecified altered mental status type  R41.82 780.97   2. Elevated troponin  R79.89 790.6   3. Schizophrenia, unspecified type  F20.9 295.90   4. Major depressive disorder, recurrent episode, moderate  F33.1 296.32   5. Dysphagia, unspecified type  R13.10 787.20   6. Difficulty in walking  R26.2 719.7   7. Decreased activities of daily living (ADL)  Z78.9 V49.89     Patient Active Problem List   Diagnosis    Left groin pain    Major depressive disorder, recurrent episode, moderate    Chronic obstructive pulmonary disease    Diabetes mellitus    Hypertensive disorder    Hyperlipidemia    Hepatitis C    Cigarette nicotine dependence    BPH (benign prostatic hyperplasia)    GERD (gastroesophageal reflux disease)    B12 deficiency    LV (left ventricular) mural thrombus    Schizophrenia    Acute on chronic combined systolic and diastolic CHF (congestive heart failure)    Acute on chronic heart failure with reduced ejection fraction and diastolic dysfunction    Acute on chronic congestive heart failure    PAF (paroxysmal atrial fibrillation)    CHF (congestive heart failure)    Acute on chronic HFrEF (heart failure with reduced ejection fraction)    Moderate malnutrition    Systolic heart failure    Acute exacerbation of CHF (congestive heart failure)    Cardiomyopathy    TIA (transient ischemic attack)     Past Medical History:   Diagnosis Date    Anxiety     Asthma     Bipolar affective     Cardiac tamponade         CHF (congestive heart failure)     COPD (chronic obstructive pulmonary disease)     Coronary artery disease     Depression     Diabetes mellitus     Elevated cholesterol     Hypertension     Parkinson disease      Past Surgical History:   Procedure Laterality Date    CARDIAC CATHETERIZATION Right  9/17/2023    Procedure: Right and Left Heart Cath;  Surgeon: Ben Grant MD;  Location: [facility] CATH INVASIVE LOCATION;  Service: Cardiovascular;  Laterality: Right;    TESTICLE SURGERY Left     extraction      General Information       Row Name 04/09/24 1518          OT Time and Intention    Document Type evaluation  -ES     Mode of Treatment individual therapy;occupational therapy  -ES       Row Name 04/09/24 1518          General Information    Patient Profile Reviewed yes  -ES     Prior Level of Function independent:;ADL's;all household mobility;community mobility  Patient independent with B and IADLs at baseline. No device for functional mobility. Standing showering and grooming completion. No home O2 use. Denies recent falls.  -ES     Existing Precautions/Restrictions no known precautions/restrictions  -ES     Barriers to Rehab none identified  -ES       Row Name 04/09/24 1518          Occupational Profile    Reason for Services/Referral (Occupational Profile) Patient is 58 yr old male admitted [facility] on 4/8/2024 with reports of altered mental status, facial paralysis and RUE/LE weakness. Patient PMH significant for schizophrenia. OT evaluation and treatment ordered d/t recent decline in ADLs/transfer ability and discharge planning recommendations. No previous OT services for current condition.  -ES       Row Name 04/09/24 1518          Living Environment    People in Home alone  -ES       Row Name 04/09/24 1518          Home Main Entrance    Number of Stairs, Main Entrance five  -ES       Row Name 04/09/24 1518          Stairs Within Home, Primary    Number of Stairs, Within Home, Primary none  -ES       Row Name 04/09/24 1518          Cognition    Orientation Status (Cognition) oriented x 3  Patient cooperative, agreeable to therapy evaluation. Patient with garbled speech during evaluation, improves with decreased talking speed.  -ES               User Key  (r) = Recorded By, (t) = Taken  By, (c) = Cosigned By      Initials Name Provider Type    ES Paige Azevedo, OTR/L, CSRS Occupational Therapist                     Mobility/ADL's       Row Name 04/09/24 1522          Bed Mobility    Bed Mobility supine-sit;sit-supine  -ES     Supine-Sit [place] (Bed Mobility) independent  -ES     Sit-Supine [place] (Bed Mobility) independent  -ES       Row Name 04/09/24 1522          Transfers    Transfers sit-stand transfer;stand-sit transfer  -ES       Row Name 04/09/24 1522          Sit-Stand Transfer    Sit-Stand [place] (Transfers) supervision  -ES     Assistive Device (Sit-Stand Transfers) other (see comments)  no AD  -ES       Row Name 04/09/24 1522          Stand-Sit Transfer    Stand-Sit [place] (Transfers) supervision  -ES     Assistive Device (Stand-Sit Transfers) other (see comments)  no AD  -ES       Row Name 04/09/24 1522          Functional Mobility    Functional Mobility- Ind. Level supervision required  -ES     Functional Mobility- Device other (see comments)  no AD  -ES       Row Name 04/09/24 1522          Activities of Daily Living    BADL Assessment/Intervention bathing;upper body dressing;lower body dressing;grooming;feeding;toileting  -ES       Row Name 04/09/24 1522          Bathing Assessment/Intervention    [place] Level (Bathing) bathing skills;independent  -ES       Row Name 04/09/24 1522          Upper Body Dressing Assessment/Training    [place] Level (Upper Body Dressing) upper body dressing skills;independent  -ES       Row Name 04/09/24 1522          Lower Body Dressing Assessment/Training    [place] Level (Lower Body Dressing) lower body dressing skills;independent  -ES       Row Name 04/09/24 1522          Grooming Assessment/Training    [place] Level (Grooming) grooming skills;independent  -ES       Row Name 04/09/24 1522          Self-Feeding Assessment/Training    [place] Level (Feeding) feeding skills;independent  -ES       Row Name  04/09/24 1522          Toileting Assessment/Training    [place] Level (Toileting) toileting skills;independent  -ES               User Key  (r) = Recorded By, (t) = Taken By, (c) = Cosigned By      Initials Name Provider Type    ES Paige Azevedo, OTR/L, CSRS Occupational Therapist                   Obj/Interventions       Row Name 04/09/24 1523          Sensory Assessment (Somatosensory)    Sensory Assessment (Somatosensory) sensation intact  -ES       Row Name 04/09/24 1523          Vision Assessment/Intervention    Visual Impairment/Limitations WFL  -ES     Vision Assessment Comment quick vision screen intact to all four visual quadrants. No visual deficits present at time of assessment  -ES       Row Name 04/09/24 1523          Range of Motion Comprehensive    General Range of Motion bilateral upper extremity ROM WNL  -ES       [facility] Name 04/09/24 1523          Strength Comprehensive (MMT)    General Manual Muscle Testing (MMT) Assessment no strength deficits identified  -ES     Comment, General Manual Muscle Testing (MMT) Assessment BUEs 4/5  -ES       Row Name 04/09/24 1523          Motor Skills    Motor Skills functional endurance  -ES     Functional Endurance fair plus  -ES       Row Name 04/09/24 1523          Balance    Balance Assessment sitting dynamic balance;standing dynamic balance  -ES     Dynamic Sitting Balance independent  -ES     Position, Sitting Balance unsupported  -ES     Dynamic Standing Balance supervision  -ES     Position/Device Used, Standing Balance unsupported  -ES               User Key  (r) = Recorded By, (t) = Taken By, (c) = Cosigned By      Initials Name Provider Type    ES Paige Azevedo, OTR/L, CSRS Occupational Therapist                   Goals/Plan    No documentation.                  Clinical Impression       [facility] Name 04/09/24 1524          Plan of Care Review    Plan of Care Reviewed With patient  -ES     Outcome Evaluation Patient presents at or near baseline functional  status at time of evaluation with no identified functional deficits that impede patient independence with activities of daily living.  No indicated need for skilled occupational therapy intervention in the acute care setting.  Occupational therapy will sign off at this time.  -ES       Row Name 04/09/24 1524          Therapy Assessment/Plan (OT)    Criteria for Skilled Therapeutic Interventions Met (OT) no problems identified which require skilled intervention  -ES     Therapy Frequency (OT) evaluation only  -ES       Row Name 04/09/24 1524          Therapy Plan Review/Discharge Plan (OT)    Anticipated Discharge Disposition (OT) home  -ES               User Key  (r) = Recorded By, (t) = Taken By, (c) = Cosigned By      Initials Name Provider Type    ES Paige Azevedo, OTR/L, CSRS Occupational Therapist                   Outcome Measures       Row Name 04/09/24 1524          How much help from another is currently needed...    Putting on and taking off regular lower body clothing? 4  -ES     Bathing (including washing, rinsing, and drying) 4  -ES     Toileting (which includes using toilet bed pan or urinal) 4  -ES     Putting on and taking off regular upper body clothing 4  -ES     Taking care of personal grooming (such as brushing teeth) 4  -ES     Eating meals 4  -ES     AM-PAC 6 Clicks Score (OT) 24  -ES       Row Name 04/09/24 1400 04/09/24 4614       How much help from another person do you currently need...    Turning from your back to your side while in flat bed without using bedrails? 4  -DP (r) MF (t) DP (c) 4  -AR    Moving from lying on back to sitting on the side of a flat bed without bedrails? 4  -DP (r) MF (t) DP (c) 3  -AR    Moving to and from a bed to a chair (including a wheelchair)? 3  -DP (r) MF (t) DP (c) 3  -AR    Standing up from a chair using your arms (e.g., wheelchair, bedside chair)? 4  -DP (r) MF (t) DP (c) 3  -AR    Climbing 3-5 steps with a railing? 3  -DP (r) MF (t) DP (c) 3  -AR    To  walk in hospital room? 3  -DP (r) MF (t) DP (c) 3  -AR    AM-PAC 6 Clicks Score (PT) 21  -DP (r) MF (t) 19  -AR    Highest Level of Mobility Goal 6 --> Walk 10 steps or more  -DP (r) MF (t) 6 --> Walk 10 steps or more  -AR      Row Name 04/09/24 1524 04/09/24 1400       Functional Assessment    Outcome Measure Options AM-PAC 6 Clicks Daily Activity (OT)  -ES AM-PAC 6 Clicks Basic Mobility (PT)  -DP (r) MF (t) DP (c)              User Key  (r) = Recorded By, (t) = Taken By, (c) = Cosigned By      Initials Name Provider Type    DP Og Myrick, PT Physical Therapist    Wendy Schultz, RN Registered Nurse    Paige Fan, OTR/L, CSRS Occupational Therapist    Nam Shelton, PT Student PT Student                      OT Recommendation and Plan  Therapy Frequency (OT): evaluation only  Plan of Care Review  Plan of Care Reviewed With: patient  Outcome Evaluation: Patient presents at or near baseline functional status at time of evaluation with no identified functional deficits that impede patient independence with activities of daily living.  No indicated need for skilled occupational therapy intervention in the acute care setting.  Occupational therapy will sign off at this time.     Time Calculation:   Evaluation Complexity (OT)  Review Occupational Profile/Medical/Therapy History Complexity: brief/low complexity  Assessment, Occupational Performance/Identification of Deficit Complexity: 1-3 performance deficits  Clinical Decision Making Complexity (OT): problem focused assessment/low complexity  Overall Complexity of Evaluation (OT): low complexity     Time Calculation- OT       Row Name 04/09/24 1524             Time Calculation- OT    OT Received On 04/09/24  -ES         Untimed Charges    OT Eval/Re-eval Minutes 20  -ES         Total Minutes    Untimed Charges Total Minutes 20  -ES       Total Minutes 20  -ES                User Key  (r) = Recorded By, (t) = Taken By, (c) = Cosigned By      Initials Name  Provider Type    ES Paige Azevedo OTR/L, CSRS Occupational Therapist                  Therapy Charges for Today       Code Description Service Date Service Provider Modifiers Qty    19120837128 HC OT EVAL LOW COMPLEXITY 2 4/9/2024 Paige Azevedo OTR/L, CSRS GO 1                 Paige Azevedo, OTR/L, CSRS  4/9/2024

## 2024-04-09 NOTE — PLAN OF CARE
Goal Outcome Evaluation:            NIHSS 5/ Pt is very drowsy/slow to respond. NPO until speech see/Dr. Urbano aware.

## 2024-04-09 NOTE — PLAN OF CARE
Goal Outcome Evaluation:  Plan of Care Reviewed With: patient        Progress: no change                  NIH 1. Pt A&Ox4, intermittent lethargy throughout the day.

## 2024-04-09 NOTE — PAYOR COMM NOTE
"PATIENT INFORMATION  Name:  Regulo Sepulveda  MRN#:     [id number]  :  1965         ADMISSION INFORMATION  CLASS: Inpatient   DOS:  24        CURRENT ATTENDING PROVIDER INFORMATION  Name/NPI: Dominic Vides MD [[id number]]  Phone:             Phone: [phone]        REQUESTING PROVIDER and RENDERING FACILITY  Name:  [facility]   NPI:  [id number]  TID:  [id number]  Address:      [address] [place], [place] [address] [address]  Phone  [phone]        UTILIZATION REVIEW CONTACT INFORMATION  Phone:      [phone]  Fax:           [phone]        ADMISSION DIAGNOSIS  Major depressive disorder, recurrent episode, moderate [F33.1]  TIA (transient ischemic attack) [G45.9]  Elevated troponin [R79.89]  Schizophrenia, unspecified type [F20.9]  Altered mental status, unspecified altered mental status type [R41.82]        ++++++++++++++++++++++++++++++++++++++++++++++++++++++++++++++++++++++++++++++++        Regulo Sepulveda (58 y.o. Male)       Date of Birth   1965    Social Security Number       Address   [address] [place] [address]    Home Phone   [phone]    MRN   [id number]       Mormonism   None    Marital Status   Single                            Admission Date   24    Admission Type   Emergency    Admitting Provider   Eliot Urbano MD    Attending Provider   Dominic Vides MD    Department, Room/Bed   [facility] PROGRESSIVE CARE UNIT,        Discharge Date       Discharge Disposition       Discharge Destination                                 Attending Provider: Dominic Vides MD    Allergies: Penicillins    Isolation: None   Infection: None   Code Status: CPR    Ht: 195.6 cm (77.01\")   Wt: 68.6 kg (151 lb 3.8 oz)    Admission Cmt: None   Principal Problem: TIA (transient ischemic attack) [G45.9]                   Active Insurance as of 2024       Primary Coverage       Payor Plan Insurance Group Employer/Plan Group    PASSPORT HEALTH BY " NITHIN PASSPORT BY NITHIN [id number]       Payor Plan Address Payor Plan Phone Number Payor Plan Fax Number Effective Dates    [address] [address]   2024 - None Entered    [facility] [id number]         Subscriber Name Subscriber Birth Date Member ID       NANCY SEPULVEDA 1965 [id number]                     Emergency Contacts        (Rel.) Home Phone Work Phone Mobile Phone    Aurora Prather (Sister) -- [phone]    Yun Don (Roommate) -- -- [phone]                 History & Physical        Eliot Urbano MD at 24 0313           [facility] HISTORY AND PHYSICAL  Date: 2024   Patient Name: Nancy Sepulveda  : 1965  MRN: [id number]  Primary Care Physician:  Dickson Landry MD  Date of admission: 2024    Subjective  Subjective     Chief Complaint: Altered mental status    HPI:    Nancy Sepulveda is a 58 y.o. male past medical history of CAD status post PCI, heart failure reduced ejection fraction secondary to dilated nonischemic cardiomyopathy, schizophrenia on monthly Abilify Maintena, apical thrombus on Eliquis, anxiety/depression, COPD, nonobstructive coronary artery disease, type 2 diabetes presents to the ER due to altered mental status.  Due to patient's altered mental status he was unable to provide any useful history and history supplemented by discussion with ER staff and family.  Appears patient at some point earlier today was with his sister when he had acute development of right-sided flaccid paralysis and decreased responsiveness mentally.  He was brought into the ER subsequently as a stroke alert.  Upon arrival here he was hemodynamically stable and found to have an NIH of 17.  He was initially worked up extensively with CT perfusion that was negative as well as CT angiogram the head and neck that was negative for an LVO.  Teleneurology evaluated the patient and advised initiation of antiepileptics for possible seizure as well as  workup for definitive stroke rule out with an MRI brain.  Hospitalist was then contacted for admission.  Patient has already returned to baseline prior to my examination.  He does not member the events leading to his arrival.  He states he has been adherent to all his medications at home.      Personal History     Past Medical History:  Past Medical History:   Diagnosis Date    Anxiety     Asthma     Bipolar affective     Cardiac tamponade     2023    CHF (congestive heart failure)     COPD (chronic obstructive pulmonary disease)     Coronary artery disease     Depression     Diabetes mellitus     Elevated cholesterol     Hypertension     Parkinson disease          Past Surgical History:  Past Surgical History:   Procedure Laterality Date    CARDIAC CATHETERIZATION Right 9/17/2023    Procedure: Right and Left Heart Cath;  Surgeon: Ben Grant MD;  Location: [facility], [facility] within [facility] CATH INVASIVE LOCATION;  Service: Cardiovascular;  Laterality: Right;    TESTICLE SURGERY Left     extraction         Family History:   Reviewed and noncontributory except as mentioned in HPI    Social History:   Social Determinants of Health     Tobacco Use: High Risk (4/8/2024)    Patient History     Smoking Tobacco Use: Every Day     Smokeless Tobacco Use: Never     Passive Exposure: Not on file   Alcohol Use: Not At Risk (4/5/2024)    AUDIT-C     Frequency of Alcohol Consumption: Never     Average Number of Drinks: Patient does not drink     Frequency of Binge Drinking: Never   Financial Resource Strain: Low Risk  (4/5/2024)    Overall Financial Resource Strain (CARDIA)     Difficulty of Paying Living Expenses: Not hard at all   Food Insecurity: Patient Declined (4/5/2024)    Hunger Vital Sign     Worried About Running Out of Food in the Last Year: Patient declined     Ran Out of Food in the Last Year: Patient declined   Transportation Needs: Patient Declined (4/5/2024)    PRAPARE - Transportation     Lack of Transportation (Medical): Patient  declined     Lack of Transportation (Non-Medical): Patient declined   Physical Activity: Inactive (4/5/2024)    Exercise Vital Sign     Days of Exercise per Week: 0 days     Minutes of Exercise per Session: 0 min   Stress: No Stress Concern Present (4/5/2024)    [employer] - Occupational Stress Questionnaire     Feeling of Stress : Not at all   Social Connections: Unknown (4/5/2024)    Family and Community Support     Help with Day-to-Day Activities: I don't need any help     Lonely or Isolated: Patient declined   Recent Concern: Social Connections - At Risk (3/24/2024)    Family and Community Support     Help with Day-to-Day Activities: I don't need any help     Lonely or Isolated: Often   Interpersonal Safety: Not At Risk (4/8/2024)    Abuse Screen     Unsafe at Home or Work/School: no     Feels Threatened by Someone?: no     Does Anyone Keep You from Contacting Others or Doint Things Outside the Home?: no     Physical Sign of Abuse Present: no   Depression: Not at risk (4/5/2024)    PHQ-2     PHQ-2 Score: 0   Recent Concern: Depression - At risk (3/24/2024)    PHQ-2     PHQ-2 Score: 6   Housing Stability: Not At Risk (4/5/2024)    Housing Stability     Current Living Arrangements: home     Potentially Unsafe Housing Conditions: none   Recent Concern: Housing Stability - High Risk (4/2/2024)    Housing Stability Vital Sign     Unable to Pay for Housing in the Last Year: Yes     Number of Times Moved in the Last Year: Not on file     Homeless in the Last Year: No   Utilities: Not At Risk (4/5/2024)    [facility] Utilities     Threatened with loss of utilities: No   Health Literacy: Not At Risk (4/5/2024)    Education     Help with school or training?: No     Preferred Language: English   Employment: Not At Risk (4/5/2024)    Employment     Do you want help finding or keeping work or a job?: I do not need or want help   Disabilities: Not At Risk (4/5/2024)    Disabilities     Concentrating,  Remembering, or Making Decisions Difficulty: no     Doing Errands Independently Difficulty: no   Recent Concern: Disabilities - At Risk (4/3/2024)    Disabilities     Concentrating, Remembering, or Making Decisions Difficulty: yes     Doing Errands Independently Difficulty: no         Home Medications:  ARIPiprazole ER, albuterol sulfate HFA, amiodarone, apixaban, atorvastatin, carvedilol, dapagliflozin, divalproex, furosemide, lisinopril, tamsulosin, and venlafaxine XR    Allergies:  Allergies   Allergen Reactions    Penicillins Shortness Of Breath       Review of Systems   All systems were reviewed and negative except for: Right-sided weakness and numbness    Objective  Objective     Vitals:   Temp:  [98.4 °F (36.9 °C)] 98.4 °F (36.9 °C)  Heart Rate:  [79-83] 81  Resp:  [16-23] 17  BP: (122-153)/() 122/88    Physical Exam    Constitutional: Awake, alert, no acute distress   Eyes: Pupils equal, sclerae anicteric, no conjunctival injection   HENT: NCAT, mucous membranes moist   Neck: Supple, no thyromegaly, no lymphadenopathy, trachea midline   Respiratory: Clear to auscultation bilaterally, nonlabored respirations    Cardiovascular: RRR, no murmurs, rubs, or gallops, palpable pedal pulses bilaterally   Gastrointestinal: Positive bowel sounds, soft, nontender, nondistended   Musculoskeletal: No bilateral ankle edema, no clubbing or cyanosis to extremities   Psychiatric: Appropriate affect, cooperative   Neurologic: Oriented x 3, strength symmetric in all extremities, Cranial Nerves grossly intact to confrontation, speech clear   Skin: No rashes     Result Review   Result Review:  I have personally reviewed the results from the time of this admission to 4/8/2024 22:59 EDT and agree with these findings:  [x]  Laboratory  [x]  Microbiology  [x]  Radiology  [x]  EKG/Telemetry   []  Cardiology/Vascular   []  Pathology  [x]  Old records  []  Other:      Assessment & Plan  Assessment / Plan     Assessment/Plan:    Transient right upper and lower extremity weakness, rule out CVA  Hypomagnesemia  Chronic elevated troponins, downtrending  History of apical LV thrombus on Eliquis  Chronic heart failure reduced ejection fraction not in acute exacerbation  Anxiety/depression/schizophrenia  Type II diabetes  History of substance abuse      Plan  - Admit to hospitalist service for observation  - Continue stroke protocol.  NIH checks ordered.  Seizure precautions.  Continue Keppra per teleneurology recommendations.  EEG ordered.  MRI brain ordered.  No need to evaluate PFO as echo was just done recently on 03/24/2024.   -Replace magnesium, recheck level in a.m.  -Resume Eliquis if MRI negative for CVA  - No need to repeat further troponins as EKG is nonischemic and it is downtrending from his admission just 5 days ago  - Continue home medications for anxiety depression and schizophrenia.  Patient is due for his next injection of Abilify on 04/09/2024, will likely need to bring it in from home  - insulin sliding scale and Accu-Cheks for type 2 diabetes  - Clarify other chronic comorbidities as appropriate.  We will allow permissive hypertension overnight.  Will have to hold some of his heart failure medications      Discussed with ER Physician and Nurse    All labs/imaging studies were personally reviewed and findings are as noted above      DVT Prophylaxis: Eliquis    CODE STATUS:    Code Status (Patient has no pulse and is not breathing): CPR (Attempt to Resuscitate)  Medical Interventions (Patient has pulse or is breathing): Full Support      Admission Status:  I believe this patient meets observation status.    Electronically signed by Eliot Urbano MD, 04/08/24, 10:59 PM EDT.               Electronically signed by Eliot Urbano MD at 04/09/24 0201          Emergency Department Notes        Josue Russ, WILLIE at 04/08/24 2112          Stated Reason for Visit Pt presents to ED via EMS with LKW around 1825 per pt's family. Pt  "normally A&O x4, pt unresponsive at this moment. Per family, pt has been clean from methamphetamine for 15 days. . Pt takes Eliqui     Electronically signed by Josue Russ, RN at 04/08/24 2112       Belkis Beverly, RN at 04/08/24 1922          Dennise arrived at 1917    Electronically signed by Belkis Beverly, RN at 04/08/24 1922          Physician Progress Notes (last 24 hours)        Behravan, Vahid, MD at 04/09/24 0938          [facility] TeleNeurology Progress Note    Date of Service 4/9/2024      Presentation:    Based on previous neurology note(s)   : \" 58 year old Male.     Patient was brought by EMS for symptoms of R sided weakness.  LKW 45 minutes ago with altered mental status and R sided weakness. Pt is not speaking or responding or interacting at all. Pt is on Eliquis, indication likely a mural thrombus in the LV. Pt is intermittently able to answer questions and follow commands. He is generally weak but much worse on the R side, unclear if it involves the face.      initial NIHSS 10”      Interval history:    valproic acid level very low    4/9/2024: nursing does not report any significant new events overnight.     Impression:    Atrial Fibrillation    altered mental status and right sided weakness, possible left thalamic stroke     Recommendations:    1- Risk factor management If stroke is confirmed:  Statin for Goal LDL<70, primary team to order    Goal HbA1c<7;     Blood pressure management:  During the first 72 hours after stroke onset If BP greater than 220/120 give IV Labetalol or Enalapril; then after 72 hours from stroke onset should slowly and gradually lower Blood Pressure to <130/80 over the course of one week (if no cardiovascular contraindication or any critical/severe intra or extracranial arterial stenosis, and if it does not elicit or exacerbate symptoms).  avoid hypotension and rapid decrease in Blood Pressure.      2- Work up and Results:   Brain MRI: Some signal on " diffusion in the left thalamic area that could relate to subacute ischemia to this area.  Head and neck Vascular imaging: CTA Head and Neck unremarkable  ECHO: may not , deferring to primary team   LDL and HbA1c: 55/6.4    EEG pending     3- Antithrombotic regimen:  A- hold anticoagulation for now, obtain Noncontrast head CT 4/11/24 and if no Hemorrhagic Transformation then should resume anticoagulation, Once/If anticoagulation is started then can stop antiplatelet therapy (unless there is a definite cardiovascular indication for concurrent use of Antiplatelet therapy and anticoagulation)    4- Nursing recommendations:  IV Fluids, avoid dextrose containing fluids  Maintain euglycemia  Neuro checks every 4 hours for 24 hours and then per shift  Head of bed 30 degrees    5- Life Style modifications for stroke prevention should be followed:  Diet. Mediterranean diet rich in fruits, vegetables, whole grains, fish, legumes, plant-based oils preferred over animal fats. Reduce sodium intake as directed by primary care physician.  Exercise. Aim for 150-minutes of moderate to intense exercise per week in sessions of at least 10 minutes duration as tolerated.  Tobacco. Tobacco cessation with counseling and/or pharmacologic management  Alcohol. Reduce alcohol consumption as directed by primary care physician.  Hormone therapy. Avoid estrogen and testosterone containing medications  Sleep. Evaluation and treatment of sleep apnea  Return precautions. Seek emergency medical attention if you cannot see, speak, move or feel as you do normally for any abnormal length of time as these may be signs of a stroke      6- indication for his valproic acid is unclear to me (? psych vs seizures) level on admission was very low indicating noncompliance, he was also started on keppra empirically this admission, depending on EEG and additional history (once available) will make decision on keppra.     Physical  Therapy/Occupational Therapy/Speech Therapy/ Rehab (if/when) eligible  Continue with Telemetry  DVT prophylaxis (choice of primary team)    [facility] Neurologist will follow up with results.  Please contact [facility] Navigator to reach me if further questions/concerns arise.    Examination:    awake, alert, Oriented x3  speech no aphasia  Extraocular movements intact  face symmetric  very subtle drift right arm       Patient / Family was informed the Neurology Consult would occur via TeleHealth consult by way of interactive audio and video telecommunications and consented to receiving care in this manner.     Patient is being evaluated for possible acute neurologic impairment and high probability of imminent or life - threatening deterioration. I spent total of 14 minutes providing care to this patient, including time for face to face visit via telemedicine, review of medical records, imaging studies and discussion of findings with providers, the patient and / or family.        Dr Vahid Behravan        [facility]  For Inpatient follow-up with [facility] physician please call [facility] [phone]. This is not an outpatient service. Post hospital discharge, please contact hospital directly.     Please do not communicate with [facility] physicians via secure chat. If you have any questions, Please contact [facility].  Please call or reconsult our service if there are any clinical or diagnostic changes.         Electronically signed by Behravan, Vahid, MD at 04/09/24 0925          Consult Notes (last 24 hours)        Zeina Sapp MD at 04/08/24 2144          [facility]  [facility] TeleNeurology Consult Services      Patient Name:   Regulo Sepulveda  YOB: 1965  Identification Number:   MRN - [id number]  Date of Service:   04/08/2024 19:18:12    Diagnosis:        I63.30 - Cerebrovascular accident (CVA) due to thrombosis of cerebral artery ([facility])    Impression:       Pt  presents with sudden onset decreased responsiveness and R sided weakness not involving the face. Pt is intermittently able to answer some questions and follow simple commands. He is on Eliquis therefore not a candidate for thrombolytics. Differential includes acute ischemia vs seizure/post-ictal state vs toxic/metabolic factors. WOuld recommend MRI brain and EEG alongside toxic/metabolic work-up to evaluate.    Our recommendations are outlined below.    Recommendations:          Stroke/Telemetry Floor        Neuro Checks        Bedside Swallow Eval        DVT Prophylaxis        IV Fluids, Normal Saline        Head of Bed 30 Degrees        Euglycemia and Avoid Hyperthermia (PRN Acetaminophen)        Hold Anticoagulation for Now        Initiate or continue Aspirin 325 MG daily        Antihypertensives PRN if Blood pressure is greater than 220/120 or there is a concern for End organ damage/contraindications for permissive HTN. If blood pressure is greater than 220/120 give labetalol PO or IV or Vasotec IV with a goal of 15% reduction in BP during the first 24 hours.    Recommended Scan:       MRI Head Without Contrast    Lipid Panel to Be Obtained, if Not Done in the Last 30 Days    Therapies:        Physical Therapy, Occupational Therapy, Speech Therapy Assessment When Applicable    Dysphagia:        Swallow Evaluation, Bedside        NPO Until Swallow Evaluation    DVT prophylaxis:        SCDs, Pneumatic Compression        Lovenox or LMW Heparin    Disposition:        Neurology Follow Up Recommended    Sign Out:        Discussed with Emergency Department Provider        ------------------------------------------------------------------------------    Advanced Imaging:  CTA Head and Neck Completed.    CTP Completed.    LVO:No    Patient in not a candidate for CHARAN      Metrics:  Last Known Well: 04/08/2024 18:30:29  [facility] Notification Time: 04/08/2024 19:17:28  Arrival Time: 04/08/2024 19:17:20  Stamp Time:  04/08/2024 19:18:12  Initial Response Time: 04/08/2024 19:20:26  Symptoms: R sided weakness.  Initial patient interaction: 04/08/2024 19:35:20  NIHSS Assessment Completed: 04/08/2024 19:45:20  Patient is not a candidate for Thrombolytic.  Thrombolytic Medical Decision: 04/08/2024 19:45:50  Patient was not deemed candidate for Thrombolytic because of following reasons:  Use of NOAs within 48 hours.    I personally Reviewed the CT Head and it Showed no hemorrhage,    Primary Provider Notified of Diagnostic Impression and Management Plan on: 04/08/2024 21:25:43        ------------------------------------------------------------------------------    History of Present Illness:  Patient is a 58 year old Male.    Patient was brought by EMS for symptoms of R sided weakness.  LKW 45 minutes ago with altered mental status and R sided weakness. Pt is not speaking or responding or interacting at all. Pt is on Eliquis, indication likely a mural thrombus in the LV. Pt is intermittently able to answer questions and follow commands. He is generally weak but much worse on the R side, unclear if it involves the face.       Past Medical History:       Hypertension       Diabetes Mellitus       Hyperlipidemia       Atrial Fibrillation  Othere PMH:  depression, COPD, BPH, GERD, LV mural thrombus likely indication for eliquis, CHF, Schizophrenia, CM    Medications:    Anticoagulant use:  Yes Eliquis  No Antiplatelet use  Reviewed EMR for current medications    Allergies:   Reviewed  Description: PCN    Social History:  Current Employee : meth abuse  Smoking: Yes  Alcohol Use: No  Drug Use: Yes    Family History:    There is no family history of premature cerebrovascular disease pertinent to this consultation    ROS :  14 Points Review of Systems was performed and was negative except mentioned in HPI.    Past Surgical History:  There Is No Surgical History Contributory To Today’s Visit         Examination:  BP(152/94), Pulse(83), Blood  Glucose(139)  1A: Level of Consciousness - Requires repeated stimulation to arouse + 2  1B: Ask Month and Age - 1 Question Right + 1  1C: Blink Eyes & Squeeze Hands - Performs 1 Task + 1  2: Test Horizontal Extraocular Movements - Normal + 0  3: Test Visual Fields - No Visual Loss + 0  4: Test Facial Palsy (Use Grimace if Obtunded) - Normal symmetry + 0  5A: Test Left Arm Motor Drift - No Drift for 10 Seconds + 0  5B: Test Right Arm Motor Drift - Drift, hits bed + 2  6A: Test Left Leg Motor Drift - Drift, but doesn't hit bed + 1  6B: Test Right Leg Motor Drift - No Effort Against Gravity + 3  7: Test Limb Ataxia (FNF/Heel-Shin) - No Ataxia + 0  8: Test Sensation - Normal; No sensory loss + 0  9: Test Language/Aphasia - Normal; No aphasia + 0  10: Test Dysarthria - Normal + 0  11: Test Extinction/Inattention - No abnormality + 0    NIHSS Score: 10    Pre-Morbid Modified Franklin Scale:  Unable to assess    Spoke with : Dr    Patient/Family was informed the Neurology Consult would occur via TeleHealth consult by way of interactive audio and video telecommunications and consented to receiving care in this manner.      Patient is being evaluated for possible acute neurologic impairment and high probability of imminent or life-threatening deterioration. I spent total of 35 minutes providing care to this patient, including time for face to face visit via telemedicine, review of medical records, imaging studies and discussion of findings with providers, the patient and/or family.      Dr Zeina Pratted      [facility]  For Inpatient follow-up with [facility] physician please call [facility] [phone]. This is not an outpatient service. Post hospital discharge, please contact hospital directly.    Please do not communicate with [facility] physicians via secure chat. If you have any questions, Please contact [facility].  Please call or reconsult our service if there are any clinical or diagnostic changes.        Electronically signed by Zeina Sapp MD at 24 2129          Physical Therapy Notes (last 24 hours)        Nam Monet, PT Student at 24 1437  Version 1 of 1      Attestation signed by Og Myrick PT at 24 1438    Patient evaluation and/or treatment was performed under the direct supervision of a licensed physical therapist. Og Myrick PT                  Goal Outcome Evaluation:  Plan of Care Reviewed With: patient           Outcome Evaluation: Patient initially found asleep but was easy to wake. Once patient was awake, he was lethargic and confused. During treatment patient removed their pulse ox, nsg notified. Patient has strength deficits of HI LEs affecting ability to safely ambulate and transfer independently. Skilled therapy is needed at this time to address the above deficits.      Anticipated Discharge Disposition (PT): home with home health                          Electronically signed by Og Myrick PT at 24 143       Nam Monet PT Student at 24 1438  Version 1 of 1      Attestation signed by Og Myrick PT at 24 1438    Patient evaluation and/or treatment was performed under the direct supervision of a licensed physical therapist. Og Myrick PT                  Acute Care - Physical Therapy Initial Evaluation   [place]     Patient Name: Regulo Sepulveda  : 1965  MRN: [id number]  Today's Date: 2024    Admit date: 2024     Referring Physician: Dominic Vides MD     Surgery Date:* No surgery found *            Visit Dx:     ICD-10-CM ICD-9-CM   1. Altered mental status, unspecified altered mental status type  R41.82 780.97   2. Elevated troponin  R79.89 790.6   3. Schizophrenia, unspecified type  F20.9 295.90   4. Major depressive disorder, recurrent episode, moderate  F33.1 296.32   5. Dysphagia, unspecified type  R13.10 787.20   6. Difficulty in walking  R26.2 719.7     Patient Active Problem List   Diagnosis     Left groin pain    Major depressive disorder, recurrent episode, moderate    Chronic obstructive pulmonary disease    Diabetes mellitus    Hypertensive disorder    Hyperlipidemia    Hepatitis C    Cigarette nicotine dependence    BPH (benign prostatic hyperplasia)    GERD (gastroesophageal reflux disease)    B12 deficiency    LV (left ventricular) mural thrombus    Schizophrenia    Acute on chronic combined systolic and diastolic CHF (congestive heart failure)    Acute on chronic heart failure with reduced ejection fraction and diastolic dysfunction    Acute on chronic congestive heart failure    PAF (paroxysmal atrial fibrillation)    CHF (congestive heart failure)    Acute on chronic HFrEF (heart failure with reduced ejection fraction)    Moderate malnutrition    Systolic heart failure    Acute exacerbation of CHF (congestive heart failure)    Cardiomyopathy    TIA (transient ischemic attack)     Past Medical History:   Diagnosis Date    Anxiety     Asthma     Bipolar affective     Cardiac tamponade     2023    CHF (congestive heart failure)     COPD (chronic obstructive pulmonary disease)     Coronary artery disease     Depression     Diabetes mellitus     Elevated cholesterol     Hypertension     Parkinson disease      Past Surgical History:   Procedure Laterality Date    CARDIAC CATHETERIZATION Right 9/17/2023    Procedure: Right and Left Heart Cath;  Surgeon: Ben Grant MD;  Location: [facility] CATH INVASIVE LOCATION;  Service: Cardiovascular;  Laterality: Right;    TESTICLE SURGERY Left     extraction     PT Assessment (Last 12 Hours)       PT Evaluation and Treatment       Row Name 04/09/24 1417          Physical Therapy Time and Intention    Subjective Information no complaints  -DP (r) MF (t) DP (c)     Document Type evaluation  -DP (r) MF (t) DP (c)     Mode of Treatment individual therapy;physical therapy  -DP (r) MF (t) DP (c)     Patient Effort adequate  -DP (r) MF (t) DP (c)     Symptoms Noted  During/After Treatment none  -DP (r) MF (t) DP (c)       Row Name 04/09/24 1418          General Information    Patient Profile Reviewed yes  -DP (r) MF (t) DP (c)     Patient Observations lethargic;cooperative;agree to therapy  -DP (r) MF (t) DP (c)     Prior Level of Function independent:;gait;transfer;ADL's  -DP (r) MF (t) DP (c)     Equipment Currently Used at Home none  -DP (r) MF (t) DP (c)     Existing Precautions/Restrictions fall  -DP (r) MF (t) DP (c)     Barriers to Rehab none identified  -DP (r) MF (t) DP (c)       Row Name 04/09/24 1418          Living Environment    Current Living Arrangements home  -DP (r) MF (t) DP (c)     Home Accessibility stairs to enter home  -DP (r) MF (t) DP (c)     People in Home friend(s)  -DP (r) MF (t) DP (c)     Primary Care Provided by self  -DP (r) MF (t) DP (c)       Row Name 04/09/24 1418          Home Main Entrance    Number of Stairs, Main Entrance five  -DP (r) MF (t) DP (c)     Stair Railings, Main Entrance railings on both sides of stairs  -DP (r) MF (t) DP (c)       Row Name 04/09/24 1418          Home Use of Assistive/Adaptive Equipment    Equipment Currently Used at Home none  -DP (r) MF (t) DP (c)       Row Name 04/09/24 1418          Pain    Pretreatment Pain Rating 0/10 - no pain  -DP (r) MF (t) DP (c)     Posttreatment Pain Rating 0/10 - no pain  -DP (r) MF (t) DP (c)       Row Name 04/09/24 1418          Cognition    Orientation Status (Cognition) oriented x 3  -DP (r) MF (t) DP (c)       Row Name 04/09/24 1418          Range of Motion (ROM)    Range of Motion bilateral lower extremities;ROM is WNL  -DP (r) MF (t) DP (c)       Row Name 04/09/24 1418          Strength (Manual Muscle Testing)    Strength (Manual Muscle Testing) bilateral lower extremities  MMT HI LEs 4-/5  -DP (r) MF (t) DP (c)       Row Name 04/09/24 1418          Bed Mobility    Bed Mobility supine-sit;sit-supine  -DP (r) MF (t) DP (c)     Supine-Sit [place] (Bed Mobility)  supervision  -DP (r) MF (t) DP (c)     Sit-Supine [place] (Bed Mobility) supervision  -DP (r) MF (t) DP (c)       Row Name 04/09/24 1418          Transfers    Transfers sit-stand transfer;stand-sit transfer  -DP (r) MF (t) DP (c)       Row Name 04/09/24 1418          Sit-Stand Transfer    Sit-Stand [place] (Transfers) supervision  -DP (r) MF (t) DP (c)     Assistive Device (Sit-Stand Transfers) walker, front-wheeled  -DP (r) MF (t) DP (c)       Row Name 04/09/24 1418          Stand-Sit Transfer    Stand-Sit [place] (Transfers) supervision  -DP (r) MF (t) DP (c)     Assistive Device (Stand-Sit Transfers) walker, front-wheeled  -DP (r) MF (t) DP (c)       Row Name 04/09/24 1418          Gait/Stairs (Locomotion)    Gait/Stairs Locomotion gait/ambulation assistive device  -DP (r) MF (t) DP (c)     [place] Level (Gait) standby assist  -DP (r) MF (t) DP (c)     Assistive Device (Gait) walker, front-wheeled  -DP (r) MF (t) DP (c)     Patient was able to Ambulate yes  -DP (r) MF (t) DP (c)     Distance in Feet (Gait) 200  -DP (r) MF (t) DP (c)     Pattern (Gait) step-through  -DP (r) MF (t) DP (c)       Row Name 04/09/24 1418          Safety Issues, Functional Mobility    Safety Issues Affecting Function (Mobility) impulsivity;insight into deficits/self-awareness  -DP (r) MF (t) DP (c)     Impairments Affecting Function (Mobility) strength;cognition  -DP (r) MF (t) DP (c)     Cognitive Impairments, Mobility Safety/Performance impulsivity;insight into deficits/self-awareness  -DP (r) MF (t) DP (c)       Row Name 04/09/24 1418          Balance    Balance Assessment standing dynamic balance  -DP (r) MF (t) DP (c)     Dynamic Standing Balance standby assist  -DP (r) MF (t) DP (c)     Position/Device Used, Standing Balance walker, front-wheeled  -DP (r) MF (t) DP (c)       Row Name 04/09/24 1418          Plan of Care Review    Plan of Care Reviewed With patient  -DP (r) IVORY (t) DP (c)     Outcome Evaluation  Patient initially found asleep but was easy to wake. Once patient was awake, he was lethargic and confused. During treatment patient removed their pulse ox, nsg notified. Patient has strength deficits of HI LEs affecting ability to safely ambulate and transfer independently. Skilled therapy is needed at this time to address the above deficits.  -DP (r) MF (t) DP (c)       Row Name 04/09/24 1410          Positioning and Restraints    Pre-Treatment Position in bed  -DP (r) MF (t) DP (c)     Post Treatment Position bed  -DP (r) MF (t) DP (c)     In Bed supine;call light within reach;encouraged to call for assist;exit alarm on  -DP (r) MF (t) DP (c)       Row Name 04/09/24 2327          Therapy Assessment/Plan (PT)    Rehab Potential (PT) good, to achieve stated therapy goals  -DP (r) MF (t) DP (c)     Criteria for Skilled Interventions Met (PT) yes  -DP (r) MF (t) DP (c)     Therapy Frequency (PT) daily  -DP (r) MF (t) DP (c)     Predicted Duration of Therapy Intervention (PT) 10 days  -DP (r) MF (t) DP (c)     Problem List (PT) problems related to;strength;mobility;balance  -DP (r) MF (t) DP (c)     Activity Limitations Related to Problem List (PT) unable to ambulate safely;unable to transfer safely  -DP (r) MF (t) DP (c)       Row Name 04/09/24 1411          PT Evaluation Complexity    History, PT Evaluation Complexity 3 or more personal factors and/or comorbidities  -DP (r) MF (t) DP (c)     Examination of Body Systems (PT Eval Complexity) total of 4 or more elements  -DP (r) MF (t) DP (c)     Clinical Presentation (PT Evaluation Complexity) stable  -DP (r) MF (t) DP (c)     Clinical Decision Making (PT Evaluation Complexity) low complexity  -DP (r) MF (t) DP (c)     Overall Complexity (PT Evaluation Complexity) low complexity  -DP (r) MF (t)       Row Name 04/09/24 1410          Therapy Plan Review/Discharge Plan (PT)    Therapy Plan Review (PT) evaluation/treatment results reviewed  -DP (r) MF (t) DP (c)        Row Name 04/09/24 1418          Physical Therapy Goals    Transfer Goal Selection (PT) transfer, PT goal 1  -DP (r) MF (t) DP (c)     Gait Training Goal Selection (PT) gait training, PT goal 1  -DP (r) MF (t) DP (c)     Strength Goal Selection (PT) strength, PT goal 1  -DP (r) MF (t) DP (c)       Row Name 04/09/24 1418          Transfer Goal 1 (PT)    Activity/Assistive Device (Transfer Goal 1, PT) sit-to-stand/stand-to-sit  -DP (r) MF (t) DP (c)     [place] Level/Cues Needed (Transfer Goal 1, PT) independent  -DP (r) MF (t) DP (c)     Time Frame (Transfer Goal 1, PT) 10 days  -DP (r) MF (t) DP (c)     Strategies/Barriers (Transfers Goal 1, PT) No barriers identified  -DP (r) MF (t) DP (c)       Row Name 04/09/24 1418          Gait Training Goal 1 (PT)    Activity/Assistive Device (Gait Training Goal 1, PT) gait (walking locomotion)  -DP (r) MF (t) DP (c)     [place] Level (Gait Training Goal 1, PT) independent  -DP (r) MF (t) DP (c)     Distance (Gait Training Goal 1, PT) 200  -DP (r) MF (t) DP (c)     Time Frame (Gait Training Goal 1, PT) 10 days  -DP (r) MF (t) DP (c)     Strategies/Barriers (Gait Training Goal 1, PT) No barriers identified  -DP (r) MF (t) DP (c)       Row Name 04/09/24 1418          Strength Goal 1 (PT)    Strength Goal 1 (PT) Increase strength of HI LEs to MMT 4+/5  -DP (r) MF (t) DP (c)     Time Frame (Strength Goal 1, PT) 10 days  -DP (r) MF (t) DP (c)     Strategies/Barriers (Strength Goal 1, PT) No barriers identified  -DP (r) MF (t) DP (c)               User Key  (r) = Recorded By, (t) = Taken By, (c) = Cosigned By      Initials Name Provider Type    DP Og Myrick, PT Physical Therapist    Nam Shelton PT Student PT Student                    Physical Therapy Education       Title: PT OT SLP Therapies (Done)       Topic: Physical Therapy (Done)       Point: Mobility training (Done)       Learning Progress Summary             Patient Acceptance, E,TB, VU by IVORY at  4/9/2024 1438                         Point: Precautions (Done)       Learning Progress Summary             Patient Acceptance, E,TB, VU by  at 4/9/2024 1438                                         User Key       Initials Effective Dates Name Provider Type Discipline     04/01/24 -  Nam Monet, PT Student PT Student PT                  PT Recommendation and Plan  Anticipated Discharge Disposition (PT): home with home health  Planned Therapy Interventions (PT): strengthening, gait training, bed mobility training  Therapy Frequency (PT): daily  Plan of Care Reviewed With: patient  Outcome Evaluation: Patient initially found asleep but was easy to wake. Once patient was awake, he was lethargic and confused. During treatment patient removed their pulse ox, nsg notified. Patient has strength deficits of HI LEs affecting ability to safely ambulate and transfer independently. Skilled therapy is needed at this time to address the above deficits.   Outcome Measures       Row Name 04/09/24 1400             How much help from another person do you currently need...    Turning from your back to your side while in flat bed without using bedrails? 4  -DP (r) MF (t) DP (c)      Moving from lying on back to sitting on the side of a flat bed without bedrails? 4  -DP (r) MF (t) DP (c)      Moving to and from a bed to a chair (including a wheelchair)? 3  -DP (r) MF (t) DP (c)      Standing up from a chair using your arms (e.g., wheelchair, bedside chair)? 4  -DP (r) MF (t) DP (c)      Climbing 3-5 steps with a railing? 3  -DP (r) MF (t) DP (c)      To walk in hospital room? 3  -DP (r) MF (t) DP (c)      AM-PAC 6 Clicks Score (PT) 21  -DP (r) MF (t)      Highest Level of Mobility Goal 6 --> Walk 10 steps or more  -DP (r) MF (t)         Functional Assessment    Outcome Measure Options AM-PAC 6 Clicks Basic Mobility (PT)  -DP (r) MF (t) DP (c)                User Key  (r) = Recorded By, (t) = Taken By, (c) = Cosigned By       Initials Name Provider Type    DP Og Myrick, PT Physical Therapist    Nam Shelton, PT Student PT Student                     Time Calculation:    PT Charges       Row Name 24 1417             Time Calculation    PT Received On 24  -DP (r) MF (t) DP (c)      PT Goal Re-Cert Due Date 24  -DP (r) MF (t) DP (c)         Untimed Charges    PT Eval/Re-eval Minutes 30  -DP (r) MF (t) DP (c)         Total Minutes    Untimed Charges Total Minutes 30  -DP (r) MF (t)       Total Minutes 30  -DP (r) MF (t)                User Key  (r) = Recorded By, (t) = Taken By, (c) = Cosigned By      Initials Name Provider Type    DP Og Myrick, PT Physical Therapist    Nam Shelton, PT Student PT Student                  Therapy Charges for Today       Code Description Service Date Service Provider Modifiers Qty    32430507953 HC PT EVAL LOW COMPLEXITY 3 2024 Nam Monet, PT Student GP 1            PT G-Codes  Outcome Measure Options: AM-PAC 6 Clicks Basic Mobility (PT)  AM-PAC 6 Clicks Score (PT): 21    Nam Monet PT Student  2024      Electronically signed by Og Myrick PT at 24 1438          Occupational Therapy Notes (last 24 hours)        Paige Azevedo, FRANCOR/L, CSRS at 24 1525          Patient Name: Regulo Sepulveda  : 1965    MRN: [id number]                              Today's Date: 2024       Admit Date: 2024    Visit Dx:     ICD-10-CM ICD-9-CM   1. Altered mental status, unspecified altered mental status type  R41.82 780.97   2. Elevated troponin  R79.89 790.6   3. Schizophrenia, unspecified type  F20.9 295.90   4. Major depressive disorder, recurrent episode, moderate  F33.1 296.32   5. Dysphagia, unspecified type  R13.10 787.20   6. Difficulty in walking  R26.2 719.7   7. Decreased activities of daily living (ADL)  Z78.9 V49.89     Patient Active Problem List   Diagnosis    Left groin pain    Major depressive disorder, recurrent episode, moderate     Chronic obstructive pulmonary disease    Diabetes mellitus    Hypertensive disorder    Hyperlipidemia    Hepatitis C    Cigarette nicotine dependence    BPH (benign prostatic hyperplasia)    GERD (gastroesophageal reflux disease)    B12 deficiency    LV (left ventricular) mural thrombus    Schizophrenia    Acute on chronic combined systolic and diastolic CHF (congestive heart failure)    Acute on chronic heart failure with reduced ejection fraction and diastolic dysfunction    Acute on chronic congestive heart failure    PAF (paroxysmal atrial fibrillation)    CHF (congestive heart failure)    Acute on chronic HFrEF (heart failure with reduced ejection fraction)    Moderate malnutrition    Systolic heart failure    Acute exacerbation of CHF (congestive heart failure)    Cardiomyopathy    TIA (transient ischemic attack)     Past Medical History:   Diagnosis Date    Anxiety     Asthma     Bipolar affective     Cardiac tamponade     2023    CHF (congestive heart failure)     COPD (chronic obstructive pulmonary disease)     Coronary artery disease     Depression     Diabetes mellitus     Elevated cholesterol     Hypertension     Parkinson disease      Past Surgical History:   Procedure Laterality Date    CARDIAC CATHETERIZATION Right 9/17/2023    Procedure: Right and Left Heart Cath;  Surgeon: Ben Grant MD;  Location: [facility] CATH INVASIVE LOCATION;  Service: Cardiovascular;  Laterality: Right;    TESTICLE SURGERY Left     extraction      General Information       Row Name 04/09/24 1518          OT Time and Intention    Document Type evaluation  -ES     Mode of Treatment individual therapy;occupational therapy  -ES       Row Name 04/09/24 1511          General Information    Patient Profile Reviewed yes  -ES     Prior Level of Function independent:;ADL's;all household mobility;community mobility  Patient independent with B and IADLs at baseline. No device for functional mobility. Standing showering and grooming  completion. No home O2 use. Denies recent falls.  -ES     Existing Precautions/Restrictions no known precautions/restrictions  -ES     Barriers to Rehab none identified  -ES       Row Name 04/09/24 1518          Occupational Profile    Reason for Services/Referral (Occupational Profile) Patient is 58 yr old male admitted to [facility] on 4/8/2024 with reports of altered mental status, facial paralysis and RUE/LE weakness. Patient PMH significant for schizophrenia. OT evaluation and treatment ordered d/t recent decline in ADLs/transfer ability and discharge planning recommendations. No previous OT services for current condition.  -ES       Row Name 04/09/24 1518          Living Environment    People in Home alone  -ES       Row Name 04/09/24 1518          Home Main Entrance    Number of Stairs, Main Entrance five  -ES       Row Name 04/09/24 1518          Stairs Within Home, Primary    Number of Stairs, Within Home, Primary none  -ES       Row Name 04/09/24 1518          Cognition    Orientation Status (Cognition) oriented x 3  Patient cooperative, agreeable to therapy evaluation. Patient with garbled speech during evaluation, improves with decreased talking speed.  -ES               User Key  (r) = Recorded By, (t) = Taken By, (c) = Cosigned By      Initials Name Provider Type    ES Paige Azevedo, OTR/L, CSRS Occupational Therapist                     Mobility/ADL's       Row Name 04/09/24 1522          Bed Mobility    Bed Mobility supine-sit;sit-supine  -ES     Supine-Sit [place] (Bed Mobility) independent  -ES     Sit-Supine [place] (Bed Mobility) independent  -ES       Row Name 04/09/24 1522          Transfers    Transfers sit-stand transfer;stand-sit transfer  -ES       Row Name 04/09/24 1522          Sit-Stand Transfer    Sit-Stand [place] (Transfers) supervision  -ES     Assistive Device (Sit-Stand Transfers) other (see comments)  no AD  -ES       Row Name 04/09/24 1522           Stand-Sit Transfer    Stand-Sit [place] (Transfers) supervision  -ES     Assistive Device (Stand-Sit Transfers) other (see comments)  no AD  -ES       Row Name 04/09/24 1522          Functional Mobility    Functional Mobility- Ind. Level supervision required  -ES     Functional Mobility- Device other (see comments)  no AD  -ES       Row Name 04/09/24 1522          Activities of Daily Living    BADL Assessment/Intervention bathing;upper body dressing;lower body dressing;grooming;feeding;toileting  -ES       Row Name 04/09/24 1522          Bathing Assessment/Intervention    [place] Level (Bathing) bathing skills;independent  -ES       Row Name 04/09/24 1522          Upper Body Dressing Assessment/Training    [place] Level (Upper Body Dressing) upper body dressing skills;independent  -ES       Row Name 04/09/24 1522          Lower Body Dressing Assessment/Training    [place] Level (Lower Body Dressing) lower body dressing skills;independent  -ES       Row Name 04/09/24 1522          Grooming Assessment/Training    [place] Level (Grooming) grooming skills;independent  -ES       Row Name 04/09/24 1522          Self-Feeding Assessment/Training    [place] Level (Feeding) feeding skills;independent  -ES       Row Name 04/09/24 1522          Toileting Assessment/Training    [place] Level (Toileting) toileting skills;independent  -ES               User Key  (r) = Recorded By, (t) = Taken By, (c) = Cosigned By      Initials Name Provider Type    ES Paige Azevedo, OTR/L, CSRS Occupational Therapist                   Obj/Interventions       Row Name 04/09/24 1523          Sensory Assessment (Somatosensory)    Sensory Assessment (Somatosensory) sensation intact  -ES       Row Name 04/09/24 1523          Vision Assessment/Intervention    Visual Impairment/Limitations WFL  -ES     Vision Assessment Comment quick vision screen intact to all four visual quadrants. No visual deficits present at time  of assessment  -ES       Row Name 04/09/24 1523          Range of Motion Comprehensive    General Range of Motion bilateral upper extremity ROM WNL  -ES       Row Name 04/09/24 1523          Strength Comprehensive (MMT)    General Manual Muscle Testing (MMT) Assessment no strength deficits identified  -ES     Comment, General Manual Muscle Testing (MMT) Assessment BUEs 4/5  -ES       Row Name 04/09/24 1523          Motor Skills    Motor Skills functional endurance  -ES     Functional Endurance fair plus  -ES       Row Name 04/09/24 1523          Balance    Balance Assessment sitting dynamic balance;standing dynamic balance  -ES     Dynamic Sitting Balance independent  -ES     Position, Sitting Balance unsupported  -ES     Dynamic Standing Balance supervision  -ES     Position/Device Used, Standing Balance unsupported  -ES               User Key  (r) = Recorded By, (t) = Taken By, (c) = Cosigned By      Initials Name Provider Type    Paige Fan, OTR/L, CSRS Occupational Therapist                   Goals/Plan    No documentation.                  Clinical Impression       Row Name 04/09/24 1524          Plan of Care Review    Plan of Care Reviewed With patient  -ES     Outcome Evaluation Patient presents at or near baseline functional status at time of evaluation with no identified functional deficits that impede patient independence with activities of daily living.  No indicated need for skilled occupational therapy intervention in the acute care setting.  Occupational therapy will sign off at this time.  -ES       Row Name 04/09/24 1524          Therapy Assessment/Plan (OT)    Criteria for Skilled Therapeutic Interventions Met (OT) no problems identified which require skilled intervention  -ES     Therapy Frequency (OT) evaluation only  -ES       Row Name 04/09/24 1524          Therapy Plan Review/Discharge Plan (OT)    Anticipated Discharge Disposition (OT) home  -ES               User Key  (r) = Recorded By,  (t) = Taken By, (c) = Cosigned By      Initials Name Provider Type    ES Paige Azevedo, OTR/L, CSRS Occupational Therapist                   Outcome Measures       Row Name 04/09/24 1524          How much help from another is currently needed...    Putting on and taking off regular lower body clothing? 4  -ES     Bathing (including washing, rinsing, and drying) 4  -ES     Toileting (which includes using toilet bed pan or urinal) 4  -ES     Putting on and taking off regular upper body clothing 4  -ES     Taking care of personal grooming (such as brushing teeth) 4  -ES     Eating meals 4  -ES     AM-PAC 6 Clicks Score (OT) 24  -ES       Row Name 04/09/24 1400 04/09/24 0754       How much help from another person do you currently need...    Turning from your back to your side while in flat bed without using bedrails? 4  -DP (r) MF (t) DP (c) 4  -AR    Moving from lying on back to sitting on the side of a flat bed without bedrails? 4  -DP (r) MF (t) DP (c) 3  -AR    Moving to and from a bed to a chair (including a wheelchair)? 3  -DP (r) MF (t) DP (c) 3  -AR    Standing up from a chair using your arms (e.g., wheelchair, bedside chair)? 4  -DP (r) MF (t) DP (c) 3  -AR    Climbing 3-5 steps with a railing? 3  -DP (r) MF (t) DP (c) 3  -AR    To walk in hospital room? 3  -DP (r) MF (t) DP (c) 3  -AR    AM-PAC 6 Clicks Score (PT) 21  -DP (r) MF (t) 19  -AR    Highest Level of Mobility Goal 6 --> Walk 10 steps or more  -DP (r) MF (t) 6 --> Walk 10 steps or more  -AR      Row Name 04/09/24 1524 04/09/24 1400       Functional Assessment    Outcome Measure Options AM-PAC 6 Clicks Daily Activity (OT)  -ES AM-PAC 6 Clicks Basic Mobility (PT)  -DP (r) MF (t) DP (c)              User Key  (r) = Recorded By, (t) = Taken By, (c) = Cosigned By      Initials Name Provider Type    DP Og Myrick, PT Physical Therapist    Wendy Schultz, RN Registered Nurse    ES Paige Azevedo, OTR/L, CSRS Occupational Therapist    Nam Shelton,  PT Student PT Student                      OT Recommendation and Plan  Therapy Frequency (OT): evaluation only  Plan of Care Review  Plan of Care Reviewed With: patient  Outcome Evaluation: Patient presents at or near baseline functional status at time of evaluation with no identified functional deficits that impede patient independence with activities of daily living.  No indicated need for skilled occupational therapy intervention in the acute care setting.  Occupational therapy will sign off at this time.     Time Calculation:   Evaluation Complexity (OT)  Review Occupational Profile/Medical/Therapy History Complexity: brief/low complexity  Assessment, Occupational Performance/Identification of Deficit Complexity: 1-3 performance deficits  Clinical Decision Making Complexity (OT): problem focused assessment/low complexity  Overall Complexity of Evaluation (OT): low complexity     Time Calculation- OT       Row Name 04/09/24 1524             Time Calculation- OT    OT Received On 04/09/24  -ES         Untimed Charges    OT Eval/Re-eval Minutes 20  -ES         Total Minutes    Untimed Charges Total Minutes 20  -ES       Total Minutes 20  -ES                User Key  (r) = Recorded By, (t) = Taken By, (c) = Cosigned By      Initials Name Provider Type    ES Paige Azevedo OTR/L, CSRS Occupational Therapist                  Therapy Charges for Today       Code Description Service Date Service Provider Modifiers Qty    [id number]  OT EVAL LOW COMPLEXITY 2 4/9/2024 Paige Azevedo OTR/L, CSRS GO 1                 NANCY Gonzales/L, CSRS  4/9/2024    Electronically signed by Paige Azevedo OTR/L, CSRS at 04/09/24 1525       Paige Azevedo OTR/L, CSRS at 04/09/24 1524          Goal Outcome Evaluation:  Plan of Care Reviewed With: patient           Outcome Evaluation: Patient presents at or near baseline functional status at time of evaluation with no identified functional deficits that impede patient independence  with activities of daily living.  No indicated need for skilled occupational therapy intervention in the acute care setting.  Occupational therapy will sign off at this time.      Anticipated Discharge Disposition (OT): home                          Electronically signed by Paige Azevedo OTR/L, ELVA at 24 1524          Speech Language Pathology Notes (last 24 hours)        Evie Cintron MS-CCC/SLP, CNT at 24 0954          Acute Care - Speech Language Pathology   Swallow Initial Evaluation  [facility]     Patient Name: Regulo Sepulveda  : 1965  MRN: [id number]  Today's Date: 2024               Admit Date: 2024    Visit Dx:     ICD-10-CM ICD-9-CM   1. Altered mental status, unspecified altered mental status type  R41.82 780.97   2. Elevated troponin  R79.89 790.6   3. Schizophrenia, unspecified type  F20.9 295.90   4. Major depressive disorder, recurrent episode, moderate  F33.1 296.32   5. Dysphagia, unspecified type  R13.10 787.20     Patient Active Problem List   Diagnosis    Left groin pain    Major depressive disorder, recurrent episode, moderate    Chronic obstructive pulmonary disease    Diabetes mellitus    Hypertensive disorder    Hyperlipidemia    Hepatitis C    Cigarette nicotine dependence    BPH (benign prostatic hyperplasia)    GERD (gastroesophageal reflux disease)    B12 deficiency    LV (left ventricular) mural thrombus    Schizophrenia    Acute on chronic combined systolic and diastolic CHF (congestive heart failure)    Acute on chronic heart failure with reduced ejection fraction and diastolic dysfunction    Acute on chronic congestive heart failure    PAF (paroxysmal atrial fibrillation)    CHF (congestive heart failure)    Acute on chronic HFrEF (heart failure with reduced ejection fraction)    Moderate malnutrition    Systolic heart failure    Acute exacerbation of CHF (congestive heart failure)    Cardiomyopathy    TIA (transient ischemic attack)     Past Medical History:    Diagnosis Date    Anxiety     Asthma     Bipolar affective     Cardiac tamponade     2023    CHF (congestive heart failure)     COPD (chronic obstructive pulmonary disease)     Coronary artery disease     Depression     Diabetes mellitus     Elevated cholesterol     Hypertension     Parkinson disease      Past Surgical History:   Procedure Laterality Date    CARDIAC CATHETERIZATION Right 9/17/2023    Procedure: Right and Left Heart Cath;  Surgeon: Ben Grant MD;  Location: [facility] CATH INVASIVE LOCATION;  Service: Cardiovascular;  Laterality: Right;    TESTICLE SURGERY Left     extraction       SLP Recommendation and Plan          Inpatient Speech Pathology Dysphagia Evaluation        PAIN SCALE: None indicated    PRECAUTIONS/CONTRAINDICATIONS: Standard, fall    SUSPECTED ABUSE/NEGLECT/EXPLOITATION: None identified    SOCIAL/PSYCHOLOGICAL NEEDS/BARRIERS: History of major depressive disorder    PAST SOCIAL HISTORY: 58-year-old male, lives at home    PRIOR FUNCTION: Currently n.p.o., previously on a regular diet (per report)    PATIENT GOALS/EXPECTATIONS: Patient wants to eat and drink    HISTORY: 58-year-old male admitted to [facility] on 4/8/2024 secondary to TIA.  Reportedly patient with onset of right-sided weakness, facial droop, altered mental status.  MRI revealed some signal on diffusion in the left thalamic area that could relate to subacute ischemia.  Patient with improvement in mental status at time of this evaluation.  Is currently NPO.    CURRENT DIET LEVEL: N.p.o.    OBJECTIVE:    TEST ADMINISTERED: Clinical dysphagia evaluation    COGNITION/SAFETY AWARENESS: Not thoroughly evaluated    BEHAVIORAL OBSERVATIONS: Awake, slight delay in responses, oriented to self and environment    ORAL MOTOR EXAM: Edentulous.  Lingual protrusion with slight left-sided deviation.  Lingual strength range of motion left 4+/5.  Lingual strength range of motion right 4/5.  Palatal elevation symmetrical.  No  facial asymmetry observed.    VOICE QUALITY: Mildly coarse    REFLEX EXAM: Nonproductive    POSTURE: Sitting fully upright in bed    FEEDING/SWALLOWING FUNCTION: Assessed with thin liquids, nectar thick liquids, purée solids and regular solids    CLINICAL OBSERVATIONS: Nectar thick and thin liquids by spoon, cup and straw.  Swallows completed within a timely manner.  Vocal quality clear and laryngeal sounds clear with cervical auscultation.  Purée solids with swallow completed.  Regular solids with extended chewing.  This is likely due to lack of dentition.  Swallows eventually completed with diffuse buccal residue.  Liquid wash assist to clear.  Laryngeal elevation on palpation.  No overt signs or symptoms of aspiration observed at bedside however cannot rule out silent aspiration.    DYSPHAGIA CRITERIA: N/A    FUNCTIONAL ASSESSMENT INSTRUMENT: Patient currently scored a level 7 of 7 on Functional Communication Measures for swallowing indicating a 0% limitation in function.    ASSESSMENT/ PLAN OF CARE:  At bedside, patient swallow function appears adequate for tolerance of regular soft solids, thin liquids.  At this time patient does not require any further direct speech pathology services regarding dysphagia.  If patient should demonstrate any decline in status, please reconsult speech pathology.    PROBLEMS:  1.  na   LTG 1: na   STG 1a: na     RECOMMENDATIONS:   1.   DIET: Mechanical soft solids, thin liquids    2.  POSITION: Fully upright for all p.o. intake, 30 minutes following    3.  COMPENSATORY STRATEGIES: Small bites and sips, meds whole    Pt/responsible party agrees with plan of care and has been informed of all alternatives, risks and benefits.                    Anticipated Discharge Disposition (SLP): No further SLP services warranted (04/09/24 4619)                                                               EDUCATION  The patient has been educated in the following areas:   Dysphagia (Swallowing  Impairment).              Time Calculation:    Time Calculation- SLP       Row Name 04/09/24 0953             Time Calculation- SLP    SLP Start Time 0850  -SN      SLP Stop Time 0950  -SN      SLP Time Calculation (min) 60 min  -SN      SLP Received On 04/09/24  -SN         Untimed Charges    66095-QL Eval Oral Pharyng Swallow Minutes 60  -SN         Total Minutes    Untimed Charges Total Minutes 60  -SN       Total Minutes 60  -SN                User Key  (r) = Recorded By, (t) = Taken By, (c) = Cosigned By      Initials Name Provider Type    Evie Sullivan MS-CCC/SLP, ALYSIA Speech and Language Pathologist                    Therapy Charges for Today       Code Description Service Date Service Provider Modifiers Qty    [id number] HC ST EVAL ORAL PHARYNG SWALLOW 4 4/9/2024 Evie Cintron MS-CCC/SLP, CNT GN 1                 OSMAR Ivory/SLP, CNT  4/9/2024    Electronically signed by Evie Cintron MS-CCC/SLP, CNT at 04/09/24 0959

## 2024-04-09 NOTE — OUTREACH NOTE
AMBULATORY CASE MANAGEMENT NOTE    Name and Relationship of Patient/Support Person: Regulo Sepulveda - Self    Patient Outreach    Call placed to patient sister Aurora. Phone didn't ring, unable to leave a . Called patient cell, phone rang and went to , however VM hasn't been set up. Unable to leave message.     Patient has been admitted to [facility] per [facility] notes. Will try to call next week.     Education Documentation  No documentation found.        Alesha HERBERT  Ambulatory Case Management    4/9/2024, 12:07 EDT

## 2024-04-09 NOTE — PROGRESS NOTES
"[facility] TeleNeurology Progress Note    Date of Service 4/9/2024      Presentation:    Based on previous neurology note(s)   : \" 58 year old Male.     Patient was brought by EMS for symptoms of R sided weakness.  LKW 45 minutes ago with altered mental status and R sided weakness. Pt is not speaking or responding or interacting at all. Pt is on Eliquis, indication likely a mural thrombus in the LV. Pt is intermittently able to answer questions and follow commands. He is generally weak but much worse on the R side, unclear if it involves the face.      initial NIHSS 10”      Interval history:    valproic acid level very low    4/9/2024: nursing does not report any significant new events overnight.     Impression:    Atrial Fibrillation    altered mental status and right sided weakness, possible left thalamic stroke     Recommendations:    1- Risk factor management If stroke is confirmed:  Statin for Goal LDL<70, primary team to order    Goal HbA1c<7;     Blood pressure management:  During the first 72 hours after stroke onset If BP greater than 220/120 give IV Labetalol or Enalapril; then after 72 hours from stroke onset should slowly and gradually lower Blood Pressure to <130/80 over the course of one week (if no cardiovascular contraindication or any critical/severe intra or extracranial arterial stenosis, and if it does not elicit or exacerbate symptoms).  avoid hypotension and rapid decrease in Blood Pressure.      2- Work up and Results:   Brain MRI: Some signal on diffusion in the left thalamic area that could relate to subacute ischemia to this area.  Head and neck Vascular imaging: CTA Head and Neck unremarkable  ECHO: may not , deferring to primary team   LDL and HbA1c: 55/6.4    EEG pending     3- Antithrombotic regimen:  A- hold anticoagulation for now, obtain Noncontrast head CT 4/11/24 and if no Hemorrhagic Transformation then should resume anticoagulation, Once/If anticoagulation " is started then can stop antiplatelet therapy (unless there is a definite cardiovascular indication for concurrent use of Antiplatelet therapy and anticoagulation)    4- Nursing recommendations:  IV Fluids, avoid dextrose containing fluids  Maintain euglycemia  Neuro checks every 4 hours for 24 hours and then per shift  Head of bed 30 degrees    5- Life Style modifications for stroke prevention should be followed:  Diet. Mediterranean diet rich in fruits, vegetables, whole grains, fish, legumes, plant-based oils preferred over animal fats. Reduce sodium intake as directed by primary care physician.  Exercise. Aim for 150-minutes of moderate to intense exercise per week in sessions of at least 10 minutes duration as tolerated.  Tobacco. Tobacco cessation with counseling and/or pharmacologic management  Alcohol. Reduce alcohol consumption as directed by primary care physician.  Hormone therapy. Avoid estrogen and testosterone containing medications  Sleep. Evaluation and treatment of sleep apnea  Return precautions. Seek emergency medical attention if you cannot see, speak, move or feel as you do normally for any abnormal length of time as these may be signs of a stroke      6- indication for his valproic acid is unclear to me (? psych vs seizures) level on admission was very low indicating noncompliance, he was also started on keppra empirically this admission, depending on EEG and additional history (once available) will make decision on keppra.     Physical Therapy/Occupational Therapy/Speech Therapy/ Rehab (if/when) eligible  Continue with Telemetry  DVT prophylaxis (choice of primary team)    [facility] Neurologist will follow up with results.  Please contact [facility] Navigator to reach me if further questions/concerns arise.    Examination:    awake, alert, Oriented x3  speech no aphasia  Extraocular movements intact  face symmetric  very subtle drift right arm       Patient / Family was informed the  Neurology Consult would occur via TeleHealth consult by way of interactive audio and video telecommunications and consented to receiving care in this manner.     Patient is being evaluated for possible acute neurologic impairment and high probability of imminent or life - threatening deterioration. I spent total of 14 minutes providing care to this patient, including time for face to face visit via telemedicine, review of medical records, imaging studies and discussion of findings with providers, the patient and / or family.        Dr Vahid Behravan        [facility]  For Inpatient follow-up with [facility] physician please call [facility] [phone]. This is not an outpatient service. Post hospital discharge, please contact hospital directly.     Please do not communicate with [facility] physicians via secure chat. If you have any questions, Please contact [facility].  Please call or reconsult our service if there are any clinical or diagnostic changes.

## 2024-04-09 NOTE — THERAPY EVALUATION
Acute Care - Speech Language Pathology   Swallow Initial Evaluation  [facility]     Patient Name: Regulo Sepulveda  : 1965  MRN: [id number]  Today's Date: 2024               Admit Date: 2024    Visit Dx:     ICD-10-CM ICD-9-CM   1. Altered mental status, unspecified altered mental status type  R41.82 780.97   2. Elevated troponin  R79.89 790.6   3. Schizophrenia, unspecified type  F20.9 295.90   4. Major depressive disorder, recurrent episode, moderate  F33.1 296.32   5. Dysphagia, unspecified type  R13.10 787.20     Patient Active Problem List   Diagnosis    Left groin pain    Major depressive disorder, recurrent episode, moderate    Chronic obstructive pulmonary disease    Diabetes mellitus    Hypertensive disorder    Hyperlipidemia    Hepatitis C    Cigarette nicotine dependence    BPH (benign prostatic hyperplasia)    GERD (gastroesophageal reflux disease)    B12 deficiency    LV (left ventricular) mural thrombus    Schizophrenia    Acute on chronic combined systolic and diastolic CHF (congestive heart failure)    Acute on chronic heart failure with reduced ejection fraction and diastolic dysfunction    Acute on chronic congestive heart failure    PAF (paroxysmal atrial fibrillation)    CHF (congestive heart failure)    Acute on chronic HFrEF (heart failure with reduced ejection fraction)    Moderate malnutrition    Systolic heart failure    Acute exacerbation of CHF (congestive heart failure)    Cardiomyopathy    TIA (transient ischemic attack)     Past Medical History:   Diagnosis Date    Anxiety     Asthma     Bipolar affective     Cardiac tamponade         CHF (congestive heart failure)     COPD (chronic obstructive pulmonary disease)     Coronary artery disease     Depression     Diabetes mellitus     Elevated cholesterol     Hypertension     Parkinson disease      Past Surgical History:   Procedure Laterality Date    CARDIAC CATHETERIZATION Right 2023    Procedure: Right and Left Heart  Cath;  Surgeon: Ben Grant MD;  Location: [facility] INVASIVE LOCATION;  Service: Cardiovascular;  Laterality: Right;    TESTICLE SURGERY Left     extraction       SLP Recommendation and Plan          Inpatient Speech Pathology Dysphagia Evaluation        PAIN SCALE: None indicated    PRECAUTIONS/CONTRAINDICATIONS: Standard, fall    SUSPECTED ABUSE/NEGLECT/EXPLOITATION: None identified    SOCIAL/PSYCHOLOGICAL NEEDS/BARRIERS: History of major depressive disorder    PAST SOCIAL HISTORY: 58-year-old male, lives at home    PRIOR FUNCTION: Currently n.p.o., previously on a regular diet (per report)    PATIENT GOALS/EXPECTATIONS: Patient wants to eat and drink    HISTORY: 58-year-old male admitted to [facility] on 4/8/2024 secondary to TIA.  Reportedly patient with onset of right-sided weakness, facial droop, altered mental status.  MRI revealed some signal on diffusion in the left thalamic area that could relate to subacute ischemia.  Patient with improvement in mental status at time of this evaluation.  Is currently NPO.    CURRENT DIET LEVEL: N.p.o.    OBJECTIVE:    TEST ADMINISTERED: Clinical dysphagia evaluation    COGNITION/SAFETY AWARENESS: Not thoroughly evaluated    BEHAVIORAL OBSERVATIONS: Awake, slight delay in responses, oriented to self and environment    ORAL MOTOR EXAM: Edentulous.  Lingual protrusion with slight left-sided deviation.  Lingual strength range of motion left 4+/5.  Lingual strength range of motion right 4/5.  Palatal elevation symmetrical.  No facial asymmetry observed.    VOICE QUALITY: Mildly coarse    REFLEX EXAM: Nonproductive    POSTURE: Sitting fully upright in bed    FEEDING/SWALLOWING FUNCTION: Assessed with thin liquids, nectar thick liquids, purée solids and regular solids    CLINICAL OBSERVATIONS: Nectar thick and thin liquids by spoon, cup and straw.  Swallows completed within a timely manner.  Vocal quality clear and laryngeal sounds clear with cervical  auscultation.  Purée solids with swallow completed.  Regular solids with extended chewing.  This is likely due to lack of dentition.  Swallows eventually completed with diffuse buccal residue.  Liquid wash assist to clear.  Laryngeal elevation on palpation.  No overt signs or symptoms of aspiration observed at bedside however cannot rule out silent aspiration.    DYSPHAGIA CRITERIA: N/A    FUNCTIONAL ASSESSMENT INSTRUMENT: Patient currently scored a level 7 of 7 on Functional Communication Measures for swallowing indicating a 0% limitation in function.    ASSESSMENT/ PLAN OF CARE:  At bedside, patient swallow function appears adequate for tolerance of regular soft solids, thin liquids.  At this time patient does not require any further direct speech pathology services regarding dysphagia.  If patient should demonstrate any decline in status, please reconsult speech pathology.    PROBLEMS:  1.  na   LTG 1: na   STG 1a: na     RECOMMENDATIONS:   1.   DIET: Mechanical soft solids, thin liquids    2.  POSITION: Fully upright for all p.o. intake, 30 minutes following    3.  COMPENSATORY STRATEGIES: Small bites and sips, meds whole    Pt/responsible party agrees with plan of care and has been informed of all alternatives, risks and benefits.                    Anticipated Discharge Disposition (SLP): No further SLP services warranted (04/09/24 0953)                                                               EDUCATION  The patient has been educated in the following areas:   Dysphagia (Swallowing Impairment).              Time Calculation:    Time Calculation- SLP       Row Name 04/09/24 0953             Time Calculation- SLP    SLP Start Time 0850  -SN      SLP Stop Time 0950  -SN      SLP Time Calculation (min) 60 min  -SN      SLP Received On 04/09/24  -SN         Untimed Charges    42472-JG Eval Oral Pharyng Swallow Minutes 60  -SN         Total Minutes    Untimed Charges Total Minutes 60  -SN       Total Minutes 60   -                User Key  (r) = Recorded By, (t) = Taken By, (c) = Cosigned By      Initials Name Provider Type    Evie Sullivan MS-CCC/SLP, ALYSIA Speech and Language Pathologist                    Therapy Charges for Today       Code Description Service Date Service Provider Modifiers Qty    [id number]  ST EVAL ORAL PHARYNG SWALLOW 4 4/9/2024 Evie Cintron MS-CCC/SLP, ALYSIA GN 1                 OSMAR Ivory/SLP, ALYSIA  4/9/2024

## 2024-04-09 NOTE — PROGRESS NOTES
[facility]   Hospitalist Progress Note    Date of admission: 4/8/2024  Patient Name: Regulo Sepulveda  1965  Date: 4/9/2024      Subjective     Chief Complaint   Patient presents with    Stroke       Interval Followup: tired, desnies numb/tingling, focal symptoms.  Notes chronic baseline tremor.  Denies seizure activity      Objective     Vitals:   Temp:  [97.3 °F (36.3 °C)-98.6 °F (37 °C)] 98.1 °F (36.7 °C)  Heart Rate:  [64-83] 64  Resp:  [16-23] 18  BP: (104-153)/() 104/62    Physical Exam  Tired frail appearing in bed   Exam somewhat limited by effort but appears eomi, no facial droop, strength 5/5 and symmetric in extremities, sensation intact, answers basic questions ok does require some prompting   Ctab   Ir/ir, nl rate  Abd soft      Result Review:  Vital signs, labs and recent relevant imaging reviewed.        albuterol sulfate HFA    sodium chloride    sodium chloride    sodium chloride    amiodarone, 200 mg, Oral, BID  apixaban, 5 mg, Oral, BID  ARIPiprazole ER, 300 mg, Intramuscular, Q30 Days  atorvastatin, 40 mg, Oral, Nightly  carvedilol, 6.25 mg, Oral, BID  divalproex, 250 mg, Oral, Daily  empagliflozin, 10 mg, Oral, Daily  furosemide, 40 mg, Oral, BID  levETIRAcetam, 1,000 mg, Intravenous, Q12H  lisinopril, 40 mg, Oral, Q24H  potassium chloride, 20 mEq, Oral, BID With Meals  sodium chloride, 10 mL, Intravenous, Q12H  tamsulosin, 0.4 mg, Oral, Daily  venlafaxine XR, 75 mg, Oral, Daily        MRI Brain Without Contrast    Result Date: 4/9/2024  Impression: 1.  Some signal on diffusion in the left thalamic area that could relate to subacute ischemia to this area. 2.  There are scattered signal change involving the cerebral hemispheres which could reflect more chronic small vessel ischemic change. 3.  Probable small mucous retention cyst or polyp left frontal sinus 4.  Evidence for some cerebral atrophy.    Electronically Signed By-Rodriguez Milligan MD On:4/9/2024 8:36 AM      CT Angiogram Head w  AI Analysis of LVO    Result Date: 4/8/2024  1.  No evidence for arterial occlusion or thrombosis throughout the arteries of the head or neck. 2.  No evidence for focal aneurysm. 3.  No evidence for arterial dissection throughout the arteries of the neck. 4.  No evidence for acute abnormality throughout the head or neck.    Electronically Signed ByMichael Lovelace MD On:4/8/2024 8:40 PM      CT Angiogram Neck    Result Date: 4/8/2024  1.  No evidence for arterial occlusion or thrombosis throughout the arteries of the head or neck. 2.  No evidence for focal aneurysm. 3.  No evidence for arterial dissection throughout the arteries of the neck. 4.  No evidence for acute abnormality throughout the head or neck.    Electronically Signed ByMichael Lovelace MD On:4/8/2024 8:40 PM      XR Chest 1 View    Result Date: 4/8/2024  No evidence for acute cardiopulmonary process.   Electronically Signed ByMichael Lovelace MD On:4/8/2024 8:17 PM      CT CEREBRAL PERFUSION WITH & WITHOUT CONTRAST    Result Date: 4/8/2024  1. No focal area of decreased cerebral blood flow (CBF) is seen to suggest an acute infarct in a large vessel territory.  No defects are seen to suggest a core infarct or an area of reversible ischemia.    Electronically Signed ByMichael Lovelace MD On:4/8/2024 8:13 PM      CT Head Without Contrast Stroke Protocol    Result Date: 4/8/2024  1.  No evidence for acute intracranial abnormality. 2.  Diffuse nonspecific white matter changes are noted with associated diffuse volume loss. These findings are likely related to chronic small vessel ischemic changes and/or age-related changes. 3.  Evidence for remote infarction involving the region of the left basal ganglia.  Electronically Signed ByMichael Lovelace MD On:4/8/2024 7:54 PM      CT Chest With Contrast Diagnostic    Result Date: 4/3/2024  Impression:  1. No pulmonary emboli identified.  2. Cardiomegaly.  3. Moderate right pleural effusion. Adjacent airspace  consolidation is likely atelectasis. Pneumonia is also possible.    Electronically Signed By-TOMAS ARMAS MD On:4/3/2024 3:51 PM      XR Chest 1 View    Result Date: 4/2/2024  IMPRESSION : Cardiomegaly  No focal consolidation [  Electronically Signed By-Alireza Noyola On:4/2/2024 1:44 PM       Assessment / Plan     Summary: 58-year-old male with history of substance abuse, CAD and PCI, HFrEF secondary to dilated nonischemic cardiomyopathy, schizophrenia, history of apical thrombus on Eliquis who presented with altered mental status and family noting right-sided flaccid paralysis and decreased responsiveness initially.  Admitted for stroke evaluation and seizure workup.  Teleneurology assisting    Assessment/Plan (clinically significant if listed here)  Question TIA versus seizure with right upper extremity and lower extremity weakness  CAD and hx of PCI  HFrEF secondary to dilated nonischemic cardiomyopathy  Schizophrenia  History of apical thrombus on Eliquis  Hx of substance abuse  DM2, A1c 6.3  Suspect medication noncompliance  BPH  Hypertension  Hypomagnesemia   Hypokalemia  ?  History of Parkinson's vs essential tremor    Continue neurochecks and monitoring, neurology consulted recs reviewed  MRI brain findings as noted above, remote infarct of the left basal ganglia   question home medication adherence,   Ultimately will need to resume apixaban (last fill was February for 1 month with no other refills noted), ditto for depakote - VPA level low, patient only has 1x 30d supply of this from 2/23/2024 and none since.  F/u eeg, seizure precautions, cont keppra for now, may need to adjust regimen   Holding home Apixaban. Cont amiodarone, Coreg monitor rate control  Doesn't appears to be on aspirin outpt   Per neurology - hold anticoagulation for now, obtain Noncontrast head CT 4/11/24 and if no Hemorrhagic Transformation then should resume anticoagulation, Once/If anticoagulation is started then can stop  antiplatelet therapy (unless there is a definite cardiovascular indication for concurrent use of Antiplatelet therapy and anticoagulation)  Holding home lisinopril and monitor blood pressure (also appears hasn't had this filled recently, monitor bp and reassess in am, may need reduced dose)   Resume lasix, scheduled potassium (Likely needs potassium added to home regimen with his Lasix), replace and monitoring  Recent discharge for chf exacerbation and nsvt episodes. Had a pleural efusion at that time.  Has outpt f/u with cardiology/dr berry for aicd evaluation.  Had amphetamine use last admission, tox negative here.    Still needs cards f/u after dc.    Cont on statin, ldl 55   A1c 6.3, cont on jardiance (takes farxiga outpt for hfref primarily), blood glucose adequate, no additional tx needed at this time, cont to monitor  Cont effexor, home IM abilify [id number] dose due today, will give home dose family brought in   Flomax, monitor for retention   PT/OT  Check a.m. CBC, BMP, magnesium, phosphorus  Continue hospitalization at current level of care      Patient needed no refills of medications needed suspect that he notes that this is contributing      DVT prophylaxis:  Medical and mechanical DVT prophylaxis orders are present.        Code Status (Patient has no pulse and is not breathing): CPR (Attempt to Resuscitate)  Medical Interventions (Patient has pulse or is breathing): Full Support      Greater than 50 minutes on this encounter including review of labs, imaging, documentation, orders, vitals, monitoring and adjusting treatment and orders as indicated, discussion with the patient, and documenting.     CBC          4/6/2024    04:05 4/8/2024    19:22 4/9/2024    04:30   CBC   WBC 8.07  7.82  6.73    RBC 5.00  4.87  4.81    Hemoglobin 13.5  13.3  13.2    Hematocrit 44.0  42.8  42.0    MCV 88.0  87.9  87.3    MCH 27.0  27.3  27.4    MCHC 30.7  31.1  31.4    RDW 15.1  15.2  15.1    Platelets 368  378  368         CMP          4/6/2024    04:05 4/8/2024    19:22 4/9/2024    04:30   CMP   Glucose 119  93  87    BUN 27  27  22    Creatinine 1.08  1.28  1.10    EGFR 79.5  64.9  77.8    Sodium 139  139  137    Potassium 4.1  3.2  3.2    Chloride 101  97  97    Calcium 9.1  9.1  9.0    Total Protein 6.7  6.6     Albumin 3.4  3.8     Globulin 3.3  2.8     Total Bilirubin 0.6  0.5     Alkaline Phosphatase 251  226     AST (SGOT) 18  23     ALT (SGPT) 29  25     Albumin/Globulin Ratio 1.0  1.4     BUN/Creatinine Ratio 25.0  21.1  20.0    Anion Gap 8.0  11.3  10.7

## 2024-04-09 NOTE — ED TRIAGE NOTES
Stated Reason for Visit Pt presents to ED via EMS with LKW around 1825 per pt's family. Pt normally A&O x4, pt unresponsive at this moment. Per family, pt has been clean from methamphetamine for 15 days. . Pt takes Eliqui

## 2024-04-10 VITALS
WEIGHT: 151.24 LBS | RESPIRATION RATE: 18 BRPM | TEMPERATURE: 97.4 F | SYSTOLIC BLOOD PRESSURE: 125 MMHG | HEIGHT: 77 IN | HEART RATE: 71 BPM | BODY MASS INDEX: 17.86 KG/M2 | DIASTOLIC BLOOD PRESSURE: 90 MMHG | OXYGEN SATURATION: 99 %

## 2024-04-10 LAB
ANION GAP SERPL CALCULATED.3IONS-SCNC: 9.8 MMOL/L (ref 5–15)
BASOPHILS # BLD AUTO: 0.09 10*3/MM3 (ref 0–0.2)
BASOPHILS NFR BLD AUTO: 1.2 % (ref 0–1.5)
BUN SERPL-MCNC: 19 MG/DL (ref 6–20)
BUN/CREAT SERPL: 17.8 (ref 7–25)
CALCIUM SPEC-SCNC: 8.6 MG/DL (ref 8.6–10.5)
CHLORIDE SERPL-SCNC: 103 MMOL/L (ref 98–107)
CO2 SERPL-SCNC: 27.2 MMOL/L (ref 22–29)
CREAT SERPL-MCNC: 1.07 MG/DL (ref 0.76–1.27)
DEPRECATED RDW RBC AUTO: 49.4 FL (ref 37–54)
EGFRCR SERPLBLD CKD-EPI 2021: 80.4 ML/MIN/1.73
EOSINOPHIL # BLD AUTO: 0.21 10*3/MM3 (ref 0–0.4)
EOSINOPHIL NFR BLD AUTO: 2.9 % (ref 0.3–6.2)
ERYTHROCYTE [DISTWIDTH] IN BLOOD BY AUTOMATED COUNT: 15 % (ref 12.3–15.4)
GLUCOSE BLDC GLUCOMTR-MCNC: 134 MG/DL (ref 70–99)
GLUCOSE SERPL-MCNC: 103 MG/DL (ref 65–99)
HCT VFR BLD AUTO: 43.3 % (ref 37.5–51)
HGB BLD-MCNC: 13 G/DL (ref 13–17.7)
IMM GRANULOCYTES # BLD AUTO: 0.02 10*3/MM3 (ref 0–0.05)
IMM GRANULOCYTES NFR BLD AUTO: 0.3 % (ref 0–0.5)
LYMPHOCYTES # BLD AUTO: 2.07 10*3/MM3 (ref 0.7–3.1)
LYMPHOCYTES NFR BLD AUTO: 28.2 % (ref 19.6–45.3)
MAGNESIUM SERPL-MCNC: 2.1 MG/DL (ref 1.6–2.6)
MCH RBC QN AUTO: 27.1 PG (ref 26.6–33)
MCHC RBC AUTO-ENTMCNC: 30 G/DL (ref 31.5–35.7)
MCV RBC AUTO: 90.2 FL (ref 79–97)
MONOCYTES # BLD AUTO: 0.77 10*3/MM3 (ref 0.1–0.9)
MONOCYTES NFR BLD AUTO: 10.5 % (ref 5–12)
NEUTROPHILS NFR BLD AUTO: 4.19 10*3/MM3 (ref 1.7–7)
NEUTROPHILS NFR BLD AUTO: 56.9 % (ref 42.7–76)
NRBC BLD AUTO-RTO: 0 /100 WBC (ref 0–0.2)
PLATELET # BLD AUTO: 328 10*3/MM3 (ref 140–450)
PMV BLD AUTO: 9.8 FL (ref 6–12)
POTASSIUM SERPL-SCNC: 3.7 MMOL/L (ref 3.5–5.2)
RBC # BLD AUTO: 4.8 10*6/MM3 (ref 4.14–5.8)
SODIUM SERPL-SCNC: 140 MMOL/L (ref 136–145)
WBC NRBC COR # BLD AUTO: 7.35 10*3/MM3 (ref 3.4–10.8)

## 2024-04-10 PROCEDURE — 94799 UNLISTED PULMONARY SVC/PX: CPT

## 2024-04-10 PROCEDURE — 99231 SBSQ HOSP IP/OBS SF/LOW 25: CPT | Performed by: PSYCHIATRY & NEUROLOGY

## 2024-04-10 PROCEDURE — 85025 COMPLETE CBC W/AUTO DIFF WBC: CPT | Performed by: STUDENT IN AN ORGANIZED HEALTH CARE EDUCATION/TRAINING PROGRAM

## 2024-04-10 PROCEDURE — 80048 BASIC METABOLIC PNL TOTAL CA: CPT | Performed by: STUDENT IN AN ORGANIZED HEALTH CARE EDUCATION/TRAINING PROGRAM

## 2024-04-10 PROCEDURE — 94761 N-INVAS EAR/PLS OXIMETRY MLT: CPT

## 2024-04-10 PROCEDURE — 83735 ASSAY OF MAGNESIUM: CPT | Performed by: STUDENT IN AN ORGANIZED HEALTH CARE EDUCATION/TRAINING PROGRAM

## 2024-04-10 PROCEDURE — 99233 SBSQ HOSP IP/OBS HIGH 50: CPT | Performed by: INTERNAL MEDICINE

## 2024-04-10 RX ADMIN — Medication 10 ML: at 09:56

## 2024-04-10 RX ADMIN — POTASSIUM CHLORIDE 20 MEQ: 750 CAPSULE, EXTENDED RELEASE ORAL at 09:55

## 2024-04-10 RX ADMIN — TAMSULOSIN HYDROCHLORIDE 0.4 MG: 0.4 CAPSULE ORAL at 09:55

## 2024-04-10 RX ADMIN — EMPAGLIFLOZIN 10 MG: 10 TABLET, FILM COATED ORAL at 09:55

## 2024-04-10 RX ADMIN — FUROSEMIDE 40 MG: 40 TABLET ORAL at 09:55

## 2024-04-10 RX ADMIN — DIVALPROEX SODIUM 250 MG: 250 TABLET, DELAYED RELEASE ORAL at 09:56

## 2024-04-10 RX ADMIN — CARVEDILOL 6.25 MG: 6.25 TABLET, FILM COATED ORAL at 09:55

## 2024-04-10 RX ADMIN — VENLAFAXINE HYDROCHLORIDE 75 MG: 75 CAPSULE, EXTENDED RELEASE ORAL at 09:56

## 2024-04-10 RX ADMIN — AMIODARONE HYDROCHLORIDE 200 MG: 200 TABLET ORAL at 09:55

## 2024-04-10 NOTE — PROGRESS NOTES
"[facility] Progress Note    Date of Service 4/10/2024      Presentation:    Based on previous neurology note(s)   : \" 58 year old Male.     Patient was brought by EMS for symptoms of R sided weakness.  LKW 45 minutes ago with altered mental status and R sided weakness. Pt is not speaking or responding or interacting at all. Pt is on Eliquis, indication likely a mural thrombus in the LV. Pt is intermittently able to answer questions and follow commands. He is generally weak but much worse on the R side, unclear if it involves the face.        initial NIHSS 10”        Interval history:     valproic acid level very low     4/9/2024: nursing does not report any significant new events overnight.     4/10: nursing does not report any significant new events overnight.      Impression:     Atrial Fibrillation     altered mental status and right sided weakness, possible left thalamic stroke      Recommendations:     1- Risk factor management If stroke is confirmed:  Statin for Goal LDL<70, primary team to order     Goal HbA1c<7;      Blood pressure management:  During the first 72 hours after stroke onset If BP greater than 220/120 give IV Labetalol or Enalapril; then after 72 hours from stroke onset should slowly and gradually lower Blood Pressure to <130/80 over the course of one week (if no cardiovascular contraindication or any critical/severe intra or extracranial arterial stenosis, and if it does not elicit or exacerbate symptoms).  avoid hypotension and rapid decrease in Blood Pressure.        2- Work up and Results:   Brain MRI: Some signal on diffusion in the left thalamic area that could relate to subacute ischemia to this area.  Head and neck Vascular imaging: CTA Head and Neck unremarkable  ECHO: may not , deferring to primary team   LDL and HbA1c: 55/6.4     EEG No focal abnormalities, persistent asymmetries or epileptiform discharges, or electroencephalographic seizures are " identified.      3- Antithrombotic regimen:  A- hold anticoagulation for now, obtain Noncontrast head CT 4/11/24 and if no Hemorrhagic Transformation then should resume anticoagulation, Once/If anticoagulation is started then can stop antiplatelet therapy (unless there is a definite cardiovascular indication for concurrent use of Antiplatelet therapy and anticoagulation)     4- Nursing recommendations:  IV Fluids, avoid dextrose containing fluids  Maintain euglycemia  Neuro checks every 4 hours for 24 hours and then per shift  Head of bed 30 degrees     5- Life Style modifications for stroke prevention should be followed:  Diet. Mediterranean diet rich in fruits, vegetables, whole grains, fish, legumes, plant-based oils preferred over animal fats. Reduce sodium intake as directed by primary care physician.  Exercise. Aim for 150-minutes of moderate to intense exercise per week in sessions of at least 10 minutes duration as tolerated.  Tobacco. Tobacco cessation with counseling and/or pharmacologic management  Alcohol. Reduce alcohol consumption as directed by primary care physician.  Hormone therapy. Avoid estrogen and testosterone containing medications  Sleep. Evaluation and treatment of sleep apnea  Return precautions. Seek emergency medical attention if you cannot see, speak, move or feel as you do normally for any abnormal length of time as these may be signs of a stroke        6- indication for his valproic acid is unclear to me (? psych vs seizures) level on admission was very low indicating noncompliance, he was also started on keppra empirically this admission, but given negative EEG and low suspicion for seizure I will stop Keppra 4/10.      Physical Therapy/Occupational Therapy/Speech Therapy/ Rehab (if/when) eligible  Continue with Telemetry  DVT prophylaxis (choice of primary team)     [facility] Neurologist will follow up with CT head results.  Please contact [facility] Navigator to reach me  if further questions/concerns arise.     Examination:     awake, alert, Oriented x3  speech no aphasia  Extraocular movements intact  face symmetric  no drift in arms today       Patient / Family was informed the Neurology Consult would occur via TeleHealth consult by way of interactive audio and video telecommunications and consented to receiving care in this manner.     Patient is being evaluated for possible acute neurologic impairment and high probability of imminent or life - threatening deterioration. I spent total of 12 minutes providing care to this patient, including time for face to face visit via telemedicine, review of medical records, imaging studies and discussion of findings with providers, the patient and / or family.        Dr Vahid Behravan        [facility]  For Inpatient follow-up with [facility] physician please call [facility] [phone]. This is not an outpatient service. Post hospital discharge, please contact hospital directly.     Please do not communicate with [facility] physicians via secure chat. If you have any questions, Please contact [facility].  Please call or reconsult our service if there are any clinical or diagnostic changes.

## 2024-04-10 NOTE — SIGNIFICANT NOTE
04/10/24 1015   Coping/Psychosocial   Observed Emotional State calm;cooperative   Verbalized Emotional State relief;hopefulness   Trust Relationship/Rapport empathic listening provided   Involvement in Care interacting with patient   Additional Documentation Spiritual Care (Group)   Spiritual Care   Use of Spiritual Resources non-Mandaeism use of spiritual care   Spiritual Care Source  initiative   Spiritual Care Follow-Up follow-up, none required as presently assessed   Response to Spiritual Care receptive of support;engaged in conversation   Spiritual Care Interventions supportive conversation provided   Spiritual Care Visit Type initial   Receptivity to Spiritual Care visit welcomed

## 2024-04-10 NOTE — PLAN OF CARE
Goal Outcome Evaluation:               NIHSS 1, Pt resting at this time. Will continue to monitor.

## 2024-04-10 NOTE — NURSING NOTE
Pt left AMA, educated pt extensively on the risks of leaving and benefits of staying but declines to stay. AMA form signed, MD and  notified.

## 2024-04-11 NOTE — PROGRESS NOTES
[facility]   Hospitalist Progress Note    Date of admission: 4/8/2024  Patient Name: Regulo Sepulveda  1965  Date: 4/10/2024      Subjective     Chief Complaint   Patient presents with    Stroke       Interval Followup: Patient denies any new neurological complaints.  Discussed need to continue to monitor him for repeat CT head and discuss resumption of anticoagulation if no bleeding seen risk for hemorrhagic conversion.  Patient stating he wants to go home strongly recommended that he stated that if he left to be AGAINST MEDICAL ADVICE antibiotic high risk for having further deterioration without us being able to monitor risk for bleeding and stroke and possible death.  Is willing to stay for now at least and what her symptoms are at least waiting to get the CT head tomorrow and then he could hopefully discharge after that if everything else was doing well      Objective     Vitals:   Temp:  [97.2 °F (36.2 °C)-97.9 °F (36.6 °C)] 97.4 °F (36.3 °C)  Heart Rate:  [66-80] 71  Resp:  [18] 18  BP: (104-125)/(65-90) 125/90    Physical Exam  Awake in bed conversant answering questions appropriately for insight  No facial droop moving extremities spontaneously strength appears symmetric somewhat limited exam given patient effort  Ctab   Ir/ir, nl rate  Abd soft      Result Review:  Vital signs, labs and recent relevant imaging reviewed.          ARIPiprazole ER, 300 mg, Intramuscular, Q30 Days        MRI Brain Without Contrast    Result Date: 4/9/2024  Impression: 1.  Some signal on diffusion in the left thalamic area that could relate to subacute ischemia to this area. 2.  There are scattered signal change involving the cerebral hemispheres which could reflect more chronic small vessel ischemic change. 3.  Probable small mucous retention cyst or polyp left frontal sinus 4.  Evidence for some cerebral atrophy.    Electronically Signed By-Rodriguez Milligan MD On:4/9/2024 8:36 AM      CT Angiogram Head w AI Analysis of  LVO    Result Date: 4/8/2024  1.  No evidence for arterial occlusion or thrombosis throughout the arteries of the head or neck. 2.  No evidence for focal aneurysm. 3.  No evidence for arterial dissection throughout the arteries of the neck. 4.  No evidence for acute abnormality throughout the head or neck.    Electronically Signed ByMichael Lovelace MD On:4/8/2024 8:40 PM      CT Angiogram Neck    Result Date: 4/8/2024  1.  No evidence for arterial occlusion or thrombosis throughout the arteries of the head or neck. 2.  No evidence for focal aneurysm. 3.  No evidence for arterial dissection throughout the arteries of the neck. 4.  No evidence for acute abnormality throughout the head or neck.    Electronically Signed ByMichael Lovelace MD On:4/8/2024 8:40 PM      XR Chest 1 View    Result Date: 4/8/2024  No evidence for acute cardiopulmonary process.   Electronically Signed ByMichael Lovelace MD On:4/8/2024 8:17 PM      CT CEREBRAL PERFUSION WITH & WITHOUT CONTRAST    Result Date: 4/8/2024  1. No focal area of decreased cerebral blood flow (CBF) is seen to suggest an acute infarct in a large vessel territory.  No defects are seen to suggest a core infarct or an area of reversible ischemia.    Electronically Signed ByMichael Lovelace MD On:4/8/2024 8:13 PM      CT Head Without Contrast Stroke Protocol    Result Date: 4/8/2024  1.  No evidence for acute intracranial abnormality. 2.  Diffuse nonspecific white matter changes are noted with associated diffuse volume loss. These findings are likely related to chronic small vessel ischemic changes and/or age-related changes. 3.  Evidence for remote infarction involving the region of the left basal ganglia.  Electronically Signed ByMichael Lovelace MD On:4/8/2024 7:54 PM      CT Chest With Contrast Diagnostic    Result Date: 4/3/2024  Impression:  1. No pulmonary emboli identified.  2. Cardiomegaly.  3. Moderate right pleural effusion. Adjacent airspace consolidation is likely  atelectasis. Pneumonia is also possible.    Electronically Signed By-TOMAS ARMAS MD On:4/3/2024 3:51 PM       Assessment / Plan     Summary: 58-year-old male with history of substance abuse, CAD and PCI, HFrEF secondary to dilated nonischemic cardiomyopathy, schizophrenia, history of apical thrombus on Eliquis who presented with altered mental status and family noting right-sided flaccid paralysis and decreased responsiveness initially.  Admitted for stroke evaluation and seizure workup.  Teleneurology assisting    Assessment/Plan (clinically significant if listed here)  Possible left thalamic stroke with initial right-sided weakness  Medication noncompliance  Altered mental status  CAD and hx of PCI  HFrEF secondary to dilated nonischemic cardiomyopathy  Schizophrenia  History of apical thrombus on Eliquis  Hx of substance abuse  DM2, A1c 6.3  BPH  Hypertension  Hypomagnesemia   Hypokalemia    Holding Eliquis until repeat CT head tomorrow to guide whether safe to initiate therapy  Continue amiodarone, Coreg, holding other medications for permissive hypertension.  Also seems to not have had any consistent fills of his medications as outpatient  continue neurochecks and monitoring, neurology consulted recs reviewed  Unclear location for patient's prior Depakote, EEG negative for focal abnormalities/seizure activity, discontinuing Keppra/AED therapy   Holding home lisinopril and monitor blood pressure (also appears hasn't had this filled recently, monitor bp and reassess in am, may need reduced dose)   Lasix with potassium  Recent discharge for chf exacerbation and nsvt episodes. Had a pleural efusion at that time.  Has outpt f/u with cardiology/dr berry for aicd evaluation.  Had amphetamine use last admission, tox negative here.    Still needs cards f/u after dc for continued monitoring   Cont on statin, ldl 55   A1c 6.3, cont on jardiance (takes farxiga outpt for hfref primarily), blood glucose adequate, no  additional tx needed at this time, cont to monitor  Cont effexor, home IM abilify [id number] dose due today, will give home dose family brought in   Flomax, monitor for retention   PT/OT  Continue hospitalization at current level of care    **Unfortunately patient ultimately decided to leave AGAINST MEDICAL ADVICE.  He did not want me to make any referrals at this time and is going to follow-up with his PCP and cardiology only instead.  As it is unclear if safe to resume on anticoagulation without having follow-up CT head recommended he obtain this as outpatient if willing in the future and to help guide further medical treatment.    DVT prophylaxis:  Mechanical DVT prophylaxis orders are present.        Code Status (Patient has no pulse and is not breathing): CPR (Attempt to Resuscitate)  Medical Interventions (Patient has pulse or is breathing): Full Support      Greater than 50 minutes on this encounter including review of labs, imaging, documentation, orders, vitals, monitoring and adjusting treatment and orders as indicated, discussion with the patient on several occasions, nursing, and documenting.     CBC          4/8/2024    19:22 4/9/2024    04:30 4/10/2024    04:16   CBC   WBC 7.82  6.73  7.35    RBC 4.87  4.81  4.80    Hemoglobin 13.3  13.2  13.0    Hematocrit 42.8  42.0  43.3    MCV 87.9  87.3  90.2    MCH 27.3  27.4  27.1    MCHC 31.1  31.4  30.0    RDW 15.2  15.1  15.0    Platelets 378  368  328        CMP          4/8/2024    19:22 4/9/2024    04:30 4/10/2024    04:16   CMP   Glucose 93  87  103    BUN 27  22  19    Creatinine 1.28  1.10  1.07    EGFR 64.9  77.8  80.4    Sodium 139  137  140    Potassium 3.2  3.2  3.7    Chloride 97  97  103    Calcium 9.1  9.0  8.6    Total Protein 6.6      Albumin 3.8      Globulin 2.8      Total Bilirubin 0.5      Alkaline Phosphatase 226      AST (SGOT) 23      ALT (SGPT) 25      Albumin/Globulin Ratio 1.4      BUN/Creatinine Ratio 21.1  20.0  17.8    Anion Gap 11.3   10.7  9.8

## 2024-04-11 NOTE — DISCHARGE SUMMARY
58-year-old male with history of substance abuse, CAD and PCI, HFrEF secondary to dilated nonischemic cardiomyopathy, schizophrenia, history of apical thrombus on Eliquis who presented with altered mental status and family noting right-sided flaccid paralysis and decreased responsiveness initially.  Admitted for stroke evaluation and seizure workup.  Teleneurology assisting.  EEG was negative.  Concern for left thalamic stroke.  Was pending additional CT head to monitor for stability/hemorrhagic conversion to guide whether safe to initiate patient's Eliquis but patient declined to stay despite extensive counseling and ultimately left AGAINST MEDICAL ADVICE.  He is going to follow-up with his PCP and cardiology for continued monitoring.       Final diagnosis  Possible left thalamic stroke with initial right-sided weakness  Medication noncompliance  Altered mental status  CAD and hx of PCI  HFrEF secondary to dilated nonischemic cardiomyopathy  Schizophrenia  History of apical thrombus on Eliquis  Hx of substance abuse  DM2, A1c 6.3  BPH  Hypertension  Hypomagnesemia   Hypokalemia

## 2024-04-12 LAB
QT INTERVAL: 373 MS
QTC INTERVAL: 468 MS

## 2024-04-14 ENCOUNTER — HOSPITAL ENCOUNTER (OUTPATIENT)
Dept: SLEEP MEDICINE | Facility: HOSPITAL | Age: 59
Discharge: HOME | End: 2024-04-14
Admitting: NURSE PRACTITIONER
Payer: COMMERCIAL

## 2024-04-14 DIAGNOSIS — G47.9 RESTLESS SLEEPER: ICD-10-CM

## 2024-04-14 DIAGNOSIS — I50.20 HFREF (HEART FAILURE WITH REDUCED EJECTION FRACTION): ICD-10-CM

## 2024-04-14 DIAGNOSIS — J43.9 PULMONARY EMPHYSEMA, UNSPECIFIED EMPHYSEMA TYPE: ICD-10-CM

## 2024-04-14 DIAGNOSIS — R29.818 SUSPECTED SLEEP APNEA: ICD-10-CM

## 2024-04-14 PROCEDURE — 95810 POLYSOM 6/> YRS 4/> PARAM: CPT

## 2024-04-15 ENCOUNTER — OFFICE VISIT (OUTPATIENT)
Dept: FAMILY MEDICINE CLINIC | Facility: CLINIC | Age: 59
End: 2024-04-15
Payer: COMMERCIAL

## 2024-04-15 VITALS
DIASTOLIC BLOOD PRESSURE: 82 MMHG | SYSTOLIC BLOOD PRESSURE: 104 MMHG | TEMPERATURE: 98 F | BODY MASS INDEX: 17.59 KG/M2 | HEIGHT: 77 IN | WEIGHT: 149 LBS | OXYGEN SATURATION: 97 % | HEART RATE: 84 BPM

## 2024-04-15 DIAGNOSIS — Z86.73 HISTORY OF STROKE: ICD-10-CM

## 2024-04-15 DIAGNOSIS — F33.1 MAJOR DEPRESSIVE DISORDER, RECURRENT EPISODE, MODERATE: ICD-10-CM

## 2024-04-15 DIAGNOSIS — E46 MALNUTRITION, UNSPECIFIED TYPE: ICD-10-CM

## 2024-04-15 DIAGNOSIS — I50.31 ACUTE DIASTOLIC CHF (CONGESTIVE HEART FAILURE): ICD-10-CM

## 2024-04-15 DIAGNOSIS — Z09 HOSPITAL DISCHARGE FOLLOW-UP: Primary | ICD-10-CM

## 2024-04-15 PROCEDURE — 3074F SYST BP LT 130 MM HG: CPT | Performed by: STUDENT IN AN ORGANIZED HEALTH CARE EDUCATION/TRAINING PROGRAM

## 2024-04-15 PROCEDURE — 3044F HG A1C LEVEL LT 7.0%: CPT | Performed by: STUDENT IN AN ORGANIZED HEALTH CARE EDUCATION/TRAINING PROGRAM

## 2024-04-15 PROCEDURE — 99214 OFFICE O/P EST MOD 30 MIN: CPT | Performed by: STUDENT IN AN ORGANIZED HEALTH CARE EDUCATION/TRAINING PROGRAM

## 2024-04-15 PROCEDURE — 1159F MED LIST DOCD IN RCRD: CPT | Performed by: STUDENT IN AN ORGANIZED HEALTH CARE EDUCATION/TRAINING PROGRAM

## 2024-04-15 PROCEDURE — 3079F DIAST BP 80-89 MM HG: CPT | Performed by: STUDENT IN AN ORGANIZED HEALTH CARE EDUCATION/TRAINING PROGRAM

## 2024-04-15 PROCEDURE — 1160F RVW MEDS BY RX/DR IN RCRD: CPT | Performed by: STUDENT IN AN ORGANIZED HEALTH CARE EDUCATION/TRAINING PROGRAM

## 2024-04-15 RX ORDER — MIRTAZAPINE 15 MG/1
15 TABLET, FILM COATED ORAL NIGHTLY
Qty: 30 TABLET | Refills: 5 | Status: SHIPPED | OUTPATIENT
Start: 2024-04-15

## 2024-04-15 NOTE — PROGRESS NOTES
"Chief Complaint  Hospital Follow Up Visit and Stroke (2 )    Subjective      History of Present Illness    Regulo Sepulveda is a 58 y.o. male who presents to [facility] with a past medical history of combined systolic and diastolic HFrEF 20%, LV thrombus on Eliquis, AFib, COPD, Presents or hospital follow-up was admitted on 4/2/24 for decompensated heart failure, then again admitted on 4/8/2024 for possible thalamic stroke.  He left the hospital AMA.  MRI brain showed some signal on the diffusion intrathalamic area which could relate to a subacute ischemia.         Objective   Vital Signs:   Vitals:    04/15/24 1454   BP: 104/82   Pulse: 84   Temp: 98 °F (36.7 °C)   SpO2: 97%   Weight: 67.6 kg (149 lb)   Height: 195.6 cm (77.01\")     Body mass index is 17.66 kg/m².    Wt Readings from Last 3 Encounters:   04/15/24 67.6 kg (149 lb)   04/09/24 68.6 kg (151 lb 3.8 oz)   04/06/24 64.9 kg (143 lb 1.3 oz)     BP Readings from Last 3 Encounters:   04/15/24 104/82   04/10/24 125/90   04/06/24 114/81       Health Maintenance   Topic Date Due    COLORECTAL CANCER SCREENING  Never done    DIABETIC EYE EXAM  Never done    ANNUAL PHYSICAL  Never done    DIABETIC FOOT EXAM  Never done    Pneumococcal Vaccine 0-64 (2 of 2 - PCV) 05/21/2024 (Originally 10/13/2022)    COVID-19 Vaccine (4 - 2023-24 season) 07/05/2024 (Originally 9/1/2023)    ZOSTER VACCINE (2 of 2) 01/31/2025 (Originally 8/16/2022)    Hepatitis B (2 of 3 - 19+ 3-dose series) 04/15/2025 (Originally 7/19/2022)    INFLUENZA VACCINE  08/01/2024    HEMOGLOBIN A1C  10/09/2024    URINE MICROALBUMIN  03/29/2025    LUNG CANCER SCREENING  04/03/2025    LIPID PANEL  04/09/2025    BMI FOLLOWUP  04/15/2025    TDAP/TD VACCINES (3 - Td or Tdap) 06/21/2032    HEPATITIS C SCREENING  Completed     EEG  Table formatting from the original result was not included.  Images from the original result were not included.    [address] [place] " [address]  [phone]    ELECTROENCEPHALOGRAPHIC REPORT    Patient: Regulo Sepulveda EEG # :    WAU-J-   : 1965  ID:      [id number]      Age: 58 y.o. male          Primary  Physician: Dominic Vides MD  Technician: Kate Foley   Ordering  Physician: Eliot Urbano MD        Recording Date: 2024 Report  Date: 2024           History:    Rule out seizures    Medications:  ARIPiprazole ER, albuterol sulfate HFA, amiodarone, apixaban,   atorvastatin, carvedilol, dapagliflozin, divalproex, furosemide,   lisinopril, tamsulosin, and venlafaxine XR    Reading:   The EEG was obtained portable in his room during which, he was asleep with   brief waking periods and on photic stimulation with video monitoring.  We   do not know how much sleep he has had the night before the testing.    The dominant background activity consist of symmetric and irregular 20 to   40 µV 6 to 7 cps which were prominent on both parieto-occipital regions   and were reactive to changes in states.  There were symmetric spindles and   vertex activities during the light stages of sleep.  There were no   discernible responses on photic stimulation at various frequencies.  There   was no amplitude asymmetry.  No paroxysmal discharges.      Impression:   Abnormal EEG because of slow background activity compatible with mild   diffuse encephalopathy.  There were no epileptiform discharges noted today.    Electronically signed by Ga Hair Jr., MD, 24, 11:22 PM   EDT.    Please note that portions of this note were completed with a voice   recognition program.  Part of this note is an electric or electronic   transcription/translation of spoken language to printed text using the   dragon dictating system.  MRI Brain Without Contrast  Narrative: MRI BRAIN WO CONTRAST-     Date of Exam: 2024 6:50 AM     Indication: Stroke, follow up; R41.82-Altered mental status,  unspecified; R79.89-Other specified abnormal findings of  blood  chemistry; F20.9-Schizophrenia, unspecified; F33.1-Major depressive  disorder, recurrent, moderate.     Comparison: CT head April 8, 2024     Technique:  Routine multiplanar/multisequence sequence images of the  brain were obtained without contrast administration.        Findings:  There is signal on diffusion in the left thalamic area that could  reflect subacute ischemia. There are T2 signal change involving the  periventricular and deep white matter areas compatible with more chronic  small vessel ischemic change. There is evidence for old insult in the  left frontal lobe adjacent to the frontal horn left lateral ventricle.  There also is evidence for old lacunar type infarct around the caudate  nucleus on the left. There is no unusual blooming on susceptibility  imaging. There is no definite orbital abnormality. There is some  cerebral atrophy. There is a tiny focus of signal in the region of the  left frontal sinus which could reflect a small mucous retention cyst or  polyp.     Impression: Impression:  1.  Some signal on diffusion in the left thalamic area that could relate  to subacute ischemia to this area.  2.  There are scattered signal change involving the cerebral hemispheres  which could reflect more chronic small vessel ischemic change.  3.  Probable small mucous retention cyst or polyp left frontal sinus  4.  Evidence for some cerebral atrophy.           Electronically Signed By-Rodriguez Milligan MD On:4/9/2024 8:36 AM        Physical Exam   General: Cachectic appearing  male appears older AAO x3, no acute distress.  Eyes:  EOMI, PERRLA  Ears/Nose/Mouth/Throat:  Moist mucous membranes  Respiratory:  CTA bilaterally, no wheezes rales or rhonchi  Cardiovascular:  RRR no murmur rubs or gallops  Gastrointestinal:  Abdomen soft nondistended nontender bowel sounds present x4 quadrants  Musculoskeletal:  Moves all 4 extremities spontaneously, no apparent weakness  Skin:  Warm, dry, no skin rashes  or lesions  Neurologic:  AAO x3, cranial nerves 2-12 grossly intact, no focal neuro deficits  Psychiatric:  Normal mood and affect    Result Review :     The following data was reviewed by: Dickson Landry MD on 04/15/2024:      Procedures          Diagnoses and all orders for this visit:    1. Hospital discharge follow-up (Primary)    2. Malnutrition, unspecified type  -     DME ORAL SUPPLEMENTS  -     Ambulatory Referral to Nutrition Services    3. History of stroke    4. Acute diastolic CHF (congestive heart failure)    5. Major depressive disorder, recurrent episode, moderate  -     mirtazapine (REMERON) 15 MG tablet; Take 1 tablet by mouth Every Night.  Dispense: 30 tablet; Refill: 5    Start mirtazapine for depression and to increase his appetite.  Referral placed for dietitian.  She has normal strength is back, he has normal strength equally and bilaterally in upper and lower extremity as well as facial muscles.  Medication compliance again counseled patient on taking his medicines daily.  BMI is below normal parameters (malnutrition). Recommendations: referral to dietitian        38 minutes were spent caring for Regulo on this date of service. This time spent by me includes preparing for the visit, reviewing tests, obtaining/reviewing separately obtained history, performing medically appropriate exam/evaluation, counseling/educating the patient/family/caregiver, ordering medications/tests/procedures, referring/communicating with other health care professionals, documenting information in the medical record, independently interpreting results and communicating that with the patient/family/caregiver and/or care coordination.   FOLLOW UP  Return in about 4 weeks (around 5/13/2024).  Patient was given instructions and counseling regarding his condition or for health maintenance advice. Please see specific information pulled into the AVS if appropriate.       Dickson Landry MD  04/15/24  15:39 EDT    CURRENT &  DISCONTINUED MEDICATIONS  Current Outpatient Medications   Medication Instructions    albuterol sulfate  (90 Base) MCG/ACT inhaler 2 puffs, Inhalation, Every 4 Hours PRN    amiodarone (PACERONE) 200 MG tablet Take 1 tablet by mouth 2 (Two) Times a Day for 7 days, THEN 1 tablet Daily for 21 days.    apixaban (ELIQUIS) 5 mg, Oral, 2 Times Daily    atorvastatin (LIPITOR) 40 mg, Oral, Every Night at Bedtime    carvedilol (COREG) 6.25 mg, Oral, 2 Times Daily    divalproex (DEPAKOTE) 250 mg, Oral, Daily    Farxiga 5 mg, Oral, Daily    furosemide (LASIX) 40 mg, Oral, 2 Times Daily    lisinopril (PRINIVIL,ZESTRIL) 40 mg, Oral, Every 24 Hours Scheduled    mirtazapine (REMERON) 15 mg, Oral, Nightly    tamsulosin (FLOMAX) 0.4 mg, Oral, Daily       Medications Discontinued During This Encounter   Medication Reason    venlafaxine XR (EFFEXOR-XR) 37.5 MG 24 hr capsule

## 2024-04-16 ENCOUNTER — OFFICE VISIT (OUTPATIENT)
Dept: CARDIOLOGY | Facility: CLINIC | Age: 59
End: 2024-04-16
Payer: COMMERCIAL

## 2024-04-16 ENCOUNTER — TELEPHONE (OUTPATIENT)
Dept: CASE MANAGEMENT | Facility: OTHER | Age: 59
End: 2024-04-16
Payer: COMMERCIAL

## 2024-04-16 VITALS
HEART RATE: 87 BPM | DIASTOLIC BLOOD PRESSURE: 92 MMHG | BODY MASS INDEX: 17.83 KG/M2 | SYSTOLIC BLOOD PRESSURE: 132 MMHG | WEIGHT: 151 LBS | HEIGHT: 77 IN

## 2024-04-16 DIAGNOSIS — I42.7 CARDIOMYOPATHY SECONDARY TO DRUG: ICD-10-CM

## 2024-04-16 DIAGNOSIS — E11.9 TYPE 2 DIABETES MELLITUS WITHOUT COMPLICATION, WITHOUT LONG-TERM CURRENT USE OF INSULIN: ICD-10-CM

## 2024-04-16 DIAGNOSIS — I50.22 CHRONIC HFREF (HEART FAILURE WITH REDUCED EJECTION FRACTION): Primary | ICD-10-CM

## 2024-04-16 PROCEDURE — 3080F DIAST BP >= 90 MM HG: CPT | Performed by: INTERNAL MEDICINE

## 2024-04-16 PROCEDURE — 3075F SYST BP GE 130 - 139MM HG: CPT | Performed by: INTERNAL MEDICINE

## 2024-04-16 PROCEDURE — 1160F RVW MEDS BY RX/DR IN RCRD: CPT | Performed by: INTERNAL MEDICINE

## 2024-04-16 PROCEDURE — 1159F MED LIST DOCD IN RCRD: CPT | Performed by: INTERNAL MEDICINE

## 2024-04-16 PROCEDURE — 99215 OFFICE O/P EST HI 40 MIN: CPT | Performed by: INTERNAL MEDICINE

## 2024-04-16 RX ORDER — SACUBITRIL AND VALSARTAN 49; 51 MG/1; MG/1
1 TABLET, FILM COATED ORAL 2 TIMES DAILY
Qty: 180 TABLET | Refills: 3 | Status: SHIPPED | OUTPATIENT
Start: 2024-04-16

## 2024-04-16 RX ORDER — CARVEDILOL 6.25 MG/1
6.25 TABLET ORAL 2 TIMES DAILY
Qty: 60 TABLET | Refills: 0 | Status: SHIPPED | OUTPATIENT
Start: 2024-04-16 | End: 2024-04-22

## 2024-04-16 RX ORDER — ALBUTEROL SULFATE 90 UG/1
2 AEROSOL, METERED RESPIRATORY (INHALATION) EVERY 4 HOURS PRN
Qty: 18 G | Refills: 0 | Status: SHIPPED | OUTPATIENT
Start: 2024-04-16

## 2024-04-16 RX ORDER — VENLAFAXINE 37.5 MG/1
37.5 TABLET ORAL DAILY
COMMUNITY

## 2024-04-16 RX ORDER — CARVEDILOL 12.5 MG/1
12.5 TABLET ORAL 2 TIMES DAILY
Qty: 180 TABLET | Refills: 3 | Status: SHIPPED | OUTPATIENT
Start: 2024-04-16 | End: 2024-04-22 | Stop reason: ALTCHOICE

## 2024-04-16 RX ORDER — DAPAGLIFLOZIN 10 MG/1
10 TABLET, FILM COATED ORAL DAILY
Qty: 30 TABLET | Refills: 3 | Status: SHIPPED | OUTPATIENT
Start: 2024-04-16 | End: 2024-04-22 | Stop reason: ALTCHOICE

## 2024-04-16 RX ORDER — SPIRONOLACTONE 25 MG/1
25 TABLET ORAL DAILY
Qty: 90 TABLET | Refills: 3 | Status: SHIPPED | OUTPATIENT
Start: 2024-04-16

## 2024-04-16 RX ORDER — DAPAGLIFLOZIN 5 MG/1
10 TABLET, FILM COATED ORAL DAILY
Qty: 90 TABLET | Refills: 2 | Status: SHIPPED | OUTPATIENT
Start: 2024-04-16

## 2024-04-16 NOTE — ASSESSMENT & PLAN NOTE
I have explained to him and his sister how serious his condition has and the need for him to quit smoking completely.  He does not want to use nicotine patches.  He seems to be willing to try to stop within the next 2 weeks.  Smoking cessation counseling was performed for 6 to 7 minutes.

## 2024-04-16 NOTE — PROGRESS NOTES
New Patient Office Visit    Chief Complaint  Acute on Chronic combined systolic and diastolic CHF    Subjective            Regulo Sepulveda presents to [facility]  History of Present Illness  Mr. Regulo Sepulveda is a 58 years old gentleman with congestive heart failure with reduced ejection fraction who was admitted to the hospital twice last month.  About a year ago he found out that he had congestive heart failure with reduced ejection fraction but did not take his medications very seriously.  This year he got admitted to [facility] with severe CHF and an apical thrombus on his echocardiogram.  He was then transferred to our hospital on and was admitted on 2 occasions.  1 such occasion he had congestive heart failure with severely reduced ejection fraction.  He has a history of abusing methamphetamines in the recent past.  However he claims that for the last 2 months he has not had any meth amphetamine.  He is complaining shortness of breath with mild exertion as well as paroxysmal nocturnal dyspnea and orthopnea.  For the last 1 month he has not been able to sleep flat at night and continues to do so      Past Medical History:   Diagnosis Date    Anxiety     Asthma     Bipolar affective     Cardiac tamponade     2023    CHF (congestive heart failure)     COPD (chronic obstructive pulmonary disease)     Coronary artery disease     Depression     Diabetes mellitus     Elevated cholesterol     Hypertension     Parkinson disease        Allergies   Allergen Reactions    Penicillins Shortness Of Breath        Past Surgical History:   Procedure Laterality Date    CARDIAC CATHETERIZATION Right 9/17/2023    Procedure: Right and Left Heart Cath;  Surgeon: Ben Grant MD;  Location: [facility] CATH INVASIVE LOCATION;  Service: Cardiovascular;  Laterality: Right;    TESTICLE SURGERY Left     extraction        Social History     Tobacco Use    Smoking status: Every Day     Current packs/day: 0.50      Average packs/day: 0.5 packs/day for 50.1 years (25.1 ttl pk-yrs)     Types: Cigarettes     Start date: 1/1/1974     Last attempt to quit: 11/2/2023    Smokeless tobacco: Never    Tobacco comments:     At least 10 cigarettes a day   Vaping Use    Vaping status: Former    Substances: Nicotine   Substance Use Topics    Alcohol use: Not Currently    Drug use: Not Currently     Types: Methamphetamines, Marijuana       Family History   Problem Relation Age of Onset    Diabetes Mother     Depression Sister     Restless legs syndrome Sister     Insomnia Sister     Sleep walking Sister     Sleep disorder Sister         Night Terrors    Depression Brother         Prior to Admission medications    Medication Sig Start Date End Date Taking? Authorizing Provider   amiodarone (PACERONE) 200 MG tablet Take 1 tablet by mouth 2 (Two) Times a Day for 7 days, THEN 1 tablet Daily for 21 days. 4/6/24 5/4/24 Yes Guicho Chase PA   apixaban (ELIQUIS) 5 MG tablet tablet Take 1 tablet by mouth 2 (Two) Times a Day for 120 days. 2/22/24 6/21/24 Yes Dickson Landry MD   atorvastatin (LIPITOR) 40 MG tablet Take 1 tablet by mouth every night at bedtime. 1/8/24  Yes Dickson Landry MD   carvedilol (COREG) 6.25 MG tablet Take 1 tablet by mouth 2 (Two) Times a Day. 4/6/24  Yes Guicho Chase PA   divalproex (DEPAKOTE) 250 MG DR tablet Take 1 tablet by mouth Daily.   Yes Yuliya Blanchard MD   Farxiga 5 MG tablet tablet Take 1 tablet by mouth Daily. Indications: Type 2 Diabetes 2/22/24  Yes Dickson Landry MD   furosemide (LASIX) 40 MG tablet Take 1 tablet by mouth 2 (Two) Times a Day.   Yes Yuliya Blanchard MD   lisinopril (PRINIVIL,ZESTRIL) 40 MG tablet Take 1 tablet by mouth Daily. 2/22/24  Yes Dickson Landry MD   mirtazapine (REMERON) 15 MG tablet Take 1 tablet by mouth Every Night. 4/15/24  Yes Dickson Landry MD   tamsulosin (FLOMAX) 0.4 MG capsule 24 hr capsule Take 1 capsule by mouth Daily for 30 days. 3/29/24 4/28/24 Yes Lutfi,  "MD Dickson   venlafaxine (EFFEXOR) 37.5 MG tablet Take 1 tablet by mouth Daily.   Yes Provider, MD Yuliya   albuterol sulfate  (90 Base) MCG/ACT inhaler Inhale 2 puffs Every 4 (Four) Hours As Needed for Wheezing or Shortness of Air. Indications: Chronic Obstructive Lung Disease  Patient not taking: Reported on 4/16/2024 1/8/24   Dickson Landry MD        Review of Systems   Constitutional:  Positive for fatigue. Negative for unexpected weight gain and unexpected weight loss.   HENT:  Positive for hearing loss and sinus pressure. Negative for swollen glands.    Eyes:  Positive for blurred vision. Negative for double vision.   Respiratory:  Positive for shortness of breath. Negative for cough and wheezing.    Cardiovascular:  Positive for palpitations. Negative for chest pain and leg swelling.   Gastrointestinal:  Negative for nausea and vomiting.   Endocrine: Positive for cold intolerance, polydipsia and polyuria. Negative for heat intolerance.   Musculoskeletal:  Positive for back pain. Negative for arthralgias.   Neurological:  Negative for dizziness, light-headedness and headache.   Hematological:  Bruises/bleeds easily.        Symptom Course: Improved    Weight Trend: Stable     Objective     /92   Pulse 87   Ht 195.6 cm (77.01\")   Wt 68.5 kg (151 lb)   BMI 17.90 kg/m²       Physical Exam  Constitutional:       General: He is awake. He is not in acute distress.     Appearance: Normal appearance.   HENT:      Nose: No congestion.   Eyes:      Extraocular Movements: Extraocular movements intact.      Conjunctiva/sclera: Conjunctivae normal.      Pupils: Pupils are equal, round, and reactive to light.   Neck:      Thyroid: No thyromegaly.      Vascular: No carotid bruit or JVD.   Cardiovascular:      Rate and Rhythm: Normal rate and regular rhythm.      Chest Wall: PMI is not displaced.      Pulses: Normal pulses.      Heart sounds: S1 normal and S2 normal. Murmur heard.      Systolic murmur is " present.      No friction rub. No gallop. No S3 or S4 sounds.   Pulmonary:      Effort: Pulmonary effort is normal.      Breath sounds: Normal breath sounds and air entry. No wheezing, rhonchi or rales.   Chest:      Chest wall: No tenderness.   Abdominal:      General: Bowel sounds are normal.      Palpations: Abdomen is soft. There is no mass.      Tenderness: There is no abdominal tenderness.      Comments: Negative for hepatomegaly and splenomegaly   Musculoskeletal:      Cervical back: Neck supple. No tenderness.      Right lower leg: No edema.      Left lower leg: No edema.   Skin:     General: Skin is warm and dry.      Findings: No petechiae or rash.      Nails: There is no clubbing.   Neurological:      General: No focal deficit present.      Mental Status: He is alert and oriented to person, place, and time.   Psychiatric:         Mood and Affect: Mood normal.         Behavior: Behavior is cooperative.       Lab Results   Component Value Date    PROBNP 30,138.0 (H) 04/02/2024    PROBNP 27,932.0 (H) 03/23/2024    PROBNP 25,684.0 (H) 12/15/2023    BNP 4,002 (H) 03/11/2024     CMP          4/8/2024    19:22 4/9/2024    04:30 4/10/2024    04:16   CMP   Glucose 93  87  103    BUN 27  22  19    Creatinine 1.28  1.10  1.07    EGFR 64.9  77.8  80.4    Sodium 139  137  140    Potassium 3.2  3.2  3.7    Chloride 97  97  103    Calcium 9.1  9.0  8.6    Total Protein 6.6      Albumin 3.8      Globulin 2.8      Total Bilirubin 0.5      Alkaline Phosphatase 226      AST (SGOT) 23      ALT (SGPT) 25      Albumin/Globulin Ratio 1.4      BUN/Creatinine Ratio 21.1  20.0  17.8    Anion Gap 11.3  10.7  9.8      CBC w/diff          4/8/2024    19:22 4/9/2024    04:30 4/10/2024    04:16   CBC w/Diff   WBC 7.82  6.73  7.35    RBC 4.87  4.81  4.80    Hemoglobin 13.3  13.2  13.0    Hematocrit 42.8  42.0  43.3    MCV 87.9  87.3  90.2    MCH 27.3  27.4  27.1    MCHC 31.1  31.4  30.0    RDW 15.2  15.1  15.0    Platelets 378  169 418  "   Neutrophil Rel % 54.2  49.1  56.9    Immature Granulocyte Rel % 0.3  0.1  0.3    Lymphocyte Rel % 30.3  36.1  28.2    Monocyte Rel % 12.3  10.8  10.5    Eosinophil Rel % 2.0  2.7  2.9    Basophil Rel % 0.9  1.2  1.2       Lipid Panel          8/30/2023    04:29 4/9/2024    04:30   Lipid Panel   Total Cholesterol 131  108    Triglycerides 121  88    HDL Cholesterol 32  36    VLDL Cholesterol 22  17    LDL Cholesterol  77  55    LDL/HDL Ratio 2.34  1.51       Lab Results   Component Value Date    TSH 1.020 04/08/2024    TSH 1.460 04/02/2024    TSH 1.410 03/02/2023      Lab Results   Component Value Date    FREET4 1.39 04/08/2024    FREET4 0.99 03/02/2023      D-Dimer, Quantitative   Date Value Ref Range Status   12/15/2023 1.50 (H) 0.00 - 0.58 MCGFEU/mL Final     Magnesium   Date Value Ref Range Status   04/10/2024 2.1 1.6 - 2.6 mg/dL Final      No results found for: \"DIGOXIN\"   A1C Last 3 Results          8/29/2023    05:49 3/29/2024    11:54 4/9/2024    04:30   HGBA1C Last 3 Results   Hemoglobin A1C 6.10  6.40  6.30           Results for orders placed during the hospital encounter of 03/23/24    Adult Transthoracic Echo Complete W/ Cont if Necessary Per Protocol    Interpretation Summary  Diffuse hypokinesis with severely reduced left ventricular systolic function ejection fraction around 25 to 30%.  Mild MR and mild TR.  Organized thrombus still present in the apex of the left ventricle.  Present on previous echo.       Result Review :                           Assessment and Plan        Diagnoses and all orders for this visit:    1. Chronic HFrEF (heart failure with reduced ejection fraction) (Primary)  Assessment & Plan:  Mr. Sepulveda has severely dilated cardiomyopathy with reduced ejection fraction.  He continues to smoke but denies that he has had any recent recreational drug abuse.  I explained to him the consequences of both and expressed my sincere desire to have him quit smoking soon.  He denies " palpitations dizziness or syncope.  He has severe limitations in his exercise capacity and paroxysmal nocturnal dyspnea and orthopnea present.  The following changes were made to his medications:    1.  Reduce Lasix-furosemide to 40 mg 1 tablet once a day only.  2.  Stop the lisinopril.  3.  Start Entresto 49-51 mg 1 tablet twice a day starting tomorrow night.  4.  Start spironolactone 25 mg 1 tablet once a day.  5.  Increase Farxiga to 10 mg once a day.  Given to 5 mg tablets daily finish the current supply and then the new prescription will be 10 mg 1 tablet once a day.  6.  Continue low-sodium diet.  7.  Monitor heart rate blood pressure and weight every day.  8.  Do blood work 1 or 2 days before his next appointment    Orders:  -     albuterol sulfate  (90 Base) MCG/ACT inhaler; Inhale 2 puffs Every 4 (Four) Hours As Needed for Wheezing or Shortness of Air. Indications: Chronic Obstructive Lung Disease  Dispense: 18 g; Refill: 0  -     Farxiga 5 MG tablet tablet; Take 2 tablets by mouth Daily. Indications: Type 2 Diabetes  Dispense: 90 tablet; Refill: 2  -     carvedilol (COREG) 6.25 MG tablet; Take 1 tablet by mouth 2 (Two) Times a Day. 1 tablet in the morning and 2 tablets at night for 1 week and then 2 tablets twice a day till you finish the current supply.  New prescription will be 12.5 mg tablets 1 tablet twice a day.  Dispense: 60 tablet; Refill: 0  -     Comprehensive Metabolic Panel; Future  -     Magnesium; Future  -     T4, Free; Future  -     TSH; Future  -     proBNP; Future  -     25-HydroxyVitamin D LCMS D2+D3; Future  -     spironolactone (ALDACTONE) 25 MG tablet; Take 1 tablet by mouth Daily.  Dispense: 90 tablet; Refill: 3  -     dapagliflozin Propanediol (Farxiga) 10 MG tablet; Take 10 mg by mouth Daily.  Dispense: 30 tablet; Refill: 3  -     sacubitril-valsartan (Entresto) 49-51 MG tablet; Take 1 tablet by mouth 2 (Two) Times a Day.  Dispense: 180 tablet; Refill: 3  -     carvedilol  (COREG) 12.5 MG tablet; Take 1 tablet by mouth 2 (Two) Times a Day.  Dispense: 180 tablet; Refill: 3    2. Type 2 diabetes mellitus without complication, without long-term current use of insulin  -     albuterol sulfate  (90 Base) MCG/ACT inhaler; Inhale 2 puffs Every 4 (Four) Hours As Needed for Wheezing or Shortness of Air. Indications: Chronic Obstructive Lung Disease  Dispense: 18 g; Refill: 0  -     Farxiga 5 MG tablet tablet; Take 2 tablets by mouth Daily. 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  He should continue his Eliquis for now.          Education  Regulo Sepulveda 1965 male  who presents for follow-up of congestive heart failure. Counseling was provided to Regulo Sepulveda for the following topics.   Daily log and monitoring of blood pressure, Regulo Sepulveda was instructed to keep a blood pressure log to monitor for trends of normal and possible abnormal blood pressure readings. Regulo Sepulveda was instructed to bring the blood pressure log to next appointment. Regulo Sepulveda was instructed to keep a daily weight log and to monitor for weight gain. Instructed to notify the physician office if patient experiences a weight gain of 2-3 pounds overnight, or if experiences a weight gain of 5 pounds in a week. Instruction provided to the patient to elevate bilateral lower extremities to help alleviate edema. Instruction provided to patient to decrease salt and fluid intake. Instruction provided on new medications and possible side effects, and when to call the physician office.     Follow Up     Return for fu in 2-3 weeks., EKG with next  office visit.    Patient was given instructions and counseling regarding his condition or for health maintenance advice. Please see specific information pulled into the AVS if appropriate.     Total time spent, 55 minutes.This time includes time spent by me for the following activities:  Reviewing past records including hospitalizations, performing a cardiac focused examination and/or evaluation, educating and counselling the patient and family, independently interpreting results and diagnostic tests and communicating that information to patient and family, documenting information in the medical record, etc. E/M time spent excludes any time spent on other reported services.    Electronically signed by Beto Gillette MD, 04/16/24, 2:34 PM EDT.

## 2024-04-16 NOTE — ASSESSMENT & PLAN NOTE
Mr. Sepulveda has severely dilated cardiomyopathy with reduced ejection fraction.  He continues to smoke but denies that he has had any recent recreational drug abuse.  I explained to him the consequences of both and expressed my sincere desire to have him quit smoking soon.  He denies palpitations dizziness or syncope.  He has severe limitations in his exercise capacity and paroxysmal nocturnal dyspnea and orthopnea present.  The following changes were made to his medications:    1.  Reduce Lasix-furosemide to 40 mg 1 tablet once a day only.  2.  Stop the lisinopril.  3.  Start Entresto 49-51 mg 1 tablet twice a day starting tomorrow night.  4.  Start spironolactone 25 mg 1 tablet once a day.  5.  Increase Farxiga to 10 mg once a day.  Given to 5 mg tablets daily finish the current supply and then the new prescription will be 10 mg 1 tablet once a day.  6.  Continue low-sodium diet.  7.  Monitor heart rate blood pressure and weight every day.  8.  Do blood work 1 or 2 days before his next appointment

## 2024-04-16 NOTE — TELEPHONE ENCOUNTER
Call placed to patient to discuss CCM program. No answer, VM has not been set up and unable to leave message. Will attempt again next week. If no answer, will close case.     Alesha GOMEZ RN   Ambulatory Case Management

## 2024-04-16 NOTE — PATIENT INSTRUCTIONS
1.  Reduce Lasix-furosemide to 40 mg 1 tablet once a day only.  2.  Stop the lisinopril.  3.  Start Entresto 49-51 mg 1 tablet twice a day starting tomorrow night.  4.  Start spironolactone 25 mg 1 tablet once a day.  5.  Increase Farxiga to 10 mg once a day.  Given to 5 mg tablets daily finish the current supply and then the new prescription will be 10 mg 1 tablet once a day.  6.  Continue low-sodium diet.  7.  Monitor heart rate blood pressure and weight every day.  8.  Do blood work 1 or 2 days before his next appointment

## 2024-04-16 NOTE — ASSESSMENT & PLAN NOTE
He has cardiomyopathy with severely dilated ventricle and reduced ejection fraction with apical thrombus that is organized.  He should continue his Eliquis for now.

## 2024-04-17 DIAGNOSIS — I50.20 HFREF (HEART FAILURE WITH REDUCED EJECTION FRACTION): ICD-10-CM

## 2024-04-17 DIAGNOSIS — G47.33 OSA (OBSTRUCTIVE SLEEP APNEA): Primary | ICD-10-CM

## 2024-04-17 DIAGNOSIS — J43.9 PULMONARY EMPHYSEMA, UNSPECIFIED EMPHYSEMA TYPE: ICD-10-CM

## 2024-04-17 DIAGNOSIS — R06.83 SNORING: ICD-10-CM

## 2024-04-22 ENCOUNTER — OFFICE VISIT (OUTPATIENT)
Dept: CARDIOLOGY | Facility: CLINIC | Age: 59
End: 2024-04-22
Payer: COMMERCIAL

## 2024-04-22 VITALS
DIASTOLIC BLOOD PRESSURE: 89 MMHG | WEIGHT: 161 LBS | HEIGHT: 77 IN | BODY MASS INDEX: 19.01 KG/M2 | SYSTOLIC BLOOD PRESSURE: 142 MMHG | HEART RATE: 91 BPM

## 2024-04-22 DIAGNOSIS — I50.22 CHRONIC HFREF (HEART FAILURE WITH REDUCED EJECTION FRACTION): Primary | ICD-10-CM

## 2024-04-22 DIAGNOSIS — I51.3 LV (LEFT VENTRICULAR) MURAL THROMBUS: ICD-10-CM

## 2024-04-22 DIAGNOSIS — I48.0 PAF (PAROXYSMAL ATRIAL FIBRILLATION): ICD-10-CM

## 2024-04-22 PROBLEM — E55.9 VITAMIN D DEFICIENCY: Status: ACTIVE | Noted: 2024-04-22

## 2024-04-22 PROBLEM — I50.20 SYSTOLIC HEART FAILURE: Status: RESOLVED | Noted: 2023-12-15 | Resolved: 2024-04-22

## 2024-04-22 PROBLEM — F31.9 BIPOLAR 1 DISORDER: Status: ACTIVE | Noted: 2024-04-22

## 2024-04-22 PROBLEM — F10.10 ALCOHOL ABUSE: Status: ACTIVE | Noted: 2024-04-22

## 2024-04-22 PROBLEM — R10.32 LEFT GROIN PAIN: Status: RESOLVED | Noted: 2022-05-26 | Resolved: 2024-04-22

## 2024-04-22 PROBLEM — F20.0 CHRONIC PARANOID SCHIZOPHRENIA: Status: ACTIVE | Noted: 2024-04-22

## 2024-04-22 PROBLEM — I50.9 ACUTE EXACERBATION OF CHF (CONGESTIVE HEART FAILURE): Status: RESOLVED | Noted: 2024-03-24 | Resolved: 2024-04-22

## 2024-04-22 PROBLEM — F43.12 CHRONIC POST-TRAUMATIC STRESS DISORDER: Status: ACTIVE | Noted: 2024-04-22

## 2024-04-22 PROBLEM — E11.9 TYPE 2 DIABETES MELLITUS WITHOUT COMPLICATION: Status: ACTIVE | Noted: 2024-04-22

## 2024-04-22 PROBLEM — I50.43 ACUTE ON CHRONIC COMBINED SYSTOLIC AND DIASTOLIC CHF (CONGESTIVE HEART FAILURE): Status: RESOLVED | Noted: 2023-10-18 | Resolved: 2024-04-22

## 2024-04-22 PROBLEM — I50.43 ACUTE ON CHRONIC HEART FAILURE WITH REDUCED EJECTION FRACTION AND DIASTOLIC DYSFUNCTION: Status: RESOLVED | Noted: 2023-10-19 | Resolved: 2024-04-22

## 2024-04-22 PROBLEM — I10 HYPERTENSIVE DISORDER: Status: RESOLVED | Noted: 2021-10-06 | Resolved: 2024-04-22

## 2024-04-22 PROBLEM — I50.9 ACUTE ON CHRONIC CONGESTIVE HEART FAILURE: Status: RESOLVED | Noted: 2023-11-10 | Resolved: 2024-04-22

## 2024-04-22 PROBLEM — F18.10: Status: ACTIVE | Noted: 2024-04-22

## 2024-04-22 PROCEDURE — 1160F RVW MEDS BY RX/DR IN RCRD: CPT | Performed by: NURSE PRACTITIONER

## 2024-04-22 PROCEDURE — 3079F DIAST BP 80-89 MM HG: CPT | Performed by: NURSE PRACTITIONER

## 2024-04-22 PROCEDURE — 99214 OFFICE O/P EST MOD 30 MIN: CPT | Performed by: NURSE PRACTITIONER

## 2024-04-22 PROCEDURE — 3077F SYST BP >= 140 MM HG: CPT | Performed by: NURSE PRACTITIONER

## 2024-04-22 PROCEDURE — 1159F MED LIST DOCD IN RCRD: CPT | Performed by: NURSE PRACTITIONER

## 2024-04-22 RX ORDER — CARVEDILOL 12.5 MG/1
12.5 TABLET ORAL 2 TIMES DAILY
Qty: 180 TABLET | Refills: 3 | Status: SHIPPED | OUTPATIENT
Start: 2024-04-22

## 2024-04-22 RX ORDER — AMIODARONE HYDROCHLORIDE 200 MG/1
200 TABLET ORAL DAILY
Qty: 90 TABLET | Refills: 3 | Status: SHIPPED | OUTPATIENT
Start: 2024-04-22

## 2024-04-22 NOTE — PROGRESS NOTES
Chief Complaint  Chronic HFrEF (heart failure with reduced ejection fraction), Follow-up, Atrial Fibrillation, Hypertension, and Hyperlipidemia    Subjective            History of Present Illness  Regulo Sepulveda is a 58-year-old male patient who presents to the office today for hospital follow-up.  He was admitted to [facility] from 4/2/2024 to 4/6/2024 for CHF exacerbation.  He had multiple episodes of nonsustained ventricular tachycardia while on telemetry and was started on amiodarone.  His symptoms improved with diuretic therapy.  He had follow-up with heart failure clinic on 4/16/2024 and send medication adjustments were made at that time.  He admits that he is starting to have more shortness of breath over the last 2 to 3 days.  He admits that since the visit on 4/16/2024 with the heart failure clinic he has not picked up any of the new medications that were prescribed to him.  He denies any new or worsening chest pain, lightheadedness/dizziness, palpitations, or edema.    PMH  Past Medical History:   Diagnosis Date    Anxiety     Asthma     Bipolar affective     Cardiac tamponade     2023    CHF (congestive heart failure)     COPD (chronic obstructive pulmonary disease)     Coronary artery disease     Depression     Diabetes mellitus     Elevated cholesterol     Hypertension     Parkinson disease          ALLERGY  Allergies   Allergen Reactions    Penicillins Shortness Of Breath          SURGICALHX  Past Surgical History:   Procedure Laterality Date    CARDIAC CATHETERIZATION Right 9/17/2023    Procedure: Right and Left Heart Cath;  Surgeon: Ben Grant MD;  Location: [facility];  Service: Cardiovascular;  Laterality: Right;    TESTICLE SURGERY Left     extraction          SOC  Social History     Socioeconomic History    Marital status: Single   Tobacco Use    Smoking status: Every Day     Current packs/day: 0.50     Average packs/day: 0.5 packs/day for 50.1 years (25.1 ttl  pk-yrs)     Types: Cigarettes     Start date: 1/1/1974     Last attempt to quit: 11/2/2023    Smokeless tobacco: Never    Tobacco comments:     At least 10 cigarettes a day   Vaping Use    Vaping status: Former    Substances: Nicotine   Substance and Sexual Activity    Alcohol use: Not Currently    Drug use: Not Currently     Types: Methamphetamines, Marijuana    Sexual activity: Defer         FAMHX  Family History   Problem Relation Age of Onset    Diabetes Mother     Depression Sister     Restless legs syndrome Sister     Insomnia Sister     Sleep walking Sister     Sleep disorder Sister         Night Terrors    Depression Brother           MEDSIGONLY  Current Outpatient Medications on File Prior to Visit   Medication Sig    albuterol sulfate  (90 Base) MCG/ACT inhaler Inhale 2 puffs Every 4 (Four) Hours As Needed for Wheezing or Shortness of Air. Indications: Chronic Obstructive Lung Disease    apixaban (ELIQUIS) 5 MG tablet tablet Take 1 tablet by mouth 2 (Two) Times a Day for 120 days.    atorvastatin (LIPITOR) 40 MG tablet Take 1 tablet by mouth every night at bedtime.    divalproex (DEPAKOTE) 250 MG DR tablet Take 1 tablet by mouth Daily.    Farxiga 5 MG tablet tablet Take 2 tablets by mouth Daily. Indications: Type 2 Diabetes    furosemide (LASIX) 40 MG tablet Take 1 tablet by mouth Daily.    metFORMIN (GLUCOPHAGE) 1000 MG tablet Take 1 tablet by mouth 2 (Two) Times a Day With Meals.    mirtazapine (REMERON) 15 MG tablet Take 1 tablet by mouth Every Night.    sacubitril-valsartan (Entresto) 49-51 MG tablet Take 1 tablet by mouth 2 (Two) Times a Day.    spironolactone (ALDACTONE) 25 MG tablet Take 1 tablet by mouth Daily.    tamsulosin (FLOMAX) 0.4 MG capsule 24 hr capsule Take 1 capsule by mouth Daily for 30 days.    venlafaxine (EFFEXOR) 37.5 MG tablet Take 1 tablet by mouth Daily.    [DISCONTINUED] amiodarone (PACERONE) 200 MG tablet Take 1 tablet by mouth 2 (Two) Times a Day for 7 days, THEN 1  "tablet Daily for 21 days.    [DISCONTINUED] carvedilol (COREG) 12.5 MG tablet Take 1 tablet by mouth 2 (Two) Times a Day.    [DISCONTINUED] carvedilol (COREG) 6.25 MG tablet Take 1 tablet by mouth 2 (Two) Times a Day. 1 tablet in the morning and 2 tablets at night for 1 week and then 2 tablets twice a day till you finish the current supply.  New prescription will be 12.5 mg tablets 1 tablet twice a day.    [DISCONTINUED] dapagliflozin Propanediol (Farxiga) 10 MG tablet Take 10 mg by mouth Daily.     Current Facility-Administered Medications on File Prior to Visit   Medication    ARIPiprazole ER (ABILIFY MAINTENA) IM prefilled syringe 300 mg         Objective   /89 Comment: manual  Pulse 91   Ht 195.6 cm (77.01\")   Wt 73 kg (161 lb)   BMI 19.09 kg/m²       Physical Exam  HENT:      Head: Normocephalic.   Neck:      Vascular: No carotid bruit.   Cardiovascular:      Rate and Rhythm: Normal rate and regular rhythm.      Pulses: Normal pulses.      Heart sounds: Murmur heard.   Pulmonary:      Effort: Pulmonary effort is normal.      Breath sounds: Normal breath sounds.   Musculoskeletal:      Cervical back: Neck supple.      Right lower leg: No edema.      Left lower leg: No edema.   Skin:     General: Skin is dry.   Neurological:      Mental Status: He is alert and oriented to person, place, and time.   Psychiatric:         Behavior: Behavior normal.       Result Review :   The following data was reviewed by: DEV Murphy on 04/22/2024:  proBNP   Date Value Ref Range Status   04/02/2024 30,138.0 (H) 0.0 - 900.0 pg/mL Final     CMP          4/10/2024    04:16   CMP   Glucose 103    BUN 19    Creatinine 1.07    EGFR 80.4    Sodium 140    Potassium 3.7    Chloride 103    Calcium 8.6    Total Protein    Albumin    Globulin    Total Bilirubin    Alkaline Phosphatase    AST (SGOT)    ALT (SGPT)    Albumin/Globulin Ratio    BUN/Creatinine Ratio 17.8    Anion Gap 9.8      CBC w/diff          4/10/2024    " "04:16   CBC w/Diff   WBC 7.35    RBC 4.80    Hemoglobin 13.0    Hematocrit 43.3    MCV 90.2    MCH 27.1    MCHC 30.0    RDW 15.0    Platelets 328    Neutrophil Rel % 56.9    Immature Granulocyte Rel % 0.3    Lymphocyte Rel % 28.2    Monocyte Rel % 10.5    Eosinophil Rel % 2.9    Basophil Rel % 1.2       Lab Results   Component Value Date    TSH 1.020 04/08/2024      Lab Results   Component Value Date    FREET4 1.39 04/08/2024      D-Dimer, Quantitative   Date Value Ref Range Status   12/15/2023 1.50 (H) 0.00 - 0.58 MCGFEU/mL Final     Magnesium   Date Value Ref Range Status   04/10/2024 2.1 1.6 - 2.6 mg/dL Final      No results found for: \"DIGOXIN\"   Lab Results   Component Value Date    TROPONINT 95 (C) 04/08/2024           Lipid Panel          4/9/2024    04:30   Lipid Panel   Total Cholesterol 108    Triglycerides 88    HDL Cholesterol 36    VLDL Cholesterol 17    LDL Cholesterol  55    LDL/HDL Ratio 1.51        Results for orders placed during the hospital encounter of 03/23/24    Adult Transthoracic Echo Complete W/ Cont if Necessary Per Protocol    Interpretation Summary  Diffuse hypokinesis with severely reduced left ventricular systolic function ejection fraction around 25 to 30%.  Mild MR and mild TR.  Organized thrombus still present in the apex of the left ventricle.  Present on previous echo.           Assessment and Plan    Diagnoses and all orders for this visit:    1. Chronic HFrEF (heart failure with reduced ejection fraction) (Primary)  Symptomatically unchanged from when he followed up in the heart failure clinic.  I urged him to go  his new medications today so that he can start feeling better in his breathing.  We talked about reducing fluid and sodium intake, the importance of compression therapy, and the importance of daily weight.  His blood pressure is elevated today so I am going to increase his carvedilol dose to 12.5 mg twice daily. Check blood pressure twice a day for the next two " weeks, blood pressure log provided for patient.  Will review log once available to me will make any necessary medication adjustments needed at that time.  We talked about the possible need for defibrillator however we are going to try medical therapy to see if he can be compliant with medications first to reach GDMT through the heart failure clinic.  -     carvedilol (COREG) 12.5 MG tablet; Take 1 tablet by mouth 2 (Two) Times a Day.  Dispense: 180 tablet; Refill: 3    2. PAF (paroxysmal atrial fibrillation) & 3. LV (left ventricular) mural thrombus  Symptomatically stable at this time and rate controlled, continue amiodarone 200 mg daily.  Continue Eliquis for CVA prevention.    Other orders  -     amiodarone (PACERONE) 200 MG tablet; Take 1 tablet by mouth Daily.  Dispense: 90 tablet; Refill: 3            Follow Up   Return in about 6 months (around 10/22/2024) for Follow up with Dr Carreno.    Patient was given instructions and counseling regarding his condition or for health maintenance advice. Please see specific information pulled into the AVS if appropriate.     Regulo Sepulveda  reports that he has been smoking cigarettes. He started smoking about 50 years ago. He has a 25.1 pack-year smoking history. He has never used smokeless tobacco. I have educated him on the risk of diseases from using tobacco products such as cancer, COPD, and heart disease.     I advised him to quit and he is not willing to quit.    I spent 3  minutes counseling the patient.           Lin Tamez, APRN  04/22/24  09:33 EDT    Dictated Utilizing Dragon Dictation

## 2024-04-23 ENCOUNTER — PATIENT OUTREACH (OUTPATIENT)
Dept: CASE MANAGEMENT | Facility: OTHER | Age: 59
End: 2024-04-23
Payer: COMMERCIAL

## 2024-04-23 ENCOUNTER — TELEPHONE (OUTPATIENT)
Dept: SLEEP MEDICINE | Facility: HOSPITAL | Age: 59
End: 2024-04-23
Payer: COMMERCIAL

## 2024-04-23 DIAGNOSIS — I50.43 ACUTE ON CHRONIC COMBINED SYSTOLIC AND DIASTOLIC CHF (CONGESTIVE HEART FAILURE): Primary | ICD-10-CM

## 2024-04-23 DIAGNOSIS — J44.9 CHRONIC OBSTRUCTIVE PULMONARY DISEASE, UNSPECIFIED COPD TYPE: ICD-10-CM

## 2024-04-23 NOTE — OUTREACH NOTE
AMBULATORY CASE MANAGEMENT NOTE    Names and Relationships of Patient/Support Persons: Caller: Regulo Sepulveda; Relationship: Self -     Patient Outreach    Patient answered phone and discussed current medical status. Stated he was hospitalized with 2 strokes two weeks ago, and has had issues with his speech since then. Patient stated he was able to get his insurance reinstated, but it doesn't cover home health or inpatient rehab. He stated he was told he can go to Dr Landry office and get IV meds or get his lung fluid drained if he needs it.  RN educated on services office provides, able to do IV infusion and draw labs, however unable to draw fluid off lungs in office setting. Educated on signs and symptoms of respiratory distress to report to MD or call 911.     Patient stated his sister has moved out of the trailer and he does have someone in home to assist him. Stated he would like to have surgery on his lungs to feel better and breathe easier. RN encouraged to speak about options with MD at next visit.     Patient stated he was able to get all his meds filled and has them in home. Was not agreeable to get meds to review at this time. RN stated we will discuss at next call. Patient verbalized understanding.     Education Documentation  No documentation found.        Alesha HERBERT  Ambulatory Case Management    4/23/2024, 09:54 EDT

## 2024-04-23 NOTE — TELEPHONE ENCOUNTER
Called patient on both numbers documented   to discuss sleep study results, unable to leave message.

## 2024-04-30 ENCOUNTER — TELEPHONE (OUTPATIENT)
Dept: SLEEP MEDICINE | Facility: HOSPITAL | Age: 59
End: 2024-04-30
Payer: COMMERCIAL

## 2024-04-30 ENCOUNTER — TELEPHONE (OUTPATIENT)
Dept: CASE MANAGEMENT | Facility: OTHER | Age: 59
End: 2024-04-30
Payer: COMMERCIAL

## 2024-04-30 NOTE — TELEPHONE ENCOUNTER
Called patient to discuss medications with HRCM program. No answer, received notification VM has not been set up.     RN will attempt call next week to continue discussion.     Alesha HERBERT RN  Ambulatory

## 2024-05-07 ENCOUNTER — TELEPHONE (OUTPATIENT)
Dept: CASE MANAGEMENT | Facility: OTHER | Age: 59
End: 2024-05-07
Payer: COMMERCIAL

## 2024-05-07 ENCOUNTER — OFFICE VISIT (OUTPATIENT)
Dept: CARDIOLOGY | Facility: CLINIC | Age: 59
End: 2024-05-07
Payer: COMMERCIAL

## 2024-05-07 VITALS
DIASTOLIC BLOOD PRESSURE: 84 MMHG | HEIGHT: 77 IN | SYSTOLIC BLOOD PRESSURE: 130 MMHG | WEIGHT: 149 LBS | BODY MASS INDEX: 17.59 KG/M2 | HEART RATE: 74 BPM

## 2024-05-07 DIAGNOSIS — I51.3 LV (LEFT VENTRICULAR) MURAL THROMBUS: ICD-10-CM

## 2024-05-07 DIAGNOSIS — I50.22 CHRONIC HFREF (HEART FAILURE WITH REDUCED EJECTION FRACTION): Primary | ICD-10-CM

## 2024-05-07 DIAGNOSIS — I48.0 PAF (PAROXYSMAL ATRIAL FIBRILLATION): ICD-10-CM

## 2024-05-07 DIAGNOSIS — E78.2 MIXED HYPERLIPIDEMIA: ICD-10-CM

## 2024-05-07 DIAGNOSIS — F17.219 CIGARETTE NICOTINE DEPENDENCE WITH NICOTINE-INDUCED DISORDER: ICD-10-CM

## 2024-05-07 LAB
ALBUMIN SERPL-MCNC: 3.6 G/DL (ref 3.5–5.2)
ALBUMIN/GLOB SERPL: 1.4 G/DL
ALP SERPL-CCNC: 174 U/L (ref 39–117)
ALT SERPL W P-5'-P-CCNC: 10 U/L (ref 1–41)
ANION GAP SERPL CALCULATED.3IONS-SCNC: 8.3 MMOL/L (ref 5–15)
AST SERPL-CCNC: 13 U/L (ref 1–40)
BASOPHILS # BLD AUTO: 0.06 10*3/MM3 (ref 0–0.2)
BASOPHILS NFR BLD AUTO: 0.8 % (ref 0–1.5)
BILIRUB SERPL-MCNC: 0.4 MG/DL (ref 0–1.2)
BUN SERPL-MCNC: 20 MG/DL (ref 6–20)
BUN/CREAT SERPL: 15.9 (ref 7–25)
CALCIUM SPEC-SCNC: 9.2 MG/DL (ref 8.6–10.5)
CHLORIDE SERPL-SCNC: 102 MMOL/L (ref 98–107)
CO2 SERPL-SCNC: 30.7 MMOL/L (ref 22–29)
CREAT SERPL-MCNC: 1.26 MG/DL (ref 0.76–1.27)
DEPRECATED RDW RBC AUTO: 52.1 FL (ref 37–54)
EGFRCR SERPLBLD CKD-EPI 2021: 66.1 ML/MIN/1.73
EOSINOPHIL # BLD AUTO: 0.09 10*3/MM3 (ref 0–0.4)
EOSINOPHIL NFR BLD AUTO: 1.3 % (ref 0.3–6.2)
ERYTHROCYTE [DISTWIDTH] IN BLOOD BY AUTOMATED COUNT: 16 % (ref 12.3–15.4)
GLOBULIN UR ELPH-MCNC: 2.6 GM/DL
GLUCOSE SERPL-MCNC: 164 MG/DL (ref 65–99)
HCT VFR BLD AUTO: 43.2 % (ref 37.5–51)
HGB BLD-MCNC: 13.1 G/DL (ref 13–17.7)
IMM GRANULOCYTES # BLD AUTO: 0.02 10*3/MM3 (ref 0–0.05)
IMM GRANULOCYTES NFR BLD AUTO: 0.3 % (ref 0–0.5)
IRON 24H UR-MRATE: 26 MCG/DL (ref 59–158)
IRON SATN MFR SERPL: 7 % (ref 20–50)
LYMPHOCYTES # BLD AUTO: 1.41 10*3/MM3 (ref 0.7–3.1)
LYMPHOCYTES NFR BLD AUTO: 19.8 % (ref 19.6–45.3)
MAGNESIUM SERPL-MCNC: 1.7 MG/DL (ref 1.6–2.6)
MCH RBC QN AUTO: 26.8 PG (ref 26.6–33)
MCHC RBC AUTO-ENTMCNC: 30.3 G/DL (ref 31.5–35.7)
MCV RBC AUTO: 88.5 FL (ref 79–97)
MONOCYTES # BLD AUTO: 0.56 10*3/MM3 (ref 0.1–0.9)
MONOCYTES NFR BLD AUTO: 7.9 % (ref 5–12)
NEUTROPHILS NFR BLD AUTO: 4.97 10*3/MM3 (ref 1.7–7)
NEUTROPHILS NFR BLD AUTO: 69.9 % (ref 42.7–76)
NRBC BLD AUTO-RTO: 0 /100 WBC (ref 0–0.2)
NT-PROBNP SERPL-MCNC: 8104 PG/ML (ref 0–900)
PLATELET # BLD AUTO: 333 10*3/MM3 (ref 140–450)
PMV BLD AUTO: 10.5 FL (ref 6–12)
POTASSIUM SERPL-SCNC: 3.8 MMOL/L (ref 3.5–5.2)
PROT SERPL-MCNC: 6.2 G/DL (ref 6–8.5)
RBC # BLD AUTO: 4.88 10*6/MM3 (ref 4.14–5.8)
SODIUM SERPL-SCNC: 141 MMOL/L (ref 136–145)
TIBC SERPL-MCNC: 387 MCG/DL (ref 298–536)
TRANSFERRIN SERPL-MCNC: 260 MG/DL (ref 200–360)
WBC NRBC COR # BLD AUTO: 7.11 10*3/MM3 (ref 3.4–10.8)

## 2024-05-07 PROCEDURE — 83540 ASSAY OF IRON: CPT | Performed by: INTERNAL MEDICINE

## 2024-05-07 PROCEDURE — 80053 COMPREHEN METABOLIC PANEL: CPT | Performed by: INTERNAL MEDICINE

## 2024-05-07 PROCEDURE — 84466 ASSAY OF TRANSFERRIN: CPT | Performed by: INTERNAL MEDICINE

## 2024-05-07 PROCEDURE — 83880 ASSAY OF NATRIURETIC PEPTIDE: CPT | Performed by: INTERNAL MEDICINE

## 2024-05-07 PROCEDURE — 85025 COMPLETE CBC W/AUTO DIFF WBC: CPT | Performed by: INTERNAL MEDICINE

## 2024-05-07 PROCEDURE — 82306 VITAMIN D 25 HYDROXY: CPT | Performed by: INTERNAL MEDICINE

## 2024-05-07 PROCEDURE — 83735 ASSAY OF MAGNESIUM: CPT | Performed by: INTERNAL MEDICINE

## 2024-05-07 RX ORDER — PANTOPRAZOLE SODIUM 40 MG/1
40 TABLET, DELAYED RELEASE ORAL DAILY
COMMUNITY

## 2024-05-07 RX ORDER — CARVEDILOL 12.5 MG/1
25 TABLET ORAL 2 TIMES DAILY
Qty: 180 TABLET | Refills: 3 | Status: SHIPPED | OUTPATIENT
Start: 2024-05-07

## 2024-05-07 RX ORDER — TAMSULOSIN HYDROCHLORIDE 0.4 MG/1
1 CAPSULE ORAL DAILY
COMMUNITY

## 2024-05-13 LAB
25(OH)D2 SERPL-MCNC: <1 NG/ML
25(OH)D3 SERPL-MCNC: 22 NG/ML
25(OH)D3+25(OH)D2 SERPL-MCNC: 22 NG/ML

## 2024-05-14 ENCOUNTER — PATIENT OUTREACH (OUTPATIENT)
Dept: CASE MANAGEMENT | Facility: OTHER | Age: 59
End: 2024-05-14
Payer: COMMERCIAL

## 2024-05-14 NOTE — OUTREACH NOTE
AMBULATORY CASE MANAGEMENT NOTE    Names and Relationships of Patient/Support Persons: Caller: Regulo Sepulveda; Relationship: Self  Caller: Regulo Sepulveda; Relationship: Self  Caller: Regulo Sepulveda; Relationship: Self -     Patient Outreach  Called to discuss meds and home health. Patient will not answer, unable to leave VM. Called sister, no answer.     If unable to see tomorrow in person at dr reese office, will close case.    Education Documentation  No documentation found.        Alesha HERBERT  Ambulatory Case Management    5/14/2024, 09:09 EDT

## 2024-05-15 ENCOUNTER — PATIENT OUTREACH (OUTPATIENT)
Dept: CASE MANAGEMENT | Facility: OTHER | Age: 59
End: 2024-05-15
Payer: COMMERCIAL

## 2024-05-15 ENCOUNTER — OFFICE VISIT (OUTPATIENT)
Dept: FAMILY MEDICINE CLINIC | Facility: CLINIC | Age: 59
End: 2024-05-15
Payer: COMMERCIAL

## 2024-05-15 VITALS
WEIGHT: 149 LBS | HEIGHT: 77 IN | SYSTOLIC BLOOD PRESSURE: 120 MMHG | HEART RATE: 78 BPM | OXYGEN SATURATION: 98 % | TEMPERATURE: 97 F | BODY MASS INDEX: 17.59 KG/M2 | DIASTOLIC BLOOD PRESSURE: 70 MMHG

## 2024-05-15 DIAGNOSIS — E44.0 MODERATE PROTEIN-CALORIE MALNUTRITION: ICD-10-CM

## 2024-05-15 DIAGNOSIS — Z12.11 SCREENING FOR COLON CANCER: ICD-10-CM

## 2024-05-15 DIAGNOSIS — I50.43 ACUTE ON CHRONIC COMBINED SYSTOLIC AND DIASTOLIC CHF (CONGESTIVE HEART FAILURE): Primary | ICD-10-CM

## 2024-05-15 DIAGNOSIS — I25.83 CORONARY ARTERY DISEASE DUE TO LIPID RICH PLAQUE: ICD-10-CM

## 2024-05-15 DIAGNOSIS — Z00.00 ANNUAL PHYSICAL EXAM: Primary | ICD-10-CM

## 2024-05-15 DIAGNOSIS — F32.A DEPRESSION, UNSPECIFIED DEPRESSION TYPE: ICD-10-CM

## 2024-05-15 DIAGNOSIS — I25.10 CORONARY ARTERY DISEASE DUE TO LIPID RICH PLAQUE: ICD-10-CM

## 2024-05-15 DIAGNOSIS — E11.9 TYPE 2 DIABETES MELLITUS WITHOUT COMPLICATION, WITHOUT LONG-TERM CURRENT USE OF INSULIN: ICD-10-CM

## 2024-05-15 DIAGNOSIS — D50.9 IRON DEFICIENCY ANEMIA, UNSPECIFIED IRON DEFICIENCY ANEMIA TYPE: ICD-10-CM

## 2024-05-15 DIAGNOSIS — J44.9 CHRONIC OBSTRUCTIVE PULMONARY DISEASE, UNSPECIFIED COPD TYPE: ICD-10-CM

## 2024-05-15 DIAGNOSIS — E46 MALNUTRITION, UNSPECIFIED TYPE: ICD-10-CM

## 2024-05-15 PROCEDURE — 1160F RVW MEDS BY RX/DR IN RCRD: CPT | Performed by: STUDENT IN AN ORGANIZED HEALTH CARE EDUCATION/TRAINING PROGRAM

## 2024-05-15 PROCEDURE — 3074F SYST BP LT 130 MM HG: CPT | Performed by: STUDENT IN AN ORGANIZED HEALTH CARE EDUCATION/TRAINING PROGRAM

## 2024-05-15 PROCEDURE — 1159F MED LIST DOCD IN RCRD: CPT | Performed by: STUDENT IN AN ORGANIZED HEALTH CARE EDUCATION/TRAINING PROGRAM

## 2024-05-15 PROCEDURE — 3044F HG A1C LEVEL LT 7.0%: CPT | Performed by: STUDENT IN AN ORGANIZED HEALTH CARE EDUCATION/TRAINING PROGRAM

## 2024-05-15 PROCEDURE — 3078F DIAST BP <80 MM HG: CPT | Performed by: STUDENT IN AN ORGANIZED HEALTH CARE EDUCATION/TRAINING PROGRAM

## 2024-05-15 PROCEDURE — 1125F AMNT PAIN NOTED PAIN PRSNT: CPT | Performed by: STUDENT IN AN ORGANIZED HEALTH CARE EDUCATION/TRAINING PROGRAM

## 2024-05-15 PROCEDURE — 99214 OFFICE O/P EST MOD 30 MIN: CPT | Performed by: STUDENT IN AN ORGANIZED HEALTH CARE EDUCATION/TRAINING PROGRAM

## 2024-05-15 RX ORDER — FERROUS GLUCONATE 324(38)MG
324 TABLET ORAL
Qty: 90 TABLET | Refills: 1 | Status: SHIPPED | OUTPATIENT
Start: 2024-05-15

## 2024-05-15 NOTE — PROGRESS NOTES
"Chief Complaint  Follow-up    Subjective      History of Present Illness    Regulo Sepulveda is a 58 y.o. male who presents to [facility]   history of combined systolic and diastolic HFrEF 20%, LV thrombus on Eliquis, AFib, COPD, Presents for 1 month follow-up.  He has seen cardiology and heart failure specialist.  Patient is breathing much better is feeling improved.  Is gained 6 pounds has been eating better.  His weakness has improved since last month.  Patient complains that he never received his      Objective   Vital Signs:   Vitals:    05/15/24 1453   BP: 120/70   Pulse: 78   Temp: 97 °F (36.1 °C)   SpO2: 98%   Weight: 67.6 kg (149 lb)   Height: 195.6 cm (77.01\")     Body mass index is 17.66 kg/m².    Wt Readings from Last 3 Encounters:   05/15/24 67.6 kg (149 lb)   05/07/24 67.6 kg (149 lb)   04/22/24 73 kg (161 lb)     BP Readings from Last 3 Encounters:   05/15/24 120/70   05/07/24 130/84   04/22/24 142/89       Health Maintenance   Topic Date Due   • COLORECTAL CANCER SCREENING  Never done   • ANNUAL PHYSICAL  Never done   • Pneumococcal Vaccine 0-64 (2 of 2 - PCV) 05/21/2024 (Originally 10/13/2022)   • COVID-19 Vaccine (4 - 2023-24 season) 07/05/2024 (Originally 9/1/2023)   • ZOSTER VACCINE (2 of 2) 01/31/2025 (Originally 8/16/2022)   • Hepatitis B (2 of 3 - 19+ 3-dose series) 04/15/2025 (Originally 7/19/2022)   • INFLUENZA VACCINE  08/01/2024   • HEMOGLOBIN A1C  10/09/2024   • DIABETIC EYE EXAM  01/05/2025   • DIABETIC FOOT EXAM  02/28/2025   • URINE MICROALBUMIN  03/29/2025   • LUNG CANCER SCREENING  04/03/2025   • LIPID PANEL  04/09/2025   • BMI FOLLOWUP  04/15/2025   • TDAP/TD VACCINES (3 - Td or Tdap) 06/21/2032   • HEPATITIS C SCREENING  Completed       Physical Exam     Result Review :     The following data was reviewed by: Dickson Landry MD on 05/15/2024:      Procedures          Diagnoses and all orders for this visit:    1. Annual physical exam (Primary)    2. Iron " deficiency anemia, unspecified iron deficiency anemia type  -     ferrous gluconate (FERGON) 324 MG tablet; Take 1 tablet by mouth Daily With Breakfast.  Dispense: 90 tablet; Refill: 1    3. Malnutrition, unspecified type  -     DME ORAL SUPPLEMENTS    4. Moderate protein-calorie malnutrition  -     DME ORAL SUPPLEMENTS    5. Type 2 diabetes mellitus without complication, without long-term current use of insulin    6. Chronic obstructive pulmonary disease, unspecified COPD type    7. Coronary artery disease due to lipid rich plaque    8. Screening for colon cancer  -     Cologuard - Stool, Per Rectum; Future    Patient is agreeable to do Cologuard.  We discussed taking iron every other day Monday Wednesday and Friday.              FOLLOW UP  Return in about 4 weeks (around 6/12/2024).  Patient was given instructions and counseling regarding his condition or for health maintenance advice. Please see specific information pulled into the AVS if appropriate.       Dickson Landry MD  05/15/24  15:56 EDT    CURRENT & DISCONTINUED MEDICATIONS  Current Outpatient Medications   Medication Instructions   • albuterol sulfate  (90 Base) MCG/ACT inhaler 2 puffs, Inhalation, Every 4 Hours PRN   • amiodarone (PACERONE) 200 mg, Oral, Daily   • apixaban (ELIQUIS) 5 mg, Oral, 2 Times Daily   • atorvastatin (LIPITOR) 40 mg, Oral, Every Night at Bedtime   • carvedilol (COREG) 25 mg, Oral, 2 Times Daily   • divalproex (DEPAKOTE) 250 mg, Oral, Daily   • Farxiga 10 mg, Oral, Daily   • ferrous gluconate (FERGON) 324 mg, Oral, Daily With Breakfast   • furosemide (LASIX) 20 mg, Oral, Every Other Day   • metFORMIN (GLUCOPHAGE) 1,000 mg, Oral, 2 Times Daily With Meals   • mirtazapine (REMERON) 15 mg, Oral, Nightly   • nicotine (Nicoderm CQ) 7 MG/24HR patch 1 patch, Transdermal, Every 24 Hours   • pantoprazole (PROTONIX) 40 mg, Oral, Daily   • spironolactone (ALDACTONE) 25 mg, Oral, Daily   • tamsulosin (FLOMAX) 0.4 MG capsule 24 hr  capsule 1 capsule, Oral, Daily   • venlafaxine (EFFEXOR) 37.5 mg, Oral, Daily       There are no discontinued medications.

## 2024-05-15 NOTE — OUTREACH NOTE
AMBULATORY CASE MANAGEMENT NOTE    Names and Relationships of Patient/Support Persons:  -     CCM Interim Update    Met with patient and his sister in office. Patient reports improvement in health, has started iron pills and stated he has more energy. Stated he was feeling nauseated and weak, but since starting medication the symptoms have improved. Patient stated at one time he was 245 pounds but has lost a lot of weight. His sister stated his color is better and he isn't appearing as sick has he has been recently. He's keeping MD appts, and recent cardio appt showing patient labs are better.     Patient stated he needs food stamp assistance. He has been eating roommate food at times and it's caused friction between them. He is waiting for the paperwork to be signed on the sale of the trailer and land he had. [place] stated the paperwork should be in any day, then he can proceed to get food stamps. RN discussed having  involved and both patient and sister verbalized agreement. Insurance changed to [employer]. Patient agreeable and consented to CCM program.     Patient and RN discussed prior mental health history. Patient is taking Abilify monthly and stated it's been doing well. RN educated on symptoms to report including racing thoughts or cutting to RN or MD. Educated on resources to reach out to. Patient verbalized understanding.       Care Coordination  Patient referral sent to [employer] for assistance with food stamps, food bank and home meal delivery for patient.       Adult Patient Profile  Questions/Answers      Flowsheet Row Most Recent Value   Symptoms/Conditions Managed at Home behavioral health, cardiovascular   Barriers to Managing Health financial resources   Cardiovascular Symptoms/Conditions heart failure   Cardiovascular Management Strategies medication therapy   Cardiovascular Self-Management Outcome 3 (uncertain)   Behavioral Health Symptoms/Conditions anxiety, depression    Behavioral Management Strategies abstinence from substances, community resources, coping strategies, medication therapy   Behavioral Health Self-Management Outcome 4 (good)   Behavioral Health Comment prior cutter, denies recent cutting   Q1: How often do you have a drink containing alcohol? Never   Q3: How often do you have six or more drinks on one occasion? Never        SDOH updated and reviewed with the patient during this program:  --     Alcohol Use: Not At Risk (5/15/2024)    AUDIT-C     Frequency of Alcohol Consumption: Never     Average Number of Drinks: Patient does not drink     Frequency of Binge Drinking: Never      --     Depression: Not at risk (4/5/2024)    PHQ-2     PHQ-2 Score: 0   Recent Concern: Depression - At risk (3/24/2024)    PHQ-2     PHQ-2 Score: 6      --     Disabilities: Not At Risk (4/9/2024)    Disabilities     Concentrating, Remembering, or Making Decisions Difficulty: no     Doing Errands Independently Difficulty: no   Recent Concern: Disabilities - At Risk (4/3/2024)    Disabilities     Concentrating, Remembering, or Making Decisions Difficulty: yes     Doing Errands Independently Difficulty: no      --     Employment: Not At Risk (4/5/2024)    Employment     Do you want help finding or keeping work or a job?: I do not need or want help      Financial Resource Strain: Low Risk  (5/15/2024)    Overall Financial Resource Strain (CARDIA)     Difficulty of Paying Living Expenses: Not hard at all      --     Food Insecurity: Food Insecurity Present (5/15/2024)    Hunger Vital Sign     Worried About Running Out of Food in the Last Year: Sometimes true     Ran Out of Food in the Last Year: Sometimes true      --     Health Literacy: Not At Risk (4/9/2024)    Education     Help with school or training?: No     Preferred Language: English      --     Housing Stability: Not At Risk (4/9/2024)    Housing Stability     Current Living Arrangements: home     Potentially Unsafe Housing  Conditions: none   Recent Concern: Housing Stability - High Risk (4/2/2024)    Housing Stability Vital Sign     Unable to Pay for Housing in the Last Year: Yes     Homeless in the Last Year: No      --     Interpersonal Safety: Not At Risk (5/15/2024)    Humiliation, Afraid, Rape, and Kick questionnaire     Fear of Current or Ex-Partner: No     Emotionally Abused: No     Physically Abused: No     Sexually Abused: No      --     Physical Activity: Inactive (5/15/2024)    Exercise Vital Sign     Days of Exercise per Week: 0 days     Minutes of Exercise per Session: 0 min      --     Social Connections: Unknown (5/15/2024)    Social Connection and Isolation Panel [NHANES]     Frequency of Communication with Friends and Family: Once a week     Frequency of Social Gatherings with Friends and Family: Once a week     Attends [employer] Services: Never     Active Member of Clubs or Organizations: Yes     Attends Club or Organization Meetings: Never   Recent Concern: Social Connections - At Risk (3/24/2024)    Family and Community Support     Help with Day-to-Day Activities: I don't need any help     Lonely or Isolated: Often      --     Stress: No Stress Concern Present (5/15/2024)    [employer] - Occupational Stress Questionnaire     Feeling of Stress : Only a little      --     Tobacco Use: High Risk (5/15/2024)    Patient History     Smoking Tobacco Use: Every Day     Smokeless Tobacco Use: Never      --     Transportation Needs: No Transportation Needs (4/9/2024)    PRAPARE - Transportation     Lack of Transportation (Medical): No     Lack of Transportation (Non-Medical): No      --     Utilities: Not At Risk (5/15/2024)    [facility] Utilities     Threatened with loss of utilities: No       Education Documentation  No documentation found.        Alesha HERBERT  Ambulatory Case Management    5/15/2024, 15:30 EDT

## 2024-05-22 ENCOUNTER — PATIENT OUTREACH (OUTPATIENT)
Dept: CASE MANAGEMENT | Facility: OTHER | Age: 59
End: 2024-05-22
Payer: COMMERCIAL

## 2024-05-22 ENCOUNTER — TELEPHONE (OUTPATIENT)
Dept: CASE MANAGEMENT | Facility: OTHER | Age: 59
End: 2024-05-22
Payer: COMMERCIAL

## 2024-05-22 DIAGNOSIS — F32.A DEPRESSION, UNSPECIFIED DEPRESSION TYPE: ICD-10-CM

## 2024-05-22 DIAGNOSIS — I50.43 ACUTE ON CHRONIC COMBINED SYSTOLIC AND DIASTOLIC CHF (CONGESTIVE HEART FAILURE): Primary | ICD-10-CM

## 2024-05-22 NOTE — OUTREACH NOTE
AMBULATORY CASE MANAGEMENT NOTE    Names and Relationships of Patient/Support Persons: Caller: Regulo Sepulveda; Relationship: Self  Caller: Regulo Sepulveda; Relationship: Self -     [facility] Interim Update  Call back received from patient sister, Aurora. She stated she hasn't heard from [facility] at this time about food stamps. Informed VM is stating full and unable to leave any messages. Aurora stated she will empty so VM can be left. She stated Regulo is doing alright, just needs food at this time.     RN notified reach out to [facility] and that Delmi will be calling to follow up, per epic chat response. Aurora verbalized agreement.     Care Coordination  Epic chatted Delmi Motley MSW and requested status of referral for food and food stamps.      Education Documentation  No documentation found.        Alesha HERBERT  Ambulatory Case Management    5/22/2024, 10:24 EDT

## 2024-05-22 NOTE — TELEPHONE ENCOUNTER
Called patient to follow up on referral and meals. No answer, unable to leave message as VM has not been set up.     Will attempt again next week.     Alesha HERBERT RN  Ambulatory

## 2024-05-23 ENCOUNTER — PATIENT OUTREACH (OUTPATIENT)
Age: 59
End: 2024-05-23
Payer: COMMERCIAL

## 2024-05-23 NOTE — OUTREACH NOTE
MSW attempted to reach patient's sister and VM is not set up. Unable to leave message.    Delmi GILMORE -   Ambulatory Case Management    5/23/2024, 14:43 EDT

## 2024-05-28 ENCOUNTER — HOSPITAL ENCOUNTER (INPATIENT)
Facility: HOSPITAL | Age: 59
LOS: 3 days | Discharge: HOME | DRG: 291 | End: 2024-05-31
Attending: EMERGENCY MEDICINE | Admitting: STUDENT IN AN ORGANIZED HEALTH CARE EDUCATION/TRAINING PROGRAM
Payer: COMMERCIAL

## 2024-05-28 ENCOUNTER — TELEPHONE (OUTPATIENT)
Dept: SLEEP MEDICINE | Facility: HOSPITAL | Age: 59
End: 2024-05-28
Payer: COMMERCIAL

## 2024-05-28 ENCOUNTER — PATIENT OUTREACH (OUTPATIENT)
Age: 59
End: 2024-05-28
Payer: COMMERCIAL

## 2024-05-28 ENCOUNTER — APPOINTMENT (OUTPATIENT)
Dept: GENERAL RADIOLOGY | Facility: HOSPITAL | Age: 59
DRG: 291 | End: 2024-05-28
Payer: COMMERCIAL

## 2024-05-28 DIAGNOSIS — I50.9 ACUTE ON CHRONIC CONGESTIVE HEART FAILURE, UNSPECIFIED HEART FAILURE TYPE: Primary | ICD-10-CM

## 2024-05-28 DIAGNOSIS — R26.2 DIFFICULTY WALKING: ICD-10-CM

## 2024-05-28 DIAGNOSIS — Z78.9 FAILURE OF OUTPATIENT TREATMENT: ICD-10-CM

## 2024-05-28 LAB
ALBUMIN SERPL-MCNC: 4 G/DL (ref 3.5–5.2)
ALBUMIN/GLOB SERPL: 1.3 G/DL
ALP SERPL-CCNC: 235 U/L (ref 39–117)
ALT SERPL W P-5'-P-CCNC: 32 U/L (ref 1–41)
ANION GAP SERPL CALCULATED.3IONS-SCNC: 15.3 MMOL/L (ref 5–15)
AST SERPL-CCNC: 15 U/L (ref 1–40)
BASOPHILS # BLD AUTO: 0.06 10*3/MM3 (ref 0–0.2)
BASOPHILS NFR BLD AUTO: 0.5 % (ref 0–1.5)
BILIRUB SERPL-MCNC: 1.8 MG/DL (ref 0–1.2)
BUN SERPL-MCNC: 24 MG/DL (ref 6–20)
BUN/CREAT SERPL: 17.1 (ref 7–25)
CALCIUM SPEC-SCNC: 9.3 MG/DL (ref 8.6–10.5)
CHLORIDE SERPL-SCNC: 103 MMOL/L (ref 98–107)
CO2 SERPL-SCNC: 21.7 MMOL/L (ref 22–29)
CREAT SERPL-MCNC: 1.4 MG/DL (ref 0.76–1.27)
DEPRECATED RDW RBC AUTO: 55.5 FL (ref 37–54)
EGFRCR SERPLBLD CKD-EPI 2021: 58.3 ML/MIN/1.73
EOSINOPHIL # BLD AUTO: 0 10*3/MM3 (ref 0–0.4)
EOSINOPHIL NFR BLD AUTO: 0 % (ref 0.3–6.2)
ERYTHROCYTE [DISTWIDTH] IN BLOOD BY AUTOMATED COUNT: 17.8 % (ref 12.3–15.4)
GLOBULIN UR ELPH-MCNC: 3.2 GM/DL
GLUCOSE SERPL-MCNC: 127 MG/DL (ref 65–99)
HCT VFR BLD AUTO: 42.5 % (ref 37.5–51)
HGB BLD-MCNC: 13.3 G/DL (ref 13–17.7)
HOLD SPECIMEN: NORMAL
HOLD SPECIMEN: NORMAL
IMM GRANULOCYTES # BLD AUTO: 0.05 10*3/MM3 (ref 0–0.05)
IMM GRANULOCYTES NFR BLD AUTO: 0.4 % (ref 0–0.5)
LYMPHOCYTES # BLD AUTO: 0.69 10*3/MM3 (ref 0.7–3.1)
LYMPHOCYTES NFR BLD AUTO: 5.3 % (ref 19.6–45.3)
MCH RBC QN AUTO: 27.4 PG (ref 26.6–33)
MCHC RBC AUTO-ENTMCNC: 31.3 G/DL (ref 31.5–35.7)
MCV RBC AUTO: 87.4 FL (ref 79–97)
MONOCYTES # BLD AUTO: 0.89 10*3/MM3 (ref 0.1–0.9)
MONOCYTES NFR BLD AUTO: 6.8 % (ref 5–12)
NEUTROPHILS NFR BLD AUTO: 11.38 10*3/MM3 (ref 1.7–7)
NEUTROPHILS NFR BLD AUTO: 87 % (ref 42.7–76)
NRBC BLD AUTO-RTO: 0 /100 WBC (ref 0–0.2)
NT-PROBNP SERPL-MCNC: ABNORMAL PG/ML (ref 0–900)
PLATELET # BLD AUTO: 395 10*3/MM3 (ref 140–450)
PMV BLD AUTO: 10.2 FL (ref 6–12)
POTASSIUM SERPL-SCNC: 4.6 MMOL/L (ref 3.5–5.2)
PROCALCITONIN SERPL-MCNC: 0.07 NG/ML (ref 0–0.25)
PROT SERPL-MCNC: 7.2 G/DL (ref 6–8.5)
QT INTERVAL: 373 MS
QTC INTERVAL: 485 MS
RBC # BLD AUTO: 4.86 10*6/MM3 (ref 4.14–5.8)
SODIUM SERPL-SCNC: 140 MMOL/L (ref 136–145)
TROPONIN T SERPL HS-MCNC: 16 NG/L
WBC NRBC COR # BLD AUTO: 13.07 10*3/MM3 (ref 3.4–10.8)
WHOLE BLOOD HOLD COAG: NORMAL
WHOLE BLOOD HOLD SPECIMEN: NORMAL

## 2024-05-28 PROCEDURE — 93005 ELECTROCARDIOGRAM TRACING: CPT

## 2024-05-28 PROCEDURE — 99285 EMERGENCY DEPT VISIT HI MDM: CPT

## 2024-05-28 PROCEDURE — 84484 ASSAY OF TROPONIN QUANT: CPT

## 2024-05-28 PROCEDURE — 25010000002 AZITHROMYCIN PER 500 MG: Performed by: STUDENT IN AN ORGANIZED HEALTH CARE EDUCATION/TRAINING PROGRAM

## 2024-05-28 PROCEDURE — 99223 1ST HOSP IP/OBS HIGH 75: CPT | Performed by: STUDENT IN AN ORGANIZED HEALTH CARE EDUCATION/TRAINING PROGRAM

## 2024-05-28 PROCEDURE — 84145 PROCALCITONIN (PCT): CPT | Performed by: STUDENT IN AN ORGANIZED HEALTH CARE EDUCATION/TRAINING PROGRAM

## 2024-05-28 PROCEDURE — 87040 BLOOD CULTURE FOR BACTERIA: CPT | Performed by: STUDENT IN AN ORGANIZED HEALTH CARE EDUCATION/TRAINING PROGRAM

## 2024-05-28 PROCEDURE — 83880 ASSAY OF NATRIURETIC PEPTIDE: CPT

## 2024-05-28 PROCEDURE — 25810000003 SODIUM CHLORIDE 0.9 % SOLUTION: Performed by: STUDENT IN AN ORGANIZED HEALTH CARE EDUCATION/TRAINING PROGRAM

## 2024-05-28 PROCEDURE — 85025 COMPLETE CBC W/AUTO DIFF WBC: CPT

## 2024-05-28 PROCEDURE — 25010000002 FUROSEMIDE PER 20 MG: Performed by: EMERGENCY MEDICINE

## 2024-05-28 PROCEDURE — 80053 COMPREHEN METABOLIC PANEL: CPT

## 2024-05-28 PROCEDURE — 71045 X-RAY EXAM CHEST 1 VIEW: CPT

## 2024-05-28 PROCEDURE — 93005 ELECTROCARDIOGRAM TRACING: CPT | Performed by: EMERGENCY MEDICINE

## 2024-05-28 RX ORDER — FUROSEMIDE 10 MG/ML
60 INJECTION INTRAMUSCULAR; INTRAVENOUS ONCE
Status: COMPLETED | OUTPATIENT
Start: 2024-05-28 | End: 2024-05-28

## 2024-05-28 RX ORDER — DIVALPROEX SODIUM 250 MG/1
250 TABLET, DELAYED RELEASE ORAL DAILY
Status: DISCONTINUED | OUTPATIENT
Start: 2024-05-29 | End: 2024-05-29

## 2024-05-28 RX ORDER — CARVEDILOL 25 MG/1
25 TABLET ORAL 2 TIMES DAILY
Status: DISCONTINUED | OUTPATIENT
Start: 2024-05-29 | End: 2024-05-31 | Stop reason: HOSPADM

## 2024-05-28 RX ORDER — AMIODARONE HYDROCHLORIDE 200 MG/1
200 TABLET ORAL DAILY
Status: DISCONTINUED | OUTPATIENT
Start: 2024-05-29 | End: 2024-05-31 | Stop reason: HOSPADM

## 2024-05-28 RX ORDER — TAMSULOSIN HYDROCHLORIDE 0.4 MG/1
0.4 CAPSULE ORAL DAILY
Status: DISCONTINUED | OUTPATIENT
Start: 2024-05-29 | End: 2024-05-29

## 2024-05-28 RX ORDER — FUROSEMIDE 10 MG/ML
40 INJECTION INTRAMUSCULAR; INTRAVENOUS
Status: DISCONTINUED | OUTPATIENT
Start: 2024-05-29 | End: 2024-05-31

## 2024-05-28 RX ORDER — ALBUTEROL SULFATE 90 UG/1
2 AEROSOL, METERED RESPIRATORY (INHALATION) EVERY 4 HOURS PRN
Status: DISCONTINUED | OUTPATIENT
Start: 2024-05-28 | End: 2024-05-31 | Stop reason: HOSPADM

## 2024-05-28 RX ORDER — MIRTAZAPINE 15 MG/1
15 TABLET, FILM COATED ORAL NIGHTLY
Status: DISCONTINUED | OUTPATIENT
Start: 2024-05-29 | End: 2024-05-31 | Stop reason: HOSPADM

## 2024-05-28 RX ORDER — GUAIFENESIN AND DEXTROMETHORPHAN HYDROBROMIDE 600; 30 MG/1; MG/1
1 TABLET, EXTENDED RELEASE ORAL 2 TIMES DAILY PRN
Status: DISCONTINUED | OUTPATIENT
Start: 2024-05-28 | End: 2024-05-31 | Stop reason: HOSPADM

## 2024-05-28 RX ORDER — SODIUM CHLORIDE 0.9 % (FLUSH) 0.9 %
10 SYRINGE (ML) INJECTION AS NEEDED
Status: DISCONTINUED | OUTPATIENT
Start: 2024-05-28 | End: 2024-05-31 | Stop reason: HOSPADM

## 2024-05-28 RX ORDER — SPIRONOLACTONE 25 MG/1
25 TABLET ORAL DAILY
Status: DISCONTINUED | OUTPATIENT
Start: 2024-05-29 | End: 2024-05-31 | Stop reason: HOSPADM

## 2024-05-28 RX ORDER — PANTOPRAZOLE SODIUM 40 MG/1
40 TABLET, DELAYED RELEASE ORAL
Status: DISCONTINUED | OUTPATIENT
Start: 2024-05-29 | End: 2024-05-31 | Stop reason: HOSPADM

## 2024-05-28 RX ORDER — SODIUM CHLORIDE 9 MG/ML
40 INJECTION, SOLUTION INTRAVENOUS AS NEEDED
Status: DISCONTINUED | OUTPATIENT
Start: 2024-05-28 | End: 2024-05-31 | Stop reason: HOSPADM

## 2024-05-28 RX ORDER — FERROUS SULFATE 325(65) MG
325 TABLET ORAL EVERY OTHER DAY
Status: DISCONTINUED | OUTPATIENT
Start: 2024-05-29 | End: 2024-05-31 | Stop reason: HOSPADM

## 2024-05-28 RX ORDER — VENLAFAXINE HYDROCHLORIDE 37.5 MG/1
37.5 CAPSULE, EXTENDED RELEASE ORAL DAILY
Status: DISCONTINUED | OUTPATIENT
Start: 2024-05-29 | End: 2024-05-31 | Stop reason: HOSPADM

## 2024-05-28 RX ORDER — ACETAMINOPHEN 500 MG
1000 TABLET ORAL EVERY 8 HOURS PRN
Status: DISCONTINUED | OUTPATIENT
Start: 2024-05-28 | End: 2024-05-31 | Stop reason: HOSPADM

## 2024-05-28 RX ORDER — SODIUM CHLORIDE 0.9 % (FLUSH) 0.9 %
10 SYRINGE (ML) INJECTION EVERY 12 HOURS SCHEDULED
Status: DISCONTINUED | OUTPATIENT
Start: 2024-05-29 | End: 2024-05-31 | Stop reason: HOSPADM

## 2024-05-28 RX ORDER — ATORVASTATIN CALCIUM 40 MG/1
40 TABLET, FILM COATED ORAL NIGHTLY
Status: DISCONTINUED | OUTPATIENT
Start: 2024-05-28 | End: 2024-05-31 | Stop reason: HOSPADM

## 2024-05-28 RX ORDER — NITROGLYCERIN 0.4 MG/1
0.4 TABLET SUBLINGUAL
Status: DISCONTINUED | OUTPATIENT
Start: 2024-05-28 | End: 2024-05-31 | Stop reason: HOSPADM

## 2024-05-28 RX ADMIN — GUAIFENESIN AND DEXTROMETHORPHAN HYDROBROMIDE 1 TABLET: 600; 30 TABLET, EXTENDED RELEASE ORAL at 23:30

## 2024-05-28 RX ADMIN — MIRTAZAPINE 15 MG: 15 TABLET, FILM COATED ORAL at 23:45

## 2024-05-28 RX ADMIN — Medication 10 ML: at 23:30

## 2024-05-28 RX ADMIN — APIXABAN 5 MG: 5 TABLET, FILM COATED ORAL at 23:30

## 2024-05-28 RX ADMIN — CARVEDILOL 25 MG: 25 TABLET, FILM COATED ORAL at 23:30

## 2024-05-28 RX ADMIN — FUROSEMIDE 60 MG: 10 INJECTION, SOLUTION INTRAMUSCULAR; INTRAVENOUS at 19:16

## 2024-05-28 RX ADMIN — ATORVASTATIN CALCIUM 40 MG: 40 TABLET, FILM COATED ORAL at 23:30

## 2024-05-28 RX ADMIN — AZITHROMYCIN MONOHYDRATE 500 MG: 500 INJECTION, POWDER, LYOPHILIZED, FOR SOLUTION INTRAVENOUS at 23:45

## 2024-05-28 NOTE — OUTREACH NOTE
Care Coordination    MSW sent referral through [facility] for food assistance through [employer].    Delmi GILMORE -   Ambulatory Case Management    5/28/2024, 14:51 EDT

## 2024-05-28 NOTE — TELEPHONE ENCOUNTER
Called, and left message with patients sister to reschedule appointment due to patient just receiving CPAP 5/13/24. We need 30 days of CPAP usage for his next follow up.

## 2024-05-28 NOTE — OUTREACH NOTE
Care Coordination    MSW had missed call from sister Friday afternoon. MSW attempted to reach sister on his day and left voicemail.    Delmi GILMORE -   Ambulatory Case Management    5/28/2024, 11:35 EDT

## 2024-05-29 ENCOUNTER — PATIENT OUTREACH (OUTPATIENT)
Age: 59
End: 2024-05-29
Payer: COMMERCIAL

## 2024-05-29 LAB
ALBUMIN SERPL-MCNC: 3.6 G/DL (ref 3.5–5.2)
ALBUMIN/GLOB SERPL: 1.4 G/DL
ALP SERPL-CCNC: 201 U/L (ref 39–117)
ALT SERPL W P-5'-P-CCNC: 24 U/L (ref 1–41)
ANION GAP SERPL CALCULATED.3IONS-SCNC: 14.3 MMOL/L (ref 5–15)
ARTERIAL PATENCY WRIST A: POSITIVE
AST SERPL-CCNC: 14 U/L (ref 1–40)
BASE EXCESS BLDA CALC-SCNC: -1.5 MMOL/L (ref -2–2)
BDY SITE: ABNORMAL
BILIRUB SERPL-MCNC: 1.2 MG/DL (ref 0–1.2)
BILIRUB UR QL STRIP: NEGATIVE
BUN SERPL-MCNC: 28 MG/DL (ref 6–20)
BUN/CREAT SERPL: 20.9 (ref 7–25)
CALCIUM SPEC-SCNC: 9.2 MG/DL (ref 8.6–10.5)
CHLORIDE SERPL-SCNC: 102 MMOL/L (ref 98–107)
CLARITY UR: CLEAR
CO2 SERPL-SCNC: 21.7 MMOL/L (ref 22–29)
COHGB MFR BLD: 0.2 % (ref 0–1.5)
COLOR UR: YELLOW
CREAT SERPL-MCNC: 1.34 MG/DL (ref 0.76–1.27)
DEPRECATED RDW RBC AUTO: 55.9 FL (ref 37–54)
EGFRCR SERPLBLD CKD-EPI 2021: 61.4 ML/MIN/1.73
ERYTHROCYTE [DISTWIDTH] IN BLOOD BY AUTOMATED COUNT: 17.3 % (ref 12.3–15.4)
FHHB: 5.3 % (ref 0–5)
GAS FLOW AIRWAY: 0 LPM
GLOBULIN UR ELPH-MCNC: 2.6 GM/DL
GLUCOSE BLDC GLUCOMTR-MCNC: 120 MG/DL (ref 70–99)
GLUCOSE BLDC GLUCOMTR-MCNC: 125 MG/DL (ref 70–99)
GLUCOSE BLDC GLUCOMTR-MCNC: 143 MG/DL (ref 70–99)
GLUCOSE BLDC GLUCOMTR-MCNC: 154 MG/DL (ref 70–99)
GLUCOSE BLDC GLUCOMTR-MCNC: 156 MG/DL (ref 70–99)
GLUCOSE SERPL-MCNC: 137 MG/DL (ref 65–99)
GLUCOSE UR STRIP-MCNC: ABNORMAL MG/DL
HCO3 BLDA-SCNC: 21.5 MMOL/L (ref 22–26)
HCT VFR BLD AUTO: 39.2 % (ref 37.5–51)
HGB BLD-MCNC: 12.2 G/DL (ref 13–17.7)
HGB BLDA-MCNC: 13.3 G/DL (ref 13.8–16.4)
HGB UR QL STRIP.AUTO: NEGATIVE
INHALED O2 CONCENTRATION: 21 %
KETONES UR QL STRIP: NEGATIVE
LACTATE BLDA-SCNC: ABNORMAL MMOL/L
LEUKOCYTE ESTERASE UR QL STRIP.AUTO: NEGATIVE
MAGNESIUM SERPL-MCNC: 1.8 MG/DL (ref 1.6–2.6)
MCH RBC QN AUTO: 27.7 PG (ref 26.6–33)
MCHC RBC AUTO-ENTMCNC: 31.1 G/DL (ref 31.5–35.7)
MCV RBC AUTO: 89.1 FL (ref 79–97)
METHGB BLD QL: 0.2 % (ref 0–1.5)
MODALITY: ABNORMAL
NITRITE UR QL STRIP: NEGATIVE
OXYHGB MFR BLDV: 94.3 % (ref 94–99)
PCO2 BLDA: 31.4 MM HG (ref 35–45)
PH BLDA: 7.45 PH UNITS (ref 7.35–7.45)
PH UR STRIP.AUTO: 5.5 [PH] (ref 5–8)
PHOSPHATE SERPL-MCNC: 4.7 MG/DL (ref 2.5–4.5)
PLATELET # BLD AUTO: 312 10*3/MM3 (ref 140–450)
PMV BLD AUTO: 10.7 FL (ref 6–12)
PO2 BLD: 383 MM[HG] (ref 0–500)
PO2 BLDA: 80.4 MM HG (ref 80–100)
POTASSIUM SERPL-SCNC: 4.3 MMOL/L (ref 3.5–5.2)
PROT SERPL-MCNC: 6.2 G/DL (ref 6–8.5)
PROT UR QL STRIP: NEGATIVE
RBC # BLD AUTO: 4.4 10*6/MM3 (ref 4.14–5.8)
SAO2 % BLDCOA: 94.7 % (ref 95–99)
SODIUM SERPL-SCNC: 138 MMOL/L (ref 136–145)
SP GR UR STRIP: 1.01 (ref 1–1.03)
UROBILINOGEN UR QL STRIP: ABNORMAL
WBC NRBC COR # BLD AUTO: 9.8 10*3/MM3 (ref 3.4–10.8)

## 2024-05-29 PROCEDURE — 99232 SBSQ HOSP IP/OBS MODERATE 35: CPT | Performed by: INTERNAL MEDICINE

## 2024-05-29 PROCEDURE — 85027 COMPLETE CBC AUTOMATED: CPT | Performed by: STUDENT IN AN ORGANIZED HEALTH CARE EDUCATION/TRAINING PROGRAM

## 2024-05-29 PROCEDURE — 25010000002 FUROSEMIDE PER 20 MG: Performed by: STUDENT IN AN ORGANIZED HEALTH CARE EDUCATION/TRAINING PROGRAM

## 2024-05-29 PROCEDURE — 82805 BLOOD GASES W/O2 SATURATION: CPT | Performed by: STUDENT IN AN ORGANIZED HEALTH CARE EDUCATION/TRAINING PROGRAM

## 2024-05-29 PROCEDURE — 82948 REAGENT STRIP/BLOOD GLUCOSE: CPT

## 2024-05-29 PROCEDURE — 82375 ASSAY CARBOXYHB QUANT: CPT | Performed by: STUDENT IN AN ORGANIZED HEALTH CARE EDUCATION/TRAINING PROGRAM

## 2024-05-29 PROCEDURE — 83050 HGB METHEMOGLOBIN QUAN: CPT | Performed by: STUDENT IN AN ORGANIZED HEALTH CARE EDUCATION/TRAINING PROGRAM

## 2024-05-29 PROCEDURE — 36600 WITHDRAWAL OF ARTERIAL BLOOD: CPT | Performed by: STUDENT IN AN ORGANIZED HEALTH CARE EDUCATION/TRAINING PROGRAM

## 2024-05-29 PROCEDURE — 84100 ASSAY OF PHOSPHORUS: CPT | Performed by: STUDENT IN AN ORGANIZED HEALTH CARE EDUCATION/TRAINING PROGRAM

## 2024-05-29 PROCEDURE — 81003 URINALYSIS AUTO W/O SCOPE: CPT | Performed by: STUDENT IN AN ORGANIZED HEALTH CARE EDUCATION/TRAINING PROGRAM

## 2024-05-29 PROCEDURE — 83735 ASSAY OF MAGNESIUM: CPT | Performed by: STUDENT IN AN ORGANIZED HEALTH CARE EDUCATION/TRAINING PROGRAM

## 2024-05-29 PROCEDURE — 25810000003 SODIUM CHLORIDE 0.9 % SOLUTION: Performed by: PHYSICIAN ASSISTANT

## 2024-05-29 PROCEDURE — 25010000002 CEFTRIAXONE PER 250 MG: Performed by: STUDENT IN AN ORGANIZED HEALTH CARE EDUCATION/TRAINING PROGRAM

## 2024-05-29 PROCEDURE — 82948 REAGENT STRIP/BLOOD GLUCOSE: CPT | Performed by: STUDENT IN AN ORGANIZED HEALTH CARE EDUCATION/TRAINING PROGRAM

## 2024-05-29 PROCEDURE — 63710000001 INSULIN LISPRO (HUMAN) PER 5 UNITS: Performed by: STUDENT IN AN ORGANIZED HEALTH CARE EDUCATION/TRAINING PROGRAM

## 2024-05-29 PROCEDURE — 80053 COMPREHEN METABOLIC PANEL: CPT | Performed by: STUDENT IN AN ORGANIZED HEALTH CARE EDUCATION/TRAINING PROGRAM

## 2024-05-29 RX ORDER — IBUPROFEN 600 MG/1
1 TABLET ORAL
Status: DISCONTINUED | OUTPATIENT
Start: 2024-05-29 | End: 2024-05-31 | Stop reason: HOSPADM

## 2024-05-29 RX ORDER — INSULIN LISPRO 100 [IU]/ML
2-9 INJECTION, SOLUTION INTRAVENOUS; SUBCUTANEOUS
Status: DISCONTINUED | OUTPATIENT
Start: 2024-05-29 | End: 2024-05-31 | Stop reason: HOSPADM

## 2024-05-29 RX ORDER — ARIPIPRAZOLE 300 MG
300 KIT INTRAMUSCULAR
COMMUNITY
Start: 2024-05-25

## 2024-05-29 RX ORDER — VENLAFAXINE HYDROCHLORIDE 37.5 MG/1
37.5 CAPSULE, EXTENDED RELEASE ORAL DAILY
COMMUNITY

## 2024-05-29 RX ORDER — NICOTINE POLACRILEX 4 MG
15 LOZENGE BUCCAL
Status: DISCONTINUED | OUTPATIENT
Start: 2024-05-29 | End: 2024-05-31 | Stop reason: HOSPADM

## 2024-05-29 RX ORDER — DEXTROSE MONOHYDRATE 25 G/50ML
25 INJECTION, SOLUTION INTRAVENOUS
Status: DISCONTINUED | OUTPATIENT
Start: 2024-05-29 | End: 2024-05-31 | Stop reason: HOSPADM

## 2024-05-29 RX ADMIN — PANTOPRAZOLE SODIUM 40 MG: 40 TABLET, DELAYED RELEASE ORAL at 05:01

## 2024-05-29 RX ADMIN — SPIRONOLACTONE 25 MG: 25 TABLET ORAL at 08:36

## 2024-05-29 RX ADMIN — APIXABAN 5 MG: 5 TABLET, FILM COATED ORAL at 08:41

## 2024-05-29 RX ADMIN — ATORVASTATIN CALCIUM 40 MG: 40 TABLET, FILM COATED ORAL at 21:44

## 2024-05-29 RX ADMIN — VENLAFAXINE HYDROCHLORIDE 37.5 MG: 37.5 CAPSULE, EXTENDED RELEASE ORAL at 08:36

## 2024-05-29 RX ADMIN — FUROSEMIDE 40 MG: 10 INJECTION, SOLUTION INTRAMUSCULAR; INTRAVENOUS at 17:42

## 2024-05-29 RX ADMIN — CEFTRIAXONE SODIUM 1000 MG: 1 INJECTION, POWDER, FOR SOLUTION INTRAMUSCULAR; INTRAVENOUS at 01:45

## 2024-05-29 RX ADMIN — SODIUM CHLORIDE 250 ML: 9 INJECTION, SOLUTION INTRAVENOUS at 20:10

## 2024-05-29 RX ADMIN — APIXABAN 5 MG: 5 TABLET, FILM COATED ORAL at 21:44

## 2024-05-29 RX ADMIN — DIVALPROEX SODIUM 250 MG: 250 TABLET, DELAYED RELEASE ORAL at 08:36

## 2024-05-29 RX ADMIN — FUROSEMIDE 40 MG: 10 INJECTION, SOLUTION INTRAMUSCULAR; INTRAVENOUS at 08:36

## 2024-05-29 RX ADMIN — EMPAGLIFLOZIN 25 MG: 25 TABLET, FILM COATED ORAL at 08:36

## 2024-05-29 RX ADMIN — Medication 10 ML: at 08:37

## 2024-05-29 RX ADMIN — FERROUS SULFATE TAB 325 MG (65 MG ELEMENTAL FE) 325 MG: 325 (65 FE) TAB at 08:36

## 2024-05-29 RX ADMIN — CARVEDILOL 25 MG: 25 TABLET, FILM COATED ORAL at 08:41

## 2024-05-29 RX ADMIN — INSULIN LISPRO 2 UNITS: 100 INJECTION, SOLUTION INTRAVENOUS; SUBCUTANEOUS at 21:44

## 2024-05-29 RX ADMIN — Medication 10 ML: at 21:59

## 2024-05-29 RX ADMIN — AMIODARONE HYDROCHLORIDE 200 MG: 200 TABLET ORAL at 08:36

## 2024-05-29 RX ADMIN — MIRTAZAPINE 15 MG: 15 TABLET, FILM COATED ORAL at 21:59

## 2024-05-29 RX ADMIN — TAMSULOSIN HYDROCHLORIDE 0.4 MG: 0.4 CAPSULE ORAL at 08:36

## 2024-05-29 NOTE — PROGRESS NOTES
[facility]   Hospitalist Progress Note  Date: 2024  Patient Name: Regulo Sepulveda  : 1965  MRN: [id number]  Date of admission: 2024  Room/Bed: [facility]      Subjective   Subjective     Chief Complaint:   Shortness of breath    Summary:  Regulo Sepulveda is a 58 y.o. male with medical history significant for treated hep C, HFrEF 25 to 30%, paroxysmal A-fib, apical thrombus on Eliquis, anxiety/depression, COPD, nonobstructive CAD, type II DM, HTN, HLD, and schizophrenia who presented for shortness of breath.  Patient endorses 2 days of shortness of breath, requiring patient to sleep with 4 pillows at night.  Symptoms have only been worsening therefore presented to the emergency department.  Patient found to be in heart failure with acute exacerbation.  Patient's proBNP elevated 24,000, above typical elevation in proBNP for patient's heart failure exacerbations.  Chest x-ray shows bibasilar opacities with suspicion for small right pleural effusion.     Interval Followup:   Continues on scheduled diuresis.  States his breathing does feel better since arriving in the hospital.  Order for strict I's and O's placed.  Creatinine slightly above baseline suspecting cardiorenal, continue to push diuresis    Objective   Objective     Vitals:   Temp:  [97.3 °F (36.3 °C)-98.3 °F (36.8 °C)] 97.3 °F (36.3 °C)  Heart Rate:  [] 69  Resp:  [18-30] 18  BP: (101-145)/() 103/64    Physical Exam               Constitutional: Awake, alert, no acute distress              Eyes: Pupils equal, sclerae anicteric, no conjunctival injection              HENT: NCAT, mucous membranes moist              Neck: Supple, no thyromegaly, no lymphadenopathy, trachea midline              Respiratory: Bibasilar crackles present, no wheezing              cardiovascular: RRR, no murmurs, rubs, or gallops, palpable pedal pulses bilaterally              Gastrointestinal: Positive bowel sounds, soft, nontender, nondistended               Musculoskeletal: No bilateral ankle edema, no clubbing or cyanosis to extremities              Neurologic: Oriented x 3, strength symmetric in all extremities, Cranial Nerves grossly intact to confrontation, speech clear              Skin: No rashes     Result Review    Result Review:  I have personally reviewed these results:  [x]  Laboratory      Lab 05/29/24  0617 05/28/24  1628   WBC 9.80 13.07*   HEMOGLOBIN 12.2* 13.3   HEMATOCRIT 39.2 42.5   PLATELETS 312 395   NEUTROS ABS  --  11.38*   IMMATURE GRANS (ABS)  --  0.05   LYMPHS ABS  --  0.69*   MONOS ABS  --  0.89   EOS ABS  --  0.00   MCV 89.1 87.4   PROCALCITONIN  --  0.07         Lab 05/29/24  0617 05/28/24  1628   SODIUM 138 140   POTASSIUM 4.3 4.6   CHLORIDE 102 103   CO2 21.7* 21.7*   ANION GAP 14.3 15.3*   BUN 28* 24*   CREATININE 1.34* 1.40*   EGFR 61.4 58.3*   GLUCOSE 137* 127*   CALCIUM 9.2 9.3   MAGNESIUM 1.8  --    PHOSPHORUS 4.7*  --          Lab 05/29/24  0617 05/28/24  1628   TOTAL PROTEIN 6.2 7.2   ALBUMIN 3.6 4.0   GLOBULIN 2.6 3.2   ALT (SGPT) 24 32   AST (SGOT) 14 15   BILIRUBIN 1.2 1.8*   ALK PHOS 201* 235*         Lab 05/28/24  1628   PROBNP 24,364.0*   HSTROP T 16                 Lab 05/29/24  0226   PH, ARTERIAL 7.453*   PCO2, ARTERIAL 31.4*   PO2 ART 80.4   O2 SATURATION ART 94.7*   FIO2 21   HCO3 ART 21.5*   BASE EXCESS ART -1.5   CARBOXYHEMOGLOBIN 0.2     Brief Urine Lab Results  (Last result in the past 365 days)        Color   Clarity   Blood   Leuk Est   Nitrite   Protein   CREAT   Urine HCG        05/29/24 1259 Yellow   Clear   Negative   Negative   Negative   Negative                 [x]  Microbiology   Microbiology Results (last 10 days)       ** No results found for the last 240 hours. **          [x]  Radiology  XR Chest 1 View    Result Date: 5/28/2024  1.  New right basilar opacities with suspicion for a small right pleural effusion. Opacities could represent atelectasis or pneumonia. 2.  Cardiomegaly, as before.    Electronically Signed By-Prasanna Porter MD On:5/28/2024 5:04 PM     []  EKG/Telemetry   []  Cardiology/Vascular   []  Pathology  []  Old records  []  Other:    Assessment & Plan   Assessment / Plan     Assessment:  Heart failure with reduced ejection fraction in acute exacerbation  TAYLOR, suspected cardiorenal  Treated hepatitis C  Paroxysmal atrial fibrillation  Known apical thrombus on Eliquis  Anxiety depression  COPD not in acute exacerbation  Type 2 diabetes  Hypertension  Hyperlipidemia  Schizophrenia    Plan:  Patient remains admitted to hospital for further care and management  Patient remains on scheduled IV diuresis, IV Lasix 40 mg twice daily  Strict I's and O's  Continue to monitor renal function electrolytes closely  Suspecting TAYLOR is cardiorenal, continue to monitor improvement  Patient's home medications have been resumed as indicated  Patient with a history of poor adherence with medical follow-ups, will discuss with case management  Stopping antibiotics, procalcitonin negative, patient's clinical picture is not consistent with pneumonia     Discussed with RN.  Case management    DVT prophylaxis:  Medical and mechanical DVT prophylaxis orders are present.        CODE STATUS:   Level Of Support Discussed With: Patient  Code Status (Patient has no pulse and is not breathing): CPR (Attempt to Resuscitate)  Medical Interventions (Patient has pulse or is breathing): Full Support      Electronically signed by Felton Reynoso MD, 5/29/2024, 13:08 EDT.

## 2024-05-29 NOTE — PLAN OF CARE
Goal Outcome Evaluation:  Plan of Care Reviewed With: patient        Progress: no change  Outcome Evaluation: pt is axox4. no c/o of pain. pt has a frequent non-productive cough, and has tachypnea at 30bpm, then will slow down and speed up again, md notified, abgs ordered. In chart notes it states pt got a cpap on 5/13, md notified, no new orders at this time. all other vital signs stable. no other concerns noted.

## 2024-05-29 NOTE — OUTREACH NOTE
"Care Coordination    MSW was still unable to reach patient's sister. [facility] and provided Emilia information that patient was admitted and sister's contact info.    Care Coordination    Per Emilia Wallace at [facility], \"Received notification of client being admitted to the hospital.  Was given his sister's contact information and spoke with her, she will be calling back to get information concerning additional food sources and other possible assistance that he may not be aware of.  Aurora Prather [phone]\"    Delmi GILMORE -   Ambulatory Case Management    5/29/2024, 10:12 EDT    "

## 2024-05-29 NOTE — SIGNIFICANT NOTE
05/29/24 1424   Physical Therapy Time and Intention   Session Not Performed patient/family declined evaluation

## 2024-05-29 NOTE — PAYOR COMM NOTE
"Regulo Sepulveda (58 y.o. Male)     PATIENT INFORMATION  Name:  Regulo Sepulveda  MRN#:     [id number]  :  1965         ADMISSION INFORMATION  CLASS: Inpatient   DOS:  24        CURRENT ATTENDING PROVIDER INFORMATION  Name/NPI: Felton Reynoso MD [[id number]]  Phone:             Phone: [phone]        REQUESTING PROVIDER and RENDERING FACILITY  Name:  [facility]   NPI:  [id number]  TID:  [id number]  Address:      [facility] [address] [place] [address]  Phone  [phone]      =  UTILIZATION REVIEW CONTACT INFORMATION  Phone:      [phone]  Fax:           [phone]      ADMISSION DIAGNOSIS  Acute exacerbation of CHF (congestive heart failure) [I50.9]  Failure of outpatient treatment [Z78.9]  Acute on chronic congestive heart failure, unspecified heart failure type [I50.9]    ++++++++++++++++++++++++++++++++++++++++++++++++++++++++++++++++++++++++++++++++        Date of Birth   1965    Social Security Number       Address   [address] [place] [address]    Home Phone   [phone]    MRN   [id number]       Catholic   None    Marital Status   Single                            Admission Date   24    Admission Type   Emergency    Admitting Provider   Emanuel Rodarte MD    Attending Provider   Felton Reynoso MD    Department, Room/Bed   [address], [facility]/       Discharge Date       Discharge Disposition       Discharge Destination                                 Attending Provider: Felton Reynoso MD    Allergies: Penicillins    Isolation: None   Infection: None   Code Status: CPR    Ht: 185.4 cm (73\")   Wt: 67.8 kg (149 lb 7.6 oz)    Admission Cmt: None   Principal Problem: None                  Active Insurance as of 2024       Primary Coverage       Payor Plan Insurance Group Employer/Plan Group    [facility] [id number]       Payor Plan Address Payor Plan Phone Number Payor Plan Fax Number Effective " Dates    [address] [address]   2021 - None Entered    [facility] [phone]         Subscriber Name Subscriber Birth Date Member ID       NANCY SEPULVEDA 1965 [id number]                      Pneumonia [facility] Inpatient Care       Indications Met   Last updated by Catrina Coyle RN on 2024 0956     Review Status Created By   Primary Completed Catrina Coyle RN      Criteria Review   Pneumonia [facility] Inpatient Care     Overall Determination: Indications Met     Criteria:  [×] Admission is indicated for  1 or more  of the following  [B] [D]:      [×] Moderate-risk-category or high-risk-category patient [E] (Pneumonia Severity Index class IV or V, or CURB-65 score of 3 or greater)           2024  9:56 AM              -- 2024  9:56 AM by Catrina Coyle RN --                  PSI: Score: 98 Class: IV     Notes:  -- 2024  9:56 AM by Catrina Coyle RN --      Retro Review for       To ED c/o SOA X 24 hrs. Worsening SOA. + Orthopnea.       LUngs with crackles & wheezing. AAOX4.   Resp in 30s.                   PMHx: CHF, COPD, HTN; HLD; Cardiac tamponade, T2DM. Parkinson's, Hep C, A fib (On Eliquis); Depression.                   ED results: BNP 24,364.0; BUN 24, Creatinine 1.40, , Bilirubin 1.8,       WBC 13.07,       CXR: New right basilar opacities with suspicion for a small right pleural effusion. Opacities could represent atelectasis or pneumonia.                  IN ED: Lasix 60mg IV.                   Admit for CHF exacerbation with PNA.       Remote telemetry.       O2      Eliquis PO bid.       Lasix 40mg IV bid.       Rocephin IV qd      Zithromax IV qd.       SSI.       PT consult.                 History & Physical        Emanuel Rodarte MD at 24           [facility]  [facility]   Patient Name: Nancy Sepulveda  : 1965  MRN: [id number]  Primary Care Physician:  Dickson Landry MD  Date of admission: 2024    Subjective  Subjective      Chief Complaint: Shortness of breath    HPI:    Regulo Sepulveda is a 58 y.o. male with past medical history significant for treated hep C, HFrEF 25 to 30%, paroxysmal A-fib, apical thrombus on Eliquis, anxiety/depression, COPD, nonobstructive CAD, type II DM, HTN, HLD, and schizophrenia who presented for shortness of breath    Patient stated he feels shortness of breath since yesterday.  He endorses sleeping with 4 pillows.  His symptoms worsened today which prompted him to the ED.  He endorses subjective fever.  He denies chest pain, no abdomen pain, no dysuria.    Personal History     Past Medical History:  Past Medical History:   Diagnosis Date   • Anxiety    • Asthma    • Bipolar affective    • Cardiac tamponade     2023   • CHF (congestive heart failure)    • COPD (chronic obstructive pulmonary disease)    • Coronary artery disease    • Depression    • Diabetes mellitus    • Elevated cholesterol    • Hypertension    • Parkinson disease            Past Surgical History:  Past Surgical History:   Procedure Laterality Date   • CARDIAC CATHETERIZATION Right 9/17/2023    Procedure: Right and Left Heart Cath;  Surgeon: Ben Grant MD;  Location: [facility] CATH INVASIVE LOCATION;  Service: Cardiovascular;  Laterality: Right;   • TESTICLE SURGERY Left     extraction       Family History:   Family History   Problem Relation Age of Onset   • Diabetes Mother    • Depression Sister    • Restless legs syndrome Sister    • Insomnia Sister    • Sleep walking Sister    • Sleep disorder Sister         Night Terrors   • Depression Brother        Social History:   Social History     Socioeconomic History   • Marital status: Single   Tobacco Use   • Smoking status: Every Day     Current packs/day: 0.50     Average packs/day: 0.5 packs/day for 50.2 years (25.1 ttl pk-yrs)     Types: Cigarettes     Start date: 1/1/1974     Last attempt to quit: 11/2/2023   • Smokeless tobacco: Never   • Tobacco comments:     At least 10 cigarettes a  day   Vaping Use   • Vaping status: Former   • Substances: Nicotine   Substance and Sexual Activity   • Alcohol use: Not Currently   • Drug use: Not Currently     Types: Methamphetamines, Marijuana   • Sexual activity: Defer       Home Medications:  albuterol sulfate HFA, amiodarone, apixaban, atorvastatin, carvedilol, dapagliflozin, divalproex, ferrous gluconate, furosemide, metFORMIN, mirtazapine, nicotine, pantoprazole, spironolactone, tamsulosin, and venlafaxine    Allergies:  Allergies   Allergen Reactions   • Penicillins Shortness Of Breath       Review of Systems   All systems were reviewed and negative except for indicated in HPI    Objective  Objective     Vitals:   Temp:  [98.3 °F (36.8 °C)] 98.3 °F (36.8 °C)  Heart Rate:  [] 102  Resp:  [18] 18  BP: (125-145)/() 145/107    Physical Exam    Constitutional: Awake, alert, no acute distress   Eyes: Pupils equal, sclerae anicteric, no conjunctival injection   HENT: NCAT, mucous membranes moist   Neck: Supple, no thyromegaly, no lymphadenopathy, trachea midline   Respiratory: Crackles on lung bilaterally, some mild wheezing   cardiovascular: RRR, no murmurs, rubs, or gallops, palpable pedal pulses bilaterally   Gastrointestinal: Positive bowel sounds, soft, nontender, nondistended   Musculoskeletal: No bilateral ankle edema, no clubbing or cyanosis to extremities   Psychiatric: Appropriate affect, cooperative   Neurologic: Oriented x 3, strength symmetric in all extremities, Cranial Nerves grossly intact to confrontation, speech clear   Skin: No rashes     Result Review   Result Review:  I have personally reviewed the results from the time of this admission to 5/28/2024 22:15 EDT and agree with these findings:  [x]  Laboratory  [x]  Microbiology  [x]  Radiology  [x]  EKG/Telemetry   []  Cardiology/Vascular   []  Pathology  []  Old records  []  Other:    Laboratory Studies:  Recent Results (from the past 24 hour(s))   ECG 12 Lead ED Triage Standing  Order; SOA    Collection Time: 05/28/24  4:16 PM   Result Value Ref Range    QT Interval 373 ms    QTC Interval 485 ms   Comprehensive Metabolic Panel    Collection Time: 05/28/24  4:28 PM    Specimen: Blood   Result Value Ref Range    Glucose 127 (H) 65 - 99 mg/dL    BUN 24 (H) 6 - 20 mg/dL    Creatinine 1.40 (H) 0.76 - 1.27 mg/dL    Sodium 140 136 - 145 mmol/L    Potassium 4.6 3.5 - 5.2 mmol/L    Chloride 103 98 - 107 mmol/L    CO2 21.7 (L) 22.0 - 29.0 mmol/L    Calcium 9.3 8.6 - 10.5 mg/dL    Total Protein 7.2 6.0 - 8.5 g/dL    Albumin 4.0 3.5 - 5.2 g/dL    ALT (SGPT) 32 1 - 41 U/L    AST (SGOT) 15 1 - 40 U/L    Alkaline Phosphatase 235 (H) 39 - 117 U/L    Total Bilirubin 1.8 (H) 0.0 - 1.2 mg/dL    Globulin 3.2 gm/dL    A/G Ratio 1.3 g/dL    BUN/Creatinine Ratio 17.1 7.0 - 25.0    Anion Gap 15.3 (H) 5.0 - 15.0 mmol/L    eGFR 58.3 (L) >60.0 mL/min/1.73   BNP    Collection Time: 05/28/24  4:28 PM    Specimen: Blood   Result Value Ref Range    proBNP 24,364.0 (H) 0.0 - 900.0 pg/mL   Single High Sensitivity Troponin T    Collection Time: 05/28/24  4:28 PM    Specimen: Blood   Result Value Ref Range    HS Troponin T 16 <22 ng/L   Green Top (Gel)    Collection Time: 05/28/24  4:28 PM   Result Value Ref Range    Extra Tube Hold for add-ons.    Lavender Top    Collection Time: 05/28/24  4:28 PM   Result Value Ref Range    Extra Tube hold for add-on    Gold Top - SST    Collection Time: 05/28/24  4:28 PM   Result Value Ref Range    Extra Tube Hold for add-ons.    Light Blue Top    Collection Time: 05/28/24  4:28 PM   Result Value Ref Range    Extra Tube Hold for add-ons.    CBC Auto Differential    Collection Time: 05/28/24  4:28 PM    Specimen: Blood   Result Value Ref Range    WBC 13.07 (H) 3.40 - 10.80 10*3/mm3    RBC 4.86 4.14 - 5.80 10*6/mm3    Hemoglobin 13.3 13.0 - 17.7 g/dL    Hematocrit 42.5 37.5 - 51.0 %    MCV 87.4 79.0 - 97.0 fL    MCH 27.4 26.6 - 33.0 pg    MCHC 31.3 (L) 31.5 - 35.7 g/dL    RDW 17.8 (H) 12.3  "- 15.4 %    RDW-SD 55.5 (H) 37.0 - 54.0 fl    MPV 10.2 6.0 - 12.0 fL    Platelets 395 140 - 450 10*3/mm3    Neutrophil % 87.0 (H) 42.7 - 76.0 %    Lymphocyte % 5.3 (L) 19.6 - 45.3 %    Monocyte % 6.8 5.0 - 12.0 %    Eosinophil % 0.0 (L) 0.3 - 6.2 %    Basophil % 0.5 0.0 - 1.5 %    Immature Grans % 0.4 0.0 - 0.5 %    Neutrophils, Absolute 11.38 (H) 1.70 - 7.00 10*3/mm3    Lymphocytes, Absolute 0.69 (L) 0.70 - 3.10 10*3/mm3    Monocytes, Absolute 0.89 0.10 - 0.90 10*3/mm3    Eosinophils, Absolute 0.00 0.00 - 0.40 10*3/mm3    Basophils, Absolute 0.06 0.00 - 0.20 10*3/mm3    Immature Grans, Absolute 0.05 0.00 - 0.05 10*3/mm3    nRBC 0.0 0.0 - 0.2 /100 WBC       Imaging:  XR Chest 1 View    Result Date: 5/28/2024  Narrative: DATE OF EXAM: 5/28/2024 4:53 PM  PROCEDURE: XR CHEST 1 VW-  INDICATIONS: SOA Triage Protocol  COMPARISON: April 8, 2024, chest radiograph April 2, 2024 and chest CT April 3, 2024  TECHNIQUE: Single radiographic view of the chest was obtained.  FINDINGS: There are new right basilar opacities with suspicion for a small right pleural effusion. No definite new left lung infiltrate, left pleural effusion, or pneumothorax. The heart appears mildly enlarged. Mediastinal contour appears grossly normal.      Impression: 1.  New right basilar opacities with suspicion for a small right pleural effusion. Opacities could represent atelectasis or pneumonia. 2.  Cardiomegaly, as before.   Electronically Signed By-Prsaanna Porter MD On:5/28/2024 5:04 PM       EKG: (my review): Sinus tachycardia, heart rate 101, QTc 485, no ST elevation or depression.  Left bundle branch block.  The left bundle branch block was present on his EKG on 4/8/2024    Assessment & Plan  Assessment / Plan     -I have discussed the case with the ED physician.  I have seen patient at bedside.  I have independently reviewed his EKGs, labs, imaging, record    Echocardiogram done on 3/24/2024:  \"Diffuse hypokinesis with severely reduced left " "ventricular systolic function ejection fraction around 25 to 30%.  Mild MR and mild TR.  Organized thrombus still present in the apex of the left ventricle.  Present on previous echo\"      Acute on chronic HFrEF exacerbation  Suspect pneumonia  TAYLOR, suspect cardiorenal  Other chronic problems: treated hep C, HFrEF 25 to 30%, paroxysmal A-fib, apical thrombus on Eliquis, anxiety/depression, COPD, nonobstructive CAD, type II DM, HTN, HLD, and schizophrenia      -I will start patient on Lasix IV 40 mg twice daily.  Strict I&O.  Watch electrolytes closely.  -Started patient on ceftriaxone and azithromycin.  Checking procalcitonin, UA, and blood cultures.  -Trend creatinine  -Hold off on repeat echo given he just had 1 done recently    Resume home medications when appropriate.  DVT prophylaxis with Eliquis    CODE STATUS:    Level Of Support Discussed With: Patient  Code Status (Patient has no pulse and is not breathing): CPR (Attempt to Resuscitate)  Medical Interventions (Patient has pulse or is breathing): Full Support      Admission Status:  I believe this patient meets admission status.    Electronically signed by Emanuel Rodarte MD, 05/28/24, 10:15 PM EDT.               Electronically signed by Emanuel Rodarte MD at 05/28/24 2233       Facility-Administered Medications as of 5/29/2024   Medication Dose Route Frequency Provider Last Rate Last Admin   • acetaminophen (TYLENOL) tablet 1,000 mg  1,000 mg Oral Q8H PRN Emanuel Rodarte MD       • albuterol sulfate HFA (PROVENTIL HFA;VENTOLIN HFA;PROAIR HFA) inhaler 2 puff  2 puff Inhalation Q4H PRN Emanuel Rodarte MD       • amiodarone (PACERONE) tablet 200 mg  200 mg Oral Daily Emanuel Rodarte MD   200 mg at 05/29/24 0836   • apixaban (ELIQUIS) tablet 5 mg  5 mg Oral BID Emanuel Rodarte MD   5 mg at 05/29/24 0841   • atorvastatin (LIPITOR) tablet 40 mg  40 mg Oral Nightly Emanuel Rodarte MD   40 mg at 05/28/24 2330   • AZITHROMYCIN 500 MG/250 ML 0.9% NS IVPB (vial-mate)  500 mg Intravenous Q24H Emanuel Rodarte MD   500 " mg at 05/28/24 2345   • carvedilol (COREG) tablet 25 mg  25 mg Oral BID Emanuel Rodarte MD   25 mg at 05/29/24 0841   • cefTRIAXone (ROCEPHIN) 1,000 mg in sodium chloride 0.9 % 100 mL IVPB-VTB  1,000 mg Intravenous Q24H Emanuel Rodarte  mL/hr at 05/29/24 0145 1,000 mg at 05/29/24 0145   • dextrose (D50W) (25 g/50 mL) IV injection 25 g  25 g Intravenous Q15 Min PRN Rogers Mcnair MD       • dextrose (GLUTOSE) oral gel 15 g  15 g Oral Q15 Min PRN Rogers Mcnair MD       • divalproex (DEPAKOTE) DR tablet 250 mg  250 mg Oral Daily Emanuel Rodarte MD   250 mg at 05/29/24 0836   • empagliflozin (JARDIANCE) tablet 25 mg  25 mg Oral Daily Emanuel Rodarte MD   25 mg at 05/29/24 0836   • ferrous sulfate tablet 325 mg  325 mg Oral Every Other Day Emanuel Rodarte MD   325 mg at 05/29/24 0836   • furosemide (LASIX) injection 40 mg  40 mg Intravenous BID Emanuel Rodarte MD   40 mg at 05/29/24 0836   • [COMPLETED] furosemide (LASIX) injection 60 mg  60 mg Intravenous Once Alex Bridges DO   60 mg at 05/28/24 1916   • glucagon (GLUCAGEN) injection 1 mg  1 mg Intramuscular Q15 Min PRN Rogers Mcnair MD       • guaifenesin-dextromethorphan 600-30 mg (MUCINEX DM) 1 tablet  1 tablet Oral BID PRN Emanuel Rodarte MD   1 tablet at 05/28/24 2330   • Insulin Lispro (humaLOG) injection 2-9 Units  2-9 Units Subcutaneous 4x Daily AC & at Bedtime Rogers Mcnair MD       • mirtazapine (REMERON) tablet 15 mg  15 mg Oral Nightly Emanuel Rodarte MD   15 mg at 05/28/24 2345   • nitroglycerin (NITROSTAT) SL tablet 0.4 mg  0.4 mg Sublingual Q5 Min PRN Emanuel Rodarte MD       • pantoprazole (PROTONIX) EC tablet 40 mg  40 mg Oral Q AM Emanuel Rodarte MD   40 mg at 05/29/24 0501   • sodium chloride 0.9 % flush 10 mL  10 mL Intravenous PRN Emanuel Rodarte MD       • sodium chloride 0.9 % flush 10 mL  10 mL Intravenous Q12H Emanuel Rodarte MD   10 mL at 05/29/24 0837   • sodium chloride 0.9 % flush 10 mL  10 mL Intravenous PRN Emanuel Rodarte MD       • sodium chloride 0.9 % infusion 40 mL  40 mL Intravenous PRN Emanuel Rodarte  MD       • spironolactone (ALDACTONE) tablet 25 mg  25 mg Oral Daily Emanuel Rodarte MD   25 mg at 05/29/24 0836   • tamsulosin (FLOMAX) 24 hr capsule 0.4 mg  0.4 mg Oral Daily Emanuel Rodarte MD   0.4 mg at 05/29/24 0836   • venlafaxine XR (EFFEXOR-XR) 24 hr capsule 37.5 mg  37.5 mg Oral Daily Emanuel Rodarte MD   37.5 mg at 05/29/24 0836     Lab Results (last 24 hours)       Procedure Component Value Units Date/Time    POC Glucose Once [[id number]]  (Abnormal) Collected: 05/29/24 0747    Specimen: Blood Updated: 05/29/24 0750     Glucose 143 mg/dL      Comment: Serial Number: [id number]:  [id number]       Comprehensive Metabolic Panel [[id number]]  (Abnormal) Collected: 05/29/24 0617    Specimen: Blood from Hand, Right Updated: 05/29/24 0738     Glucose 137 mg/dL      BUN 28 mg/dL      Creatinine 1.34 mg/dL      Sodium 138 mmol/L      Potassium 4.3 mmol/L      Chloride 102 mmol/L      CO2 21.7 mmol/L      Calcium 9.2 mg/dL      Total Protein 6.2 g/dL      Albumin 3.6 g/dL      ALT (SGPT) 24 U/L      AST (SGOT) 14 U/L      Alkaline Phosphatase 201 U/L      Total Bilirubin 1.2 mg/dL      Globulin 2.6 gm/dL      A/G Ratio 1.4 g/dL      BUN/Creatinine Ratio 20.9     Anion Gap 14.3 mmol/L      eGFR 61.4 mL/min/1.73     Narrative:      GFR Normal >60  Chronic Kidney Disease <60  Kidney Failure <15      Magnesium [[id number]]  (Normal) Collected: 05/29/24 0617    Specimen: Blood from Hand, Right Updated: 05/29/24 0738     Magnesium 1.8 mg/dL     Phosphorus [[id number]]  (Abnormal) Collected: 05/29/24 0617    Specimen: Blood from Hand, Right Updated: 05/29/24 0738     Phosphorus 4.7 mg/dL     CBC (No Diff) [[id number]]  (Abnormal) Collected: 05/29/24 0617    Specimen: Blood from Hand, Right Updated: 05/29/24 0713     WBC 9.80 10*3/mm3      RBC 4.40 10*6/mm3      Hemoglobin 12.2 g/dL      Hematocrit 39.2 %      MCV 89.1 fL      MCH 27.7 pg      MCHC 31.1 g/dL      RDW 17.3 %      RDW-SD 55.9 fl      MPV 10.7 fL      Platelets 312 10*3/mm3   "   Blood Gas, Arterial -With Co-Ox Panel: Yes [[id number]]  (Abnormal) Collected: 05/29/24 0226    Specimen: Arterial Blood from Arm, Right Updated: 05/29/24 0237     pH, Arterial 7.453 pH units      pCO2, Arterial 31.4 mm Hg      pO2, Arterial 80.4 mm Hg      HCO3, Arterial 21.5 mmol/L      Base Excess, Arterial -1.5 mmol/L      O2 Saturation, Arterial 94.7 %      Hemoglobin, Blood Gas 13.3 g/dL      Carboxyhemoglobin 0.2 %      Methemoglobin 0.20 %      Oxyhemoglobin 94.3 %      FHHB 5.3 %      Site Arterial: right radial     Modality Room Air     FIO2 21 %      Flow Rate 0 lpm      PO2/FIO2 383     Morgan's Test Positive     Lactate, Arterial --    POC Glucose Once [[id number]]  (Abnormal) Collected: 05/29/24 0009    Specimen: Blood Updated: 05/29/24 0011     Glucose 154 mg/dL      Comment: Serial Number: [id number]:  [id number]       Blood Culture - Blood, Arm, Left [[id number]] Collected: 05/28/24 2344    Specimen: Blood from Arm, Left Updated: 05/29/24 0010    Blood Culture - Blood, Arm, Right [[id number]] Collected: 05/28/24 2344    Specimen: Blood from Arm, Right Updated: 05/29/24 0010    Procalcitonin [[id number]]  (Normal) Collected: 05/28/24 1628    Specimen: Blood Updated: 05/28/24 2344     Procalcitonin 0.07 ng/mL     Narrative:      As a Marker for Sepsis (Non-Neonates):    1. <0.5 ng/mL represents a low risk of severe sepsis and/or septic shock.  2. >2 ng/mL represents a high risk of severe sepsis and/or septic shock.    As a Marker for Lower Respiratory Tract Infections that require antibiotic therapy:    PCT on Admission    Antibiotic Therapy       6-12 Hrs later    >0.5                Strongly Recommended  >0.25 - <0.5        Recommended  0.1 - 0.25          Discouraged              Remeasure/reassess PCT  <0.1                Strongly Discouraged     Remeasure/reassess PCT    As 28 day mortality risk marker: \"Change in Procalcitonin Result\" (>80% or <=80%) if Day 0 (or Day 1) and Day 4 values are " available. Refer to http://www.[facility]-pct-calculator.com    Change in PCT <=80%  A decrease of PCT levels below or equal to 80% defines a positive change in PCT test result representing a higher risk for 28-day all-cause mortality of patients diagnosed with severe sepsis for septic shock.    Change in PCT >80%  A decrease of PCT levels of more than 80% defines a negative change in PCT result representing a lower risk for 28-day all-cause mortality of patients diagnosed with severe sepsis or septic shock.    This test is Prognostic not Diagnostic, if elevated correlate with clinical findings before administering antibiotic treatment.        Single High Sensitivity Troponin T [[id number]]  (Normal) Collected: 05/28/24 1628    Specimen: Blood Updated: 05/28/24 1708     HS Troponin T 16 ng/L     Narrative:      High Sensitive Troponin T Reference Range:  <14.0 ng/L- Negative Female for AMI  <22.0 ng/L- Negative Male for AMI  >=14 - Abnormal Female indicating possible myocardial injury.  >=22 - Abnormal Male indicating possible myocardial injury.   Clinicians would have to utilize clinical acumen, EKG, Troponin, and serial changes to determine if it is an Acute Myocardial Infarction or myocardial injury due to an underlying chronic condition.         Comprehensive Metabolic Panel [[id number]]  (Abnormal) Collected: 05/28/24 1628    Specimen: Blood Updated: 05/28/24 1702     Glucose 127 mg/dL      BUN 24 mg/dL      Creatinine 1.40 mg/dL      Sodium 140 mmol/L      Potassium 4.6 mmol/L      Chloride 103 mmol/L      CO2 21.7 mmol/L      Calcium 9.3 mg/dL      Total Protein 7.2 g/dL      Albumin 4.0 g/dL      ALT (SGPT) 32 U/L      AST (SGOT) 15 U/L      Alkaline Phosphatase 235 U/L      Total Bilirubin 1.8 mg/dL      Globulin 3.2 gm/dL      A/G Ratio 1.3 g/dL      BUN/Creatinine Ratio 17.1     Anion Gap 15.3 mmol/L      eGFR 58.3 mL/min/1.73     Narrative:      GFR Normal >60  Chronic Kidney Disease <60  Kidney Failure <15       BNP [[id number]]  (Abnormal) Collected: 05/28/24 1628    Specimen: Blood Updated: 05/28/24 1700     proBNP 24,364.0 pg/mL     Narrative:      This assay is used as an aid in the diagnosis of individuals suspected of having heart failure. It can be used as an aid in the diagnosis of acute decompensated heart failure (ADHF) in patients presenting with signs and symptoms of ADHF to the emergency department (ED). In addition, NT-proBNP of <300 pg/mL indicates ADHF is not likely.    Age Range Result Interpretation  NT-proBNP Concentration (pg/mL:      <50             Positive            >450                   Gray                 300-450                    Negative             <300    50-75           Positive            >900                  Gray                300-900                  Negative            <300      >75             Positive            >1800                  Gray                300-1800                  Negative            <300    [place] Draw [[id number]] Collected: 05/28/24 1628    Specimen: Blood Updated: 05/28/24 1645    Narrative:      The following orders were created for panel order [place] Draw.  Procedure                               Abnormality         Status                     ---------                               -----------         ------                     Green Top (Gel)[[id number]]                                  Final result               Lavender Top[[id number]]                                     Final result               Gold Top - SST[[id number]]                                   Final result               Light Blue Top[[id number]]                                   Final result                 Please view results for these tests on the individual orders.    Green Top (Gel) [[id number]] Collected: 05/28/24 1628    Specimen: Blood Updated: 05/28/24 1645     Extra Tube Hold for add-ons.     Comment: Auto resulted.       Lavender Top [[id number]] Collected: 05/28/24 1628    Specimen:  Blood Updated: 05/28/24 1645     Extra Tube hold for add-on     Comment: Auto resulted       Gold Top - SST [[id number]] Collected: 05/28/24 1628    Specimen: Blood Updated: 05/28/24 1645     Extra Tube Hold for add-ons.     Comment: Auto resulted.       Light Blue Top [[id number]] Collected: 05/28/24 1628    Specimen: Blood Updated: 05/28/24 1645     Extra Tube Hold for add-ons.     Comment: Auto resulted       CBC & Differential [[id number]]  (Abnormal) Collected: 05/28/24 1628    Specimen: Blood Updated: 05/28/24 1636    Narrative:      The following orders were created for panel order CBC & Differential.  Procedure                               Abnormality         Status                     ---------                               -----------         ------                     CBC Auto Differential[[id number]]        Abnormal            Final result                 Please view results for these tests on the individual orders.    CBC Auto Differential [[id number]]  (Abnormal) Collected: 05/28/24 1628    Specimen: Blood Updated: 05/28/24 1636     WBC 13.07 10*3/mm3      RBC 4.86 10*6/mm3      Hemoglobin 13.3 g/dL      Hematocrit 42.5 %      MCV 87.4 fL      MCH 27.4 pg      MCHC 31.3 g/dL      RDW 17.8 %      RDW-SD 55.5 fl      MPV 10.2 fL      Platelets 395 10*3/mm3      Neutrophil % 87.0 %      Lymphocyte % 5.3 %      Monocyte % 6.8 %      Eosinophil % 0.0 %      Basophil % 0.5 %      Immature Grans % 0.4 %      Neutrophils, Absolute 11.38 10*3/mm3      Lymphocytes, Absolute 0.69 10*3/mm3      Monocytes, Absolute 0.89 10*3/mm3      Eosinophils, Absolute 0.00 10*3/mm3      Basophils, Absolute 0.06 10*3/mm3      Immature Grans, Absolute 0.05 10*3/mm3      nRBC 0.0 /100 WBC           Imaging Results (Last 24 Hours)       Procedure Component Value Units Date/Time    XR Chest 1 View [[id number]] Collected: 05/28/24 1703     Updated: 05/28/24 1706    Narrative:      DATE OF EXAM:  5/28/2024 4:53 PM     PROCEDURE:  XR CHEST  1 VW-     INDICATIONS:  SOA Triage Protocol     COMPARISON:  April 8, 2024, chest radiograph April 2, 2024 and chest CT April 3, 2024     TECHNIQUE:   Single radiographic view of the chest was obtained.     FINDINGS:  There are new right basilar opacities with suspicion for a small right  pleural effusion. No definite new left lung infiltrate, left pleural  effusion, or pneumothorax. The heart appears mildly enlarged.  Mediastinal contour appears grossly normal.       Impression:      1.  New right basilar opacities with suspicion for a small right pleural  effusion. Opacities could represent atelectasis or pneumonia.  2.  Cardiomegaly, as before.        Electronically Signed By-Prasanna Porter MD On:5/28/2024 5:04 PM             ECG/EMG Results (last 24 hours)       Procedure Component Value Units Date/Time    ECG 12 Lead ED Triage Standing Order; SOA [[id number]] Collected: 05/28/24 1616     Updated: 05/28/24 2034     QT Interval 373 ms      QTC Interval 485 ms     Narrative:      HEART RATE= 101  bpm  RR Interval= 592  ms  DE Interval= 187  ms  P Horizontal Axis= -2  deg  P Front Axis= 54  deg  QRSD Interval= 119  ms  QT Interval= 373  ms  QTcB= 485  ms  QRS Axis= -67  deg  T Wave Axis= 109  deg  - ABNORMAL ECG -  Sinus tachycardia  Left atrial enlargement  Left bundle branch block  LVH with secondary repolarization abnormality  Borderline prolonged QT interval  When compared with ECG of 08-Apr-2024 19:50:58,  Significant repolarization change  Significant axis, voltage or hypertrophy change  Electronically Signed By: Franky Santillan ([facility]) 28-May-2024 20:34:42  Date and Time of Study: 2024-05-28 16:16:34          Orders (last 24 hrs)        Start     Ordered    05/29/24 0900  amiodarone (PACERONE) tablet 200 mg  Daily         05/28/24 2320 05/29/24 0900  divalproex (DEPAKOTE) DR tablet 250 mg  Daily         05/28/24 2320 05/29/24 0900  empagliflozin (JARDIANCE) tablet 25 mg  Daily         05/28/24 2320     05/29/24 0900  ferrous sulfate tablet 325 mg  Every Other Day         05/28/24 2320 05/29/24 0900  spironolactone (ALDACTONE) tablet 25 mg  Daily         05/28/24 2320 05/29/24 0900  tamsulosin (FLOMAX) 24 hr capsule 0.4 mg  Daily         05/28/24 2320 05/29/24 0900  venlafaxine XR (EFFEXOR-XR) 24 hr capsule 37.5 mg  Daily         05/28/24 2320 05/29/24 0900  furosemide (LASIX) injection 40 mg  2 Times Daily (Diuretics)         05/28/24 2320 05/29/24 0800  Oral Care  2 Times Daily       05/28/24 2320 05/29/24 0751  POC Glucose Once  PROCEDURE ONCE        Comments: Complete no more than 45 minutes prior to patient eating      05/29/24 0747    05/29/24 0730  Insulin Lispro (humaLOG) injection 2-9 Units  4 Times Daily Before Meals & Nightly         05/29/24 0117 05/29/24 0700  POC Glucose 4x Daily Before Meals & at Bedtime  4 Times Daily Before Meals & at Bedtime      Comments: Complete no more than 45 minutes prior to patient eating      05/29/24 0117 05/29/24 0600  pantoprazole (PROTONIX) EC tablet 40 mg  Every Early Morning         05/28/24 2320 05/29/24 0600  CBC (No Diff)  Morning Draw         05/28/24 2320 05/29/24 0600  Comprehensive Metabolic Panel  Morning Draw         05/28/24 2320 05/29/24 0600  Magnesium  Morning Draw         05/28/24 2320 05/29/24 0600  Phosphorus  Morning Draw         05/28/24 2320 05/29/24 0157  Blood Gas, Arterial -With Co-Ox Panel: Yes  Once         05/29/24 0156    05/29/24 0119  PT Consult: Eval & Treat Functional Mobility Below Baseline  Once        Comments: Reason Why PT Needed: weakness    05/29/24 0118    05/29/24 0117  dextrose (GLUTOSE) oral gel 15 g  Every 15 Minutes PRN         05/29/24 0117    05/29/24 0117  dextrose (D50W) (25 g/50 mL) IV injection 25 g  Every 15 Minutes PRN         05/29/24 0117    05/29/24 0117  glucagon (GLUCAGEN) injection 1 mg  Every 15 Minutes PRN         05/29/24 0117    05/29/24 0030  cefTRIAXone (ROCEPHIN)  "1,000 mg in sodium chloride 0.9 % 100 mL IVPB-VTB  Every 24 Hours         05/28/24 2332 05/29/24 0015  apixaban (ELIQUIS) tablet 5 mg  2 Times Daily         05/28/24 2320 05/29/24 0015  carvedilol (COREG) tablet 25 mg  2 Times Daily         05/28/24 2320 05/29/24 0015  mirtazapine (REMERON) tablet 15 mg  Nightly         05/28/24 2320 05/29/24 0015  AZITHROMYCIN 500 MG/250 ML 0.9% NS IVPB (vial-mate)  Every 24 Hours         05/28/24 2320 05/29/24 0015  sodium chloride 0.9 % flush 10 mL  Every 12 Hours Scheduled         05/28/24 2320 05/29/24 0012  POC Glucose Once  PROCEDURE ONCE        Comments: Complete no more than 45 minutes prior to patient eating      05/29/24 0009    05/28/24 2353  Inpatient Case Management  Consult  Once        Provider:  (Not yet assigned)    05/28/24 2353 05/28/24 2330  atorvastatin (LIPITOR) tablet 40 mg  Nightly         05/28/24 2320 05/28/24 2321  Procalcitonin  Once         05/28/24 2320 05/28/24 2321  Urinalysis With Microscopic If Indicated (No Culture) - Urine, Clean Catch  Once         05/28/24 2320 05/28/24 2321  Blood Culture - Blood, Arm, Left  Once        Placed in \"And\" Linked Group    05/28/24 2320 05/28/24 2321  Blood Culture - Blood, Arm, Right  Once        Comments: From a different site than #1.     Placed in \"And\" Linked Group    05/28/24 2320 05/28/24 2321  Vital Signs  Per Hospital Policy         05/28/24 2320 05/28/24 2321  Continuous Cardiac Monitoring  Continuous        Comments: Follow Standing Orders As Outlined in Process Instructions (Open Order Report to View Full Instructions)    05/28/24 2320 05/28/24 2321  Telemetry - Maintain IV Access  Continuous,   Status:  Canceled         05/28/24 2320 05/28/24 2321  Telemetry - Place Orders & Notify Provider of Results When Patient Experiences Acute Chest Pain, Dysrhythmia or Respiratory Distress  Until Discontinued         05/28/24 2320 05/28/24 2321  " Notify Provider (With Default Parameters)  Until Discontinued         05/28/24 2320 05/28/24 2321  Activity - Ad Padmini  Until Discontinued         05/28/24 2320 05/28/24 2321  Intake & Output  Every Shift       05/28/24 2320 05/28/24 2321  Weigh Patient  Once         05/28/24 2320 05/28/24 2321  Insert Peripheral IV  Once         05/28/24 2320 05/28/24 2321  Saline Lock & Maintain IV Access  Continuous         05/28/24 2320 05/28/24 2321  VTE Prophylaxis Not Indicated: Contraindicated; Other; already on home eliquis  Once         05/28/24 2320 05/28/24 2321  Bowel Regimen Not Indicated  Once         05/28/24 2320 05/28/24 2321  Diet: Cardiac, Diabetic; Healthy Heart (2-3 Na+); Consistent Carbohydrate; Fluid Consistency: Thin (IDDSI 0)  Diet Effective Now         05/28/24 2320 05/28/24 2321  Reason for Discontinuing Lactic Acid: Not Septic  Once         05/28/24 2320 05/28/24 2320  sodium chloride 0.9 % flush 10 mL  As Needed         05/28/24 2320 05/28/24 2320  sodium chloride 0.9 % infusion 40 mL  As Needed         05/28/24 2320 05/28/24 2320  acetaminophen (TYLENOL) tablet 1,000 mg  Every 8 Hours PRN         05/28/24 2320 05/28/24 2320  guaifenesin-dextromethorphan 600-30 mg (MUCINEX DM) 1 tablet  2 Times Daily PRN         05/28/24 2320 05/28/24 2320  albuterol sulfate HFA (PROVENTIL HFA;VENTOLIN HFA;PROAIR HFA) inhaler 2 puff  Every 4 Hours PRN         05/28/24 2320 05/28/24 2320  nitroglycerin (NITROSTAT) SL tablet 0.4 mg  Every 5 Minutes PRN         05/28/24 2320 05/28/24 2215  Code Status and Medical Interventions:  Continuous         05/28/24 2215 05/28/24 2215  Inpatient Admission  Once         05/28/24 2215 05/28/24 2124  Hospitalist (on-call MD unless specified)  Once        Specialty:  Hospitalist  Provider:  Emanuel Rodarte MD    05/28/24 2148 05/28/24 1845  furosemide (LASIX) injection 60 mg  Once         05/28/24 1829 05/28/24 1608  NPO Diet NPO Type:  Strict NPO  Diet Effective Now,   Status:  Canceled         05/28/24 1607    05/28/24 1608  Undress & Gown  Once         05/28/24 1607    05/28/24 1608  Cardiac Monitoring  Continuous,   Status:  Canceled        Comments: Follow Standing Orders As Outlined in Process Instructions (Open Order Report to View Full Instructions)    05/28/24 1607    05/28/24 1608  Continuous Pulse Oximetry  Continuous         05/28/24 1607    05/28/24 1608  Vital Signs  Per Hospital Policy         05/28/24 1607    05/28/24 1608  ECG 12 Lead ED Triage Standing Order; SOA  Once         05/28/24 1607    05/28/24 1608  XR Chest 1 View  1 Time Imaging         05/28/24 1607    05/28/24 1608  Insert Peripheral IV  Once         05/28/24 1607    05/28/24 1608  [place] Draw  Once         05/28/24 1607    05/28/24 1608  CBC & Differential  Once         05/28/24 1607    05/28/24 1608  Comprehensive Metabolic Panel  Once         05/28/24 1607    05/28/24 1608  BNP  Once         05/28/24 1607    05/28/24 1608  Single High Sensitivity Troponin T  Once         05/28/24 1607    05/28/24 1608  Green Top (Gel)  PROCEDURE ONCE         05/28/24 1607    05/28/24 1608  Lavender Top  PROCEDURE ONCE         05/28/24 1607    05/28/24 1608  Gold Top - SST  PROCEDURE ONCE         05/28/24 1607    05/28/24 1608  Light Blue Top  PROCEDURE ONCE         05/28/24 1607    05/28/24 1608  CBC Auto Differential  PROCEDURE ONCE         05/28/24 1607    05/28/24 1607  sodium chloride 0.9 % flush 10 mL  As Needed         05/28/24 1607    Unscheduled  Oxygen Therapy- Nasal Cannula; Titrate 1-6 LPM Per SpO2; 90 - 95%  Continuous PRN       05/28/24 1607    Unscheduled  Oxygen Therapy- Nasal Cannula; Titrate 1-6 LPM Per SpO2; 90 - 95%  Continuous PRN       05/28/24 2320    Unscheduled  Follow Hypoglycemia Standing Orders For Blood Glucose <70 & Notify Provider of Treatment  As Needed      Comments: Follow Hypoglycemia Orders As Outlined in Process Instructions (Open Order Report  to View Full Instructions)  Notify Provider Any Time Hypoglycemia Treatment is Administered    05/29/24 0117                   Ronda Pinto, RN   Registered Nurse  Nurse Navigator     Plan of Care     Signed     Date of Service: 05/29/24 0408  Creation Time: 05/29/24 0408     Signed         Goal Outcome Evaluation:  Plan of Care Reviewed With: patient  Progress: no change  Outcome Evaluation: pt is axox4. no c/o of pain. pt has a frequent non-productive cough, and has tachypnea at 30bpm, then will slow down and speed up again, md notified, abgs ordered. In chart notes it states pt got a cpap on 5/13, md notified, no new orders at this time. all other vital signs stable. no other concerns noted.

## 2024-05-29 NOTE — H&P
[facility]   HOSPITALIST HISTORY AND PHYSICAL  Date: 2024   Patient Name: Regulo Sepulveda  : 1965  MRN: [id number]  Primary Care Physician:  Dickson Landry MD  Date of admission: 2024    Subjective   Subjective     Chief Complaint: Shortness of breath    HPI:    Regulo Sepulveda is a 58 y.o. male with past medical history significant for treated hep C, HFrEF 25 to 30%, paroxysmal A-fib, apical thrombus on Eliquis, anxiety/depression, COPD, nonobstructive CAD, type II DM, HTN, HLD, and schizophrenia who presented for shortness of breath    Patient stated he feels shortness of breath since yesterday.  He endorses sleeping with 4 pillows.  His symptoms worsened today which prompted him to the ED.  He endorses subjective fever.  He denies chest pain, no abdomen pain, no dysuria.    Personal History     Past Medical History:  Past Medical History:   Diagnosis Date    Anxiety     Asthma     Bipolar affective     Cardiac tamponade         CHF (congestive heart failure)     COPD (chronic obstructive pulmonary disease)     Coronary artery disease     Depression     Diabetes mellitus     Elevated cholesterol     Hypertension     Parkinson disease            Past Surgical History:  Past Surgical History:   Procedure Laterality Date    CARDIAC CATHETERIZATION Right 2023    Procedure: Right and Left Heart Cath;  Surgeon: Ben Grant MD;  Location: [facility] CATH INVASIVE LOCATION;  Service: Cardiovascular;  Laterality: Right;    TESTICLE SURGERY Left     extraction       Family History:   Family History   Problem Relation Age of Onset    Diabetes Mother     Depression Sister     Restless legs syndrome Sister     Insomnia Sister     Sleep walking Sister     Sleep disorder Sister         Night Terrors    Depression Brother        Social History:   Social History     Socioeconomic History    Marital status: Single   Tobacco Use    Smoking status: Every Day     Current packs/day: 0.50     Average packs/day: 0.5  packs/day for 50.2 years (25.1 ttl pk-yrs)     Types: Cigarettes     Start date: 1/1/1974     Last attempt to quit: 11/2/2023    Smokeless tobacco: Never    Tobacco comments:     At least 10 cigarettes a day   Vaping Use    Vaping status: Former    Substances: Nicotine   Substance and Sexual Activity    Alcohol use: Not Currently    Drug use: Not Currently     Types: Methamphetamines, Marijuana    Sexual activity: Defer       Home Medications:  albuterol sulfate HFA, amiodarone, apixaban, atorvastatin, carvedilol, dapagliflozin, divalproex, ferrous gluconate, furosemide, metFORMIN, mirtazapine, nicotine, pantoprazole, spironolactone, tamsulosin, and venlafaxine    Allergies:  Allergies   Allergen Reactions    Penicillins Shortness Of Breath       Review of Systems   All systems were reviewed and negative except for indicated in HPI    Objective   Objective     Vitals:   Temp:  [98.3 °F (36.8 °C)] 98.3 °F (36.8 °C)  Heart Rate:  [] 102  Resp:  [18] 18  BP: (125-145)/() 145/107    Physical Exam    Constitutional: Awake, alert, no acute distress   Eyes: Pupils equal, sclerae anicteric, no conjunctival injection   HENT: NCAT, mucous membranes moist   Neck: Supple, no thyromegaly, no lymphadenopathy, trachea midline   Respiratory: Crackles on lung bilaterally, some mild wheezing   cardiovascular: RRR, no murmurs, rubs, or gallops, palpable pedal pulses bilaterally   Gastrointestinal: Positive bowel sounds, soft, nontender, nondistended   Musculoskeletal: No bilateral ankle edema, no clubbing or cyanosis to extremities   Psychiatric: Appropriate affect, cooperative   Neurologic: Oriented x 3, strength symmetric in all extremities, Cranial Nerves grossly intact to confrontation, speech clear   Skin: No rashes     Result Review    Result Review:  I have personally reviewed the results from the time of this admission to 5/28/2024 22:15 EDT and agree with these findings:  [x]  Laboratory  [x]  Microbiology  [x]   Radiology  [x]  EKG/Telemetry   []  Cardiology/Vascular   []  Pathology  []  Old records  []  Other:    Laboratory Studies:  Recent Results (from the past 24 hour(s))   ECG 12 Lead ED Triage Standing Order; SOA    Collection Time: 05/28/24  4:16 PM   Result Value Ref Range    QT Interval 373 ms    QTC Interval 485 ms   Comprehensive Metabolic Panel    Collection Time: 05/28/24  4:28 PM    Specimen: Blood   Result Value Ref Range    Glucose 127 (H) 65 - 99 mg/dL    BUN 24 (H) 6 - 20 mg/dL    Creatinine 1.40 (H) 0.76 - 1.27 mg/dL    Sodium 140 136 - 145 mmol/L    Potassium 4.6 3.5 - 5.2 mmol/L    Chloride 103 98 - 107 mmol/L    CO2 21.7 (L) 22.0 - 29.0 mmol/L    Calcium 9.3 8.6 - 10.5 mg/dL    Total Protein 7.2 6.0 - 8.5 g/dL    Albumin 4.0 3.5 - 5.2 g/dL    ALT (SGPT) 32 1 - 41 U/L    AST (SGOT) 15 1 - 40 U/L    Alkaline Phosphatase 235 (H) 39 - 117 U/L    Total Bilirubin 1.8 (H) 0.0 - 1.2 mg/dL    Globulin 3.2 gm/dL    A/G Ratio 1.3 g/dL    BUN/Creatinine Ratio 17.1 7.0 - 25.0    Anion Gap 15.3 (H) 5.0 - 15.0 mmol/L    eGFR 58.3 (L) >60.0 mL/min/1.73   BNP    Collection Time: 05/28/24  4:28 PM    Specimen: Blood   Result Value Ref Range    proBNP 24,364.0 (H) 0.0 - 900.0 pg/mL   Single High Sensitivity Troponin T    Collection Time: 05/28/24  4:28 PM    Specimen: Blood   Result Value Ref Range    HS Troponin T 16 <22 ng/L   Green Top (Gel)    Collection Time: 05/28/24  4:28 PM   Result Value Ref Range    Extra Tube Hold for add-ons.    Lavender Top    Collection Time: 05/28/24  4:28 PM   Result Value Ref Range    Extra Tube hold for add-on    Gold Top - SST    Collection Time: 05/28/24  4:28 PM   Result Value Ref Range    Extra Tube Hold for add-ons.    Light Blue Top    Collection Time: 05/28/24  4:28 PM   Result Value Ref Range    Extra Tube Hold for add-ons.    CBC Auto Differential    Collection Time: 05/28/24  4:28 PM    Specimen: Blood   Result Value Ref Range    WBC 13.07 (H) 3.40 - 10.80 10*3/mm3    RBC 4.86  4.14 - 5.80 10*6/mm3    Hemoglobin 13.3 13.0 - 17.7 g/dL    Hematocrit 42.5 37.5 - 51.0 %    MCV 87.4 79.0 - 97.0 fL    MCH 27.4 26.6 - 33.0 pg    MCHC 31.3 (L) 31.5 - 35.7 g/dL    RDW 17.8 (H) 12.3 - 15.4 %    RDW-SD 55.5 (H) 37.0 - 54.0 fl    MPV 10.2 6.0 - 12.0 fL    Platelets 395 140 - 450 10*3/mm3    Neutrophil % 87.0 (H) 42.7 - 76.0 %    Lymphocyte % 5.3 (L) 19.6 - 45.3 %    Monocyte % 6.8 5.0 - 12.0 %    Eosinophil % 0.0 (L) 0.3 - 6.2 %    Basophil % 0.5 0.0 - 1.5 %    Immature Grans % 0.4 0.0 - 0.5 %    Neutrophils, Absolute 11.38 (H) 1.70 - 7.00 10*3/mm3    Lymphocytes, Absolute 0.69 (L) 0.70 - 3.10 10*3/mm3    Monocytes, Absolute 0.89 0.10 - 0.90 10*3/mm3    Eosinophils, Absolute 0.00 0.00 - 0.40 10*3/mm3    Basophils, Absolute 0.06 0.00 - 0.20 10*3/mm3    Immature Grans, Absolute 0.05 0.00 - 0.05 10*3/mm3    nRBC 0.0 0.0 - 0.2 /100 WBC       Imaging:  XR Chest 1 View    Result Date: 5/28/2024  Narrative: DATE OF EXAM: 5/28/2024 4:53 PM  PROCEDURE: XR CHEST 1 VW-  INDICATIONS: SOA Triage Protocol  COMPARISON: April 8, 2024, chest radiograph April 2, 2024 and chest CT April 3, 2024  TECHNIQUE: Single radiographic view of the chest was obtained.  FINDINGS: There are new right basilar opacities with suspicion for a small right pleural effusion. No definite new left lung infiltrate, left pleural effusion, or pneumothorax. The heart appears mildly enlarged. Mediastinal contour appears grossly normal.      Impression: 1.  New right basilar opacities with suspicion for a small right pleural effusion. Opacities could represent atelectasis or pneumonia. 2.  Cardiomegaly, as before.   Electronically Signed By-Prasanna Porter MD On:5/28/2024 5:04 PM       EKG: (my review): Sinus tachycardia, heart rate 101, QTc 485, no ST elevation or depression.  Left bundle branch block.  The left bundle branch block was present on his EKG on 4/8/2024    Assessment & Plan   Assessment / Plan     -I have discussed the case with the ED  "physician.  I have seen patient at bedside.  I have independently reviewed his EKGs, labs, imaging, record    Echocardiogram done on 3/24/2024:  \"Diffuse hypokinesis with severely reduced left ventricular systolic function ejection fraction around 25 to 30%.  Mild MR and mild TR.  Organized thrombus still present in the apex of the left ventricle.  Present on previous echo\"      Acute on chronic HFrEF exacerbation  Suspect pneumonia  TAYLOR, suspect cardiorenal  Other chronic problems: treated hep C, HFrEF 25 to 30%, paroxysmal A-fib, apical thrombus on Eliquis, anxiety/depression, COPD, nonobstructive CAD, type II DM, HTN, HLD, and schizophrenia      -I will start patient on Lasix IV 40 mg twice daily.  Strict I&O.  Watch electrolytes closely.  -Started patient on ceftriaxone and azithromycin.  Checking procalcitonin, UA, and blood cultures.  -Trend creatinine  -Hold off on repeat echo given he just had 1 done recently    Resume home medications when appropriate.  DVT prophylaxis with Eliquis    CODE STATUS:    Level Of Support Discussed With: Patient  Code Status (Patient has no pulse and is not breathing): CPR (Attempt to Resuscitate)  Medical Interventions (Patient has pulse or is breathing): Full Support      Admission Status:  I believe this patient meets admission status.    Electronically signed by Emanuel Rodarte MD, 05/28/24, 10:15 PM EDT.             "

## 2024-05-29 NOTE — ED PROVIDER NOTES
Time: 10:47 PM EDT  Date of encounter:  5/28/2024  Independent Historian/Clinical History and Information was obtained by:   Patient  Chief Complaint: Shortness of breath    History is limited by: N/A    History of Present Illness:  Patient is a 58 y.o. year old male who presents to the emergency department for evaluation of shortness of breath.  Patient states he has a history of CHF and COPD.  Patient is not on home oxygen.  Patient states his symptoms of gotten worse today.  Patient is still cough but is nonproductive.  Patient denies any fevers.  Patient states it feels like he just cannot get enough air in.  Patient also states that he has had decreased urine output.  Patient reports it has very dark in color.  Patient admits to having decreased p.o. intake.  Additionally patient is at his primary care provider has increased his diuretics several days ago.    HPI    Patient Care Team  Primary Care Provider: Dickson Landry MD    Past Medical History:     Allergies   Allergen Reactions    Penicillins Shortness Of Breath     Past Medical History:   Diagnosis Date    Anxiety     Asthma     Bipolar affective     Cardiac tamponade     2023    CHF (congestive heart failure)     COPD (chronic obstructive pulmonary disease)     Coronary artery disease     Depression     Diabetes mellitus     Elevated cholesterol     Hypertension     Parkinson disease      Past Surgical History:   Procedure Laterality Date    CARDIAC CATHETERIZATION Right 9/17/2023    Procedure: Right and Left Heart Cath;  Surgeon: Ben Gratn MD;  Location: [facility] CATH INVASIVE LOC[facility];  Service: Cardiovascular;  Laterality: Right;    TESTICLE SURGERY Left     extraction     Family History   Problem Relation Age of Onset    Diabetes Mother     Depression Sister     Restless legs syndrome Sister     Insomnia Sister     Sleep walking Sister     Sleep disorder Sister         Night Terrors    Depression Brother        Home Medications:  Prior to Admission  medications    Medication Sig Start Date End Date Taking? Authorizing Provider   albuterol sulfate  (90 Base) MCG/ACT inhaler Inhale 2 puffs Every 4 (Four) Hours As Needed for Wheezing or Shortness of Air. Indications: Chronic Obstructive Lung Disease 4/16/24   Beto Gillette MD   amiodarone (PACERONE) 200 MG tablet Take 1 tablet by mouth Daily. 4/22/24   Lin Tamez APRN   apixaban (ELIQUIS) 5 MG tablet tablet Take 1 tablet by mouth 2 (Two) Times a Day for 120 days. 2/22/24 6/21/24  Dickson Landry MD   atorvastatin (LIPITOR) 40 MG tablet Take 1 tablet by mouth every night at bedtime. 1/8/24   Dickson Landry MD   carvedilol (COREG) 12.5 MG tablet Take 2 tablets by mouth 2 (Two) Times a Day. 5/7/24   Beto Gillette MD   divalproex (DEPAKOTE) 250 MG DR tablet Take 1 tablet by mouth Daily.    ProviderYuliya MD   Farxiga 5 MG tablet tablet Take 2 tablets by mouth Daily. Indications: Type 2 Diabetes  Patient taking differently: Take 1 tablet by mouth Daily. Indications: Type 2 Diabetes 4/16/24   Beto Gillette MD   ferrous gluconate (FERGON) 324 MG tablet Take 1 tablet by mouth Daily With Breakfast. 5/15/24   Dickosn Landry MD   furosemide (LASIX) 40 MG tablet Take 0.5 tablets by mouth Every Other Day.    ProviderYuliya MD   metFORMIN (GLUCOPHAGE) 1000 MG tablet Take 1 tablet by mouth 2 (Two) Times a Day With Meals.    ProvideruYliya MD   mirtazapine (REMERON) 15 MG tablet Take 1 tablet by mouth Every Night. 4/15/24   Dickson Landry MD   nicotine (Nicoderm CQ) 7 MG/24HR patch Place 1 patch on the skin as directed by provider Daily.  Patient not taking: Reported on 5/15/2024 5/7/24   Beto Gillette MD   pantoprazole (PROTONIX) 40 MG EC tablet Take 1 tablet by mouth Daily.    ProviderYuliya MD   spironolactone (ALDACTONE) 25 MG tablet Take 1 tablet by mouth Daily. 4/16/24   Beto Gillette MD   tamsulosin (FLOMAX) 0.4 MG capsule 24 hr capsule Take 1 capsule by mouth Daily.     "Provider, MD Yuliya   venlafaxine (EFFEXOR) 37.5 MG tablet Take 1 tablet by mouth Daily.    Provider, MD Yuliya        Social History:   Social History     Tobacco Use    Smoking status: Every Day     Current packs/day: 0.50     Average packs/day: 0.5 packs/day for 50.2 years (25.1 ttl pk-yrs)     Types: Cigarettes     Start date: 1/1/1974     Last attempt to quit: 11/2/2023    Smokeless tobacco: Never    Tobacco comments:     At least 10 cigarettes a day   Vaping Use    Vaping status: Former    Substances: Nicotine   Substance Use Topics    Alcohol use: Not Currently    Drug use: Not Currently     Types: Methamphetamines, Marijuana         Review of Systems:  Review of Systems   Constitutional:  Negative for chills and fever.   HENT:  Negative for congestion, ear pain and sore throat.    Eyes:  Negative for pain.   Respiratory:  Positive for cough and shortness of breath. Negative for chest tightness.    Cardiovascular:  Negative for chest pain.   Gastrointestinal:  Negative for abdominal pain, diarrhea, nausea and vomiting.   Genitourinary:  Positive for decreased urine volume. Negative for flank pain and hematuria.   Musculoskeletal:  Negative for joint swelling.   Skin:  Negative for pallor.   Neurological:  Negative for seizures and headaches.   All other systems reviewed and are negative.       Physical Exam:  /93 (Patient Position: Lying)   Pulse 85   Temp 98.3 °F (36.8 °C) (Oral)   Resp 20   Ht 195.6 cm (77\")   Wt 70.6 kg (155 lb 10.3 oz)   SpO2 98%   BMI 18.46 kg/m²     Physical Exam    Vital signs were reviewed under triage note.  General appearance - Patient appears well-developed and well-nourished.  Patient is in no acute distress.  Head - Normocephalic, atraumatic.  Pupils - Equal, round, reactive to light.  Extraocular muscles are intact.  Conjunctiva is clear.  Nasal - Normal inspection.  No evidence of trauma or epistaxis.  Tympanic membranes - Gray, intact without erythema or " retractions.  Oral mucosa - Pink and moist without lesions or erythema.  Uvula is midline.  Chest wall - Atraumatic.  Chest wall is nontender.  There are no vesicular rashes noted.  Neck - Supple.  Trachea was midline.  There is no palpable lymphadenopathy or thyromegaly.  There are no meningeal signs  Lungs - Auscultation reveals moderately decreased breath sounds.  There are no wheezes or rhonchi upon auscultation.  Heart - Tachycardic rate but with a regular rhythm without any murmurs, clicks, or gallops.  Abdomen - Soft.  Bowel sounds are present.  There is no palpable tenderness.  There is no rebound, guarding, or rigidity.  There are no palpable masses.  There are no pulsatile masses.  Back - Spine is straight and midline.  There is no CVA tenderness.  Extremities - Intact x4 with full range of motion.  There is palpable edema.  Pulses are intact x4 and equal.  Neurologic - Patient is awake, alert, and oriented x3.  Cranial nerves II through XII are grossly intact.  Motor and sensory functions grossly intact.  Cerebellar function was normal.  Integument - There are no rashes.  There are no petechia or purpura lesions noted.  There are no vesicular lesions noted.          Procedures:  Procedures      Medical Decision Making:      Comorbidities that affect care:    Hypertension, COPD, diabetes, anxiety, depression, bipolar affective disorder, CHF, asthma, hyperlipidemia, coronary artery disease, Parkinson's disease    External Notes reviewed:    Previous Clinic Note: Office visit with Dr. Landry from 5/15/2024 was reviewed by me.      The following orders were placed and all results were independently analyzed by me:  Orders Placed This Encounter   Procedures    XR Chest 1 View    Surrency Draw    Comprehensive Metabolic Panel    BNP    Single High Sensitivity Troponin T    CBC Auto Differential    NPO Diet NPO Type: Strict NPO    Undress & Gown    Continuous Pulse Oximetry    Vital Signs    Code Status and  Medical Interventions:    Hospitalist (on-call MD unless specified)    Oxygen Therapy- Nasal Cannula; Titrate 1-6 LPM Per SpO2; 90 - 95%    ECG 12 Lead ED Triage Standing Order; SOA    Insert Peripheral IV    Inpatient Admission    CBC & Differential    Green Top (Gel)    Lavender Top    Gold Top - SST    Light Blue Top       Medications Given in the Emergency Department:  Medications   sodium chloride 0.9 % flush 10 mL (has no administration in time range)   furosemide (LASIX) injection 60 mg (60 mg Intravenous Given 5/28/24 1916)        ED Course:    ED Course as of 05/31/24 2240   Fri May 31, 2024   2238 EKG performed at 1616 was interpreted by me to show a sinus tachycardia with a ventricular rate of 101 bpm.  SD interval is 77 ms.  P waves are suggestive left atrial enlargement.  QRS is 119 ms.  Patient is a left bundle branch block morphology.  Axis is leftward -67 degrees.  Patient has voltage criteria suggesting LVH.  There is nonspecific ST-T wave change identified.  QT corrected was 485 ms. [TB]      ED Course User Index  [TB] Alex Bridges DO   Patient was seen and evaluated in the ED by me.  The above history and physical examination was performed as documented.  Diagnostic data was obtained.  Results reviewed.  Patient was noted to have conversational dyspnea.  Patient has elevation of his BNP.  This concerning for developing CHF exacerbation.  Patient was given Lasix.  Because of the tachycardia and conversational dyspnea patient be admitted for observation and further evaluation.  Hospitalist service was consulted and agreed to admission.    Labs:    Lab Results (last 24 hours)       Procedure Component Value Units Date/Time    CBC & Differential [[id number]]  (Abnormal) Collected: 05/28/24 1628    Specimen: Blood Updated: 05/28/24 1636    Narrative:      The following orders were created for panel order CBC & Differential.  Procedure                               Abnormality         Status                      ---------                               -----------         ------                     CBC Auto Differential[[id number]]        Abnormal            Final result                 Please view results for these tests on the individual orders.    Comprehensive Metabolic Panel [[id number]]  (Abnormal) Collected: 05/28/24 1628    Specimen: Blood Updated: 05/28/24 1702     Glucose 127 mg/dL      BUN 24 mg/dL      Creatinine 1.40 mg/dL      Sodium 140 mmol/L      Potassium 4.6 mmol/L      Chloride 103 mmol/L      CO2 21.7 mmol/L      Calcium 9.3 mg/dL      Total Protein 7.2 g/dL      Albumin 4.0 g/dL      ALT (SGPT) 32 U/L      AST (SGOT) 15 U/L      Alkaline Phosphatase 235 U/L      Total Bilirubin 1.8 mg/dL      Globulin 3.2 gm/dL      A/G Ratio 1.3 g/dL      BUN/Creatinine Ratio 17.1     Anion Gap 15.3 mmol/L      eGFR 58.3 mL/min/1.73     Narrative:      GFR Normal >60  Chronic Kidney Disease <60  Kidney Failure <15      BNP [[id number]]  (Abnormal) Collected: 05/28/24 1628    Specimen: Blood Updated: 05/28/24 1700     proBNP 24,364.0 pg/mL     Narrative:      This assay is used as an aid in the diagnosis of individuals suspected of having heart failure. It can be used as an aid in the diagnosis of acute decompensated heart failure (ADHF) in patients presenting with signs and symptoms of ADHF to the emergency department (ED). In addition, NT-proBNP of <300 pg/mL indicates ADHF is not likely.    Age Range Result Interpretation  NT-proBNP Concentration (pg/mL:      <50             Positive            >450                   Gray                 300-450                    Negative             <300    50-75           Positive            >900                  Gray                300-900                  Negative            <300      >75             Positive            >1800                  Gray                300-1800                  Negative            <300    Single High Sensitivity Troponin T [[id number]]  (Normal)  Collected: 05/28/24 1628    Specimen: Blood Updated: 05/28/24 1708     HS Troponin T 16 ng/L     Narrative:      High Sensitive Troponin T Reference Range:  <14.0 ng/L- Negative Female for AMI  <22.0 ng/L- Negative Male for AMI  >=14 - Abnormal Female indicating possible myocardial injury.  >=22 - Abnormal Male indicating possible myocardial injury.   Clinicians would have to utilize clinical acumen, EKG, Troponin, and serial changes to determine if it is an Acute Myocardial Infarction or myocardial injury due to an underlying chronic condition.         CBC Auto Differential [[id number]]  (Abnormal) Collected: 05/28/24 1628    Specimen: Blood Updated: 05/28/24 1636     WBC 13.07 10*3/mm3      RBC 4.86 10*6/mm3      Hemoglobin 13.3 g/dL      Hematocrit 42.5 %      MCV 87.4 fL      MCH 27.4 pg      MCHC 31.3 g/dL      RDW 17.8 %      RDW-SD 55.5 fl      MPV 10.2 fL      Platelets 395 10*3/mm3      Neutrophil % 87.0 %      Lymphocyte % 5.3 %      Monocyte % 6.8 %      Eosinophil % 0.0 %      Basophil % 0.5 %      Immature Grans % 0.4 %      Neutrophils, Absolute 11.38 10*3/mm3      Lymphocytes, Absolute 0.69 10*3/mm3      Monocytes, Absolute 0.89 10*3/mm3      Eosinophils, Absolute 0.00 10*3/mm3      Basophils, Absolute 0.06 10*3/mm3      Immature Grans, Absolute 0.05 10*3/mm3      nRBC 0.0 /100 WBC              Imaging:    XR Chest 1 View    Result Date: 5/28/2024  DATE OF EXAM: 5/28/2024 4:53 PM  PROCEDURE: XR CHEST 1 VW-  INDICATIONS: SOA Triage Protocol  COMPARISON: April 8, 2024, chest radiograph April 2, 2024 and chest CT April 3, 2024  TECHNIQUE: Single radiographic view of the chest was obtained.  FINDINGS: There are new right basilar opacities with suspicion for a small right pleural effusion. No definite new left lung infiltrate, left pleural effusion, or pneumothorax. The heart appears mildly enlarged. Mediastinal contour appears grossly normal.      1.  New right basilar opacities with suspicion for a small  right pleural effusion. Opacities could represent atelectasis or pneumonia. 2.  Cardiomegaly, as before.   Electronically Signed By-Prasanna Porter MD On:5/28/2024 5:04 PM         Differential Diagnosis and Discussion:    Dyspnea: Differential diagnosis includes but is not limited to metabolic acidosis, neurological disorders, psychogenic, asthma, pneumothorax, upper airway obstruction, COPD, pneumonia, noncardiogenic pulmonary edema, interstitial lung disease, anemia, congestive heart failure, and pulmonary embolism    All labs were reviewed and interpreted by me.  All X-rays impressions were independently interpreted by me.  EKG was interpreted by me.    MDM     Amount and/or Complexity of Data Reviewed  Decide to obtain previous medical records or to obtain history from someone other than the patient: yes             Patient Care Considerations:    SEPSIS was considered but is NOT present in the emergency department as SIRS criteria is not present.      Consultants/Shared Management Plan:    Hospitalist: I have discussed the case with Dr. Rodarte who agrees to accept the patient for admission.    Social Determinants of Health:    Patient is independent, reliable, and has access to care.       Disposition and Care Coordination:    Admit:   Through independent evaluation of the patient's history, physical, and imperical data, the patient meets criteria for inpatient admission to the hospital.        Final diagnoses:   Acute on chronic congestive heart failure, unspecified heart failure type   Failure of outpatient treatment        ED Disposition       ED Disposition   Decision to Admit    Condition   --    Comment   Level of Care: Remote Telemetry [26]   Diagnosis: Acute exacerbation of CHF (congestive heart failure) [659128]   Certification: I Certify That Inpatient Hospital Services Are Medically Necessary For Greater Than 2 Midnights                 This medical record created using voice recognition  software.             Alex Bridges DO  05/31/24 [id number]

## 2024-05-29 NOTE — PLAN OF CARE
Goal Outcome Evaluation:    Patient A/Ox4. On room air. No complaints of pain. Urine sent to lab. Resting comfortably this shift. No other issues/needs at this time.

## 2024-05-30 ENCOUNTER — PATIENT OUTREACH (OUTPATIENT)
Dept: CASE MANAGEMENT | Facility: OTHER | Age: 59
End: 2024-05-30
Payer: COMMERCIAL

## 2024-05-30 DIAGNOSIS — I50.43 ACUTE ON CHRONIC COMBINED SYSTOLIC AND DIASTOLIC CHF (CONGESTIVE HEART FAILURE): Primary | ICD-10-CM

## 2024-05-30 DIAGNOSIS — J44.9 CHRONIC OBSTRUCTIVE PULMONARY DISEASE, UNSPECIFIED COPD TYPE: ICD-10-CM

## 2024-05-30 DIAGNOSIS — F32.A DEPRESSION, UNSPECIFIED DEPRESSION TYPE: ICD-10-CM

## 2024-05-30 LAB
ANION GAP SERPL CALCULATED.3IONS-SCNC: 10.4 MMOL/L (ref 5–15)
BUN SERPL-MCNC: 33 MG/DL (ref 6–20)
BUN/CREAT SERPL: 26.2 (ref 7–25)
CALCIUM SPEC-SCNC: 8.7 MG/DL (ref 8.6–10.5)
CHLORIDE SERPL-SCNC: 101 MMOL/L (ref 98–107)
CO2 SERPL-SCNC: 28.6 MMOL/L (ref 22–29)
CREAT SERPL-MCNC: 1.26 MG/DL (ref 0.76–1.27)
DEPRECATED RDW RBC AUTO: 55.7 FL (ref 37–54)
EGFRCR SERPLBLD CKD-EPI 2021: 66.1 ML/MIN/1.73
ERYTHROCYTE [DISTWIDTH] IN BLOOD BY AUTOMATED COUNT: 18 % (ref 12.3–15.4)
GLUCOSE BLDC GLUCOMTR-MCNC: 123 MG/DL (ref 70–99)
GLUCOSE BLDC GLUCOMTR-MCNC: 125 MG/DL (ref 70–99)
GLUCOSE BLDC GLUCOMTR-MCNC: 135 MG/DL (ref 70–99)
GLUCOSE BLDC GLUCOMTR-MCNC: 90 MG/DL (ref 70–99)
GLUCOSE SERPL-MCNC: 108 MG/DL (ref 65–99)
HCT VFR BLD AUTO: 39.9 % (ref 37.5–51)
HGB BLD-MCNC: 12.6 G/DL (ref 13–17.7)
MAGNESIUM SERPL-MCNC: 1.7 MG/DL (ref 1.6–2.6)
MCH RBC QN AUTO: 27.2 PG (ref 26.6–33)
MCHC RBC AUTO-ENTMCNC: 31.6 G/DL (ref 31.5–35.7)
MCV RBC AUTO: 86.2 FL (ref 79–97)
PLATELET # BLD AUTO: 331 10*3/MM3 (ref 140–450)
PMV BLD AUTO: 10 FL (ref 6–12)
POTASSIUM SERPL-SCNC: 3.5 MMOL/L (ref 3.5–5.2)
RBC # BLD AUTO: 4.63 10*6/MM3 (ref 4.14–5.8)
SODIUM SERPL-SCNC: 140 MMOL/L (ref 136–145)
WBC NRBC COR # BLD AUTO: 9.78 10*3/MM3 (ref 3.4–10.8)

## 2024-05-30 PROCEDURE — 80048 BASIC METABOLIC PNL TOTAL CA: CPT | Performed by: INTERNAL MEDICINE

## 2024-05-30 PROCEDURE — 82948 REAGENT STRIP/BLOOD GLUCOSE: CPT

## 2024-05-30 PROCEDURE — 25810000003 SODIUM CHLORIDE 0.9 % SOLUTION 500 ML FLEX CONT: Performed by: STUDENT IN AN ORGANIZED HEALTH CARE EDUCATION/TRAINING PROGRAM

## 2024-05-30 PROCEDURE — 25010000002 FUROSEMIDE PER 20 MG: Performed by: STUDENT IN AN ORGANIZED HEALTH CARE EDUCATION/TRAINING PROGRAM

## 2024-05-30 PROCEDURE — 94761 N-INVAS EAR/PLS OXIMETRY MLT: CPT

## 2024-05-30 PROCEDURE — 85027 COMPLETE CBC AUTOMATED: CPT | Performed by: INTERNAL MEDICINE

## 2024-05-30 PROCEDURE — 97161 PT EVAL LOW COMPLEX 20 MIN: CPT

## 2024-05-30 PROCEDURE — 94799 UNLISTED PULMONARY SVC/PX: CPT

## 2024-05-30 PROCEDURE — 83735 ASSAY OF MAGNESIUM: CPT | Performed by: INTERNAL MEDICINE

## 2024-05-30 PROCEDURE — 25010000002 MAGNESIUM SULFATE 2 GM/50ML SOLUTION: Performed by: INTERNAL MEDICINE

## 2024-05-30 PROCEDURE — 99232 SBSQ HOSP IP/OBS MODERATE 35: CPT | Performed by: INTERNAL MEDICINE

## 2024-05-30 RX ORDER — POTASSIUM CHLORIDE 750 MG/1
40 CAPSULE, EXTENDED RELEASE ORAL ONCE
Status: COMPLETED | OUTPATIENT
Start: 2024-05-30 | End: 2024-05-30

## 2024-05-30 RX ORDER — MAGNESIUM SULFATE HEPTAHYDRATE 40 MG/ML
2 INJECTION, SOLUTION INTRAVENOUS ONCE
Status: COMPLETED | OUTPATIENT
Start: 2024-05-30 | End: 2024-05-30

## 2024-05-30 RX ADMIN — Medication 10 ML: at 21:05

## 2024-05-30 RX ADMIN — AMIODARONE HYDROCHLORIDE 200 MG: 200 TABLET ORAL at 08:26

## 2024-05-30 RX ADMIN — SPIRONOLACTONE 25 MG: 25 TABLET ORAL at 08:26

## 2024-05-30 RX ADMIN — ATORVASTATIN CALCIUM 40 MG: 40 TABLET, FILM COATED ORAL at 21:04

## 2024-05-30 RX ADMIN — CARVEDILOL 25 MG: 25 TABLET, FILM COATED ORAL at 21:04

## 2024-05-30 RX ADMIN — APIXABAN 5 MG: 5 TABLET, FILM COATED ORAL at 08:26

## 2024-05-30 RX ADMIN — APIXABAN 5 MG: 5 TABLET, FILM COATED ORAL at 21:04

## 2024-05-30 RX ADMIN — CARVEDILOL 25 MG: 25 TABLET, FILM COATED ORAL at 08:26

## 2024-05-30 RX ADMIN — FUROSEMIDE 40 MG: 10 INJECTION, SOLUTION INTRAMUSCULAR; INTRAVENOUS at 17:15

## 2024-05-30 RX ADMIN — EMPAGLIFLOZIN 25 MG: 25 TABLET, FILM COATED ORAL at 08:26

## 2024-05-30 RX ADMIN — POTASSIUM CHLORIDE 40 MEQ: 750 CAPSULE, EXTENDED RELEASE ORAL at 08:25

## 2024-05-30 RX ADMIN — SODIUM CHLORIDE 40 ML: 9 INJECTION, SOLUTION INTRAVENOUS at 08:26

## 2024-05-30 RX ADMIN — FUROSEMIDE 40 MG: 10 INJECTION, SOLUTION INTRAMUSCULAR; INTRAVENOUS at 08:26

## 2024-05-30 RX ADMIN — VENLAFAXINE HYDROCHLORIDE 37.5 MG: 37.5 CAPSULE, EXTENDED RELEASE ORAL at 08:25

## 2024-05-30 RX ADMIN — Medication 10 ML: at 08:26

## 2024-05-30 RX ADMIN — MAGNESIUM SULFATE HEPTAHYDRATE 2 G: 40 INJECTION, SOLUTION INTRAVENOUS at 08:26

## 2024-05-30 RX ADMIN — PANTOPRAZOLE SODIUM 40 MG: 40 TABLET, DELAYED RELEASE ORAL at 05:30

## 2024-05-30 RX ADMIN — MIRTAZAPINE 15 MG: 15 TABLET, FILM COATED ORAL at 21:04

## 2024-05-30 NOTE — OUTREACH NOTE
[facility] End of Month Documentation    This Chronic Medical Management Care Plan for Regulo Sepulveda, 58 y.o. male, has been established; reviewed; a new plan of care implemented and a new plan of care implemented for the month of May.  A cumulative time of 72  minutes was spent on this patient record this month, including phone call with relative; phone call with patient; chart review.    Regarding the patient's problems: has Major depressive disorder, recurrent episode, moderate; Chronic obstructive pulmonary disease; Diabetes mellitus; Primary hypertension; Hyperlipidemia; Hepatitis C; Cigarette nicotine dependence; BPH (benign prostatic hyperplasia); GERD (gastroesophageal reflux disease); B12 deficiency; LV (left ventricular) mural thrombus; Schizophrenia; PAF (paroxysmal atrial fibrillation); Chronic HFrEF (heart failure with reduced ejection fraction); Moderate malnutrition; Cardiomyopathy; TIA (transient ischemic attack); Alcohol abuse; Bipolar 1 disorder; Chronic paranoid schizophrenia; Chronic post-traumatic stress disorder; Inhalant abuse; Type 2 diabetes mellitus without complication; Vitamin D deficiency; and Acute exacerbation of CHF (congestive heart failure) on their problem list., the following items were addressed: medical records; medications and any changes can be found within the plan section of the note.  A detailed listing of time spent for chronic care management is tracked within each outreach encounter.  Current medications include:  has a current medication list which includes the following prescription(s): abilify maintena, albuterol sulfate hfa, amiodarone, apixaban, atorvastatin, carvedilol, farxiga, ferrous gluconate, furosemide, metformin, mirtazapine, pantoprazole, sacubitril-valsartan, sacubitril-valsartan, spironolactone, and venlafaxine xr, and the following Facility-Administered Medications: acetaminophen, albuterol sulfate hfa, amiodarone, apixaban, atorvastatin, carvedilol, dextrose,  dextrose, empagliflozin, ferrous sulfate, furosemide, glucagon, guaifenesin-dextromethorphan 600-30 mg, insulin lispro, mirtazapine, nitroglycerin, pantoprazole, sodium chloride, sodium chloride, sodium chloride, sodium chloride, spironolactone, and venlafaxine xr. and the patient is reported to be patient is compliant with medication protocol,  Medications are reported to be effective.  Regarding these diagnoses, referrals were made to the following provider(s):  NA.  All notes on chart for PCP to review.    The patient was monitored remotely for activity level; medications; pain.    The patient's physical needs include:  resources for disability needs.     The patient's mental support needs include:  No data recorded    The patient's cognitive support needs include:  medication; continued support; needs assistance with ADLs; increased support    The patient's psychosocial support needs include:  continued support    The patient's functional needs include: medication education; physical healthcare; resources for disability needs    The patient's environmental needs include:  resources for disability needs    Care Plan overall comments:  No data recorded    Refer to previous outreach notes for more information on the areas listed above.    Monthly Billing Diagnoses  (I50.43) Acute on chronic combined systolic and diastolic CHF (congestive heart failure)    (F32.A) Depression, unspecified depression type    (J44.9) Chronic obstructive pulmonary disease, unspecified COPD type    Medications   Medications have been reconciled    Care Plan progress this month:      Recently Modified Care Plans Updates made since 4/29/2024 12:00 AM       Wellness (Adult)           Problem Priority Last Modified     Healthy Nutrition (Wellness) --  5/15/2024  3:52 PM by Alesha Cheung RN              Goal Recent Progress Last Modified     Healthy Nutrition Achieved --  5/15/2024  3:52 PM by Alesha Cheung, RN     Evidence-based  guidance:   Assess patient perspective on healthy weight, weight loss or weight gain, motivation and readiness for change.   Recommend or set healthy weight goal based on body mass index.   Review current dietary intake and exercise levels.   Encourage small steps toward making change to eating and exercising.   Provide individualized medical nutrition therapy.    Notes:            Task Due Date Last Modified     Alleviate Barriers to Healthy Eating --  5/15/2024  3:52 PM by Alesha Cheung RN     Care Management Activities:      - not discussed during this outreach      Notes:                   Heart Failure (Adult)           Problem Priority Last Modified     Symptom Exacerbation (Heart Failure) --  5/15/2024  3:52 PM by Alesha Cheung RN              Goal Recent Progress Last Modified     Symptom Exacerbation Prevented or Minimized --  5/15/2024  3:52 PM by Alesha Cheung RN     Evidence-based guidance:   Perform or review cognitive and/or health literacy screening.   Assess understanding of adherence and barriers to treatment plan, as well as lifestyle changes; develop strategies to address barriers.   Establish a cszlconc-ucdeof-lxyd early intervention process to communicate with primary care provider when signs/symptoms worsen.   Facilitate timely posthospital discharge or emergency department treatment that includes intensive follow-up via telephone calls, home visit, telehealth monitoring and care at multidisciplinary heart failure clinic.   Adjust frequency and intensity of follow-up based on presentation, number of emergency department visits, hospital admissions and frequency and severity of symptom exacerbation.   Facilitate timely visit, usually within 1 week, with primary care provider following hospital discharge.   Collaborate with clinical pharmacist to address adverse drug reactions, drug interactions, subtherapeutic dosage, patient and family education.   Regularly screen for  presence of depressive symptoms using a validated tool; consider pharmacologic therapy and/or referral for cognitive behavioral therapy when present.   Refer to community-based services, such as a heart failure support group, community health worker or peer support program.   Review immunization status; arrange receipt of needed vaccinations.   Prepare patient for home oxygen use based on signs/symptoms.    Notes:            Task Due Date Last Modified     Identify and Minimize Risk of Heart Failure Exacerbation --  5/15/2024  3:52 PM by Alesha Cheung RN     Care Management Activities:      - barriers to lifestyle changes reviewed and addressed  - depression screen reviewed      Notes:              Problem Priority Last Modified     Disease Progression (Heart Failure) --  5/15/2024  3:52 PM by Alesha Cheung RN              Goal Recent Progress Last Modified     Comorbidities Identified and Managed --  5/15/2024  3:52 PM by Alesha Cheung RN     Evidence-based guidance:   Assess and address signs/symptoms of comorbidity, including dyslipidemia, diabetes, iron deficiency, gout, arthritis, dysrhythmia, hypertension, cachexia, coronary artery disease, kidney dysfunction and lung disease.   Prepare patient for laboratory and diagnostic exams based on risk and presentation.   Prepare for use of pharmacologic therapy that may include statin, angiotensin converting enzyme (ACE) inhibitor, angiotensin receptor blocker (ARB), beta-blocker, digoxin, antidysrhythmic, diuretic or omega-3 fatty acid.   Monitor side effects and anticipate need for periodic adjustments.   Prepare patient for potential invasive treatment, such as implantable cardioverter-defibrillator, cardiac resynchronization therapy or heart transplant as disease progresses.    Notes:            Task Due Date Last Modified     Identify and Manage Comorbidities --  5/15/2024  3:52 PM by Alesha Cheung RN     Care Management Activities:      -  medication side effects monitored and managed  - signs/symptoms of comorbidities identified      Notes:            Goal Recent Progress Last Modified     Health Optimized --  5/15/2024  3:52 PM by Alesha Cheung RN     Evidence-based guidance:   Use brief intervention, such as 5 A's (Ask, Advise, Assess, Assist, Arrange) to encourage smoking cessation; refer to smoking cessation program, if ready for more intensive intervention.   Perform or refer to a registered dietitian for a nutrition assessment and nutrition-focused physical exam.    Identify potential micronutrient deficiencies, such as iron, vitamin D and thiamin.   Assess need for potential diet and fluid modification, such as reduced sodium or fluid intake.   Minimize unnecessary dietary restrictions to increase oral intake. Note: Sodium restriction should be individualized to the patient and clinical status.   Facilitate home monitoring of weight.    Notes:            Task Due Date Last Modified     Optimize Health --  5/15/2024  3:52 PM by Alesha Cheung RN     Care Management Activities:      - not discussed during this outreach      Notes:              Problem Priority Last Modified     Activity Tolerance (Heart Failure) --  5/15/2024  3:52 PM by Alesha Cheung RN              Goal Recent Progress Last Modified     Activity Tolerance Optimized --  5/15/2024  3:52 PM by Alesha Cheung RN     Evidence-based guidance:   Promote daily physical activity that improves functional ability, cognition and quality of life.   Encourage reduction in sedentary time.    Encourage optimal, safe functional mobility and self-care performance based on ability and tolerance.    Promote breathing and energy conservation techniques, such as pursed-lip breathing, preplanning and pacing of activity, balancing activity and rest.   Encourage participation in cardiac rehabilitation services.    Notes:            Task Due Date Last Modified     Maintain  Strength and Functional Ability --  5/15/2024  3:52 PM by Alesha Cheung RN     Care Management Activities:      - not discussed during this outreach      Notes:              Problem Priority Last Modified     Obstructive Sleep Apnea (Heart Failure) --  5/15/2024  3:52 PM by Alesha Cheung RN              Goal Recent Progress Last Modified     Effective Breathing Pattern During Sleep --  5/15/2024  3:52 PM by Alesha Cheung RN     Evidence-based guidance:   Use a validated screening tool to identify risk for obstructive sleep apnea (JESE).   Encourage a nonsupine sleep position, such as side-lying, head of bed elevated, multiple pillows, to prevent airway collapse.   Encourage the use of sleep hygiene techniques.   Anticipate a referral for sleep study (polysomnography).   If JESE is identified, prepare patient for treatment options, such as nighttime oxygen therapy and CPAP (continuous positive airway pressure) or BiPAP (bilevel positive airway pressure).    Notes:            Task Due Date Last Modified     Monitor and Manage Obstructive Sleep Apnea (JESE) --  5/15/2024  3:52 PM by Alesha Cheung RN     Care Management Activities:      - JESE risk screen completed or reviewed      Notes:                        Current Specialty Plan of Care Status signed by both patient and provider    Instructions   Patient was provided an electronic copy of care plan  CCM services were explained and offered and patient has accepted these services.  Patient has given their written consent to receive CCM services and understands that this includes the authorization of electronic communication of medical information with the other treating providers.  Patient understands that they may stop CCM services at any time and these changes will be effective at the end of the calendar month and will effectively revocate the agreement of CCM services.  Patient understands that only one practitioner can furnish and be paid for  CCM services during one calendar month.  Patient also understands that there may be co-payment or deductible fees in association with CCM services.  Patient will continue with at least monthly follow-up calls with the Ambulatory .    Alesha HERBERT  Ambulatory Case Management    5/30/2024, 12:09 EDT

## 2024-05-30 NOTE — PLAN OF CARE
Goal Outcome Evaluation:  Plan of Care Reviewed With: patient        Progress: no change  Pt is alert x4. No c/o pain. 250 bolus given for low bp, vital signs stable after. Pt has been very sleepy, stated he doesn't sleep much. Continuous pulse ox for o2 dips while sleeping, 1Lnc in place. No other concerns noted.

## 2024-05-30 NOTE — SIGNIFICANT NOTE
05/30/24 1230   Coping/Psychosocial   Observed Emotional State calm;cooperative   Verbalized Emotional State other (see comments)  (tired)   Trust Relationship/Rapport empathic listening provided   Involvement in Care interacting with patient   Additional Documentation Spiritual Care (Group)   Spiritual Care   Use of Spiritual Resources non-Zoroastrianism use of spiritual care   Spiritual Care Source  initiative   Spiritual Care Follow-Up follow-up, none required as presently assessed   Response to Spiritual Care receptive of support   Spiritual Care Interventions supportive conversation provided   Spiritual Care Visit Type initial   Receptivity to Spiritual Care visit welcomed

## 2024-05-30 NOTE — PLAN OF CARE
Goal Outcome Evaluation:   Pt remained A&Ox4 this shift. Also, pt was titrated to room air with orders placed for CPAP with rest/sleep. Blood glucose readings were 90, 123, and 125. SSI was not administered as order parameters were not met. All other vital signs remained stable.

## 2024-05-30 NOTE — THERAPY EVALUATION
Acute Care - Physical Therapy Initial Evaluation   [facility]     Patient Name: Regulo Sepulveda  : 1965  MRN: [id number]  Today's Date: 2024      Visit Dx:     ICD-10-CM ICD-9-CM   1. Acute on chronic congestive heart failure, unspecified heart failure type  I50.9 428.0   2. Failure of outpatient treatment  Z78.9 V49.89   3. Difficulty walking  R26.2 719.7     Patient Active Problem List   Diagnosis    Major depressive disorder, recurrent episode, moderate    Chronic obstructive pulmonary disease    Diabetes mellitus    Primary hypertension    Hyperlipidemia    Hepatitis C    Cigarette nicotine dependence    BPH (benign prostatic hyperplasia)    GERD (gastroesophageal reflux disease)    B12 deficiency    LV (left ventricular) mural thrombus    Schizophrenia    PAF (paroxysmal atrial fibrillation)    Chronic HFrEF (heart failure with reduced ejection fraction)    Moderate malnutrition    Cardiomyopathy    TIA (transient ischemic attack)    Alcohol abuse    Bipolar 1 disorder    Chronic paranoid schizophrenia    Chronic post-traumatic stress disorder    Inhalant abuse    Type 2 diabetes mellitus without complication    Vitamin D deficiency    Acute exacerbation of CHF (congestive heart failure)     Past Medical History:   Diagnosis Date    Anxiety     Asthma     Bipolar affective     Cardiac tamponade         CHF (congestive heart failure)     COPD (chronic obstructive pulmonary disease)     Coronary artery disease     Depression     Diabetes mellitus     Elevated cholesterol     Hypertension     Parkinson disease      Past Surgical History:   Procedure Laterality Date    CARDIAC CATHETERIZATION Right 2023    Procedure: Right and Left Heart Cath;  Surgeon: Ben Grant MD;  Location: [facility] CATH INVASIVE LOCATION;  Service: Cardiovascular;  Laterality: Right;    TESTICLE SURGERY Left     extraction     PT Assessment (Last 12 Hours)       PT Evaluation and Treatment       Row Name 24 1100           Physical Therapy Time and Intention    Subjective Information no complaints  -CS     Document Type evaluation  -CS     Mode of Treatment individual therapy;physical therapy  -CS     Patient Effort fair  -CS     Symptoms Noted During/After Treatment fatigue  -CS       Row Name 05/30/24 1100          General Information    Patient Profile Reviewed yes  -CS     Patient Observations alert  -CS     Prior Level of Function independent:;all household mobility;gait;transfer;bed mobility;ADL's  -CS     Equipment Currently Used at Home none  Patient reports he ambulates short distances, no further than needed using no AD. He stands to shower. No home O2 via nasal cannula but does have a CPAP as needed for night.  -CS     Existing Precautions/Restrictions fall;oxygen therapy device and L/min  2L supplemental O2  -CS     Barriers to Rehab none identified  -CS       Row Name 05/30/24 1100          Living Environment    Current Living Arrangements home  -CS     Home Accessibility stairs to enter home;stairs within home  -CS     People in Home alone  -CS     Primary Care Provided by self  -CS       Row Name 05/30/24 1100          Home Main Entrance    Number of Stairs, Main Entrance three  -CS     Stair Railings, Main Entrance railings safe and in good condition  -CS       Row Name 05/30/24 1100          Stairs Within Home, Primary    Number of Stairs, Within Home, Primary none  -CS       Row Name 05/30/24 1100          Pain    Pretreatment Pain Rating 0/10 - no pain  -CS     Posttreatment Pain Rating 0/10 - no pain  -CS       Row Name 05/30/24 1100          Cognition    Orientation Status (Cognition) oriented x 3  -CS       Row Name 05/30/24 1100          Range of Motion Comprehensive    General Range of Motion no range of motion deficits identified  -CS       Row Name 05/30/24 1100          Strength Comprehensive (MMT)    General Manual Muscle Testing (MMT) Assessment no strength deficits identified  -CS       Row Name  05/30/24 1100          Bed Mobility    Bed Mobility bed mobility (all) activities  -CS     All Activities, [place] (Bed Mobility) independent  -CS       Row Name 05/30/24 1100          Transfers    Transfers sit-stand transfer;stand-sit transfer  -CS       Row Name 05/30/24 1100          Sit-Stand Transfer    Sit-Stand [place] (Transfers) supervision  -CS     Assistive Device (Sit-Stand Transfers) other (see comments)  no AD  -CS       Row Name 05/30/24 1100          Stand-Sit Transfer    Stand-Sit [place] (Transfers) supervision  -CS     Assistive Device (Stand-Sit Transfers) other (see comments)  no AD  -CS       Row Name 05/30/24 1100          Gait/Stairs (Locomotion)    Comment, (Gait/Stairs) Pt refused ambulation during session but had been up with staff to the bathroom, 15'x2, without assistive device and supervision/standby assist. Pt reports he does not need any assist and does not need physical therapy.  -       Row Name 05/30/24 1100          Safety Issues, Functional Mobility    Impairments Affecting Function (Mobility) endurance/activity tolerance;shortness of breath  -       Row Name 05/30/24 1100          Balance    Balance Assessment sitting dynamic balance  -CS     Dynamic Sitting Balance modified independence  -     Position, Sitting Balance sitting edge of bed  -       Row Name 05/30/24 1100          Plan of Care Review    Plan of Care Reviewed With patient  -CS     Progress no change  -     Outcome Evaluation Pt presents with no significant changes in physical limitations but is limited by endurance currently. He is able to complete all transfers independently and supervision with ambulation in room for safety. He is refusing further therapy services at this time but would benefit from home health services to follow-up once medically able to return home. He will be discharged from PT caseload at this time.  -       Row Name 05/30/24 1100          Positioning and  Restraints    Pre-Treatment Position in bed  -CS     Post Treatment Position bed  -CS     In Bed fowlers;call light within reach;encouraged to call for assist;exit alarm on  -CS       Row Name 05/30/24 1100          Therapy Assessment/Plan (PT)    Criteria for Skilled Interventions Met (PT) patient/family refuse skilled intervention at this time  -CS     Therapy Frequency (PT) evaluation only  -CS       Row Name 05/30/24 1100          PT Evaluation Complexity    History, PT Evaluation Complexity 1-2 personal factors and/or comorbidities  -CS     Examination of Body Systems (PT Eval Complexity) total of 4 or more elements  -CS     Clinical Presentation (PT Evaluation Complexity) stable  -CS     Clinical Decision Making (PT Evaluation Complexity) low complexity  -CS     Overall Complexity (PT Evaluation Complexity) low complexity  -CS       Row Name 05/30/24 1100          Therapy Plan Review/Discharge Plan (PT)    Therapy Plan Review (PT) evaluation/treatment results reviewed;patient  -CS       Row Name 05/30/24 1100          Physical Therapy Goals    Problem Specific Goal Selection (PT) problem specific goal 1, PT  -CS       Row Name 05/30/24 1100          Problem Specific Goal 1 (PT)    Problem Specific Goal 1 (PT) Complete physical therapy evaluation  -CS     Time Frame (Problem Specific Goal 1, PT) by discharge  -CS     Progress/Outcome (Problem Specific Goal 1, PT) goal met  -CS               User Key  (r) = Recorded By, (t) = Taken By, (c) = Cosigned By      Initials Name Provider Type    CS Marilee Nieves, PT Physical Therapist                    Physical Therapy Education       Title: PT OT SLP Therapies (In Progress)       Topic: Physical Therapy (Not Started)       Point: Mobility training (Not Started)       Learner Progress:  Not documented in this visit.              Point: Precautions (Not Started)       Learner Progress:  Not documented in this visit.                                  PT Recommendation  and Plan  Anticipated Discharge Disposition (PT): home, home with home health  Therapy Frequency (PT): evaluation only  Plan of Care Reviewed With: patient  Progress: no change  Outcome Evaluation: Pt presents with no significant changes in physical limitations but is limited by endurance currently. He is able to complete all transfers independently and supervision with ambulation in room for safety. He is refusing further therapy services at this time but would benefit from home health services to follow-up once medically able to return home. He will be discharged from PT caseload at this time.   Outcome Measures       Row Name 05/30/24 1100             How much help from another person do you currently need...    Turning from your back to your side while in flat bed without using bedrails? 4  -CS      Moving from lying on back to sitting on the side of a flat bed without bedrails? 4  -CS      Moving to and from a bed to a chair (including a wheelchair)? 4  -CS      Standing up from a chair using your arms (e.g., wheelchair, bedside chair)? 4  -CS      Climbing 3-5 steps with a railing? 3  -CS      To walk in hospital room? 3  -CS      AM-PAC 6 Clicks Score (PT) 22  -CS      Highest Level of Mobility Goal 7 --> Walk 25 feet or more  -CS         Functional Assessment    Outcome Measure Options AM-PAC 6 Clicks Basic Mobility (PT)  -CS                User Key  (r) = Recorded By, (t) = Taken By, (c) = Cosigned By      Initials Name Provider Type    Marilee Watson PT Physical Therapist                     Time Calculation:    PT Charges       Row Name 05/30/24 1150             Time Calculation    PT Received On 05/30/24  -CS         Untimed Charges    PT Eval/Re-eval Minutes 32  -CS         Total Minutes    Untimed Charges Total Minutes 32  -CS       Total Minutes 32  -CS                User Key  (r) = Recorded By, (t) = Taken By, (c) = Cosigned By      Initials Name Provider Type    Marilee Watson PT Physical  Therapist                  Therapy Charges for Today       Code Description Service Date Service Provider Modifiers Qty    [id number] HC PT EVAL LOW COMPLEXITY 3 5/30/2024 Marilee Nieves, PT GP 1            PT G-Codes  Outcome Measure Options: AM-PAC 6 Clicks Basic Mobility (PT)  AM-PAC 6 Clicks Score (PT): 22    Marilee Nieves, PT  5/30/2024

## 2024-05-30 NOTE — PLAN OF CARE
Goal Outcome Evaluation:  Plan of Care Reviewed With: patient        Progress: no change  Outcome Evaluation: Pt presents with no significant changes in physical limitations but is limited by endurance currently. He is able to complete all transfers independently and supervision with ambulation in room for safety. He is refusing further therapy services at this time but would benefit from home health services to follow-up once medically able to return home. He will be discharged from PT caseload at this time.      Anticipated Discharge Disposition (PT): home, home with home health

## 2024-05-30 NOTE — PROGRESS NOTES
[facility]   Hospitalist Progress Note  Date: 2024  Patient Name: Regulo Sepulveda  : 1965  MRN: [id number]  Date of admission: 2024  Room/Bed: [facility]/      Subjective   Subjective     Chief Complaint:   Shortness of breath    Summary:  Regulo Sepulveda is a 58 y.o. male with medical history significant for treated hep C, HFrEF 25 to 30%, paroxysmal A-fib, apical thrombus on Eliquis, anxiety/depression, COPD, nonobstructive CAD, type II DM, HTN, HLD, and schizophrenia who presented for shortness of breath.  Patient endorses 2 days of shortness of breath, requiring patient to sleep with 4 pillows at night.  Symptoms have only been worsening therefore presented to the emergency department.  Patient found to be in heart failure with acute exacerbation.  Patient's proBNP elevated 24,000, above typical elevation in proBNP for patient's heart failure exacerbations.  Chest x-ray shows bibasilar opacities with suspicion for small right pleural effusion.     Interval Followup:   Creatinine improved over the past 24 hours.  Continues to diurese well.  Patient placed on oxygen overnight, felt secondary to not having home CPAP.  Patient unable to bring this and therefore ordering CPAP for tonight.  Will wean oxygen as able today.  Supplementing potassium and magnesium given diuresis    Objective   Objective     Vitals:   Temp:  [97.2 °F (36.2 °C)-98.4 °F (36.9 °C)] 97.2 °F (36.2 °C)  Heart Rate:  [62-71] 62  Resp:  [18-20] 20  BP: ()/(42-83) 101/64  Flow (L/min):  [1-2] 1    Physical Exam               Constitutional: Sleeping comfortably, wakes to voice, no acute distress, nasal cannula in place              Eyes: Pupils equal, sclerae anicteric, no conjunctival injection              HENT: NCAT, mucous membranes moist              Neck: Supple, no thyromegaly, no lymphadenopathy, trachea midline              Respiratory: Improving bibasilar crackles present, no wheezing              cardiovascular: RRR, no  murmurs, rubs, or gallops, palpable pedal pulses bilaterally              Gastrointestinal: Positive bowel sounds, soft, nontender, nondistended              Musculoskeletal: No bilateral ankle edema, no clubbing or cyanosis to extremities              Neurologic: Oriented x 3, strength symmetric in all extremities, Cranial Nerves grossly intact to confrontation, speech clear              Skin: No rashes     Result Review    Result Review:  I have personally reviewed these results:  [x]  Laboratory      Lab 05/30/24  0526 05/29/24  0617 05/28/24  1628   WBC 9.78 9.80 13.07*   HEMOGLOBIN 12.6* 12.2* 13.3   HEMATOCRIT 39.9 39.2 42.5   PLATELETS 331 312 395   NEUTROS ABS  --   --  11.38*   IMMATURE GRANS (ABS)  --   --  0.05   LYMPHS ABS  --   --  0.69*   MONOS ABS  --   --  0.89   EOS ABS  --   --  0.00   MCV 86.2 89.1 87.4   PROCALCITONIN  --   --  0.07         Lab 05/30/24  0526 05/29/24  0617 05/28/24  1628   SODIUM 140 138 140   POTASSIUM 3.5 4.3 4.6   CHLORIDE 101 102 103   CO2 28.6 21.7* 21.7*   ANION GAP 10.4 14.3 15.3*   BUN 33* 28* 24*   CREATININE 1.26 1.34* 1.40*   EGFR 66.1 61.4 58.3*   GLUCOSE 108* 137* 127*   CALCIUM 8.7 9.2 9.3   MAGNESIUM 1.7 1.8  --    PHOSPHORUS  --  4.7*  --          Lab 05/29/24  0617 05/28/24  1628   TOTAL PROTEIN 6.2 7.2   ALBUMIN 3.6 4.0   GLOBULIN 2.6 3.2   ALT (SGPT) 24 32   AST (SGOT) 14 15   BILIRUBIN 1.2 1.8*   ALK PHOS 201* 235*         Lab 05/28/24  1628   PROBNP 24,364.0*   HSTROP T 16                 Lab 05/29/24  0226   PH, ARTERIAL 7.453*   PCO2, ARTERIAL 31.4*   PO2 ART 80.4   O2 SATURATION ART 94.7*   FIO2 21   HCO3 ART 21.5*   BASE EXCESS ART -1.5   CARBOXYHEMOGLOBIN 0.2     Brief Urine Lab Results  (Last result in the past 365 days)        Color   Clarity   Blood   Leuk Est   Nitrite   Protein   CREAT   Urine HCG        05/29/24 1259 Yellow   Clear   Negative   Negative   Negative   Negative                 [x]  Microbiology   Microbiology Results (last 10 days)        Procedure Component Value - Date/Time    Blood Culture - Blood, Arm, Left [[id number]]  (Normal) Collected: 05/28/24 2344    Lab Status: Preliminary result Specimen: Blood from Arm, Left Updated: 05/30/24 0015     Blood Culture No growth at 24 hours    Blood Culture - Blood, Arm, Right [[id number]]  (Normal) Collected: 05/28/24 2344    Lab Status: Preliminary result Specimen: Blood from Arm, Right Updated: 05/30/24 0015     Blood Culture No growth at 24 hours          [x]  Radiology  XR Chest 1 View    Result Date: 5/28/2024  1.  New right basilar opacities with suspicion for a small right pleural effusion. Opacities could represent atelectasis or pneumonia. 2.  Cardiomegaly, as before.   Electronically Signed By-Prasanna Porter MD On:5/28/2024 5:04 PM     []  EKG/Telemetry   []  Cardiology/Vascular   []  Pathology  []  Old records  []  Other:    Assessment & Plan   Assessment / Plan     Assessment:  Heart failure with reduced ejection fraction in acute exacerbation  TAYLOR, suspected cardiorenal  Treated hepatitis C  Paroxysmal atrial fibrillation  Known apical thrombus on Eliquis  Anxiety depression  COPD not in acute exacerbation  Type 2 diabetes  Hypertension  Hyperlipidemia  Schizophrenia    Plan:  Patient remains admitted to hospital for further care and management  Patient remains on scheduled IV diuresis, IV Lasix 40 mg twice daily  Strict I's and O's, net -2 L over the past 24 hours  Continue to monitor renal function electrolytes closely  Supplementing potassium and magnesium  Suspecting TAYLOR is cardiorenal, improving on diuretics  Patient's home medications have been resumed as indicated  Patient with a history of poor adherence with medical follow-ups, will discuss with case management  Stopping antibiotics, procalcitonin negative, patient's clinical picture is not consistent with pneumonia  Started on supplemental oxygen, suspecting a component of this is patient not having home CPAP at bedside.  Ordering  CPAP for this evening patient states he is unable to have his own brought into the hospital.      Discussed with RN.  Case management    DVT prophylaxis:  Medical and mechanical DVT prophylaxis orders are present.        CODE STATUS:   Level Of Support Discussed With: Patient  Code Status (Patient has no pulse and is not breathing): CPR (Attempt to Resuscitate)  Medical Interventions (Patient has pulse or is breathing): Full Support      Electronically signed by Felton Reynoso MD, 5/30/2024, 13:49 EDT.

## 2024-05-31 ENCOUNTER — READMISSION MANAGEMENT (OUTPATIENT)
Dept: CALL CENTER | Facility: HOSPITAL | Age: 59
End: 2024-05-31
Payer: COMMERCIAL

## 2024-05-31 ENCOUNTER — CLINICAL SUPPORT (OUTPATIENT)
Dept: FAMILY MEDICINE CLINIC | Facility: CLINIC | Age: 59
End: 2024-05-31
Payer: COMMERCIAL

## 2024-05-31 VITALS
HEIGHT: 73 IN | SYSTOLIC BLOOD PRESSURE: 99 MMHG | HEART RATE: 63 BPM | OXYGEN SATURATION: 96 % | WEIGHT: 132.72 LBS | DIASTOLIC BLOOD PRESSURE: 61 MMHG | RESPIRATION RATE: 18 BRPM | TEMPERATURE: 97.3 F | BODY MASS INDEX: 17.59 KG/M2

## 2024-05-31 DIAGNOSIS — F20.0 CHRONIC PARANOID SCHIZOPHRENIA: Primary | ICD-10-CM

## 2024-05-31 DIAGNOSIS — F33.1 MAJOR DEPRESSIVE DISORDER, RECURRENT EPISODE, MODERATE: ICD-10-CM

## 2024-05-31 LAB
ANION GAP SERPL CALCULATED.3IONS-SCNC: 12.5 MMOL/L (ref 5–15)
BUN SERPL-MCNC: 38 MG/DL (ref 6–20)
BUN/CREAT SERPL: 25.7 (ref 7–25)
CALCIUM SPEC-SCNC: 8.7 MG/DL (ref 8.6–10.5)
CHLORIDE SERPL-SCNC: 98 MMOL/L (ref 98–107)
CO2 SERPL-SCNC: 30.5 MMOL/L (ref 22–29)
CREAT SERPL-MCNC: 1.48 MG/DL (ref 0.76–1.27)
DEPRECATED RDW RBC AUTO: 54.8 FL (ref 37–54)
EGFRCR SERPLBLD CKD-EPI 2021: 54.5 ML/MIN/1.73
ERYTHROCYTE [DISTWIDTH] IN BLOOD BY AUTOMATED COUNT: 17.5 % (ref 12.3–15.4)
GLUCOSE BLDC GLUCOMTR-MCNC: 158 MG/DL (ref 70–99)
GLUCOSE BLDC GLUCOMTR-MCNC: 159 MG/DL (ref 70–99)
GLUCOSE SERPL-MCNC: 125 MG/DL (ref 65–99)
HCT VFR BLD AUTO: 43.2 % (ref 37.5–51)
HGB BLD-MCNC: 13.4 G/DL (ref 13–17.7)
MAGNESIUM SERPL-MCNC: 1.9 MG/DL (ref 1.6–2.6)
MCH RBC QN AUTO: 26.9 PG (ref 26.6–33)
MCHC RBC AUTO-ENTMCNC: 31 G/DL (ref 31.5–35.7)
MCV RBC AUTO: 86.6 FL (ref 79–97)
PLATELET # BLD AUTO: 336 10*3/MM3 (ref 140–450)
PMV BLD AUTO: 10.1 FL (ref 6–12)
POTASSIUM SERPL-SCNC: 3.4 MMOL/L (ref 3.5–5.2)
RBC # BLD AUTO: 4.99 10*6/MM3 (ref 4.14–5.8)
SODIUM SERPL-SCNC: 141 MMOL/L (ref 136–145)
WBC NRBC COR # BLD AUTO: 6.85 10*3/MM3 (ref 3.4–10.8)

## 2024-05-31 PROCEDURE — 63710000001 INSULIN LISPRO (HUMAN) PER 5 UNITS: Performed by: STUDENT IN AN ORGANIZED HEALTH CARE EDUCATION/TRAINING PROGRAM

## 2024-05-31 PROCEDURE — 83735 ASSAY OF MAGNESIUM: CPT | Performed by: INTERNAL MEDICINE

## 2024-05-31 PROCEDURE — 94799 UNLISTED PULMONARY SVC/PX: CPT

## 2024-05-31 PROCEDURE — 85027 COMPLETE CBC AUTOMATED: CPT | Performed by: INTERNAL MEDICINE

## 2024-05-31 PROCEDURE — 25010000002 FUROSEMIDE PER 20 MG: Performed by: STUDENT IN AN ORGANIZED HEALTH CARE EDUCATION/TRAINING PROGRAM

## 2024-05-31 PROCEDURE — 80048 BASIC METABOLIC PNL TOTAL CA: CPT | Performed by: INTERNAL MEDICINE

## 2024-05-31 PROCEDURE — 82948 REAGENT STRIP/BLOOD GLUCOSE: CPT

## 2024-05-31 PROCEDURE — 96372 THER/PROPH/DIAG INJ SC/IM: CPT | Performed by: STUDENT IN AN ORGANIZED HEALTH CARE EDUCATION/TRAINING PROGRAM

## 2024-05-31 PROCEDURE — 99239 HOSP IP/OBS DSCHRG MGMT >30: CPT | Performed by: INTERNAL MEDICINE

## 2024-05-31 RX ORDER — FUROSEMIDE 40 MG/1
20 TABLET ORAL DAILY
Start: 2024-06-02

## 2024-05-31 RX ADMIN — Medication 10 ML: at 08:15

## 2024-05-31 RX ADMIN — VENLAFAXINE HYDROCHLORIDE 37.5 MG: 37.5 CAPSULE, EXTENDED RELEASE ORAL at 08:14

## 2024-05-31 RX ADMIN — FERROUS SULFATE TAB 325 MG (65 MG ELEMENTAL FE) 325 MG: 325 (65 FE) TAB at 08:14

## 2024-05-31 RX ADMIN — APIXABAN 5 MG: 5 TABLET, FILM COATED ORAL at 08:14

## 2024-05-31 RX ADMIN — AMIODARONE HYDROCHLORIDE 200 MG: 200 TABLET ORAL at 08:14

## 2024-05-31 RX ADMIN — EMPAGLIFLOZIN 25 MG: 25 TABLET, FILM COATED ORAL at 08:14

## 2024-05-31 RX ADMIN — INSULIN LISPRO 2 UNITS: 100 INJECTION, SOLUTION INTRAVENOUS; SUBCUTANEOUS at 08:14

## 2024-05-31 RX ADMIN — INSULIN LISPRO 2 UNITS: 100 INJECTION, SOLUTION INTRAVENOUS; SUBCUTANEOUS at 12:29

## 2024-05-31 RX ADMIN — CARVEDILOL 25 MG: 25 TABLET, FILM COATED ORAL at 08:14

## 2024-05-31 RX ADMIN — SPIRONOLACTONE 25 MG: 25 TABLET ORAL at 08:14

## 2024-05-31 RX ADMIN — FUROSEMIDE 40 MG: 10 INJECTION, SOLUTION INTRAMUSCULAR; INTRAVENOUS at 08:15

## 2024-05-31 NOTE — CONSULTS
"Nutrition Services    Patient Name: Regulo Sepulveda  YOB: 1965  MRN: [id number]  Admission date: 5/28/2024      CLINICAL NUTRITION ASSESSMENT      Reason for Assessment  BMI     H&P:  Past Medical History:   Diagnosis Date    Anxiety     Asthma     Bipolar affective     Cardiac tamponade     2023    CHF (congestive heart failure)     COPD (chronic obstructive pulmonary disease)     Coronary artery disease     Depression     Diabetes mellitus     Elevated cholesterol     Hypertension     Parkinson disease         Current Problems:   Active Hospital Problems    Diagnosis     Acute exacerbation of CHF (congestive heart failure)         Nutrition/Diet History         Narrative   RD went and visited pt for a couple of days ranging from 1-2 visits. Each time pt was sleeping and unable to arouse. Spoke w/ pt nurse this AM and she reports pt eats 100% of his meals w/ no issues c/s and has no complaints of any n/v/c/d. Unable to obtain wt hx, PO intake PTA, and if any known food allergies. RD will continue to monitor.     Note pt appears thin, but unable to complete a NFPE at this time. RD mickey conduct on f/u.      Anthropometrics        Current Height, Weight Height: 185.4 cm (73\")  Weight: 60.2 kg (132 lb 11.5 oz)   Current BMI Body mass index is 17.51 kg/m².   BMI Classification Underweight   % IBW 75%   Adjusted Body Weight (ABW)    Weight Hx  Wt Readings from Last 30 Encounters:   05/31/24 0342 60.2 kg (132 lb 11.5 oz)   05/30/24 0505 63.4 kg (139 lb 12.4 oz)   05/29/24 1322 65.5 kg (144 lb 6.4 oz)   05/29/24 0156 67.8 kg (149 lb 7.6 oz)   05/29/24 0141 67.3 kg (148 lb 5.9 oz)   05/28/24 1608 70.6 kg (155 lb 10.3 oz)   05/15/24 1453 67.6 kg (149 lb)   05/07/24 1510 67.6 kg (149 lb)   04/22/24 0917 73 kg (161 lb)   04/16/24 1253 68.5 kg (151 lb)   04/15/24 1454 67.6 kg (149 lb)   04/09/24 0055 68.6 kg (151 lb 3.8 oz)   04/08/24 1955 65.1 kg (143 lb 8.3 oz)   04/06/24 0526 64.9 kg (143 lb 1.3 oz)   04/05/24 0559 " 65 kg (143 lb 4.8 oz)   04/04/24 0534 64.1 kg (141 lb 5 oz)   04/03/24 0610 66.1 kg (145 lb 11.6 oz)   04/02/24 2141 68.2 kg (150 lb 5.7 oz)   04/02/24 1308 63.8 kg (140 lb 10.5 oz)   03/29/24 1031 65.3 kg (144 lb)   03/25/24 0600 64.5 kg (142 lb 3.2 oz)   03/24/24 0411 67 kg (147 lb 11.3 oz)   03/24/24 0250 67 kg (147 lb 11.3 oz)   03/21/24 1435 69 kg (152 lb 1.6 oz)   03/04/24 1300 69.7 kg (153 lb 9.6 oz)   02/22/24 1414 68.9 kg (152 lb)   01/31/24 1544 68.9 kg (152 lb)   01/08/24 1533 71.7 kg (158 lb)   12/18/23 0559 70.1 kg (154 lb 8.7 oz)   12/16/23 0500 78.6 kg (173 lb 4.5 oz)   12/15/23 1733 77.8 kg (171 lb 8.3 oz)   12/15/23 0914 74.6 kg (164 lb 7.4 oz)   11/27/23 1311 66.7 kg (147 lb)   11/15/23 0320 66.9 kg (147 lb 7.8 oz)   11/14/23 0619 67.1 kg (147 lb 14.9 oz)   11/13/23 0612 66.9 kg (147 lb 7.8 oz)   11/12/23 0600 68.5 kg (151 lb 0.2 oz)   11/11/23 0500 72.8 kg (160 lb 7.9 oz)   11/10/23 0710 76.2 kg (167 lb 15.9 oz)   11/06/23 1744 78.9 kg (174 lb)   11/02/23 1253 74.8 kg (165 lb)   10/23/23 0500 68.2 kg (150 lb 5.7 oz)   10/22/23 0355 69 kg (152 lb 1.9 oz)   10/21/23 0700 69.7 kg (153 lb 10.6 oz)   10/20/23 0300 72.8 kg (160 lb 7.9 oz)   10/19/23 0600 74.9 kg (165 lb 2 oz)   10/18/23 2204 74.7 kg (164 lb 10.9 oz)   10/18/23 1513 81.5 kg (179 lb 10.8 oz)   09/17/23 0345 71 kg (156 lb 8.4 oz)   09/02/23 0719 71.4 kg (157 lb 6.5 oz)   09/01/23 0500 71 kg (156 lb 8.4 oz)   08/31/23 0500 71.2 kg (156 lb 15.5 oz)   08/30/23 0542 74.2 kg (163 lb 9.3 oz)   08/29/23 0600 74 kg (163 lb 2.3 oz)   08/28/23 0522 79.3 kg (174 lb 13.2 oz)   08/27/23 2034 79.3 kg (174 lb 13.2 oz)   08/27/23 1504 76.6 kg (168 lb 14 oz)   02/27/23 1151 70.4 kg (155 lb 3.3 oz)   09/18/22 2111 83.9 kg (184 lb 15.5 oz)   09/14/22 1636 83.6 kg (184 lb 4.9 oz)   05/24/22 1358 97.5 kg (215 lb)          Wt Change Observation Note per EMR, a steady wt decline within the last year. Unable to obtain wt hx at this time.      Estimated/Assessed  Needs  Estimated Needs based on: Current Body Weight       Energy Requirements 25-30 kcal/kg   EST Needs (kcal/day) 9332-8973 kcal/d        Protein Requirements 1.2-1.5 g.kg   EST Daily Needs (g/day) 72-90 g/d        Fluid Requirements 1 ml/kcal    Estimated Needs (mL/day) 5962-9761 mL/d      Labs/Medications         Pertinent Labs Reviewed.   Results from last 7 days   Lab Units 05/31/24  0540 05/30/24  0526 05/29/24  0617 05/28/24  1628   SODIUM mmol/L 141 140 138 140   POTASSIUM mmol/L 3.4* 3.5 4.3 4.6   CHLORIDE mmol/L 98 101 102 103   CO2 mmol/L 30.5* 28.6 21.7* 21.7*   BUN mg/dL 38* 33* 28* 24*   CREATININE mg/dL 1.48* 1.26 1.34* 1.40*   CALCIUM mg/dL 8.7 8.7 9.2 9.3   BILIRUBIN mg/dL  --   --  1.2 1.8*   ALK PHOS U/L  --   --  201* 235*   ALT (SGPT) U/L  --   --  24 32   AST (SGOT) U/L  --   --  14 15   GLUCOSE mg/dL 125* 108* 137* 127*     Results from last 7 days   Lab Units 05/31/24  0540 05/30/24  0526 05/29/24  0617   MAGNESIUM mg/dL 1.9 1.7 1.8   PHOSPHORUS mg/dL  --   --  4.7*   HEMOGLOBIN g/dL 13.4 12.6* 12.2*   HEMATOCRIT % 43.2 39.9 39.2     COVID19   Date Value Ref Range Status   04/02/2024 Not Detected Not Detected - Ref. Range Final     Lab Results   Component Value Date    HGBA1C 6.30 (H) 04/09/2024         Pertinent Medications Reviewed.     Malnutrition Severity Assessment              Nutrition Diagnosis         Nutrition Dx Problem 1 No nutrition diagnosis at this time     Nutrition Intervention           Current Nutrition Orders & Evaluation of Intake       Current PO Diet Diet: Cardiac, Diabetic; Healthy Heart (2-3 Na+); Consistent Carbohydrate; Fluid Consistency: Thin (IDDSI 0)   Supplement No active supplement orders           Nutrition Intervention/Prescription        Recommend to continue current diet order         Medical Nutrition Therapy/Nutrition Education          Learner     Readiness Patient  N/A     Method     Response N/A  N/A     Monitor/Evaluation        Monitor Recommend to  monitor the need for an ONS   Recommend to monitor PO intake, wt trends, and labs      Nutrition Discharge Plan         To be determined     Electronically signed by:  Ingris Monson RD  05/31/24 13:13 EDT

## 2024-05-31 NOTE — PLAN OF CARE
Goal Outcome Evaluation:  Plan of Care Reviewed With: patient        Progress: no change  Outcome Evaluation: patient is in room air this morning. He did not wear the bipap last night.

## 2024-05-31 NOTE — DISCHARGE SUMMARY
[facility]         HOSPITALIST  DISCHARGE SUMMARY    Patient Name: Regulo Sepulveda  : 1965  MRN: [id number]    Date of Admission: 2024  Date of Discharge:  2024  Primary Care Physician: Dickson Landry MD    Active and Resolved Hospital Problems:  Heart failure with reduced ejection fraction in acute exacerbation  TAYLOR, suspected cardiorenal  Treated hepatitis C  Paroxysmal atrial fibrillation on Eliquis   Known apical thrombus on Eliquis  Anxiety / depression  COPD not in acute exacerbation  Type 2 diabetes  Hypertension  Hyperlipidemia  Schizophrenia         Hospital Course     Hospital Course:  Regulo Sepulveda is a 58 y.o. male with medical history significant for treated hep C, HFrEF 25 to 30%, paroxysmal A-fib, apical thrombus on Eliquis, anxiety/depression, COPD, nonobstructive CAD, type II DM, HTN, HLD, and schizophrenia who presented for shortness of breath.  Patient endorses 2 days of shortness of breath, requiring patient to sleep with 4 pillows at night.  Symptoms have only been worsening therefore presented to the emergency department.  Patient found to be in heart failure with acute exacerbation.  Patient's proBNP elevated 24,000, above typical elevation in proBNP for patient's heart failure exacerbations.  Chest x-ray shows bibasilar opacities with suspicion for small right pleural effusion.  Patient aggressively diuresed with improvement in symptoms.  Patient discharged home with instruction to continue taking home medications as instructed.  Patient needs to follow-up with primary care physician and cardiologist as soon as possible.  Patient seen on date of discharge, clinically and hemodynamically stable.  Patient provided concerning signs and symptoms prompting immediate medical attention, patient understanding and agreeable     DISCHARGE Follow Up Recommendations for labs and diagnostics:   Follow-up with primary care physician soon as possible  Follow-up with cardiology in  clinic      Day of Discharge     Vital Signs:  Temp:  [97.3 °F (36.3 °C)-98.4 °F (36.9 °C)] 97.3 °F (36.3 °C)  Heart Rate:  [56-66] 63  Resp:  [18-20] 18  BP: ()/(58-79) 99/61  Physical Exam:               Constitutional: Sitting bedside chair resting comfortably, no acute distress, breathing comfortably on room air              Eyes: Pupils equal, sclerae anicteric, no conjunctival injection              HENT: NCAT, mucous membranes moist              Neck: Supple, no thyromegaly, no lymphadenopathy, trachea midline              Respiratory: Minimal bibasilar crackles present, no wheezing              cardiovascular: RRR, no murmurs, rubs, or gallops, palpable pedal pulses bilaterally              Gastrointestinal: Positive bowel sounds, soft, nontender, nondistended              Musculoskeletal: No bilateral ankle edema, no clubbing or cyanosis to extremities              Neurologic: Oriented x 3, strength symmetric in all extremities, Cranial Nerves grossly intact to confrontation, speech clear              Skin: No rashes        Discharge Details        Discharge Medications        Changes to Medications        Instructions Start Date   furosemide 40 MG tablet  Commonly known as: LASIX  What changed:   when to take this  These instructions start on June 2, 2024. If you are unsure what to do until then, ask your doctor or other care provider.   20 mg, Oral, Daily   Start Date: June 2, 2024            Continue These Medications        Instructions Start Date   Abilijennifer Maintena 300 MG Suspension Reconstituted ER IM injection ER  Generic drug: ARIPiprazole ER   300 mg, Intramuscular, Every 30 Days      albuterol sulfate  (90 Base) MCG/ACT inhaler  Commonly known as: PROVENTIL HFA;VENTOLIN HFA;PROAIR HFA   2 puffs, Inhalation, Every 4 Hours PRN      amiodarone 200 MG tablet  Commonly known as: PACERONE   200 mg, Oral, Daily      apixaban 5 MG tablet tablet  Commonly known as: ELIQUIS   5 mg, Oral, 2  Times Daily      atorvastatin 40 MG tablet  Commonly known as: LIPITOR   40 mg, Oral, Every Night at Bedtime      carvedilol 12.5 MG tablet  Commonly known as: COREG   25 mg, Oral, 2 Times Daily      Farxiga 5 MG tablet tablet  Generic drug: dapagliflozin   10 mg, Oral, Daily      ferrous gluconate 324 MG tablet  Commonly known as: FERGON   324 mg, Oral, Daily With Breakfast      metFORMIN 1000 MG tablet  Commonly known as: GLUCOPHAGE   1,000 mg, Oral, 2 Times Daily With Meals      mirtazapine 15 MG tablet  Commonly known as: REMERON   15 mg, Oral, Nightly      pantoprazole 40 MG EC tablet  Commonly known as: PROTONIX   40 mg, Oral, Daily      sacubitril-valsartan 49-51 MG tablet  Commonly known as: ENTRESTO   1 tablet, Oral, Every Morning      sacubitril-valsartan 49-51 MG tablet  Commonly known as: ENTRESTO   2 tablets, Oral, Nightly      spironolactone 25 MG tablet  Commonly known as: ALDACTONE   25 mg, Oral, Daily      venlafaxine XR 37.5 MG 24 hr capsule  Commonly known as: EFFEXOR-XR   37.5 mg, Oral, Daily               Allergies   Allergen Reactions    Penicillins Shortness Of Breath       Discharge Disposition:  Home or Self Care    Diet:  Hospital:  Diet Order   Procedures    Diet: Cardiac, Diabetic; Healthy Heart (2-3 Na+); Consistent Carbohydrate; Fluid Consistency: Thin (IDDSI 0)       Discharge Activity:   Activity Instructions       Activity as Tolerated              CODE STATUS:  Code Status and Medical Interventions:   Ordered at: 05/28/24 7714     Level Of Support Discussed With:    Patient     Code Status (Patient has no pulse and is not breathing):    CPR (Attempt to Resuscitate)     Medical Interventions (Patient has pulse or is breathing):    Full Support         Future Appointments   Date Time Provider Department Center   6/3/2024  2:00 PM Rhina Stephens DNP, DEV [facility] SLEEP CT JANETTE   6/5/2024  2:00 PM Keely Carter APRN [facility] HRTFL HA None   6/25/2024  2:15 PM Dickson Landry MD [facility] PC RADCL  [facility]   7/5/2024 10:20 AM Hailey Workman PA-C [facility] BH PCRAD [facility]   7/17/2024  8:45 AM Veronica Lu APRN [facility] GE ETW [facility]   11/5/2024  9:15 AM Jaun Carreno MD [facility] CD ETOWN [facility]       Additional Instructions for the Follow-ups that You Need to Schedule       Discharge Follow-up with PCP   As directed       Currently Documented PCP:    Dickson Landry MD    PCP Phone Number:    [phone]     Follow Up Details: In less than one week        Discharge Follow-up with Specified Provider: Cardiologist, Dr. Carreno; 2 Weeks   As directed      To: Cardiologist, Dr. Carreno   Follow Up: 2 Weeks                Pertinent  and/or Most Recent Results     PROCEDURES:   None     LAB RESULTS:      Lab 05/31/24  0540 05/30/24  0526 05/29/24  0617 05/28/24  1628   WBC 6.85 9.78 9.80 13.07*   HEMOGLOBIN 13.4 12.6* 12.2* 13.3   HEMATOCRIT 43.2 39.9 39.2 42.5   PLATELETS 336 331 312 395   NEUTROS ABS  --   --   --  11.38*   IMMATURE GRANS (ABS)  --   --   --  0.05   LYMPHS ABS  --   --   --  0.69*   MONOS ABS  --   --   --  0.89   EOS ABS  --   --   --  0.00   MCV 86.6 86.2 89.1 87.4   PROCALCITONIN  --   --   --  0.07         Lab 05/31/24  0540 05/30/24  0526 05/29/24  0617 05/28/24  1628   SODIUM 141 140 138 140   POTASSIUM 3.4* 3.5 4.3 4.6   CHLORIDE 98 101 102 103   CO2 30.5* 28.6 21.7* 21.7*   ANION GAP 12.5 10.4 14.3 15.3*   BUN 38* 33* 28* 24*   CREATININE 1.48* 1.26 1.34* 1.40*   EGFR 54.5* 66.1 61.4 58.3*   GLUCOSE 125* 108* 137* 127*   CALCIUM 8.7 8.7 9.2 9.3   MAGNESIUM 1.9 1.7 1.8  --    PHOSPHORUS  --   --  4.7*  --          Lab 05/29/24  0617 05/28/24  1628   TOTAL PROTEIN 6.2 7.2   ALBUMIN 3.6 4.0   GLOBULIN 2.6 3.2   ALT (SGPT) 24 32   AST (SGOT) 14 15   BILIRUBIN 1.2 1.8*   ALK PHOS 201* 235*         Lab 05/28/24  1628   PROBNP 24,364.0*   HSTROP T 16                 Lab 05/29/24  0226   PH, ARTERIAL 7.453*   PCO2, ARTERIAL 31.4*   PO2 ART 80.4   O2 SATURATION ART 94.7*   FIO2 21   HCO3 ART 21.5*   BASE  EXCESS ART -1.5   CARBOXYHEMOGLOBIN 0.2     Brief Urine Lab Results  (Last result in the past 365 days)        Color   Clarity   Blood   Leuk Est   Nitrite   Protein   CREAT   Urine HCG        05/29/24 1259 Yellow   Clear   Negative   Negative   Negative   Negative                 Microbiology Results (last 10 days)       Procedure Component Value - Date/Time    Blood Culture - Blood, Arm, Left [[id number]]  (Normal) Collected: 05/28/24 2344    Lab Status: Preliminary result Specimen: Blood from Arm, Left Updated: 05/31/24 0015     Blood Culture No growth at 2 days    Blood Culture - Blood, Arm, Right [[id number]]  (Normal) Collected: 05/28/24 2344    Lab Status: Preliminary result Specimen: Blood from Arm, Right Updated: 05/31/24 0015     Blood Culture No growth at 2 days            XR Chest 1 View    Result Date: 5/28/2024  Impression: 1.  New right basilar opacities with suspicion for a small right pleural effusion. Opacities could represent atelectasis or pneumonia. 2.  Cardiomegaly, as before.   Electronically Signed By-Prasanna Porter MD On:5/28/2024 5:04 PM               Results for orders placed during the hospital encounter of 03/23/24    Adult Transthoracic Echo Complete W/ Cont if Necessary Per Protocol    Interpretation Summary  Diffuse hypokinesis with severely reduced left ventricular systolic function ejection fraction around 25 to 30%.  Mild MR and mild TR.  Organized thrombus still present in the apex of the left ventricle.  Present on previous echo.      Labs Pending at Discharge:  Pending Labs       Order Current Status    Blood Culture - Blood, Arm, Left Preliminary result    Blood Culture - Blood, Arm, Right Preliminary result              Time spent on Discharge including face to face service:  38 minutes    Electronically signed by Felton Reynoso MD, 05/31/24, 1:40 PM EDT.

## 2024-05-31 NOTE — OUTREACH NOTE
Prep Survey      Flowsheet Row Responses   [facility] patient discharged from? [facility]   Is LACE score < 7 ? No   Eligibility [facility]   Date of Admission 05/28/24   Date of Discharge 05/31/24   Discharge Disposition Home or Self Care   Discharge diagnosis Acute exacerbation of CHF (congestive heart failure)   Does the patient have one of the following disease processes/diagnoses(primary or secondary)? CHF   Does the patient have Home health ordered? No   Is there a DME ordered? No   Medication alerts for this patient see AVS   Prep survey completed? Yes            Clara GILMORE - Registered Nurse

## 2024-05-31 NOTE — PLAN OF CARE
Goal Outcome Evaluation:  Plan of Care Reviewed With: patient        Progress: no change  Outcome Evaluation: Pt remained A&Ox4 this shift. Also, pt remained on room air maintaining stable oxygen saturation. Pt denied pain this shift. Blood glucose monitored as per order. SSI administered as per order, see MAR. Pt is to dc to home. All other vital signs remained stable.

## 2024-06-03 ENCOUNTER — TELEPHONE (OUTPATIENT)
Dept: SLEEP MEDICINE | Facility: HOSPITAL | Age: 59
End: 2024-06-03
Payer: COMMERCIAL

## 2024-06-03 ENCOUNTER — TRANSITIONAL CARE MANAGEMENT TELEPHONE ENCOUNTER (OUTPATIENT)
Dept: CALL CENTER | Facility: HOSPITAL | Age: 59
End: 2024-06-03
Payer: COMMERCIAL

## 2024-06-03 LAB
BACTERIA SPEC AEROBE CULT: NORMAL
BACTERIA SPEC AEROBE CULT: NORMAL

## 2024-06-03 NOTE — TELEPHONE ENCOUNTER
Called patients roommate, Yun Don, she answered and explained that she will have him call to reschedule his appointment as soon as she can.

## 2024-06-03 NOTE — OUTREACH NOTE
Call Center TCM Note      Flowsheet Row Responses   [facility], [place], [facility] by [facility] patient discharged from? [facility]   Does the patient have one of the following disease processes/diagnoses(primary or secondary)? CHF   TCM attempt successful? No   Unsuccessful attempts Attempt 1   Call Status Voice mail issues   Revoked Reason Phone Issues  [Phone has been disconnected,  nobody else listed on verbal consent but sister]             Alisia Witt RN    6/3/2024, 09:51 EDT

## 2024-06-05 ENCOUNTER — TELEPHONE (OUTPATIENT)
Dept: CASE MANAGEMENT | Facility: OTHER | Age: 59
End: 2024-06-05
Payer: COMMERCIAL

## 2024-06-05 NOTE — TELEPHONE ENCOUNTER
Called, went to . Left message to call RN at [facility] or [facility].     Will follow up.     Alesha GOMEZ RN  Ambulatory .

## 2024-06-12 ENCOUNTER — PATIENT OUTREACH (OUTPATIENT)
Dept: CASE MANAGEMENT | Facility: OTHER | Age: 59
End: 2024-06-12
Payer: COMMERCIAL

## 2024-06-12 ENCOUNTER — TELEPHONE (OUTPATIENT)
Dept: CASE MANAGEMENT | Facility: OTHER | Age: 59
End: 2024-06-12
Payer: COMMERCIAL

## 2024-06-12 DIAGNOSIS — F32.A DEPRESSION, UNSPECIFIED DEPRESSION TYPE: ICD-10-CM

## 2024-06-12 DIAGNOSIS — I50.43 ACUTE ON CHRONIC COMBINED SYSTOLIC AND DIASTOLIC CHF (CONGESTIVE HEART FAILURE): Primary | ICD-10-CM

## 2024-06-12 NOTE — TELEPHONE ENCOUNTER
Called to follow up. No answer, phone stated not working number.     Attempted to call sister, no answer and unble to leave VM.     Will try again next week.     Alesha HERBERT RN  Ambulatory

## 2024-06-14 ENCOUNTER — APPOINTMENT (OUTPATIENT)
Dept: GENERAL RADIOLOGY | Facility: HOSPITAL | Age: 59
End: 2024-06-14
Payer: COMMERCIAL

## 2024-06-14 ENCOUNTER — HOSPITAL ENCOUNTER (INPATIENT)
Facility: HOSPITAL | Age: 59
LOS: 10 days | Discharge: LEFT BEFORE DISCHARGE | End: 2024-06-25
Attending: EMERGENCY MEDICINE | Admitting: INTERNAL MEDICINE
Payer: COMMERCIAL

## 2024-06-14 DIAGNOSIS — Z78.9 DECREASED ACTIVITIES OF DAILY LIVING (ADL): ICD-10-CM

## 2024-06-14 DIAGNOSIS — R53.1 WEAKNESS GENERALIZED: ICD-10-CM

## 2024-06-14 DIAGNOSIS — I50.9 ACUTE ON CHRONIC CONGESTIVE HEART FAILURE, UNSPECIFIED HEART FAILURE TYPE: Primary | ICD-10-CM

## 2024-06-14 DIAGNOSIS — R26.2 DIFFICULTY WALKING: ICD-10-CM

## 2024-06-14 DIAGNOSIS — R06.09 DYSPNEA ON EXERTION: ICD-10-CM

## 2024-06-14 DIAGNOSIS — J02.9 PHARYNGITIS, UNSPECIFIED ETIOLOGY: ICD-10-CM

## 2024-06-14 LAB
ALBUMIN SERPL-MCNC: 3.6 G/DL (ref 3.5–5.2)
ALBUMIN/GLOB SERPL: 1.3 G/DL
ALP SERPL-CCNC: 270 U/L (ref 39–117)
ALT SERPL W P-5'-P-CCNC: 42 U/L (ref 1–41)
ANION GAP SERPL CALCULATED.3IONS-SCNC: 12.9 MMOL/L (ref 5–15)
AST SERPL-CCNC: 26 U/L (ref 1–40)
BACTERIA UR QL AUTO: NORMAL /HPF
BASOPHILS # BLD AUTO: 0.07 10*3/MM3 (ref 0–0.2)
BASOPHILS NFR BLD AUTO: 0.6 % (ref 0–1.5)
BILIRUB SERPL-MCNC: 1.2 MG/DL (ref 0–1.2)
BILIRUB UR QL STRIP: NEGATIVE
BUN SERPL-MCNC: 29 MG/DL (ref 6–20)
BUN/CREAT SERPL: 22.3 (ref 7–25)
CALCIUM SPEC-SCNC: 8.7 MG/DL (ref 8.6–10.5)
CHLORIDE SERPL-SCNC: 104 MMOL/L (ref 98–107)
CLARITY UR: CLEAR
CO2 SERPL-SCNC: 24.1 MMOL/L (ref 22–29)
COLOR UR: YELLOW
CREAT SERPL-MCNC: 1.3 MG/DL (ref 0.76–1.27)
DEPRECATED RDW RBC AUTO: 54.1 FL (ref 37–54)
EGFRCR SERPLBLD CKD-EPI 2021: 63.7 ML/MIN/1.73
EOSINOPHIL # BLD AUTO: 0 10*3/MM3 (ref 0–0.4)
EOSINOPHIL NFR BLD AUTO: 0 % (ref 0.3–6.2)
ERYTHROCYTE [DISTWIDTH] IN BLOOD BY AUTOMATED COUNT: 17.5 % (ref 12.3–15.4)
FLUAV SUBTYP SPEC NAA+PROBE: NOT DETECTED
FLUBV RNA ISLT QL NAA+PROBE: NOT DETECTED
GLOBULIN UR ELPH-MCNC: 2.7 GM/DL
GLUCOSE BLDC GLUCOMTR-MCNC: 99 MG/DL (ref 70–99)
GLUCOSE SERPL-MCNC: 103 MG/DL (ref 65–99)
GLUCOSE UR STRIP-MCNC: ABNORMAL MG/DL
HCT VFR BLD AUTO: 38 % (ref 37.5–51)
HGB BLD-MCNC: 12.2 G/DL (ref 13–17.7)
HGB UR QL STRIP.AUTO: ABNORMAL
HOLD SPECIMEN: NORMAL
HOLD SPECIMEN: NORMAL
HYALINE CASTS UR QL AUTO: NORMAL /LPF
IMM GRANULOCYTES # BLD AUTO: 0.04 10*3/MM3 (ref 0–0.05)
IMM GRANULOCYTES NFR BLD AUTO: 0.3 % (ref 0–0.5)
KETONES UR QL STRIP: NEGATIVE
LEUKOCYTE ESTERASE UR QL STRIP.AUTO: NEGATIVE
LYMPHOCYTES # BLD AUTO: 1.48 10*3/MM3 (ref 0.7–3.1)
LYMPHOCYTES NFR BLD AUTO: 12 % (ref 19.6–45.3)
MCH RBC QN AUTO: 27.4 PG (ref 26.6–33)
MCHC RBC AUTO-ENTMCNC: 32.1 G/DL (ref 31.5–35.7)
MCV RBC AUTO: 85.4 FL (ref 79–97)
MONOCYTES # BLD AUTO: 1.06 10*3/MM3 (ref 0.1–0.9)
MONOCYTES NFR BLD AUTO: 8.6 % (ref 5–12)
NEUTROPHILS NFR BLD AUTO: 78.5 % (ref 42.7–76)
NEUTROPHILS NFR BLD AUTO: 9.69 10*3/MM3 (ref 1.7–7)
NITRITE UR QL STRIP: NEGATIVE
NRBC BLD AUTO-RTO: 0 /100 WBC (ref 0–0.2)
NT-PROBNP SERPL-MCNC: ABNORMAL PG/ML (ref 0–900)
PH UR STRIP.AUTO: <=5 [PH] (ref 5–8)
PLATELET # BLD AUTO: 324 10*3/MM3 (ref 140–450)
PMV BLD AUTO: 10 FL (ref 6–12)
POTASSIUM SERPL-SCNC: 3.7 MMOL/L (ref 3.5–5.2)
PROT SERPL-MCNC: 6.3 G/DL (ref 6–8.5)
PROT UR QL STRIP: ABNORMAL
RBC # BLD AUTO: 4.45 10*6/MM3 (ref 4.14–5.8)
RBC # UR STRIP: NORMAL /HPF
REF LAB TEST METHOD: NORMAL
RSV RNA NPH QL NAA+NON-PROBE: NOT DETECTED
S PYO AG THROAT QL: NEGATIVE
SARS-COV-2 RNA RESP QL NAA+PROBE: NOT DETECTED
SODIUM SERPL-SCNC: 141 MMOL/L (ref 136–145)
SP GR UR STRIP: 1.01 (ref 1–1.03)
SQUAMOUS #/AREA URNS HPF: NORMAL /HPF
UROBILINOGEN UR QL STRIP: ABNORMAL
WBC # UR STRIP: NORMAL /HPF
WBC NRBC COR # BLD AUTO: 12.34 10*3/MM3 (ref 3.4–10.8)
WHOLE BLOOD HOLD COAG: NORMAL
WHOLE BLOOD HOLD SPECIMEN: NORMAL

## 2024-06-14 PROCEDURE — 81001 URINALYSIS AUTO W/SCOPE: CPT | Performed by: NURSE PRACTITIONER

## 2024-06-14 PROCEDURE — 87637 SARSCOV2&INF A&B&RSV AMP PRB: CPT

## 2024-06-14 PROCEDURE — 87081 CULTURE SCREEN ONLY: CPT

## 2024-06-14 PROCEDURE — 25010000002 FUROSEMIDE PER 20 MG: Performed by: NURSE PRACTITIONER

## 2024-06-14 PROCEDURE — 83880 ASSAY OF NATRIURETIC PEPTIDE: CPT | Performed by: NURSE PRACTITIONER

## 2024-06-14 PROCEDURE — 99285 EMERGENCY DEPT VISIT HI MDM: CPT

## 2024-06-14 PROCEDURE — 80053 COMPREHEN METABOLIC PANEL: CPT | Performed by: NURSE PRACTITIONER

## 2024-06-14 PROCEDURE — 86160 COMPLEMENT ANTIGEN: CPT | Performed by: INTERNAL MEDICINE

## 2024-06-14 PROCEDURE — 82948 REAGENT STRIP/BLOOD GLUCOSE: CPT

## 2024-06-14 PROCEDURE — 71045 X-RAY EXAM CHEST 1 VIEW: CPT

## 2024-06-14 PROCEDURE — 73080 X-RAY EXAM OF ELBOW: CPT

## 2024-06-14 PROCEDURE — 93010 ELECTROCARDIOGRAM REPORT: CPT | Performed by: INTERNAL MEDICINE

## 2024-06-14 PROCEDURE — 87880 STREP A ASSAY W/OPTIC: CPT

## 2024-06-14 PROCEDURE — 85025 COMPLETE CBC W/AUTO DIFF WBC: CPT | Performed by: NURSE PRACTITIONER

## 2024-06-14 PROCEDURE — 99222 1ST HOSP IP/OBS MODERATE 55: CPT | Performed by: FAMILY MEDICINE

## 2024-06-14 PROCEDURE — 93005 ELECTROCARDIOGRAM TRACING: CPT | Performed by: NURSE PRACTITIONER

## 2024-06-14 RX ORDER — FUROSEMIDE 10 MG/ML
60 INJECTION INTRAMUSCULAR; INTRAVENOUS ONCE
Status: COMPLETED | OUTPATIENT
Start: 2024-06-14 | End: 2024-06-14

## 2024-06-14 RX ORDER — ACETAMINOPHEN 325 MG/1
975 TABLET ORAL ONCE
Status: COMPLETED | OUTPATIENT
Start: 2024-06-14 | End: 2024-06-14

## 2024-06-14 RX ORDER — SODIUM CHLORIDE 0.9 % (FLUSH) 0.9 %
10 SYRINGE (ML) INJECTION AS NEEDED
Status: DISCONTINUED | OUTPATIENT
Start: 2024-06-14 | End: 2024-06-25 | Stop reason: HOSPADM

## 2024-06-14 RX ADMIN — FUROSEMIDE 60 MG: 10 INJECTION, SOLUTION INTRAMUSCULAR; INTRAVENOUS at 23:11

## 2024-06-14 RX ADMIN — ACETAMINOPHEN 975 MG: 325 TABLET ORAL at 18:50

## 2024-06-14 RX ADMIN — TOPICAL ANESTHETIC 1 SPRAY: 200 SPRAY DENTAL; PERIODONTAL at 18:50

## 2024-06-14 NOTE — ED PROVIDER NOTES
"Time: 5:18 PM EDT  Date of encounter:  6/14/2024  Independent Historian/Clinical History and Information was obtained by:   Patient    History is limited by: N/A    Chief Complaint: Sore throat      History of Present Illness:  The patient is a 58 y.o. year old male who presents to the emergency department for evaluation of sore throat and myalgias for the past 3 to 4 days.  Also having left elbow pain that he states he is never had before.  He denies any traumas or falls.  He states that he is been swollen and painful for 3 days.  He can flex and extend but does report pain when he does flex and extend.  The patient also states that he was just admitted here few weeks ago for CHF and has had increasingly had more shortness of breath recently.  He states that his feet have been swollen for the last several days.  He states that he has had increased shortness of breath with exertion.  He reports fevers at home.  He states that he has been compliant with his medications since discharge.  He denies any chest pain.  The patient does appear to be somewhat tachypneic on exam with respirations 24-28.  He does have some mild swelling to his extremities with no pitting edema.  He has decreased breath sounds throughout.    HPI    Patient Care Team  Primary Care Provider: Dickson Landry MD    Past Medical History:     Allergies   Allergen Reactions    Penicillins Shortness Of Breath     Past Medical History:   Diagnosis Date    Anxiety     Arthritis     Asthma     Bipolar affective     Cardiac tamponade     2023    CHF (congestive heart failure)     COPD (chronic obstructive pulmonary disease)     Coronary artery disease     Depression     Diabetes mellitus     Elevated cholesterol     Hypertension     Parkinson disease     Sleep apnea     Stroke     Pt stated it was \"about 2 months ago\" as of 6/15/24     Past Surgical History:   Procedure Laterality Date    CARDIAC CATHETERIZATION Right 09/17/2023    Procedure: Right and Left " Heart Cath;  Surgeon: Ben Grant MD;  [facility];  Service: Cardiovascular;  Laterality: Right;    ENDOSCOPY      TESTICLE SURGERY Left     extraction     Family History   Problem Relation Age of Onset    Diabetes Mother     Depression Sister     Restless legs syndrome Sister     Insomnia Sister     Sleep walking Sister     Sleep disorder Sister         Night Terrors    Depression Brother        Home Medications:  Prior to Admission medications    Medication Sig Start Date End Date Taking? Authorizing Provider   Abilify Maintena 300 MG Suspension Reconstituted ER IM injection ER Inject 300 mg into the appropriate muscle as directed by prescriber Every 30 (Thirty) Days. 5/25/24   ProviderYuliya MD   albuterol sulfate  (90 Base) MCG/ACT inhaler Inhale 2 puffs Every 4 (Four) Hours As Needed for Wheezing or Shortness of Air. Indications: Chronic Obstructive Lung Disease 4/16/24   Beto Gillette MD   amiodarone (PACERONE) 200 MG tablet Take 1 tablet by mouth Daily. 4/22/24   Lin Tamez APRN   apixaban (ELIQUIS) 5 MG tablet tablet Take 1 tablet by mouth 2 (Two) Times a Day for 120 days. 2/22/24 6/21/24  Dickson Landry MD   atorvastatin (LIPITOR) 40 MG tablet Take 1 tablet by mouth every night at bedtime. 1/8/24   Dickson Landry MD   carvedilol (COREG) 12.5 MG tablet Take 2 tablets by mouth 2 (Two) Times a Day. 5/7/24   Beto Gillette MD   Farxiga 5 MG tablet tablet Take 2 tablets by mouth Daily. Indications: Type 2 Diabetes 4/16/24   Beto Gillette MD   ferrous gluconate (FERGON) 324 MG tablet Take 1 tablet by mouth Daily With Breakfast. 5/15/24   Dickson Landry MD   furosemide (LASIX) 40 MG tablet Take 0.5 tablets by mouth Daily. 6/2/24   Felton Reynoso MD   metFORMIN (GLUCOPHAGE) 1000 MG tablet Take 1 tablet by mouth 2 (Two) Times a Day With Meals.    Yuliya Blanchard MD   mirtazapine (REMERON) 15 MG tablet Take 1 tablet by mouth Every Night. 4/15/24   Frantz  MD Dickson   pantoprazole (PROTONIX) 40 MG EC tablet Take 1 tablet by mouth Daily.    ProviderYuliya MD   sacubitril-valsartan (ENTRESTO) 49-51 MG tablet Take 1 tablet by mouth Every Morning.    Yuliya Blanchard MD   sacubitril-valsartan (ENTRESTO) 49-51 MG tablet Take 2 tablets by mouth Every Night.    Yuliya Blanchard MD   spironolactone (ALDACTONE) 25 MG tablet Take 1 tablet by mouth Daily. 4/16/24   Beto Gillette MD   venlafaxine XR (EFFEXOR-XR) 37.5 MG 24 hr capsule Take 1 capsule by mouth Daily.    ProviderYuliya MD        Social History:   Social History     Tobacco Use    Smoking status: Every Day     Current packs/day: 0.50     Average packs/day: 0.5 packs/day for 50.3 years (25.1 ttl pk-yrs)     Types: Cigarettes     Start date: 1/1/1974     Last attempt to quit: 11/2/2023    Smokeless tobacco: Never    Tobacco comments:     At least 10 cigarettes a day   Vaping Use    Vaping status: Former    Substances: Nicotine   Substance Use Topics    Alcohol use: Not Currently    Drug use: Not Currently     Types: Methamphetamines, Marijuana         Review of Systems:  Review of Systems   Constitutional:  Positive for fatigue. Negative for chills and fever.   HENT:  Positive for sore throat. Negative for congestion, ear pain and trouble swallowing.    Eyes:  Negative for pain.   Respiratory:  Positive for shortness of breath and wheezing. Negative for cough and chest tightness.    Cardiovascular:  Positive for leg swelling. Negative for chest pain.   Gastrointestinal:  Negative for abdominal pain, diarrhea, nausea and vomiting.   Genitourinary:  Negative for flank pain and hematuria.   Musculoskeletal:  Positive for arthralgias. Negative for joint swelling, neck pain and neck stiffness.   Skin:  Negative for pallor and rash.   Neurological:  Positive for weakness. Negative for seizures and headaches.   All other systems reviewed and are negative.       Physical Exam:  /96 (BP Location:  "Left arm, Patient Position: Lying)   Pulse 74   Temp 97.3 °F (36.3 °C) (Oral)   Resp 18   Ht 185.4 cm (73\")   Wt 64.7 kg (142 lb 10.2 oz)   SpO2 97%   BMI 18.82 kg/m²     Physical Exam  Vitals and nursing note reviewed.   Constitutional:       General: He is not in acute distress.     Appearance: Normal appearance. He is well-developed. He is not ill-appearing or toxic-appearing.   HENT:      Head: Normocephalic and atraumatic.   Eyes:      General: No scleral icterus.     Extraocular Movements: Extraocular movements intact.      Conjunctiva/sclera: Conjunctivae normal.      Pupils: Pupils are equal, round, and reactive to light.   Neck:      Thyroid: No thyromegaly.   Cardiovascular:      Rate and Rhythm: Normal rate and regular rhythm.      Pulses: Normal pulses.   Pulmonary:      Effort: Pulmonary effort is normal. No respiratory distress.      Breath sounds: Wheezing and rhonchi present.   Abdominal:      General: Abdomen is flat. There is no distension.      Palpations: Abdomen is soft.      Tenderness: There is no abdominal tenderness. There is no guarding or rebound.   Musculoskeletal:         General: Normal range of motion.      Cervical back: Normal range of motion and neck supple.      Right lower le+ Pitting Edema present.      Left lower le+ Pitting Edema present.   Lymphadenopathy:      Cervical: No cervical adenopathy.   Skin:     General: Skin is warm and dry.      Capillary Refill: Capillary refill takes less than 2 seconds.      Coloration: Skin is not cyanotic.      Findings: No rash.   Neurological:      General: No focal deficit present.      Mental Status: He is alert and oriented to person, place, and time. Mental status is at baseline.   Psychiatric:         Attention and Perception: Attention and perception normal.         Mood and Affect: Mood normal.         Behavior: Behavior normal.                Procedures:  Procedures      Medical Decision Making:      Comorbidities " that affect care:    Hypertension, diabetes, anxiety, cardiac tamponade, asthma, coronary artery disease, arthritis, stroke, COPD, depression, bipolar affective, CHF, elevated cholesterol, Parkinson's disease, sleep apnea    External Notes reviewed:    Hospital Discharge Summary: Patient's discharge summary from May 31, 2024 and Previous Admission Note: Patient's admission note from 5/28/2024      The following orders were placed and all results were independently analyzed by me:  Orders Placed This Encounter   Procedures    Rapid Strep A Screen - Swab, Throat    COVID-19, FLU A/B, RSV PCR 1 HR TAT - Swab, Nasopharynx    Beta Strep Culture, Throat - Swab, Throat    XR Elbow 3+ View Left    XR Chest 1 View    Martha Draw    Urinalysis With Microscopic If Indicated (No Culture) - Urine, Clean Catch    Comprehensive Metabolic Panel    BNP    CBC Auto Differential    Urinalysis, Microscopic Only - Urine, Clean Catch    Comprehensive Metabolic Panel    CBC Auto Differential    Hemoglobin A1c    Potassium    Diet: Cardiac, Fluid Restriction (240 mL/tray); Healthy Heart (2-3 Na+); 1500 mL/day; Fluid Consistency: Thin (IDDSI 0)    Vital Signs Recheck    Ambulate Patient - Report tolerance to provider    Vital Signs    Activity - Ad Padmini    Intake & Output    Weigh Patient    Oral Care    Saline Lock & Maintain IV Access    Code Status and Medical Interventions:    Inpatient Hospitalist Consult    Inpatient Case Management  Consult    OT Consult: Eval & Treat Early Mobility Assessment, Adaptive Equipments Needs, ADL Performance Below Baseline    PT Consult: Eval & Treat Functional Mobility Below Baseline    POC Glucose Once    POC Glucose 4x Daily Before Meals & at Bedtime    ECG 12 Lead Dyspnea    Insert Peripheral IV    Insert Peripheral IV    Inpatient Admission    CBC & Differential    Green Top (Gel)    Lavender Top    Gold Top - SST    Light Blue Top    CBC & Differential       Medications Given in the  Emergency Department:  Medications   sodium chloride 0.9 % flush 10 mL (has no administration in time range)   sodium chloride 0.9 % flush 10 mL (has no administration in time range)   sodium chloride 0.9 % flush 10 mL (has no administration in time range)   sodium chloride 0.9 % infusion 40 mL (has no administration in time range)   acetaminophen (TYLENOL) tablet 650 mg (has no administration in time range)   sennosides-docusate (PERICOLACE) 8.6-50 MG per tablet 2 tablet (has no administration in time range)     And   polyethylene glycol (MIRALAX) packet 17 g (has no administration in time range)     And   bisacodyl (DULCOLAX) EC tablet 5 mg (has no administration in time range)     And   bisacodyl (DULCOLAX) suppository 10 mg (has no administration in time range)   Potassium Replacement - Follow Nurse / BPA Driven Protocol (has no administration in time range)   Magnesium Standard Dose Replacement - Follow Nurse / BPA Driven Protocol (has no administration in time range)   Calcium Replacement - Follow Nurse / BPA Driven Protocol (has no administration in time range)   ondansetron (ZOFRAN) injection 4 mg (has no administration in time range)   Enoxaparin Sodium (LOVENOX) syringe 40 mg (has no administration in time range)   melatonin tablet 5 mg (has no administration in time range)   dextrose (GLUTOSE) oral gel 15 g (has no administration in time range)   dextrose (D50W) (25 g/50 mL) IV injection 25 g (has no administration in time range)   glucagon (GLUCAGEN) injection 1 mg (has no administration in time range)   Insulin Lispro (humaLOG) injection 2-9 Units (has no administration in time range)   cefTRIAXone (ROCEPHIN) 1,000 mg in sodium chloride 0.9 % 100 mL IVPB-VTB (1,000 mg Intravenous New Bag 6/15/24 4301)   furosemide (LASIX) injection 40 mg (has no administration in time range)   potassium chloride (KLOR-CON M20) CR tablet 40 mEq (has no administration in time range)   acetaminophen (TYLENOL) tablet 975 mg  (975 mg Oral Given 6/14/24 1850)   benzocaine (HURRICAINE) 20 % liquid solution 1 spray (1 spray Mouth/Throat Given 6/14/24 1850)   furosemide (LASIX) injection 60 mg (60 mg Intravenous Given 6/14/24 2311)        ED Course:    ED Course as of 06/15/24 0620   Fri Jun 14, 2024   1719 --- PROVIDER IN TRIAGE NOTE ---    The patient was evaluated by me, Cori Perez in triage. Orders were placed and the patient is currently awaiting disposition.    [AJ]   2214 I had Dr. Bello examined the patient.  He feels the patient is good to require admission just due to his generalized weakness and his inability to set up and get up to the bedside without assistance. [TC]   2245 I spoke with Dr. Acevedo and she will admit the patient to the inpatient services.  The patient was made aware of the admission. [TC]      ED Course User Index  [AJ] Cori Perez, GERMAINE  [TC] Lorraine Ritchie APRN       Labs:    Lab Results (last 24 hours)       Procedure Component Value Units Date/Time    Rapid Strep A Screen - Swab, Throat [[id number]]  (Normal) Collected: 06/14/24 1724    Specimen: Swab from Throat Updated: 06/14/24 1746     Strep A Ag Negative    COVID-19, FLU A/B, RSV PCR 1 HR TAT - Swab, Nasopharynx [[id number]]  (Normal) Collected: 06/14/24 1724    Specimen: Swab from Nasopharynx Updated: 06/14/24 1808     COVID19 Not Detected     Influenza A PCR Not Detected     Influenza B PCR Not Detected     RSV, PCR Not Detected    Narrative:      Fact sheet for providers: https://www.fda.gov/media/603177/download    Fact sheet for patients: https://www.fda.gov/media/622519/download    Test performed by PCR.    Beta Strep Culture, Throat - Swab, Throat [[id number]] Collected: 06/14/24 1724    Specimen: Swab from Throat Updated: 06/14/24 1746    POC Glucose Once [[id number]]  (Normal) Collected: 06/14/24 1731    Specimen: Blood Updated: 06/14/24 1735     Glucose 99 mg/dL      Comment: Serial Number: [id number]:  [id number]        Urinalysis With Microscopic If Indicated (No Culture) - Urine, Clean Catch [[id number]]  (Abnormal) Collected: 06/14/24 1945    Specimen: Urine, Clean Catch Updated: 06/14/24 2009     Color, UA Yellow     Appearance, UA Clear     pH, UA <=5.0     Specific Gravity, UA 1.011     Glucose,  mg/dL (2+)     Ketones, UA Negative     Bilirubin, UA Negative     Blood, UA Moderate (2+)     Protein, UA 30 mg/dL (1+)     Leuk Esterase, UA Negative     Nitrite, UA Negative     Urobilinogen, UA 1.0 E.U./dL    Urinalysis, Microscopic Only - Urine, Clean Catch [[id number]] Collected: 06/14/24 1945    Specimen: Urine, Clean Catch Updated: 06/14/24 2009     RBC, UA 0-2 /HPF      WBC, UA 0-2 /HPF      Bacteria, UA None Seen /HPF      Squamous Epithelial Cells, UA 0-2 /HPF      Hyaline Casts, UA 3-6 /LPF      Methodology Automated Microscopy    CBC & Differential [[id number]]  (Abnormal) Collected: 06/14/24 1954    Specimen: Blood Updated: 06/14/24 2001    Narrative:      The following orders were created for panel order CBC & Differential.  Procedure                               Abnormality         Status                     ---------                               -----------         ------                     CBC Auto Differential[[id number]]        Abnormal            Final result                 Please view results for these tests on the individual orders.    Comprehensive Metabolic Panel [[id number]]  (Abnormal) Collected: 06/14/24 1954    Specimen: Blood Updated: 06/14/24 2026     Glucose 103 mg/dL      BUN 29 mg/dL      Creatinine 1.30 mg/dL      Sodium 141 mmol/L      Potassium 3.7 mmol/L      Chloride 104 mmol/L      CO2 24.1 mmol/L      Calcium 8.7 mg/dL      Total Protein 6.3 g/dL      Albumin 3.6 g/dL      ALT (SGPT) 42 U/L      AST (SGOT) 26 U/L      Alkaline Phosphatase 270 U/L      Total Bilirubin 1.2 mg/dL      Globulin 2.7 gm/dL      A/G Ratio 1.3 g/dL      BUN/Creatinine Ratio 22.3     Anion Gap 12.9 mmol/L       eGFR 63.7 mL/min/1.73     Narrative:      GFR Normal >60  Chronic Kidney Disease <60  Kidney Failure <15      BNP [[id number]]  (Abnormal) Collected: 06/14/24 1954    Specimen: Blood Updated: 06/14/24 2036     proBNP 45,413.0 pg/mL     Narrative:      This assay is used as an aid in the diagnosis of individuals suspected of having heart failure. It can be used as an aid in the diagnosis of acute decompensated heart failure (ADHF) in patients presenting with signs and symptoms of ADHF to the emergency department (ED). In addition, NT-proBNP of <300 pg/mL indicates ADHF is not likely.    Age Range Result Interpretation  NT-proBNP Concentration (pg/mL:      <50             Positive            >450                   Gray                 300-450                    Negative             <300    50-75           Positive            >900                  Gray                300-900                  Negative            <300      >75             Positive            >1800                  Gray                300-1800                  Negative            <300    CBC Auto Differential [[id number]]  (Abnormal) Collected: 06/14/24 1954    Specimen: Blood Updated: 06/14/24 2001     WBC 12.34 10*3/mm3      RBC 4.45 10*6/mm3      Hemoglobin 12.2 g/dL      Hematocrit 38.0 %      MCV 85.4 fL      MCH 27.4 pg      MCHC 32.1 g/dL      RDW 17.5 %      RDW-SD 54.1 fl      MPV 10.0 fL      Platelets 324 10*3/mm3      Neutrophil % 78.5 %      Lymphocyte % 12.0 %      Monocyte % 8.6 %      Eosinophil % 0.0 %      Basophil % 0.6 %      Immature Grans % 0.3 %      Neutrophils, Absolute 9.69 10*3/mm3      Lymphocytes, Absolute 1.48 10*3/mm3      Monocytes, Absolute 1.06 10*3/mm3      Eosinophils, Absolute 0.00 10*3/mm3      Basophils, Absolute 0.07 10*3/mm3      Immature Grans, Absolute 0.04 10*3/mm3      nRBC 0.0 /100 WBC     CBC & Differential [[id number]]  (Abnormal) Collected: 06/15/24 0402    Specimen: Blood Updated: 06/15/24 0421    Narrative:       The following orders were created for panel order CBC & Differential.  Procedure                               Abnormality         Status                     ---------                               -----------         ------                     CBC Auto Differential[[id number]]        Abnormal            Final result                 Please view results for these tests on the individual orders.    Comprehensive Metabolic Panel [[id number]]  (Abnormal) Collected: 06/15/24 0402    Specimen: Blood Updated: 06/15/24 0440     Glucose 89 mg/dL      BUN 27 mg/dL      Creatinine 1.34 mg/dL      Sodium 141 mmol/L      Potassium 3.3 mmol/L      Chloride 102 mmol/L      CO2 29.9 mmol/L      Calcium 8.4 mg/dL      Total Protein 5.9 g/dL      Albumin 3.3 g/dL      ALT (SGPT) 39 U/L      AST (SGOT) 23 U/L      Alkaline Phosphatase 249 U/L      Total Bilirubin 1.4 mg/dL      Globulin 2.6 gm/dL      A/G Ratio 1.3 g/dL      BUN/Creatinine Ratio 20.1     Anion Gap 9.1 mmol/L      eGFR 61.4 mL/min/1.73     Narrative:      GFR Normal >60  Chronic Kidney Disease <60  Kidney Failure <15      CBC Auto Differential [[id number]]  (Abnormal) Collected: 06/15/24 0402    Specimen: Blood Updated: 06/15/24 0421     WBC 9.46 10*3/mm3      RBC 4.58 10*6/mm3      Hemoglobin 12.4 g/dL      Hematocrit 39.9 %      MCV 87.1 fL      MCH 27.1 pg      MCHC 31.1 g/dL      RDW 17.6 %      RDW-SD 55.8 fl      MPV 10.3 fL      Platelets 280 10*3/mm3      Neutrophil % 75.8 %      Lymphocyte % 15.0 %      Monocyte % 7.9 %      Eosinophil % 0.4 %      Basophil % 0.6 %      Immature Grans % 0.3 %      Neutrophils, Absolute 7.16 10*3/mm3      Lymphocytes, Absolute 1.42 10*3/mm3      Monocytes, Absolute 0.75 10*3/mm3      Eosinophils, Absolute 0.04 10*3/mm3      Basophils, Absolute 0.06 10*3/mm3      Immature Grans, Absolute 0.03 10*3/mm3      nRBC 0.0 /100 WBC     Hemoglobin A1c [[id number]] Collected: 06/15/24 0402    Specimen: Blood Updated: 06/15/24 0415              Imaging:    XR Chest 1 View    Result Date: 6/14/2024  XR CHEST 1 VW Date of Exam: 6/14/2024 7:39 PM EDT Indication: soa Comparison: 5/20/2024 Findings: Cardiomediastinal silhouette is within normal limits. No focal opacity, pleural effusion or pneumothorax. No evidence of acute osseous abnormalities. Visualized upper abdomen is unremarkable. Degenerative change of the shoulders.     Impression: No radiographic evidence of acute cardiopulmonary process. Electronically Signed: Ryan Dejesus MD  6/14/2024 8:35 PM EDT  Workstation ID: [id number]    XR Elbow 3+ View Left    Result Date: 6/14/2024  XR ELBOW 3+ VW LEFT Date of Exam: 6/14/2024 5:28 PM EDT Indication: pain pain Comparison: None available. Findings: No fractures, dislocations or acute osseous abnormalities are identified in the left elbow. No evidence of significant joint effusion. There is an olecranon osteophyte. There is mild adjacent soft tissue swelling.     Impression: Left olecranon osteophyte with adjacent soft tissue swelling. Electronically Signed: Jerson Jane MD  6/14/2024 5:39 PM EDT  Workstation ID: [id number]       Differential Diagnosis and Discussion:    Dyspnea: Differential diagnosis includes but is not limited to metabolic acidosis, neurological disorders, psychogenic, asthma, pneumothorax, upper airway obstruction, COPD, pneumonia, noncardiogenic pulmonary edema, interstitial lung disease, anemia, congestive heart failure, and pulmonary embolism  Sore Throat: Differential diagnosis includes but is not limited to bacterial infection, viral infection, inhaled irritants, sinus drainage, thyroiditis, epiglottitis, and retropharyngeal abscess.    All labs were reviewed and interpreted by me.  All X-rays impressions were independently interpreted by me.  EKG was interpreted by supervising attending.    MDM  Number of Diagnoses or Management Options  Acute on chronic congestive heart failure, unspecified heart failure type: established and  worsening  Dyspnea on exertion: established and worsening  Pharyngitis, unspecified etiology: new and requires workup  Weakness generalized: established and worsening     Amount and/or Complexity of Data Reviewed  Clinical lab tests: reviewed  Tests in the radiology section of CPT®: reviewed  Tests in the medicine section of CPT®: reviewed  Decide to obtain previous medical records or to obtain history from someone other than the patient: yes    Risk of Complications, Morbidity, and/or Mortality  Presenting problems: low  Diagnostic procedures: low  Management options: low    Patient Progress  Patient progress: stable         Patient Care Considerations:    SEPSIS was considered but is NOT present in the emergency department as SIRS criteria is not present.      Consultants/Shared Management Plan:    Hospitalist: I have discussed the case with Dr. Acevedo who agrees to accept the patient for admission.    Social Determinants of Health:        Disposition and Care Coordination:    Admit:   Through independent evaluation of the patient's history, physical, and imperical data, the patient meets criteria for inpatient admission to the hospital.      Final diagnoses:   Acute on chronic congestive heart failure, unspecified heart failure type   Weakness generalized   Pharyngitis, unspecified etiology   Dyspnea on exertion        ED Disposition       ED Disposition   Decision to Admit    Condition   --    Comment   Level of Care: Telemetry [5]   Diagnosis: Generalized weakness [457511]   Admitting Physician: FARHEEN ACEVEDO [[id number]]   Attending Physician: FARHEEN ACEVEDO [[id number]]   Certification: I Certify That Inpatient Hospital Services Are Medically Necessary For Greater Than 2 Midnights                 This medical record created using voice recognition software.             Lorraine Ritchie, DEV  06/15/24 0620

## 2024-06-15 PROBLEM — R53.1 GENERALIZED WEAKNESS: Status: ACTIVE | Noted: 2024-06-15

## 2024-06-15 LAB
ALBUMIN SERPL-MCNC: 3.3 G/DL (ref 3.5–5.2)
ALBUMIN/GLOB SERPL: 1.3 G/DL
ALP SERPL-CCNC: 249 U/L (ref 39–117)
ALT SERPL W P-5'-P-CCNC: 39 U/L (ref 1–41)
ANION GAP SERPL CALCULATED.3IONS-SCNC: 9.1 MMOL/L (ref 5–15)
AST SERPL-CCNC: 23 U/L (ref 1–40)
BASOPHILS # BLD AUTO: 0.06 10*3/MM3 (ref 0–0.2)
BASOPHILS NFR BLD AUTO: 0.6 % (ref 0–1.5)
BILIRUB SERPL-MCNC: 1.4 MG/DL (ref 0–1.2)
BUN SERPL-MCNC: 27 MG/DL (ref 6–20)
BUN/CREAT SERPL: 20.1 (ref 7–25)
C3 SERPL-MCNC: 126 MG/DL (ref 82–167)
C4 SERPL-MCNC: 27 MG/DL (ref 14–44)
CALCIUM SPEC-SCNC: 8.4 MG/DL (ref 8.6–10.5)
CHLORIDE SERPL-SCNC: 102 MMOL/L (ref 98–107)
CO2 SERPL-SCNC: 29.9 MMOL/L (ref 22–29)
CREAT SERPL-MCNC: 1.34 MG/DL (ref 0.76–1.27)
DEPRECATED RDW RBC AUTO: 55.8 FL (ref 37–54)
EGFRCR SERPLBLD CKD-EPI 2021: 61.4 ML/MIN/1.73
EOSINOPHIL # BLD AUTO: 0.04 10*3/MM3 (ref 0–0.4)
EOSINOPHIL NFR BLD AUTO: 0.4 % (ref 0.3–6.2)
ERYTHROCYTE [DISTWIDTH] IN BLOOD BY AUTOMATED COUNT: 17.6 % (ref 12.3–15.4)
GLOBULIN UR ELPH-MCNC: 2.6 GM/DL
GLUCOSE BLDC GLUCOMTR-MCNC: 104 MG/DL (ref 70–99)
GLUCOSE BLDC GLUCOMTR-MCNC: 149 MG/DL (ref 70–99)
GLUCOSE BLDC GLUCOMTR-MCNC: 172 MG/DL (ref 70–99)
GLUCOSE BLDC GLUCOMTR-MCNC: 96 MG/DL (ref 70–99)
GLUCOSE SERPL-MCNC: 89 MG/DL (ref 65–99)
HBA1C MFR BLD: 6.4 % (ref 4.8–5.6)
HCT VFR BLD AUTO: 39.9 % (ref 37.5–51)
HGB BLD-MCNC: 12.4 G/DL (ref 13–17.7)
IMM GRANULOCYTES # BLD AUTO: 0.03 10*3/MM3 (ref 0–0.05)
IMM GRANULOCYTES NFR BLD AUTO: 0.3 % (ref 0–0.5)
LYMPHOCYTES # BLD AUTO: 1.42 10*3/MM3 (ref 0.7–3.1)
LYMPHOCYTES NFR BLD AUTO: 15 % (ref 19.6–45.3)
MCH RBC QN AUTO: 27.1 PG (ref 26.6–33)
MCHC RBC AUTO-ENTMCNC: 31.1 G/DL (ref 31.5–35.7)
MCV RBC AUTO: 87.1 FL (ref 79–97)
MONOCYTES # BLD AUTO: 0.75 10*3/MM3 (ref 0.1–0.9)
MONOCYTES NFR BLD AUTO: 7.9 % (ref 5–12)
NEUTROPHILS NFR BLD AUTO: 7.16 10*3/MM3 (ref 1.7–7)
NEUTROPHILS NFR BLD AUTO: 75.8 % (ref 42.7–76)
NRBC BLD AUTO-RTO: 0 /100 WBC (ref 0–0.2)
PLATELET # BLD AUTO: 280 10*3/MM3 (ref 140–450)
PMV BLD AUTO: 10.3 FL (ref 6–12)
POTASSIUM SERPL-SCNC: 3.3 MMOL/L (ref 3.5–5.2)
POTASSIUM SERPL-SCNC: 4.2 MMOL/L (ref 3.5–5.2)
PROT SERPL-MCNC: 5.9 G/DL (ref 6–8.5)
QT INTERVAL: 407 MS
QTC INTERVAL: 499 MS
RBC # BLD AUTO: 4.58 10*6/MM3 (ref 4.14–5.8)
SODIUM SERPL-SCNC: 141 MMOL/L (ref 136–145)
WBC NRBC COR # BLD AUTO: 9.46 10*3/MM3 (ref 3.4–10.8)

## 2024-06-15 PROCEDURE — 83516 IMMUNOASSAY NONANTIBODY: CPT | Performed by: INTERNAL MEDICINE

## 2024-06-15 PROCEDURE — 85025 COMPLETE CBC W/AUTO DIFF WBC: CPT | Performed by: FAMILY MEDICINE

## 2024-06-15 PROCEDURE — 36415 COLL VENOUS BLD VENIPUNCTURE: CPT | Performed by: FAMILY MEDICINE

## 2024-06-15 PROCEDURE — 84132 ASSAY OF SERUM POTASSIUM: CPT | Performed by: PHYSICIAN ASSISTANT

## 2024-06-15 PROCEDURE — 82948 REAGENT STRIP/BLOOD GLUCOSE: CPT

## 2024-06-15 PROCEDURE — 86038 ANTINUCLEAR ANTIBODIES: CPT | Performed by: INTERNAL MEDICINE

## 2024-06-15 PROCEDURE — 25010000002 CEFTRIAXONE PER 250 MG: Performed by: FAMILY MEDICINE

## 2024-06-15 PROCEDURE — 86037 ANCA TITER EACH ANTIBODY: CPT | Performed by: INTERNAL MEDICINE

## 2024-06-15 PROCEDURE — 25010000002 FUROSEMIDE PER 20 MG: Performed by: PHYSICIAN ASSISTANT

## 2024-06-15 PROCEDURE — 83036 HEMOGLOBIN GLYCOSYLATED A1C: CPT | Performed by: FAMILY MEDICINE

## 2024-06-15 PROCEDURE — 63710000001 INSULIN LISPRO (HUMAN) PER 5 UNITS: Performed by: FAMILY MEDICINE

## 2024-06-15 PROCEDURE — 99232 SBSQ HOSP IP/OBS MODERATE 35: CPT | Performed by: INTERNAL MEDICINE

## 2024-06-15 PROCEDURE — 82948 REAGENT STRIP/BLOOD GLUCOSE: CPT | Performed by: FAMILY MEDICINE

## 2024-06-15 PROCEDURE — 80053 COMPREHEN METABOLIC PANEL: CPT | Performed by: FAMILY MEDICINE

## 2024-06-15 RX ORDER — IBUPROFEN 600 MG/1
1 TABLET ORAL
Status: DISCONTINUED | OUTPATIENT
Start: 2024-06-15 | End: 2024-06-25 | Stop reason: HOSPADM

## 2024-06-15 RX ORDER — ONDANSETRON 2 MG/ML
4 INJECTION INTRAMUSCULAR; INTRAVENOUS EVERY 6 HOURS PRN
Status: DISCONTINUED | OUTPATIENT
Start: 2024-06-15 | End: 2024-06-25 | Stop reason: HOSPADM

## 2024-06-15 RX ORDER — ENOXAPARIN SODIUM 100 MG/ML
40 INJECTION SUBCUTANEOUS DAILY
Status: DISCONTINUED | OUTPATIENT
Start: 2024-06-15 | End: 2024-06-15

## 2024-06-15 RX ORDER — ATORVASTATIN CALCIUM 40 MG/1
40 TABLET, FILM COATED ORAL NIGHTLY
Status: DISCONTINUED | OUTPATIENT
Start: 2024-06-15 | End: 2024-06-25 | Stop reason: HOSPADM

## 2024-06-15 RX ORDER — CARVEDILOL 25 MG/1
25 TABLET ORAL 2 TIMES DAILY
Status: DISCONTINUED | OUTPATIENT
Start: 2024-06-15 | End: 2024-06-18

## 2024-06-15 RX ORDER — FUROSEMIDE 10 MG/ML
40 INJECTION INTRAMUSCULAR; INTRAVENOUS EVERY 12 HOURS
Status: DISCONTINUED | OUTPATIENT
Start: 2024-06-15 | End: 2024-06-18

## 2024-06-15 RX ORDER — SODIUM CHLORIDE 0.9 % (FLUSH) 0.9 %
10 SYRINGE (ML) INJECTION EVERY 12 HOURS SCHEDULED
Status: DISCONTINUED | OUTPATIENT
Start: 2024-06-15 | End: 2024-06-25 | Stop reason: HOSPADM

## 2024-06-15 RX ORDER — PANTOPRAZOLE SODIUM 40 MG/1
40 TABLET, DELAYED RELEASE ORAL DAILY
Status: DISCONTINUED | OUTPATIENT
Start: 2024-06-15 | End: 2024-06-25 | Stop reason: HOSPADM

## 2024-06-15 RX ORDER — INSULIN LISPRO 100 [IU]/ML
2-9 INJECTION, SOLUTION INTRAVENOUS; SUBCUTANEOUS
Status: DISCONTINUED | OUTPATIENT
Start: 2024-06-15 | End: 2024-06-25 | Stop reason: HOSPADM

## 2024-06-15 RX ORDER — FUROSEMIDE 10 MG/ML
40 INJECTION INTRAMUSCULAR; INTRAVENOUS EVERY 12 HOURS
Status: DISCONTINUED | OUTPATIENT
Start: 2024-06-15 | End: 2024-06-15

## 2024-06-15 RX ORDER — FERROUS SULFATE 325(65) MG
325 TABLET ORAL
Status: DISCONTINUED | OUTPATIENT
Start: 2024-06-15 | End: 2024-06-25 | Stop reason: HOSPADM

## 2024-06-15 RX ORDER — SODIUM CHLORIDE 0.9 % (FLUSH) 0.9 %
10 SYRINGE (ML) INJECTION AS NEEDED
Status: DISCONTINUED | OUTPATIENT
Start: 2024-06-15 | End: 2024-06-25 | Stop reason: HOSPADM

## 2024-06-15 RX ORDER — MIRTAZAPINE 15 MG/1
15 TABLET, FILM COATED ORAL NIGHTLY
Status: DISCONTINUED | OUTPATIENT
Start: 2024-06-15 | End: 2024-06-25 | Stop reason: HOSPADM

## 2024-06-15 RX ORDER — VENLAFAXINE HYDROCHLORIDE 37.5 MG/1
37.5 CAPSULE, EXTENDED RELEASE ORAL DAILY
Status: DISCONTINUED | OUTPATIENT
Start: 2024-06-15 | End: 2024-06-25 | Stop reason: HOSPADM

## 2024-06-15 RX ORDER — SPIRONOLACTONE 25 MG/1
25 TABLET ORAL DAILY
Status: DISCONTINUED | OUTPATIENT
Start: 2024-06-15 | End: 2024-06-18

## 2024-06-15 RX ORDER — POLYETHYLENE GLYCOL 3350 17 G/17G
17 POWDER, FOR SOLUTION ORAL DAILY PRN
Status: DISCONTINUED | OUTPATIENT
Start: 2024-06-15 | End: 2024-06-25 | Stop reason: HOSPADM

## 2024-06-15 RX ORDER — ACETAMINOPHEN 325 MG/1
650 TABLET ORAL EVERY 4 HOURS PRN
Status: DISCONTINUED | OUTPATIENT
Start: 2024-06-15 | End: 2024-06-25 | Stop reason: HOSPADM

## 2024-06-15 RX ORDER — AMOXICILLIN 250 MG
2 CAPSULE ORAL 2 TIMES DAILY PRN
Status: DISCONTINUED | OUTPATIENT
Start: 2024-06-15 | End: 2024-06-25 | Stop reason: HOSPADM

## 2024-06-15 RX ORDER — DEXTROSE MONOHYDRATE 25 G/50ML
25 INJECTION, SOLUTION INTRAVENOUS
Status: DISCONTINUED | OUTPATIENT
Start: 2024-06-15 | End: 2024-06-25 | Stop reason: HOSPADM

## 2024-06-15 RX ORDER — UREA 10 %
5 LOTION (ML) TOPICAL NIGHTLY PRN
Status: DISCONTINUED | OUTPATIENT
Start: 2024-06-15 | End: 2024-06-25 | Stop reason: HOSPADM

## 2024-06-15 RX ORDER — NICOTINE POLACRILEX 4 MG
15 LOZENGE BUCCAL
Status: DISCONTINUED | OUTPATIENT
Start: 2024-06-15 | End: 2024-06-25 | Stop reason: HOSPADM

## 2024-06-15 RX ORDER — BISACODYL 5 MG/1
5 TABLET, DELAYED RELEASE ORAL DAILY PRN
Status: DISCONTINUED | OUTPATIENT
Start: 2024-06-15 | End: 2024-06-25 | Stop reason: HOSPADM

## 2024-06-15 RX ORDER — POTASSIUM CHLORIDE 20 MEQ/1
40 TABLET, EXTENDED RELEASE ORAL EVERY 4 HOURS
Status: COMPLETED | OUTPATIENT
Start: 2024-06-15 | End: 2024-06-15

## 2024-06-15 RX ORDER — SODIUM CHLORIDE 9 MG/ML
40 INJECTION, SOLUTION INTRAVENOUS AS NEEDED
Status: DISCONTINUED | OUTPATIENT
Start: 2024-06-15 | End: 2024-06-25 | Stop reason: HOSPADM

## 2024-06-15 RX ORDER — AMIODARONE HYDROCHLORIDE 200 MG/1
200 TABLET ORAL DAILY
Status: DISCONTINUED | OUTPATIENT
Start: 2024-06-15 | End: 2024-06-25 | Stop reason: HOSPADM

## 2024-06-15 RX ORDER — BISACODYL 10 MG
10 SUPPOSITORY, RECTAL RECTAL DAILY PRN
Status: DISCONTINUED | OUTPATIENT
Start: 2024-06-15 | End: 2024-06-25 | Stop reason: HOSPADM

## 2024-06-15 RX ADMIN — EMPAGLIFLOZIN 10 MG: 10 TABLET, FILM COATED ORAL at 08:57

## 2024-06-15 RX ADMIN — CARVEDILOL 25 MG: 25 TABLET, FILM COATED ORAL at 08:56

## 2024-06-15 RX ADMIN — POTASSIUM CHLORIDE 40 MEQ: 1500 TABLET, EXTENDED RELEASE ORAL at 12:11

## 2024-06-15 RX ADMIN — INSULIN LISPRO 2 UNITS: 100 INJECTION, SOLUTION INTRAVENOUS; SUBCUTANEOUS at 08:57

## 2024-06-15 RX ADMIN — FERROUS SULFATE TAB 325 MG (65 MG ELEMENTAL FE) 325 MG: 325 (65 FE) TAB at 08:56

## 2024-06-15 RX ADMIN — ATORVASTATIN CALCIUM 40 MG: 40 TABLET, FILM COATED ORAL at 22:00

## 2024-06-15 RX ADMIN — APIXABAN 5 MG: 5 TABLET, FILM COATED ORAL at 08:56

## 2024-06-15 RX ADMIN — AMIODARONE HYDROCHLORIDE 200 MG: 200 TABLET ORAL at 08:57

## 2024-06-15 RX ADMIN — POTASSIUM CHLORIDE 40 MEQ: 1500 TABLET, EXTENDED RELEASE ORAL at 06:27

## 2024-06-15 RX ADMIN — Medication 10 ML: at 08:58

## 2024-06-15 RX ADMIN — MIRTAZAPINE 15 MG: 15 TABLET, FILM COATED ORAL at 22:00

## 2024-06-15 RX ADMIN — APIXABAN 5 MG: 5 TABLET, FILM COATED ORAL at 22:00

## 2024-06-15 RX ADMIN — FUROSEMIDE 40 MG: 10 INJECTION, SOLUTION INTRAMUSCULAR; INTRAVENOUS at 08:57

## 2024-06-15 RX ADMIN — SACUBITRIL AND VALSARTAN 1 TABLET: 49; 51 TABLET, FILM COATED ORAL at 12:11

## 2024-06-15 RX ADMIN — SPIRONOLACTONE 25 MG: 25 TABLET ORAL at 08:57

## 2024-06-15 RX ADMIN — VENLAFAXINE HYDROCHLORIDE 37.5 MG: 37.5 CAPSULE, EXTENDED RELEASE ORAL at 08:56

## 2024-06-15 RX ADMIN — Medication 10 ML: at 22:01

## 2024-06-15 RX ADMIN — PANTOPRAZOLE SODIUM 40 MG: 40 TABLET, DELAYED RELEASE ORAL at 08:57

## 2024-06-15 RX ADMIN — CEFTRIAXONE SODIUM 1000 MG: 1 INJECTION, POWDER, FOR SOLUTION INTRAMUSCULAR; INTRAVENOUS at 04:48

## 2024-06-15 NOTE — PLAN OF CARE
Goal Outcome Evaluation:  Plan of Care Reviewed With: patient        Progress: improving  Outcome Evaluation: Pt rested well since arrival to unit. Pt's VSS. Pt has had no significant reports of pain so far. Pt started on IV antibiotics as ordered. Pt alert and able to make needs known. Call light in reach and bed in lowest lcoekd position. Pt has had no additional complaints so far.

## 2024-06-15 NOTE — PROGRESS NOTES
[facility]   Hospitalist Progress Note  Date: 6/15/2024  Patient Name: Regulo Sepulveda  : 1965  MRN: [id number]  Date of admission: 2024  Room/Bed: [facility]/      Subjective   Subjective     Chief Complaint: weakness, short of air     Summary:Regulo Sepulveda is a 58 y.o. male with PMHx of HTN, CHF , COPD, DMII, Parkinson Disease, CAD, Bipolar disorder who presents to the emergency department for evaluation of sore throat and myalgias for the past 3 to 4 days. Also having left elbow pain that he states he is never had before. The patient also states that he was just admitted here few weeks ago for CHF and has had increasingly had more shortness of breath recently. He states that his feet have been swollen for the last several days. He states that he has had increased shortness of breath with exertion. He reports fevers at home. He states that he has been compliant with his medications since discharge. He denies any chills, diaphoresis, chest pain, nausea, vomiting, dysuria, constipation or diarrhea. In the ED he was given Tylenol, Lasix because of pain. He was admitted for further evaluation and management.     Interval Followup:   Patient feels ok today just tired and states he has had this rash on his feet for a couple days  Left elbow pain  Vitals are stable     Review of Systems    All systems reviewed and negative except for what is outlined above.      Objective   Objective     Vitals:   Temp:  [97.2 °F (36.2 °C)-98.9 °F (37.2 °C)] 97.2 °F (36.2 °C)  Heart Rate:  [] 75  Resp:  [18-22] 22  BP: (101-139)/(65-96) 110/65    Physical Exam   General: Awake, alert, NAD sleeping in bed   HENT: NCAT, MMM  Eyes: pupils equal, no scleral icterus  Cardiovascular: RRR, no murmurs   Pulmonary: CTA bilaterally; no wheezes; no conversational dyspnea  Gastrointestinal: S/ND/NT, +BS  Musculoskeletal: No gross deformities  Skin: bilateral feet have red vascular colored rash. Some edema in feet   Neuro: CN II through  XII grossly intact; speech clear; no tremor  Psych: Mood and affect appropriate  : No Poon catheter; no suprapubic tenderness    Result Review    Result Review:  I have personally reviewed these results:  [x]  Laboratory      Lab 06/15/24  0402 06/14/24 1954   WBC 9.46 12.34*   HEMOGLOBIN 12.4* 12.2*   HEMATOCRIT 39.9 38.0   PLATELETS 280 324   NEUTROS ABS 7.16* 9.69*   IMMATURE GRANS (ABS) 0.03 0.04   LYMPHS ABS 1.42 1.48   MONOS ABS 0.75 1.06*   EOS ABS 0.04 0.00   MCV 87.1 85.4         Lab 06/15/24  0402 06/14/24 1954   SODIUM 141 141   POTASSIUM 3.3* 3.7   CHLORIDE 102 104   CO2 29.9* 24.1   ANION GAP 9.1 12.9   BUN 27* 29*   CREATININE 1.34* 1.30*   EGFR 61.4 63.7   GLUCOSE 89 103*   CALCIUM 8.4* 8.7         Lab 06/15/24  0402 06/14/24 1954   TOTAL PROTEIN 5.9* 6.3   ALBUMIN 3.3* 3.6   GLOBULIN 2.6 2.7   ALT (SGPT) 39 42*   AST (SGOT) 23 26   BILIRUBIN 1.4* 1.2   ALK PHOS 249* 270*         Lab 06/14/24 1954   PROBNP 45,413.0*                 Brief Urine Lab Results  (Last result in the past 365 days)        Color   Clarity   Blood   Leuk Est   Nitrite   Protein   CREAT   Urine HCG        06/14/24 1945 Yellow   Clear   Moderate (2+)   Negative   Negative   30 mg/dL (1+)                 [x]  Microbiology   Microbiology Results (last 10 days)       Procedure Component Value - Date/Time    Rapid Strep A Screen - Swab, Throat [[id number]]  (Normal) Collected: 06/14/24 1724    Lab Status: Final result Specimen: Swab from Throat Updated: 06/14/24 1746     Strep A Ag Negative    COVID-19, FLU A/B, RSV PCR 1 HR TAT - Swab, Nasopharynx [[id number]]  (Normal) Collected: 06/14/24 1724    Lab Status: Final result Specimen: Swab from Nasopharynx Updated: 06/14/24 1808     COVID19 Not Detected     Influenza A PCR Not Detected     Influenza B PCR Not Detected     RSV, PCR Not Detected    Narrative:      Fact sheet for providers: https://www.fda.gov/media/679602/download    Fact sheet for patients:  https://www.fda.gov/media/811945/download    Test performed by PCR.          [x]  Radiology  XR Chest 1 View    Result Date: 6/14/2024  Impression: No radiographic evidence of acute cardiopulmonary process. Electronically Signed: Ryan Dejesus MD  6/14/2024 8:35 PM EDT  Workstation ID: [id number]    XR Elbow 3+ View Left    Result Date: 6/14/2024  Impression: Left olecranon osteophyte with adjacent soft tissue swelling. Electronically Signed: Jerson Jane MD  6/14/2024 5:39 PM EDT  Workstation ID: [id number]   []  EKG/Telemetry   []  Cardiology/Vascular   []  Pathology  []  Old records  []  Other:    Assessment & Plan   Assessment / Plan     Assessment:  Acute systolic heart failure exacerbation  Hypertension  Rash on bilateral lower feet  History of hepatitis C, treated  Paroxysmal atrial fibrillation on Eliquis  History of apical thrombus on Eliquis  COPD  Diabetes mellitus type 2  Hypertension  Hyperlipidemia  Schizophrenia    Plan:  Patient remains admitted to telemetry  At this time we will discontinue antibiotics as chest x-ray shows no clear evidence of any infection  Continue patient on IV Lasix daily.  Continue with fluid restriction and cardiac diet  Resume home medication  Will check DARIEN, ANCA and complement level given patient's rash on his feet  Left elbow x-ray reviewed.  Showing some soft tissue swelling and olecranon osteophyte  Continue patient on sliding scale insulin for diabetes  For patient's schizophrenia he takes injectable medicine once per month  Continue patient on Eliquis for his history of apical thrombus and atrial fibrillation  Repeat labs in am        Discussed with RN.    VTE Prophylaxis:  Pharmacologic VTE prophylaxis orders are present.        CODE STATUS:   Level Of Support Discussed With: Patient  Code Status (Patient has no pulse and is not breathing): CPR (Attempt to Resuscitate)  Medical Interventions (Patient has pulse or is breathing): Full Support      Electronically signed  by Zackery Soni DO, 6/15/2024, 07:49 EDT.

## 2024-06-15 NOTE — ED NOTES
Pt ambulated for 1 minute then wanted to stop due to severe fatigue, pt HR up to 106 and oxygen stayed between 93&96 for duration of walk

## 2024-06-15 NOTE — PAYOR COMM NOTE
"PATIENT INFORMATION  Name:  Regulo Sepulveda  MRN#:     [id number]  :  1965         ADMISSION INFORMATION  CLASS: Inpatient   DOS:  06/15/24        CURRENT ATTENDING PROVIDER INFORMATION  Name/NPI: Zackery Soni DO [[id number]]  Phone:              Phone: [phone]  Fax:  [phone]        REQUESTING PROVIDER and RENDERING FACILITY  Name:  [facility]   NPI:  [id number]  TID:  [id number]  Address:      [facility] [address] [place] [address]  Phone:              [phone]  Fax:  [phone]        UTILIZATION REVIEW CONTACT INFORMATION  Phone:      [phone]  Fax:           [phone]        ADMISSION DIAGNOSIS  Weakness generalized [R53.1]  Dyspnea on exertion [R06.09]  Generalized weakness [R53.1]  Pharyngitis, unspecified etiology [J02.9]  Acute on chronic congestive heart failure, unspecified heart failure type [I50.9]        ++++++++++++++++++++++++++++++++++++++++++++++++++++++++++++++++++++++++++++++++          Regulo Sepulveda (58 y.o. Male)        Date of Birth   1965    Social Security Number       Address   [address] [place], [place] [address]    Home Phone   [phone]    MRN   [id number]       Yazidism   None    Marital Status   Single                            Admission Date   24    Admission Type   Emergency    Admitting Provider   Zackery Soni DO    Attending Provider   Zackery Soni DO    Department, Room/Bed   [facility] 5TH FLOOR SURGICAL TELEMETRY UNIT, 505/1       Discharge Date       Discharge Disposition       Discharge Destination                                 Attending Provider: Zackery Soni DO    Allergies: Penicillins    Isolation: Contact Air   Infection: None   Code Status: CPR    Ht: 185.4 cm (73\")   Wt: 64.7 kg (142 lb 10.2 oz)    Admission Cmt: None   Principal Problem: Generalized weakness [R53.1]                   Active Insurance as of 2024       Primary Coverage       Payor Plan Insurance Group " Employer/Plan Group    [facility] [id number]       Payor Plan Address Payor Plan Phone Number Payor Plan Fax Number Effective Dates    [address] [address]   2021 - None Entered    [facility] [phone]         Subscriber Name Subscriber Birth Date Member ID       NANCY SEPULVEDA 1965 [id number]                     Emergency Contacts        (Rel.) Home Phone Work Phone Mobile Phone    Aurora Prather (Sister) [phone] -- [phone]    Yun Don (Roommate) -- -- [phone]                 History & Physical        Leslie Acevedo MD at 24 1040            Psychiatric   HISTORY AND PHYSICAL    Patient Name: Nancy Sepulveda  : 1965  MRN: [id number]  Primary Care Physician:  Dickson Landry MD  Date of admission: 2024    Subjective   Subjective     Chief Complaint: generalized weakness    History of Present Illness  The patient is a 58 y.o. year old with PMHx of HTN, CHF , COPD, DMII, Parkinson Disease, CAD, Bipolar disorder who presents to the emergency department for evaluation of sore throat and myalgias for the past 3 to 4 days.  Also having left elbow pain that he states he is never had before. The patient also states that he was just admitted here few weeks ago for CHF and has had increasingly had more shortness of breath recently.  He states that his feet have been swollen for the last several days.  He states that he has had increased shortness of breath with exertion.  He reports fevers at home.  He states that he has been compliant with his medications since discharge.  He denies any  chills, diaphoresis, chest pain, nausea, vomiting, dysuria, constipation or diarrhea.  In the ED he was given Tylenol, Lasix because of pain.  He was admitted for further evaluation and management.      Review of Systems   Constitutional:  Positive for fever. Negative for activity change, appetite change and chills.   HENT:  Negative for  congestion, drooling, sinus pain and sneezing.    Eyes:  Negative for discharge.   Respiratory:  Positive for shortness of breath. Negative for cough and chest tightness.    Cardiovascular:  Negative for chest pain.   Gastrointestinal:  Negative for abdominal distention, abdominal pain, constipation, diarrhea and nausea.   Endocrine: Negative for polydipsia and polyphagia.   Genitourinary:  Negative for dysuria.   Musculoskeletal:  Positive for arthralgias and myalgias. Negative for joint swelling.   Neurological:  Negative for dizziness and light-headedness.   Psychiatric/Behavioral:  Negative for agitation and behavioral problems.         Personal History     Past Medical History:   Diagnosis Date    Anxiety     Asthma     Bipolar affective     Cardiac tamponade     2023    CHF (congestive heart failure)     COPD (chronic obstructive pulmonary disease)     Coronary artery disease     Depression     Diabetes mellitus     Elevated cholesterol     Hypertension     Parkinson disease        Past Surgical History:   Procedure Laterality Date    CARDIAC CATHETERIZATION Right 9/17/2023    Procedure: Right and Left Heart Cath;  Surgeon: Ben Grant MD;  Location: [facility] INVASIVE LOCATION;  Service: Cardiovascular;  Laterality: Right;    TESTICLE SURGERY Left     extraction       Family History: family history includes Depression in his brother and sister; Diabetes in his mother; Insomnia in his sister; Restless legs syndrome in his sister; Sleep disorder in his sister; Sleep walking in his sister. Otherwise pertinent FHx was reviewed and not pertinent to current issue.    Social History:  reports that he has been smoking cigarettes. He started smoking about 50 years ago. He has a 25.1 pack-year smoking history. He has never used smokeless tobacco. He reports that he does not currently use alcohol. He reports that he does not currently use drugs after having used the following drugs: Methamphetamines and  Marijuana.    Home Medications:  ARIPiprazole ER, albuterol sulfate HFA, amiodarone, apixaban, atorvastatin, carvedilol, dapagliflozin, ferrous gluconate, furosemide, metFORMIN, mirtazapine, pantoprazole, sacubitril-valsartan, spironolactone, and venlafaxine XR    Allergies:  Allergies   Allergen Reactions    Penicillins Shortness Of Breath       Objective    Objective     Vitals:   Temp:  [98.6 °F (37 °C)-98.9 °F (37.2 °C)] 98.6 °F (37 °C)  Heart Rate:  [] 77  Resp:  [18-20] 18  BP: (101-139)/(72-92) 101/76    Physical Exam  Constitutional:       General: He is not in acute distress.     Appearance: He is not ill-appearing.   HENT:      Head: Normocephalic and atraumatic.      Nose: No rhinorrhea.      Mouth/Throat:      Mouth: Mucous membranes are moist.      Pharynx: Oropharynx is clear.   Eyes:      Extraocular Movements: Extraocular movements intact.      Pupils: Pupils are equal, round, and reactive to light.   Cardiovascular:      Rate and Rhythm: Normal rate and regular rhythm.      Heart sounds: No murmur heard.  Pulmonary:      Effort: Pulmonary effort is normal. No respiratory distress.      Breath sounds: Normal breath sounds. No wheezing.      Comments: Decreased lung sounds her bilateral  Chest:      Chest wall: No tenderness.   Abdominal:      General: Abdomen is flat. Bowel sounds are normal. There is no distension.      Palpations: Abdomen is soft.      Tenderness: There is no abdominal tenderness. There is no guarding.   Musculoskeletal:         General: No swelling or tenderness.      Cervical back: Normal range of motion.   Skin:     General: Skin is warm and dry.   Neurological:      General: No focal deficit present.      Mental Status: He is alert and oriented to person, place, and time.   Psychiatric:         Mood and Affect: Mood normal.       Result Review    Result Review:  I have personally reviewed the results from the time of this admission to 6/15/2024 00:58 EDT and agree with  these findings:  [x]  Laboratory list / accordion  []  Microbiology  [x]  Radiology  []  EKG/Telemetry   []  Cardiology/Vascular   []  Pathology  []  Old records  []  Other:  Most notable findings include      Assessment & Plan   Assessment / Plan     Brief Patient Summary:  Regulo Sepulveda is a 58 y.o. male     Active Hospital Problems:  Active Hospital Problems    Diagnosis     **Generalized weakness      Plan:   Generalized weakness:  Chest x-ray showed No radiographic evidence of acute cardiopulmonary process.   UA showed glucose otherwise unremarkable   Admit to inpatient telemetry  Cardiac diet  Continue IV fluids of normal saline at 100 mL/h  Empiric antibiotics  Consulted PT/OT for eval and treatment  Daily CBC and CMP, will continue to monitor  Will continue to monitor vitals    Hx of CHF:  BNP 45,413  Chest x-ray showed  Telemetry monitoring  Cardiac diet  Fluid restriction 1.5 L  Strict FARAZ's, daily weights  Ordered IV Lasix 40 mg twice daily  Ordered resp panel  Consider consulting cardiology if symptoms worsen  Daily CBC and CMP, will continue to monitor  Will continue to monitor vitals    Hx of HTN:   Will continue home medications    Hyperglycemia secondary to DMII:  Order insulin sliding scale  Order hypoglycemia protocol  Order A1c  Hold home medications for now  Consult nutritionist for diabetic education  Daily CMP, will continue to monitor    DVT prophylaxis: Lovenox  GI prophylaxis: Zofran, Protonix  CODE STATUS: CPR (full code)      VTE Prophylaxis:  Pharmacologic VTE prophylaxis orders are signed & held.          CODE STATUS:    Level Of Support Discussed With: Patient  Code Status (Patient has no pulse and is not breathing): CPR (Attempt to Resuscitate)  Medical Interventions (Patient has pulse or is breathing): Full Support    Admission Status:  I believe this patient meets CPR  status.    Leslie Acevedo MD      Electronically signed by Leslie Acevedo MD at 06/15/24 2284           Emergency Department Notes        Doreen Hong RN at 06/14/24 2155          Pt was woke up from snoring by this RN to move rooms in the dept, pt able to ambulate self to wheelchair, pt placed in room 30 report given to ирина     Electronically signed by Doreen Hong RN at 06/14/24 2209       Doreen Hong RN at 06/14/24 2047          Pt ambulated for 1 minute then wanted to stop due to severe fatigue, pt HR up to 106 and oxygen stayed between 93&96 for duration of walk     Electronically signed by Doreen Hong RN at 06/14/24 2206       Lorraine Ritchie APRN at 06/14/24 1718          Time: 5:18 PM EDT  Date of encounter:  6/14/2024  Independent Historian/Clinical History and Information was obtained by:   Patient    History is limited by: N/A    Chief Complaint: Sore throat      History of Present Illness:  The patient is a 58 y.o. year old male who presents to the emergency department for evaluation of sore throat and myalgias for the past 3 to 4 days.  Also having left elbow pain that he states he is never had before.  He denies any traumas or falls.  He states that he is been swollen and painful for 3 days.  He can flex and extend but does report pain when he does flex and extend.  The patient also states that he was just admitted here few weeks ago for CHF and has had increasingly had more shortness of breath recently.  He states that his feet have been swollen for the last several days.  He states that he has had increased shortness of breath with exertion.  He reports fevers at home.  He states that he has been compliant with his medications since discharge.  He denies any chest pain.  The patient does appear to be somewhat tachypneic on exam with respirations 24-28.  He does have some mild swelling to his extremities with no pitting edema.  He has decreased breath sounds throughout.    HPI    Patient Care Team  Primary Care Provider: Dickson Landry MD    Past Medical History:     Allergies  "  Allergen Reactions    Penicillins Shortness Of Breath     Past Medical History:   Diagnosis Date    Anxiety     Arthritis     Asthma     Bipolar affective     Cardiac tamponade     2023    CHF (congestive heart failure)     COPD (chronic obstructive pulmonary disease)     Coronary artery disease     Depression     Diabetes mellitus     Elevated cholesterol     Hypertension     Parkinson disease     Sleep apnea     Stroke     Pt stated it was \"about 2 months ago\" as of 6/15/24     Past Surgical History:   Procedure Laterality Date    CARDIAC CATHETERIZATION Right 09/17/2023    Procedure: Right and Left Heart Cath;  Surgeon: Ben Grant MD;  Location: [facility] CATH INVASIVE [facility];  Service: Cardiovascular;  Laterality: Right;    ENDOSCOPY      TESTICLE SURGERY Left     extraction     Family History   Problem Relation Age of Onset    Diabetes Mother     Depression Sister     Restless legs syndrome Sister     Insomnia Sister     Sleep walking Sister     Sleep disorder Sister         Night Terrors    Depression Brother        Home Medications:  Prior to Admission medications    Medication Sig Start Date End Date Taking? Authorizing Provider   Abilify Maintena 300 MG Suspension Reconstituted ER IM injection ER Inject 300 mg into the appropriate muscle as directed by prescriber Every 30 (Thirty) Days. 5/25/24   Provider, MD Yuliya   albuterol sulfate  (90 Base) MCG/ACT inhaler Inhale 2 puffs Every 4 (Four) Hours As Needed for Wheezing or Shortness of Air. Indications: Chronic Obstructive Lung Disease 4/16/24   Beto Gillette MD   amiodarone (PACERONE) 200 MG tablet Take 1 tablet by mouth Daily. 4/22/24   Lin Tamez APRN   apixaban (ELIQUIS) 5 MG tablet tablet Take 1 tablet by mouth 2 (Two) Times a Day for 120 days. 2/22/24 6/21/24  Dickson Landry MD   atorvastatin (LIPITOR) 40 MG tablet Take 1 tablet by mouth every night at bedtime. 1/8/24   Dickson Landry MD   carvedilol (COREG) 12.5 MG tablet " Take 2 tablets by mouth 2 (Two) Times a Day. 5/7/24   Beto Gillette MD   Farxiga 5 MG tablet tablet Take 2 tablets by mouth Daily. Indications: Type 2 Diabetes 4/16/24   Beto Gillette MD   ferrous gluconate (FERGON) 324 MG tablet Take 1 tablet by mouth Daily With Breakfast. 5/15/24   Dickson Landry MD   furosemide (LASIX) 40 MG tablet Take 0.5 tablets by mouth Daily. 6/2/24   Felton Reynoso MD   metFORMIN (GLUCOPHAGE) 1000 MG tablet Take 1 tablet by mouth 2 (Two) Times a Day With Meals.    Yuliya Blanchard MD   mirtazapine (REMERON) 15 MG tablet Take 1 tablet by mouth Every Night. 4/15/24   Dickson Landry MD   pantoprazole (PROTONIX) 40 MG EC tablet Take 1 tablet by mouth Daily.    Yuliya Blanchard MD   sacubitril-valsartan (ENTRESTO) 49-51 MG tablet Take 1 tablet by mouth Every Morning.    Yuliya Blanchard MD   sacubitril-valsartan (ENTRESTO) 49-51 MG tablet Take 2 tablets by mouth Every Night.    Yuliya Blanchard MD   spironolactone (ALDACTONE) 25 MG tablet Take 1 tablet by mouth Daily. 4/16/24   Beto Gillette MD   venlafaxine XR (EFFEXOR-XR) 37.5 MG 24 hr capsule Take 1 capsule by mouth Daily.    Yuliya Blanchard MD        Social History:   Social History     Tobacco Use    Smoking status: Every Day     Current packs/day: 0.50     Average packs/day: 0.5 packs/day for 50.3 years (25.1 ttl pk-yrs)     Types: Cigarettes     Start date: 1/1/1974     Last attempt to quit: 11/2/2023    Smokeless tobacco: Never    Tobacco comments:     At least 10 cigarettes a day   Vaping Use    Vaping status: Former    Substances: Nicotine   Substance Use Topics    Alcohol use: Not Currently    Drug use: Not Currently     Types: Methamphetamines, Marijuana         Review of Systems:  Review of Systems   Constitutional:  Positive for fatigue. Negative for chills and fever.   HENT:  Positive for sore throat. Negative for congestion, ear pain and trouble swallowing.    Eyes:  Negative for pain.   Respiratory:   "Positive for shortness of breath and wheezing. Negative for cough and chest tightness.    Cardiovascular:  Positive for leg swelling. Negative for chest pain.   Gastrointestinal:  Negative for abdominal pain, diarrhea, nausea and vomiting.   Genitourinary:  Negative for flank pain and hematuria.   Musculoskeletal:  Positive for arthralgias. Negative for joint swelling, neck pain and neck stiffness.   Skin:  Negative for pallor and rash.   Neurological:  Positive for weakness. Negative for seizures and headaches.   All other systems reviewed and are negative.       Physical Exam:  /96 (BP Location: Left arm, Patient Position: Lying)   Pulse 74   Temp 97.3 °F (36.3 °C) (Oral)   Resp 18   Ht 185.4 cm (73\")   Wt 64.7 kg (142 lb 10.2 oz)   SpO2 97%   BMI 18.82 kg/m²     Physical Exam  Vitals and nursing note reviewed.   Constitutional:       General: He is not in acute distress.     Appearance: Normal appearance. He is well-developed. He is not ill-appearing or toxic-appearing.   HENT:      Head: Normocephalic and atraumatic.   Eyes:      General: No scleral icterus.     Extraocular Movements: Extraocular movements intact.      Conjunctiva/sclera: Conjunctivae normal.      Pupils: Pupils are equal, round, and reactive to light.   Neck:      Thyroid: No thyromegaly.   Cardiovascular:      Rate and Rhythm: Normal rate and regular rhythm.      Pulses: Normal pulses.   Pulmonary:      Effort: Pulmonary effort is normal. No respiratory distress.      Breath sounds: Wheezing and rhonchi present.   Abdominal:      General: Abdomen is flat. There is no distension.      Palpations: Abdomen is soft.      Tenderness: There is no abdominal tenderness. There is no guarding or rebound.   Musculoskeletal:         General: Normal range of motion.      Cervical back: Normal range of motion and neck supple.      Right lower le+ Pitting Edema present.      Left lower le+ Pitting Edema present.   Lymphadenopathy:      " Cervical: No cervical adenopathy.   Skin:     General: Skin is warm and dry.      Capillary Refill: Capillary refill takes less than 2 seconds.      Coloration: Skin is not cyanotic.      Findings: No rash.   Neurological:      General: No focal deficit present.      Mental Status: He is alert and oriented to person, place, and time. Mental status is at baseline.   Psychiatric:         Attention and Perception: Attention and perception normal.         Mood and Affect: Mood normal.         Behavior: Behavior normal.                Procedures:  Procedures      Medical Decision Making:      Comorbidities that affect care:    Hypertension, diabetes, anxiety, cardiac tamponade, asthma, coronary artery disease, arthritis, stroke, COPD, depression, bipolar affective, CHF, elevated cholesterol, Parkinson's disease, sleep apnea    External Notes reviewed:    Hospital Discharge Summary: Patient's discharge summary from May 31, 2024 and Previous Admission Note: Patient's admission note from 5/28/2024      The following orders were placed and all results were independently analyzed by me:  Orders Placed This Encounter   Procedures    Rapid Strep A Screen - Swab, Throat    COVID-19, FLU A/B, RSV PCR 1 HR TAT - Swab, Nasopharynx    Beta Strep Culture, Throat - Swab, Throat    XR Elbow 3+ View Left    XR Chest 1 View    [place] Draw    Urinalysis With Microscopic If Indicated (No Culture) - Urine, Clean Catch    Comprehensive Metabolic Panel    BNP    CBC Auto Differential    Urinalysis, Microscopic Only - Urine, Clean Catch    Comprehensive Metabolic Panel    CBC Auto Differential    Hemoglobin A1c    Potassium    Diet: Cardiac, Fluid Restriction (240 mL/tray); Healthy Heart (2-3 Na+); 1500 mL/day; Fluid Consistency: Thin (IDDSI 0)    Vital Signs Recheck    Ambulate Patient - Report tolerance to provider    Vital Signs    Activity - Ad Padmini    Intake & Output    Weigh Patient    Oral Care    Saline Lock & Maintain IV Access     Code Status and Medical Interventions:    Inpatient Hospitalist Consult    Inpatient Case Management  Consult    OT Consult: Eval & Treat Early Mobility Assessment, Adaptive Equipments Needs, ADL Performance Below Baseline    PT Consult: Eval & Treat Functional Mobility Below Baseline    POC Glucose Once    POC Glucose 4x Daily Before Meals & at Bedtime    ECG 12 Lead Dyspnea    Insert Peripheral IV    Insert Peripheral IV    Inpatient Admission    CBC & Differential    Green Top (Gel)    Lavender Top    Gold Top - SST    Light Blue Top    CBC & Differential       Medications Given in the Emergency Department:  Medications   sodium chloride 0.9 % flush 10 mL (has no administration in time range)   sodium chloride 0.9 % flush 10 mL (has no administration in time range)   sodium chloride 0.9 % flush 10 mL (has no administration in time range)   sodium chloride 0.9 % infusion 40 mL (has no administration in time range)   acetaminophen (TYLENOL) tablet 650 mg (has no administration in time range)   sennosides-docusate (PERICOLACE) 8.6-50 MG per tablet 2 tablet (has no administration in time range)     And   polyethylene glycol (MIRALAX) packet 17 g (has no administration in time range)     And   bisacodyl (DULCOLAX) EC tablet 5 mg (has no administration in time range)     And   bisacodyl (DULCOLAX) suppository 10 mg (has no administration in time range)   Potassium Replacement - Follow Nurse / BPA Driven Protocol (has no administration in time range)   Magnesium Standard Dose Replacement - Follow Nurse / BPA Driven Protocol (has no administration in time range)   Calcium Replacement - Follow Nurse / BPA Driven Protocol (has no administration in time range)   ondansetron (ZOFRAN) injection 4 mg (has no administration in time range)   Enoxaparin Sodium (LOVENOX) syringe 40 mg (has no administration in time range)   melatonin tablet 5 mg (has no administration in time range)   dextrose (GLUTOSE) oral gel 15  g (has no administration in time range)   dextrose (D50W) (25 g/50 mL) IV injection 25 g (has no administration in time range)   glucagon (GLUCAGEN) injection 1 mg (has no administration in time range)   Insulin Lispro (humaLOG) injection 2-9 Units (has no administration in time range)   cefTRIAXone (ROCEPHIN) 1,000 mg in sodium chloride 0.9 % 100 mL IVPB-VTB (1,000 mg Intravenous New Bag 6/15/24 3078)   furosemide (LASIX) injection 40 mg (has no administration in time range)   potassium chloride (KLOR-CON M20) CR tablet 40 mEq (has no administration in time range)   acetaminophen (TYLENOL) tablet 975 mg (975 mg Oral Given 6/14/24 1850)   benzocaine (HURRICAINE) 20 % liquid solution 1 spray (1 spray Mouth/Throat Given 6/14/24 1850)   furosemide (LASIX) injection 60 mg (60 mg Intravenous Given 6/14/24 2311)        ED Course:    ED Course as of 06/15/24 0620 Fri Jun 14, 2024 1719 --- PROVIDER IN TRIAGE NOTE ---    The patient was evaluated by Cori alonzo in triage. Orders were placed and the patient is currently awaiting disposition.    [AJ]   2214 I had Dr. Bello examined the patient.  He feels the patient is good to require admission just due to his generalized weakness and his inability to set up and get up to the bedside without assistance. [TC]   2245 I spoke with Dr. Acevedo and she will admit the patient to the inpatient services.  The patient was made aware of the admission. [TC]      ED Course User Index  [AJ] Cori Perez PA-C  [TC] Lorraine Ritchie APRN       Labs:    Lab Results (last 24 hours)       Procedure Component Value Units Date/Time    Rapid Strep A Screen - Swab, Throat [[id number]]  (Normal) Collected: 06/14/24 1724    Specimen: Swab from Throat Updated: 06/14/24 1746     Strep A Ag Negative    COVID-19, FLU A/B, RSV PCR 1 HR TAT - Swab, Nasopharynx [[id number]]  (Normal) Collected: 06/14/24 1724    Specimen: Swab from Nasopharynx Updated: 06/14/24 1808     COVID19 Not  Detected     Influenza A PCR Not Detected     Influenza B PCR Not Detected     RSV, PCR Not Detected    Narrative:      Fact sheet for providers: https://www.fda.gov/media/603341/download    Fact sheet for patients: https://www.fda.gov/media/800569/download    Test performed by PCR.    Beta Strep Culture, Throat - Swab, Throat [[id number]] Collected: 06/14/24 1724    Specimen: Swab from Throat Updated: 06/14/24 1746    POC Glucose Once [[id number]]  (Normal) Collected: 06/14/24 1731    Specimen: Blood Updated: 06/14/24 1735     Glucose 99 mg/dL      Comment: Serial Number: [id number]:  [id number]       Urinalysis With Microscopic If Indicated (No Culture) - Urine, Clean Catch [[id number]]  (Abnormal) Collected: 06/14/24 1945    Specimen: Urine, Clean Catch Updated: 06/14/24 2009     Color, UA Yellow     Appearance, UA Clear     pH, UA <=5.0     Specific Gravity, UA 1.011     Glucose,  mg/dL (2+)     Ketones, UA Negative     Bilirubin, UA Negative     Blood, UA Moderate (2+)     Protein, UA 30 mg/dL (1+)     Leuk Esterase, UA Negative     Nitrite, UA Negative     Urobilinogen, UA 1.0 E.U./dL    Urinalysis, Microscopic Only - Urine, Clean Catch [[id number]] Collected: 06/14/24 1945    Specimen: Urine, Clean Catch Updated: 06/14/24 2009     RBC, UA 0-2 /HPF      WBC, UA 0-2 /HPF      Bacteria, UA None Seen /HPF      Squamous Epithelial Cells, UA 0-2 /HPF      Hyaline Casts, UA 3-6 /LPF      Methodology Automated Microscopy    CBC & Differential [[id number]]  (Abnormal) Collected: 06/14/24 1954    Specimen: Blood Updated: 06/14/24 2001    Narrative:      The following orders were created for panel order CBC & Differential.  Procedure                               Abnormality         Status                     ---------                               -----------         ------                     CBC Auto Differential[[id number]]        Abnormal            Final result                 Please view results for these  tests on the individual orders.    Comprehensive Metabolic Panel [[id number]]  (Abnormal) Collected: 06/14/24 1954    Specimen: Blood Updated: 06/14/24 2026     Glucose 103 mg/dL      BUN 29 mg/dL      Creatinine 1.30 mg/dL      Sodium 141 mmol/L      Potassium 3.7 mmol/L      Chloride 104 mmol/L      CO2 24.1 mmol/L      Calcium 8.7 mg/dL      Total Protein 6.3 g/dL      Albumin 3.6 g/dL      ALT (SGPT) 42 U/L      AST (SGOT) 26 U/L      Alkaline Phosphatase 270 U/L      Total Bilirubin 1.2 mg/dL      Globulin 2.7 gm/dL      A/G Ratio 1.3 g/dL      BUN/Creatinine Ratio 22.3     Anion Gap 12.9 mmol/L      eGFR 63.7 mL/min/1.73     Narrative:      GFR Normal >60  Chronic Kidney Disease <60  Kidney Failure <15      BNP [[id number]]  (Abnormal) Collected: 06/14/24 1954    Specimen: Blood Updated: 06/14/24 2036     proBNP 45,413.0 pg/mL     Narrative:      This assay is used as an aid in the diagnosis of individuals suspected of having heart failure. It can be used as an aid in the diagnosis of acute decompensated heart failure (ADHF) in patients presenting with signs and symptoms of ADHF to the emergency department (ED). In addition, NT-proBNP of <300 pg/mL indicates ADHF is not likely.    Age Range Result Interpretation  NT-proBNP Concentration (pg/mL:      <50             Positive            >450                   Gray                 300-450                    Negative             <300    50-75           Positive            >900                  Gray                300-900                  Negative            <300      >75             Positive            >1800                  Gray                300-1800                  Negative            <300    CBC Auto Differential [[id number]]  (Abnormal) Collected: 06/14/24 1954    Specimen: Blood Updated: 06/14/24 2001     WBC 12.34 10*3/mm3      RBC 4.45 10*6/mm3      Hemoglobin 12.2 g/dL      Hematocrit 38.0 %      MCV 85.4 fL      MCH 27.4 pg      MCHC 32.1 g/dL      RDW  17.5 %      RDW-SD 54.1 fl      MPV 10.0 fL      Platelets 324 10*3/mm3      Neutrophil % 78.5 %      Lymphocyte % 12.0 %      Monocyte % 8.6 %      Eosinophil % 0.0 %      Basophil % 0.6 %      Immature Grans % 0.3 %      Neutrophils, Absolute 9.69 10*3/mm3      Lymphocytes, Absolute 1.48 10*3/mm3      Monocytes, Absolute 1.06 10*3/mm3      Eosinophils, Absolute 0.00 10*3/mm3      Basophils, Absolute 0.07 10*3/mm3      Immature Grans, Absolute 0.04 10*3/mm3      nRBC 0.0 /100 WBC     CBC & Differential [[id number]]  (Abnormal) Collected: 06/15/24 0402    Specimen: Blood Updated: 06/15/24 0421    Narrative:      The following orders were created for panel order CBC & Differential.  Procedure                               Abnormality         Status                     ---------                               -----------         ------                     CBC Auto Differential[[id number]]        Abnormal            Final result                 Please view results for these tests on the individual orders.    Comprehensive Metabolic Panel [[id number]]  (Abnormal) Collected: 06/15/24 0402    Specimen: Blood Updated: 06/15/24 0440     Glucose 89 mg/dL      BUN 27 mg/dL      Creatinine 1.34 mg/dL      Sodium 141 mmol/L      Potassium 3.3 mmol/L      Chloride 102 mmol/L      CO2 29.9 mmol/L      Calcium 8.4 mg/dL      Total Protein 5.9 g/dL      Albumin 3.3 g/dL      ALT (SGPT) 39 U/L      AST (SGOT) 23 U/L      Alkaline Phosphatase 249 U/L      Total Bilirubin 1.4 mg/dL      Globulin 2.6 gm/dL      A/G Ratio 1.3 g/dL      BUN/Creatinine Ratio 20.1     Anion Gap 9.1 mmol/L      eGFR 61.4 mL/min/1.73     Narrative:      GFR Normal >60  Chronic Kidney Disease <60  Kidney Failure <15      CBC Auto Differential [[id number]]  (Abnormal) Collected: 06/15/24 0402    Specimen: Blood Updated: 06/15/24 0421     WBC 9.46 10*3/mm3      RBC 4.58 10*6/mm3      Hemoglobin 12.4 g/dL      Hematocrit 39.9 %      MCV 87.1 fL      MCH 27.1 pg       MCHC 31.1 g/dL      RDW 17.6 %      RDW-SD 55.8 fl      MPV 10.3 fL      Platelets 280 10*3/mm3      Neutrophil % 75.8 %      Lymphocyte % 15.0 %      Monocyte % 7.9 %      Eosinophil % 0.4 %      Basophil % 0.6 %      Immature Grans % 0.3 %      Neutrophils, Absolute 7.16 10*3/mm3      Lymphocytes, Absolute 1.42 10*3/mm3      Monocytes, Absolute 0.75 10*3/mm3      Eosinophils, Absolute 0.04 10*3/mm3      Basophils, Absolute 0.06 10*3/mm3      Immature Grans, Absolute 0.03 10*3/mm3      nRBC 0.0 /100 WBC     Hemoglobin A1c [[id number]] Collected: 06/15/24 0402    Specimen: Blood Updated: 06/15/24 0415             Imaging:    XR Chest 1 View    Result Date: 6/14/2024  XR CHEST 1 VW Date of Exam: 6/14/2024 7:39 PM EDT Indication: soa Comparison: 5/20/2024 Findings: Cardiomediastinal silhouette is within normal limits. No focal opacity, pleural effusion or pneumothorax. No evidence of acute osseous abnormalities. Visualized upper abdomen is unremarkable. Degenerative change of the shoulders.     Impression: No radiographic evidence of acute cardiopulmonary process. Electronically Signed: Ryan Dejesus MD  6/14/2024 8:35 PM EDT  Workstation ID: [id number]    XR Elbow 3+ View Left    Result Date: 6/14/2024  XR ELBOW 3+ VW LEFT Date of Exam: 6/14/2024 5:28 PM EDT Indication: pain pain Comparison: None available. Findings: No fractures, dislocations or acute osseous abnormalities are identified in the left elbow. No evidence of significant joint effusion. There is an olecranon osteophyte. There is mild adjacent soft tissue swelling.     Impression: Left olecranon osteophyte with adjacent soft tissue swelling. Electronically Signed: Jerson Jane MD  6/14/2024 5:39 PM EDT  Workstation ID: [id number]       Differential Diagnosis and Discussion:    Dyspnea: Differential diagnosis includes but is not limited to metabolic acidosis, neurological disorders, psychogenic, asthma, pneumothorax, upper airway obstruction, COPD,  pneumonia, noncardiogenic pulmonary edema, interstitial lung disease, anemia, congestive heart failure, and pulmonary embolism  Sore Throat: Differential diagnosis includes but is not limited to bacterial infection, viral infection, inhaled irritants, sinus drainage, thyroiditis, epiglottitis, and retropharyngeal abscess.    All labs were reviewed and interpreted by me.  All X-rays impressions were independently interpreted by me.  EKG was interpreted by supervising attending.    MDM  Number of Diagnoses or Management Options  Acute on chronic congestive heart failure, unspecified heart failure type: established and worsening  Dyspnea on exertion: established and worsening  Pharyngitis, unspecified etiology: new and requires workup  Weakness generalized: established and worsening     Amount and/or Complexity of Data Reviewed  Clinical lab tests: reviewed  Tests in the radiology section of CPT®: reviewed  Tests in the medicine section of CPT®: reviewed  Decide to obtain previous medical records or to obtain history from someone other than the patient: yes    Risk of Complications, Morbidity, and/or Mortality  Presenting problems: low  Diagnostic procedures: low  Management options: low    Patient Progress  Patient progress: stable         Patient Care Considerations:    SEPSIS was considered but is NOT present in the emergency department as SIRS criteria is not present.      Consultants/Shared Management Plan:    Hospitalist: I have discussed the case with Dr. Acevedo who agrees to accept the patient for admission.    Social Determinants of Health:        Disposition and Care Coordination:    Admit:   Through independent evaluation of the patient's history, physical, and imperical data, the patient meets criteria for inpatient admission to the hospital.      Final diagnoses:   Acute on chronic congestive heart failure, unspecified heart failure type   Weakness generalized   Pharyngitis, unspecified etiology   Dyspnea  on exertion        ED Disposition       ED Disposition   Decision to Admit    Condition   --    Comment   Level of Care: Telemetry [5]   Diagnosis: Generalized weakness [826452]   Admitting Physician: LESLIE ACEVEDO [[id number]]   Attending Physician: LESLIE ACEVEDO [[id number]]   Certification: I Certify That Inpatient Hospital Services Are Medically Necessary For Greater Than 2 Midnights                 This medical record created using voice recognition software.             Lorraine Ritchie APRN  06/15/24 0620      Electronically signed by Lorraine Ritchie APRN at 06/15/24 0620       Current Facility-Administered Medications   Medication Dose Route Frequency Provider Last Rate Last Admin    acetaminophen (TYLENOL) tablet 650 mg  650 mg Oral Q4H PRN Leslie Acevedo MD        amiodarone (PACERONE) tablet 200 mg  200 mg Oral Daily Zackery Soni DO   200 mg at 06/15/24 0857    apixaban (ELIQUIS) tablet 5 mg  5 mg Oral BID Zackery Soni DO   5 mg at 06/15/24 0856    atorvastatin (LIPITOR) tablet 40 mg  40 mg Oral Nightly Zackery Soni DO        sennosides-docusate (PERICOLACE) 8.6-50 MG per tablet 2 tablet  2 tablet Oral BID PRN Leslie Acevedo MD        And    polyethylene glycol (MIRALAX) packet 17 g  17 g Oral Daily PRN Leslie Acevedo MD        And    bisacodyl (DULCOLAX) EC tablet 5 mg  5 mg Oral Daily PRN Leslie Acevedo MD        And    bisacodyl (DULCOLAX) suppository 10 mg  10 mg Rectal Daily PRN Leslie Acevedo MD        Calcium Replacement - Follow Nurse / BPA Driven Protocol   Does not apply PRN Leslie Acevedo MD        carvedilol (COREG) tablet 25 mg  25 mg Oral BID Zackery Soni DO   25 mg at 06/15/24 0856    dextrose (D50W) (25 g/50 mL) IV injection 25 g  25 g Intravenous Q15 Min PRN Leslie Acevedo MD        dextrose (GLUTOSE) oral gel 15 g  15 g Oral Q15 Min PRN Leslie Acevedo MD        empagliflozin (JARDIANCE) tablet 10 mg  10 mg Oral Daily Zackery Soni DO   10 mg at  06/15/24 0857    ferrous sulfate tablet 325 mg  325 mg Oral Daily With Breakfast Zackery Soni DO   325 mg at 06/15/24 0856    furosemide (LASIX) injection 40 mg  40 mg Intravenous Q12H PreetAudubon, PA   40 mg at 06/15/24 0857    glucagon (GLUCAGEN) injection 1 mg  1 mg Intramuscular Q15 Min PRN Leslie Acevedo MD        Insulin Lispro (humaLOG) injection 2-9 Units  2-9 Units Subcutaneous 4x Daily AC & at Bedtime Leslie Acevedo MD   2 Units at 06/15/24 0857    Magnesium Standard Dose Replacement - Follow Nurse / BPA Driven Protocol   Does not apply PRN Leslie Acevdeo MD        melatonin tablet 5 mg  5 mg Oral Nightly PRN Leslie Acevedo MD        mirtazapine (REMERON) tablet 15 mg  15 mg Oral Nightly Zackery Soni DO        ondansetron (ZOFRAN) injection 4 mg  4 mg Intravenous Q6H PRN Leslie Acevedo MD        pantoprazole (PROTONIX) EC tablet 40 mg  40 mg Oral Daily Zackery Soni DO   40 mg at 06/15/24 0857    Potassium Replacement - Follow Nurse / BPA Driven Protocol   Does not apply PRN Leslie Acevedo MD        sacubitril-valsartan (ENTRESTO) 49-51 MG tablet 1 tablet  1 tablet Oral QAM Zackery Soni DO   1 tablet at 06/15/24 1211    sacubitril-valsartan (ENTRESTO) 49-51 MG tablet 2 tablet  2 tablet Oral Nightly Zackery Soni DO        sodium chloride 0.9 % flush 10 mL  10 mL Intravenous PRN Lorraine Ritchie, DEV        sodium chloride 0.9 % flush 10 mL  10 mL Intravenous Q12H Leslie Acevedo MD   10 mL at 06/15/24 0858    sodium chloride 0.9 % flush 10 mL  10 mL Intravenous PRN Leslie Acevedo MD        sodium chloride 0.9 % infusion 40 mL  40 mL Intravenous PRN Leslie Acevedo MD        spironolactone (ALDACTONE) tablet 25 mg  25 mg Oral Daily Zackery Soni DO   25 mg at 06/15/24 0857    venlafaxine XR (EFFEXOR-XR) 24 hr capsule 37.5 mg  37.5 mg Oral Daily Zackery Soni DO   37.5 mg at 06/15/24 0856

## 2024-06-15 NOTE — ED NOTES
Pt was woke up from snoring by this RN to move rooms in the dept, pt able to ambulate self to wheelchair, pt placed in room 30 report given to ирина

## 2024-06-15 NOTE — H&P
[facility]   HISTORY AND PHYSICAL    Patient Name: Regulo Sepulveda  : 1965  MRN: [id number]  Primary Care Physician:  Dickson Landry MD  Date of admission: 2024    Subjective   Subjective     Chief Complaint: generalized weakness    History of Present Illness  The patient is a 58 y.o. year old with PMHx of HTN, CHF , COPD, DMII, Parkinson Disease, CAD, Bipolar disorder who presents to the emergency department for evaluation of sore throat and myalgias for the past 3 to 4 days.  Also having left elbow pain that he states he is never had before. The patient also states that he was just admitted here few weeks ago for CHF and has had increasingly had more shortness of breath recently.  He states that his feet have been swollen for the last several days.  He states that he has had increased shortness of breath with exertion.  He reports fevers at home.  He states that he has been compliant with his medications since discharge.  He denies any  chills, diaphoresis, chest pain, nausea, vomiting, dysuria, constipation or diarrhea.  In the ED he was given Tylenol, Lasix because of pain.  He was admitted for further evaluation and management.      Review of Systems   Constitutional:  Positive for fever. Negative for activity change, appetite change and chills.   HENT:  Negative for congestion, drooling, sinus pain and sneezing.    Eyes:  Negative for discharge.   Respiratory:  Positive for shortness of breath. Negative for cough and chest tightness.    Cardiovascular:  Negative for chest pain.   Gastrointestinal:  Negative for abdominal distention, abdominal pain, constipation, diarrhea and nausea.   Endocrine: Negative for polydipsia and polyphagia.   Genitourinary:  Negative for dysuria.   Musculoskeletal:  Positive for arthralgias and myalgias. Negative for joint swelling.   Neurological:  Negative for dizziness and light-headedness.   Psychiatric/Behavioral:  Negative for agitation and behavioral problems.          Personal History     Past Medical History:   Diagnosis Date    Anxiety     Asthma     Bipolar affective     Cardiac tamponade     2023    CHF (congestive heart failure)     COPD (chronic obstructive pulmonary disease)     Coronary artery disease     Depression     Diabetes mellitus     Elevated cholesterol     Hypertension     Parkinson disease        Past Surgical History:   Procedure Laterality Date    CARDIAC CATHETERIZATION Right 9/17/2023    Procedure: Right and Left Heart Cath;  Surgeon: Ben Grant MD;  Location: [facility] CATH INVASIVE LOCATION;  Service: Cardiovascular;  Laterality: Right;    TESTICLE SURGERY Left     extraction       Family History: family history includes Depression in his brother and sister; Diabetes in his mother; Insomnia in his sister; Restless legs syndrome in his sister; Sleep disorder in his sister; Sleep walking in his sister. Otherwise pertinent FHx was reviewed and not pertinent to current issue.    Social History:  reports that he has been smoking cigarettes. He started smoking about 50 years ago. He has a 25.1 pack-year smoking history. He has never used smokeless tobacco. He reports that he does not currently use alcohol. He reports that he does not currently use drugs after having used the following drugs: Methamphetamines and Marijuana.    Home Medications:  ARIPiprazole ER, albuterol sulfate HFA, amiodarone, apixaban, atorvastatin, carvedilol, dapagliflozin, ferrous gluconate, furosemide, metFORMIN, mirtazapine, pantoprazole, sacubitril-valsartan, spironolactone, and venlafaxine XR    Allergies:  Allergies   Allergen Reactions    Penicillins Shortness Of Breath       Objective    Objective     Vitals:   Temp:  [98.6 °F (37 °C)-98.9 °F (37.2 °C)] 98.6 °F (37 °C)  Heart Rate:  [] 77  Resp:  [18-20] 18  BP: (101-139)/(72-92) 101/76    Physical Exam  Constitutional:       General: He is not in acute distress.     Appearance: He is not ill-appearing.   HENT:       Head: Normocephalic and atraumatic.      Nose: No rhinorrhea.      Mouth/Throat:      Mouth: Mucous membranes are moist.      Pharynx: Oropharynx is clear.   Eyes:      Extraocular Movements: Extraocular movements intact.      Pupils: Pupils are equal, round, and reactive to light.   Cardiovascular:      Rate and Rhythm: Normal rate and regular rhythm.      Heart sounds: No murmur heard.  Pulmonary:      Effort: Pulmonary effort is normal. No respiratory distress.      Breath sounds: Normal breath sounds. No wheezing.      Comments: Decreased lung sounds her bilateral  Chest:      Chest wall: No tenderness.   Abdominal:      General: Abdomen is flat. Bowel sounds are normal. There is no distension.      Palpations: Abdomen is soft.      Tenderness: There is no abdominal tenderness. There is no guarding.   Musculoskeletal:         General: No swelling or tenderness.      Cervical back: Normal range of motion.   Skin:     General: Skin is warm and dry.   Neurological:      General: No focal deficit present.      Mental Status: He is alert and oriented to person, place, and time.   Psychiatric:         Mood and Affect: Mood normal.       Result Review    Result Review:  I have personally reviewed the results from the time of this admission to 6/15/2024 00:58 EDT and agree with these findings:  [x]  Laboratory list / accordion  []  Microbiology  [x]  Radiology  []  EKG/Telemetry   []  Cardiology/Vascular   []  Pathology  []  Old records  []  Other:  Most notable findings include      Assessment & Plan   Assessment / Plan     Brief Patient Summary:  Regulo Sepulveda is a 58 y.o. male     Active Hospital Problems:  Active Hospital Problems    Diagnosis     **Generalized weakness      Plan:   Generalized weakness:  Chest x-ray showed No radiographic evidence of acute cardiopulmonary process.   UA showed glucose otherwise unremarkable   Admit to inpatient telemetry  Cardiac diet  Continue IV fluids of normal saline at 100  mL/h  Empiric antibiotics  Consulted PT/OT for eval and treatment  Daily CBC and CMP, will continue to monitor  Will continue to monitor vitals    Hx of CHF:  BNP 45,413  Chest x-ray showed  Telemetry monitoring  Cardiac diet  Fluid restriction 1.5 L  Strict FARAZ's, daily weights  Ordered IV Lasix 40 mg twice daily  Ordered resp panel  Consider consulting cardiology if symptoms worsen  Daily CBC and CMP, will continue to monitor  Will continue to monitor vitals    Hx of HTN:   Will continue home medications    Hyperglycemia secondary to DMII:  Order insulin sliding scale  Order hypoglycemia protocol  Order A1c  Hold home medications for now  Consult nutritionist for diabetic education  Daily CMP, will continue to monitor    DVT prophylaxis: Lovenox  GI prophylaxis: Zofran, Protonix  CODE STATUS: CPR (full code)      VTE Prophylaxis:  Pharmacologic VTE prophylaxis orders are signed & held.          CODE STATUS:    Level Of Support Discussed With: Patient  Code Status (Patient has no pulse and is not breathing): CPR (Attempt to Resuscitate)  Medical Interventions (Patient has pulse or is breathing): Full Support    Admission Status:  I believe this patient meets CPR  status.    Leslie Acevedo MD

## 2024-06-16 LAB
ALBUMIN SERPL-MCNC: 3.3 G/DL (ref 3.5–5.2)
ALBUMIN/GLOB SERPL: 1 G/DL
ALP SERPL-CCNC: 264 U/L (ref 39–117)
ALT SERPL W P-5'-P-CCNC: 47 U/L (ref 1–41)
ANION GAP SERPL CALCULATED.3IONS-SCNC: 17.7 MMOL/L (ref 5–15)
AST SERPL-CCNC: 30 U/L (ref 1–40)
BACTERIA SPEC AEROBE CULT: NORMAL
BASOPHILS # BLD AUTO: 0.06 10*3/MM3 (ref 0–0.2)
BASOPHILS NFR BLD AUTO: 0.5 % (ref 0–1.5)
BILIRUB SERPL-MCNC: 0.9 MG/DL (ref 0–1.2)
BUN SERPL-MCNC: 32 MG/DL (ref 6–20)
BUN/CREAT SERPL: 28.1 (ref 7–25)
CALCIUM SPEC-SCNC: 8.8 MG/DL (ref 8.6–10.5)
CHLORIDE SERPL-SCNC: 99 MMOL/L (ref 98–107)
CO2 SERPL-SCNC: 29.3 MMOL/L (ref 22–29)
CREAT SERPL-MCNC: 1.14 MG/DL (ref 0.76–1.27)
DEPRECATED RDW RBC AUTO: 54.5 FL (ref 37–54)
EGFRCR SERPLBLD CKD-EPI 2021: 74.5 ML/MIN/1.73
EOSINOPHIL # BLD AUTO: 0.12 10*3/MM3 (ref 0–0.4)
EOSINOPHIL NFR BLD AUTO: 1 % (ref 0.3–6.2)
ERYTHROCYTE [DISTWIDTH] IN BLOOD BY AUTOMATED COUNT: 17.6 % (ref 12.3–15.4)
GLOBULIN UR ELPH-MCNC: 3.2 GM/DL
GLUCOSE BLDC GLUCOMTR-MCNC: 130 MG/DL (ref 70–99)
GLUCOSE BLDC GLUCOMTR-MCNC: 155 MG/DL (ref 70–99)
GLUCOSE BLDC GLUCOMTR-MCNC: 156 MG/DL (ref 70–99)
GLUCOSE BLDC GLUCOMTR-MCNC: 162 MG/DL (ref 70–99)
GLUCOSE SERPL-MCNC: 136 MG/DL (ref 65–99)
HCT VFR BLD AUTO: 44.6 % (ref 37.5–51)
HGB BLD-MCNC: 14 G/DL (ref 13–17.7)
IMM GRANULOCYTES # BLD AUTO: 0.04 10*3/MM3 (ref 0–0.05)
IMM GRANULOCYTES NFR BLD AUTO: 0.3 % (ref 0–0.5)
LYMPHOCYTES # BLD AUTO: 1 10*3/MM3 (ref 0.7–3.1)
LYMPHOCYTES NFR BLD AUTO: 8.7 % (ref 19.6–45.3)
MCH RBC QN AUTO: 27 PG (ref 26.6–33)
MCHC RBC AUTO-ENTMCNC: 31.4 G/DL (ref 31.5–35.7)
MCV RBC AUTO: 85.9 FL (ref 79–97)
MONOCYTES # BLD AUTO: 0.75 10*3/MM3 (ref 0.1–0.9)
MONOCYTES NFR BLD AUTO: 6.6 % (ref 5–12)
NEUTROPHILS NFR BLD AUTO: 82.9 % (ref 42.7–76)
NEUTROPHILS NFR BLD AUTO: 9.48 10*3/MM3 (ref 1.7–7)
NRBC BLD AUTO-RTO: 0 /100 WBC (ref 0–0.2)
PLATELET # BLD AUTO: 353 10*3/MM3 (ref 140–450)
PMV BLD AUTO: 10.2 FL (ref 6–12)
POTASSIUM SERPL-SCNC: 3.8 MMOL/L (ref 3.5–5.2)
PROT SERPL-MCNC: 6.5 G/DL (ref 6–8.5)
RBC # BLD AUTO: 5.19 10*6/MM3 (ref 4.14–5.8)
SODIUM SERPL-SCNC: 146 MMOL/L (ref 136–145)
WBC NRBC COR # BLD AUTO: 11.45 10*3/MM3 (ref 3.4–10.8)

## 2024-06-16 PROCEDURE — 82948 REAGENT STRIP/BLOOD GLUCOSE: CPT

## 2024-06-16 PROCEDURE — 99232 SBSQ HOSP IP/OBS MODERATE 35: CPT | Performed by: INTERNAL MEDICINE

## 2024-06-16 PROCEDURE — 25010000002 FUROSEMIDE PER 20 MG: Performed by: PHYSICIAN ASSISTANT

## 2024-06-16 PROCEDURE — 25010000002 MORPHINE PER 10 MG: Performed by: INTERNAL MEDICINE

## 2024-06-16 PROCEDURE — 85025 COMPLETE CBC W/AUTO DIFF WBC: CPT | Performed by: FAMILY MEDICINE

## 2024-06-16 PROCEDURE — 80053 COMPREHEN METABOLIC PANEL: CPT | Performed by: FAMILY MEDICINE

## 2024-06-16 PROCEDURE — 82948 REAGENT STRIP/BLOOD GLUCOSE: CPT | Performed by: FAMILY MEDICINE

## 2024-06-16 PROCEDURE — 25010000002 ONDANSETRON PER 1 MG: Performed by: FAMILY MEDICINE

## 2024-06-16 PROCEDURE — 25010000002 PROCHLORPERAZINE 10 MG/2ML SOLUTION: Performed by: INTERNAL MEDICINE

## 2024-06-16 RX ORDER — PROCHLORPERAZINE EDISYLATE 5 MG/ML
5 INJECTION INTRAMUSCULAR; INTRAVENOUS EVERY 6 HOURS PRN
Status: DISCONTINUED | OUTPATIENT
Start: 2024-06-16 | End: 2024-06-25 | Stop reason: HOSPADM

## 2024-06-16 RX ORDER — MORPHINE SULFATE 2 MG/ML
2 INJECTION, SOLUTION INTRAMUSCULAR; INTRAVENOUS ONCE
Status: COMPLETED | OUTPATIENT
Start: 2024-06-16 | End: 2024-06-16

## 2024-06-16 RX ADMIN — MORPHINE SULFATE 2 MG: 2 INJECTION, SOLUTION INTRAMUSCULAR; INTRAVENOUS at 12:36

## 2024-06-16 RX ADMIN — Medication 10 ML: at 08:42

## 2024-06-16 RX ADMIN — SACUBITRIL AND VALSARTAN 1 TABLET: 49; 51 TABLET, FILM COATED ORAL at 06:15

## 2024-06-16 RX ADMIN — ONDANSETRON 4 MG: 2 INJECTION INTRAMUSCULAR; INTRAVENOUS at 12:36

## 2024-06-16 RX ADMIN — FUROSEMIDE 40 MG: 10 INJECTION, SOLUTION INTRAMUSCULAR; INTRAVENOUS at 08:35

## 2024-06-16 RX ADMIN — PROCHLORPERAZINE EDISYLATE 5 MG: 5 INJECTION INTRAMUSCULAR; INTRAVENOUS at 18:31

## 2024-06-16 RX ADMIN — FUROSEMIDE 40 MG: 10 INJECTION, SOLUTION INTRAMUSCULAR; INTRAVENOUS at 00:08

## 2024-06-16 RX ADMIN — ONDANSETRON 4 MG: 2 INJECTION INTRAMUSCULAR; INTRAVENOUS at 06:10

## 2024-06-16 RX ADMIN — SACUBITRIL AND VALSARTAN 2 TABLET: 49; 51 TABLET, FILM COATED ORAL at 00:07

## 2024-06-16 RX ADMIN — PROCHLORPERAZINE EDISYLATE 5 MG: 5 INJECTION INTRAMUSCULAR; INTRAVENOUS at 08:35

## 2024-06-16 NOTE — PROGRESS NOTES
[facility]   Hospitalist Progress Note  Date: 2024  Patient Name: Regulo Sepulveda  : 1965  MRN: [id number]  Date of admission: 2024  Room/Bed: [facility]/      Subjective   Subjective     Chief Complaint: weakness, short of air     Summary:Regulo Sepulveda is a 58 y.o. male with PMHx of HTN, CHF , COPD, DMII, Parkinson Disease, CAD, Bipolar disorder who presents to the emergency department for evaluation of sore throat and myalgias for the past 3 to 4 days. Also having left elbow pain that he states he is never had before. The patient also states that he was just admitted here few weeks ago for CHF and has had increasingly had more shortness of breath recently. He states that his feet have been swollen for the last several days. He states that he has had increased shortness of breath with exertion. He reports fevers at home. He states that he has been compliant with his medications since discharge. He denies any chills, diaphoresis, chest pain, nausea, vomiting, dysuria, constipation or diarrhea. In the ED he was given Tylenol, Lasix because of pain. He was admitted for further evaluation and management.     Interval Followup:   Patient has some nausea today and refused to have his morning meds   Left elbow pain  Continues to have rash on his feet and some on the buttock   Vitals are stable     Review of Systems    All systems reviewed and negative except for what is outlined above.      Objective   Objective     Vitals:   Temp:  [97.3 °F (36.3 °C)-97.9 °F (36.6 °C)] 97.9 °F (36.6 °C)  Heart Rate:  [54-81] 73  Resp:  [16-24] 16  BP: ()/(64-97) 124/91    Physical Exam   General: Awake, alert, NAD sleeping in bed   HENT: NCAT, MMM  Eyes: pupils equal, no scleral icterus  Cardiovascular: RRR, no murmurs   Pulmonary: CTA bilaterally; no wheezes; no conversational dyspnea  Gastrointestinal: S/ND/NT, +BS  Musculoskeletal: No gross deformities  Skin: bilateral feet have red vascular colored rash. Some edema  in feet   Neuro: CN II through XII grossly intact; speech clear; no tremor  Psych: Mood and affect appropriate  : No Poon catheter; no suprapubic tenderness    Result Review    Result Review:  I have personally reviewed these results:  [x]  Laboratory      Lab 06/16/24  0432 06/15/24  0402 06/14/24  1954   WBC 11.45* 9.46 12.34*   HEMOGLOBIN 14.0 12.4* 12.2*   HEMATOCRIT 44.6 39.9 38.0   PLATELETS 353 280 324   NEUTROS ABS 9.48* 7.16* 9.69*   IMMATURE GRANS (ABS) 0.04 0.03 0.04   LYMPHS ABS 1.00 1.42 1.48   MONOS ABS 0.75 0.75 1.06*   EOS ABS 0.12 0.04 0.00   MCV 85.9 87.1 85.4         Lab 06/16/24  0432 06/15/24  1525 06/15/24  0402 06/14/24  1954   SODIUM 146*  --  141 141   POTASSIUM 3.8 4.2 3.3* 3.7   CHLORIDE 99  --  102 104   CO2 29.3*  --  29.9* 24.1   ANION GAP 17.7*  --  9.1 12.9   BUN 32*  --  27* 29*   CREATININE 1.14  --  1.34* 1.30*   EGFR 74.5  --  61.4 63.7   GLUCOSE 136*  --  89 103*   CALCIUM 8.8  --  8.4* 8.7   HEMOGLOBIN A1C  --   --  6.40*  --          Lab 06/16/24  0432 06/15/24  0402 06/14/24  1954   TOTAL PROTEIN 6.5 5.9* 6.3   ALBUMIN 3.3* 3.3* 3.6   GLOBULIN 3.2 2.6 2.7   ALT (SGPT) 47* 39 42*   AST (SGOT) 30 23 26   BILIRUBIN 0.9 1.4* 1.2   ALK PHOS 264* 249* 270*         Lab 06/14/24 1954   PROBNP 45,413.0*                 Brief Urine Lab Results  (Last result in the past 365 days)        Color   Clarity   Blood   Leuk Est   Nitrite   Protein   CREAT   Urine HCG        06/14/24 1945 Yellow   Clear   Moderate (2+)   Negative   Negative   30 mg/dL (1+)                 [x]  Microbiology   Microbiology Results (last 10 days)       Procedure Component Value - Date/Time    Rapid Strep A Screen - Swab, Throat [[id number]]  (Normal) Collected: 06/14/24 1724    Lab Status: Final result Specimen: Swab from Throat Updated: 06/14/24 1746     Strep A Ag Negative    COVID-19, FLU A/B, RSV PCR 1 HR TAT - Swab, Nasopharynx [[id number]]  (Normal) Collected: 06/14/24 1724    Lab Status: Final result  Specimen: Swab from Nasopharynx Updated: 06/14/24 1808     COVID19 Not Detected     Influenza A PCR Not Detected     Influenza B PCR Not Detected     RSV, PCR Not Detected    Narrative:      Fact sheet for providers: https://www.fda.gov/media/636983/download    Fact sheet for patients: https://www.fda.gov/media/975901/download    Test performed by PCR.    Beta Strep Culture, Throat - Swab, Throat [[id number]]  (Normal) Collected: 06/14/24 1724    Lab Status: Preliminary result Specimen: Swab from Throat Updated: 06/15/24 1157     Throat Culture, Beta Strep No Beta Hemolytic Streptococcus Isolated    Narrative:      Group A Strep incidence is low in adults. Positive culture for Beta hemolytic Streptococcus species can reflect colonization and not true infection. Please correlate clinically.          [x]  Radiology  XR Chest 1 View    Result Date: 6/14/2024  Impression: No radiographic evidence of acute cardiopulmonary process. Electronically Signed: Ryan Dejesus MD  6/14/2024 8:35 PM EDT  Workstation ID: [id number]    XR Elbow 3+ View Left    Result Date: 6/14/2024  Impression: Left olecranon osteophyte with adjacent soft tissue swelling. Electronically Signed: Jerson Jane MD  6/14/2024 5:39 PM EDT  Workstation ID: [id number]   []  EKG/Telemetry   []  Cardiology/Vascular   []  Pathology  []  Old records  []  Other:    Assessment & Plan   Assessment / Plan     Assessment:  Acute systolic heart failure exacerbation  Hypertension  Rash on bilateral lower feet  History of hepatitis C, treated  Paroxysmal atrial fibrillation on Eliquis  History of apical thrombus on Eliquis  COPD  Diabetes mellitus type 2  Hypertension  Hyperlipidemia  Schizophrenia    Plan:  Patient remains admitted to telemetry  Continue patient on IV Lasix daily.  Continue with fluid restriction and cardiac diet  Resume home medication  Complement level is fine. ANCA and DARIEN pending   Left elbow x-ray reviewed.  Showing some soft tissue swelling and  olecranon osteophyte  Continue patient on sliding scale insulin for diabetes  For patient's schizophrenia he takes injectable medicine once per month  Continue patient on Eliquis for his history of apical thrombus and atrial fibrillation  Repeat labs in am        Discussed with RN.    VTE Prophylaxis:  Pharmacologic VTE prophylaxis orders are present.        CODE STATUS:   Level Of Support Discussed With: Patient  Code Status (Patient has no pulse and is not breathing): CPR (Attempt to Resuscitate)  Medical Interventions (Patient has pulse or is breathing): Full Support      Electronically signed by Zackery Soni DO, 6/16/2024, 08:21 EDT.

## 2024-06-16 NOTE — PLAN OF CARE
Goal Outcome Evaluation:  Plan of Care Reviewed With: patient        Progress: improving  Outcome Evaluation: Pt rested well overnight. Pt's VSS. Pt has had no significant reports of pain so far overnight. Pt has had good urinary output this shift. Pt's blood glucose monitored as ordered. Pt alert and able to make needs known. Call light in reach and bed in lowest locked position. Pt has had no additional complaints so far.

## 2024-06-16 NOTE — PLAN OF CARE
Goal Outcome Evaluation:           Progress: no change  Outcome Evaluation: VSS. Pt had complaints of severe abdominal pain controlled with a dose a pain meds. Pt has been nauseous through shift, antiemetics given. Has declined all medications except prns.         Lizzette Buchanan RN

## 2024-06-17 ENCOUNTER — APPOINTMENT (OUTPATIENT)
Dept: CARDIOLOGY | Facility: HOSPITAL | Age: 59
End: 2024-06-17
Payer: COMMERCIAL

## 2024-06-17 ENCOUNTER — APPOINTMENT (OUTPATIENT)
Dept: GENERAL RADIOLOGY | Facility: HOSPITAL | Age: 59
End: 2024-06-17
Payer: COMMERCIAL

## 2024-06-17 LAB
ANION GAP SERPL CALCULATED.3IONS-SCNC: 21.8 MMOL/L (ref 5–15)
BUN SERPL-MCNC: 33 MG/DL (ref 6–20)
BUN/CREAT SERPL: 28.4 (ref 7–25)
CALCIUM SPEC-SCNC: 8.7 MG/DL (ref 8.6–10.5)
CHLORIDE SERPL-SCNC: 93 MMOL/L (ref 98–107)
CO2 SERPL-SCNC: 28.2 MMOL/L (ref 22–29)
CREAT SERPL-MCNC: 1.16 MG/DL (ref 0.76–1.27)
DEPRECATED RDW RBC AUTO: 52.9 FL (ref 37–54)
EGFRCR SERPLBLD CKD-EPI 2021: 73 ML/MIN/1.73
ERYTHROCYTE [DISTWIDTH] IN BLOOD BY AUTOMATED COUNT: 18.5 % (ref 12.3–15.4)
GLUCOSE BLDC GLUCOMTR-MCNC: 116 MG/DL (ref 70–99)
GLUCOSE BLDC GLUCOMTR-MCNC: 154 MG/DL (ref 70–99)
GLUCOSE BLDC GLUCOMTR-MCNC: 179 MG/DL (ref 70–99)
GLUCOSE BLDC GLUCOMTR-MCNC: 185 MG/DL (ref 70–99)
GLUCOSE SERPL-MCNC: 141 MG/DL (ref 65–99)
HCT VFR BLD AUTO: 52.5 % (ref 37.5–51)
HGB BLD-MCNC: 16.8 G/DL (ref 13–17.7)
MAGNESIUM SERPL-MCNC: 1.7 MG/DL (ref 1.6–2.6)
MCH RBC QN AUTO: 27.1 PG (ref 26.6–33)
MCHC RBC AUTO-ENTMCNC: 32 G/DL (ref 31.5–35.7)
MCV RBC AUTO: 84.5 FL (ref 79–97)
PLATELET # BLD AUTO: 449 10*3/MM3 (ref 140–450)
PMV BLD AUTO: 10.2 FL (ref 6–12)
POTASSIUM SERPL-SCNC: 4.4 MMOL/L (ref 3.5–5.2)
RBC # BLD AUTO: 6.21 10*6/MM3 (ref 4.14–5.8)
S PYO AG THROAT QL: NEGATIVE
SODIUM SERPL-SCNC: 143 MMOL/L (ref 136–145)
WBC NRBC COR # BLD AUTO: 10.38 10*3/MM3 (ref 3.4–10.8)

## 2024-06-17 PROCEDURE — 87081 CULTURE SCREEN ONLY: CPT | Performed by: PHYSICIAN ASSISTANT

## 2024-06-17 PROCEDURE — 99232 SBSQ HOSP IP/OBS MODERATE 35: CPT | Performed by: INTERNAL MEDICINE

## 2024-06-17 PROCEDURE — 82948 REAGENT STRIP/BLOOD GLUCOSE: CPT

## 2024-06-17 PROCEDURE — 93306 TTE W/DOPPLER COMPLETE: CPT | Performed by: INTERNAL MEDICINE

## 2024-06-17 PROCEDURE — 83735 ASSAY OF MAGNESIUM: CPT | Performed by: INTERNAL MEDICINE

## 2024-06-17 PROCEDURE — 97161 PT EVAL LOW COMPLEX 20 MIN: CPT

## 2024-06-17 PROCEDURE — 25010000002 FUROSEMIDE PER 20 MG: Performed by: PHYSICIAN ASSISTANT

## 2024-06-17 PROCEDURE — 87880 STREP A ASSAY W/OPTIC: CPT | Performed by: PHYSICIAN ASSISTANT

## 2024-06-17 PROCEDURE — 80048 BASIC METABOLIC PNL TOTAL CA: CPT | Performed by: INTERNAL MEDICINE

## 2024-06-17 PROCEDURE — 25010000002 SULFUR HEXAFLUORIDE MICROSPH 60.7-25 MG RECONSTITUTED SUSPENSION: Performed by: INTERNAL MEDICINE

## 2024-06-17 PROCEDURE — 82948 REAGENT STRIP/BLOOD GLUCOSE: CPT | Performed by: FAMILY MEDICINE

## 2024-06-17 PROCEDURE — 25010000002 MORPHINE PER 10 MG: Performed by: STUDENT IN AN ORGANIZED HEALTH CARE EDUCATION/TRAINING PROGRAM

## 2024-06-17 PROCEDURE — 85027 COMPLETE CBC AUTOMATED: CPT | Performed by: INTERNAL MEDICINE

## 2024-06-17 PROCEDURE — 93306 TTE W/DOPPLER COMPLETE: CPT

## 2024-06-17 PROCEDURE — 25010000002 ONDANSETRON PER 1 MG: Performed by: FAMILY MEDICINE

## 2024-06-17 PROCEDURE — 73080 X-RAY EXAM OF ELBOW: CPT

## 2024-06-17 PROCEDURE — 63710000001 INSULIN LISPRO (HUMAN) PER 5 UNITS: Performed by: FAMILY MEDICINE

## 2024-06-17 RX ORDER — MORPHINE SULFATE 2 MG/ML
2 INJECTION, SOLUTION INTRAMUSCULAR; INTRAVENOUS ONCE
Status: COMPLETED | OUTPATIENT
Start: 2024-06-17 | End: 2024-06-17

## 2024-06-17 RX ORDER — HYDROCODONE BITARTRATE AND ACETAMINOPHEN 5; 325 MG/1; MG/1
1 TABLET ORAL EVERY 6 HOURS PRN
Status: DISPENSED | OUTPATIENT
Start: 2024-06-17 | End: 2024-06-22

## 2024-06-17 RX ADMIN — MORPHINE SULFATE 2 MG: 2 INJECTION, SOLUTION INTRAMUSCULAR; INTRAVENOUS at 03:19

## 2024-06-17 RX ADMIN — ONDANSETRON 4 MG: 2 INJECTION INTRAMUSCULAR; INTRAVENOUS at 21:24

## 2024-06-17 RX ADMIN — SPIRONOLACTONE 25 MG: 25 TABLET ORAL at 08:28

## 2024-06-17 RX ADMIN — SULFUR HEXAFLUORIDE 5 ML: KIT at 16:38

## 2024-06-17 RX ADMIN — CARVEDILOL 25 MG: 25 TABLET, FILM COATED ORAL at 08:28

## 2024-06-17 RX ADMIN — HYDROCODONE BITARTRATE AND ACETAMINOPHEN 1 TABLET: 5; 325 TABLET ORAL at 21:23

## 2024-06-17 RX ADMIN — SACUBITRIL AND VALSARTAN 2 TABLET: 49; 51 TABLET, FILM COATED ORAL at 21:23

## 2024-06-17 RX ADMIN — HYDROCODONE BITARTRATE AND ACETAMINOPHEN 1 TABLET: 5; 325 TABLET ORAL at 09:29

## 2024-06-17 RX ADMIN — INSULIN LISPRO 2 UNITS: 100 INJECTION, SOLUTION INTRAVENOUS; SUBCUTANEOUS at 17:21

## 2024-06-17 RX ADMIN — Medication 5 MG: at 21:23

## 2024-06-17 RX ADMIN — INSULIN LISPRO 2 UNITS: 100 INJECTION, SOLUTION INTRAVENOUS; SUBCUTANEOUS at 08:29

## 2024-06-17 RX ADMIN — APIXABAN 5 MG: 5 TABLET, FILM COATED ORAL at 21:23

## 2024-06-17 RX ADMIN — HYDROCODONE BITARTRATE AND ACETAMINOPHEN 1 TABLET: 5; 325 TABLET ORAL at 15:31

## 2024-06-17 RX ADMIN — PANTOPRAZOLE SODIUM 40 MG: 40 TABLET, DELAYED RELEASE ORAL at 08:27

## 2024-06-17 RX ADMIN — APIXABAN 5 MG: 5 TABLET, FILM COATED ORAL at 08:27

## 2024-06-17 RX ADMIN — FUROSEMIDE 40 MG: 10 INJECTION, SOLUTION INTRAMUSCULAR; INTRAVENOUS at 10:55

## 2024-06-17 RX ADMIN — CARVEDILOL 25 MG: 25 TABLET, FILM COATED ORAL at 21:23

## 2024-06-17 RX ADMIN — Medication 10 ML: at 08:30

## 2024-06-17 RX ADMIN — EMPAGLIFLOZIN 10 MG: 10 TABLET, FILM COATED ORAL at 08:28

## 2024-06-17 RX ADMIN — FERROUS SULFATE TAB 325 MG (65 MG ELEMENTAL FE) 325 MG: 325 (65 FE) TAB at 08:27

## 2024-06-17 RX ADMIN — MIRTAZAPINE 15 MG: 15 TABLET, FILM COATED ORAL at 21:23

## 2024-06-17 RX ADMIN — SACUBITRIL AND VALSARTAN 1 TABLET: 49; 51 TABLET, FILM COATED ORAL at 07:38

## 2024-06-17 RX ADMIN — ACETAMINOPHEN 650 MG: 325 TABLET ORAL at 02:34

## 2024-06-17 RX ADMIN — AMIODARONE HYDROCHLORIDE 200 MG: 200 TABLET ORAL at 08:27

## 2024-06-17 RX ADMIN — Medication 10 ML: at 21:24

## 2024-06-17 NOTE — THERAPY EVALUATION
"Acute Care - Physical Therapy Initial Evaluation   [place]     Patient Name: Regulo Sepulveda  : 1965  MRN: [id number]  Today's Date: 2024      Visit Dx:     ICD-10-CM ICD-9-CM   1. Acute on chronic congestive heart failure, unspecified heart failure type  I50.9 428.0   2. Weakness generalized  R53.1 780.79   3. Pharyngitis, unspecified etiology  J02.9 462   4. Dyspnea on exertion  R06.09 786.09   5. Difficulty walking  R26.2 719.7     Patient Active Problem List   Diagnosis    Major depressive disorder, recurrent episode, moderate    Chronic obstructive pulmonary disease    Diabetes mellitus    Primary hypertension    Hyperlipidemia    Hepatitis C    Cigarette nicotine dependence    BPH (benign prostatic hyperplasia)    GERD (gastroesophageal reflux disease)    B12 deficiency    LV (left ventricular) mural thrombus    Schizophrenia    PAF (paroxysmal atrial fibrillation)    Chronic HFrEF (heart failure with reduced ejection fraction)    Moderate malnutrition    Cardiomyopathy    TIA (transient ischemic attack)    Alcohol abuse    Bipolar 1 disorder    Chronic paranoid schizophrenia    Chronic post-traumatic stress disorder    Inhalant abuse    Type 2 diabetes mellitus without complication    Vitamin D deficiency    Acute exacerbation of CHF (congestive heart failure)    Generalized weakness     Past Medical History:   Diagnosis Date    Anxiety     Arthritis     Asthma     Bipolar affective     Cardiac tamponade         CHF (congestive heart failure)     COPD (chronic obstructive pulmonary disease)     Coronary artery disease     Depression     Diabetes mellitus     Elevated cholesterol     Hypertension     Parkinson disease     Sleep apnea     Stroke     Pt stated it was \"about 2 months ago\" as of 6/15/24     Past Surgical History:   Procedure Laterality Date    CARDIAC CATHETERIZATION Right 2023    Procedure: Right and Left Heart Cath;  Surgeon: Ben Grant MD;  Location: [facility] CATH INVASIVE " LOCATION;  Service: Cardiovascular;  Laterality: Right;    ENDOSCOPY      TESTICLE SURGERY Left     extraction     PT Assessment (Last 12 Hours)       PT Evaluation and Treatment       Row Name 06/17/24 1300          Physical Therapy Time and Intention    Document Type evaluation  -AV     Mode of Treatment individual therapy;physical therapy  -AV       Row Name 06/17/24 1300          General Information    Patient Profile Reviewed yes  -AV     Prior Level of Function independent:;all household mobility;gait;transfer;ADL's  Ambulated without an assistive device. No home O2.  -AV     Equipment Currently Used at Home none  -AV     Existing Precautions/Restrictions fall  -AV       Row Name 06/17/24 1300          Living Environment    Current Living Arrangements home  -AV     Home Accessibility stairs to enter home  -AV     People in Home alone  -AV       Row Name 06/17/24 1300          Home Main Entrance    Number of Stairs, Main Entrance seven;eight  -AV     Stair Railings, Main Entrance railings on both sides of stairs  -AV       Row Name 06/17/24 1300          Range of Motion (ROM)    Range of Motion bilateral lower extremities;ROM is WFL  -AV       Row Name 06/17/24 1300          Strength (Manual Muscle Testing)    Strength (Manual Muscle Testing) --  Patient declined formaly testing but was noted to be moving BLE against gravity in supine  -AV       Row Name 06/17/24 1300          Bed Mobility    Comment, (Bed Mobility) Patient completed rolling and repositioning with supervision. Declined further transfers out of bed.  -AV       Row Name 06/17/24 1300          Safety Issues, Functional Mobility    Impairments Affecting Function (Mobility) pain;balance;endurance/activity tolerance  -AV       Row Name 06/17/24 1300          Balance    Comment, Balance Unable to assess, likely impaired  -AV       Row Name 06/17/24 1300          Plan of Care Review    Plan of Care Reviewed With patient  -AV     Progress no change  -AV      Outcome Evaluation Patient presents with deficits in balance, endurance, transfers, and ambulation. Patient will benefit from skilled PT services to address these mobility deficits and decrease risk of falls.  -AV       Row Name 06/17/24 1300          Therapy Assessment/Plan (PT)    Rehab Potential (PT) good, to achieve stated therapy goals  -AV     Criteria for Skilled Interventions Met (PT) yes;meets criteria  -AV     Therapy Frequency (PT) daily  -AV     Predicted Duration of Therapy Intervention (PT) 10 days  -AV     Problem List (PT) problems related to;balance;mobility;strength  -AV     Activity Limitations Related to Problem List (PT) unable to transfer safely;unable to ambulate safely  -AV       Row Name 06/17/24 1300          PT Evaluation Complexity    History, PT Evaluation Complexity 1-2 personal factors and/or comorbidities  -AV     Examination of Body Systems (PT Eval Complexity) total of 4 or more elements  -AV     Clinical Presentation (PT Evaluation Complexity) stable  -AV     Clinical Decision Making (PT Evaluation Complexity) low complexity  -AV     Overall Complexity (PT Evaluation Complexity) low complexity  -AV       Row Name 06/17/24 1300          Therapy Plan Review/Discharge Plan (PT)    Therapy Plan Review (PT) evaluation/treatment results reviewed;patient  -AV       Row Name 06/17/24 1300          Physical Therapy Goals    Transfer Goal Selection (PT) transfer, PT goal 1  -AV     Gait Training Goal Selection (PT) gait training, PT goal 1  -AV       Row Name 06/17/24 1300          Transfer Goal 1 (PT)    Activity/Assistive Device (Transfer Goal 1, PT) transfers, all  -AV     [place] Level/Cues Needed (Transfer Goal 1, PT) independent  -AV     Time Frame (Transfer Goal 1, PT) 10 days  -AV       Row Name 06/17/24 1300          Gait Training Goal 1 (PT)    Activity/Assistive Device (Gait Training Goal 1, PT) gait (walking locomotion)  -AV     [place] Level (Gait Training Goal 1,  PT) independent  -AV     Distance (Gait Training Goal 1, PT) 200  -AV     Time Frame (Gait Training Goal 1, PT) 10 days  -AV               User Key  (r) = Recorded By, (t) = Taken By, (c) = Cosigned By      Initials Name Provider Type    AV Daniel Frank, PT Physical Therapist                    Physical Therapy Education       Title: PT OT SLP Therapies (In Progress)       Topic: Physical Therapy (In Progress)       Point: Mobility training (Done)       Learning Progress Summary             Patient Acceptance, E,TB, VU by AV at 6/17/2024 1344                         Point: Home exercise program (Not Started)       Learner Progress:  Not documented in this visit.              Point: Body mechanics (Done)       Learning Progress Summary             Patient Acceptance, E,TB, VU by AV at 6/17/2024 1344                         Point: Precautions (Done)       Learning Progress Summary             Patient Acceptance, E,TB, VU by AV at 6/17/2024 1344                                         User Key       Initials Effective Dates Name Provider Type Discipline    AV 06/11/21 -  Daniel Frank, PT Physical Therapist PT                  PT Recommendation and Plan  Anticipated Discharge Disposition (PT): home with home health  Planned Therapy Interventions (PT): balance training, bed mobility training, gait training, home exercise program, neuromuscular re-education, strengthening, transfer training  Therapy Frequency (PT): daily  Plan of Care Reviewed With: patient  Progress: no change  Outcome Evaluation: Patient presents with deficits in balance, endurance, transfers, and ambulation. Patient will benefit from skilled PT services to address these mobility deficits and decrease risk of falls.   Outcome Measures       Row Name 06/17/24 1300             How much help from another person do you currently need...    Turning from your back to your side while in flat bed without using bedrails? 4  -AV      Moving from lying  on back to sitting on the side of a flat bed without bedrails? 4  -AV      Moving to and from a bed to a chair (including a wheelchair)? 3  -AV      Standing up from a chair using your arms (e.g., wheelchair, bedside chair)? 3  -AV      Climbing 3-5 steps with a railing? 3  -AV      To walk in hospital room? 3  -AV      AM-PAC 6 Clicks Score (PT) 20  -AV      Highest Level of Mobility Goal 6 --> Walk 10 steps or more  -AV         Functional Assessment    Outcome Measure Options AM-PAC 6 Clicks Basic Mobility (PT)  -AV                User Key  (r) = Recorded By, (t) = Taken By, (c) = Cosigned By      Initials Name Provider Type    AV Daniel Frank, PT Physical Therapist                     Time Calculation:    PT Charges       Row Name 06/17/24 1344             Time Calculation    PT Received On 06/17/24  -AV      PT Goal Re-Cert Due Date 06/26/24  -AV         Untimed Charges    PT Eval/Re-eval Minutes 25  -AV         Total Minutes    Untimed Charges Total Minutes 25  -AV       Total Minutes 25  -AV                User Key  (r) = Recorded By, (t) = Taken By, (c) = Cosigned By      Initials Name Provider Type    AV Daniel Frank, PT Physical Therapist                  Therapy Charges for Today       Code Description Service Date Service Provider Modifiers Qty    09901156966 HC PT EVAL LOW COMPLEXITY 2 6/17/2024 Daniel Frank, PT GP 1            PT G-Codes  Outcome Measure Options: AM-PAC 6 Clicks Basic Mobility (PT)  AM-PAC 6 Clicks Score (PT): 20    Daniel Frank PT  6/17/2024

## 2024-06-17 NOTE — SIGNIFICANT NOTE
06/17/24 1009   OTHER   Discipline occupational therapist   Rehab Time/Intention   Session Not Performed patient/family declined evaluation  (Pt declined d/t pain)

## 2024-06-17 NOTE — PLAN OF CARE
Goal Outcome Evaluation:                 Problem: Adult Inpatient Plan of Care  Goal: Plan of Care Review  Outcome: Ongoing, Progressing  Goal: Patient-Specific Goal (Individualized)  Outcome: Ongoing, Progressing  Goal: Absence of Hospital-Acquired Illness or Injury  Outcome: Ongoing, Progressing  Intervention: Identify and Manage Fall Risk  Recent Flowsheet Documentation  Taken 6/17/2024 0830 by Connie Gan RN  Safety Promotion/Fall Prevention:   activity supervised   lighting adjusted   nonskid shoes/slippers when out of bed   room organization consistent   safety round/check completed   toileting scheduled  Intervention: Prevent Skin Injury  Recent Flowsheet Documentation  Taken 6/17/2024 0830 by Connie Gan RN  Body Position:   turned   left  Intervention: Prevent and Manage VTE (Venous Thromboembolism) Risk  Recent Flowsheet Documentation  Taken 6/17/2024 0830 by Connie Gan RN  Activity Management: sitting, edge of bed  Goal: Optimal Comfort and Wellbeing  Outcome: Ongoing, Progressing  Intervention: Monitor Pain and Promote Comfort  Recent Flowsheet Documentation  Taken 6/17/2024 0929 by Connie Gan RN  Pain Management Interventions: see MAR  Taken 6/17/2024 0830 by Connie Gan RN  Pain Management Interventions: position adjusted  Goal: Readiness for Transition of Care  Outcome: Ongoing, Progressing     Problem: Skin Injury Risk Increased  Goal: Skin Health and Integrity  Outcome: Ongoing, Progressing  Intervention: Optimize Skin Protection  Recent Flowsheet Documentation  Taken 6/17/2024 0830 by Connie Gan RN  Head of Bed (HOB) Positioning: HOB at 30-45 degrees     Problem: Asthma Comorbidity  Goal: Maintenance of Asthma Control  Outcome: Ongoing, Progressing     Problem: COPD (Chronic Obstructive Pulmonary Disease) Comorbidity  Goal: Maintenance of COPD Symptom Control  Outcome: Ongoing, Progressing     Problem: Diabetes Comorbidity  Goal: Blood Glucose Level Within Targeted  "Range  Outcome: Ongoing, Progressing     Problem: Heart Failure Comorbidity  Goal: Maintenance of Heart Failure Symptom Control  Outcome: Ongoing, Progressing     Problem: Hypertension Comorbidity  Goal: Blood Pressure in Desired Range  Outcome: Ongoing, Progressing     Problem: Obstructive Sleep Apnea Risk or Actual Comorbidity Management  Goal: Unobstructed Breathing During Sleep  Outcome: Ongoing, Progressing     Problem: Fall Injury Risk  Goal: Absence of Fall and Fall-Related Injury  Outcome: Ongoing, Progressing  Intervention: Promote Injury-Free Environment  Recent Flowsheet Documentation  Taken 6/17/2024 4788 by Connie Gan RN  Safety Promotion/Fall Prevention:   activity supervised   lighting adjusted   nonskid shoes/slippers when out of bed   room organization consistent   safety round/check completed   toileting scheduled            Patient requested stronger pain medicine for the pain in his r elbow. MD notified. MD ordered [place]. MD said she will order torodol IV to help with swelling. Patient was compliant with medications until he refused 2 units of insulin with his lunch. Family at bedside. Family requested update on patient. Patient stated \"you can tell them whatever you want, I just forget this stuff.\" Family updated with permission from patient.                       "

## 2024-06-17 NOTE — PROGRESS NOTES
[facility]   Hospitalist Progress Note  Date: 2024  Patient Name: Regulo Sepulveda  : 1965  MRN: [id number]  Date of admission: 2024  Room/Bed: [facility]/1      Subjective   Subjective     Chief Complaint: weakness, short of air     Summary:Regulo Sepulveda is a 58 y.o. male with PMHx of HTN, CHF , COPD, DMII, Parkinson Disease, CAD, Bipolar disorder who presents to the emergency department for evaluation of sore throat and myalgias for the past 3 to 4 days. Also having left elbow pain that he states he is never had before. The patient also states that he was just admitted here few weeks ago for CHF and has had increasingly had more shortness of breath recently. He states that his feet have been swollen for the last several days. He states that he has had increased shortness of breath with exertion. He reports fevers at home. He states that he has been compliant with his medications since discharge. He denies any chills, diaphoresis, chest pain, nausea, vomiting, dysuria, constipation or diarrhea. In the ED he was given Tylenol, Lasix because of pain. He was admitted for further evaluation and management.     Interval Followup:     Left elbow pain and x-ray shows bursitis.  Patient requested some oral pain medication  Patient is taking his home medication.  Previously refused due to nausea  Continues to have rash on his feet and some on the buttock   Vitals are stable     Review of Systems    All systems reviewed and negative except for what is outlined above.      Objective   Objective     Vitals:   Temp:  [97.2 °F (36.2 °C)-97.8 °F (36.6 °C)] 97.2 °F (36.2 °C)  Heart Rate:  [73-88] 79  Resp:  [14-18] 18  BP: (117-149)/() 149/106    Physical Exam   General: Awake, alert, NAD sleeping in bed   HENT: NCAT, MMM  Eyes: pupils equal, no scleral icterus  Cardiovascular: RRR, no murmurs   Pulmonary: CTA bilaterally; no wheezes; no conversational dyspnea  Gastrointestinal: S/ND/NT, +BS  Musculoskeletal: No  gross deformities.  Left elbow tenderness to palpation and swelling  Skin: bilateral feet have red vascular colored rash. Some edema in feet   Neuro: CN II through XII grossly intact; speech clear; no tremor  Psych: Mood and affect appropriate  : No Poon catheter; no suprapubic tenderness    Result Review    Result Review:  I have personally reviewed these results:  [x]  Laboratory      Lab 06/17/24  0436 06/16/24  0432 06/15/24  0402 06/14/24  1954   WBC 10.38 11.45* 9.46 12.34*   HEMOGLOBIN 16.8 14.0 12.4* 12.2*   HEMATOCRIT 52.5* 44.6 39.9 38.0   PLATELETS 449 353 280 324   NEUTROS ABS  --  9.48* 7.16* 9.69*   IMMATURE GRANS (ABS)  --  0.04 0.03 0.04   LYMPHS ABS  --  1.00 1.42 1.48   MONOS ABS  --  0.75 0.75 1.06*   EOS ABS  --  0.12 0.04 0.00   MCV 84.5 85.9 87.1 85.4         Lab 06/17/24  0436 06/16/24 0432 06/15/24  1525 06/15/24  0402   SODIUM 143 146*  --  141   POTASSIUM 4.4 3.8 4.2 3.3*   CHLORIDE 93* 99  --  102   CO2 28.2 29.3*  --  29.9*   ANION GAP 21.8* 17.7*  --  9.1   BUN 33* 32*  --  27*   CREATININE 1.16 1.14  --  1.34*   EGFR 73.0 74.5  --  61.4   GLUCOSE 141* 136*  --  89   CALCIUM 8.7 8.8  --  8.4*   MAGNESIUM 1.7  --   --   --    HEMOGLOBIN A1C  --   --   --  6.40*         Lab 06/16/24  0432 06/15/24  0402 06/14/24  1954   TOTAL PROTEIN 6.5 5.9* 6.3   ALBUMIN 3.3* 3.3* 3.6   GLOBULIN 3.2 2.6 2.7   ALT (SGPT) 47* 39 42*   AST (SGOT) 30 23 26   BILIRUBIN 0.9 1.4* 1.2   ALK PHOS 264* 249* 270*         Lab 06/14/24 1954   PROBNP 45,413.0*                 Brief Urine Lab Results  (Last result in the past 365 days)        Color   Clarity   Blood   Leuk Est   Nitrite   Protein   CREAT   Urine HCG        06/14/24 1945 Yellow   Clear   Moderate (2+)   Negative   Negative   30 mg/dL (1+)                 [x]  Microbiology   Microbiology Results (last 10 days)       Procedure Component Value - Date/Time    Rapid Strep A Screen - Swab, Throat [[id number]]  (Normal) Collected: 06/17/24 0548    Lab  Status: Final result Specimen: Swab from Throat Updated: 06/17/24 0640     Strep A Ag Negative    Rapid Strep A Screen - Swab, Throat [[id number]]  (Normal) Collected: 06/14/24 1724    Lab Status: Final result Specimen: Swab from Throat Updated: 06/14/24 1746     Strep A Ag Negative    COVID-19, FLU A/B, RSV PCR 1 HR TAT - Swab, Nasopharynx [[id number]]  (Normal) Collected: 06/14/24 1724    Lab Status: Final result Specimen: Swab from Nasopharynx Updated: 06/14/24 1808     COVID19 Not Detected     Influenza A PCR Not Detected     Influenza B PCR Not Detected     RSV, PCR Not Detected    Narrative:      Fact sheet for providers: https://www.fda.gov/media/328648/download    Fact sheet for patients: https://www.fda.gov/media/030460/download    Test performed by PCR.    Beta Strep Culture, Throat - Swab, Throat [[id number]]  (Normal) Collected: 06/14/24 1724    Lab Status: Final result Specimen: Swab from Throat Updated: 06/16/24 0944     Throat Culture, Beta Strep No Beta Hemolytic Streptococcus Isolated    Narrative:      Group A Strep incidence is low in adults. Positive culture for Beta hemolytic Streptococcus species can reflect colonization and not true infection. Please correlate clinically.          [x]  Radiology  XR Elbow 3+ View Right    Result Date: 6/17/2024  1. There is possible right-sided olecranon bursitis. 2. A large right elbow joint effusion is seen. 3. Mild enthesopathic changes are present. 4. There may be minimal degenerative change. 5. No retained radiopaque foreign body. 6. No subcutaneous emphysema. 7. No convincing radiographic evidence for osteomyelitis. Please note that portions of this note were completed with a voice recognition program. Electronically Signed: Angel Snell MD  6/17/2024 5:02 AM EDT  Workstation ID: [id number]    XR Chest 1 View    Result Date: 6/14/2024  Impression: No radiographic evidence of acute cardiopulmonary process. Electronically Signed: Ryan Dejesus MD   6/14/2024 8:35 PM EDT  Workstation ID: [id number]    XR Elbow 3+ View Left    Result Date: 6/14/2024  Impression: Left olecranon osteophyte with adjacent soft tissue swelling. Electronically Signed: Jerson Jane MD  6/14/2024 5:39 PM EDT  Workstation ID: [id number]   []  EKG/Telemetry   []  Cardiology/Vascular   []  Pathology  []  Old records  []  Other:    Assessment & Plan   Assessment / Plan     Assessment:  Acute systolic heart failure exacerbation  Hypertension  Rash on bilateral lower feet  Left elbow bursitis  History of hepatitis C, treated  Paroxysmal atrial fibrillation on Eliquis  History of apical thrombus on Eliquis  COPD  Diabetes mellitus type 2  Hypertension  Hyperlipidemia  Schizophrenia    Plan:  Patient remains admitted to telemetry  Continue patient on IV Lasix daily.  Continue with fluid restriction and cardiac diet  Resume home medication  Complement level is fine. ANCA and DARIEN pending   Left elbow x-ray reviewed.  Showing some edema as well as concern for bursitis.  Considered anti-inflammatories however patient is on Eliquis.  For now continue pain control with oral Norco  Continue patient on sliding scale insulin for diabetes  For patient's schizophrenia he takes injectable medicine once per month  Continue patient on Eliquis for his history of apical thrombus and atrial fibrillation. Can repeat echocardiogram  Repeat labs in am        Discussed with RN.    VTE Prophylaxis:  Pharmacologic VTE prophylaxis orders are present.        CODE STATUS:   Level Of Support Discussed With: Patient  Code Status (Patient has no pulse and is not breathing): CPR (Attempt to Resuscitate)  Medical Interventions (Patient has pulse or is breathing): Full Support      Electronically signed by Zackery Soni DO, 6/17/2024, 08:04 EDT.

## 2024-06-17 NOTE — SIGNIFICANT NOTE
06/17/24 1440   Plan   Plan CCM, RN met with patient, ex-girlfriend and daughter, to introduce self and assess possible discharge needs.  Patient is agreeable for family members to be present during assessment.  CCM also contacted sister Aurora, with patients approval, to complete assessment.  Patient lives with a roommate, Yun, that lives in one end of the trailer and patient lives in the other.  Aurora reports she and a friend fill the patients medication planner, weekly, due to patient being unable to read or write.  Aurora is not sure if patient takes his medications as ordered but they are in the home.  Aurora provides transportation to appointments and support for patient.  Reports patient is having financial trouble affording food and housing but she assist with needs until patient is able to sell his property.  Patient does receive disability check.  Patient is able to provide own ADL's.  PCP and facesheet verified. Sister will provide transportation home with patient is discharged.  Community Resource Guide left in patients  room for sister to see if patient mets assistance.  Sister reports patient does not drink alcohol or drugs that she is aware.  CCM will follow for possible discharge needs.  Plans are for patient to return home at discharge.

## 2024-06-18 ENCOUNTER — APPOINTMENT (OUTPATIENT)
Dept: CT IMAGING | Facility: HOSPITAL | Age: 59
End: 2024-06-18
Payer: COMMERCIAL

## 2024-06-18 LAB
ALBUMIN SERPL-MCNC: 3 G/DL (ref 3.5–5.2)
ALBUMIN/GLOB SERPL: 1.1 G/DL
ALP SERPL-CCNC: 215 U/L (ref 39–117)
ALT SERPL W P-5'-P-CCNC: 28 U/L (ref 1–41)
ANA SER QL: NEGATIVE
ANION GAP SERPL CALCULATED.3IONS-SCNC: 13.5 MMOL/L (ref 5–15)
ANION GAP SERPL CALCULATED.3IONS-SCNC: 15.2 MMOL/L (ref 5–15)
AST SERPL-CCNC: 20 U/L (ref 1–40)
BASOPHILS # BLD AUTO: 0.03 10*3/MM3 (ref 0–0.2)
BASOPHILS NFR BLD AUTO: 0.2 % (ref 0–1.5)
BH CV ECHO MEAS - AO ROOT DIAM: 3 CM
BH CV ECHO MEAS - EDV(CUBED): 177.5 ML
BH CV ECHO MEAS - EDV(MOD-SP2): 143 ML
BH CV ECHO MEAS - EDV(MOD-SP4): 169 ML
BH CV ECHO MEAS - EF(MOD-BP): 25.8 %
BH CV ECHO MEAS - EF(MOD-SP2): 22.4 %
BH CV ECHO MEAS - EF(MOD-SP4): 26.6 %
BH CV ECHO MEAS - ESV(CUBED): 122 ML
BH CV ECHO MEAS - ESV(MOD-SP2): 111 ML
BH CV ECHO MEAS - ESV(MOD-SP4): 124 ML
BH CV ECHO MEAS - FS: 11.7 %
BH CV ECHO MEAS - IVS/LVPW: 0.91 CM
BH CV ECHO MEAS - IVSD: 1.15 CM
BH CV ECHO MEAS - LA DIMENSION: 3.1 CM
BH CV ECHO MEAS - LAT PEAK E' VEL: 4.2 CM/SEC
BH CV ECHO MEAS - LV MASS(C)D: 285.3 GRAMS
BH CV ECHO MEAS - LVIDD: 5.6 CM
BH CV ECHO MEAS - LVIDS: 5 CM
BH CV ECHO MEAS - LVOT AREA: 3.1 CM2
BH CV ECHO MEAS - LVOT DIAM: 2 CM
BH CV ECHO MEAS - LVPWD: 1.27 CM
BH CV ECHO MEAS - MED PEAK E' VEL: 5 CM/SEC
BH CV ECHO MEAS - MV A MAX VEL: 32.6 CM/SEC
BH CV ECHO MEAS - MV DEC SLOPE: 119 CM/SEC2
BH CV ECHO MEAS - MV DEC TIME: 0.32 SEC
BH CV ECHO MEAS - MV E MAX VEL: 37.7 CM/SEC
BH CV ECHO MEAS - MV E/A: 1.16
BH CV ECHO MEAS - RVDD: 2.46 CM
BH CV ECHO MEAS - SV(MOD-SP2): 32 ML
BH CV ECHO MEAS - SV(MOD-SP4): 45 ML
BH CV ECHO MEASUREMENTS AVERAGE E/E' RATIO: 8.2
BILIRUB SERPL-MCNC: 0.6 MG/DL (ref 0–1.2)
BUN SERPL-MCNC: 43 MG/DL (ref 6–20)
BUN SERPL-MCNC: 65 MG/DL (ref 6–20)
BUN/CREAT SERPL: 28.1 (ref 7–25)
BUN/CREAT SERPL: 38.2 (ref 7–25)
C-ANCA TITR SER IF: NORMAL TITER
CALCIUM SPEC-SCNC: 8.5 MG/DL (ref 8.6–10.5)
CALCIUM SPEC-SCNC: 9.2 MG/DL (ref 8.6–10.5)
CHLORIDE SERPL-SCNC: 96 MMOL/L (ref 98–107)
CHLORIDE SERPL-SCNC: 99 MMOL/L (ref 98–107)
CO2 SERPL-SCNC: 24.8 MMOL/L (ref 22–29)
CO2 SERPL-SCNC: 30.5 MMOL/L (ref 22–29)
CREAT SERPL-MCNC: 1.53 MG/DL (ref 0.76–1.27)
CREAT SERPL-MCNC: 1.7 MG/DL (ref 0.76–1.27)
CRP SERPL-MCNC: 9.86 MG/DL (ref 0–0.5)
D-LACTATE SERPL-SCNC: 2 MMOL/L (ref 0.5–2)
DEPRECATED RDW RBC AUTO: 52.9 FL (ref 37–54)
DEPRECATED RDW RBC AUTO: 54.3 FL (ref 37–54)
EGFRCR SERPLBLD CKD-EPI 2021: 46.2 ML/MIN/1.73
EGFRCR SERPLBLD CKD-EPI 2021: 52.4 ML/MIN/1.73
EOSINOPHIL # BLD AUTO: 0.01 10*3/MM3 (ref 0–0.4)
EOSINOPHIL NFR BLD AUTO: 0.1 % (ref 0.3–6.2)
ERYTHROCYTE [DISTWIDTH] IN BLOOD BY AUTOMATED COUNT: 18.3 % (ref 12.3–15.4)
ERYTHROCYTE [DISTWIDTH] IN BLOOD BY AUTOMATED COUNT: 19.1 % (ref 12.3–15.4)
ERYTHROCYTE [SEDIMENTATION RATE] IN BLOOD: 23 MM/HR (ref 0–20)
GLOBULIN UR ELPH-MCNC: 2.8 GM/DL
GLUCOSE BLDC GLUCOMTR-MCNC: 135 MG/DL (ref 70–99)
GLUCOSE BLDC GLUCOMTR-MCNC: 158 MG/DL (ref 70–99)
GLUCOSE BLDC GLUCOMTR-MCNC: 159 MG/DL (ref 70–99)
GLUCOSE SERPL-MCNC: 131 MG/DL (ref 65–99)
GLUCOSE SERPL-MCNC: 163 MG/DL (ref 65–99)
HCT VFR BLD AUTO: 49.6 % (ref 37.5–51)
HCT VFR BLD AUTO: 57.2 % (ref 37.5–51)
HGB BLD-MCNC: 15.9 G/DL (ref 13–17.7)
HGB BLD-MCNC: 18 G/DL (ref 13–17.7)
IMM GRANULOCYTES # BLD AUTO: 0.05 10*3/MM3 (ref 0–0.05)
IMM GRANULOCYTES NFR BLD AUTO: 0.3 % (ref 0–0.5)
LYMPHOCYTES # BLD AUTO: 1.39 10*3/MM3 (ref 0.7–3.1)
LYMPHOCYTES NFR BLD AUTO: 9 % (ref 19.6–45.3)
MAGNESIUM SERPL-MCNC: 1.6 MG/DL (ref 1.6–2.6)
MCH RBC QN AUTO: 26.9 PG (ref 26.6–33)
MCH RBC QN AUTO: 27.1 PG (ref 26.6–33)
MCHC RBC AUTO-ENTMCNC: 31.5 G/DL (ref 31.5–35.7)
MCHC RBC AUTO-ENTMCNC: 32.1 G/DL (ref 31.5–35.7)
MCV RBC AUTO: 84.6 FL (ref 79–97)
MCV RBC AUTO: 85.4 FL (ref 79–97)
MONOCYTES # BLD AUTO: 1.63 10*3/MM3 (ref 0.1–0.9)
MONOCYTES NFR BLD AUTO: 10.6 % (ref 5–12)
MRSA DNA SPEC QL NAA+PROBE: NORMAL
MYELOPEROXIDASE AB SER IA-ACNC: <0.2 UNITS (ref 0–0.9)
NEUTROPHILS NFR BLD AUTO: 12.3 10*3/MM3 (ref 1.7–7)
NEUTROPHILS NFR BLD AUTO: 79.8 % (ref 42.7–76)
NRBC BLD AUTO-RTO: 0 /100 WBC (ref 0–0.2)
NT-PROBNP SERPL-MCNC: 4831 PG/ML (ref 0–900)
P-ANCA ATYPICAL TITR SER IF: NORMAL TITER
P-ANCA TITR SER IF: NORMAL TITER
PLATELET # BLD AUTO: 447 10*3/MM3 (ref 140–450)
PLATELET # BLD AUTO: 506 10*3/MM3 (ref 140–450)
PMV BLD AUTO: 10 FL (ref 6–12)
PMV BLD AUTO: 10 FL (ref 6–12)
POTASSIUM SERPL-SCNC: 4.2 MMOL/L (ref 3.5–5.2)
POTASSIUM SERPL-SCNC: 4.4 MMOL/L (ref 3.5–5.2)
PROCALCITONIN SERPL-MCNC: 0.16 NG/ML (ref 0–0.25)
PROCALCITONIN SERPL-MCNC: 0.3 NG/ML (ref 0–0.25)
PROT SERPL-MCNC: 5.8 G/DL (ref 6–8.5)
PROTEINASE3 AB SER IA-ACNC: <0.2 UNITS (ref 0–0.9)
RBC # BLD AUTO: 5.86 10*6/MM3 (ref 4.14–5.8)
RBC # BLD AUTO: 6.7 10*6/MM3 (ref 4.14–5.8)
SODIUM SERPL-SCNC: 139 MMOL/L (ref 136–145)
SODIUM SERPL-SCNC: 140 MMOL/L (ref 136–145)
URATE SERPL-MCNC: 6.7 MG/DL (ref 3.4–7)
WBC NRBC COR # BLD AUTO: 15.41 10*3/MM3 (ref 3.4–10.8)
WBC NRBC COR # BLD AUTO: 15.95 10*3/MM3 (ref 3.4–10.8)

## 2024-06-18 PROCEDURE — 87040 BLOOD CULTURE FOR BACTERIA: CPT | Performed by: INTERNAL MEDICINE

## 2024-06-18 PROCEDURE — 70450 CT HEAD/BRAIN W/O DYE: CPT

## 2024-06-18 PROCEDURE — 25010000002 ONDANSETRON PER 1 MG: Performed by: FAMILY MEDICINE

## 2024-06-18 PROCEDURE — 25810000003 SODIUM CHLORIDE 0.9 % SOLUTION: Performed by: PHYSICIAN ASSISTANT

## 2024-06-18 PROCEDURE — 84145 PROCALCITONIN (PCT): CPT | Performed by: INTERNAL MEDICINE

## 2024-06-18 PROCEDURE — 25810000003 SODIUM CHLORIDE 0.9 % SOLUTION: Performed by: INTERNAL MEDICINE

## 2024-06-18 PROCEDURE — 25010000002 FUROSEMIDE PER 20 MG: Performed by: PHYSICIAN ASSISTANT

## 2024-06-18 PROCEDURE — 82948 REAGENT STRIP/BLOOD GLUCOSE: CPT

## 2024-06-18 PROCEDURE — 87641 MR-STAPH DNA AMP PROBE: CPT | Performed by: INTERNAL MEDICINE

## 2024-06-18 PROCEDURE — 85027 COMPLETE CBC AUTOMATED: CPT | Performed by: INTERNAL MEDICINE

## 2024-06-18 PROCEDURE — 85652 RBC SED RATE AUTOMATED: CPT | Performed by: INTERNAL MEDICINE

## 2024-06-18 PROCEDURE — 86140 C-REACTIVE PROTEIN: CPT | Performed by: INTERNAL MEDICINE

## 2024-06-18 PROCEDURE — 25010000002 ALBUMIN HUMAN 25% PER 50 ML: Performed by: PHYSICIAN ASSISTANT

## 2024-06-18 PROCEDURE — 82948 REAGENT STRIP/BLOOD GLUCOSE: CPT | Performed by: FAMILY MEDICINE

## 2024-06-18 PROCEDURE — 85025 COMPLETE CBC W/AUTO DIFF WBC: CPT | Performed by: ORTHOPAEDIC SURGERY

## 2024-06-18 PROCEDURE — 83735 ASSAY OF MAGNESIUM: CPT | Performed by: INTERNAL MEDICINE

## 2024-06-18 PROCEDURE — 84550 ASSAY OF BLOOD/URIC ACID: CPT | Performed by: INTERNAL MEDICINE

## 2024-06-18 PROCEDURE — 97165 OT EVAL LOW COMPLEX 30 MIN: CPT

## 2024-06-18 PROCEDURE — 83880 ASSAY OF NATRIURETIC PEPTIDE: CPT | Performed by: INTERNAL MEDICINE

## 2024-06-18 PROCEDURE — 83605 ASSAY OF LACTIC ACID: CPT | Performed by: INTERNAL MEDICINE

## 2024-06-18 PROCEDURE — 25010000002 VANCOMYCIN 5 G RECONSTITUTED SOLUTION: Performed by: INTERNAL MEDICINE

## 2024-06-18 PROCEDURE — 80053 COMPREHEN METABOLIC PANEL: CPT | Performed by: INTERNAL MEDICINE

## 2024-06-18 PROCEDURE — P9047 ALBUMIN (HUMAN), 25%, 50ML: HCPCS | Performed by: PHYSICIAN ASSISTANT

## 2024-06-18 PROCEDURE — 99233 SBSQ HOSP IP/OBS HIGH 50: CPT | Performed by: INTERNAL MEDICINE

## 2024-06-18 PROCEDURE — 84145 PROCALCITONIN (PCT): CPT | Performed by: ORTHOPAEDIC SURGERY

## 2024-06-18 PROCEDURE — 63710000001 INSULIN LISPRO (HUMAN) PER 5 UNITS: Performed by: FAMILY MEDICINE

## 2024-06-18 RX ORDER — DIPHENHYDRAMINE HYDROCHLORIDE 50 MG/ML
25 INJECTION INTRAMUSCULAR; INTRAVENOUS EVERY 6 HOURS PRN
Status: DISCONTINUED | OUTPATIENT
Start: 2024-06-18 | End: 2024-06-25 | Stop reason: HOSPADM

## 2024-06-18 RX ORDER — CARVEDILOL 12.5 MG/1
12.5 TABLET ORAL 2 TIMES DAILY
Status: DISCONTINUED | OUTPATIENT
Start: 2024-06-18 | End: 2024-06-19

## 2024-06-18 RX ORDER — ALBUMIN (HUMAN) 12.5 G/50ML
25 SOLUTION INTRAVENOUS ONCE
Status: COMPLETED | OUTPATIENT
Start: 2024-06-18 | End: 2024-06-18

## 2024-06-18 RX ADMIN — VENLAFAXINE HYDROCHLORIDE 37.5 MG: 37.5 CAPSULE, EXTENDED RELEASE ORAL at 10:09

## 2024-06-18 RX ADMIN — ATORVASTATIN CALCIUM 40 MG: 40 TABLET, FILM COATED ORAL at 20:35

## 2024-06-18 RX ADMIN — ALBUMIN (HUMAN) 25 G: 0.25 INJECTION, SOLUTION INTRAVENOUS at 21:32

## 2024-06-18 RX ADMIN — AMIODARONE HYDROCHLORIDE 200 MG: 200 TABLET ORAL at 10:09

## 2024-06-18 RX ADMIN — VANCOMYCIN HYDROCHLORIDE 1250 MG: 5 INJECTION, POWDER, LYOPHILIZED, FOR SOLUTION INTRAVENOUS at 12:42

## 2024-06-18 RX ADMIN — INSULIN LISPRO 2 UNITS: 100 INJECTION, SOLUTION INTRAVENOUS; SUBCUTANEOUS at 17:30

## 2024-06-18 RX ADMIN — FERROUS SULFATE TAB 325 MG (65 MG ELEMENTAL FE) 325 MG: 325 (65 FE) TAB at 09:41

## 2024-06-18 RX ADMIN — ONDANSETRON 4 MG: 2 INJECTION INTRAMUSCULAR; INTRAVENOUS at 07:57

## 2024-06-18 RX ADMIN — APIXABAN 5 MG: 5 TABLET, FILM COATED ORAL at 20:35

## 2024-06-18 RX ADMIN — Medication 10 ML: at 08:00

## 2024-06-18 RX ADMIN — APIXABAN 5 MG: 5 TABLET, FILM COATED ORAL at 09:41

## 2024-06-18 RX ADMIN — PANTOPRAZOLE SODIUM 40 MG: 40 TABLET, DELAYED RELEASE ORAL at 09:41

## 2024-06-18 RX ADMIN — MIRTAZAPINE 15 MG: 15 TABLET, FILM COATED ORAL at 20:35

## 2024-06-18 RX ADMIN — EMPAGLIFLOZIN 10 MG: 10 TABLET, FILM COATED ORAL at 09:40

## 2024-06-18 RX ADMIN — INSULIN LISPRO 2 UNITS: 100 INJECTION, SOLUTION INTRAVENOUS; SUBCUTANEOUS at 08:15

## 2024-06-18 RX ADMIN — HYDROCODONE BITARTRATE AND ACETAMINOPHEN 1 TABLET: 5; 325 TABLET ORAL at 08:15

## 2024-06-18 RX ADMIN — FUROSEMIDE 40 MG: 10 INJECTION, SOLUTION INTRAMUSCULAR; INTRAVENOUS at 10:09

## 2024-06-18 RX ADMIN — SODIUM CHLORIDE 250 ML: 9 INJECTION, SOLUTION INTRAVENOUS at 20:36

## 2024-06-18 NOTE — PROGRESS NOTES
[facility]   Hospitalist Progress Note  Date: 2024  Patient Name: Regulo Sepulveda  : 1965  MRN: [id number]  Date of admission: 2024  Room/Bed: [facility]/      Subjective   Subjective     Chief Complaint: weakness, short of air     Summary:Regulo Sepulveda is a 58 y.o. male with PMHx of HTN, CHF , COPD, DMII, Parkinson Disease, CAD, Bipolar disorder who presents to the emergency department for evaluation of sore throat and myalgias for the past 3 to 4 days. Also having left elbow pain that he states he is never had before. The patient also states that he was just admitted here few weeks ago for CHF and has had increasingly had more shortness of breath recently. He states that his feet have been swollen for the last several days. He states that he has had increased shortness of breath with exertion. He reports fevers at home. He states that he has been compliant with his medications since discharge. He denies any chills, diaphoresis, chest pain, nausea, vomiting, dysuria, constipation or diarrhea. In the ED he was given Tylenol, Lasix because of pain. He was admitted for further evaluation and management.   2D echo with EF of 21%.  Cardiology consulted.  Ortho consulted for right elbow effusion and bursitis.  Patient denies any trauma or bug bite there.    Interval Followup:   Oral temp was low but rectal temp is 98.1  Remains on 2 L.  Patient denies home oxygen use.  Patient confused oriented to  place.  Awake but has very weak voice  Blood pressure soft.  Heart failure medicines needed to be held  Left elbow pain continues x-ray shows bursitis and effusion.    CT head negative for acute finding  Creatinine trending up  Continues to have rash on his feet and some on the buttock       Review of Systems    All systems reviewed and negative except for what is outlined above.      Objective   Objective     Vitals:   Temp:  [92.3 °F (33.5 °C)-98.6 °F (37 °C)] 97.3 °F (36.3 °C)  Resp:  [18-20] 20  BP:  ()/(72-91) 103/77  Flow (L/min):  [2] 2    Physical Exam   General: Awake but lethargic, NAD sleeping in bed   HENT: NCAT, MMM  Eyes: pupils equal, no scleral icterus  Cardiovascular: RRR, no murmurs   Pulmonary: CTA bilaterally; no wheezes; no conversational dyspnea  Gastrointestinal: S/ND/NT, +BS  Musculoskeletal: No gross deformities.  Right elbow tenderness to palpation, has swelling with restricted end flexion of right elbow  Skin: bilateral feet have fading red vascular colored rash.  Negative ankle edema.  Neuro: Oriented x 1, CN II through XII grossly intact; voice very weak, speech not clear; no tremor  Psych: Mood and affect appropriate  : No Poon catheter; no suprapubic tenderness    Result Review    Result Review:  I have personally reviewed these results:  [x]  Laboratory      Lab 06/18/24  1421 06/18/24  1211 06/18/24  0532 06/17/24  0436 06/16/24  0432 06/15/24  0402   WBC 15.41*  --  15.95* 10.38 11.45* 9.46   HEMOGLOBIN 15.9  --  18.0* 16.8 14.0 12.4*   HEMATOCRIT 49.6  --  57.2* 52.5* 44.6 39.9   PLATELETS 447  --  506* 449 353 280   NEUTROS ABS 12.30*  --   --   --  9.48* 7.16*   IMMATURE GRANS (ABS) 0.05  --   --   --  0.04 0.03   LYMPHS ABS 1.39  --   --   --  1.00 1.42   MONOS ABS 1.63*  --   --   --  0.75 0.75   EOS ABS 0.01  --   --   --  0.12 0.04   MCV 84.6  --  85.4 84.5 85.9 87.1   SED RATE  --   --  23*  --   --   --    CRP  --   --  9.86*  --   --   --    PROCALCITONIN 0.30*  --  0.16  --   --   --    LACTATE  --  2.0  --   --   --   --          Lab 06/18/24  1421 06/18/24  0532 06/17/24  0436 06/15/24  1525 06/15/24  0402   SODIUM 139 140 143   < > 141   POTASSIUM 4.4 4.2 4.4   < > 3.3*   CHLORIDE 99 96* 93*   < > 102   CO2 24.8 30.5* 28.2   < > 29.9*   ANION GAP 15.2* 13.5 21.8*   < > 9.1   BUN 65* 43* 33*   < > 27*   CREATININE 1.70* 1.53* 1.16   < > 1.34*   EGFR 46.2* 52.4* 73.0   < > 61.4   GLUCOSE 131* 163* 141*   < > 89   CALCIUM 8.5* 9.2 8.7   < > 8.4*   MAGNESIUM  --   1.6 1.7  --   --    HEMOGLOBIN A1C  --   --   --   --  6.40*    < > = values in this interval not displayed.         Lab 06/18/24  1421 06/16/24  0432 06/15/24  0402   TOTAL PROTEIN 5.8* 6.5 5.9*   ALBUMIN 3.0* 3.3* 3.3*   GLOBULIN 2.8 3.2 2.6   ALT (SGPT) 28 47* 39   AST (SGOT) 20 30 23   BILIRUBIN 0.6 0.9 1.4*   ALK PHOS 215* 264* 249*         Lab 06/18/24  0532 06/14/24  1954   PROBNP 4,831.0* 45,413.0*                 Brief Urine Lab Results  (Last result in the past 365 days)        Color   Clarity   Blood   Leuk Est   Nitrite   Protein   CREAT   Urine HCG        06/14/24 1945 Yellow   Clear   Moderate (2+)   Negative   Negative   30 mg/dL (1+)                 [x]  Microbiology   Microbiology Results (last 10 days)       Procedure Component Value - Date/Time    MRSA Screen, PCR (Inpatient) - Swab, Nares [[id number]]  (Normal) Collected: 06/18/24 1119    Lab Status: Final result Specimen: Swab from Nares Updated: 06/18/24 1253     MRSA PCR No MRSA Detected    Narrative:      The negative predictive value of this diagnostic test is high and should only be used to consider de-escalating anti-MRSA therapy. A positive result may indicate colonization with MRSA and must be correlated clinically.    Rapid Strep A Screen - Swab, Throat [[id number]]  (Normal) Collected: 06/17/24 0548    Lab Status: Final result Specimen: Swab from Throat Updated: 06/17/24 0640     Strep A Ag Negative    Beta Strep Culture, Throat - Swab, Throat [[id number]]  (Normal) Collected: 06/17/24 0548    Lab Status: Preliminary result Specimen: Swab from Throat Updated: 06/18/24 1018     Throat Culture, Beta Strep No Beta Hemolytic Streptococcus Isolated    Narrative:      Group A Strep incidence is low in adults. Positive culture for Beta hemolytic Streptococcus species can reflect colonization and not true infection. Please correlate clinically.    Rapid Strep A Screen - Swab, Throat [[id number]]  (Normal) Collected: 06/14/24 1724    Lab Status:  Final result Specimen: Swab from Throat Updated: 06/14/24 1746     Strep A Ag Negative    COVID-19, FLU A/B, RSV PCR 1 HR TAT - Swab, Nasopharynx [[id number]]  (Normal) Collected: 06/14/24 1724    Lab Status: Final result Specimen: Swab from Nasopharynx Updated: 06/14/24 1808     COVID19 Not Detected     Influenza A PCR Not Detected     Influenza B PCR Not Detected     RSV, PCR Not Detected    Narrative:      Fact sheet for providers: https://www.fda.gov/media/815787/download    Fact sheet for patients: https://www.fda.gov/media/241626/download    Test performed by PCR.    Beta Strep Culture, Throat - Swab, Throat [[id number]]  (Normal) Collected: 06/14/24 1724    Lab Status: Final result Specimen: Swab from Throat Updated: 06/16/24 0944     Throat Culture, Beta Strep No Beta Hemolytic Streptococcus Isolated    Narrative:      Group A Strep incidence is low in adults. Positive culture for Beta hemolytic Streptococcus species can reflect colonization and not true infection. Please correlate clinically.          [x]  Radiology  CT Head Without Contrast    Result Date: 6/18/2024  Age-related changes of the brain as above, otherwise without evidence of acute intracranial abnormality. Electronically Signed: Tito Spicer MD  6/18/2024 11:46 AM EDT  Workstation ID: [id number]    XR Elbow 3+ View Right    Result Date: 6/17/2024  1. There is possible right-sided olecranon bursitis. 2. A large right elbow joint effusion is seen. 3. Mild enthesopathic changes are present. 4. There may be minimal degenerative change. 5. No retained radiopaque foreign body. 6. No subcutaneous emphysema. 7. No convincing radiographic evidence for osteomyelitis. Please note that portions of this note were completed with a voice recognition program. Electronically Signed: Angel Snell MD  6/17/2024 5:02 AM EDT  Workstation ID: [id number]    XR Chest 1 View    Result Date: 6/14/2024  Impression: No radiographic evidence of acute cardiopulmonary  process. Electronically Signed: Ryan Dejesus MD  6/14/2024 8:35 PM EDT  Workstation ID: [id number]    XR Elbow 3+ View Left    Result Date: 6/14/2024  Impression: Left olecranon osteophyte with adjacent soft tissue swelling. Electronically Signed: Jerson Jane MD  6/14/2024 5:39 PM EDT  Workstation ID: [id number]   []  EKG/Telemetry   []  Cardiology/Vascular   []  Pathology  []  Old records  []  Other:    Assessment & Plan   Assessment / Plan     Assessment:  Acute systolic heart failure exacerbation.  EF of 21%  Hypertension  Rash on bilateral lower feet.?  Leukocytoclastic vasculitis  Left elbow bursitis/effusion  History of hepatitis C, treated  Paroxysmal atrial fibrillation on Eliquis  History of apical thrombus on Eliquis  COPD  Diabetes mellitus type 2.  Hemoglobin A1c of 6.4%  Hypertension  Hyperlipidemia  Schizophrenia on monthly injection  Renal insufficiency.  Creatinine worse  Parkinsonism  Confusion/acute metabolic encephalopathy  Leukocytosis and polycythemia    Plan:  Continue supplemental oxygen to keep sats more than 90%  Stat CT head negative  DC IV Lasix and Aldactone as creatinine trending up.  He is up on fluid restriction  Continue home Entresto.  Decrease home Coreg by 50% due to soft blood pressure  Discussed with patient's cardiologist to evaluate patient  Discussed with orthopedic surgery.  Appreciate input  Uric acid negative.  Patient denies any history of gout  Started on IV vancomycin for right elbow effusion.  MRSA PCR negative.  ESR and CRP noted  Complement level is fine. ANCA and DARIEN pending   Left elbow x-ray reviewed.  Showing some edema as well as concern for bursitis.  Considered anti-inflammatories however patient is on Eliquis.  For now continue pain control with oral Norco  Continue patient on sliding scale insulin for diabetes  For patient's schizophrenia he takes injectable medicine once per month  Continue patient on Eliquis for his history of apical thrombus and atrial  fibrillation.   Repeat echocardiogram noted  Repeat labs in am   PT OT  Continue telemetry       Discussed with RN.    VTE Prophylaxis:  Pharmacologic VTE prophylaxis orders are present.  Eliquis        CODE STATUS:   Level Of Support Discussed With: Patient  Code Status (Patient has no pulse and is not breathing): CPR (Attempt to Resuscitate)  Medical Interventions (Patient has pulse or is breathing): Full Support      Electronically signed by Alex Angulo MD, 6/18/2024, 17:15 EDT.

## 2024-06-18 NOTE — PLAN OF CARE
Goal Outcome Evaluation:  Plan of Care Reviewed With: patient        Progress: no change  Outcome Evaluation: Pt continues with joint pain, rash throughout body, and c/o nausea. Patient refused some medications this shift see MAR. Pt able to make needs known, pain medication given per order. Educated patient on medications and need to be compliant with administration. No s/s of distress at this time, call light within reach, bed in lowest position. Patient within limit of 1500ml fluid restriction.Kellee Logan LPN

## 2024-06-18 NOTE — THERAPY EVALUATION
"Patient Name: Regulo Sepulveda  : 1965    MRN: [id number]                              Today's Date: 2024       Admit Date: 2024    Visit Dx:     ICD-10-CM ICD-9-CM   1. Acute on chronic congestive heart failure, unspecified heart failure type  I50.9 428.0   2. Weakness generalized  R53.1 780.79   3. Pharyngitis, unspecified etiology  J02.9 462   4. Dyspnea on exertion  R06.09 786.09   5. Difficulty walking  R26.2 719.7   6. Decreased activities of daily living (ADL)  Z78.9 V49.89     Patient Active Problem List   Diagnosis    Major depressive disorder, recurrent episode, moderate    Chronic obstructive pulmonary disease    Diabetes mellitus    Primary hypertension    Hyperlipidemia    Hepatitis C    Cigarette nicotine dependence    BPH (benign prostatic hyperplasia)    GERD (gastroesophageal reflux disease)    B12 deficiency    LV (left ventricular) mural thrombus    Schizophrenia    PAF (paroxysmal atrial fibrillation)    Chronic HFrEF (heart failure with reduced ejection fraction)    Moderate malnutrition    Cardiomyopathy    TIA (transient ischemic attack)    Alcohol abuse    Bipolar 1 disorder    Chronic paranoid schizophrenia    Chronic post-traumatic stress disorder    Inhalant abuse    Type 2 diabetes mellitus without complication    Vitamin D deficiency    Acute exacerbation of CHF (congestive heart failure)    Generalized weakness     Past Medical History:   Diagnosis Date    Anxiety     Arthritis     Asthma     Bipolar affective     Cardiac tamponade         CHF (congestive heart failure)     COPD (chronic obstructive pulmonary disease)     Coronary artery disease     Depression     Diabetes mellitus     Elevated cholesterol     Hypertension     Parkinson disease     Sleep apnea     Stroke     Pt stated it was \"about 2 months ago\" as of 6/15/24     Past Surgical History:   Procedure Laterality Date    CARDIAC CATHETERIZATION Right 2023    Procedure: Right and Left Heart Cath;  " Surgeon: Ben Grant MD;  Location: [facility] INVASIVE LOCATION;  Service: Cardiovascular;  Laterality: Right;    ENDOSCOPY      TESTICLE SURGERY Left     extraction      General Information       Row Name 06/18/24 1401          OT Time and Intention    Document Type evaluation  -     Mode of Treatment individual therapy;occupational therapy  -       Row Name 06/18/24 1401          General Information    Patient Profile Reviewed yes  -     Prior Level of Function --  (I) with ADLs, ambulated w/o a device, has a step over tub where he stands to shower, standard commode, stands to groom, does not drive, and no home O2.  -     Existing Precautions/Restrictions fall  -     Barriers to Rehab none identified  -       Row Name 06/18/24 1401          Occupational Profile    Reason for Services/Referral (Occupational Profile) Patient is a 58 year old male admitted to [facility] for generalized weakness on June 14th, 2024. Occupational therapy consulted due to recent decline in ADLs/functional transfers. No previous occupational therapy services for current condition.  -       Row Name 06/18/24 1401          Living Environment    People in Home friend(s)  -       Row Name 06/18/24 1401          Cognition    Orientation Status (Cognition) oriented to;person  -       Row Name 06/18/24 1401          Safety Issues, Functional Mobility    Impairments Affecting Function (Mobility) balance;endurance/activity tolerance;pain;strength  -               User Key  (r) = Recorded By, (t) = Taken By, (c) = Cosigned By      Initials Name Provider Type     Bridgett Terry OT Occupational Therapist                     Mobility/ADL's       Row Name 06/18/24 1403          Bed Mobility    Bed Mobility supine-sit  -     Supine-Sit [place] (Bed Mobility) minimum assist (75% patient effort)  -     Bed Mobility, Safety Issues decreased use of arms for pushing/pulling;decreased use of legs for  bridging/pushing  -     Assistive Device (Bed Mobility) bed rails;head of bed elevated;draw sheet  -       Row Name 06/18/24 1403          Transfers    Transfers sit-stand transfer;stand-sit transfer;bed-chair transfer  -       Row Name 06/18/24 1403          Bed-Chair Transfer    Bed-Chair [place] (Transfers) minimum assist (75% patient effort);verbal cues  -     Assistive Device (Bed-Chair Transfers) walker, front-wheeled  -LF       Row Name 06/18/24 1403          Sit-Stand Transfer    Sit-Stand [place] (Transfers) minimum assist (75% patient effort);verbal cues  -     Assistive Device (Sit-Stand Transfers) walker, front-wheeled  -LF       Row Name 06/18/24 1403          Stand-Sit Transfer    Stand-Sit [place] (Transfers) minimum assist (75% patient effort);verbal cues  -     Assistive Device (Stand-Sit Transfers) walker, front-wheeled  -LF       Row Name 06/18/24 1403          Activities of Daily Living    BADL Assessment/Intervention bathing;upper body dressing;lower body dressing;grooming;feeding;toileting  -       Row Name 06/18/24 1403          Bathing Assessment/Intervention    [place] Level (Bathing) bathing skills;upper body;lower body;maximum assist (25% patient effort)  -       Row Name 06/18/24 1403          Upper Body Dressing Assessment/Training    [place] Level (Upper Body Dressing) upper body dressing skills;maximum assist (25% patient effort)  -       Row Name 06/18/24 1403          Lower Body Dressing Assessment/Training    [place] Level (Lower Body Dressing) lower body dressing skills;maximum assist (25% patient effort)  -       Row Name 06/18/24 1403          Grooming Assessment/Training    [place] Level (Grooming) grooming skills;maximum assist (25% patient effort)  -       Row Name 06/18/24 1403          Self-Feeding Assessment/Training    [place] Level (Feeding) feeding skills;minimum assist (75% patient effort)  -       Row Name  06/18/24 1403          Toileting Assessment/Training    [place] Level (Toileting) toileting skills;maximum assist (25% patient effort)  -LF               User Key  (r) = Recorded By, (t) = Taken By, (c) = Cosigned By      Initials Name Provider Type     Bridgett Terry OT Occupational Therapist                   Obj/Interventions       Row Name 06/18/24 1404          Sensory Assessment (Somatosensory)    Sensory Assessment (Somatosensory) UE sensation intact  -LF       Row Name 06/18/24 1404          Vision Assessment/Intervention    Visual Impairment/Limitations WFL  -LF       Row Name 06/18/24 1404          Range of Motion Comprehensive    General Range of Motion bilateral upper extremity ROM WFL  -LF       [facility] Name 06/18/24 1404          Strength Comprehensive (MMT)    Comment, General Manual Muscle Testing (MMT) Assessment 4-/5 RUE and 4/5 LUE  -LF       [facility] Name 06/18/24 1404          Motor Skills    Motor Skills coordination;functional endurance  -LF     Coordination bilateral;upper extremity;WFL  -LF     Functional Endurance Poor+/Fair-  -LF       Row Name 06/18/24 1404          Balance    Balance Assessment sitting dynamic balance;standing dynamic balance  -LF     Dynamic Sitting Balance standby assist  -LF     Position, Sitting Balance unsupported;sitting edge of bed  -LF     Dynamic Standing Balance minimal assist  -LF     Position/Device Used, Standing Balance supported;walker, front-wheeled  -LF               User Key  (r) = Recorded By, (t) = Taken By, (c) = Cosigned By      Initials Name Provider Type    LF Bridgett Terry OT Occupational Therapist                   Goals/Plan       Row Name 06/18/24 1405          Bed Mobility Goal 1 (OT)    Activity/Assistive Device (Bed Mobility Goal 1, OT) bed mobility activities, all  -LF     [place] Level/Cues Needed (Bed Mobility Goal 1, OT) modified independence  -LF     Time Frame (Bed Mobility Goal 1, OT) long term goal (LTG);10 days  -LF        Row Name 06/18/24 1405          Transfer Goal 1 (OT)    Activity/Assistive Device (Transfer Goal 1, OT) transfers, all  -LF     [place] Level/Cues Needed (Transfer Goal 1, OT) modified independence  -LF     Time Frame (Transfer Goal 1, OT) long term goal (LTG);10 days  -LF       Row Name 06/18/24 1405          Bathing Goal 1 (OT)    Activity/Device (Bathing Goal 1, OT) bathing skills, all  -LF     [place] Level/Cues Needed (Bathing Goal 1, OT) modified independence  -LF     Time Frame (Bathing Goal 1, OT) long term goal (LTG);10 days  -LF       Row Name 06/18/24 1405          Dressing Goal 1 (OT)    Activity/Device (Dressing Goal 1, OT) dressing skills, all  -LF     [place]/Cues Needed (Dressing Goal 1, OT) modified independence  -LF     Time Frame (Dressing Goal 1, OT) long term goal (LTG);10 days  -LF       Row Name 06/18/24 1405          Toileting Goal 1 (OT)    Activity/Device (Toileting Goal 1, OT) toileting skills, all  -LF     [place] Level/Cues Needed (Toileting Goal 1, OT) modified independence  -LF     Time Frame (Toileting Goal 1, OT) long term goal (LTG);10 days  -LF       Row Name 06/18/24 1405          Problem Specific Goal 1 (OT)    Problem Specific Goal 1 (OT) Patient will demonstrate good endurance to support ADLs/functional transfers.  -LF     Time Frame (Problem Specific Goal 1, OT) long term goal (LTG);10 days  -LF       Row Name 06/18/24 1405          Therapy Assessment/Plan (OT)    Planned Therapy Interventions (OT) activity tolerance training;BADL retraining;functional balance retraining;occupation/activity based interventions;patient/caregiver education/training;strengthening exercise;transfer/mobility retraining  -LF               User Key  (r) = Recorded By, (t) = Taken By, (c) = Cosigned By      Initials Name Provider Type    Bridgett Hernandez OT Occupational Therapist                   Clinical Impression       Row Name 06/18/24 1405          Pain Assessment     Additional Documentation Pain Scale: FACES Pre/Post-Treatment (Group)  -       Row Name 06/18/24 1405          Pain Scale: FACES Pre/Post-Treatment    Pain: FACES Scale, Pretreatment 4-->hurts little more  -LF     Posttreatment Pain Rating 4-->hurts little more  -LF       Row Name 06/18/24 1405          Plan of Care Review    Plan of Care Reviewed With patient  -     Progress no change  -     Outcome Evaluation Patient presents with limitations in self-care, functional transfers, balance, and endurance. He would benefit from continued skilled occupational therapy services to maximize independence with ADLs/functional transfers.  -       Row Name 06/18/24 1408          Therapy Assessment/Plan (OT)    Patient/Family Therapy Goal Statement (OT) To maximize independence.  -     Rehab Potential (OT) good, to achieve stated therapy goals  -     Criteria for Skilled Therapeutic Interventions Met (OT) yes;meets criteria;skilled treatment is necessary  -     Therapy Frequency (OT) 5 times/wk  -       Row Name 06/18/24 1406          Therapy Plan Review/Discharge Plan (OT)    Equipment Needs Upon Discharge (OT) walker, rolling  -     Anticipated Discharge Disposition (OT) sub acute care setting  -       Row Name 06/18/24 1407          Vital Signs    O2 Delivery Pre Treatment room air  -     O2 Delivery Intra Treatment room air  -     O2 Delivery Post Treatment room air  -       Row Name 06/18/24 1401          Positioning and Restraints    Pre-Treatment Position in bed  -     Post Treatment Position chair  -     In Chair reclined;call light within reach;encouraged to call for assist;exit alarm on;with nsg  -               User Key  (r) = Recorded By, (t) = Taken By, (c) = Cosigned By      Initials Name Provider Type     Bridgett Terry, OT Occupational Therapist                   Outcome Measures       Row Name 06/18/24 1408          How much help from another is currently needed...    Putting  on and taking off regular lower body clothing? 2  -LF     Bathing (including washing, rinsing, and drying) 2  -LF     Toileting (which includes using toilet bed pan or urinal) 2  -LF     Putting on and taking off regular upper body clothing 2  -LF     Taking care of personal grooming (such as brushing teeth) 2  -LF     Eating meals 3  -LF     AM-PAC 6 Clicks Score (OT) 13  -LF       Row Name 06/18/24 0815          How much help from another person do you currently need...    Turning from your back to your side while in flat bed without using bedrails? 4  -LC     Moving from lying on back to sitting on the side of a flat bed without bedrails? 4  -LC     Moving to and from a bed to a chair (including a wheelchair)? 3  -LC     Standing up from a chair using your arms (e.g., wheelchair, bedside chair)? 3  -LC     Climbing 3-5 steps with a railing? 3  -LC     To walk in hospital room? 3  -LC     AM-PAC 6 Clicks Score (PT) 20  -LC     Highest Level of Mobility Goal 6 --> Walk 10 steps or more  -LC       Row Name 06/18/24 1406          Functional Assessment    Outcome Measure Options AM-PAC 6 Clicks Daily Activity (OT);Optimal Instrument  -LF       Row Name 06/18/24 1406          Optimal Instrument    Optimal Instrument Optimal - 3  -LF     Bending/Stooping 4  -LF     Standing 2  -LF     Reaching 2  -LF     From the list, choose the 3 activities you would most like to be able to do without any difficulty Bending/stooping;Standing;Reaching  -LF     Total Score Optimal - 3 8  -LF               User Key  (r) = Recorded By, (t) = Taken By, (c) = Cosigned By      Initials Name Provider Type    LC Ty Girard RN Registered Nurse    Bridgett Hernandez OT Occupational Therapist                    Occupational Therapy Education       Title: PT OT SLP Therapies (In Progress)       Topic: Occupational Therapy (In Progress)       Point: ADL training (In Progress)       Description:   Instruct learner(s) on proper safety  adaptation and remediation techniques during self care or transfers.   Instruct in proper use of assistive devices.                  Learning Progress Summary             Patient Acceptance, E,TB, NR by  at 6/18/2024 1406                         Point: Home exercise program (Not Started)       Description:   Instruct learner(s) on appropriate technique for monitoring, assisting and/or progressing therapeutic exercises/activities.                  Learner Progress:  Not documented in this visit.              Point: Precautions (In Progress)       Description:   Instruct learner(s) on prescribed precautions during self-care and functional transfers.                  Learning Progress Summary             Patient Acceptance, E,TB, NR by  at 6/18/2024 1406                         Point: Body mechanics (In Progress)       Description:   Instruct learner(s) on proper positioning and spine alignment during self-care, functional mobility activities and/or exercises.                  Learning Progress Summary             Patient Acceptance, E,TB, NR by  at 6/18/2024 1406                                         User Key       Initials Effective Dates Name Provider Type Discipline     06/16/21 -  Bridgett Terry OT Occupational Therapist OT                  OT Recommendation and Plan  Planned Therapy Interventions (OT): activity tolerance training, BADL retraining, functional balance retraining, occupation/activity based interventions, patient/caregiver education/training, strengthening exercise, transfer/mobility retraining  Therapy Frequency (OT): 5 times/wk  Plan of Care Review  Plan of Care Reviewed With: patient  Progress: no change  Outcome Evaluation: Patient presents with limitations in self-care, functional transfers, balance, and endurance. He would benefit from continued skilled occupational therapy services to maximize independence with ADLs/functional transfers.     Time Calculation:   Evaluation  Complexity (OT)  Review Occupational Profile/Medical/Therapy History Complexity: brief/low complexity  Assessment, Occupational Performance/Identification of Deficit Complexity: 3-5 performance deficits  Clinical Decision Making Complexity (OT): problem focused assessment/low complexity  Overall Complexity of Evaluation (OT): low complexity     Time Calculation- OT       Row Name 06/18/24 1406             Time Calculation- OT    OT Received On 06/18/24  -LF      OT Goal Re-Cert Due Date 06/27/24  -LF         Untimed Charges    OT Eval/Re-eval Minutes 40  -LF         Total Minutes    Untimed Charges Total Minutes 40  -LF       Total Minutes 40  -LF                User Key  (r) = Recorded By, (t) = Taken By, (c) = Cosigned By      Initials Name Provider Type    LF Bridgett Terry OT Occupational Therapist                  Therapy Charges for Today       Code Description Service Date Service Provider Modifiers Qty    [id number] HC OT EVAL LOW COMPLEXITY 3 6/18/2024 Bridgett Terry OT GO 1                 Bridgett Terry OT  6/18/2024

## 2024-06-18 NOTE — PLAN OF CARE
Goal Outcome Evaluation:  Plan of Care Reviewed With: patient        Progress: declining     Patient became lethargic/confused at beginning of shift, CT scan of head ordered, no acute/abnormal findings, MRSA negative, complaints of pain and nausea at the beginning of shift, regimens effective, hypotensive and hypothermic episodes, blanket warmer applied but patient kept removing it due to feeling too hot, did not eat anything today, UOP ok, UA and EEG ordered to rule out UTI and seizures. LEX, RN

## 2024-06-18 NOTE — PLAN OF CARE
Goal Outcome Evaluation:  Plan of Care Reviewed With: patient        Progress: no change  Outcome Evaluation: Patient presents with limitations in self-care, functional transfers, balance, and endurance. He would benefit from continued skilled occupational therapy services to maximize independence with ADLs/functional transfers.      Anticipated Discharge Disposition (OT): sub acute care setting

## 2024-06-18 NOTE — PROGRESS NOTES
"[facility] Clinical Pharmacy Services: Vancomycin Pharmacokinetic Initial Consult Note    Regulo Sepulveda is a 58 y.o. male who is on day 1  of pharmacy to dose vancomycin for Bone and/or Joint Infection.    Consult Information  Consulting Provider: Kev  Planned Duration of Therapy: 7 days  Was Patient Receiving Prior to Admission/Consult?: No  Loading Dose Ordered or Given: 1250 mg on  at ~1300  PK/PD Target: -600 mg/L.hr       Imaging Reviewed?: Yes    Microbiology Data  MRSA PCR performed: 24; Result: Pending  Culture/Source:   Blood cultures in process    Vitals/Labs  Ht: 185.4 cm (73\"); Wt: 64.7 kg (142 lb 10.2 oz)  Temp (24hrs), Av.4 °F (35.8 °C), Min:92.3 °F (33.5 °C), Max:98.6 °F (37 °C)   Estimated Creatinine Clearance: 48.2 mL/min (A) (by C-G formula based on SCr of 1.53 mg/dL (H)).       Results from last 7 days   Lab Units 24  0532 24  0436 24  0432   CREATININE mg/dL 1.53* 1.16 1.14   WBC 10*3/mm3 15.95* 10.38 11.45*     Assessment/Plan:    Vancomycin Dose:  1250 mg IV every 24 hours; which provides the following predicted parameters:  AUC24,ss: 542 mg/L.hr  PAUC*: 83 %  Ctrough,ss: 16.3 mg/L  Pconc*: 30 %  Tox.: 12 %  Vanc Random ordered for  at 0600  Patient has order for Renal Function Panel    Pharmacy will follow patient's kidney function and will adjust doses and obtain levels as necessary. Thank you for involving pharmacy in this patient's care. Please contact pharmacy with any questions or concerns.                           Paige Pantoja  Clinical Pharmacist    "

## 2024-06-19 ENCOUNTER — PATIENT OUTREACH (OUTPATIENT)
Dept: CASE MANAGEMENT | Facility: OTHER | Age: 59
End: 2024-06-19
Payer: COMMERCIAL

## 2024-06-19 DIAGNOSIS — I10 PRIMARY HYPERTENSION: ICD-10-CM

## 2024-06-19 DIAGNOSIS — I50.43 ACUTE ON CHRONIC COMBINED SYSTOLIC AND DIASTOLIC CHF (CONGESTIVE HEART FAILURE): Primary | ICD-10-CM

## 2024-06-19 LAB
ALBUMIN SERPL-MCNC: 3.1 G/DL (ref 3.5–5.2)
ANION GAP SERPL CALCULATED.3IONS-SCNC: 12.9 MMOL/L (ref 5–15)
BACTERIA SPEC AEROBE CULT: NORMAL
BASOPHILS # BLD AUTO: 0.05 10*3/MM3 (ref 0–0.2)
BASOPHILS NFR BLD AUTO: 0.3 % (ref 0–1.5)
BUN SERPL-MCNC: 72 MG/DL (ref 6–20)
BUN/CREAT SERPL: 44.2 (ref 7–25)
CALCIUM SPEC-SCNC: 8.6 MG/DL (ref 8.6–10.5)
CHLORIDE SERPL-SCNC: 100 MMOL/L (ref 98–107)
CO2 SERPL-SCNC: 27.1 MMOL/L (ref 22–29)
CREAT SERPL-MCNC: 1.63 MG/DL (ref 0.76–1.27)
DEPRECATED RDW RBC AUTO: 54.3 FL (ref 37–54)
EGFRCR SERPLBLD CKD-EPI 2021: 48.5 ML/MIN/1.73
EOSINOPHIL # BLD AUTO: 0.03 10*3/MM3 (ref 0–0.4)
EOSINOPHIL NFR BLD AUTO: 0.2 % (ref 0.3–6.2)
ERYTHROCYTE [DISTWIDTH] IN BLOOD BY AUTOMATED COUNT: 17.4 % (ref 12.3–15.4)
GLUCOSE BLDC GLUCOMTR-MCNC: 108 MG/DL (ref 70–99)
GLUCOSE BLDC GLUCOMTR-MCNC: 139 MG/DL (ref 70–99)
GLUCOSE BLDC GLUCOMTR-MCNC: 162 MG/DL (ref 70–99)
GLUCOSE BLDC GLUCOMTR-MCNC: 88 MG/DL (ref 70–99)
GLUCOSE SERPL-MCNC: 113 MG/DL (ref 65–99)
HCT VFR BLD AUTO: 41 % (ref 37.5–51)
HGB BLD-MCNC: 13 G/DL (ref 13–17.7)
IMM GRANULOCYTES # BLD AUTO: 0.05 10*3/MM3 (ref 0–0.05)
IMM GRANULOCYTES NFR BLD AUTO: 0.3 % (ref 0–0.5)
LYMPHOCYTES # BLD AUTO: 1.3 10*3/MM3 (ref 0.7–3.1)
LYMPHOCYTES NFR BLD AUTO: 8.6 % (ref 19.6–45.3)
MCH RBC QN AUTO: 27.1 PG (ref 26.6–33)
MCHC RBC AUTO-ENTMCNC: 31.7 G/DL (ref 31.5–35.7)
MCV RBC AUTO: 85.4 FL (ref 79–97)
MONOCYTES # BLD AUTO: 1.27 10*3/MM3 (ref 0.1–0.9)
MONOCYTES NFR BLD AUTO: 8.4 % (ref 5–12)
NEUTROPHILS NFR BLD AUTO: 12.47 10*3/MM3 (ref 1.7–7)
NEUTROPHILS NFR BLD AUTO: 82.2 % (ref 42.7–76)
NRBC BLD AUTO-RTO: 0 /100 WBC (ref 0–0.2)
PHOSPHATE SERPL-MCNC: 3.6 MG/DL (ref 2.5–4.5)
PLATELET # BLD AUTO: 344 10*3/MM3 (ref 140–450)
PMV BLD AUTO: 10.3 FL (ref 6–12)
POTASSIUM SERPL-SCNC: 4.1 MMOL/L (ref 3.5–5.2)
RBC # BLD AUTO: 4.8 10*6/MM3 (ref 4.14–5.8)
SODIUM SERPL-SCNC: 140 MMOL/L (ref 136–145)
VANCOMYCIN SERPL-MCNC: 12.38 MCG/ML (ref 5–40)
WBC NRBC COR # BLD AUTO: 15.17 10*3/MM3 (ref 3.4–10.8)

## 2024-06-19 PROCEDURE — 25810000003 SODIUM CHLORIDE 0.9 % SOLUTION: Performed by: INTERNAL MEDICINE

## 2024-06-19 PROCEDURE — 99254 IP/OBS CNSLTJ NEW/EST MOD 60: CPT | Performed by: SPECIALIST

## 2024-06-19 PROCEDURE — 63710000001 INSULIN LISPRO (HUMAN) PER 5 UNITS: Performed by: FAMILY MEDICINE

## 2024-06-19 PROCEDURE — 80202 ASSAY OF VANCOMYCIN: CPT | Performed by: INTERNAL MEDICINE

## 2024-06-19 PROCEDURE — 25010000002 ONDANSETRON PER 1 MG: Performed by: FAMILY MEDICINE

## 2024-06-19 PROCEDURE — 82948 REAGENT STRIP/BLOOD GLUCOSE: CPT | Performed by: FAMILY MEDICINE

## 2024-06-19 PROCEDURE — 85025 COMPLETE CBC W/AUTO DIFF WBC: CPT | Performed by: INTERNAL MEDICINE

## 2024-06-19 PROCEDURE — 25010000002 VANCOMYCIN 1 G RECONSTITUTED SOLUTION: Performed by: INTERNAL MEDICINE

## 2024-06-19 PROCEDURE — 99233 SBSQ HOSP IP/OBS HIGH 50: CPT | Performed by: INTERNAL MEDICINE

## 2024-06-19 PROCEDURE — 80069 RENAL FUNCTION PANEL: CPT | Performed by: INTERNAL MEDICINE

## 2024-06-19 PROCEDURE — 82948 REAGENT STRIP/BLOOD GLUCOSE: CPT

## 2024-06-19 RX ORDER — CARVEDILOL 6.25 MG/1
6.25 TABLET ORAL 2 TIMES DAILY
Status: DISCONTINUED | OUTPATIENT
Start: 2024-06-19 | End: 2024-06-25 | Stop reason: HOSPADM

## 2024-06-19 RX ORDER — MIDODRINE HYDROCHLORIDE 10 MG/1
10 TABLET ORAL EVERY 8 HOURS
Status: DISCONTINUED | OUTPATIENT
Start: 2024-06-19 | End: 2024-06-25 | Stop reason: HOSPADM

## 2024-06-19 RX ADMIN — MIDODRINE HYDROCHLORIDE 10 MG: 10 TABLET ORAL at 21:48

## 2024-06-19 RX ADMIN — APIXABAN 5 MG: 5 TABLET, FILM COATED ORAL at 21:48

## 2024-06-19 RX ADMIN — INSULIN LISPRO 2 UNITS: 100 INJECTION, SOLUTION INTRAVENOUS; SUBCUTANEOUS at 12:06

## 2024-06-19 RX ADMIN — MIDODRINE HYDROCHLORIDE 10 MG: 10 TABLET ORAL at 12:06

## 2024-06-19 RX ADMIN — ATORVASTATIN CALCIUM 40 MG: 40 TABLET, FILM COATED ORAL at 21:48

## 2024-06-19 RX ADMIN — Medication 10 ML: at 09:55

## 2024-06-19 RX ADMIN — VANCOMYCIN HYDROCHLORIDE 1000 MG: 1 INJECTION, POWDER, LYOPHILIZED, FOR SOLUTION INTRAVENOUS at 09:54

## 2024-06-19 RX ADMIN — AMIODARONE HYDROCHLORIDE 200 MG: 200 TABLET ORAL at 09:54

## 2024-06-19 RX ADMIN — APIXABAN 5 MG: 5 TABLET, FILM COATED ORAL at 09:54

## 2024-06-19 RX ADMIN — ONDANSETRON 4 MG: 2 INJECTION INTRAMUSCULAR; INTRAVENOUS at 12:06

## 2024-06-19 RX ADMIN — HYDROCODONE BITARTRATE AND ACETAMINOPHEN 1 TABLET: 5; 325 TABLET ORAL at 19:24

## 2024-06-19 RX ADMIN — PANTOPRAZOLE SODIUM 40 MG: 40 TABLET, DELAYED RELEASE ORAL at 09:54

## 2024-06-19 RX ADMIN — SACUBITRIL AND VALSARTAN 1 TABLET: 49; 51 TABLET, FILM COATED ORAL at 09:54

## 2024-06-19 RX ADMIN — SACUBITRIL AND VALSARTAN 1 TABLET: 49; 51 TABLET, FILM COATED ORAL at 21:48

## 2024-06-19 RX ADMIN — EMPAGLIFLOZIN 10 MG: 10 TABLET, FILM COATED ORAL at 09:54

## 2024-06-19 RX ADMIN — Medication 5 MG: at 21:48

## 2024-06-19 RX ADMIN — MIRTAZAPINE 15 MG: 15 TABLET, FILM COATED ORAL at 21:48

## 2024-06-19 RX ADMIN — VENLAFAXINE HYDROCHLORIDE 37.5 MG: 37.5 CAPSULE, EXTENDED RELEASE ORAL at 09:54

## 2024-06-19 RX ADMIN — FERROUS SULFATE TAB 325 MG (65 MG ELEMENTAL FE) 325 MG: 325 (65 FE) TAB at 09:54

## 2024-06-19 NOTE — PROGRESS NOTES
"[facility] Clinical Pharmacy Services: Vancomycin Monitoring Note    Regulo Sepulveda is a 58 y.o. male who is on day  of pharmacy to dose vancomycin for Bone and/or Joint Infection.    Previous Vancomycin Dose:   1250 mg IV every  24  hours  Imaging Reviewed?: Yes   XR Right elbow: soft tissue swelling, especially posteriorly, which is nonspecific and may represent confusion; olecranon bursitis is possible; no subc emphysema; expected right elbow joint effusion; no convincing radiographic evidence of OM    Updated Cultures and Sensitivities:    MRSA PCR: negative   Blood cx : in process   Strep: negative   COVID/flu/RSV: negative    Vitals/Labs  Ht: 185.4 cm (73\"); Wt: 64.7 kg (142 lb 10.2 oz)   Temp (24hrs), Av.3 °F (35.7 °C), Min:92.3 °F (33.5 °C), Max:98.6 °F (37 °C)   Estimated Creatinine Clearance: 45.2 mL/min (A) (by C-G formula based on SCr of 1.63 mg/dL (H)).     Results from last 7 days   Lab Units 24  0434 24  1421 24  0532   VANCOMYCIN RM mcg/mL 12.38  --   --    CREATININE mg/dL 1.63* 1.70* 1.53*   WBC 10*3/mm3 15.17* 15.41* 15.95*     Assessment/Plan    Vancomycin level of 12.38 mcg/mL this morning, slightly above predicted parameters. Will decrease dose to 1000 mg q24h to maintain AUC between 400-600.   Current Vancomycin Dose:  1000 mg IV every 24 hours; which provides the following predicted parameters:  AUC24,ss: 538 mg/L.hr  PAUC*: 96 %  Ctrough,ss: 15.8 mg/L  Pconc*: 18 %  Tox.: 11 %  Next Vanc Random planned for  at 0600 with AM labs  We will continue to monitor patient changes and renal function     Thank you for involving pharmacy in this patient's care. Please contact pharmacy with any questions or concerns.    Doreen Puentes [facility]  Clinical Pharmacist  "

## 2024-06-19 NOTE — SIGNIFICANT NOTE
06/19/24 1400   Physical Therapy Time and Intention   Session Not Performed other (see comments)  (sleeping soundly)

## 2024-06-19 NOTE — PROGRESS NOTES
[facility]   Hospitalist Progress Note  Date: 2024  Patient Name: Regulo Sepulveda  : 1965  MRN: [id number]  Date of admission: 2024  Room/Bed: [facility]/      Subjective   Subjective     Chief Complaint: weakness, short of air     Summary:Regulo Sepulveda is a 58 y.o. male with PMHx of HTN, CHF , COPD, DMII, Parkinson Disease, CAD, Bipolar disorder who presents to the emergency department for evaluation of sore throat and myalgias for the past 3 to 4 days. Also having left elbow pain that he states he is never had before. The patient also states that he was just admitted here few weeks ago for CHF and has had increasingly had more shortness of breath recently. He states that his feet have been swollen for the last several days. He states that he has had increased shortness of breath with exertion. He reports fevers at home. He states that he has been compliant with his medications since discharge. He denies any chills, diaphoresis, chest pain, nausea, vomiting, dysuria, constipation or diarrhea. In the ED he was given Tylenol, Lasix because of pain. He was admitted for further evaluation and management.   2D echo with EF of 21%.  Cardiology consulted.  Ortho consulted for right elbow effusion and bursitis.  Patient denies any trauma or bug bite there.  CT head negative obtained because of confusion    Interval Followup:   No hypothermia  Off oxygen.  Blood pressure soft.  Was given albumin and IV fluid bolus overnight.  Some of the heart failure medicine need to be held  Patient is more awake today.  Oriented  Left elbow pain continues x-ray shows bursitis and effusion.    EEG ordered for staring spells as reported by nursing staff.  No EEG tech available  Creatinine  better today since off diuretics  Continues to have rash on his feet and some on the buttock       Review of Systems    All systems reviewed and negative except for what is outlined above.      Objective   Objective     Vitals:   Temp:  [93.9  °F (34.4 °C)-98.2 °F (36.8 °C)] 97.5 °F (36.4 °C)  Heart Rate:  [70] 70  Resp:  [18] 18  BP: (80-97)/(45-70) 97/70    Physical Exam   General: Awake but lethargic, NAD sleeping in bed wakes up easily  HENT: NCAT, MMM  Eyes: pupils equal, no scleral icterus  Cardiovascular: RRR, no murmurs   Pulmonary: CTA bilaterally; no wheezes; no conversational dyspnea  Gastrointestinal: S/ND/NT, +BS  Musculoskeletal: No gross deformities.  Right elbow tenderness to palpation, has redness, warmth and swelling with restricted end flexion of right elbow  Skin: bilateral feet have fading red vascular colored rash.  Negative ankle edema.  Neuro: Oriented x 1, CN II through XII grossly intact; voice  weak, speech is clear; no tremor  Psych: Mood and affect appropriate  : No Poon catheter; no suprapubic tenderness    Result Review    Result Review:  I have personally reviewed these results:  [x]  Laboratory      Lab 06/19/24  0434 06/18/24  1421 06/18/24  1211 06/18/24  0532 06/17/24  0436 06/16/24  0432   WBC 15.17* 15.41*  --  15.95*   < > 11.45*   HEMOGLOBIN 13.0 15.9  --  18.0*   < > 14.0   HEMATOCRIT 41.0 49.6  --  57.2*   < > 44.6   PLATELETS 344 447  --  506*   < > 353   NEUTROS ABS 12.47* 12.30*  --   --   --  9.48*   IMMATURE GRANS (ABS) 0.05 0.05  --   --   --  0.04   LYMPHS ABS 1.30 1.39  --   --   --  1.00   MONOS ABS 1.27* 1.63*  --   --   --  0.75   EOS ABS 0.03 0.01  --   --   --  0.12   MCV 85.4 84.6  --  85.4   < > 85.9   SED RATE  --   --   --  23*  --   --    CRP  --   --   --  9.86*  --   --    PROCALCITONIN  --  0.30*  --  0.16  --   --    LACTATE  --   --  2.0  --   --   --     < > = values in this interval not displayed.         Lab 06/19/24  0434 06/18/24  1421 06/18/24  0532 06/17/24  0436 06/15/24  1525 06/15/24  0402   SODIUM 140 139 140 143   < > 141   POTASSIUM 4.1 4.4 4.2 4.4   < > 3.3*   CHLORIDE 100 99 96* 93*   < > 102   CO2 27.1 24.8 30.5* 28.2   < > 29.9*   ANION GAP 12.9 15.2* 13.5 21.8*   < > 9.1    BUN 72* 65* 43* 33*   < > 27*   CREATININE 1.63* 1.70* 1.53* 1.16   < > 1.34*   EGFR 48.5* 46.2* 52.4* 73.0   < > 61.4   GLUCOSE 113* 131* 163* 141*   < > 89   CALCIUM 8.6 8.5* 9.2 8.7   < > 8.4*   MAGNESIUM  --   --  1.6 1.7  --   --    PHOSPHORUS 3.6  --   --   --   --   --    HEMOGLOBIN A1C  --   --   --   --   --  6.40*    < > = values in this interval not displayed.         Lab 06/19/24  0434 06/18/24  1421 06/16/24  0432 06/15/24  0402   TOTAL PROTEIN  --  5.8* 6.5 5.9*   ALBUMIN 3.1* 3.0* 3.3* 3.3*   GLOBULIN  --  2.8 3.2 2.6   ALT (SGPT)  --  28 47* 39   AST (SGOT)  --  20 30 23   BILIRUBIN  --  0.6 0.9 1.4*   ALK PHOS  --  215* 264* 249*         Lab 06/18/24  0532 06/14/24  1954   PROBNP 4,831.0* 45,413.0*                 Brief Urine Lab Results  (Last result in the past 365 days)        Color   Clarity   Blood   Leuk Est   Nitrite   Protein   CREAT   Urine HCG        06/14/24 1945 Yellow   Clear   Moderate (2+)   Negative   Negative   30 mg/dL (1+)                 [x]  Microbiology   Microbiology Results (last 10 days)       Procedure Component Value - Date/Time    Blood Culture - Blood, Hand, Right [[id number]]  (Normal) Collected: 06/18/24 1211    Lab Status: Preliminary result Specimen: Blood from Hand, Right Updated: 06/19/24 1230     Blood Culture No growth at 24 hours    Blood Culture - Blood, Arm, Left [[id number]]  (Normal) Collected: 06/18/24 1205    Lab Status: Preliminary result Specimen: Blood from Arm, Left Updated: 06/19/24 1230     Blood Culture No growth at 24 hours    MRSA Screen, PCR (Inpatient) - Swab, Nares [[id number]]  (Normal) Collected: 06/18/24 1119    Lab Status: Final result Specimen: Swab from Nares Updated: 06/18/24 1253     MRSA PCR No MRSA Detected    Narrative:      The negative predictive value of this diagnostic test is high and should only be used to consider de-escalating anti-MRSA therapy. A positive result may indicate colonization with MRSA and must be correlated  clinically.    Rapid Strep A Screen - Swab, Throat [[id number]]  (Normal) Collected: 06/17/24 0548    Lab Status: Final result Specimen: Swab from Throat Updated: 06/17/24 0640     Strep A Ag Negative    Beta Strep Culture, Throat - Swab, Throat [[id number]]  (Normal) Collected: 06/17/24 0548    Lab Status: Final result Specimen: Swab from Throat Updated: 06/19/24 0731     Throat Culture, Beta Strep No Beta Hemolytic Streptococcus Isolated    Narrative:      Group A Strep incidence is low in adults. Positive culture for Beta hemolytic Streptococcus species can reflect colonization and not true infection. Please correlate clinically.      Rapid Strep A Screen - Swab, Throat [[id number]]  (Normal) Collected: 06/14/24 1724    Lab Status: Final result Specimen: Swab from Throat Updated: 06/14/24 1746     Strep A Ag Negative    COVID-19, FLU A/B, RSV PCR 1 HR TAT - Swab, Nasopharynx [[id number]]  (Normal) Collected: 06/14/24 1724    Lab Status: Final result Specimen: Swab from Nasopharynx Updated: 06/14/24 1808     COVID19 Not Detected     Influenza A PCR Not Detected     Influenza B PCR Not Detected     RSV, PCR Not Detected    Narrative:      Fact sheet for providers: https://www.fda.gov/media/526872/download    Fact sheet for patients: https://www.fda.gov/media/511875/download    Test performed by PCR.    Beta Strep Culture, Throat - Swab, Throat [[id number]]  (Normal) Collected: 06/14/24 1724    Lab Status: Final result Specimen: Swab from Throat Updated: 06/16/24 0944     Throat Culture, Beta Strep No Beta Hemolytic Streptococcus Isolated    Narrative:      Group A Strep incidence is low in adults. Positive culture for Beta hemolytic Streptococcus species can reflect colonization and not true infection. Please correlate clinically.          [x]  Radiology  CT Head Without Contrast    Result Date: 6/18/2024  Age-related changes of the brain as above, otherwise without evidence of acute intracranial abnormality.  Electronically Signed: Tito Spicer MD  6/18/2024 11:46 AM EDT  Workstation ID: [id number]    XR Elbow 3+ View Right    Result Date: 6/17/2024  1. There is possible right-sided olecranon bursitis. 2. A large right elbow joint effusion is seen. 3. Mild enthesopathic changes are present. 4. There may be minimal degenerative change. 5. No retained radiopaque foreign body. 6. No subcutaneous emphysema. 7. No convincing radiographic evidence for osteomyelitis. Please note that portions of this note were completed with a voice recognition program. Electronically Signed: Angel Snell MD  6/17/2024 5:02 AM EDT  Workstation ID: [id number]    XR Chest 1 View    Result Date: 6/14/2024  Impression: No radiographic evidence of acute cardiopulmonary process. Electronically Signed: Ryan Dejesus MD  6/14/2024 8:35 PM EDT  Workstation ID: [id number]    XR Elbow 3+ View Left    Result Date: 6/14/2024  Impression: Left olecranon osteophyte with adjacent soft tissue swelling. Electronically Signed: Jerson Jane MD  6/14/2024 5:39 PM EDT  Workstation ID: [id number]   []  EKG/Telemetry   []  Cardiology/Vascular   []  Pathology  []  Old records  []  Other:    Assessment & Plan   Assessment / Plan     Assessment:  Acute systolic heart failure exacerbation.  EF of 21%  Hypertension  Rash on bilateral lower feet.?  Leukocytoclastic vasculitis  Right elbow bursitis/effusion  History of hepatitis C, treated  Paroxysmal atrial fibrillation on Eliquis  History of apical thrombus on Eliquis  COPD  Diabetes mellitus type 2.  Hemoglobin A1c of 6.4%  Hypertension  Hyperlipidemia  Schizophrenia on monthly injection  Renal insufficiency.  Creatinine stable  Parkinsonism  Confusion/acute metabolic encephalopathy  Leukocytosis and polycythemia    Plan:  Continue supplemental oxygen to keep sats more than 90%  Stat CT head negative.  Midodrine  IV Vanco  NG PCR negative  Pro-Topher and lactate negative  Keep holding IV Lasix and Aldactone as creatinine   up and blood pressure is soft.    Continue home Entresto, 9 dose decreased.  Decreased home Coreg further by 50% due to soft blood pressure  Discussed with patient's cardiologist about CHF and soft blood pressure.  Appreciate input  Discussed with orthopedic surgery.  Appreciate input  Uric acid negative.  Patient denies any history of gout  Started on IV vancomycin for right elbow effusion.  MRSA PCR negative.  ESR and CRP noted  Complement level is fine. ANCA and DARIEN pending  Right elbow x-ray reviewed.  Showing some edema as well as concern for bursitis.  Considered anti-inflammatories however patient is on Eliquis.  For now continue pain control with oral Norco  Continue patient on sliding scale insulin for diabetes  For patient's schizophrenia he takes injectable medicine once per month  Continue patient on Eliquis for his history of apical thrombus and atrial fibrillation.   Repeat echocardiogram noted  Repeat labs in am   PT OT  Continue telemetry.     Discussed with RN and .  Apparently patient does not want rehab placement.    VTE Prophylaxis:  Pharmacologic VTE prophylaxis orders are present.  Eliquis        CODE STATUS:   Level Of Support Discussed With: Patient  Code Status (Patient has no pulse and is not breathing): CPR (Attempt to Resuscitate)  Medical Interventions (Patient has pulse or is breathing): Full Support      Electronically signed by Alex Angulo MD, 6/19/2024, 17:17 EDT.

## 2024-06-19 NOTE — CONSULTS
"  [facility]   Cardiology Consult Note    Patient Name: Regulo Sepulveda  : 1965  MRN: [id number]  Primary Care Physician:  Dickson Landry MD  Referring Physician: No ref. provider found  Date of admission: 2024    Subjective   Subjective     Reason for Consult/ Chief Complaint: Short of breath    HPI:  Regulo Sepulveda is a 58 y.o. male with history of cardiomyopathy and CHF.  Noncompliant with his diet and fluid restrictions.  Admitted with increased shortness of breath and CHF.    Review of Systems:      Constitutional no fever,  no weight loss   Skin no rash   Otolaryngeal no difficulty swallowing   Cardiovascular See HPI   Pulmonary no cough, no sputum production   Gastrointestinal no constipation, no diarrhea   Genitourinary no dysuria, no hematuria   Hematologic no easy bruisability, no abnormal bleeding   Musculoskeletal no muscle pain   Neurologic no dizziness, no falls     Personal History       Past Medical/Surgical History:   Past Medical History:   Diagnosis Date    Anxiety     Arthritis     Asthma     Bipolar affective     Cardiac tamponade         CHF (congestive heart failure)     COPD (chronic obstructive pulmonary disease)     Coronary artery disease     Depression     Diabetes mellitus     Elevated cholesterol     Hypertension     Parkinson disease     Sleep apnea     Stroke     Pt stated it was \"about 2 months ago\" as of 6/15/24     Past Surgical History:   Procedure Laterality Date    CARDIAC CATHETERIZATION Right 2023    Procedure: Right and Left Heart Cath;  Surgeon: Ben Grant MD;  Location: [facility] CATH INVASIVE LOCATION;  Service: Cardiovascular;  Laterality: Right;    ENDOSCOPY      TESTICLE SURGERY Left     extraction         Family History: No Family History of CAD.    Social History:  reports that he has been smoking cigarettes. He started smoking about 50 years ago. He has a 25.1 pack-year smoking history. He has never used smokeless tobacco. He reports that he does " not currently use alcohol. He reports that he does not currently use drugs after having used the following drugs: Methamphetamines and Marijuana.    Medications:  Facility-Administered Medications Prior to Admission   Medication Dose Route Frequency Provider Last Rate Last Admin    ARIPiprazole ER (ABILIFY MAINTENA) IM prefilled syringe 300 mg  300 mg Intramuscular Q30 Days Dickson Landry MD   300 mg at 05/31/24 1610     Medications Prior to Admission   Medication Sig Dispense Refill Last Dose    Abilify Maintena 300 MG Suspension Reconstituted ER IM injection ER Inject 300 mg into the appropriate muscle as directed by prescriber Every 30 (Thirty) Days.   Unknown    albuterol sulfate  (90 Base) MCG/ACT inhaler Inhale 2 puffs Every 4 (Four) Hours As Needed for Wheezing or Shortness of Air. Indications: Chronic Obstructive Lung Disease 18 g 0 Unknown    amiodarone (PACERONE) 200 MG tablet Take 1 tablet by mouth Daily. 90 tablet 3 Unknown    apixaban (ELIQUIS) 5 MG tablet tablet Take 1 tablet by mouth 2 (Two) Times a Day for 120 days. 60 tablet 3 Unknown    atorvastatin (LIPITOR) 40 MG tablet Take 1 tablet by mouth every night at bedtime. 90 tablet 2 Unknown    carvedilol (COREG) 12.5 MG tablet Take 2 tablets by mouth 2 (Two) Times a Day. 180 tablet 3 Unknown    Farxiga 5 MG tablet tablet Take 2 tablets by mouth Daily. Indications: Type 2 Diabetes 90 tablet 2 Unknown    ferrous gluconate (FERGON) 324 MG tablet Take 1 tablet by mouth Daily With Breakfast. 90 tablet 1 Unknown    furosemide (LASIX) 40 MG tablet Take 0.5 tablets by mouth Daily. (Patient taking differently: Take 0.5 tablets by mouth Daily.)   Unknown    metFORMIN (GLUCOPHAGE) 1000 MG tablet Take 1 tablet by mouth 2 (Two) Times a Day With Meals.   Unknown    mirtazapine (REMERON) 15 MG tablet Take 1 tablet by mouth Every Night. 30 tablet 5 Unknown    pantoprazole (PROTONIX) 40 MG EC tablet Take 1 tablet by mouth Daily.   Unknown    sacubitril-valsartan  (ENTRESTO) 49-51 MG tablet Take 1 tablet by mouth Every Morning.   Unknown    sacubitril-valsartan (ENTRESTO) 49-51 MG tablet Take 2 tablets by mouth Every Night.   Unknown    spironolactone (ALDACTONE) 25 MG tablet Take 1 tablet by mouth Daily. 90 tablet 3 Unknown    venlafaxine XR (EFFEXOR-XR) 37.5 MG 24 hr capsule Take 1 capsule by mouth Daily.   Unknown     Current medications:  amiodarone, 200 mg, Oral, Daily  apixaban, 5 mg, Oral, BID  atorvastatin, 40 mg, Oral, Nightly  carvedilol, 12.5 mg, Oral, BID  empagliflozin, 10 mg, Oral, Daily  ferrous sulfate, 325 mg, Oral, Daily With Breakfast  insulin lispro, 2-9 Units, Subcutaneous, 4x Daily AC & at Bedtime  mirtazapine, 15 mg, Oral, Nightly  pantoprazole, 40 mg, Oral, Daily  sacubitril-valsartan, 1 tablet, Oral, QAM  sacubitril-valsartan, 2 tablet, Oral, Nightly  sodium chloride, 10 mL, Intravenous, Q12H  vancomycin, 1,000 mg, Intravenous, Q24H  venlafaxine XR, 37.5 mg, Oral, Daily      Current IV drips:  Pharmacy to dose vancomycin,         Allergies:  Allergies   Allergen Reactions    Penicillins Shortness Of Breath       Objective    Objective     Vitals:   Temp:  [92.3 °F (33.5 °C)-98.6 °F (37 °C)] 96.8 °F (36 °C)  Resp:  [18-20] 18  BP: ()/(45-77) 91/61  Flow (L/min):  [2] 2      Physical Exam:    Constitutional: Awake, alert, No acute distress   Eyes: PERRLA, sclerae anicteric, no conjunctival injection  HENT: NCAT, mucous membranes moist  Neck: Supple, no thyromegaly, no lymphadenopathy, trachea midline  Respiratory: Decreased to auscultation bilaterally, nonlabored respirations   Cardiovascular: RRR, no murmurs, rubs, or gallops, palpable pedal pulses bilaterally    Result Review    Result Review:  I have personally reviewed the results from the time of this admission to 6/19/2024 08:55 EDT and agree with these findings:  [x]  Laboratory  [x]  EKG/Telemetry   [x]  Cardiology/Vascular   []  Pathology  [x]  Old records  [x]  Medications  Basic  "Metabolic Panel    Sodium Sodium   Date Value Ref Range Status   06/19/2024 140 136 - 145 mmol/L Final   06/18/2024 139 136 - 145 mmol/L Final   06/18/2024 140 136 - 145 mmol/L Final   06/17/2024 143 136 - 145 mmol/L Final      Potassium Potassium   Date Value Ref Range Status   06/19/2024 4.1 3.5 - 5.2 mmol/L Final   06/18/2024 4.4 3.5 - 5.2 mmol/L Final     Comment:     Slight hemolysis detected by analyzer. Result may be falsely elevated.   06/18/2024 4.2 3.5 - 5.2 mmol/L Final   06/17/2024 4.4 3.5 - 5.2 mmol/L Final      Chloride Chloride   Date Value Ref Range Status   06/19/2024 100 98 - 107 mmol/L Final   06/18/2024 99 98 - 107 mmol/L Final   06/18/2024 96 (L) 98 - 107 mmol/L Final   06/17/2024 93 (L) 98 - 107 mmol/L Final      Bicarbonate No results found for: \"PLASMABICARB\"   BUN BUN   Date Value Ref Range Status   06/19/2024 72 (H) 6 - 20 mg/dL Final   06/18/2024 65 (H) 6 - 20 mg/dL Final   06/18/2024 43 (H) 6 - 20 mg/dL Final   06/17/2024 33 (H) 6 - 20 mg/dL Final      Creatinine Creatinine   Date Value Ref Range Status   06/19/2024 1.63 (H) 0.76 - 1.27 mg/dL Final   06/18/2024 1.70 (H) 0.76 - 1.27 mg/dL Final   06/18/2024 1.53 (H) 0.76 - 1.27 mg/dL Final   06/17/2024 1.16 0.76 - 1.27 mg/dL Final      Calcium Calcium   Date Value Ref Range Status   06/19/2024 8.6 8.6 - 10.5 mg/dL Final   06/18/2024 8.5 (L) 8.6 - 10.5 mg/dL Final   06/18/2024 9.2 8.6 - 10.5 mg/dL Final   06/17/2024 8.7 8.6 - 10.5 mg/dL Final      Glucose      No components found for: \"GLUCOSE.*\"  Results for orders placed during the hospital encounter of 06/14/24    Adult Transthoracic Echo Complete W/ Cont if Necessary Per Protocol    Interpretation Summary    Left ventricular ejection fraction appears to be 21 - 25%.    Left ventricular wall thickness is consistent with mild concentric hypertrophy.    Left ventricular diastolic dysfunction is noted.    Mildly reduced right ventricular systolic function noted.    The right ventricular " cavity is borderline dilated.    There is a trivial pericardial effusion.    Increased trabeculation in the left ventricular apex was noted.  Cannot exclude organized thrombus.     Results for orders placed during the hospital encounter of 09/17/23    Cardiac Catheterization/Vascular Study    Conclusion  OPERATORS  Ben Grant M.D. (Attending Cardiologist)      PROCEDURE PERFORMED  Ultrasound guided vascular access  Right heart catheterization  Coronary Angiogram  Left Heart Catheterization 89885  Moderate Sedation    INDICATIONS FOR PROCEDURE  58-year-old man with multiple cardiovascular risk factors presented with acute on chronic HFrEF exacerbation.  He has not had any previous ischemic work-up.  He also has pulmonary hypertension.  After discussing the risk and benefit of the procedure he was brought in for right and left heart cath.    PROCEDURE IN DETAIL  Informed consent was obtained from the patient after explaining the risks, benefits, and alternative options of the procedure. After obtaining informed consent, the patient was brought to the cath lab and was prepped in a sterile fashion. Lidocaine 2% was used for local anesthesia into the right femoral venous access site. Right femoral vein was accessed using the micropuncture needle under ultrasound guidance and micropuncture wire advanced under flouroscopy. A 7 Amharic vascular sheath was put into place percutaneously over guide-wire. Guide wires were removed. A 6Fr swan sydnee catheter was advanced to wedge position. RA, RV and PA and wedge pressures were recorded.  PA sat and arterial sats recorded.  The patient tolerated the procedure well without any complications.    Lidocaine 2% was used for local anesthesia into the right femoral arterial access site. The right femoral artery was accessed with a micropuncture needle via modified Seldinger technique under ultrasound guidance. A 6F was inserted successfully.  Afterwards, 6F JR4 and JL4 diagnostic  catheters were advanced over a wire into the ascending aorta and were used to engage the ostia of the left main and RCA respectively. JR4 used to cross the AV and obtain LV pressures and gradient across the AV measured via pullback technique. Images of the right and left coronary systems were obtained. All the catheters were exchanged over a wire and subsequently removed. Angiogram of the femoral access site was obtained and did not show complications. The patient tolerated the procedure well without any complications. The pictures were reviewed at the end of the procedure. A Mynx closure device was applied for venous closure.  A 6 Persian Angio-Seal device was applied for arterial closure.    HEMODYNAMICS    RHC  RA 8/5, 4 mmHg  RV 55/3, 8 mmHg  PA 59/24, 40 mmHg  PCW 34/27, 26 mmHg  AO Sat 96%  PA Sat 69%    Lydia CO 4.9 L/min    Lydia CI 2.53 L/min/m²    SVR 1958 D/S   D/S    LHC  LV: 137/27, 31 mmHg  AO: 136/96, 116 mmHg  No significant gradient across the aortic valve during pullback of JR4 catheter.  LV gram was not performed due to recently available echocardiogram.    FINDINGS  Coronary Angiogram    Right dominant circulation    Left main: Left main is a large caliber vessel which gives rise to the Left Anterior Descending and the Left circumflex.  Left main coronary artery is angiographically free from any significant disease.    Left Anterior Descending Artery: LAD is a medium caliber vessel which gives rise to several septal perforators and several diagonal branches.  LAD is angiographically free from any significant disease.    Left Circumflex: Left circumflex artery gives rise to marginals.  Left circumflex artery is angiographically free from any significant disease.    Right Coronary Artery: The RCA is a large caliber dominant vessel gives rise to PDA and PLV.  RCA is angiographically free from any significant disease    ESTIMATED BLOOD LOSS:  10 ml    COMPLICATIONS:  None    PROCEDURE  DATA:  Contrast Used: 24 cc  Sedation Time: 30 minutes    IMPRESSIONS  Non obstructive CAD.  Nonischemic idiopathic dilated cardiomyopathy.  Normal cardiac output and index  Significantly elevated LVEDP and wedge pressure.  Pulmonary hypertension due to volume overload  Significantly elevated systemic vascular resistance      RECOMMENDATIONS  -Afterload reduction and diuretics  -Uptitrate GDMT  -Abstinence from drug abuse    Electronically signed by Ben Grant MD, 09/17/23, 2:51 PM EDT.     Lab Results   Component Value Date    PROBNP 4,831.0 (H) 06/18/2024          EKG shows sinus rhythm with no acute changes.  Telemetry reviewed    Assessment / Plan     Impression:   1.  Acute on chronic systolic heart failure.  2.  Nonischemic cardiomyopathy.  3.  Mild CAD.  4.  Paroxysmal atrial fibrillation.  5.  Acute on chronic kidney disease stage IIIa  Plan:   1.  Continue carvedilol.  Continue Entresto.  2.  Continue Eliquis for stroke prevention.  Continue amiodarone.  3.  IV Lasix as needed in view of renal function.  Electronically signed by Jaun Carreno MD, 06/19/24, 8:55 AM EDT.

## 2024-06-19 NOTE — OUTREACH NOTE
AMBULATORY CASE MANAGEMENT NOTE    Names and Relationships of Patient/Support Persons: Caller: Regulo Sepulveda; Relationship: Self  Caller: Regulo Sepulveda; Relationship: Self  Caller: Regulo Sepulveda; Relationship: Self -     [facility] Interim Update  Per chart review, patient has been admitted to hospital for weakness and actue cardiac issues. Patient on telemetry floor.   Will continue to follow.       Education Documentation  No documentation found.        Alesha HERBERT  Ambulatory Case Management    6/19/2024, 08:59 EDT

## 2024-06-19 NOTE — PROGRESS NOTES
[facility]   Hospitalist Progress Note  Date: 2024  Patient Name: Regulo Sepulveda  : 1965  MRN: [id number]  Date of admission: 2024  Room/Bed: [facility]/      Subjective   Subjective     Chief Complaint: weakness, short of air     Summary:Regulo Sepulveda is a 58 y.o. male with PMHx of HTN, CHF , COPD, DMII, Parkinson Disease, CAD, Bipolar disorder who presents to the emergency department for evaluation of sore throat and myalgias for the past 3 to 4 days. Also having left elbow pain that he states he is never had before. The patient also states that he was just admitted here few weeks ago for CHF and has had increasingly had more shortness of breath recently. He states that his feet have been swollen for the last several days. He states that he has had increased shortness of breath with exertion. He reports fevers at home. He states that he has been compliant with his medications since discharge. He denies any chills, diaphoresis, chest pain, nausea, vomiting, dysuria, constipation or diarrhea. In the ED he was given Tylenol, Lasix because of pain. He was admitted for further evaluation and management.   2D echo with EF of 21%.  Cardiology consulted.  Ortho consulted for right elbow effusion and bursitis.  Patient denies any trauma or bug bite there.  CT head negative obtained because of confusion    Interval Followup:   No hypothermia  Off oxygen.  Blood pressure soft.  Was given albumin and IV fluid bolus overnight.  Some of the heart failure medicine need to be held  Patient is more awake today.  Oriented  Left elbow pain continues x-ray shows bursitis and effusion.    EEG ordered for staring spells as reported by nursing staff.  No EEG tech available  Creatinine  better today since off diuretics  Continues to have rash on his feet and some on the buttock       Review of Systems    All systems reviewed and negative except for what is outlined above.      Objective   Objective     Vitals:   Temp:  [93.9  °F (34.4 °C)-98.2 °F (36.8 °C)] 97.5 °F (36.4 °C)  Heart Rate:  [70] 70  Resp:  [18] 18  BP: (80-97)/(45-70) 97/70    Physical Exam   General: Awake but lethargic, NAD sleeping in bed wakes up easily  HENT: NCAT, MMM  Eyes: pupils equal, no scleral icterus  Cardiovascular: RRR, no murmurs   Pulmonary: CTA bilaterally; no wheezes; no conversational dyspnea  Gastrointestinal: S/ND/NT, +BS  Musculoskeletal: No gross deformities.  Right elbow tenderness to palpation, has redness, warmth and swelling with restricted end flexion of right elbow  Skin: bilateral feet have fading red vascular colored rash.  Negative ankle edema.  Neuro: Oriented x 1, CN II through XII grossly intact; voice  weak, speech is clear; no tremor  Psych: Mood and affect appropriate  : No Poon catheter; no suprapubic tenderness    Result Review    Result Review:  I have personally reviewed these results:  [x]  Laboratory      Lab 06/19/24  0434 06/18/24  1421 06/18/24  1211 06/18/24  0532 06/17/24  0436 06/16/24  0432   WBC 15.17* 15.41*  --  15.95*   < > 11.45*   HEMOGLOBIN 13.0 15.9  --  18.0*   < > 14.0   HEMATOCRIT 41.0 49.6  --  57.2*   < > 44.6   PLATELETS 344 447  --  506*   < > 353   NEUTROS ABS 12.47* 12.30*  --   --   --  9.48*   IMMATURE GRANS (ABS) 0.05 0.05  --   --   --  0.04   LYMPHS ABS 1.30 1.39  --   --   --  1.00   MONOS ABS 1.27* 1.63*  --   --   --  0.75   EOS ABS 0.03 0.01  --   --   --  0.12   MCV 85.4 84.6  --  85.4   < > 85.9   SED RATE  --   --   --  23*  --   --    CRP  --   --   --  9.86*  --   --    PROCALCITONIN  --  0.30*  --  0.16  --   --    LACTATE  --   --  2.0  --   --   --     < > = values in this interval not displayed.         Lab 06/19/24  0434 06/18/24  1421 06/18/24  0532 06/17/24  0436 06/15/24  1525 06/15/24  0402   SODIUM 140 139 140 143   < > 141   POTASSIUM 4.1 4.4 4.2 4.4   < > 3.3*   CHLORIDE 100 99 96* 93*   < > 102   CO2 27.1 24.8 30.5* 28.2   < > 29.9*   ANION GAP 12.9 15.2* 13.5 21.8*   < > 9.1    BUN 72* 65* 43* 33*   < > 27*   CREATININE 1.63* 1.70* 1.53* 1.16   < > 1.34*   EGFR 48.5* 46.2* 52.4* 73.0   < > 61.4   GLUCOSE 113* 131* 163* 141*   < > 89   CALCIUM 8.6 8.5* 9.2 8.7   < > 8.4*   MAGNESIUM  --   --  1.6 1.7  --   --    PHOSPHORUS 3.6  --   --   --   --   --    HEMOGLOBIN A1C  --   --   --   --   --  6.40*    < > = values in this interval not displayed.         Lab 06/19/24  0434 06/18/24  1421 06/16/24  0432 06/15/24  0402   TOTAL PROTEIN  --  5.8* 6.5 5.9*   ALBUMIN 3.1* 3.0* 3.3* 3.3*   GLOBULIN  --  2.8 3.2 2.6   ALT (SGPT)  --  28 47* 39   AST (SGOT)  --  20 30 23   BILIRUBIN  --  0.6 0.9 1.4*   ALK PHOS  --  215* 264* 249*         Lab 06/18/24  0532 06/14/24  1954   PROBNP 4,831.0* 45,413.0*                 Brief Urine Lab Results  (Last result in the past 365 days)        Color   Clarity   Blood   Leuk Est   Nitrite   Protein   CREAT   Urine HCG        06/14/24 1945 Yellow   Clear   Moderate (2+)   Negative   Negative   30 mg/dL (1+)                 [x]  Microbiology   Microbiology Results (last 10 days)       Procedure Component Value - Date/Time    Blood Culture - Blood, Hand, Right [[id number]]  (Normal) Collected: 06/18/24 1211    Lab Status: Preliminary result Specimen: Blood from Hand, Right Updated: 06/19/24 1230     Blood Culture No growth at 24 hours    Blood Culture - Blood, Arm, Left [[id number]]  (Normal) Collected: 06/18/24 1205    Lab Status: Preliminary result Specimen: Blood from Arm, Left Updated: 06/19/24 1230     Blood Culture No growth at 24 hours    MRSA Screen, PCR (Inpatient) - Swab, Nares [[id number]]  (Normal) Collected: 06/18/24 1119    Lab Status: Final result Specimen: Swab from Nares Updated: 06/18/24 1253     MRSA PCR No MRSA Detected    Narrative:      The negative predictive value of this diagnostic test is high and should only be used to consider de-escalating anti-MRSA therapy. A positive result may indicate colonization with MRSA and must be correlated  clinically.    Rapid Strep A Screen - Swab, Throat [[id number]]  (Normal) Collected: 06/17/24 0548    Lab Status: Final result Specimen: Swab from Throat Updated: 06/17/24 0640     Strep A Ag Negative    Beta Strep Culture, Throat - Swab, Throat [[id number]]  (Normal) Collected: 06/17/24 0548    Lab Status: Final result Specimen: Swab from Throat Updated: 06/19/24 0731     Throat Culture, Beta Strep No Beta Hemolytic Streptococcus Isolated    Narrative:      Group A Strep incidence is low in adults. Positive culture for Beta hemolytic Streptococcus species can reflect colonization and not true infection. Please correlate clinically.      Rapid Strep A Screen - Swab, Throat [[id number]]  (Normal) Collected: 06/14/24 1724    Lab Status: Final result Specimen: Swab from Throat Updated: 06/14/24 1746     Strep A Ag Negative    COVID-19, FLU A/B, RSV PCR 1 HR TAT - Swab, Nasopharynx [[id number]]  (Normal) Collected: 06/14/24 1724    Lab Status: Final result Specimen: Swab from Nasopharynx Updated: 06/14/24 1808     COVID19 Not Detected     Influenza A PCR Not Detected     Influenza B PCR Not Detected     RSV, PCR Not Detected    Narrative:      Fact sheet for providers: https://www.fda.gov/media/695044/download    Fact sheet for patients: https://www.fda.gov/media/797044/download    Test performed by PCR.    Beta Strep Culture, Throat - Swab, Throat [[id number]]  (Normal) Collected: 06/14/24 1724    Lab Status: Final result Specimen: Swab from Throat Updated: 06/16/24 0944     Throat Culture, Beta Strep No Beta Hemolytic Streptococcus Isolated    Narrative:      Group A Strep incidence is low in adults. Positive culture for Beta hemolytic Streptococcus species can reflect colonization and not true infection. Please correlate clinically.          [x]  Radiology  CT Head Without Contrast    Result Date: 6/18/2024  Age-related changes of the brain as above, otherwise without evidence of acute intracranial abnormality.  Electronically Signed: Tito Spicer MD  6/18/2024 11:46 AM EDT  Workstation ID: [id number]    XR Elbow 3+ View Right    Result Date: 6/17/2024  1. There is possible right-sided olecranon bursitis. 2. A large right elbow joint effusion is seen. 3. Mild enthesopathic changes are present. 4. There may be minimal degenerative change. 5. No retained radiopaque foreign body. 6. No subcutaneous emphysema. 7. No convincing radiographic evidence for osteomyelitis. Please note that portions of this note were completed with a voice recognition program. Electronically Signed: Angel Snell MD  6/17/2024 5:02 AM EDT  Workstation ID: [id number]    XR Chest 1 View    Result Date: 6/14/2024  Impression: No radiographic evidence of acute cardiopulmonary process. Electronically Signed: Ryan Dejesus MD  6/14/2024 8:35 PM EDT  Workstation ID: [id number]    XR Elbow 3+ View Left    Result Date: 6/14/2024  Impression: Left olecranon osteophyte with adjacent soft tissue swelling. Electronically Signed: Jerson Jane MD  6/14/2024 5:39 PM EDT  Workstation ID: [id number]   []  EKG/Telemetry   []  Cardiology/Vascular   []  Pathology  []  Old records  []  Other:    Assessment & Plan   Assessment / Plan     Assessment:  Acute systolic heart failure exacerbation.  EF of 21%  Hypertension  Rash on bilateral lower feet.?  Leukocytoclastic vasculitis  Right elbow bursitis/effusion  History of hepatitis C, treated  Paroxysmal atrial fibrillation on Eliquis  History of apical thrombus on Eliquis  COPD  Diabetes mellitus type 2.  Hemoglobin A1c of 6.4%  Hypertension  Hyperlipidemia  Schizophrenia on monthly injection  Renal insufficiency.  Creatinine stable  Parkinsonism  Confusion/acute metabolic encephalopathy  Leukocytosis and polycythemia    Plan:  Continue supplemental oxygen to keep sats more than 90%  Stat CT head negative.  EEG pending.  Await tech  Midodrine  IV Vanco  NG PCR negative  Pro-Topher and lactate negative  Keep holding IV Lasix and  Aldactone as creatinine  up and blood pressure is soft.    Continue home Entresto, 9 dose decreased.  Decreased home Coreg further by 50% due to soft blood pressure  Discussed with patient's cardiologist about CHF and soft blood pressure.  Appreciate input  Discussed with orthopedic surgery.  Appreciate input  Uric acid negative.  Patient denies any history of gout  Started on IV vancomycin for right elbow effusion.  MRSA PCR negative.  ESR and CRP noted  Complement level is fine. ANCA and DARIEN pending  Right elbow x-ray reviewed.  Showing some edema as well as concern for bursitis.  Considered anti-inflammatories however patient is on Eliquis.  For now continue pain control with oral Norco  Continue patient on sliding scale insulin for diabetes  For patient's schizophrenia he takes injectable medicine once per month  Continue patient on Eliquis for his history of apical thrombus and atrial fibrillation.   Repeat echocardiogram noted  Repeat labs in am   PT OT  Continue telemetry.     Discussed with RN and .  Apparently patient does not want rehab placement.    VTE Prophylaxis:  Pharmacologic VTE prophylaxis orders are present.  Eliquis        CODE STATUS:   Level Of Support Discussed With: Patient  Code Status (Patient has no pulse and is not breathing): CPR (Attempt to Resuscitate)  Medical Interventions (Patient has pulse or is breathing): Full Support      Electronically signed by Alex Angulo MD, 6/19/2024, 17:28 EDT.

## 2024-06-19 NOTE — SIGNIFICANT NOTE
06/19/24 1039   OTHER   Discipline occupational therapist   Rehab Time/Intention   Session Not Performed patient/family declined treatment

## 2024-06-19 NOTE — SIGNIFICANT NOTE
06/19/24 1215   Coping/Psychosocial   Observed Emotional State calm;cooperative   Verbalized Emotional State other (see comments)  (Tired)   Trust Relationship/Rapport empathic listening provided   Involvement in Care interacting with patient   Additional Documentation Spiritual Care (Group)   Spiritual Care   Use of Spiritual Resources non-Pentecostal use of spiritual care   Spiritual Care Source  initiative   Spiritual Care Follow-Up follow-up, none required as presently assessed   Response to Spiritual Care receptive of support   Spiritual Care Interventions supportive conversation provided   Spiritual Care Visit Type initial   Receptivity to Spiritual Care visit welcomed

## 2024-06-19 NOTE — PLAN OF CARE
Goal Outcome Evaluation:              Outcome Evaluation: Patient had low BP this shift, orders for 250ml bolus and albumin, A&O x4, intermittent confusion. Temp WNL this shift

## 2024-06-19 NOTE — PLAN OF CARE
Goal Outcome Evaluation:  Plan of Care Reviewed With: patient        Progress: (P) declining  Outcome Evaluation: BPs soft this shift. Intermittent confusion. Pt eating little of meals. Used bearhugger for 1 hour this shift, Temp WNL after.

## 2024-06-20 ENCOUNTER — APPOINTMENT (OUTPATIENT)
Dept: NEUROLOGY | Facility: HOSPITAL | Age: 59
End: 2024-06-20
Payer: COMMERCIAL

## 2024-06-20 LAB
ALBUMIN SERPL-MCNC: 3 G/DL (ref 3.5–5.2)
ANION GAP SERPL CALCULATED.3IONS-SCNC: 9.4 MMOL/L (ref 5–15)
BASOPHILS # BLD AUTO: 0.04 10*3/MM3 (ref 0–0.2)
BASOPHILS NFR BLD AUTO: 0.4 % (ref 0–1.5)
BUN SERPL-MCNC: 54 MG/DL (ref 6–20)
BUN/CREAT SERPL: 45.8 (ref 7–25)
CALCIUM SPEC-SCNC: 8.5 MG/DL (ref 8.6–10.5)
CHLORIDE SERPL-SCNC: 103 MMOL/L (ref 98–107)
CO2 SERPL-SCNC: 28.6 MMOL/L (ref 22–29)
CREAT SERPL-MCNC: 1.18 MG/DL (ref 0.76–1.27)
DEPRECATED RDW RBC AUTO: 55.1 FL (ref 37–54)
EGFRCR SERPLBLD CKD-EPI 2021: 71.5 ML/MIN/1.73
EOSINOPHIL # BLD AUTO: 0.12 10*3/MM3 (ref 0–0.4)
EOSINOPHIL NFR BLD AUTO: 1.3 % (ref 0.3–6.2)
ERYTHROCYTE [DISTWIDTH] IN BLOOD BY AUTOMATED COUNT: 17.5 % (ref 12.3–15.4)
GLUCOSE BLDC GLUCOMTR-MCNC: 108 MG/DL (ref 70–99)
GLUCOSE BLDC GLUCOMTR-MCNC: 108 MG/DL (ref 70–99)
GLUCOSE BLDC GLUCOMTR-MCNC: 127 MG/DL (ref 70–99)
GLUCOSE BLDC GLUCOMTR-MCNC: 149 MG/DL (ref 70–99)
GLUCOSE SERPL-MCNC: 104 MG/DL (ref 65–99)
HCT VFR BLD AUTO: 40.8 % (ref 37.5–51)
HGB BLD-MCNC: 12.8 G/DL (ref 13–17.7)
IMM GRANULOCYTES # BLD AUTO: 0.03 10*3/MM3 (ref 0–0.05)
IMM GRANULOCYTES NFR BLD AUTO: 0.3 % (ref 0–0.5)
LYMPHOCYTES # BLD AUTO: 1.38 10*3/MM3 (ref 0.7–3.1)
LYMPHOCYTES NFR BLD AUTO: 14.4 % (ref 19.6–45.3)
MCH RBC QN AUTO: 27.1 PG (ref 26.6–33)
MCHC RBC AUTO-ENTMCNC: 31.4 G/DL (ref 31.5–35.7)
MCV RBC AUTO: 86.3 FL (ref 79–97)
MONOCYTES # BLD AUTO: 0.95 10*3/MM3 (ref 0.1–0.9)
MONOCYTES NFR BLD AUTO: 9.9 % (ref 5–12)
NEUTROPHILS NFR BLD AUTO: 7.07 10*3/MM3 (ref 1.7–7)
NEUTROPHILS NFR BLD AUTO: 73.7 % (ref 42.7–76)
NRBC BLD AUTO-RTO: 0 /100 WBC (ref 0–0.2)
PHOSPHATE SERPL-MCNC: 2.6 MG/DL (ref 2.5–4.5)
PLATELET # BLD AUTO: 337 10*3/MM3 (ref 140–450)
PMV BLD AUTO: 10.2 FL (ref 6–12)
POTASSIUM SERPL-SCNC: 3.7 MMOL/L (ref 3.5–5.2)
RBC # BLD AUTO: 4.73 10*6/MM3 (ref 4.14–5.8)
SODIUM SERPL-SCNC: 141 MMOL/L (ref 136–145)
WBC NRBC COR # BLD AUTO: 9.59 10*3/MM3 (ref 3.4–10.8)

## 2024-06-20 PROCEDURE — 25810000003 SODIUM CHLORIDE 0.9 % SOLUTION: Performed by: INTERNAL MEDICINE

## 2024-06-20 PROCEDURE — 80069 RENAL FUNCTION PANEL: CPT | Performed by: INTERNAL MEDICINE

## 2024-06-20 PROCEDURE — 82948 REAGENT STRIP/BLOOD GLUCOSE: CPT

## 2024-06-20 PROCEDURE — 85025 COMPLETE CBC W/AUTO DIFF WBC: CPT | Performed by: INTERNAL MEDICINE

## 2024-06-20 PROCEDURE — 25010000002 METOCLOPRAMIDE PER 10 MG: Performed by: PHYSICIAN ASSISTANT

## 2024-06-20 PROCEDURE — 82948 REAGENT STRIP/BLOOD GLUCOSE: CPT | Performed by: FAMILY MEDICINE

## 2024-06-20 PROCEDURE — 25010000002 VANCOMYCIN 1 G RECONSTITUTED SOLUTION: Performed by: INTERNAL MEDICINE

## 2024-06-20 PROCEDURE — 95819 EEG AWAKE AND ASLEEP: CPT

## 2024-06-20 PROCEDURE — 99232 SBSQ HOSP IP/OBS MODERATE 35: CPT | Performed by: SPECIALIST

## 2024-06-20 PROCEDURE — 99232 SBSQ HOSP IP/OBS MODERATE 35: CPT | Performed by: INTERNAL MEDICINE

## 2024-06-20 RX ORDER — FUROSEMIDE 20 MG/1
20 TABLET ORAL DAILY
Status: DISCONTINUED | OUTPATIENT
Start: 2024-06-20 | End: 2024-06-25 | Stop reason: HOSPADM

## 2024-06-20 RX ORDER — METOCLOPRAMIDE HYDROCHLORIDE 5 MG/ML
10 INJECTION INTRAMUSCULAR; INTRAVENOUS ONCE
Status: COMPLETED | OUTPATIENT
Start: 2024-06-20 | End: 2024-06-20

## 2024-06-20 RX ORDER — SPIRONOLACTONE 25 MG/1
25 TABLET ORAL DAILY
Status: DISCONTINUED | OUTPATIENT
Start: 2024-06-20 | End: 2024-06-25 | Stop reason: HOSPADM

## 2024-06-20 RX ADMIN — AMIODARONE HYDROCHLORIDE 200 MG: 200 TABLET ORAL at 08:52

## 2024-06-20 RX ADMIN — MIDODRINE HYDROCHLORIDE 10 MG: 10 TABLET ORAL at 04:48

## 2024-06-20 RX ADMIN — APIXABAN 5 MG: 5 TABLET, FILM COATED ORAL at 08:52

## 2024-06-20 RX ADMIN — SACUBITRIL AND VALSARTAN 1 TABLET: 49; 51 TABLET, FILM COATED ORAL at 21:28

## 2024-06-20 RX ADMIN — PANTOPRAZOLE SODIUM 40 MG: 40 TABLET, DELAYED RELEASE ORAL at 08:52

## 2024-06-20 RX ADMIN — Medication 10 ML: at 21:28

## 2024-06-20 RX ADMIN — MIRTAZAPINE 15 MG: 15 TABLET, FILM COATED ORAL at 21:28

## 2024-06-20 RX ADMIN — FUROSEMIDE 20 MG: 20 TABLET ORAL at 13:41

## 2024-06-20 RX ADMIN — HYDROCODONE BITARTRATE AND ACETAMINOPHEN 1 TABLET: 5; 325 TABLET ORAL at 13:41

## 2024-06-20 RX ADMIN — APIXABAN 5 MG: 5 TABLET, FILM COATED ORAL at 21:28

## 2024-06-20 RX ADMIN — CARVEDILOL 6.25 MG: 6.25 TABLET, FILM COATED ORAL at 21:28

## 2024-06-20 RX ADMIN — ATORVASTATIN CALCIUM 40 MG: 40 TABLET, FILM COATED ORAL at 21:28

## 2024-06-20 RX ADMIN — VANCOMYCIN HYDROCHLORIDE 1000 MG: 1 INJECTION, POWDER, LYOPHILIZED, FOR SOLUTION INTRAVENOUS at 08:53

## 2024-06-20 RX ADMIN — METOCLOPRAMIDE HYDROCHLORIDE 10 MG: 5 INJECTION INTRAMUSCULAR; INTRAVENOUS at 21:28

## 2024-06-20 RX ADMIN — MIDODRINE HYDROCHLORIDE 10 MG: 10 TABLET ORAL at 13:41

## 2024-06-20 RX ADMIN — Medication 10 ML: at 10:15

## 2024-06-20 RX ADMIN — EMPAGLIFLOZIN 10 MG: 10 TABLET, FILM COATED ORAL at 08:52

## 2024-06-20 RX ADMIN — CARVEDILOL 6.25 MG: 6.25 TABLET, FILM COATED ORAL at 08:52

## 2024-06-20 RX ADMIN — FERROUS SULFATE TAB 325 MG (65 MG ELEMENTAL FE) 325 MG: 325 (65 FE) TAB at 08:52

## 2024-06-20 RX ADMIN — SPIRONOLACTONE 25 MG: 25 TABLET ORAL at 13:41

## 2024-06-20 RX ADMIN — HYDROCODONE BITARTRATE AND ACETAMINOPHEN 1 TABLET: 5; 325 TABLET ORAL at 04:48

## 2024-06-20 RX ADMIN — Medication 5 MG: at 21:28

## 2024-06-20 RX ADMIN — MIDODRINE HYDROCHLORIDE 10 MG: 10 TABLET ORAL at 21:28

## 2024-06-20 RX ADMIN — SACUBITRIL AND VALSARTAN 1 TABLET: 49; 51 TABLET, FILM COATED ORAL at 08:52

## 2024-06-20 RX ADMIN — VENLAFAXINE HYDROCHLORIDE 37.5 MG: 37.5 CAPSULE, EXTENDED RELEASE ORAL at 08:52

## 2024-06-20 RX ADMIN — HYDROCODONE BITARTRATE AND ACETAMINOPHEN 1 TABLET: 5; 325 TABLET ORAL at 19:48

## 2024-06-20 NOTE — PROGRESS NOTES
[facility]   Cardiology Progress Note      Patient Name: Regulo Sepulveda  : 1965  MRN: [id number]  Primary Care Physician:  Dickson Landry MD  Referring Physician: No ref. provider found  Date of admission: 2024    Subjective   Subjective     Chief Complaint: CHF    HPI:  Regulo Sepulveda is a 58 y.o. male with history of cardiomyopathy and CHF admitted with increased shortness of breath.  Slightly confused.        Objective    Objective     Vitals:   Vitals:    24 1904 24 2216 24 0219 24 0810   BP: 97/51 94/52 106/66 125/75   BP Location:    Left arm   Patient Position:    Sitting   Pulse:    67   Resp: 18 18 18 16   Temp: 97.5 °F (36.4 °C) 97.7 °F (36.5 °C) 97.5 °F (36.4 °C) 97.9 °F (36.6 °C)   TempSrc: Oral Oral Oral Oral   SpO2: 97% 96% 99% 95%   Weight:       Height:                Physical Exam:   Constitutional: Awake, alert, No acute distress    Eyes: PERRLA, sclerae anicteric, no conjunctival injection   HENT: NCAT, mucous membranes moist   Neck: Supple, no thyromegaly, no lymphadenopathy, trachea midline   Respiratory: Decreased to auscultation bilaterally, nonlabored respirations    Cardiovascular: Irregular heart sound, no murmurs, rubs, or gallops, palpable pedal pulses bilaterally       Current medications:  amiodarone, 200 mg, Oral, Daily  apixaban, 5 mg, Oral, BID  atorvastatin, 40 mg, Oral, Nightly  carvedilol, 6.25 mg, Oral, BID  empagliflozin, 10 mg, Oral, Daily  ferrous sulfate, 325 mg, Oral, Daily With Breakfast  insulin lispro, 2-9 Units, Subcutaneous, 4x Daily AC & at Bedtime  midodrine, 10 mg, Oral, Q8H  mirtazapine, 15 mg, Oral, Nightly  pantoprazole, 40 mg, Oral, Daily  sacubitril-valsartan, 1 tablet, Oral, Q12H  sodium chloride, 10 mL, Intravenous, Q12H  vancomycin, 1,000 mg, Intravenous, Q24H  venlafaxine XR, 37.5 mg, Oral, Daily      Current IV drips:  Pharmacy to dose vancomycin,         Result Review    Result Review:  I have personally reviewed the  results from the time of this admission to 6/20/2024 08:34 EDT and agree with these findings:  []  Laboratory  []  EKG/Telemetry   []  Cardiology/Vascular   []  Radiology         CBC          6/18/2024    05:32 6/18/2024    14:21 6/19/2024    04:34 6/20/2024    04:30   CBC   WBC 15.95  15.41  15.17  9.59    RBC 6.70  5.86  4.80  4.73    Hemoglobin 18.0  15.9  13.0  12.8    Hematocrit 57.2  49.6  41.0  40.8    MCV 85.4  84.6  85.4  86.3    MCH 26.9  27.1  27.1  27.1    MCHC 31.5  32.1  31.7  31.4    RDW 19.1  18.3  17.4  17.5    Platelets 506  447  344  337      CMP          6/18/2024    05:32 6/18/2024    14:21 6/19/2024    04:34 6/20/2024    04:30   CMP   Glucose 163  131  113  104    BUN 43  65  72  54    Creatinine 1.53  1.70  1.63  1.18    EGFR 52.4  46.2  48.5  71.5    Sodium 140  139  140  141    Potassium 4.2  4.4  4.1  3.7    Chloride 96  99  100  103    Calcium 9.2  8.5  8.6  8.5    Total Protein  5.8      Albumin  3.0  3.1  3.0    Globulin  2.8      Total Bilirubin  0.6      Alkaline Phosphatase  215      AST (SGOT)  20      ALT (SGPT)  28      Albumin/Globulin Ratio  1.1      BUN/Creatinine Ratio 28.1  38.2  44.2  45.8    Anion Gap 13.5  15.2  12.9  9.4      Results for orders placed during the hospital encounter of 06/14/24    Adult Transthoracic Echo Complete W/ Cont if Necessary Per Protocol    Interpretation Summary    Left ventricular ejection fraction appears to be 21 - 25%.    Left ventricular wall thickness is consistent with mild concentric hypertrophy.    Left ventricular diastolic dysfunction is noted.    Mildly reduced right ventricular systolic function noted.    The right ventricular cavity is borderline dilated.    There is a trivial pericardial effusion.    Increased trabeculation in the left ventricular apex was noted.  Cannot exclude organized thrombus.     Results for orders placed during the hospital encounter of 09/17/23    Cardiac Catheterization/Vascular  Study    Conclusion  OPERATORS  Ben Grant M.D. (Attending Cardiologist)      PROCEDURE PERFORMED  Ultrasound guided vascular access  Right heart catheterization  Coronary Angiogram  Left Heart Catheterization 63075  Moderate Sedation    INDICATIONS FOR PROCEDURE  58-year-old man with multiple cardiovascular risk factors presented with acute on chronic HFrEF exacerbation.  He has not had any previous ischemic work-up.  He also has pulmonary hypertension.  After discussing the risk and benefit of the procedure he was brought in for right and left heart cath.    PROCEDURE IN DETAIL  Informed consent was obtained from the patient after explaining the risks, benefits, and alternative options of the procedure. After obtaining informed consent, the patient was brought to the cath lab and was prepped in a sterile fashion. Lidocaine 2% was used for local anesthesia into the right femoral venous access site. Right femoral vein was accessed using the micropuncture needle under ultrasound guidance and micropuncture wire advanced under flouroscopy. A 7 Luxembourger vascular sheath was put into place percutaneously over guide-wire. Guide wires were removed. A 6Fr swan sydnee catheter was advanced to wedge position. RA, RV and PA and wedge pressures were recorded.  PA sat and arterial sats recorded.  The patient tolerated the procedure well without any complications.    Lidocaine 2% was used for local anesthesia into the right femoral arterial access site. The right femoral artery was accessed with a micropuncture needle via modified Seldinger technique under ultrasound guidance. A 6F was inserted successfully.  Afterwards, 6F JR4 and JL4 diagnostic catheters were advanced over a wire into the ascending aorta and were used to engage the ostia of the left main and RCA respectively. JR4 used to cross the AV and obtain LV pressures and gradient across the AV measured via pullback technique. Images of the right and left coronary systems  were obtained. All the catheters were exchanged over a wire and subsequently removed. Angiogram of the femoral access site was obtained and did not show complications. The patient tolerated the procedure well without any complications. The pictures were reviewed at the end of the procedure. A Mynx closure device was applied for venous closure.  A 6 [place]e Angio-Seal device was applied for arterial closure.    HEMODYNAMICS    RHC  RA 8/5, 4 mmHg  RV 55/3, 8 mmHg  PA 59/24, 40 mmHg  PCW 34/27, 26 mmHg  AO Sat 96%  PA Sat 69%    Lydia CO 4.9 L/min    Lydia CI 2.53 L/min/m²    SVR 1958 D/S   D/S    LHC  LV: 137/27, 31 mmHg  AO: 136/96, 116 mmHg  No significant gradient across the aortic valve during pullback of JR4 catheter.  LV gram was not performed due to recently available echocardiogram.    FINDINGS  Coronary Angiogram    Right dominant circulation    Left main: Left main is a large caliber vessel which gives rise to the Left Anterior Descending and the Left circumflex.  Left main coronary artery is angiographically free from any significant disease.    Left Anterior Descending Artery: LAD is a medium caliber vessel which gives rise to several septal perforators and several diagonal branches.  LAD is angiographically free from any significant disease.    Left Circumflex: Left circumflex artery gives rise to marginals.  Left circumflex artery is angiographically free from any significant disease.    Right Coronary Artery: The RCA is a large caliber dominant vessel gives rise to PDA and PLV.  RCA is angiographically free from any significant disease    ESTIMATED BLOOD LOSS:  10 ml    COMPLICATIONS:  None    PROCEDURE DATA:  Contrast Used: 24 cc  Sedation Time: 30 minutes    IMPRESSIONS  Non obstructive CAD.  Nonischemic idiopathic dilated cardiomyopathy.  Normal cardiac output and index  Significantly elevated LVEDP and wedge pressure.  Pulmonary hypertension due to volume overload  Significantly elevated  systemic vascular resistance      RECOMMENDATIONS  -Afterload reduction and diuretics  -Uptitrate GDMT  -Abstinence from drug abuse    Electronically signed by Ben Grant MD, 09/17/23, 2:51 PM EDT.     Lab Results   Component Value Date    PROBNP 4,831.0 (H) 06/18/2024         Telemetry reviewed     Assessment / Plan     ASSESSMENT:    Generalized weakness  Nonischemic cardiomyopathy.  Acute on chronic systolic heart failure secondary to above.  Paroxysmal atrial fibrillation      PLAN:  1.  Continue current medications.  2.  Continue Entresto, Eliquis.  3.  IV Lasix as needed.  Restart p.o. Lasix on discharge.  4.  Will sign off and see as needed.  Electronically signed by Jaun Carreno MD, 06/20/24, 8:34 AM EDT.

## 2024-06-20 NOTE — PLAN OF CARE
Goal Outcome Evaluation:  Plan of Care Reviewed With: patient        Progress: no change  Outcome Evaluation: Pt did not eat much of any meals. Pt stated being in severe pain 10/10 Kev notified. EEG today.

## 2024-06-20 NOTE — PLAN OF CARE
Goal Outcome Evaluation:      Pain control per MAR, VSS, temp WNL all shift. Plan for ortho consult today to determine further plan of care. Cynthia Yang RN

## 2024-06-20 NOTE — PROGRESS NOTES
"[facility] Clinical Pharmacy Services: Vancomycin Monitoring Note    Regulo Sepulveda is a 58 y.o. male who is on day 3/7 of pharmacy to dose vancomycin for Bone and/or Joint Infection.    Previous Vancomycin Dose:   1000 mg IV every  24  hours  Imaging Reviewed?: Yes   XR Right elbow: soft tissue swelling, especially posteriorly, which is nonspecific and may represent confusion; olecranon bursitis is possible; no subc emphysema; expected right elbow joint effusion; no convincing radiographic evidence of OM    Updated Cultures and Sensitivities:    MRSA PCR: negative   Blood cx : NGTD   Strep: negative   COVID/flu/RSV: negative    Vitals/Labs  Ht: 185.4 cm (73\"); Wt: 64.7 kg (142 lb 10.2 oz)   Temp (24hrs), Av.7 °F (36.5 °C), Min:97.5 °F (36.4 °C), Max:97.9 °F (36.6 °C)   Estimated Creatinine Clearance: 62.4 mL/min (by C-G formula based on SCr of 1.18 mg/dL).     Results from last 7 days   Lab Units 24  0430 24  0434 24  1421   VANCOMYCIN RM mcg/mL  --  12.38  --    CREATININE mg/dL 1.18 1.63* 1.70*   WBC 10*3/mm3 9.59 15.17* 15.41*     Assessment/Plan    Renal function improved, current regimen still maintaining AUC between 400-600.   Current Vancomycin Dose:  1000 mg IV every 24 hours; which provides the following predicted parameters:  AUC24,ss: 431 mg/L.hr  PAUC*: 63 %  Ctrough,ss: 11.5 mg/L  Pconc*: 5 %  Tox.: 7 %  Next Vanc Random ordered for  at 0600 with AM labs  We will continue to monitor patient changes and renal function     Thank you for involving pharmacy in this patient's care. Please contact pharmacy with any questions or concerns.    Doreen Puentes [facility]  Clinical Pharmacist  "

## 2024-06-20 NOTE — SIGNIFICANT NOTE
06/20/24 0932   OTHER   Discipline occupational therapist   Rehab Time/Intention   Session Not Performed patient/family declined treatment  (Adamantly declined stating that he was leaving, nursing notified.)

## 2024-06-20 NOTE — PROGRESS NOTES
[facility]   Hospitalist Progress Note  Date: 2024  Patient Name: Regulo Sepulveda  : 1965  MRN: [id number]  Date of admission: 2024  Room/Bed: [facility]/      Subjective   Subjective     Chief Complaint: weakness, short of air     Summary:Regulo Sepulveda is a 58 y.o. male with PMHx of HTN, CHF , COPD, DMII, Parkinson Disease, CAD, Bipolar disorder who presents to the emergency department for evaluation of sore throat and myalgias for the past 3 to 4 days. Also having left elbow pain that he states he is never had before. The patient also states that he was just admitted here few weeks ago for CHF and has had increasingly had more shortness of breath recently. He states that his feet have been swollen for the last several days. He states that he has had increased shortness of breath with exertion. He reports fevers at home. He states that he has been compliant with his medications since discharge. He denies any chills, diaphoresis, chest pain, nausea, vomiting, dysuria, constipation or diarrhea. In the ED he was given Tylenol, Lasix because of pain. He was admitted for further evaluation and management.   2D echo with EF of 21%.  Cardiology consulted.  Ortho consulted for right elbow effusion and bursitis.  Patient denies any trauma or bug bite there.  CT head negative obtained because of confusion    Interval Followup:   Blood pressure stable.  Remains on room air.    Patient is awake and alert.  Oriented  Left elbow pain continues x-ray shows bursitis and effusion.    EEG ordered for staring spells as reported by nursing staff.  No EEG tech available  Creatinine normalized.  Will resume oral diuretics  Continues to have rash on his feet and some on the buttock       Review of Systems    All systems reviewed and negative except for what is outlined above.      Objective   Objective     Vitals:   Temp:  [97.5 °F (36.4 °C)-97.9 °F (36.6 °C)] 97.5 °F (36.4 °C)  Heart Rate:  [67-68] 68  Resp:  [16-18] 16  BP:  ()/(51-86) 103/60    Physical Exam   General: Awake but lethargic, NAD sleeping in bed wakes up easily  HENT: NCAT, MMM  Eyes: pupils equal, no scleral icterus  Cardiovascular: RRR, no murmurs   Pulmonary: CTA bilaterally; no wheezes; no conversational dyspnea  Gastrointestinal: S/ND/NT, +BS  Musculoskeletal: No gross deformities.  Right elbow tenderness to palpation, has redness, warmth and swelling with restricted end flexion of right elbow  Skin: bilateral feet have fading red vascular colored rash.  Negative ankle edema.  Neuro: Oriented x 1, CN II through XII grossly intact; voice  weak, speech is clear; no tremor  Psych: Mood and affect appropriate  : No Poon catheter; no suprapubic tenderness    Result Review    Result Review:  I have personally reviewed these results:  [x]  Laboratory      Lab 06/20/24  0430 06/19/24  0434 06/18/24  1421 06/18/24  1211 06/18/24  0532   WBC 9.59 15.17* 15.41*  --  15.95*   HEMOGLOBIN 12.8* 13.0 15.9  --  18.0*   HEMATOCRIT 40.8 41.0 49.6  --  57.2*   PLATELETS 337 344 447  --  506*   NEUTROS ABS 7.07* 12.47* 12.30*  --   --    IMMATURE GRANS (ABS) 0.03 0.05 0.05  --   --    LYMPHS ABS 1.38 1.30 1.39  --   --    MONOS ABS 0.95* 1.27* 1.63*  --   --    EOS ABS 0.12 0.03 0.01  --   --    MCV 86.3 85.4 84.6  --  85.4   SED RATE  --   --   --   --  23*   CRP  --   --   --   --  9.86*   PROCALCITONIN  --   --  0.30*  --  0.16   LACTATE  --   --   --  2.0  --          Lab 06/20/24  0430 06/19/24  0434 06/18/24  1421 06/18/24  0532 06/17/24  0436 06/15/24  1525 06/15/24  0402   SODIUM 141 140 139 140 143   < > 141   POTASSIUM 3.7 4.1 4.4 4.2 4.4   < > 3.3*   CHLORIDE 103 100 99 96* 93*   < > 102   CO2 28.6 27.1 24.8 30.5* 28.2   < > 29.9*   ANION GAP 9.4 12.9 15.2* 13.5 21.8*   < > 9.1   BUN 54* 72* 65* 43* 33*   < > 27*   CREATININE 1.18 1.63* 1.70* 1.53* 1.16   < > 1.34*   EGFR 71.5 48.5* 46.2* 52.4* 73.0   < > 61.4   GLUCOSE 104* 113* 131* 163* 141*   < > 89   CALCIUM  8.5* 8.6 8.5* 9.2 8.7   < > 8.4*   MAGNESIUM  --   --   --  1.6 1.7  --   --    PHOSPHORUS 2.6 3.6  --   --   --   --   --    HEMOGLOBIN A1C  --   --   --   --   --   --  6.40*    < > = values in this interval not displayed.         Lab 06/20/24  0430 06/19/24  0434 06/18/24  1421 06/16/24  0432 06/15/24  0402   TOTAL PROTEIN  --   --  5.8* 6.5 5.9*   ALBUMIN 3.0* 3.1* 3.0* 3.3* 3.3*   GLOBULIN  --   --  2.8 3.2 2.6   ALT (SGPT)  --   --  28 47* 39   AST (SGOT)  --   --  20 30 23   BILIRUBIN  --   --  0.6 0.9 1.4*   ALK PHOS  --   --  215* 264* 249*         Lab 06/18/24  0532 06/14/24  1954   PROBNP 4,831.0* 45,413.0*                 Brief Urine Lab Results  (Last result in the past 365 days)        Color   Clarity   Blood   Leuk Est   Nitrite   Protein   CREAT   Urine HCG        06/14/24 1945 Yellow   Clear   Moderate (2+)   Negative   Negative   30 mg/dL (1+)                 [x]  Microbiology   Microbiology Results (last 10 days)       Procedure Component Value - Date/Time    Blood Culture - Blood, Hand, Right [[id number]]  (Normal) Collected: 06/18/24 1211    Lab Status: Preliminary result Specimen: Blood from Hand, Right Updated: 06/20/24 1230     Blood Culture No growth at 2 days    Blood Culture - Blood, Arm, Left [[id number]]  (Normal) Collected: 06/18/24 1205    Lab Status: Preliminary result Specimen: Blood from Arm, Left Updated: 06/20/24 1230     Blood Culture No growth at 2 days    MRSA Screen, PCR (Inpatient) - Swab, Nares [[id number]]  (Normal) Collected: 06/18/24 1119    Lab Status: Final result Specimen: Swab from Nares Updated: 06/18/24 1253     MRSA PCR No MRSA Detected    Narrative:      The negative predictive value of this diagnostic test is high and should only be used to consider de-escalating anti-MRSA therapy. A positive result may indicate colonization with MRSA and must be correlated clinically.    Rapid Strep A Screen - Swab, Throat [[id number]]  (Normal) Collected: 06/17/24 0548    Lab  Status: Final result Specimen: Swab from Throat Updated: 06/17/24 0640     Strep A Ag Negative    Beta Strep Culture, Throat - Swab, Throat [[id number]]  (Normal) Collected: 06/17/24 0548    Lab Status: Final result Specimen: Swab from Throat Updated: 06/19/24 0731     Throat Culture, Beta Strep No Beta Hemolytic Streptococcus Isolated    Narrative:      Group A Strep incidence is low in adults. Positive culture for Beta hemolytic Streptococcus species can reflect colonization and not true infection. Please correlate clinically.      Rapid Strep A Screen - Swab, Throat [[id number]]  (Normal) Collected: 06/14/24 1724    Lab Status: Final result Specimen: Swab from Throat Updated: 06/14/24 1746     Strep A Ag Negative    COVID-19, FLU A/B, RSV PCR 1 HR TAT - Swab, Nasopharynx [[id number]]  (Normal) Collected: 06/14/24 1724    Lab Status: Final result Specimen: Swab from Nasopharynx Updated: 06/14/24 1808     COVID19 Not Detected     Influenza A PCR Not Detected     Influenza B PCR Not Detected     RSV, PCR Not Detected    Narrative:      Fact sheet for providers: https://www.fda.gov/media/516297/download    Fact sheet for patients: https://www.fda.gov/media/550858/download    Test performed by PCR.    Beta Strep Culture, Throat - Swab, Throat [[id number]]  (Normal) Collected: 06/14/24 1724    Lab Status: Final result Specimen: Swab from Throat Updated: 06/16/24 0944     Throat Culture, Beta Strep No Beta Hemolytic Streptococcus Isolated    Narrative:      Group A Strep incidence is low in adults. Positive culture for Beta hemolytic Streptococcus species can reflect colonization and not true infection. Please correlate clinically.          [x]  Radiology  CT Head Without Contrast    Result Date: 6/18/2024  Age-related changes of the brain as above, otherwise without evidence of acute intracranial abnormality. Electronically Signed: Tito Spicer MD  6/18/2024 11:46 AM EDT  Workstation ID: [id number]    XR Elbow 3+ View  Right    Result Date: 6/17/2024  1. There is possible right-sided olecranon bursitis. 2. A large right elbow joint effusion is seen. 3. Mild enthesopathic changes are present. 4. There may be minimal degenerative change. 5. No retained radiopaque foreign body. 6. No subcutaneous emphysema. 7. No convincing radiographic evidence for osteomyelitis. Please note that portions of this note were completed with a voice recognition program. Electronically Signed: Angel Snell MD  6/17/2024 5:02 AM EDT  Workstation ID: [id number]    XR Chest 1 View    Result Date: 6/14/2024  Impression: No radiographic evidence of acute cardiopulmonary process. Electronically Signed: Ryan Dejesus MD  6/14/2024 8:35 PM EDT  Workstation ID: [id number]    XR Elbow 3+ View Left    Result Date: 6/14/2024  Impression: Left olecranon osteophyte with adjacent soft tissue swelling. Electronically Signed: Jerson Jane MD  6/14/2024 5:39 PM EDT  Workstation ID: [id number]   []  EKG/Telemetry   []  Cardiology/Vascular   []  Pathology  []  Old records  []  Other:    Assessment & Plan   Assessment / Plan     Assessment:  Acute systolic heart failure exacerbation.  EF of 21%  Hypertension  Rash on bilateral lower feet.?  Leukocytoclastic vasculitis  Right elbow bursitis/effusion  History of hepatitis C, treated  Paroxysmal atrial fibrillation on Eliquis  History of apical thrombus on Eliquis  COPD  Diabetes mellitus type 2.  Hemoglobin A1c of 6.4%  Hypertension  Hyperlipidemia  Schizophrenia on monthly injection  Renal insufficiency.  Creatinine stable  Parkinsonism  Confusion/acute metabolic encephalopathy  Leukocytosis and polycythemia.  Resolved    Plan:  Continue supplemental oxygen to keep sats more than 90%  Stat CT head negative.  EEG pending.  Await tech  Midodrine  IV Vanco  NG PCR negative  Pro-Topher and lactate negative  Started on home oral Aldactone and Lasix, continue home Entresto, 9 dose decreased.  Decreased continue home Coreg with  decreased dose  due to soft blood pressure  Discussed with patient's cardiologist about CHF and soft blood pressure.  Appreciate input.  Signed off  Discussed with orthopedic surgery.  Appreciate input  Uric acid negative.  Patient denies any history of gout  Started on IV vancomycin for right elbow effusion.  MRSA PCR negative.  ESR and CRP noted.  Complement level is fine. ANCA and DARIEN pending  Right elbow x-ray reviewed.  Showing some edema as well as concern for bursitis.  Considered anti-inflammatories however patient is on Eliquis.  For now continue pain control with oral Norco  Continue patient on sliding scale insulin for diabetes  For patient's schizophrenia he takes injectable medicine once per month  Continue patient on Eliquis for his history of apical thrombus and atrial fibrillation.   Repeat echocardiogram noted  Repeat labs in am   PT OT  Continue telemetry.     Discussed with RN and .  Patient now wants rehab placement.    VTE Prophylaxis:  Pharmacologic VTE prophylaxis orders are present.  Eliquis        CODE STATUS:   Level Of Support Discussed With: Patient  Code Status (Patient has no pulse and is not breathing): CPR (Attempt to Resuscitate)  Medical Interventions (Patient has pulse or is breathing): Full Support      Electronically signed by Alex Angulo MD, 6/20/2024, 17:18 EDT.

## 2024-06-21 LAB
ALBUMIN SERPL-MCNC: 2.9 G/DL (ref 3.5–5.2)
ANION GAP SERPL CALCULATED.3IONS-SCNC: 6.7 MMOL/L (ref 5–15)
BACTERIA UR QL AUTO: ABNORMAL /HPF
BILIRUB UR QL STRIP: NEGATIVE
BUN SERPL-MCNC: 41 MG/DL (ref 6–20)
BUN/CREAT SERPL: 41 (ref 7–25)
CALCIUM SPEC-SCNC: 8.5 MG/DL (ref 8.6–10.5)
CHLORIDE SERPL-SCNC: 104 MMOL/L (ref 98–107)
CLARITY UR: ABNORMAL
CO2 SERPL-SCNC: 29.3 MMOL/L (ref 22–29)
COLOR UR: YELLOW
CREAT SERPL-MCNC: 1 MG/DL (ref 0.76–1.27)
CRP SERPL-MCNC: 6.58 MG/DL (ref 0–0.5)
EGFRCR SERPLBLD CKD-EPI 2021: 87.2 ML/MIN/1.73
GLUCOSE BLDC GLUCOMTR-MCNC: 127 MG/DL (ref 70–99)
GLUCOSE BLDC GLUCOMTR-MCNC: 128 MG/DL (ref 70–99)
GLUCOSE BLDC GLUCOMTR-MCNC: 154 MG/DL (ref 70–99)
GLUCOSE BLDC GLUCOMTR-MCNC: 165 MG/DL (ref 70–99)
GLUCOSE SERPL-MCNC: 132 MG/DL (ref 65–99)
GLUCOSE UR STRIP-MCNC: ABNORMAL MG/DL
HGB UR QL STRIP.AUTO: ABNORMAL
HYALINE CASTS UR QL AUTO: ABNORMAL /LPF
KETONES UR QL STRIP: NEGATIVE
LEUKOCYTE ESTERASE UR QL STRIP.AUTO: NEGATIVE
NITRITE UR QL STRIP: NEGATIVE
PH UR STRIP.AUTO: 5.5 [PH] (ref 5–8)
PHOSPHATE SERPL-MCNC: 2.5 MG/DL (ref 2.5–4.5)
POTASSIUM SERPL-SCNC: 3.8 MMOL/L (ref 3.5–5.2)
PROT UR QL STRIP: ABNORMAL
RBC # UR STRIP: ABNORMAL /HPF
REF LAB TEST METHOD: ABNORMAL
SODIUM SERPL-SCNC: 140 MMOL/L (ref 136–145)
SP GR UR STRIP: >1.03 (ref 1–1.03)
SQUAMOUS #/AREA URNS HPF: ABNORMAL /HPF
UROBILINOGEN UR QL STRIP: ABNORMAL
VANCOMYCIN SERPL-MCNC: 10.08 MCG/ML (ref 5–40)
WBC # UR STRIP: ABNORMAL /HPF
WHOLE BLOOD HOLD SPECIMEN: NORMAL

## 2024-06-21 PROCEDURE — 80069 RENAL FUNCTION PANEL: CPT | Performed by: INTERNAL MEDICINE

## 2024-06-21 PROCEDURE — 25810000003 SODIUM CHLORIDE 0.9 % SOLUTION: Performed by: INTERNAL MEDICINE

## 2024-06-21 PROCEDURE — 99232 SBSQ HOSP IP/OBS MODERATE 35: CPT | Performed by: INTERNAL MEDICINE

## 2024-06-21 PROCEDURE — 63710000001 INSULIN LISPRO (HUMAN) PER 5 UNITS: Performed by: FAMILY MEDICINE

## 2024-06-21 PROCEDURE — 82948 REAGENT STRIP/BLOOD GLUCOSE: CPT

## 2024-06-21 PROCEDURE — 86140 C-REACTIVE PROTEIN: CPT | Performed by: INTERNAL MEDICINE

## 2024-06-21 PROCEDURE — 25010000002 VANCOMYCIN 5 G RECONSTITUTED SOLUTION: Performed by: INTERNAL MEDICINE

## 2024-06-21 PROCEDURE — 81001 URINALYSIS AUTO W/SCOPE: CPT | Performed by: INTERNAL MEDICINE

## 2024-06-21 PROCEDURE — 97110 THERAPEUTIC EXERCISES: CPT

## 2024-06-21 PROCEDURE — 82948 REAGENT STRIP/BLOOD GLUCOSE: CPT | Performed by: FAMILY MEDICINE

## 2024-06-21 PROCEDURE — 25010000002 ONDANSETRON PER 1 MG: Performed by: FAMILY MEDICINE

## 2024-06-21 PROCEDURE — 80202 ASSAY OF VANCOMYCIN: CPT | Performed by: INTERNAL MEDICINE

## 2024-06-21 RX ORDER — VANCOMYCIN/0.9 % SOD CHLORIDE 1.5G/250ML
1500 PLASTIC BAG, INJECTION (ML) INTRAVENOUS EVERY 24 HOURS
Status: COMPLETED | OUTPATIENT
Start: 2024-06-21 | End: 2024-06-25

## 2024-06-21 RX ADMIN — BISACODYL 5 MG: 5 TABLET, COATED ORAL at 17:06

## 2024-06-21 RX ADMIN — HYDROCODONE BITARTRATE AND ACETAMINOPHEN 1 TABLET: 5; 325 TABLET ORAL at 16:40

## 2024-06-21 RX ADMIN — Medication 10 ML: at 20:59

## 2024-06-21 RX ADMIN — MIDODRINE HYDROCHLORIDE 10 MG: 10 TABLET ORAL at 11:55

## 2024-06-21 RX ADMIN — POLYETHYLENE GLYCOL 3350 17 G: 17 POWDER, FOR SOLUTION ORAL at 09:01

## 2024-06-21 RX ADMIN — VANCOMYCIN HYDROCHLORIDE 1500 MG: 5 INJECTION, POWDER, LYOPHILIZED, FOR SOLUTION INTRAVENOUS at 11:56

## 2024-06-21 RX ADMIN — ATORVASTATIN CALCIUM 40 MG: 40 TABLET, FILM COATED ORAL at 20:57

## 2024-06-21 RX ADMIN — CARVEDILOL 6.25 MG: 6.25 TABLET, FILM COATED ORAL at 20:57

## 2024-06-21 RX ADMIN — Medication 10 ML: at 12:08

## 2024-06-21 RX ADMIN — MIRTAZAPINE 15 MG: 15 TABLET, FILM COATED ORAL at 20:57

## 2024-06-21 RX ADMIN — SENNOSIDES AND DOCUSATE SODIUM 2 TABLET: 50; 8.6 TABLET ORAL at 05:27

## 2024-06-21 RX ADMIN — ONDANSETRON 4 MG: 2 INJECTION INTRAMUSCULAR; INTRAVENOUS at 07:40

## 2024-06-21 RX ADMIN — HYDROCODONE BITARTRATE AND ACETAMINOPHEN 1 TABLET: 5; 325 TABLET ORAL at 05:24

## 2024-06-21 RX ADMIN — APIXABAN 5 MG: 5 TABLET, FILM COATED ORAL at 20:57

## 2024-06-21 RX ADMIN — SACUBITRIL AND VALSARTAN 1 TABLET: 49; 51 TABLET, FILM COATED ORAL at 20:57

## 2024-06-21 RX ADMIN — MIDODRINE HYDROCHLORIDE 10 MG: 10 TABLET ORAL at 20:57

## 2024-06-21 RX ADMIN — MIDODRINE HYDROCHLORIDE 10 MG: 10 TABLET ORAL at 05:24

## 2024-06-21 NOTE — PLAN OF CARE
Goal Outcome Evaluation:              Outcome Evaluation: Medicated for pain per MAR, VSS, no acute events overnight. Cynthia Yang RN

## 2024-06-21 NOTE — SIGNIFICANT NOTE
06/21/24 0918   OTHER   Discipline occupational therapist   Rehab Time/Intention   Session Not Performed other (see comments)  (soundly sleeping)

## 2024-06-21 NOTE — PLAN OF CARE
Goal Outcome Evaluation:  Plan of Care Reviewed With: patient        Progress: no change  Outcome Evaluation: Pt signed safety contract with security. Pt medicated for pain and constipation per MAR. Pt has not ate much food throughout day, consault with dietian to recieve ensure per Pt request.

## 2024-06-21 NOTE — THERAPY TREATMENT NOTE
"Acute Care - Physical Therapy Progress Note  [facility]     Patient Name: Regulo Sepulveda  : 1965  MRN: [id number]  Today's Date: 2024      Visit Dx:     ICD-10-CM ICD-9-CM   1. Acute on chronic congestive heart failure, unspecified heart failure type  I50.9 428.0   2. Weakness generalized  R53.1 780.79   3. Pharyngitis, unspecified etiology  J02.9 462   4. Dyspnea on exertion  R06.09 786.09   5. Difficulty walking  R26.2 719.7   6. Decreased activities of daily living (ADL)  Z78.9 V49.89     Patient Active Problem List   Diagnosis    Major depressive disorder, recurrent episode, moderate    Chronic obstructive pulmonary disease    Diabetes mellitus    Primary hypertension    Hyperlipidemia    Hepatitis C    Cigarette nicotine dependence    BPH (benign prostatic hyperplasia)    GERD (gastroesophageal reflux disease)    B12 deficiency    LV (left ventricular) mural thrombus    Schizophrenia    PAF (paroxysmal atrial fibrillation)    Chronic HFrEF (heart failure with reduced ejection fraction)    Moderate malnutrition    Cardiomyopathy    TIA (transient ischemic attack)    Alcohol abuse    Bipolar 1 disorder    Chronic paranoid schizophrenia    Chronic post-traumatic stress disorder    Inhalant abuse    Type 2 diabetes mellitus without complication    Vitamin D deficiency    Acute exacerbation of CHF (congestive heart failure)    Generalized weakness     Past Medical History:   Diagnosis Date    Anxiety     Arthritis     Asthma     Bipolar affective     Cardiac tamponade         CHF (congestive heart failure)     COPD (chronic obstructive pulmonary disease)     Coronary artery disease     Depression     Diabetes mellitus     Elevated cholesterol     Hypertension     Parkinson disease     Sleep apnea     Stroke     Pt stated it was \"about 2 months ago\" as of 6/15/24     Past Surgical History:   Procedure Laterality Date    CARDIAC CATHETERIZATION Right 2023    Procedure: Right and Left Heart Cath;  " Surgeon: Ben Grant MD;  Location: [facility] INVASIVE LOCATION;  Service: Cardiovascular;  Laterality: Right;    ENDOSCOPY      TESTICLE SURGERY Left     extraction     PT Assessment (Last 12 Hours)       PT Evaluation and Treatment       Row Name 06/21/24 1500          Physical Therapy Time and Intention    Subjective Information complains of;pain  -CS     Document Type therapy note (daily note)  -CS     Mode of Treatment individual therapy;physical therapy  -CS     Patient Effort poor  -CS     Symptoms Noted During/After Treatment fatigue  -CS       Row Name 06/21/24 1500          Pain    Pretreatment Pain Rating 9/10  -CS     Posttreatment Pain Rating 9/10  -CS     Pain Location - Side/Orientation Right  -CS     Pain Location generalized  -CS     Pain Location - elbow  -CS       Row Name 06/21/24 1500          Motor Skills    Therapeutic Exercise hip;knee;ankle  -CS       Row Name 06/21/24 1500          Hip (Therapeutic Exercise)    Hip (Therapeutic Exercise) AAROM (active assistive range of motion)  -     Hip AAROM (Therapeutic Exercise) bilateral;aBduction;aDduction;external rotation;internal rotation;10 repetitions;supine  heel slides  -       Row Name 06/21/24 1500          Knee (Therapeutic Exercise)    Knee (Therapeutic Exercise) AAROM (active assistive range of motion)  -     Knee AAROM (Therapeutic Exercise) bilateral;supine;10 repetitions  SLR's, SAQ's  -       Row Name 06/21/24 1500          Ankle (Therapeutic Exercise)    Ankle (Therapeutic Exercise) AAROM (active assistive range of motion)  -     Ankle AAROM (Therapeutic Exercise) bilateral;dorsiflexion;plantarflexion;supine;10 repetitions  -       Row Name 06/21/24 1500          Positioning and Restraints    Pre-Treatment Position sitting in chair/recliner  -CS     Post Treatment Position chair  -CS     In Chair reclined;call light within reach;encouraged to call for assist;exit alarm on  -       Row Name 06/21/24 1500           Progress Summary (PT)    Progress Toward Functional Goals (PT) progress toward functional goals is gradual  -CS               User Key  (r) = Recorded By, (t) = Taken By, (c) = Cosigned By      Initials Name Provider Type    CS Walter Lorenz PTA Physical Therapist Assistant                    Physical Therapy Education       Title: PT OT SLP Therapies (In Progress)       Topic: Physical Therapy (In Progress)       Point: Mobility training (Done)       Learning Progress Summary             Patient Acceptance, E,TB, VU by AV at 6/17/2024 1344                         Point: Home exercise program (Not Started)       Learner Progress:  Not documented in this visit.              Point: Body mechanics (Done)       Learning Progress Summary             Patient Acceptance, E,TB, VU by AV at 6/17/2024 1344                         Point: Precautions (Done)       Learning Progress Summary             Patient Acceptance, E,TB, VU by AV at 6/17/2024 1344                                         User Key       Initials Effective Dates Name Provider Type Discipline     06/11/21 -  Daniel Frank, PT Physical Therapist PT                  PT Recommendation and Plan     Progress Summary (PT)  Progress Toward Functional Goals (PT): progress toward functional goals is gradual   Outcome Measures       Row Name 06/21/24 1500             How much help from another person do you currently need...    Turning from your back to your side while in flat bed without using bedrails? 4  -CS      Moving from lying on back to sitting on the side of a flat bed without bedrails? 3  -CS      Moving to and from a bed to a chair (including a wheelchair)? 3  -CS      Standing up from a chair using your arms (e.g., wheelchair, bedside chair)? 3  -CS      Climbing 3-5 steps with a railing? 2  -CS      To walk in hospital room? 3  -CS      AM-PAC 6 Clicks Score (PT) 18  -CS      Highest Level of Mobility Goal 6 --> Walk 10 steps or more  -CS          Functional Assessment    Outcome Measure Options AM-PAC 6 Clicks Basic Mobility (PT)  -CS                User Key  (r) = Recorded By, (t) = Taken By, (c) = Cosigned By      Initials Name Provider Type    Walter Contreras PTA Physical Therapist Assistant                     Time Calculation:    PT Charges       Row Name 06/21/24 1536             Time Calculation    Start Time 1345  -CS      PT Received On 06/21/24  -CS         Timed Charges    93122 - PT Therapeutic Exercise Minutes 12  -CS         Total Minutes    Timed Charges Total Minutes 12  -CS       Total Minutes 12  -CS                User Key  (r) = Recorded By, (t) = Taken By, (c) = Cosigned By      Initials Name Provider Type    Walter Contreras PTA Physical Therapist Assistant                  Therapy Charges for Today       Code Description Service Date Service Provider Modifiers Qty    07677893879 HC PT THER PROC EA 15 MIN 6/21/2024 Walter Lorenz PTA GP 1            PT G-Codes  Outcome Measure Options: AM-PAC 6 Clicks Basic Mobility (PT)  AM-PAC 6 Clicks Score (PT): 18  AM-PAC 6 Clicks Score (OT): 13    Walter Lorenz PTA  6/21/2024

## 2024-06-21 NOTE — PROGRESS NOTES
"[facility] Clinical Pharmacy Services: Vancomycin Monitoring Note    Regulo Sepulveda is a 58 y.o. male who is on day  of pharmacy to dose vancomycin for Bone and/or Joint Infection.    Previous Vancomycin Dose:   1000 mg IV every  24  hours  Imaging Reviewed?: Yes   XR Right elbow: soft tissue swelling, especially posteriorly, which is nonspecific and may represent confusion; olecranon bursitis is possible; no subc emphysema; expected right elbow joint effusion; no convincing radiographic evidence of OM    Updated Cultures and Sensitivities:    MRSA PCR: negative   Blood cx : NGTD   Strep: negative   COVID/flu/RSV: negative    Vitals/Labs  Ht: 185.4 cm (73\"); Wt: 64.7 kg (142 lb 10.2 oz)   Temp (24hrs), Av.8 °F (36.6 °C), Min:97.3 °F (36.3 °C), Max:98.4 °F (36.9 °C)   Estimated Creatinine Clearance: 73.7 mL/min (by C-G formula based on SCr of 1 mg/dL).     Results from last 7 days   Lab Units 24  0521 24  0430 24  0434 24  1421   VANCOMYCIN RM mcg/mL 10.08  --  12.38  --    CREATININE mg/dL 1.00 1.18 1.63* 1.70*   WBC 10*3/mm3  --  9.59 15.17* 15.41*     Assessment/Plan    Renal function improved, will increase dose to better meet AUC goal.   Current Vancomycin Dose:  1500 mg IV every 24 hours; which provides the following predicted parameters:    AUC24,ss: 518 mg/L.hr  PAUC*: 99 %  Ctrough,ss: 12.1 mg/L  Pconc*: 0 %  Tox.: 7 %    Next level in 2-3 days if vanc continues  We will continue to monitor patient changes and renal function     Thank you for involving pharmacy in this patient's care. Please contact pharmacy with any questions or concerns.    Gemini Saavedra [facility]  Clinical Pharmacist    "

## 2024-06-21 NOTE — PROGRESS NOTES
[facility]   Hospitalist Progress Note  Date: 2024  Patient Name: Regulo Sepulveda  : 1965  MRN: [id number]  Date of admission: 2024  Room/Bed: [facility]/      Subjective   Subjective     Chief Complaint: weakness, short of air     Summary:Regulo Sepulveda is a 58 y.o. male with PMHx of HTN, CHF , COPD, DMII, Parkinson Disease, CAD, Bipolar disorder who presents to the emergency department for evaluation of sore throat and myalgias for the past 3 to 4 days. Also having left elbow pain that he states he is never had before. The patient also states that he was just admitted here few weeks ago for CHF and has had increasingly had more shortness of breath recently. He states that his feet have been swollen for the last several days. He states that he has had increased shortness of breath with exertion. He reports fevers at home. He states that he has been compliant with his medications since discharge. He denies any chills, diaphoresis, chest pain, nausea, vomiting, dysuria, constipation or diarrhea. In the ED he was given Tylenol, Lasix because of pain. He was admitted for further evaluation and management.   2D echo with EF of 21%.  Cardiology consulted.  Ortho consulted for right elbow effusion and bursitis.  Patient denies any trauma or bug bite there.  CT head negative obtained because of confusion.    Interval Followup:   Blood pressure stable.  Remains on room air.    Patient is awake and alert.  Oriented  Patient very weak.  Left elbow pain continues x-ray shows bursitis and effusion.    Creatinine normalized.  On oral diuretics  Continues to have rash on his feet and some on the buttock   Negative urine output  Constipated reported by nursing staff.  Review of Systems    All systems reviewed and negative except for what is outlined above.      Objective   Objective     Vitals:   Temp:  [97.3 °F (36.3 °C)-98.4 °F (36.9 °C)] 97.5 °F (36.4 °C)  Heart Rate:  [61-68] 68  Resp:  [16] 16  BP: (102-125)/(66-88)  114/74    Physical Exam   General: Awake but lethargic, NAD sleeping in bed wakes up easily  HENT: NCAT, MMM  Eyes: pupils equal, no scleral icterus  Cardiovascular: RRR, no murmurs   Pulmonary: CTA bilaterally; no wheezes; no conversational dyspnea  Gastrointestinal: S/ND/NT, +BS  Musculoskeletal: No gross deformities.  Right elbow tenderness to palpation, has redness, warmth and swelling with restricted end flexion of right elbow  Skin: bilateral feet have fading red vascular colored rash.  Negative ankle edema.  Neuro: Oriented x 1, CN II through XII grossly intact; voice  weak, speech is clear; no tremor  Psych: Mood and affect appropriate  : No Poon catheter; no suprapubic tenderness    Result Review    Result Review:  I have personally reviewed these results:  [x]  Laboratory      Lab 06/21/24  0521 06/20/24 0430 06/19/24  0434 06/18/24  1421 06/18/24  1211 06/18/24  0532   WBC  --  9.59 15.17* 15.41*  --  15.95*   HEMOGLOBIN  --  12.8* 13.0 15.9  --  18.0*   HEMATOCRIT  --  40.8 41.0 49.6  --  57.2*   PLATELETS  --  337 344 447  --  506*   NEUTROS ABS  --  7.07* 12.47* 12.30*  --   --    IMMATURE GRANS (ABS)  --  0.03 0.05 0.05  --   --    LYMPHS ABS  --  1.38 1.30 1.39  --   --    MONOS ABS  --  0.95* 1.27* 1.63*  --   --    EOS ABS  --  0.12 0.03 0.01  --   --    MCV  --  86.3 85.4 84.6  --  85.4   SED RATE  --   --   --   --   --  23*   CRP 6.58*  --   --   --   --  9.86*   PROCALCITONIN  --   --   --  0.30*  --  0.16   LACTATE  --   --   --   --  2.0  --          Lab 06/21/24  0521 06/20/24  0430 06/19/24  0434 06/18/24  1421 06/18/24  0532 06/17/24  0436 06/15/24  1525 06/15/24  0402   SODIUM 140 141 140   < > 140 143   < > 141   POTASSIUM 3.8 3.7 4.1   < > 4.2 4.4   < > 3.3*   CHLORIDE 104 103 100   < > 96* 93*   < > 102   CO2 29.3* 28.6 27.1   < > 30.5* 28.2   < > 29.9*   ANION GAP 6.7 9.4 12.9   < > 13.5 21.8*   < > 9.1   BUN 41* 54* 72*   < > 43* 33*   < > 27*   CREATININE 1.00 1.18 1.63*   < >  1.53* 1.16   < > 1.34*   EGFR 87.2 71.5 48.5*   < > 52.4* 73.0   < > 61.4   GLUCOSE 132* 104* 113*   < > 163* 141*   < > 89   CALCIUM 8.5* 8.5* 8.6   < > 9.2 8.7   < > 8.4*   MAGNESIUM  --   --   --   --  1.6 1.7  --   --    PHOSPHORUS 2.5 2.6 3.6  --   --   --   --   --    HEMOGLOBIN A1C  --   --   --   --   --   --   --  6.40*    < > = values in this interval not displayed.         Lab 06/21/24  0521 06/20/24  0430 06/19/24  0434 06/18/24  1421 06/16/24  0432 06/15/24  0402   TOTAL PROTEIN  --   --   --  5.8* 6.5 5.9*   ALBUMIN 2.9* 3.0* 3.1* 3.0* 3.3* 3.3*   GLOBULIN  --   --   --  2.8 3.2 2.6   ALT (SGPT)  --   --   --  28 47* 39   AST (SGOT)  --   --   --  20 30 23   BILIRUBIN  --   --   --  0.6 0.9 1.4*   ALK PHOS  --   --   --  215* 264* 249*         Lab 06/18/24  0532 06/14/24  1954   PROBNP 4,831.0* 45,413.0*                 Brief Urine Lab Results  (Last result in the past 365 days)        Color   Clarity   Blood   Leuk Est   Nitrite   Protein   CREAT   Urine HCG        06/21/24 1550 Yellow   Cloudy   Large (3+)   Negative   Negative   30 mg/dL (1+)                 [x]  Microbiology   Microbiology Results (last 10 days)       Procedure Component Value - Date/Time    Blood Culture - Blood, Hand, Right [[id number]]  (Normal) Collected: 06/18/24 1211    Lab Status: Preliminary result Specimen: Blood from Hand, Right Updated: 06/21/24 1230     Blood Culture No growth at 3 days    Blood Culture - Blood, Arm, Left [[id number]]  (Normal) Collected: 06/18/24 1205    Lab Status: Preliminary result Specimen: Blood from Arm, Left Updated: 06/21/24 1230     Blood Culture No growth at 3 days    MRSA Screen, PCR (Inpatient) - Swab, Nares [[id number]]  (Normal) Collected: 06/18/24 1119    Lab Status: Final result Specimen: Swab from Nares Updated: 06/18/24 1253     MRSA PCR No MRSA Detected    Narrative:      The negative predictive value of this diagnostic test is high and should only be used to consider de-escalating  anti-MRSA therapy. A positive result may indicate colonization with MRSA and must be correlated clinically.    Rapid Strep A Screen - Swab, Throat [[id number]]  (Normal) Collected: 06/17/24 0548    Lab Status: Final result Specimen: Swab from Throat Updated: 06/17/24 0640     Strep A Ag Negative    Beta Strep Culture, Throat - Swab, Throat [[id number]]  (Normal) Collected: 06/17/24 0548    Lab Status: Final result Specimen: Swab from Throat Updated: 06/19/24 0731     Throat Culture, Beta Strep No Beta Hemolytic Streptococcus Isolated    Narrative:      Group A Strep incidence is low in adults. Positive culture for Beta hemolytic Streptococcus species can reflect colonization and not true infection. Please correlate clinically.      Rapid Strep A Screen - Swab, Throat [[id number]]  (Normal) Collected: 06/14/24 1724    Lab Status: Final result Specimen: Swab from Throat Updated: 06/14/24 1746     Strep A Ag Negative    COVID-19, FLU A/B, RSV PCR 1 HR TAT - Swab, Nasopharynx [[id number]]  (Normal) Collected: 06/14/24 1724    Lab Status: Final result Specimen: Swab from Nasopharynx Updated: 06/14/24 1808     COVID19 Not Detected     Influenza A PCR Not Detected     Influenza B PCR Not Detected     RSV, PCR Not Detected    Narrative:      Fact sheet for providers: https://www.fda.gov/media/515452/download    Fact sheet for patients: https://www.fda.gov/media/687031/download    Test performed by PCR.    Beta Strep Culture, Throat - Swab, Throat [[id number]]  (Normal) Collected: 06/14/24 1724    Lab Status: Final result Specimen: Swab from Throat Updated: 06/16/24 0944     Throat Culture, Beta Strep No Beta Hemolytic Streptococcus Isolated    Narrative:      Group A Strep incidence is low in adults. Positive culture for Beta hemolytic Streptococcus species can reflect colonization and not true infection. Please correlate clinically.          [x]  Radiology  CT Head Without Contrast    Result Date: 6/18/2024  Age-related changes  of the brain as above, otherwise without evidence of acute intracranial abnormality. Electronically Signed: Tito Spicer MD  6/18/2024 11:46 AM EDT  Workstation ID: [id number]    XR Elbow 3+ View Right    Result Date: 6/17/2024  1. There is possible right-sided olecranon bursitis. 2. A large right elbow joint effusion is seen. 3. Mild enthesopathic changes are present. 4. There may be minimal degenerative change. 5. No retained radiopaque foreign body. 6. No subcutaneous emphysema. 7. No convincing radiographic evidence for osteomyelitis. Please note that portions of this note were completed with a voice recognition program. Electronically Signed: Angel Snell MD  6/17/2024 5:02 AM EDT  Workstation ID: [id number]    XR Chest 1 View    Result Date: 6/14/2024  Impression: No radiographic evidence of acute cardiopulmonary process. Electronically Signed: Ryan Dejesus MD  6/14/2024 8:35 PM EDT  Workstation ID: [id number]    XR Elbow 3+ View Left    Result Date: 6/14/2024  Impression: Left olecranon osteophyte with adjacent soft tissue swelling. Electronically Signed: Jerson Jane MD  6/14/2024 5:39 PM EDT  Workstation ID: [id number]   []  EKG/Telemetry   []  Cardiology/Vascular   []  Pathology  []  Old records  []  Other:    Assessment & Plan   Assessment / Plan     Assessment:  Acute systolic heart failure exacerbation.  EF of 21%  Hypertension  Rash on bilateral lower feet.?  Leukocytoclastic vasculitis  Right elbow bursitis/effusion  History of hepatitis C, treated  Paroxysmal atrial fibrillation on Eliquis  History of apical thrombus on Eliquis  COPD  Diabetes mellitus type 2.  Hemoglobin A1c of 6.4%  Hypertension  Hyperlipidemia  Schizophrenia on monthly injection  Renal insufficiency.  Creatinine stable  Parkinsonism  Confusion/acute metabolic encephalopathy.  Improved  General debility and weakness  Leukocytosis and polycythemia.  Resolved    Plan:  Continue supplemental oxygen to keep sats more than 90%  Stat  CT head negative.  EEG noted with metabolic encephalopathy.  No epileptiform discharges   midodrine  IV Vanco  NG PCR negative  Pro-Topher and lactate negative  Started on home oral Aldactone and Lasix, continue home Entresto, dose decreased.  continue home Coreg with decreased dose  due to soft blood pressure  Discussed with patient's cardiologist about CHF and soft blood pressure.  Appreciate input.  Signed off  Discussed with orthopedic surgery.  Appreciate input  Uric acid negative.  Patient denies any history of gout  Started on IV vancomycin for right elbow effusion.  MRSA PCR negative.  ESR and CRP noted.  CRP trending down  Complement level is fine. ANCA negative  Right elbow x-ray reviewed.  Showing some edema as well as concern for bursitis.  Considered anti-inflammatories however patient is on Eliquis.  For now continue pain control with oral Norco  Continue patient on sliding scale insulin for diabetes  For patient's schizophrenia he takes injectable medicine once per month  Continue patient on Eliquis for his history of apical thrombus and atrial fibrillation.   Repeat echocardiogram noted  Repeat labs in am   PT OT  Continue telemetry.     Discussed with RN and .  Referral sent for rehab    VTE Prophylaxis:  Pharmacologic VTE prophylaxis orders are present.  Eliquis        CODE STATUS:   Level Of Support Discussed With: Patient  Code Status (Patient has no pulse and is not breathing): CPR (Attempt to Resuscitate)  Medical Interventions (Patient has pulse or is breathing): Full Support      Electronically signed by Alex Angulo MD, 6/21/2024, 16:57 EDT.

## 2024-06-22 ENCOUNTER — APPOINTMENT (OUTPATIENT)
Dept: GENERAL RADIOLOGY | Facility: HOSPITAL | Age: 59
End: 2024-06-22
Payer: COMMERCIAL

## 2024-06-22 LAB
ALBUMIN SERPL-MCNC: 2.8 G/DL (ref 3.5–5.2)
ANION GAP SERPL CALCULATED.3IONS-SCNC: 8.1 MMOL/L (ref 5–15)
BUN SERPL-MCNC: 34 MG/DL (ref 6–20)
BUN/CREAT SERPL: 39.5 (ref 7–25)
CALCIUM SPEC-SCNC: 8.2 MG/DL (ref 8.6–10.5)
CHLORIDE SERPL-SCNC: 104 MMOL/L (ref 98–107)
CO2 SERPL-SCNC: 27.9 MMOL/L (ref 22–29)
CREAT SERPL-MCNC: 0.86 MG/DL (ref 0.76–1.27)
EGFRCR SERPLBLD CKD-EPI 2021: 100.4 ML/MIN/1.73
GLUCOSE BLDC GLUCOMTR-MCNC: 102 MG/DL (ref 70–99)
GLUCOSE BLDC GLUCOMTR-MCNC: 292 MG/DL (ref 70–99)
GLUCOSE BLDC GLUCOMTR-MCNC: 93 MG/DL (ref 70–99)
GLUCOSE SERPL-MCNC: 123 MG/DL (ref 65–99)
PHOSPHATE SERPL-MCNC: 2.3 MG/DL (ref 2.5–4.5)
POTASSIUM SERPL-SCNC: 4.1 MMOL/L (ref 3.5–5.2)
SODIUM SERPL-SCNC: 140 MMOL/L (ref 136–145)
WHOLE BLOOD HOLD SPECIMEN: NORMAL

## 2024-06-22 PROCEDURE — 82948 REAGENT STRIP/BLOOD GLUCOSE: CPT

## 2024-06-22 PROCEDURE — 73090 X-RAY EXAM OF FOREARM: CPT

## 2024-06-22 PROCEDURE — 80069 RENAL FUNCTION PANEL: CPT | Performed by: INTERNAL MEDICINE

## 2024-06-22 PROCEDURE — 99221 1ST HOSP IP/OBS SF/LOW 40: CPT | Performed by: ORTHOPAEDIC SURGERY

## 2024-06-22 PROCEDURE — 82948 REAGENT STRIP/BLOOD GLUCOSE: CPT | Performed by: FAMILY MEDICINE

## 2024-06-22 PROCEDURE — 73120 X-RAY EXAM OF HAND: CPT

## 2024-06-22 PROCEDURE — 25810000003 SODIUM CHLORIDE 0.9 % SOLUTION: Performed by: INTERNAL MEDICINE

## 2024-06-22 PROCEDURE — 99233 SBSQ HOSP IP/OBS HIGH 50: CPT | Performed by: INTERNAL MEDICINE

## 2024-06-22 PROCEDURE — 63710000001 INSULIN LISPRO (HUMAN) PER 5 UNITS: Performed by: FAMILY MEDICINE

## 2024-06-22 PROCEDURE — 25010000002 KETOROLAC TROMETHAMINE PER 15 MG: Performed by: INTERNAL MEDICINE

## 2024-06-22 PROCEDURE — 25010000002 VANCOMYCIN 5 G RECONSTITUTED SOLUTION: Performed by: INTERNAL MEDICINE

## 2024-06-22 PROCEDURE — 20610 DRAIN/INJ JOINT/BURSA W/O US: CPT | Performed by: ORTHOPAEDIC SURGERY

## 2024-06-22 RX ORDER — OXYCODONE HYDROCHLORIDE 5 MG/1
5 TABLET ORAL EVERY 8 HOURS PRN
Status: DISCONTINUED | OUTPATIENT
Start: 2024-06-22 | End: 2024-06-25 | Stop reason: HOSPADM

## 2024-06-22 RX ORDER — HYDROCODONE BITARTRATE AND ACETAMINOPHEN 5; 325 MG/1; MG/1
1 TABLET ORAL EVERY 8 HOURS PRN
Status: DISCONTINUED | OUTPATIENT
Start: 2024-06-22 | End: 2024-06-25 | Stop reason: HOSPADM

## 2024-06-22 RX ORDER — HYDROCODONE BITARTRATE AND ACETAMINOPHEN 5; 325 MG/1; MG/1
1 TABLET ORAL EVERY 6 HOURS PRN
Status: DISCONTINUED | OUTPATIENT
Start: 2024-06-22 | End: 2024-06-22

## 2024-06-22 RX ORDER — HALOPERIDOL 5 MG/ML
2 INJECTION INTRAMUSCULAR EVERY 8 HOURS PRN
Status: DISCONTINUED | OUTPATIENT
Start: 2024-06-22 | End: 2024-06-25 | Stop reason: HOSPADM

## 2024-06-22 RX ORDER — KETOROLAC TROMETHAMINE 30 MG/ML
15 INJECTION, SOLUTION INTRAMUSCULAR; INTRAVENOUS EVERY 6 HOURS PRN
Status: DISPENSED | OUTPATIENT
Start: 2024-06-22 | End: 2024-06-24

## 2024-06-22 RX ADMIN — SPIRONOLACTONE 25 MG: 25 TABLET ORAL at 09:22

## 2024-06-22 RX ADMIN — INSULIN LISPRO 6 UNITS: 100 INJECTION, SOLUTION INTRAVENOUS; SUBCUTANEOUS at 17:31

## 2024-06-22 RX ADMIN — VANCOMYCIN HYDROCHLORIDE 1500 MG: 5 INJECTION, POWDER, LYOPHILIZED, FOR SOLUTION INTRAVENOUS at 09:22

## 2024-06-22 RX ADMIN — CARVEDILOL 6.25 MG: 6.25 TABLET, FILM COATED ORAL at 09:22

## 2024-06-22 RX ADMIN — EMPAGLIFLOZIN 10 MG: 10 TABLET, FILM COATED ORAL at 09:22

## 2024-06-22 RX ADMIN — APIXABAN 5 MG: 5 TABLET, FILM COATED ORAL at 09:22

## 2024-06-22 RX ADMIN — KETOROLAC TROMETHAMINE 15 MG: 30 INJECTION, SOLUTION INTRAMUSCULAR; INTRAVENOUS at 16:04

## 2024-06-22 RX ADMIN — ATORVASTATIN CALCIUM 40 MG: 40 TABLET, FILM COATED ORAL at 21:05

## 2024-06-22 RX ADMIN — MIDODRINE HYDROCHLORIDE 10 MG: 10 TABLET ORAL at 03:44

## 2024-06-22 RX ADMIN — FERROUS SULFATE TAB 325 MG (65 MG ELEMENTAL FE) 325 MG: 325 (65 FE) TAB at 09:22

## 2024-06-22 RX ADMIN — Medication 10 ML: at 21:07

## 2024-06-22 RX ADMIN — Medication 10 ML: at 09:23

## 2024-06-22 RX ADMIN — SACUBITRIL AND VALSARTAN 1 TABLET: 49; 51 TABLET, FILM COATED ORAL at 09:21

## 2024-06-22 RX ADMIN — AMIODARONE HYDROCHLORIDE 200 MG: 200 TABLET ORAL at 09:21

## 2024-06-22 RX ADMIN — MIRTAZAPINE 15 MG: 15 TABLET, FILM COATED ORAL at 21:05

## 2024-06-22 RX ADMIN — SACUBITRIL AND VALSARTAN 1 TABLET: 49; 51 TABLET, FILM COATED ORAL at 21:05

## 2024-06-22 RX ADMIN — FUROSEMIDE 20 MG: 20 TABLET ORAL at 09:22

## 2024-06-22 RX ADMIN — PANTOPRAZOLE SODIUM 40 MG: 40 TABLET, DELAYED RELEASE ORAL at 09:21

## 2024-06-22 RX ADMIN — MIDODRINE HYDROCHLORIDE 10 MG: 10 TABLET ORAL at 21:05

## 2024-06-22 RX ADMIN — CARVEDILOL 6.25 MG: 6.25 TABLET, FILM COATED ORAL at 21:05

## 2024-06-22 RX ADMIN — APIXABAN 5 MG: 5 TABLET, FILM COATED ORAL at 21:05

## 2024-06-22 NOTE — PROGRESS NOTES
[facility]   Hospitalist Progress Note  Date: 2024  Patient Name: Regulo Sepulveda  : 1965  MRN: [id number]  Date of admission: 2024  Room/Bed: [facility]/      Subjective   Subjective     Chief Complaint: weakness, short of air     Summary:Regulo Sepulveda is a 58 y.o. male with PMHx of HTN, CHF , COPD, DMII, Parkinson Disease, CAD, Bipolar disorder who presents to the emergency department for evaluation of sore throat and myalgias for the past 3 to 4 days. Also having left elbow pain that he states he is never had before. The patient also states that he was just admitted here few weeks ago for CHF and has had increasingly had more shortness of breath recently. He states that his feet have been swollen for the last several days. He states that he has had increased shortness of breath with exertion. He reports fevers at home. He states that he has been compliant with his medications since discharge. He denies any chills, diaphoresis, chest pain, nausea, vomiting, dysuria, constipation or diarrhea. In the ED he was given Tylenol, Lasix because of pain. He was admitted for further evaluation and management.   2D echo with EF of 21%.  Cardiology consulted.  Ortho consulted for right elbow effusion and bursitis.  Patient denies any trauma or bug bite there.  CT head negative obtained because of confusion.    Interval Followup:   Blood pressure stable.  Remains on room air.    Patient is awake oriented x 1.  Does not know the year.  Knows the place.  Patient very agitated and restless taking food tray pulling on IVs and monitor.  Patient wants to leave now.  Complaining of pain in right dorsal hand forearm area per nursing staff patient has been refusing pain medicine though.  Patient very weak.  Left elbow pain continues x-ray shows bursitis and effusion.    Creatinine normalized.  On oral diuretics  Continues to have rash on his feet and some on the buttock improving  Denies urinary complaints.  UA with  bacteriuria  Constipated reported by nursing staff.    Review of Systems    All systems reviewed and negative except for what is outlined above.      Objective   Objective     Vitals:   Temp:  [97 °F (36.1 °C)-97.7 °F (36.5 °C)] 97.7 °F (36.5 °C)  Heart Rate:  [65-93] 93  Resp:  [16-18] 18  BP: ()/(60-97) 121/74    Physical Exam   General: Awake but lethargic, NAD   HENT: NCAT, MMM  Eyes: pupils equal, no scleral icterus  Cardiovascular: RRR, no murmurs   Pulmonary: CTA bilaterally; no wheezes; no conversational dyspnea  Gastrointestinal: S/ND/NT, +BS  Musculoskeletal: No gross deformities.  Right elbow tenderness to palpation, has redness, warmth and swelling with restricted end flexion of right elbow  Skin: bilateral feet have fading red vascular colored rash.  Negative ankle edema.  Neuro: Oriented x 1, CN II through XII grossly intact; voice  weak, speech is clear; no tremor  Psych: Mood and affect appropriate.  Frustrated because of right elbow pain and now hurting in right wrist  : No Poon catheter; no suprapubic tenderness    Result Review    Result Review:  I have personally reviewed these results:  [x]  Laboratory      Lab 06/21/24  0521 06/20/24  0430 06/19/24  0434 06/18/24  1421 06/18/24  1211 06/18/24  0532   WBC  --  9.59 15.17* 15.41*  --  15.95*   HEMOGLOBIN  --  12.8* 13.0 15.9  --  18.0*   HEMATOCRIT  --  40.8 41.0 49.6  --  57.2*   PLATELETS  --  337 344 447  --  506*   NEUTROS ABS  --  7.07* 12.47* 12.30*  --   --    IMMATURE GRANS (ABS)  --  0.03 0.05 0.05  --   --    LYMPHS ABS  --  1.38 1.30 1.39  --   --    MONOS ABS  --  0.95* 1.27* 1.63*  --   --    EOS ABS  --  0.12 0.03 0.01  --   --    MCV  --  86.3 85.4 84.6  --  85.4   SED RATE  --   --   --   --   --  23*   CRP 6.58*  --   --   --   --  9.86*   PROCALCITONIN  --   --   --  0.30*  --  0.16   LACTATE  --   --   --   --  2.0  --          Lab 06/22/24  0302 06/21/24  0521 06/20/24  0430 06/18/24  1421 06/18/24  0532  06/17/24  0436   SODIUM 140 140 141   < > 140 143   POTASSIUM 4.1 3.8 3.7   < > 4.2 4.4   CHLORIDE 104 104 103   < > 96* 93*   CO2 27.9 29.3* 28.6   < > 30.5* 28.2   ANION GAP 8.1 6.7 9.4   < > 13.5 21.8*   BUN 34* 41* 54*   < > 43* 33*   CREATININE 0.86 1.00 1.18   < > 1.53* 1.16   EGFR 100.4 87.2 71.5   < > 52.4* 73.0   GLUCOSE 123* 132* 104*   < > 163* 141*   CALCIUM 8.2* 8.5* 8.5*   < > 9.2 8.7   MAGNESIUM  --   --   --   --  1.6 1.7   PHOSPHORUS 2.3* 2.5 2.6   < >  --   --     < > = values in this interval not displayed.         Lab 06/22/24  0302 06/21/24  0521 06/20/24  0430 06/19/24  0434 06/18/24  1421 06/16/24  0432   TOTAL PROTEIN  --   --   --   --  5.8* 6.5   ALBUMIN 2.8* 2.9* 3.0*   < > 3.0* 3.3*   GLOBULIN  --   --   --   --  2.8 3.2   ALT (SGPT)  --   --   --   --  28 47*   AST (SGOT)  --   --   --   --  20 30   BILIRUBIN  --   --   --   --  0.6 0.9   ALK PHOS  --   --   --   --  215* 264*    < > = values in this interval not displayed.         Lab 06/18/24  0532   PROBNP 4,831.0*                 Brief Urine Lab Results  (Last result in the past 365 days)        Color   Clarity   Blood   Leuk Est   Nitrite   Protein   CREAT   Urine HCG        06/21/24 1550 Yellow   Cloudy   Large (3+)   Negative   Negative   30 mg/dL (1+)                 [x]  Microbiology   Microbiology Results (last 10 days)       Procedure Component Value - Date/Time    Blood Culture - Blood, Hand, Right [[id number]]  (Normal) Collected: 06/18/24 1211    Lab Status: Preliminary result Specimen: Blood from Hand, Right Updated: 06/22/24 1216     Blood Culture No growth at 4 days    Blood Culture - Blood, Arm, Left [[id number]]  (Normal) Collected: 06/18/24 1205    Lab Status: Preliminary result Specimen: Blood from Arm, Left Updated: 06/22/24 1216     Blood Culture No growth at 4 days    MRSA Screen, PCR (Inpatient) - Swab, Nares [[id number]]  (Normal) Collected: 06/18/24 1119    Lab Status: Final result Specimen: Swab from Nares  Updated: 06/18/24 1253     MRSA PCR No MRSA Detected    Narrative:      The negative predictive value of this diagnostic test is high and should only be used to consider de-escalating anti-MRSA therapy. A positive result may indicate colonization with MRSA and must be correlated clinically.    Rapid Strep A Screen - Swab, Throat [[id number]]  (Normal) Collected: 06/17/24 0548    Lab Status: Final result Specimen: Swab from Throat Updated: 06/17/24 0640     Strep A Ag Negative    Beta Strep Culture, Throat - Swab, Throat [[id number]]  (Normal) Collected: 06/17/24 0548    Lab Status: Final result Specimen: Swab from Throat Updated: 06/19/24 0731     Throat Culture, Beta Strep No Beta Hemolytic Streptococcus Isolated    Narrative:      Group A Strep incidence is low in adults. Positive culture for Beta hemolytic Streptococcus species can reflect colonization and not true infection. Please correlate clinically.      Rapid Strep A Screen - Swab, Throat [[id number]]  (Normal) Collected: 06/14/24 1724    Lab Status: Final result Specimen: Swab from Throat Updated: 06/14/24 1746     Strep A Ag Negative    COVID-19, FLU A/B, RSV PCR 1 HR TAT - Swab, Nasopharynx [[id number]]  (Normal) Collected: 06/14/24 1724    Lab Status: Final result Specimen: Swab from Nasopharynx Updated: 06/14/24 1808     COVID19 Not Detected     Influenza A PCR Not Detected     Influenza B PCR Not Detected     RSV, PCR Not Detected    Narrative:      Fact sheet for providers: https://www.fda.gov/media/508181/download    Fact sheet for patients: https://www.fda.gov/media/464311/download    Test performed by PCR.    Beta Strep Culture, Throat - Swab, Throat [[id number]]  (Normal) Collected: 06/14/24 1724    Lab Status: Final result Specimen: Swab from Throat Updated: 06/16/24 0944     Throat Culture, Beta Strep No Beta Hemolytic Streptococcus Isolated    Narrative:      Group A Strep incidence is low in adults. Positive culture for Beta hemolytic  Streptococcus species can reflect colonization and not true infection. Please correlate clinically.          [x]  Radiology  CT Head Without Contrast    Result Date: 6/18/2024  Age-related changes of the brain as above, otherwise without evidence of acute intracranial abnormality. Electronically Signed: Tito Spicer MD  6/18/2024 11:46 AM EDT  Workstation ID: [id number]    XR Elbow 3+ View Right    Result Date: 6/17/2024  1. There is possible right-sided olecranon bursitis. 2. A large right elbow joint effusion is seen. 3. Mild enthesopathic changes are present. 4. There may be minimal degenerative change. 5. No retained radiopaque foreign body. 6. No subcutaneous emphysema. 7. No convincing radiographic evidence for osteomyelitis. Please note that portions of this note were completed with a voice recognition program. Electronically Signed: Angel Snell MD  6/17/2024 5:02 AM EDT  Workstation ID: [id number]    XR Chest 1 View    Result Date: 6/14/2024  Impression: No radiographic evidence of acute cardiopulmonary process. Electronically Signed: Ryan Dejesus MD  6/14/2024 8:35 PM EDT  Workstation ID: [id number]    XR Elbow 3+ View Left    Result Date: 6/14/2024  Impression: Left olecranon osteophyte with adjacent soft tissue swelling. Electronically Signed: Jerson Jane MD  6/14/2024 5:39 PM EDT  Workstation ID: [id number]   []  EKG/Telemetry   []  Cardiology/Vascular   []  Pathology  []  Old records  []  Other:    Assessment & Plan   Assessment / Plan     Assessment:  Acute systolic heart failure exacerbation.  EF of 21%  Hypertension  Rash on bilateral lower feet.?  Leukocytoclastic vasculitis  Right elbow bursitis/effusion  History of hepatitis C, treated  Paroxysmal atrial fibrillation on Eliquis  History of apical thrombus on Eliquis  COPD  Diabetes mellitus type 2.  Hemoglobin A1c of 6.4%  Hypertension  Hyperlipidemia  Schizophrenia on monthly injection  Renal insufficiency.  Creatinine  stable  Parkinsonism  Confusion/acute metabolic encephalopathy.  Improved  General debility and weakness  Leukocytosis and polycythemia.  Resolved    Plan:  Continue supplemental oxygen to keep sats more than 90%  Will restrain the patient.  Not safe for discharge as patient is confused as needed Haldol for agitation and restlessness  X-ray right wrist and forearm  EEG noted with metabolic encephalopathy.  No epileptiform discharges   midodrine  IV Vanco  NG PCR negative  Pro-Topher and lactate negative  Started on home oral Aldactone and Lasix, continue home Entresto, dose decreased.  continue home Coreg with decreased dose  due to soft blood pressure  Discussed with patient's cardiologist about CHF and soft blood pressure.  Appreciate input.  Signed off  Discussed with orthopedic surgery.  Appreciate input.  Recommending MRI of right elbow  Uric acid negative.  Patient denies any history of gout  Started on IV vancomycin for right elbow effusion.  MRSA PCR negative.  ESR and CRP noted.  CRP trending down  Complement level is fine. ANCA negative  Right elbow x-ray reviewed.  Showing some edema as well as concern for bursitis. continue pain control with Norco.  As needed Oxy IR added.  Will also add as needed Toradol as patient frustrated with pain control.  Continue patient on sliding scale insulin for diabetes  For patient's schizophrenia he takes injectable medicine once per month  Continue patient on Eliquis for his history of apical thrombus and atrial fibrillation.   Repeat echocardiogram noted  Repeat labs in am   PT OT  Continue telemetry.     Discussed with RN and .  Referral sent for rehab    VTE Prophylaxis:  Pharmacologic VTE prophylaxis orders are present.  Eliquis        CODE STATUS:   Level Of Support Discussed With: Patient  Code Status (Patient has no pulse and is not breathing): CPR (Attempt to Resuscitate)  Medical Interventions (Patient has pulse or is breathing): Full  Support      Electronically signed by Alex Angulo MD, 6/22/2024, 16:05 EDT.

## 2024-06-22 NOTE — PLAN OF CARE
Goal Outcome Evaluation:  Plan of Care Reviewed With: patient        Progress: improving  Pt rested off and on overnight. Pt's VSS. Pt stated he wanted to sleep in his chair overnight. Pt did move to bed this morning. Pt's blood glucose monitored as ordered. Pt alert and able to make needs known. Call light in reach and bed in lowest locked position. Pt has had no additional complaints so far.

## 2024-06-22 NOTE — PLAN OF CARE
Goal Outcome Evaluation:  Plan of Care Reviewed With: patient        Progress: no change  Outcome Evaluation: Patient noted with episodes of agitation and frustration. Patient was throwing items in room, pulling at tubes, trying to get out of bed. MD notified and aware. Restraints ordered and applied per policy. Patient currently resting quietly. Remains confused and requires frequent reorientation.

## 2024-06-22 NOTE — CONSULTS
"  [facility]   Consult Note    Patient Name: Regulo Sepulveda  : 1965  MRN: [id number]  Primary Care Physician:  Dickson Landry MD  Referring Physician: No ref. provider found  Date of admission: 2024    Subjective   Subjective     Reason for Consult/ Chief Complaint: Left knee effusion    HPI:  Regulo Sepulveda is a 58 y.o. male who was admitted to hospital for left knee effusion after falling prior to his admission since evaluation was last Saturday by me this consult was from last Saturday.  Denies any previous knee pain    Review of Systems   14 Point ROS is negative except as noted above.     Personal History     Past Medical History:   Diagnosis Date    Anxiety     Arthritis     Asthma     Bipolar affective     Cardiac tamponade         CHF (congestive heart failure)     COPD (chronic obstructive pulmonary disease)     Coronary artery disease     Depression     Diabetes mellitus     Elevated cholesterol     Hypertension     Parkinson disease     Sleep apnea     Stroke     Pt stated it was \"about 2 months ago\" as of 6/15/24       Past Surgical History:   Procedure Laterality Date    CARDIAC CATHETERIZATION Right 2023    Procedure: Right and Left Heart Cath;  Surgeon: Ben Grant MD;  Location: [facility] CATH INVASIVE LOCATION;  Service: Cardiovascular;  Laterality: Right;    ENDOSCOPY      TESTICLE SURGERY Left     extraction       Family History: family history includes Depression in his brother and sister; Diabetes in his mother; Insomnia in his sister; Restless legs syndrome in his sister; Sleep disorder in his sister; Sleep walking in his sister. Otherwise pertinent FHx was reviewed and not pertinent to current issue.    Social History:  reports that he has been smoking cigarettes. He started smoking about 50 years ago. He has a 25.2 pack-year smoking history. He has never used smokeless tobacco. He reports that he does not currently use alcohol. He reports that he does not currently use " drugs after having used the following drugs: Methamphetamines and Marijuana.    Home Medications:  ARIPiprazole ER, albuterol sulfate HFA, amiodarone, atorvastatin, carvedilol, dapagliflozin, ferrous gluconate, furosemide, metFORMIN, mirtazapine, pantoprazole, sacubitril-valsartan, spironolactone, and venlafaxine XR    Allergies:  Allergies   Allergen Reactions    Penicillins Shortness Of Breath       Objective    Objective     Vitals:   Temp:  [97 °F (36.1 °C)-97.7 °F (36.5 °C)] 97 °F (36.1 °C)  Heart Rate:  [65-91] 91  Resp:  [16] 16  BP: ()/(60-97) 132/97    Physical Exam:   Constitutional: Awake, alert   HENT: Atraumatic, Normocephalic   Respiratory: Nonlabored respirations    Cardiovascular: Intact peripheral pulses    Musculoskeletal: Right knee is nontender full range of motion no effusions left knee scattered large effusion Tenderness diffusely he can straighten it cannot bend it slightly due to straight leg raise     Imaging:  Imaging Results (Last 24 Hours)       ** No results found for the last 24 hours. **             Result Review    Result Review:  I have personally reviewed the results from the time of this admission to 6/22/2024 11:22 EDT and agree with these findings:  []  Laboratory  []  Microbiology  []  Radiology  []  EKG/Telemetry   []  Cardiology/Vascular   []  Pathology  []  Old records  []  Other:      Assessment & Plan   Assessment / Plan     Brief Patient Summary:  Regulo Sepulveda is a 58 y.o. male who has a left knee effusion    Active Hospital Problems:  Active Hospital Problems    Diagnosis     **Generalized weakness        Plan: Discussed with him risk benefits of aspiration he signed a consent and aspirated his knee and hemarthrosis 60 cc was drawn.  Unable to get MRI likely osteochondral fracture after aspiration his ACL was evaluated and was normal.  Recommend progressive weightbearing as tolerated rehab follow back up with me reevaluate 2 to 3 weeks      Electronically signed by  Olayinka Yuan MD, 06/22/24, 11:22 AM EDT.

## 2024-06-23 ENCOUNTER — APPOINTMENT (OUTPATIENT)
Dept: MRI IMAGING | Facility: HOSPITAL | Age: 59
End: 2024-06-23
Payer: COMMERCIAL

## 2024-06-23 LAB
ALBUMIN SERPL-MCNC: 2.6 G/DL (ref 3.5–5.2)
ANION GAP SERPL CALCULATED.3IONS-SCNC: 7.4 MMOL/L (ref 5–15)
BACTERIA SPEC AEROBE CULT: NORMAL
BACTERIA SPEC AEROBE CULT: NORMAL
BUN SERPL-MCNC: 36 MG/DL (ref 6–20)
BUN/CREAT SERPL: 34.3 (ref 7–25)
CALCIUM SPEC-SCNC: 8.1 MG/DL (ref 8.6–10.5)
CHLORIDE SERPL-SCNC: 106 MMOL/L (ref 98–107)
CO2 SERPL-SCNC: 27.6 MMOL/L (ref 22–29)
CREAT SERPL-MCNC: 1.05 MG/DL (ref 0.76–1.27)
CRP SERPL-MCNC: 7.2 MG/DL (ref 0–0.5)
EGFRCR SERPLBLD CKD-EPI 2021: 82.3 ML/MIN/1.73
ERYTHROCYTE [SEDIMENTATION RATE] IN BLOOD: 13 MM/HR (ref 0–20)
GLUCOSE BLDC GLUCOMTR-MCNC: 118 MG/DL (ref 70–99)
GLUCOSE BLDC GLUCOMTR-MCNC: 155 MG/DL (ref 70–99)
GLUCOSE BLDC GLUCOMTR-MCNC: 161 MG/DL (ref 70–99)
GLUCOSE BLDC GLUCOMTR-MCNC: 90 MG/DL (ref 70–99)
GLUCOSE SERPL-MCNC: 88 MG/DL (ref 65–99)
PHOSPHATE SERPL-MCNC: 2.6 MG/DL (ref 2.5–4.5)
POTASSIUM SERPL-SCNC: 3.7 MMOL/L (ref 3.5–5.2)
SODIUM SERPL-SCNC: 141 MMOL/L (ref 136–145)
VANCOMYCIN TROUGH SERPL-MCNC: 15.71 MCG/ML (ref 5–20)

## 2024-06-23 PROCEDURE — 82948 REAGENT STRIP/BLOOD GLUCOSE: CPT | Performed by: FAMILY MEDICINE

## 2024-06-23 PROCEDURE — 25810000003 SODIUM CHLORIDE 0.9 % SOLUTION: Performed by: INTERNAL MEDICINE

## 2024-06-23 PROCEDURE — 99232 SBSQ HOSP IP/OBS MODERATE 35: CPT | Performed by: INTERNAL MEDICINE

## 2024-06-23 PROCEDURE — 80202 ASSAY OF VANCOMYCIN: CPT | Performed by: INTERNAL MEDICINE

## 2024-06-23 PROCEDURE — 73221 MRI JOINT UPR EXTREM W/O DYE: CPT

## 2024-06-23 PROCEDURE — 80069 RENAL FUNCTION PANEL: CPT | Performed by: INTERNAL MEDICINE

## 2024-06-23 PROCEDURE — 25010000002 HYALURONIDASE (HUMAN) 150 UNIT/ML SOLUTION 1 ML VIAL: Performed by: INTERNAL MEDICINE

## 2024-06-23 PROCEDURE — 63710000001 INSULIN LISPRO (HUMAN) PER 5 UNITS: Performed by: FAMILY MEDICINE

## 2024-06-23 PROCEDURE — 85652 RBC SED RATE AUTOMATED: CPT | Performed by: INTERNAL MEDICINE

## 2024-06-23 PROCEDURE — 99231 SBSQ HOSP IP/OBS SF/LOW 25: CPT | Performed by: ORTHOPAEDIC SURGERY

## 2024-06-23 PROCEDURE — 82948 REAGENT STRIP/BLOOD GLUCOSE: CPT

## 2024-06-23 PROCEDURE — 86140 C-REACTIVE PROTEIN: CPT | Performed by: INTERNAL MEDICINE

## 2024-06-23 PROCEDURE — 25010000002 VANCOMYCIN 5 G RECONSTITUTED SOLUTION: Performed by: INTERNAL MEDICINE

## 2024-06-23 RX ADMIN — MIRTAZAPINE 15 MG: 15 TABLET, FILM COATED ORAL at 22:15

## 2024-06-23 RX ADMIN — EMPAGLIFLOZIN 10 MG: 10 TABLET, FILM COATED ORAL at 09:30

## 2024-06-23 RX ADMIN — PANTOPRAZOLE SODIUM 40 MG: 40 TABLET, DELAYED RELEASE ORAL at 09:30

## 2024-06-23 RX ADMIN — ATORVASTATIN CALCIUM 40 MG: 40 TABLET, FILM COATED ORAL at 22:15

## 2024-06-23 RX ADMIN — SACUBITRIL AND VALSARTAN 1 TABLET: 49; 51 TABLET, FILM COATED ORAL at 09:30

## 2024-06-23 RX ADMIN — SACUBITRIL AND VALSARTAN 1 TABLET: 49; 51 TABLET, FILM COATED ORAL at 22:15

## 2024-06-23 RX ADMIN — MIDODRINE HYDROCHLORIDE 10 MG: 10 TABLET ORAL at 22:15

## 2024-06-23 RX ADMIN — FUROSEMIDE 20 MG: 20 TABLET ORAL at 09:29

## 2024-06-23 RX ADMIN — MIDODRINE HYDROCHLORIDE 10 MG: 10 TABLET ORAL at 05:01

## 2024-06-23 RX ADMIN — CARVEDILOL 6.25 MG: 6.25 TABLET, FILM COATED ORAL at 09:30

## 2024-06-23 RX ADMIN — VENLAFAXINE HYDROCHLORIDE 37.5 MG: 37.5 CAPSULE, EXTENDED RELEASE ORAL at 09:30

## 2024-06-23 RX ADMIN — SPIRONOLACTONE 25 MG: 25 TABLET ORAL at 09:30

## 2024-06-23 RX ADMIN — CARVEDILOL 6.25 MG: 6.25 TABLET, FILM COATED ORAL at 22:15

## 2024-06-23 RX ADMIN — FERROUS SULFATE TAB 325 MG (65 MG ELEMENTAL FE) 325 MG: 325 (65 FE) TAB at 07:58

## 2024-06-23 RX ADMIN — MIDODRINE HYDROCHLORIDE 10 MG: 10 TABLET ORAL at 13:20

## 2024-06-23 RX ADMIN — INSULIN LISPRO 2 UNITS: 100 INJECTION, SOLUTION INTRAVENOUS; SUBCUTANEOUS at 17:49

## 2024-06-23 RX ADMIN — APIXABAN 5 MG: 5 TABLET, FILM COATED ORAL at 09:30

## 2024-06-23 RX ADMIN — VANCOMYCIN HYDROCHLORIDE 1500 MG: 5 INJECTION, POWDER, LYOPHILIZED, FOR SOLUTION INTRAVENOUS at 09:34

## 2024-06-23 RX ADMIN — INSULIN LISPRO 2 UNITS: 100 INJECTION, SOLUTION INTRAVENOUS; SUBCUTANEOUS at 07:57

## 2024-06-23 RX ADMIN — HYDROCODONE BITARTRATE AND ACETAMINOPHEN 1 TABLET: 5; 325 TABLET ORAL at 10:19

## 2024-06-23 RX ADMIN — Medication 10 ML: at 08:00

## 2024-06-23 RX ADMIN — HYALURONIDASE (HUMAN RECOMBINANT) 150 UNITS: 150 INJECTION, SOLUTION SUBCUTANEOUS at 11:47

## 2024-06-23 RX ADMIN — APIXABAN 5 MG: 5 TABLET, FILM COATED ORAL at 22:15

## 2024-06-23 RX ADMIN — AMIODARONE HYDROCHLORIDE 200 MG: 200 TABLET ORAL at 09:30

## 2024-06-23 NOTE — PROGRESS NOTES
"[facility] Clinical Pharmacy Services: Vancomycin Monitoring Note    Regulo Sepulveda is a 58 y.o. male who is on day 6 of pharmacy to dose vancomycin for Bone and/or Joint Infection.    Previous Vancomycin Dose:   1000 mg IV every  24  hours  Imaging Reviewed?: Yes      Updated Cultures and Sensitivities:    MRSA PCR: negative   Blood cx : NGTD   Strep: negative      Vitals/Labs  Ht: 185.4 cm (73\"); Wt: 64.7 kg (142 lb 10.2 oz)   Temp (24hrs), Av.6 °F (36.4 °C), Min:97 °F (36.1 °C), Max:98.2 °F (36.8 °C)   Estimated Creatinine Clearance: 70.2 mL/min (by C-G formula based on SCr of 1.05 mg/dL).     Results from last 7 days   Lab Units 24  0730 24  0257 24  0302 24  0521 24  0430 24  0434 24  1421   VANCOMYCIN RM mcg/mL  --   --   --  10.08  --  12.38  --    VANCOMYCIN TR mcg/mL 15.71  --   --   --   --   --   --    CREATININE mg/dL  --  1.05 0.86 1.00 1.18 1.63* 1.70*   WBC 10*3/mm3  --   --   --   --  9.59 15.17* 15.41*     Assessment/Plan       Current Vancomycin Dose:  1500 mg IV every 24 hours; which provides the following predicted parameters:  AUC24,ss: 557 mg/L.hr  PAUC*: 100 %  Ctrough,ss: 14.9 mg/L  Pconc*: 1 %  Tox.: 10 %      2. Continue with current regimen.     Thank you for involving pharmacy in this patient's care. Please contact pharmacy with any questions or concerns.    Vannessa Marie  Clinical Pharmacist          "

## 2024-06-23 NOTE — CONSULTS
"  [facility]   Consult Note    Patient Name: Regulo Sepulveda  : 1965  MRN: [id number]  Primary Care Physician:  Dickson Landry MD  Referring Physician: No ref. provider found  Date of admission: 2024    Subjective   Subjective     Reason for Consult/ Chief Complaint: Right elbow pain    HPI:  Regulo Sepulveda is a 58 y.o. male who presented to the hospital with right upper extremity pain and swelling.  The patient has been treated with antibiotics.  Initial x-rays of the elbow revealed a large effusion.  The patient had MRI ordered yesterday but was unable to complete this.  He reports swelling to the fingers and forearm.    Review of Systems   All systems were reviewed and negative except for those mentioned in HPI    Personal History     Past Medical History:   Diagnosis Date   • Anxiety    • Arthritis    • Asthma    • Bipolar affective    • Cardiac tamponade        • CHF (congestive heart failure)    • COPD (chronic obstructive pulmonary disease)    • Coronary artery disease    • Depression    • Diabetes mellitus    • Elevated cholesterol    • Hypertension    • Parkinson disease    • Sleep apnea    • Stroke     Pt stated it was \"about 2 months ago\" as of 6/15/24       Past Surgical History:   Procedure Laterality Date   • CARDIAC CATHETERIZATION Right 2023    Procedure: Right and Left Heart Cath;  Surgeon: Ben Grant MD;  Location: [facility] CATH INVASIVE LOCATION;  Service: Cardiovascular;  Laterality: Right;   • ENDOSCOPY     • TESTICLE SURGERY Left     extraction       Family History: family history includes Depression in his brother and sister; Diabetes in his mother; Insomnia in his sister; Restless legs syndrome in his sister; Sleep disorder in his sister; Sleep walking in his sister. Otherwise pertinent FHx was reviewed and not pertinent to current issue.    Social History:  reports that he has been smoking cigarettes. He started smoking about 50 years ago. He has a 25.2 pack-year smoking " history. He has never used smokeless tobacco. He reports that he does not currently use alcohol. He reports that he does not currently use drugs after having used the following drugs: Methamphetamines and Marijuana.    Home Medications:  ARIPiprazole ER, albuterol sulfate HFA, amiodarone, atorvastatin, carvedilol, dapagliflozin, ferrous gluconate, furosemide, metFORMIN, mirtazapine, pantoprazole, sacubitril-valsartan, spironolactone, and venlafaxine XR    Allergies:  Allergies   Allergen Reactions   • Penicillins Shortness Of Breath       Objective    Objective     Vitals:   Temp:  [97 °F (36.1 °C)-98.2 °F (36.8 °C)] 97.6 °F (36.4 °C)  Heart Rate:  [60-93] 60  Resp:  [16-18] 18  BP: (104-132)/(60-97) 118/60    Physical Exam:   Constitutional: Awake, alert   HENT: NCAT   Neck: Supple   Respiratory: Unlabored breathing, good respiratory effort   Cardiovascular: Regular heart rate   Gastrointestinal: Nontender and nondistended   Musculoskeletal: Right upper extremity: No significant redness or cellulitis.  Mild swelling of the elbow.  No palpable or drainable fluid collection.  Elbow range of motion is intact but somewhat limited secondary to pain.  However is able to flex and extend the elbow.  There is swelling to the hand and forearm with 1+ edema.  Grossly intact   Psychiatric: Appropriate affect, cooperative   Neurologic: Oriented x 3, strength symmetric in all extremities, Cranial Nerves grossly intact to confrontation, speech clear   Skin: No rashes     Result Review    Result Review:  I have personally reviewed the results from the time of this admission to 6/23/2024 10:13 EDT and agree with these findings:  []  Laboratory  []  Microbiology  [x]  Radiology  []  EKG/Telemetry   []  Cardiology/Vascular   []  Pathology  []  Old records  []  Other:    Most notable findings include: Right elbow x-ray: Effusion.  Forearm and hand x-rays: Increasing soft tissue swelling.    Assessment & Plan   Assessment / Plan      Brief Patient Summary:  Regulo Sepulveda is a 58 y.o. male who has right elbow swelling and pain    Active Hospital Problems:  Active Hospital Problems    Diagnosis    • **Generalized weakness        Plan: I discussed treatment options with the patient as well as Dr. Angulo.  The patient has continued right elbow pain and upper extremity swelling.  I recommended an MRI of the elbow to assess for effusion/rule out septic arthritis.  Recent laboratories show persistently elevated CRP, normal ESR.  Patient does have range of motion of the elbow and there is no obvious signs of a drainable fluid collection at this time.  The patient was unable to tolerate MRI yesterday.  Depending on MRI results or if he is unable to tolerate MRI again may consider IR aspiration of the elbow.  Will follow-up on MRI results.  Thank you for the consultation.        Electronically signed by Rogers Yadav MD, 06/23/24, 10:13 AM EDT.

## 2024-06-23 NOTE — PLAN OF CARE
Goal Outcome Evaluation:  Plan of Care Reviewed With: patient        Progress: improving  Outcome Evaluation: Patient exhibited significant & noticeable improvements in behavior. Very cooperative and pleasant with staff. No restraints needed during shift. Patient's mental status also shows improvement. Client sitting quietly in bed eating.

## 2024-06-23 NOTE — PLAN OF CARE
Goal Outcome Evaluation:  Plan of Care Reviewed With: patient        Progress: improving  Pt alert to self, place, and situation, but was unable to tell the correct year. Pt has been pleasant and compliant so far overnight and has maintained a respectful attitude toward staff members. Pt's restraints order was DC'd this shift and pt has tolerated this well. Pt's VSS. Pt rested well overnight. Pt's blood glucose monitored as ordered. Pt alert and able to make needs known. Call light in reach, bed alert active and audible, and bed in lowest locked position. Pt has had no additional complaints so far.

## 2024-06-23 NOTE — PROGRESS NOTES
[facility]   Hospitalist Progress Note  Date: 2024  Patient Name: Regulo Sepulveda  : 1965  MRN: [id number]  Date of admission: 2024  Room/Bed: [facility]/      Subjective   Subjective     Chief Complaint: weakness, short of air     Summary:Regulo Sepulveda is a 58 y.o. male with PMHx of HTN, CHF , COPD, DMII, Parkinson Disease, CAD, Bipolar disorder who presents to the emergency department for evaluation of sore throat and myalgias for the past 3 to 4 days. Also having left elbow pain that he states he is never had before. The patient also states that he was just admitted here few weeks ago for CHF and has had increasingly had more shortness of breath recently. He states that his feet have been swollen for the last several days. He states that he has had increased shortness of breath with exertion. He reports fevers at home. He states that he has been compliant with his medications since discharge. He denies any chills, diaphoresis, chest pain, nausea, vomiting, dysuria, constipation or diarrhea. In the ED he was given Tylenol, Lasix because of pain. He was admitted for further evaluation and management.   2D echo with EF of 21%.  Cardiology consulted.  Ortho consulted for right elbow effusion and bursitis.  Patient denies any trauma or bug bite there.  CT head negative obtained because of confusion.  Patient got agitated and restless needing restraint overnight.  MRI of right elbow.    Interval Followup:   Blood pressure stable.  Remains on room air.    Patient is awake oriented   Patient is calm and cooperative.  Apologizing for yesterday's behavior  Patient has been out of restraints.  IV Vanco got infiltrated left upper extremity.  Increasing range of motion of right elbow along with decreasing swelling and redness  Patient very weak.  Creatinine normalized.  On oral diuretics  Continues to have rash on his feet and some on the buttock improving  Denies urinary complaints.  UA with  bacteriuria  Constipated reported by nursing staff.    Review of Systems    All systems reviewed and negative except for what is outlined above.      Objective   Objective     Vitals:   Temp:  [97.3 °F (36.3 °C)-98.2 °F (36.8 °C)] 97.3 °F (36.3 °C)  Heart Rate:  [60-93] 60  Resp:  [16-18] 18  BP: (104-130)/(60-74) 130/72    Physical Exam   General: Awake but lethargic, NAD   HENT: NCAT, MMM  Eyes: pupils equal, no scleral icterus  Cardiovascular: RRR, no murmurs   Pulmonary: CTA bilaterally; no wheezes; no conversational dyspnea  Gastrointestinal: S/ND/NT, +BS  Musculoskeletal: No gross deformities.  Right elbow tenderness to palpation, warmth, swelling and redness redness decreasing,  with less restricted end flexion of right elbow  Skin: bilateral feet have fading red vascular colored rash.  Negative ankle edema.  Neuro: Oriented x 1, CN II through XII grossly intact; voice  weak, speech is clear; no tremor  Psych: Mood and affect appropriate.  Frustrated because of right elbow pain and now hurting in right wrist  : No Poon catheter; no suprapubic tenderness    Result Review    Result Review:  I have personally reviewed these results:  [x]  Laboratory      Lab 06/23/24  0257 06/21/24  0521 06/20/24  0430 06/19/24  0434 06/18/24  1421 06/18/24  1211 06/18/24  0532   WBC  --   --  9.59 15.17* 15.41*  --  15.95*   HEMOGLOBIN  --   --  12.8* 13.0 15.9  --  18.0*   HEMATOCRIT  --   --  40.8 41.0 49.6  --  57.2*   PLATELETS  --   --  337 344 447  --  506*   NEUTROS ABS  --   --  7.07* 12.47* 12.30*  --   --    IMMATURE GRANS (ABS)  --   --  0.03 0.05 0.05  --   --    LYMPHS ABS  --   --  1.38 1.30 1.39  --   --    MONOS ABS  --   --  0.95* 1.27* 1.63*  --   --    EOS ABS  --   --  0.12 0.03 0.01  --   --    MCV  --   --  86.3 85.4 84.6  --  85.4   SED RATE 13  --   --   --   --   --  23*   CRP 7.20* 6.58*  --   --   --   --  9.86*   PROCALCITONIN  --   --   --   --  0.30*  --  0.16   LACTATE  --   --   --   --   --   2.0  --          Lab 06/23/24  0257 06/22/24  0302 06/21/24  0521 06/18/24  1421 06/18/24  0532 06/17/24  0436   SODIUM 141 140 140   < > 140 143   POTASSIUM 3.7 4.1 3.8   < > 4.2 4.4   CHLORIDE 106 104 104   < > 96* 93*   CO2 27.6 27.9 29.3*   < > 30.5* 28.2   ANION GAP 7.4 8.1 6.7   < > 13.5 21.8*   BUN 36* 34* 41*   < > 43* 33*   CREATININE 1.05 0.86 1.00   < > 1.53* 1.16   EGFR 82.3 100.4 87.2   < > 52.4* 73.0   GLUCOSE 88 123* 132*   < > 163* 141*   CALCIUM 8.1* 8.2* 8.5*   < > 9.2 8.7   MAGNESIUM  --   --   --   --  1.6 1.7   PHOSPHORUS 2.6 2.3* 2.5   < >  --   --     < > = values in this interval not displayed.         Lab 06/23/24  0257 06/22/24  0302 06/21/24  0521 06/19/24  0434 06/18/24  1421   TOTAL PROTEIN  --   --   --   --  5.8*   ALBUMIN 2.6* 2.8* 2.9*   < > 3.0*   GLOBULIN  --   --   --   --  2.8   ALT (SGPT)  --   --   --   --  28   AST (SGOT)  --   --   --   --  20   BILIRUBIN  --   --   --   --  0.6   ALK PHOS  --   --   --   --  215*    < > = values in this interval not displayed.         Lab 06/18/24  0532   PROBNP 4,831.0*                 Brief Urine Lab Results  (Last result in the past 365 days)        Color   Clarity   Blood   Leuk Est   Nitrite   Protein   CREAT   Urine HCG        06/21/24 1550 Yellow   Cloudy   Large (3+)   Negative   Negative   30 mg/dL (1+)                 [x]  Microbiology   Microbiology Results (last 10 days)       Procedure Component Value - Date/Time    Blood Culture - Blood, Hand, Right [[id number]]  (Normal) Collected: 06/18/24 1211    Lab Status: Final result Specimen: Blood from Hand, Right Updated: 06/23/24 1230     Blood Culture No growth at 5 days    Blood Culture - Blood, Arm, Left [[id number]]  (Normal) Collected: 06/18/24 1205    Lab Status: Final result Specimen: Blood from Arm, Left Updated: 06/23/24 1230     Blood Culture No growth at 5 days    MRSA Screen, PCR (Inpatient) - Swab, Nares [[id number]]  (Normal) Collected: 06/18/24 1119    Lab Status: Final  result Specimen: Swab from Nares Updated: 06/18/24 1253     MRSA PCR No MRSA Detected    Narrative:      The negative predictive value of this diagnostic test is high and should only be used to consider de-escalating anti-MRSA therapy. A positive result may indicate colonization with MRSA and must be correlated clinically.    Rapid Strep A Screen - Swab, Throat [[id number]]  (Normal) Collected: 06/17/24 0548    Lab Status: Final result Specimen: Swab from Throat Updated: 06/17/24 0640     Strep A Ag Negative    Beta Strep Culture, Throat - Swab, Throat [[id number]]  (Normal) Collected: 06/17/24 0548    Lab Status: Final result Specimen: Swab from Throat Updated: 06/19/24 0731     Throat Culture, Beta Strep No Beta Hemolytic Streptococcus Isolated    Narrative:      Group A Strep incidence is low in adults. Positive culture for Beta hemolytic Streptococcus species can reflect colonization and not true infection. Please correlate clinically.      Rapid Strep A Screen - Swab, Throat [[id number]]  (Normal) Collected: 06/14/24 1724    Lab Status: Final result Specimen: Swab from Throat Updated: 06/14/24 1746     Strep A Ag Negative    COVID-19, FLU A/B, RSV PCR 1 HR TAT - Swab, Nasopharynx [[id number]]  (Normal) Collected: 06/14/24 1724    Lab Status: Final result Specimen: Swab from Nasopharynx Updated: 06/14/24 1808     COVID19 Not Detected     Influenza A PCR Not Detected     Influenza B PCR Not Detected     RSV, PCR Not Detected    Narrative:      Fact sheet for providers: https://www.fda.gov/media/331054/download    Fact sheet for patients: https://www.fda.gov/media/665105/download    Test performed by PCR.    Beta Strep Culture, Throat - Swab, Throat [[id number]]  (Normal) Collected: 06/14/24 1724    Lab Status: Final result Specimen: Swab from Throat Updated: 06/16/24 0944     Throat Culture, Beta Strep No Beta Hemolytic Streptococcus Isolated    Narrative:      Group A Strep incidence is low in adults. Positive  culture for Beta hemolytic Streptococcus species can reflect colonization and not true infection. Please correlate clinically.          [x]  Radiology  XR Hand 2 View Right    Result Date: 6/22/2024  Impression: Soft tissue swelling. No acute osseous abnormalities are identified in the right hand. Electronically Signed: Jerson Jane MD  6/22/2024 5:04 PM EDT  Workstation ID: [id number]    XR Forearm 2 View Right    Result Date: 6/22/2024  Impression: Increasing soft tissue swelling in the proximal right forearm and right elbow. Electronically Signed: Jerson Jane MD  6/22/2024 5:02 PM EDT  Workstation ID: [id number]    CT Head Without Contrast    Result Date: 6/18/2024  Age-related changes of the brain as above, otherwise without evidence of acute intracranial abnormality. Electronically Signed: Tito Spicer MD  6/18/2024 11:46 AM EDT  Workstation ID: [id number]    XR Elbow 3+ View Right    Result Date: 6/17/2024  1. There is possible right-sided olecranon bursitis. 2. A large right elbow joint effusion is seen. 3. Mild enthesopathic changes are present. 4. There may be minimal degenerative change. 5. No retained radiopaque foreign body. 6. No subcutaneous emphysema. 7. No convincing radiographic evidence for osteomyelitis. Please note that portions of this note were completed with a voice recognition program. Electronically Signed: Angel Snell MD  6/17/2024 5:02 AM EDT  Workstation ID: [id number]   []  EKG/Telemetry   []  Cardiology/Vascular   []  Pathology  []  Old records  []  Other:    Assessment & Plan   Assessment / Plan     Assessment:  Acute systolic heart failure exacerbation.  EF of 21%  Hypertension  Rash on bilateral lower feet.?  Leukocytoclastic vasculitis  Right elbow bursitis/effusion  History of hepatitis C, treated  Paroxysmal atrial fibrillation on Eliquis  History of apical thrombus on Eliquis  COPD  Diabetes mellitus type 2.  Hemoglobin A1c of 6.4%  Hypertension  Hyperlipidemia  Schizophrenia on  monthly injection  Renal insufficiency.  Creatinine stable  Parkinsonism  Confusion/acute metabolic encephalopathy.  Improved  General debility and weakness  Leukocytosis and polycythemia.  Resolved    Plan:  Continue supplemental oxygen to keep sats more than 90%  Out of restraints   x-ray right wrist and forearm noted with soft tissue swelling  EEG noted with metabolic encephalopathy.  No epileptiform discharges   midodrine  Continue IV Vanco  NG PCR negative  Pro-Topher and lactate negative  Started on home oral Aldactone and Lasix, continue home Entresto, dose decreased.  continue home Coreg with decreased dose  due to soft blood pressure  Discussed with patient's cardiologist about CHF and soft blood pressure.  Appreciate input.  Signed off  Discussed with orthopedic surgery.  Appreciate input.  Getting MRI of right elbow.  It was canceled on June 22 because of patient agitation and being noncooperative  Uric acid negative.  Patient denies any history of gout  Started on IV vancomycin for right elbow effusion.  MRSA PCR negative.  ESR and CRP noted.  CRP trending down  Complement level is fine. ANCA negative  Right elbow x-ray reviewed.  Showing some edema as well as concern for bursitis. continue pain control with Norco.  As needed Oxy IR added.  Continue as needed Toradol as patient frustrated with pain control.  Continue patient on sliding scale insulin for diabetes  For patient's schizophrenia he takes injectable medicine once per month.  Order entered for pharmacy to use home medication  Continue patient on Eliquis for his history of apical thrombus and atrial fibrillation.   Repeat echocardiogram noted  Repeat labs in am   PT OT  Continue telemetry.     Discussed with RN and .  Referral sent for rehab    VTE Prophylaxis:  Pharmacologic VTE prophylaxis orders are present.  Eliquis        CODE STATUS:   Level Of Support Discussed With: Patient  Code Status (Patient has no pulse and is not  breathing): CPR (Attempt to Resuscitate)  Medical Interventions (Patient has pulse or is breathing): Full Support      Electronically signed by Alex Angulo MD, 6/23/2024, 13:57 EDT.

## 2024-06-24 ENCOUNTER — APPOINTMENT (OUTPATIENT)
Dept: GENERAL RADIOLOGY | Facility: HOSPITAL | Age: 59
End: 2024-06-24
Payer: COMMERCIAL

## 2024-06-24 LAB
ANION GAP SERPL CALCULATED.3IONS-SCNC: 8.9 MMOL/L (ref 5–15)
BUN SERPL-MCNC: 28 MG/DL (ref 6–20)
BUN/CREAT SERPL: 32.6 (ref 7–25)
CALCIUM SPEC-SCNC: 8 MG/DL (ref 8.6–10.5)
CHLORIDE SERPL-SCNC: 106 MMOL/L (ref 98–107)
CO2 SERPL-SCNC: 27.1 MMOL/L (ref 22–29)
CREAT SERPL-MCNC: 0.86 MG/DL (ref 0.76–1.27)
EGFRCR SERPLBLD CKD-EPI 2021: 100.4 ML/MIN/1.73
GLUCOSE BLDC GLUCOMTR-MCNC: 130 MG/DL (ref 70–99)
GLUCOSE BLDC GLUCOMTR-MCNC: 157 MG/DL (ref 70–99)
GLUCOSE BLDC GLUCOMTR-MCNC: 165 MG/DL (ref 70–99)
GLUCOSE SERPL-MCNC: 97 MG/DL (ref 65–99)
POTASSIUM SERPL-SCNC: 3.5 MMOL/L (ref 3.5–5.2)
POTASSIUM SERPL-SCNC: 4.5 MMOL/L (ref 3.5–5.2)
SODIUM SERPL-SCNC: 142 MMOL/L (ref 136–145)

## 2024-06-24 PROCEDURE — 25810000003 SODIUM CHLORIDE 0.9 % SOLUTION: Performed by: INTERNAL MEDICINE

## 2024-06-24 PROCEDURE — 25010000002 PROCHLORPERAZINE 10 MG/2ML SOLUTION: Performed by: INTERNAL MEDICINE

## 2024-06-24 PROCEDURE — 82948 REAGENT STRIP/BLOOD GLUCOSE: CPT | Performed by: FAMILY MEDICINE

## 2024-06-24 PROCEDURE — 25010000002 VANCOMYCIN 5 G RECONSTITUTED SOLUTION: Performed by: INTERNAL MEDICINE

## 2024-06-24 PROCEDURE — 74018 RADEX ABDOMEN 1 VIEW: CPT

## 2024-06-24 PROCEDURE — 99232 SBSQ HOSP IP/OBS MODERATE 35: CPT | Performed by: INTERNAL MEDICINE

## 2024-06-24 PROCEDURE — 84132 ASSAY OF SERUM POTASSIUM: CPT | Performed by: INTERNAL MEDICINE

## 2024-06-24 PROCEDURE — 25010000002 ONDANSETRON PER 1 MG: Performed by: FAMILY MEDICINE

## 2024-06-24 PROCEDURE — 80048 BASIC METABOLIC PNL TOTAL CA: CPT | Performed by: INTERNAL MEDICINE

## 2024-06-24 RX ORDER — POTASSIUM CHLORIDE 20 MEQ/1
40 TABLET, EXTENDED RELEASE ORAL EVERY 4 HOURS
Status: DISCONTINUED | OUTPATIENT
Start: 2024-06-24 | End: 2024-06-24

## 2024-06-24 RX ORDER — SODIUM PHOSPHATE,MONO-DIBASIC 19G-7G/118
1 ENEMA (ML) RECTAL ONCE
Status: COMPLETED | OUTPATIENT
Start: 2024-06-24 | End: 2024-06-24

## 2024-06-24 RX ORDER — SODIUM PHOSPHATE,MONO-DIBASIC 19G-7G/118
1 ENEMA (ML) RECTAL ONCE AS NEEDED
Status: DISCONTINUED | OUTPATIENT
Start: 2024-06-24 | End: 2024-06-25 | Stop reason: HOSPADM

## 2024-06-24 RX ADMIN — PROCHLORPERAZINE EDISYLATE 5 MG: 5 INJECTION INTRAMUSCULAR; INTRAVENOUS at 09:54

## 2024-06-24 RX ADMIN — NALOXEGOL OXALATE 12.5 MG: 12.5 TABLET, FILM COATED ORAL at 15:12

## 2024-06-24 RX ADMIN — OXYCODONE HYDROCHLORIDE 5 MG: 5 TABLET ORAL at 18:48

## 2024-06-24 RX ADMIN — SODIUM PHOSPHATE 1 ENEMA: 7; 19 ENEMA RECTAL at 15:12

## 2024-06-24 RX ADMIN — HYDROCODONE BITARTRATE AND ACETAMINOPHEN 1 TABLET: 5; 325 TABLET ORAL at 09:54

## 2024-06-24 RX ADMIN — ONDANSETRON 4 MG: 2 INJECTION INTRAMUSCULAR; INTRAVENOUS at 17:31

## 2024-06-24 RX ADMIN — BISACODYL 10 MG: 10 SUPPOSITORY RECTAL at 13:24

## 2024-06-24 RX ADMIN — VANCOMYCIN HYDROCHLORIDE 1500 MG: 5 INJECTION, POWDER, LYOPHILIZED, FOR SOLUTION INTRAVENOUS at 08:31

## 2024-06-24 RX ADMIN — MIDODRINE HYDROCHLORIDE 10 MG: 10 TABLET ORAL at 03:41

## 2024-06-24 RX ADMIN — ONDANSETRON 4 MG: 2 INJECTION INTRAMUSCULAR; INTRAVENOUS at 08:24

## 2024-06-24 RX ADMIN — POLYETHYLENE GLYCOL 3350 17 G: 17 POWDER, FOR SOLUTION ORAL at 08:38

## 2024-06-24 NOTE — PROGRESS NOTES
"[facility] Clinical Pharmacy Services: Vancomycin Monitoring Note    Regulo Sepulveda is a 58 y.o. male who is on day 7 of pharmacy to dose vancomycin for Bone and/or Joint Infection.  Set to complete after  dose    Previous Vancomycin Dose:   1000 mg IV every  24  hours  Imaging Reviewed?: Yes      Updated Cultures and Sensitivities:    MRSA PCR: negative   Blood cx : NGTD   Strep: negative      Vitals/Labs  Ht: 185.4 cm (73\"); Wt: 64.7 kg (142 lb 10.2 oz)   Temp (24hrs), Av.5 °F (36.4 °C), Min:97.3 °F (36.3 °C), Max:97.9 °F (36.6 °C)   Estimated Creatinine Clearance: 85.7 mL/min (by C-G formula based on SCr of 0.86 mg/dL).     Results from last 7 days   Lab Units 24  0341 24  0730 24  0257 24  0302 24  0521 24  0430 24  0434 24  1421   VANCOMYCIN RM mcg/mL  --   --   --   --  10.08  --  12.38  --    VANCOMYCIN TR mcg/mL  --  15.71  --   --   --   --   --   --    CREATININE mg/dL 0.86  --  1.05 0.86 1.00 1.18 1.63* 1.70*   WBC 10*3/mm3  --   --   --   --   --  9.59 15.17* 15.41*     Assessment/Plan       Current Vancomycin Dose:  1500 mg IV every 24 hours; which provides the following predicted parameters:  AUC24,ss: 480 mg/L.hr  PAUC*: 96 %  Ctrough,ss: 11.9 mg/L  Pconc*: 0 %  Tox.: 7 %      2. Continue with current regimen. Continued improvement in renal function. Current dosing still meets parameter goals.    Thank you for involving pharmacy in this patient's care. Please contact pharmacy with any questions or concerns.    Vickie Wolff  Clinical Pharmacist            "

## 2024-06-24 NOTE — PROGRESS NOTES
[facility]   Hospitalist Progress Note  Date: 2024  Patient Name: Regulo Sepulveda  : 1965  MRN: [id number]  Date of admission: 2024  Room/Bed: [facility]/      Subjective   Subjective     Chief Complaint: weakness, short of air     Summary:Regulo Sepulveda is a 58 y.o. male with PMHx of HTN, CHF , COPD, DMII, Parkinson Disease, CAD, Bipolar disorder who presents to the emergency department for evaluation of sore throat and myalgias for the past 3 to 4 days. Also having left elbow pain that he states he is never had before. The patient also states that he was just admitted here few weeks ago for CHF and has had increasingly had more shortness of breath recently. He states that his feet have been swollen for the last several days. He states that he has had increased shortness of breath with exertion. He reports fevers at home. He states that he has been compliant with his medications since discharge. He denies any chills, diaphoresis, chest pain, nausea, vomiting, dysuria, constipation or diarrhea. In the ED he was given Tylenol, Lasix because of pain. He was admitted for further evaluation and management.   2D echo with EF of 21%.  Cardiology consulted.  Ortho consulted for right elbow effusion and bursitis.  Patient denies any trauma or bug bite there.  CT head negative obtained because of confusion.  Patient got agitated and restless needing restraint overnight.  MRI of right elbow.  MRI of the right elbow with soft tissue swelling and a small effusion in the joint and olecranon bursa.  Patient does not want drainage by IR at this time as per Ortho.    Interval Followup:   Blood pressure stable.  Remains on room air.  Patient had a fall after my rounds where he was trying to use bedside commode and it flipped making patient fall.  Did have moderate BM afterwards.  No obvious injury.  Patient is awake oriented   Episodes of nausea and vomiting noted.  Denies any abdominal pain.  No BM since patient hospital  per patient.  Stat KUB shows constipation with no obstruction.  Responded to enema  Remains out of restraints.  IV Vanco got infiltrated left upper extremity.  Increasing range of motion of right elbow along with decreasing swelling and redness  Patient very weak.  Creatinine normalized.  On oral diuretics  Continues to have rash on his feet and some on the buttock improving  Denies urinary complaints.  UA with bacteriuria        Review of Systems    All systems reviewed and negative except for what is outlined above.      Objective   Objective     Vitals:   Temp:  [97.5 °F (36.4 °C)-97.9 °F (36.6 °C)] 97.7 °F (36.5 °C)  Heart Rate:  [64-88] 88  Resp:  [16] 16  BP: (104-145)/(61-92) 104/84    Physical Exam   General: Awake but lethargic, NAD   HENT: NCAT, MMM  Eyes: pupils equal, no scleral icterus  Cardiovascular: RRR, no murmurs   Pulmonary: CTA bilaterally; no wheezes; no conversational dyspnea  Gastrointestinal: S/ND/NT, +BS  Musculoskeletal: No gross deformities.  Right elbow tenderness to palpation, warmth, swelling and  redness decreasing,  with less restricted end flexion of right elbow  Skin: bilateral feet have fading red vascular colored rash.  Negative ankle edema.  Neuro: Oriented x 1, CN II through XII grossly intact; voice  weak, speech is clear; no tremor  Psych: Mood and affect appropriate.  Frustrated because of right elbow pain and now hurting in right wrist  : No Poon catheter; no suprapubic tenderness    Result Review    Result Review:  I have personally reviewed these results:  [x]  Laboratory      Lab 06/23/24  0257 06/21/24  0521 06/20/24  0430 06/19/24  0434 06/18/24  1421 06/18/24  1211 06/18/24  0532   WBC  --   --  9.59 15.17* 15.41*  --  15.95*   HEMOGLOBIN  --   --  12.8* 13.0 15.9  --  18.0*   HEMATOCRIT  --   --  40.8 41.0 49.6  --  57.2*   PLATELETS  --   --  337 344 447  --  506*   NEUTROS ABS  --   --  7.07* 12.47* 12.30*  --   --    IMMATURE GRANS (ABS)  --   --  0.03 0.05 0.05   --   --    LYMPHS ABS  --   --  1.38 1.30 1.39  --   --    MONOS ABS  --   --  0.95* 1.27* 1.63*  --   --    EOS ABS  --   --  0.12 0.03 0.01  --   --    MCV  --   --  86.3 85.4 84.6  --  85.4   SED RATE 13  --   --   --   --   --  23*   CRP 7.20* 6.58*  --   --   --   --  9.86*   PROCALCITONIN  --   --   --   --  0.30*  --  0.16   LACTATE  --   --   --   --   --  2.0  --          Lab 06/24/24  1621 06/24/24  0341 06/23/24 0257 06/22/24 0302 06/21/24  0521 06/18/24  1421 06/18/24  0532   SODIUM  --  142 141 140 140   < > 140   POTASSIUM 4.5 3.5 3.7 4.1 3.8   < > 4.2   CHLORIDE  --  106 106 104 104   < > 96*   CO2  --  27.1 27.6 27.9 29.3*   < > 30.5*   ANION GAP  --  8.9 7.4 8.1 6.7   < > 13.5   BUN  --  28* 36* 34* 41*   < > 43*   CREATININE  --  0.86 1.05 0.86 1.00   < > 1.53*   EGFR  --  100.4 82.3 100.4 87.2   < > 52.4*   GLUCOSE  --  97 88 123* 132*   < > 163*   CALCIUM  --  8.0* 8.1* 8.2* 8.5*   < > 9.2   MAGNESIUM  --   --   --   --   --   --  1.6   PHOSPHORUS  --   --  2.6 2.3* 2.5   < >  --     < > = values in this interval not displayed.         Lab 06/23/24  0257 06/22/24 0302 06/21/24  0521 06/19/24  0434 06/18/24  1421   TOTAL PROTEIN  --   --   --   --  5.8*   ALBUMIN 2.6* 2.8* 2.9*   < > 3.0*   GLOBULIN  --   --   --   --  2.8   ALT (SGPT)  --   --   --   --  28   AST (SGOT)  --   --   --   --  20   BILIRUBIN  --   --   --   --  0.6   ALK PHOS  --   --   --   --  215*    < > = values in this interval not displayed.         Lab 06/18/24  0532   PROBNP 4,831.0*                 Brief Urine Lab Results  (Last result in the past 365 days)        Color   Clarity   Blood   Leuk Est   Nitrite   Protein   CREAT   Urine HCG        06/21/24 1550 Yellow   Cloudy   Large (3+)   Negative   Negative   30 mg/dL (1+)                 [x]  Microbiology   Microbiology Results (last 10 days)       Procedure Component Value - Date/Time    Blood Culture - Blood, Hand, Right [[id number]]  (Normal) Collected: 06/18/24 1211     Lab Status: Final result Specimen: Blood from Hand, Right Updated: 06/23/24 1230     Blood Culture No growth at 5 days    Blood Culture - Blood, Arm, Left [[id number]]  (Normal) Collected: 06/18/24 1205    Lab Status: Final result Specimen: Blood from Arm, Left Updated: 06/23/24 1230     Blood Culture No growth at 5 days    MRSA Screen, PCR (Inpatient) - Swab, Nares [[id number]]  (Normal) Collected: 06/18/24 1119    Lab Status: Final result Specimen: Swab from Nares Updated: 06/18/24 1253     MRSA PCR No MRSA Detected    Narrative:      The negative predictive value of this diagnostic test is high and should only be used to consider de-escalating anti-MRSA therapy. A positive result may indicate colonization with MRSA and must be correlated clinically.    Rapid Strep A Screen - Swab, Throat [[id number]]  (Normal) Collected: 06/17/24 0548    Lab Status: Final result Specimen: Swab from Throat Updated: 06/17/24 0640     Strep A Ag Negative    Beta Strep Culture, Throat - Swab, Throat [[id number]]  (Normal) Collected: 06/17/24 0548    Lab Status: Final result Specimen: Swab from Throat Updated: 06/19/24 0731     Throat Culture, Beta Strep No Beta Hemolytic Streptococcus Isolated    Narrative:      Group A Strep incidence is low in adults. Positive culture for Beta hemolytic Streptococcus species can reflect colonization and not true infection. Please correlate clinically.            [x]  Radiology  XR Abdomen KUB    Result Date: 6/24/2024  Impression: Constipation. Mild nonspecific dilatation of a single small bowel loop. Electronically Signed: Renetta Glover MD  6/24/2024 1:35 PM EDT  Workstation ID: [id number]    MRI Elbow Right Without Contrast    Result Date: 6/23/2024  Impression: 1.Extensive diffuse subcutaneous edema of the elbow and mild heterogeneous edema of the musculature surrounding the elbow. Findings are nonspecific but could indicate soft tissue infection/myositis. 2.Small nonspecific elbow effusion.  Joint infection must be included in the differential diagnosis although no other significant findings of joint or osseous infection are seen at this time.  Consider joint aspiration as clinically indicated. 3.Small amount of fluid in the olecranon bursa. No other significant localized collection is seen on this exam. 4.Mild osteoarthritis. 5.Tendinopathy of the common extensor and common flexor tendons without definite high-grade tendon tear. Electronically Signed: Prasanna Porter  6/23/2024 3:35 PM EDT  Workstation ID: [id number]    XR Hand 2 View Right    Result Date: 6/22/2024  Impression: Soft tissue swelling. No acute osseous abnormalities are identified in the right hand. Electronically Signed: Jerson Jane MD  6/22/2024 5:04 PM EDT  Workstation ID: [id number]    XR Forearm 2 View Right    Result Date: 6/22/2024  Impression: Increasing soft tissue swelling in the proximal right forearm and right elbow. Electronically Signed: Jerson Jane MD  6/22/2024 5:02 PM EDT  Workstation ID: [id number]    CT Head Without Contrast    Result Date: 6/18/2024  Age-related changes of the brain as above, otherwise without evidence of acute intracranial abnormality. Electronically Signed: Tito Spicer MD  6/18/2024 11:46 AM EDT  Workstation ID: [id number]    XR Elbow 3+ View Right    Result Date: 6/17/2024  1. There is possible right-sided olecranon bursitis. 2. A large right elbow joint effusion is seen. 3. Mild enthesopathic changes are present. 4. There may be minimal degenerative change. 5. No retained radiopaque foreign body. 6. No subcutaneous emphysema. 7. No convincing radiographic evidence for osteomyelitis. Please note that portions of this note were completed with a voice recognition program. Electronically Signed: Angel Snell MD  6/17/2024 5:02 AM EDT  Workstation ID: [id number]   []  EKG/Telemetry   []  Cardiology/Vascular   []  Pathology  []  Old records  []  Other:    Assessment & Plan   Assessment / Plan      Assessment:  Acute systolic heart failure exacerbation.  EF of 21%  Hypertension  Rash on bilateral lower feet.?  Leukocytoclastic vasculitis  Right elbow bursitis/effusion.  Improving  History of hepatitis C, treated  Paroxysmal atrial fibrillation on Eliquis  History of apical thrombus on Eliquis  COPD  Diabetes mellitus type 2.  Hemoglobin A1c of 6.4%  Hypertension  Hyperlipidemia  Schizophrenia on monthly injection  Renal insufficiency.  Creatinine stable  Parkinsonism  Confusion/acute metabolic encephalopathy.  Improved  General debility and weakness  Leukocytosis and polycythemia.  Resolved  Constipation.  Resolved    Plan:  Clear liquid diet.  Enema x 1  Continue supplemental oxygen to keep sats more than 90%  Remain out of restraints   x-ray right wrist and forearm noted with soft tissue swelling  EEG noted with metabolic encephalopathy.  No epileptiform discharges   midodrine  Continue IV Vanco may switch to p.o. Bactrim for 1 month course  NG PCR negative  Pro-Topher and lactate negative  Started on home oral Aldactone and Lasix, continue home Entresto, dose decreased.  continue home Coreg with decreased dose  due to soft blood pressure  Discussed with patient's cardiologist about CHF and soft blood pressure.  Appreciate input.  Signed off  Discussed with orthopedic surgery.  Appreciate input.  MRI of right elbow noted.  Per Ortho patient does not want IR aspiration of right elbow joint.  Uric acid negative.  Patient denies any history of gout  Continue on IV vancomycin for right elbow effusion.  MRSA PCR negative.  ESR and CRP noted.  CRP trending down  Complement level is fine. ANCA negative  continue pain control with Norco.  As needed Oxy IR added.  Continue as needed Toradol as patient frustrated with pain control.  Continue patient on sliding scale insulin for diabetes  For patient's schizophrenia he takes injectable medicine once per month.  Order entered for pharmacy to use home  medication  Continue patient on Eliquis for his history of apical thrombus and atrial fibrillation.   Repeat echocardiogram noted  Repeat labs in am   PT OT  Continue telemetry.     Discussed with RN and .  Referral sent for rehab    VTE Prophylaxis:  Pharmacologic VTE prophylaxis orders are present.  Eliquis        CODE STATUS:   Level Of Support Discussed With: Patient  Code Status (Patient has no pulse and is not breathing): CPR (Attempt to Resuscitate)  Medical Interventions (Patient has pulse or is breathing): Full Support      Electronically signed by Alex Angulo MD, 6/24/2024, 17:31 EDT.

## 2024-06-24 NOTE — SIGNIFICANT NOTE
06/24/24 0905   OTHER   Discipline occupational therapist   Rehab Time/Intention   Session Not Performed patient unavailable for treatment

## 2024-06-24 NOTE — PLAN OF CARE
Goal Outcome Evaluation:  Plan of Care Reviewed With: patient        Progress: improving  Outcome Evaluation: Pt alert and oriented x3. Continues to be cooperative and pleasant with staff. Loss of IV access, Pt refused for RN to continue to attempt to get IV, MD aware. Blood glucose closely monitored. No c/o pain or nausea. VSS

## 2024-06-24 NOTE — PROGRESS NOTES
" [facility]     Progress Note    Patient Name: Regulo Sepulveda  : 1965  MRN: [id number]  Primary Care Physician:  Dickson Landry MD  Date of admission: 2024    Subjective   Subjective     HPI:  Patient Reports doing much better this morning.  He is reporting less pain and swelling to the elbow.  The hand swelling is much improved.  He is feeling better.    Review of Systems   See HPI    Objective   Objective     Vitals:   Temp:  [97.3 °F (36.3 °C)-97.9 °F (36.6 °C)] 97.5 °F (36.4 °C)  Heart Rate:  [64-70] 70  Resp:  [16-18] 16  BP: (112-130)/(60-81) 129/81  Physical Exam    General: Alert, no acute distress   Musculoskeletal: Neurovascular intact extremity.  Positive pulses.  Decreased swelling to the elbow and hand.  Decreased pain to the elbow.  Improved range of motion of the elbow.    Result Review      No results found for: \"WBC\", \"RBC\", \"HGB\", \"HCT\", \"MCV\", \"MCH\", \"MCHC\", \"RDW\", \"RDWSD\", \"MPV\", \"PLT\", \"NEUTRORELPCT\", \"LYMPHORELPCT\", \"MONORELPCT\", \"EOSRELPCT\", \"BASORELPCT\", \"AUTOIGPER\", \"NEUTROABS\", \"LYMPHSABS\", \"MONOSABS\", \"EOSABS\", \"BASOSABS\", \"AUTOIGNUM\", \"NRBC\"     Result Review:  I have personally reviewed the results from the time of this admission to 2024 07:06 EDT and agree with these findings:  [x]  Laboratory  []  Microbiology  [x]  Radiology  []  EKG/Telemetry   []  Cardiology/Vascular   []  Pathology  []  Old records  []  Other:      Assessment & Plan   Assessment / Plan     Brief Patient Summary:  Regulo Sepulveda is a 58 y.o. male who has right elbow pain, swelling    Active Hospital Problems:  Active Hospital Problems    Diagnosis    • **Generalized weakness      Plan: I discussed treatment options and plan.  The patient is feeling much better this morning.  He reports less pain and less swelling and less pain in the elbow.  He has improved elbow range of motion.  We discussed possibility of an IR guided aspiration of the elbow.  However he deferred this at this time as he is feeling " much better and would rather not have the aspiration.  Continue antibiotics.  Continue to clinically monitor.  Discharge disposition per hospitalist.       VTE Prophylaxis:  Pharmacologic VTE prophylaxis orders are present.        CODE STATUS:   Level Of Support Discussed With: Patient  Code Status (Patient has no pulse and is not breathing): CPR (Attempt to Resuscitate)  Medical Interventions (Patient has pulse or is breathing): Full Support    Disposition:  I expect patient to be discharged when medically able.    Electronically signed by Rogers Yadav MD, 06/24/24, 7:06 AM EDT.

## 2024-06-24 NOTE — SIGNIFICANT NOTE
Pt was trying to get to bed side commode. Bed side commode fell over and pt fell with it. RN witnessed pt fall

## 2024-06-25 VITALS
HEART RATE: 80 BPM | TEMPERATURE: 97.3 F | BODY MASS INDEX: 18.9 KG/M2 | OXYGEN SATURATION: 100 % | HEIGHT: 73 IN | DIASTOLIC BLOOD PRESSURE: 94 MMHG | SYSTOLIC BLOOD PRESSURE: 133 MMHG | RESPIRATION RATE: 18 BRPM | WEIGHT: 142.64 LBS

## 2024-06-25 PROBLEM — E43 SEVERE MALNUTRITION: Status: ACTIVE | Noted: 2024-01-01

## 2024-06-25 LAB
ANION GAP SERPL CALCULATED.3IONS-SCNC: 7.6 MMOL/L (ref 5–15)
BUN SERPL-MCNC: 25 MG/DL (ref 6–20)
BUN/CREAT SERPL: 28.4 (ref 7–25)
CALCIUM SPEC-SCNC: 8 MG/DL (ref 8.6–10.5)
CHLORIDE SERPL-SCNC: 104 MMOL/L (ref 98–107)
CO2 SERPL-SCNC: 26.4 MMOL/L (ref 22–29)
CREAT SERPL-MCNC: 0.88 MG/DL (ref 0.76–1.27)
EGFRCR SERPLBLD CKD-EPI 2021: 99.7 ML/MIN/1.73
GLUCOSE BLDC GLUCOMTR-MCNC: 124 MG/DL (ref 70–99)
GLUCOSE BLDC GLUCOMTR-MCNC: 141 MG/DL (ref 70–99)
GLUCOSE SERPL-MCNC: 140 MG/DL (ref 65–99)
POTASSIUM SERPL-SCNC: 4.2 MMOL/L (ref 3.5–5.2)
SODIUM SERPL-SCNC: 138 MMOL/L (ref 136–145)
WHOLE BLOOD HOLD SPECIMEN: NORMAL

## 2024-06-25 PROCEDURE — 80048 BASIC METABOLIC PNL TOTAL CA: CPT | Performed by: INTERNAL MEDICINE

## 2024-06-25 PROCEDURE — 25810000003 SODIUM CHLORIDE 0.9 % SOLUTION: Performed by: INTERNAL MEDICINE

## 2024-06-25 PROCEDURE — 97530 THERAPEUTIC ACTIVITIES: CPT

## 2024-06-25 PROCEDURE — 99231 SBSQ HOSP IP/OBS SF/LOW 25: CPT | Performed by: ORTHOPAEDIC SURGERY

## 2024-06-25 PROCEDURE — 25010000002 VANCOMYCIN 5 G RECONSTITUTED SOLUTION: Performed by: INTERNAL MEDICINE

## 2024-06-25 PROCEDURE — 97164 PT RE-EVAL EST PLAN CARE: CPT

## 2024-06-25 PROCEDURE — 82948 REAGENT STRIP/BLOOD GLUCOSE: CPT

## 2024-06-25 PROCEDURE — 97110 THERAPEUTIC EXERCISES: CPT

## 2024-06-25 PROCEDURE — 97116 GAIT TRAINING THERAPY: CPT

## 2024-06-25 RX ADMIN — SACUBITRIL AND VALSARTAN 1 TABLET: 49; 51 TABLET, FILM COATED ORAL at 09:08

## 2024-06-25 RX ADMIN — SPIRONOLACTONE 25 MG: 25 TABLET ORAL at 09:10

## 2024-06-25 RX ADMIN — CARVEDILOL 6.25 MG: 6.25 TABLET, FILM COATED ORAL at 09:08

## 2024-06-25 RX ADMIN — Medication 10 ML: at 09:10

## 2024-06-25 RX ADMIN — EMPAGLIFLOZIN 10 MG: 10 TABLET, FILM COATED ORAL at 09:08

## 2024-06-25 RX ADMIN — PANTOPRAZOLE SODIUM 40 MG: 40 TABLET, DELAYED RELEASE ORAL at 09:08

## 2024-06-25 RX ADMIN — FUROSEMIDE 20 MG: 20 TABLET ORAL at 09:08

## 2024-06-25 RX ADMIN — VANCOMYCIN HYDROCHLORIDE 1500 MG: 5 INJECTION, POWDER, LYOPHILIZED, FOR SOLUTION INTRAVENOUS at 09:35

## 2024-06-25 RX ADMIN — APIXABAN 5 MG: 5 TABLET, FILM COATED ORAL at 09:08

## 2024-06-25 RX ADMIN — AMIODARONE HYDROCHLORIDE 200 MG: 200 TABLET ORAL at 09:08

## 2024-06-25 NOTE — PROGRESS NOTES
" [facility]     Progress Note    Patient Name: Regulo Sepulveda  : 1965  MRN: [id number]  Primary Care Physician:  Dickson Landry MD  Date of admission: 2024    Subjective   Subjective     HPI:  Patient Reports anxious to go home.  No new complaints to elbow today.  Swelling and pain is improved.    Review of Systems   See HPI    Objective   Objective     Vitals:   Temp:  [97.5 °F (36.4 °C)-98 °F (36.7 °C)] 97.9 °F (36.6 °C)  Heart Rate:  [74-91] 91  Resp:  [16] 16  BP: (104-145)/(83-92) 125/83  Physical Exam    General: Alert, no acute distress   Chest: Unlabored breathing, cardiovascular: Regular heart rate  Musculoskeletal: Decreased swelling and pain to elbow.  Improved range of motion.  Neurovascular intact.    Result Review      No results found for: \"WBC\", \"RBC\", \"HGB\", \"HCT\", \"MCV\", \"MCH\", \"MCHC\", \"RDW\", \"RDWSD\", \"MPV\", \"PLT\", \"NEUTRORELPCT\", \"LYMPHORELPCT\", \"MONORELPCT\", \"EOSRELPCT\", \"BASORELPCT\", \"AUTOIGPER\", \"NEUTROABS\", \"LYMPHSABS\", \"MONOSABS\", \"EOSABS\", \"BASOSABS\", \"AUTOIGNUM\", \"NRBC\"     Result Review:  I have personally reviewed the results from the time of this admission to 2024 07:54 EDT and agree with these findings:  []  Laboratory  []  Microbiology  []  Radiology  []  EKG/Telemetry   []  Cardiology/Vascular   []  Pathology  []  Old records  []  Other:      Assessment & Plan   Assessment / Plan     Brief Patient Summary:  Regulo Sepulveda is a 58 y.o. male who has right elbow swelling and effusion    Active Hospital Problems:  Active Hospital Problems    Diagnosis     **Generalized weakness      Plan: He reports he is doing better today.  He wants to go home.  No plan for orthopedic intervention at this time.  Continue to monitor.  Plan for outpatient orthopedic follow-up if needed.       VTE Prophylaxis:  Pharmacologic VTE prophylaxis orders are present.        CODE STATUS:   Level Of Support Discussed With: Patient  Code Status (Patient has no pulse and is not breathing): CPR (Attempt " to Resuscitate)  Medical Interventions (Patient has pulse or is breathing): Full Support    Disposition:  I expect patient to be discharged when medically able.    Electronically signed by Rogers Yadav MD, 06/25/24, 7:54 AM EDT.

## 2024-06-25 NOTE — CONSULTS
"Nutrition Services    Patient Name: Regulo Sepulveda  YOB: 1965  MRN: [id number]  Admission date: 6/14/2024      CLINICAL NUTRITION ASSESSMENT      Reason for Assessment  LOS and Nursing screen      H&P:  Past Medical History:   Diagnosis Date    Anxiety     Arthritis     Asthma     Bipolar affective     Cardiac tamponade     2023    CHF (congestive heart failure)     COPD (chronic obstructive pulmonary disease)     Coronary artery disease     Depression     Diabetes mellitus     Elevated cholesterol     Hypertension     Parkinson disease     Sleep apnea     Stroke     Pt stated it was \"about 2 months ago\" as of 6/15/24        Current Problems:   Active Hospital Problems    Diagnosis     **Generalized weakness         Nutrition/Diet History         Narrative   Pt alert at time of visit. PTA, pt reports eating 100% of his 3 meals daily w/ no issues c/s. Currently, pt reports since being inpatient, he is not eating that well. Discussed ONS, Boost TID to aid in caloric intake. Per pt, no n/v/c/d complaints. NKFA. NFPE performed. RD to continue to monitor.      Anthropometrics        Current Height, Weight Height: 185.4 cm (73\")  Weight: 64.7 kg (142 lb 10.2 oz)   Current BMI Body mass index is 18.82 kg/m².   BMI Classification Normal range   % IBW 80%   Adjusted Body Weight (ABW)    Weight Hx  Wt Readings from Last 30 Encounters:   06/14/24 1717 64.7 kg (142 lb 10.2 oz)   05/31/24 0342 60.2 kg (132 lb 11.5 oz)   05/30/24 0505 63.4 kg (139 lb 12.4 oz)   05/29/24 1322 65.5 kg (144 lb 6.4 oz)   05/29/24 0156 67.8 kg (149 lb 7.6 oz)   05/29/24 0141 67.3 kg (148 lb 5.9 oz)   05/28/24 1608 70.6 kg (155 lb 10.3 oz)   05/15/24 1453 67.6 kg (149 lb)   05/07/24 1510 67.6 kg (149 lb)   04/22/24 0917 73 kg (161 lb)   04/16/24 1253 68.5 kg (151 lb)   04/15/24 1454 67.6 kg (149 lb)   04/09/24 0055 68.6 kg (151 lb 3.8 oz)   04/08/24 1955 65.1 kg (143 lb 8.3 oz)   04/06/24 0526 64.9 kg (143 lb 1.3 oz)   04/05/24 0559 65 kg " (143 lb 4.8 oz)   04/04/24 0534 64.1 kg (141 lb 5 oz)   04/03/24 0610 66.1 kg (145 lb 11.6 oz)   04/02/24 2141 68.2 kg (150 lb 5.7 oz)   04/02/24 1308 63.8 kg (140 lb 10.5 oz)   03/29/24 1031 65.3 kg (144 lb)   03/25/24 0600 64.5 kg (142 lb 3.2 oz)   03/24/24 0411 67 kg (147 lb 11.3 oz)   03/24/24 0250 67 kg (147 lb 11.3 oz)   03/21/24 1435 69 kg (152 lb 1.6 oz)   03/04/24 1300 69.7 kg (153 lb 9.6 oz)   02/22/24 1414 68.9 kg (152 lb)   01/31/24 1544 68.9 kg (152 lb)   01/08/24 1533 71.7 kg (158 lb)   12/18/23 0559 70.1 kg (154 lb 8.7 oz)   12/16/23 0500 78.6 kg (173 lb 4.5 oz)   12/15/23 1733 77.8 kg (171 lb 8.3 oz)   12/15/23 0914 74.6 kg (164 lb 7.4 oz)   11/27/23 1311 66.7 kg (147 lb)   11/15/23 0320 66.9 kg (147 lb 7.8 oz)   11/14/23 0619 67.1 kg (147 lb 14.9 oz)   11/13/23 0612 66.9 kg (147 lb 7.8 oz)   11/12/23 0600 68.5 kg (151 lb 0.2 oz)   11/11/23 0500 72.8 kg (160 lb 7.9 oz)   11/10/23 0710 76.2 kg (167 lb 15.9 oz)   11/06/23 1744 78.9 kg (174 lb)   11/02/23 1253 74.8 kg (165 lb)   10/23/23 0500 68.2 kg (150 lb 5.7 oz)   10/22/23 0355 69 kg (152 lb 1.9 oz)   10/21/23 0700 69.7 kg (153 lb 10.6 oz)   10/20/23 0300 72.8 kg (160 lb 7.9 oz)   10/19/23 0600 74.9 kg (165 lb 2 oz)   10/18/23 2204 74.7 kg (164 lb 10.9 oz)   10/18/23 1513 81.5 kg (179 lb 10.8 oz)   09/17/23 0345 71 kg (156 lb 8.4 oz)   09/02/23 0719 71.4 kg (157 lb 6.5 oz)   09/01/23 0500 71 kg (156 lb 8.4 oz)   08/31/23 0500 71.2 kg (156 lb 15.5 oz)   08/30/23 0542 74.2 kg (163 lb 9.3 oz)   08/29/23 0600 74 kg (163 lb 2.3 oz)   08/28/23 0522 79.3 kg (174 lb 13.2 oz)   08/27/23 2034 79.3 kg (174 lb 13.2 oz)   08/27/23 1504 76.6 kg (168 lb 14 oz)   02/27/23 1151 70.4 kg (155 lb 3.3 oz)   09/18/22 2111 83.9 kg (184 lb 15.5 oz)   09/14/22 1636 83.6 kg (184 lb 4.9 oz)   05/24/22 1358 97.5 kg (215 lb)          Wt Change Observation Note per EMR, pt wt stable in the last month      Estimated/Assessed Needs  Estimated Needs based on: Current Body Weight        Energy Requirements 22-25 kcal/kg    EST Needs (kcal/day) 2861-3697 kcal/d       Protein Requirements 1.2-1.5 g.kg   EST Daily Needs (g/day) 77-97 g/d        Fluid Requirements Fluid Restriction    Estimated Needs (mL/day) 2000 mL      Labs/Medications         Pertinent Labs Reviewed.   Results from last 7 days   Lab Units 06/25/24  0356 06/24/24  1621 06/24/24  0341 06/23/24  0257 06/19/24  0434 06/18/24  1421   SODIUM mmol/L 138  --  142 141   < > 139   POTASSIUM mmol/L 4.2 4.5 3.5 3.7   < > 4.4   CHLORIDE mmol/L 104  --  106 106   < > 99   CO2 mmol/L 26.4  --  27.1 27.6   < > 24.8   BUN mg/dL 25*  --  28* 36*   < > 65*   CREATININE mg/dL 0.88  --  0.86 1.05   < > 1.70*   CALCIUM mg/dL 8.0*  --  8.0* 8.1*   < > 8.5*   BILIRUBIN mg/dL  --   --   --   --   --  0.6   ALK PHOS U/L  --   --   --   --   --  215*   ALT (SGPT) U/L  --   --   --   --   --  28   AST (SGOT) U/L  --   --   --   --   --  20   GLUCOSE mg/dL 140*  --  97 88   < > 131*    < > = values in this interval not displayed.     Results from last 7 days   Lab Units 06/23/24  0257 06/21/24  0521 06/20/24  0430   PHOSPHORUS mg/dL 2.6   < > 2.6   HEMOGLOBIN g/dL  --   --  12.8*   HEMATOCRIT %  --   --  40.8    < > = values in this interval not displayed.     COVID19   Date Value Ref Range Status   06/14/2024 Not Detected Not Detected - Ref. Range Final     Lab Results   Component Value Date    HGBA1C 6.40 (H) 06/15/2024         Pertinent Medications Reviewed.     Malnutrition Severity Assessment      Patient meets criteria for : Severe Malnutrition  Malnutrition Type (Last 8 Hours)       Malnutrition Severity Assessment       Row Name 06/25/24 1224       Malnutrition Severity Assessment    Malnutrition Type Chronic Disease - Related Malnutrition      Row Name 06/25/24 1224       Muscle Loss    Loss of Muscle Mass Findings Severe    [place] Region Severe - deep hollowing/scooping, lack of muscle to touch, facial bones well defined    Clavicle Bone Region  Severe - protruding prominent bone      Row Name 06/25/24 1224       Fat Loss    Subcutaneous Fat Loss Findings Severe    Upper Arm Region Severe - mostly skin, very little space between folds, fingers touch      Row Name 06/25/24 1225       Criteria Met (Must meet criteria for severity in at least 2 of these categories: M Wasting, Fat Loss, Fluid, Secondary Signs, Wt. Status, Intake)    Patient meets criteria for  Severe Malnutrition                     Nutrition Diagnosis         Nutrition Dx Problem 1 Severe malnutrition related to  chronic disease  as evidenced by  severe subcutaneous fat loss of triceps region and severe muscle loss of temporal and clavicle region      Nutrition Intervention           Current Nutrition Orders & Evaluation of Intake       Current PO Diet Diet: Cardiac, Diabetic; Healthy Heart (2-3 Na+); Consistent Carbohydrate; Fluid Consistency: Thin (IDDSI 0)   Supplement Orders Placed This Encounter      Dietary Nutrition Supplements Boost Plus (Ensure Plus); chocolate           Nutrition Intervention/Prescription        Recommend to continue current diet order, if PO intake does not improve recommend to liberalize diet   Recommend to initiate Boost Plus TID to aid in caloric intake        Medical Nutrition Therapy/Nutrition Education          Learner     Readiness Patient  N/A     Method     Response N/A  N/A     Monitor/Evaluation        Monitor Monitor PO/ONS intake, wt trends, and labs      Nutrition Discharge Plan         Recommend to continue oral nutrition supplements on discharge.      Electronically signed by:  Ingris Monson RD  06/25/24 12:25 EDT

## 2024-06-25 NOTE — NURSING NOTE
Pt made aware of risks of leaving and benefits of staying such as receiving more antibiotics. Pt refused to stay, pt made aware that no medication will be sent home with him and insurance will not cover stay. Dr. Angulo made aware, AMA paperwork signed, IV removed.

## 2024-06-25 NOTE — THERAPY TREATMENT NOTE
"Patient Name: Regulo Sepulveda  : 1965    MRN: [id number]                              Today's Date: 2024       Admit Date: 2024    Visit Dx:     ICD-10-CM ICD-9-CM   1. Acute on chronic congestive heart failure, unspecified heart failure type  I50.9 428.0   2. Weakness generalized  R53.1 780.79   3. Pharyngitis, unspecified etiology  J02.9 462   4. Dyspnea on exertion  R06.09 786.09   5. Difficulty walking  R26.2 719.7   6. Decreased activities of daily living (ADL)  Z78.9 V49.89     Patient Active Problem List   Diagnosis    Major depressive disorder, recurrent episode, moderate    Chronic obstructive pulmonary disease    Diabetes mellitus    Primary hypertension    Hyperlipidemia    Hepatitis C    Cigarette nicotine dependence    BPH (benign prostatic hyperplasia)    GERD (gastroesophageal reflux disease)    B12 deficiency    LV (left ventricular) mural thrombus    Schizophrenia    PAF (paroxysmal atrial fibrillation)    Chronic HFrEF (heart failure with reduced ejection fraction)    Moderate malnutrition    Cardiomyopathy    TIA (transient ischemic attack)    Alcohol abuse    Bipolar 1 disorder    Chronic paranoid schizophrenia    Chronic post-traumatic stress disorder    Inhalant abuse    Type 2 diabetes mellitus without complication    Vitamin D deficiency    Acute exacerbation of CHF (congestive heart failure)    Generalized weakness     Past Medical History:   Diagnosis Date    Anxiety     Arthritis     Asthma     Bipolar affective     Cardiac tamponade         CHF (congestive heart failure)     COPD (chronic obstructive pulmonary disease)     Coronary artery disease     Depression     Diabetes mellitus     Elevated cholesterol     Hypertension     Parkinson disease     Sleep apnea     Stroke     Pt stated it was \"about 2 months ago\" as of 6/15/24     Past Surgical History:   Procedure Laterality Date    CARDIAC CATHETERIZATION Right 2023    Procedure: Right and Left Heart Cath;  " Surgeon: Ben Grant MD;  Location: [facility] INVASIVE LOCATION;  Service: Cardiovascular;  Laterality: Right;    ENDOSCOPY      TESTICLE SURGERY Left     extraction      General Information       Row Name 06/25/24 1327          OT Time and Intention    Document Type therapy note (daily note)  -     Mode of Treatment individual therapy;occupational therapy  -               User Key  (r) = Recorded By, (t) = Taken By, (c) = Cosigned By      Initials Name Provider Type     Bridgett Terry OT Occupational Therapist                     Mobility/ADL's       Row Name 06/25/24 1329          Bed Mobility    Bed Mobility supine-sit  -LF     Supine-Sit [place] (Bed Mobility) supervision  -     Assistive Device (Bed Mobility) bed rails;head of bed elevated  -       Row Name 06/25/24 1329          Transfers    Transfers sit-stand transfer;stand-sit transfer  -       Row Name 06/25/24 1329          Sit-Stand Transfer    Sit-Stand [place] (Transfers) standby assist  -LF     Assistive Device (Sit-Stand Transfers) walker, front-wheeled  -       Row Name 06/25/24 1329          Stand-Sit Transfer    Stand-Sit [place] (Transfers) standby assist  -LF     Assistive Device (Stand-Sit Transfers) walker, front-wheeled  -               User Key  (r) = Recorded By, (t) = Taken By, (c) = Cosigned By      Initials Name Provider Type     Bridgett Terry OT Occupational Therapist                   Obj/Interventions       Row Name 06/25/24 1330          Shoulder (Therapeutic Exercise)    Shoulder (Therapeutic Exercise) strengthening exercise  -     Shoulder Strengthening (Therapeutic Exercise) bilateral;flexion;extension;horizontal aBduction/aDduction;resistance band;green;2 sets;10 repetitions  -       Row Name 06/25/24 1330          Elbow/Forearm (Therapeutic Exercise)    Elbow/Forearm (Therapeutic Exercise) strengthening exercise  -     Elbow/Forearm Strengthening (Therapeutic Exercise)  bilateral;flexion;extension;resistance band;green;2 sets;10 repetitions  -       Row Name 06/25/24 1330          Motor Skills    Motor Skills functional endurance  -     Functional Endurance Fair  -     Therapeutic Exercise shoulder;elbow/forearm  -       Row Name 06/25/24 1330          Balance    Balance Assessment sitting dynamic balance;standing dynamic balance  -     Dynamic Sitting Balance supervision  -     Position, Sitting Balance unsupported;sitting edge of bed  -     Dynamic Standing Balance standby assist  -     Position/Device Used, Standing Balance supported;walker, front-wheeled  -     Balance Interventions sitting;standing;sit to stand;supported;dynamic;occupation based/functional task;UE activity with balance activity  -               User Key  (r) = Recorded By, (t) = Taken By, (c) = Cosigned By      Initials Name Provider Type     Bridgett Terry OT Occupational Therapist                   Goals/Plan    No documentation.                  Clinical Impression       Row Name 06/25/24 1332          Pain Assessment    Additional Documentation Pain Scale: FACES Pre/Post-Treatment (Group)  -       Row Name 06/25/24 1332          Pain Scale: FACES Pre/Post-Treatment    Pain: FACES Scale, Pretreatment 2-->hurts little bit  -     Posttreatment Pain Rating 2-->hurts little bit  -LF     Pain Location - Side/Orientation Left  -     Pain Location - elbow  -       Row Name 06/25/24 1332          Plan of Care Review    Plan of Care Reviewed With patient  -     Progress improving  -     Outcome Evaluation Pt agreeable to BUE exercises on EOB, demonstrating fair endurance. He would benefit from continued OT services to maximize independence.  -       Row Name 06/25/24 1332          Vital Signs    O2 Delivery Pre Treatment room air  -     O2 Delivery Intra Treatment room air  -     O2 Delivery Post Treatment room air  -       Row Name 06/25/24 1332          Positioning and  Restraints    Pre-Treatment Position in bed  -LF     Post Treatment Position bed  -LF     In Bed fowlers;call light within reach;encouraged to call for assist  -LF               User Key  (r) = Recorded By, (t) = Taken By, (c) = Cosigned By      Initials Name Provider Type    LF Bridgett Terry, OT Occupational Therapist                   Outcome Measures       Row Name 06/25/24 1337          How much help from another is currently needed...    Putting on and taking off regular lower body clothing? 2  -LF     Bathing (including washing, rinsing, and drying) 2  -LF     Toileting (which includes using toilet bed pan or urinal) 2  -LF     Putting on and taking off regular upper body clothing 2  -LF     Taking care of personal grooming (such as brushing teeth) 2  -LF     Eating meals 3  -LF     AM-PAC 6 Clicks Score (OT) 13  -LF       Row Name 06/25/24 0900          How much help from another person do you currently need...    Turning from your back to your side while in flat bed without using bedrails? 4  -HYUN     Moving from lying on back to sitting on the side of a flat bed without bedrails? 4  -HYUN     Moving to and from a bed to a chair (including a wheelchair)? 3  -HYUN     Standing up from a chair using your arms (e.g., wheelchair, bedside chair)? 3  -HYUN     Climbing 3-5 steps with a railing? 3  -HYUN     To walk in hospital room? 3  -HYUN     AM-PAC 6 Clicks Score (PT) 20  -HYUN     Highest Level of Mobility Goal 6 --> Walk 10 steps or more  -HYUN       Row Name 06/25/24 0597 06/25/24 0900       Functional Assessment    Outcome Measure Options AM-PAC 6 Clicks Daily Activity (OT);Optimal Instrument  -LF AM-PAC 6 Clicks Basic Mobility (PT)  -HYUN      Row Name 06/25/24 1337          Optimal Instrument    Optimal Instrument Optimal - 3  -LF     Bending/Stooping 4  -LF     Standing 2  -LF     Reaching 2  -LF     From the list, choose the 3 activities you would most like to be able to do without any difficulty  Bending/stooping;Standing;Reaching  -     Total Score Optimal - 3 8  -LF               User Key  (r) = Recorded By, (t) = Taken By, (c) = Cosigned By      Initials Name Provider Type     Bridgett Terry OT Occupational Therapist    Drew Shay, PT Physical Therapist                    Occupational Therapy Education       Title: PT OT SLP Therapies (In Progress)       Topic: Occupational Therapy (In Progress)       Point: ADL training (In Progress)       Description:   Instruct learner(s) on proper safety adaptation and remediation techniques during self care or transfers.   Instruct in proper use of assistive devices.                  Learning Progress Summary             Patient Acceptance, E,TB, NR by  at 6/18/2024 1406                         Point: Home exercise program (Not Started)       Description:   Instruct learner(s) on appropriate technique for monitoring, assisting and/or progressing therapeutic exercises/activities.                  Learner Progress:  Not documented in this visit.              Point: Precautions (In Progress)       Description:   Instruct learner(s) on prescribed precautions during self-care and functional transfers.                  Learning Progress Summary             Patient Acceptance, E,TB, NR by  at 6/18/2024 1406                         Point: Body mechanics (In Progress)       Description:   Instruct learner(s) on proper positioning and spine alignment during self-care, functional mobility activities and/or exercises.                  Learning Progress Summary             Patient Acceptance, E,TB, NR by  at 6/18/2024 1406                                         User Key       Initials Effective Dates Name Provider Type [facility] 06/16/21 -  Bridgett Terry OT Occupational Therapist OT                  OT Recommendation and Plan  Planned Therapy Interventions (OT): activity tolerance training, BADL retraining, functional balance retraining,  occupation/activity based interventions, patient/caregiver education/training, strengthening exercise, transfer/mobility retraining  Therapy Frequency (OT): 5 times/wk  Plan of Care Review  Plan of Care Reviewed With: patient  Progress: improving  Outcome Evaluation: Pt agreeable to BUE exercises on EOB, demonstrating fair endurance. He would benefit from continued OT services to maximize independence.     Time Calculation:   Evaluation Complexity (OT)  Review Occupational Profile/Medical/Therapy History Complexity: brief/low complexity  Assessment, Occupational Performance/Identification of Deficit Complexity: 3-5 performance deficits  Clinical Decision Making Complexity (OT): problem focused assessment/low complexity  Overall Complexity of Evaluation (OT): low complexity     Time Calculation- OT       Row Name 06/25/24 1337 06/25/24 0935          Time Calculation- OT    OT Received On 06/25/24  -LF --     OT Goal Re-Cert Due Date 06/27/24  -LF --        Timed Charges    34396 - OT Therapeutic Exercise Minutes 15  -LF --     28312 - Gait Training Minutes  -- --  -HYUN     15995 - OT Therapeutic Activity Minutes 8  -LF --        Total Minutes    Timed Charges Total Minutes 23  -LF --  -HYUN      Total Minutes 23  -LF --  -HYUN               User Key  (r) = Recorded By, (t) = Taken By, (c) = Cosigned By      Initials Name Provider Type    LF Bridgett Terry OT Occupational Therapist    Drew Shay, PT Physical Therapist                  Therapy Charges for Today       Code Description Service Date Service Provider Modifiers Qty    63126163505 HC OT THER PROC EA 15 MIN 6/25/2024 Bridgett Terry OT GO 1    10903829613 HC OT THERAPEUTIC ACT EA 15 MIN 6/25/2024 Bridgett Terry OT GO 1                 Bridgett Terry OT  6/25/2024

## 2024-06-25 NOTE — THERAPY EVALUATION
"Acute Care - Physical Therapy Re-Evaluation  [facility]     Patient Name: Regulo Sepulveda  : 1965  MRN: [id number]  Today's Date: 2024     Admit date: 2024     Referring Physician: Alex Angulo MD     Surgery Date:* No surgery found *             Visit Dx:     ICD-10-CM ICD-9-CM   1. Acute on chronic congestive heart failure, unspecified heart failure type  I50.9 428.0   2. Weakness generalized  R53.1 780.79   3. Pharyngitis, unspecified etiology  J02.9 462   4. Dyspnea on exertion  R06.09 786.09   5. Difficulty walking  R26.2 719.7   6. Decreased activities of daily living (ADL)  Z78.9 V49.89     Patient Active Problem List   Diagnosis    Major depressive disorder, recurrent episode, moderate    Chronic obstructive pulmonary disease    Diabetes mellitus    Primary hypertension    Hyperlipidemia    Hepatitis C    Cigarette nicotine dependence    BPH (benign prostatic hyperplasia)    GERD (gastroesophageal reflux disease)    B12 deficiency    LV (left ventricular) mural thrombus    Schizophrenia    PAF (paroxysmal atrial fibrillation)    Chronic HFrEF (heart failure with reduced ejection fraction)    Moderate malnutrition    Cardiomyopathy    TIA (transient ischemic attack)    Alcohol abuse    Bipolar 1 disorder    Chronic paranoid schizophrenia    Chronic post-traumatic stress disorder    Inhalant abuse    Type 2 diabetes mellitus without complication    Vitamin D deficiency    Acute exacerbation of CHF (congestive heart failure)    Generalized weakness     Past Medical History:   Diagnosis Date    Anxiety     Arthritis     Asthma     Bipolar affective     Cardiac tamponade         CHF (congestive heart failure)     COPD (chronic obstructive pulmonary disease)     Coronary artery disease     Depression     Diabetes mellitus     Elevated cholesterol     Hypertension     Parkinson disease     Sleep apnea     Stroke     Pt stated it was \"about 2 months ago\" as of 6/15/24     Past Surgical History: "   Procedure Laterality Date    CARDIAC CATHETERIZATION Right 09/17/2023    Procedure: Right and Left Heart Cath;  Surgeon: Ben Grant MD;  Location: [facility] CATH INVASIVE [facility];  Service: Cardiovascular;  Laterality: Right;    ENDOSCOPY      TESTICLE SURGERY Left     extraction     PT Assessment (Last 12 Hours)       PT Evaluation and Treatment       Row Name 06/25/24 0900          Physical Therapy Time and Intention    Subjective Information no complaints  -HYUN     Document Type re-evaluation  -HYUN     Mode of Treatment individual therapy;physical therapy  -HYUN     Patient Effort good  -HYUN       Row Name 06/25/24 0900          General Information    Patient Observations alert;cooperative;agree to therapy  -HYUN       Row Name 06/25/24 0900          Bed Mobility    Bed Mobility bed mobility (all) activities;supine-sit  -HYUN     All Activities, Sims (Bed Mobility) standby assist  -HYUN     Supine-Sit Sims (Bed Mobility) standby assist  -HYUN       Row Name 06/25/24 0900          Transfers    Transfers bed-chair transfer;sit-stand transfer  -HYUN       Row Name 06/25/24 0900          Bed-Chair Transfer    Bed-Chair Sims (Transfers) contact guard  -HYUN     Assistive Device (Bed-Chair Transfers) walker, front-wheeled  -HYUN       Row Name 06/25/24 0900          Sit-Stand Transfer    Sit-Stand Sims (Transfers) contact guard  -HYUN     Assistive Device (Sit-Stand Transfers) walker, front-wheeled  -HYUN       Row Name 06/25/24 0900          Gait/Stairs (Locomotion)    Gait/Stairs Locomotion gait/ambulation assistive device  -HYUN     Sims Level (Gait) contact guard  -HYUN     Assistive Device (Gait) walker, front-wheeled  -HYUN     Distance in Feet (Gait) 100  -HYUN       Row Name 06/25/24 0900          Safety Issues, Functional Mobility    Impairments Affecting Function (Mobility) balance  -HYUN       Row Name 06/25/24 0900          Balance    Dynamic Standing Balance contact guard  -HYUN     Position/Device  Used, Standing Balance walker, front-wheeled  mildly unsteady throughout ambulation  -HYUN       Row Name             Wound 06/23/24 1129 Left upper arm Extravasation    Wound - Properties Group Placement Date: 06/23/24 -EW Placement Time: 1129 -EW Side: Left  -EW Orientation: upper  -EW Location: arm  -EW Primary Wound Type: Extravasatio  -EW Number of Sutures Placed: 0  -EW    Retired Wound - Properties Group Placement Date: 06/23/24 -EW Placement Time: 1129 -EW Side: Left  -EW Orientation: upper  -EW Location: arm  -EW Primary Wound Type: Extravasatio  -EW Number of Sutures Placed: 0  -EW    Retired Wound - Properties Group Date first assessed: 06/23/24 -EW Time first assessed: 1129 -EW Side: Left  -EW Location: arm  -EW Primary Wound Type: Extravasatio  -EW Number of Sutures Placed: 0  -EW      Row Name 06/25/24 0900          Therapy Assessment/Plan (PT)    Rehab Potential (PT) good, to achieve stated therapy goals  -HYUN     Criteria for Skilled Interventions Met (PT) skilled treatment is necessary  -HYUN     Therapy Frequency (PT) daily  -HYUN     Predicted Duration of Therapy Intervention (PT) 10 days  -HYUN     Problem List (PT) problems related to;balance;mobility  -HYUN     Activity Limitations Related to Problem List (PT) unable to ambulate safely  -HYUN       Row Name 06/25/24 0900          PT Evaluation Complexity    History, PT Evaluation Complexity no personal factors and/or comorbidities  -HYUN     Examination of Body Systems (PT Eval Complexity) total of 4 or more elements  -HYUN     Clinical Presentation (PT Evaluation Complexity) stable  -HYUN     Clinical Decision Making (PT Evaluation Complexity) low complexity  -HYUN     Overall Complexity (PT Evaluation Complexity) low complexity  -HYUN       Row Name 06/25/24 0900          Therapy Plan Review/Discharge Plan (PT)    Therapy Plan Review (PT) evaluation/treatment results reviewed;participants voiced agreement with care plan;participants included;patient  -HYUN        Row Name 06/25/24 0900          Physical Therapy Goals    Transfer Goal Selection (PT) transfer, PT goal 1  -HYUN     Gait Training Goal Selection (PT) gait training, PT goal 1  -HYUN       Row Name 06/25/24 0900          Transfer Goal 1 (PT)    Activity/Assistive Device (Transfer Goal 1, PT) transfers, all  -HYUN     Ages [place] Level/Cues Needed (Transfer Goal 1, PT) independent  -HYUN     Time Frame (Transfer Goal 1, PT) 10 days  -HYUN     Progress/Outcome (Transfer Goal 1, PT) good progress toward goal;continuing progress toward goal  -HYUN       Row Name 06/25/24 0900          Gait Training Goal 1 (PT)    Activity/Assistive Device (Gait Training Goal 1, PT) gait (walking locomotion)  -HYUN     Ages [place] Level (Gait Training Goal 1, PT) independent  -HYUN     Distance (Gait Training Goal 1, PT) 200  -HYUN     Time Frame (Gait Training Goal 1, PT) 10 days  -HYUN     Progress/Outcome (Gait Training Goal 1, PT) good progress toward goal;continuing progress toward goal  -HYUN               User Key  (r) = Recorded By, (t) = Taken By, (c) = Cosigned By      Initials Name Provider Type    HYUN Drew Mayes, PT Physical Therapist    Екатерина Campos, RN Registered Nurse                    Physical Therapy Education       Title: PT OT SLP Therapies (In Progress)       Topic: Physical Therapy (In Progress)       Point: Mobility training (Done)       Learning Progress Summary             Patient Acceptance, E,TB, VU by AV at 6/17/2024 1344                         Point: Home exercise program (Not Started)       Learner Progress:  Not documented in this visit.              Point: Body mechanics (Done)       Learning Progress Summary             Patient Acceptance, E,TB, VU by AV at 6/17/2024 1344                         Point: Precautions (Done)       Learning Progress Summary             Patient Acceptance, E,TB, VU by AV at 6/17/2024 1344                                         User Key       Initials Effective Dates Name Provider  Type Discipline    AV 06/11/21 -  Daniel Frank, PT Physical Therapist PT                  PT Recommendation and Plan  Anticipated Discharge Disposition (PT): inpatient rehabilitation facility  Planned Therapy Interventions (PT): balance training, bed mobility training, gait training, home exercise program, stair training, strengthening, transfer training  Therapy Frequency (PT): daily      Outcome Measures       Row Name 06/25/24 0900             How much help from another person do you currently need...    Turning from your back to your side while in flat bed without using bedrails? 4  -HYUN      Moving from lying on back to sitting on the side of a flat bed without bedrails? 4  -HYUN      Moving to and from a bed to a chair (including a wheelchair)? 3  -HYUN      Standing up from a chair using your arms (e.g., wheelchair, bedside chair)? 3  -HYUN      Climbing 3-5 steps with a railing? 3  -HYUN      To walk in hospital room? 3  -HYUN      AM-PAC 6 Clicks Score (PT) 20  -HYUN      Highest Level of Mobility Goal 6 --> Walk 10 steps or more  -HYUN         Functional Assessment    Outcome Measure Options AM-PAC 6 Clicks Basic Mobility (PT)  -HYUN                User Key  (r) = Recorded By, (t) = Taken By, (c) = Cosigned By      Initials Name Provider Type    Drew Shay PT Physical Therapist                     Time Calculation:    PT Charges       Row Name 06/25/24 0935             Time Calculation    PT Received On 06/25/24  -HYUN      PT Goal Re-Cert Due Date 07/04/24  -HYUN         Timed Charges    41588 - Gait Training Minutes  --  -HYUN         Untimed Charges    PT Eval/Re-eval Minutes 20  -HYUN         Total Minutes    Timed Charges Total Minutes --  -HUYN      Untimed Charges Total Minutes 20  -HYUN       Total Minutes 20  -HYUN                User Key  (r) = Recorded By, (t) = Taken By, (c) = Cosigned By      Initials Name Provider Type    Drew Shay PT Physical Therapist                      PT G-Codes  Outcome  Measure Options: AM-PAC 6 Clicks Basic Mobility (PT)  AM-PAC 6 Clicks Score (PT): 20  AM-PAC 6 Clicks Score (OT): 13    Drew Mayes, PT  6/25/2024

## 2024-06-25 NOTE — DISCHARGE SUMMARY
[facility]        HOSPITALIST  DISCHARGE SUMMARY    Patient Name: Regulo Sepulveda  : 1965  MRN: [id number]    Date of Admission: 2024  Date of Discharge:  2024  Primary Care Physician: Dickson Landry MD    Consults       Date and Time Order Name Status Description    2024 10:48 AM Inpatient Orthopedic Surgery Consult Completed             Active and Resolved Hospital Problems:  Active Hospital Problems    Diagnosis POA   • **Generalized weakness [R53.1] Yes      Resolved Hospital Problems   No resolved problems to display.   Acute systolic heart failure exacerbation.  EF of 21%.  Improved  Hypertension.  Stable  Rash on bilateral lower feet.?  Leukocytoclastic vasculitis.  Improving  Right elbow bursitis/effusion.  Improving  History of hepatitis C, treated  Paroxysmal atrial fibrillation on Eliquis  History of apical thrombus on Eliquis  COPD.  Stable  Diabetes mellitus type 2.  Hemoglobin A1c of 6.4%  Hypertension  Hyperlipidemia  Schizophrenia on monthly injection  Renal insufficiency.  Creatinine stable  Parkinsonism  Confusion/acute metabolic encephalopathy.  Improved  General debility and weakness  Leukocytosis and polycythemia.  Resolved  Constipation.  Resolved       Hospital Course     Hospital Course:  Regulo Sepulveda is a 58 y.o. male  male with PMHx of HTN, CHF , COPD, DMII, Parkinson Disease, CAD, Bipolar disorder who presents to the emergency department for evaluation of sore throat and myalgias for the past 3 to 4 days. Also having left elbow pain that he states he is never had before. The patient also states that he was just admitted here few weeks ago for CHF and has had increasingly had more shortness of breath recently. He states that his feet have been swollen for the last several days. He states that he has had increased shortness of breath with exertion. He reports fevers at home. He states that he has been compliant with his medications since discharge. He  denies any chills, diaphoresis, chest pain, nausea, vomiting, dysuria, constipation or diarrhea. In the ED he was given Tylenol, Lasix because of pain. He was admitted for further evaluation and management.   2D echo with EF of 21%.  Cardiology consulted.  Ortho consulted for right elbow effusion and bursitis.  Patient denies any trauma or bug bite there.  CT head negative obtained because of confusion.  Patient got agitated and restless needing restraint overnight.  MRI of right elbow.  MRI of the right elbow with soft tissue swelling and a small effusion in the joint and olecranon bursa.  Patient does not want drainage by IR at this time as per Ortho.  Finished IV Vanco in hospital     Interval Followup:   Patient did not qualify for rehab as no place was willing to take him.  Patient getting frustrated because of prolonged stay in the hospital and signed leaving AMA.      DISCHARGE Follow Up Recommendations for labs and diagnostics: Patient left signing AMA before my rounds.  Recommended 2 weeks of p.o. doxycycline for right elbow infection.  Follow-up with PCP, cardiology and orthopedic surgery      Day of Discharge     Vital Signs:  Temp:  [97.3 °F (36.3 °C)-98 °F (36.7 °C)] 97.3 °F (36.3 °C)  Heart Rate:  [80-91] 80  Resp:  [16-18] 18  BP: (120-133)/(83-94) 133/94    Physical Exam: Left after signing AMA      Discharge Details        Discharge Medications        Continue These Medications        Instructions Start Date   Abilify Maintena 300 MG Suspension Reconstituted ER IM injection ER  Generic drug: ARIPiprazole ER   300 mg, Intramuscular, Every 30 Days      albuterol sulfate  (90 Base) MCG/ACT inhaler  Commonly known as: PROVENTIL HFA;VENTOLIN HFA;PROAIR HFA   2 puffs, Inhalation, Every 4 Hours PRN      amiodarone 200 MG tablet  Commonly known as: PACERONE   200 mg, Oral, Daily      atorvastatin 40 MG tablet  Commonly known as: LIPITOR   40 mg, Oral, Every Night at Bedtime      carvedilol 12.5 MG  tablet  Commonly known as: COREG   25 mg, Oral, 2 Times Daily      Farxiga 5 MG tablet tablet  Generic drug: dapagliflozin   10 mg, Oral, Daily      ferrous gluconate 324 MG tablet  Commonly known as: FERGON   324 mg, Oral, Daily With Breakfast      furosemide 40 MG tablet  Commonly known as: LASIX   20 mg, Oral, Daily      metFORMIN 1000 MG tablet  Commonly known as: GLUCOPHAGE   1,000 mg, Oral, 2 Times Daily With Meals      mirtazapine 15 MG tablet  Commonly known as: REMERON   15 mg, Oral, Nightly      pantoprazole 40 MG EC tablet  Commonly known as: PROTONIX   40 mg, Oral, Daily      sacubitril-valsartan 49-51 MG tablet  Commonly known as: ENTRESTO   1 tablet, Oral, Every Morning      sacubitril-valsartan 49-51 MG tablet  Commonly known as: ENTRESTO   2 tablets, Oral, Nightly      spironolactone 25 MG tablet  Commonly known as: ALDACTONE   25 mg, Oral, Daily      venlafaxine XR 37.5 MG 24 hr capsule  Commonly known as: EFFEXOR-XR   37.5 mg, Oral, Daily             ASK your doctor about these medications        Instructions Start Date   apixaban 5 MG tablet tablet  Commonly known as: ELIQUIS  Ask about: Should I take this medication?   5 mg, Oral, 2 Times Daily               Allergies   Allergen Reactions   • Penicillins Shortness Of Breath       Discharge Disposition:  Left Against Medical Advice    Diet:      Discharge Activity:       CODE STATUS:  Code Status and Medical Interventions:   Ordered at: 06/15/24 0058     Level Of Support Discussed With:    Patient     Code Status (Patient has no pulse and is not breathing):    CPR (Attempt to Resuscitate)     Medical Interventions (Patient has pulse or is breathing):    Full Support         Future Appointments   Date Time Provider Department [place]   7/5/2024 10:20 AM Hailey Workman PA-C [facility] [place] [facility]   7/17/2024  8:45 AM Veronica Lu APRN [facility] [place]W [facility]   8/5/2024 11:00 AM Rhina Stepehns DNP, DEV [facility] SLEEP CT [facility]   11/5/2024  9:15 AM Wai  MD [place] [facility] [facility]           Pertinent  and/or Most Recent Results     PROCEDURES:   * Cannot find OR case *     LAB RESULTS:      Lab 06/23/24  0257 06/21/24  0521 06/20/24  0430 06/19/24  0434   WBC  --   --  9.59 15.17*   HEMOGLOBIN  --   --  12.8* 13.0   HEMATOCRIT  --   --  40.8 41.0   PLATELETS  --   --  337 344   NEUTROS ABS  --   --  7.07* 12.47*   IMMATURE GRANS (ABS)  --   --  0.03 0.05   LYMPHS ABS  --   --  1.38 1.30   MONOS ABS  --   --  0.95* 1.27*   EOS ABS  --   --  0.12 0.03   MCV  --   --  86.3 85.4   SED RATE 13  --   --   --    CRP 7.20* 6.58*  --   --          Lab 06/25/24  0356 06/24/24  1621 06/24/24  0341 06/23/24 0257 06/22/24 0302 06/21/24 0521 06/20/24 0430 06/19/24 0434   SODIUM 138  --  142 141 140 140 141 140   POTASSIUM 4.2 4.5 3.5 3.7 4.1 3.8 3.7 4.1   CHLORIDE 104  --  106 106 104 104 103 100   CO2 26.4  --  27.1 27.6 27.9 29.3* 28.6 27.1   ANION GAP 7.6  --  8.9 7.4 8.1 6.7 9.4 12.9   BUN 25*  --  28* 36* 34* 41* 54* 72*   CREATININE 0.88  --  0.86 1.05 0.86 1.00 1.18 1.63*   EGFR 99.7  --  100.4 82.3 100.4 87.2 71.5 48.5*   GLUCOSE 140*  --  97 88 123* 132* 104* 113*   CALCIUM 8.0*  --  8.0* 8.1* 8.2* 8.5* 8.5* 8.6   PHOSPHORUS  --   --   --  2.6 2.3* 2.5 2.6 3.6         Lab 06/23/24  0257 06/22/24  0302 06/21/24  0521 06/20/24  0430 06/19/24  0434   ALBUMIN 2.6* 2.8* 2.9* 3.0* 3.1*                     Brief Urine Lab Results  (Last result in the past 365 days)        Color   Clarity   Blood   Leuk Est   Nitrite   Protein   CREAT   Urine HCG        06/21/24 1550 Yellow   Cloudy   Large (3+)   Negative   Negative   30 mg/dL (1+)                 Microbiology Results (last 10 days)       Procedure Component Value - Date/Time    Blood Culture - Blood, Hand, Right [[id number]]  (Normal) Collected: 06/18/24 1211    Lab Status: Final result Specimen: Blood from Hand, Right Updated: 06/23/24 1230     Blood Culture No growth at 5 days    Blood Culture - Blood, Arm, Left  [[id number]]  (Normal) Collected: 06/18/24 1205    Lab Status: Final result Specimen: Blood from Arm, Left Updated: 06/23/24 1230     Blood Culture No growth at 5 days    MRSA Screen, PCR (Inpatient) - Swab, Nares [[id number]]  (Normal) Collected: 06/18/24 1119    Lab Status: Final result Specimen: Swab from Nares Updated: 06/18/24 1253     MRSA PCR No MRSA Detected    Narrative:      The negative predictive value of this diagnostic test is high and should only be used to consider de-escalating anti-MRSA therapy. A positive result may indicate colonization with MRSA and must be correlated clinically.    Rapid Strep A Screen - Swab, Throat [[id number]]  (Normal) Collected: 06/17/24 0548    Lab Status: Final result Specimen: Swab from Throat Updated: 06/17/24 0640     Strep A Ag Negative    Beta Strep Culture, Throat - Swab, Throat [[id number]]  (Normal) Collected: 06/17/24 0548    Lab Status: Final result Specimen: Swab from Throat Updated: 06/19/24 0731     Throat Culture, Beta Strep No Beta Hemolytic Streptococcus Isolated    Narrative:      Group A Strep incidence is low in adults. Positive culture for Beta hemolytic Streptococcus species can reflect colonization and not true infection. Please correlate clinically.              XR Abdomen KUB    Result Date: 6/24/2024  Impression: Impression: Constipation. Mild nonspecific dilatation of a single small bowel loop. Electronically Signed: Renetta Glover MD  6/24/2024 1:35 PM EDT  Workstation ID: [id number]    MRI Elbow Right Without Contrast    Result Date: 6/23/2024  Impression: Impression: 1.Extensive diffuse subcutaneous edema of the elbow and mild heterogeneous edema of the musculature surrounding the elbow. Findings are nonspecific but could indicate soft tissue infection/myositis. 2.Small nonspecific elbow effusion. Joint infection must be included in the differential diagnosis although no other significant findings of joint or osseous infection are seen at this  time.  Consider joint aspiration as clinically indicated. 3.Small amount of fluid in the olecranon bursa. No other significant localized collection is seen on this exam. 4.Mild osteoarthritis. 5.Tendinopathy of the common extensor and common flexor tendons without definite high-grade tendon tear. Electronically Signed: Prasanna Porter  6/23/2024 3:35 PM EDT  Workstation ID: [id number]    XR Hand 2 View Right    Result Date: 6/22/2024  Impression: Impression: Soft tissue swelling. No acute osseous abnormalities are identified in the right hand. Electronically Signed: Jerson Jane MD  6/22/2024 5:04 PM EDT  Workstation ID: [id number]    XR Forearm 2 View Right    Result Date: 6/22/2024  Impression: Impression: Increasing soft tissue swelling in the proximal right forearm and right elbow. Electronically Signed: Jerson Jane MD  6/22/2024 5:02 PM EDT  Workstation ID: [id number]    CT Head Without Contrast    Result Date: 6/18/2024  Impression: Age-related changes of the brain as above, otherwise without evidence of acute intracranial abnormality. Electronically Signed: Tito Spicer MD  6/18/2024 11:46 AM EDT  Workstation ID: [id number]    XR Elbow 3+ View Right    Result Date: 6/17/2024  Impression: 1. There is possible right-sided olecranon bursitis. 2. A large right elbow joint effusion is seen. 3. Mild enthesopathic changes are present. 4. There may be minimal degenerative change. 5. No retained radiopaque foreign body. 6. No subcutaneous emphysema. 7. No convincing radiographic evidence for osteomyelitis. Please note that portions of this note were completed with a voice recognition program. Electronically Signed: Angel Snell MD  6/17/2024 5:02 AM EDT  Workstation ID: [id number]    XR Chest 1 View    Result Date: 6/14/2024  Impression: Impression: No radiographic evidence of acute cardiopulmonary process. Electronically Signed: Ryan Dejesus MD  6/14/2024 8:35 PM EDT  Workstation ID: [id number]    XR Elbow 3+ View  Left    Result Date: 6/14/2024  Impression: Impression: Left olecranon osteophyte with adjacent soft tissue swelling. Electronically Signed: Jerson Jane MD  6/14/2024 5:39 PM EDT  Workstation ID: [id number]    XR Chest 1 View    Result Date: 5/28/2024  Impression: 1.  New right basilar opacities with suspicion for a small right pleural effusion. Opacities could represent atelectasis or pneumonia. 2.  Cardiomegaly, as before.   Electronically Signed By-Prasanna Porter MD On:5/28/2024 5:04 PM               Results for orders placed during the hospital encounter of 06/14/24    Adult Transthoracic Echo Complete W/ Cont if Necessary Per Protocol    Interpretation Summary  •  Left ventricular ejection fraction appears to be 21 - 25%.  •  Left ventricular wall thickness is consistent with mild concentric hypertrophy.  •  Left ventricular diastolic dysfunction is noted.  •  Mildly reduced right ventricular systolic function noted.  •  The right ventricular cavity is borderline dilated.  •  There is a trivial pericardial effusion.  •  Increased trabeculation in the left ventricular apex was noted.  Cannot exclude organized thrombus.      Imaging Results (All)       Procedure Component Value Units Date/Time    XR Abdomen KUB [[id number]] Collected: 06/24/24 1334     Updated: 06/24/24 1337    Narrative:      XR ABDOMEN KUB    Date of Exam: 6/24/2024 1:15 PM EDT    Indication: Nausea vomiting    FINDINGS: 2 portable supine views. There is a moderately large amount of stool. There is no colon dilatation. There is a single loop of mildly dilated small bowel in the left abdomen measuring up to 3.8 cm transversely. Remaining small bowel is   nondilated. No upright films were obtained.    Comparison: None available.      Impression:      Impression:  Constipation. Mild nonspecific dilatation of a single small bowel loop.      Electronically Signed: Renetta Glover MD    6/24/2024 1:35 PM EDT    Workstation ID: [id number]    MRI Elbow  Right Without Contrast [[id number]] Collected: 06/23/24 1523     Updated: 06/23/24 1537    Narrative:      MRI ELBOW RIGHT WO CONTRAST    Date of Exam: 6/23/2024 2:18 PM EDT    Indication: Right elbow effusion, redness, tenderness and restricted range of motion.     Comparison: Radiographs June 17, 2024. Forearm radiographs June 22, 2024    Technique:  Routine multiplanar/multisequence sequence images of the right elbow were obtained without contrast administration.        Findings:  Motion artifact degrades diagnostic image quality. There is extensive diffuse subcutaneous edema of the elbow. There also appears to be some heterogeneous mild diffuse edema in the musculature surrounding the elbow.    There appears to be a small elbow effusion. There does not appear to be confluent T1 hypointense signal in the marrow on this exam to indicate osteomyelitis. There also does not appear to be significant marrow edema at this time. No definite fracture or   acute malalignment. There is mild osteophyte formation    The distal triceps tendon appears intact. Distal biceps and brachialis tendons also appear intact. There is some increased signal in the proximal common extensor and common flexor tendons which could indicate tendinopathy. No high-grade tendon tear is   seen. Ligament evaluation is limited by motion. No definite findings to suggest an acute ligament tear. There is mild osteophyte formation. Articular cartilage evaluation is limited, but there is suggestion of mild joint space narrowing and cartilage   loss suggesting mild osteoarthritis. Visualized ulnar nerve is without definite abnormality on this limited evaluation.    There appears to be a small amount of fluid in the olecranon bursa. No other significant localized collection is seen at this time.      Impression:      Impression:  1.Extensive diffuse subcutaneous edema of the elbow and mild heterogeneous edema of the musculature surrounding the elbow. Findings  are nonspecific but could indicate soft tissue infection/myositis.  2.Small nonspecific elbow effusion. Joint infection must be included in the differential diagnosis although no other significant findings of joint or osseous infection are seen at this time.  Consider joint aspiration as clinically indicated.  3.Small amount of fluid in the olecranon bursa. No other significant localized collection is seen on this exam.  4.Mild osteoarthritis.  5.Tendinopathy of the common extensor and common flexor tendons without definite high-grade tendon tear.        Electronically Signed: Prasanna Porter    6/23/2024 3:35 PM EDT    Workstation ID: [id number]    XR Hand 2 View Right [[id number]] Collected: 06/22/24 1703     Updated: 06/22/24 1706    Narrative:      XR HAND 2 VW RIGHT    Date of Exam: 6/22/2024 4:57 PM EDT    Indication: Swollen dorsum of right hand    Comparison: None available.    Findings:  There is soft tissue swelling in the right hand. Degenerative changes are present in the IP joint and first carpal metacarpal joint. No fractures or acute osseous abnormalities are identified. No evidence of radiopaque foreign body or abnormal soft   tissue gas collection.      Impression:      Impression:  Soft tissue swelling. No acute osseous abnormalities are identified in the right hand.      Electronically Signed: Jerson Jane MD    6/22/2024 5:04 PM EDT    Workstation ID: [id number]    XR Forearm 2 View Right [[id number]] Collected: 06/22/24 1700     Updated: 06/22/24 1705    Narrative:      XR FOREARM 2 VW RIGHT    Date of Exam: 6/22/2024 4:58 PM EDT    Indication: Pain and swelling dorsum of right forearm    Comparison: 6/17/2024    Findings:  Increasing soft tissue swelling in the proximal right forearm and right elbow. No evidence of radiopaque foreign body or abnormal soft tissue gas collection. No fractures or acute osseous abnormalities are identified.      Impression:      Impression:  Increasing soft tissue swelling  in the proximal right forearm and right elbow.      Electronically Signed: Jerson Jane MD    6/22/2024 5:02 PM EDT    Workstation ID: [id number]    CT Head Without Contrast [[id number]] Collected: 06/18/24 1144     Updated: 06/18/24 1148    Narrative:      CT HEAD WO CONTRAST    Date of Exam: 6/18/2024 11:41 AM EDT    Indication: Confusion.    Comparison: MRI 4/9/2024.    Technique: Axial CT images were obtained of the head without contrast administration.  Reconstructed coronal and sagittal images were also obtained. Automated exposure control and iterative construction methods were used.    FINDINGS: Gray-white differentiation is maintained and there is no evidence of intracranial hemorrhage, mass or mass effect. Age-related changes of the brain are present including volume loss and typical periventricular sequela of chronic small vessel   ischemia. There is otherwise no evidence of intracranial hemorrhage, mass or mass effect. The ventricles are normal in size and configuration accounting for surrounding volume loss. The orbits are normal and the paranasal sinuses are grossly clear.      Impression:      Age-related changes of the brain as above, otherwise without evidence of acute intracranial abnormality.          Electronically Signed: Tito Spicer MD    6/18/2024 11:46 AM EDT    Workstation ID: [id number]    XR Elbow 3+ View Right [[id number]] Collected: 06/17/24 0458     Updated: 06/17/24 0504    Narrative:      XR ELBOW 3+ VW RIGHT    Date of Exam: 6/17/2024 4:20 AM EDT    Indication: swelling; right elbow pain; no known recent injury    Comparison: 6/14/2024 (left elbow x-rays).    FINDINGS:  3 views of the right elbow reveal soft tissue swelling, especially posteriorly, which is nonspecific and may represent contusion. Olecranon bursitis is possible. No subcutaneous emphysema. No retained radiopaque foreign body. There is an expected right   elbow joint effusion also. Minimal degenerative change involves  the right elbow. There are enthesopathic changes, also seen contralaterally.      Impression:        1. There is possible right-sided olecranon bursitis.     2. A large right elbow joint effusion is seen.     3. Mild enthesopathic changes are present.     4. There may be minimal degenerative change.     5. No retained radiopaque foreign body.     6. No subcutaneous emphysema.     7. No convincing radiographic evidence for osteomyelitis.           Please note that portions of this note were completed with a voice recognition program.          Electronically Signed: Angel Snell MD    6/17/2024 5:02 AM EDT    Workstation ID: [id number]    XR Chest 1 View [[id number]] Collected: 06/14/24 2029     Updated: 06/14/24 2037    Narrative:      XR CHEST 1 VW    Date of Exam: 6/14/2024 7:39 PM EDT    Indication: soa    Comparison: 5/20/2024    Findings:  Cardiomediastinal silhouette is within normal limits. No focal opacity, pleural effusion or pneumothorax. No evidence of acute osseous abnormalities. Visualized upper abdomen is unremarkable. Degenerative change of the shoulders.      Impression:      Impression:  No radiographic evidence of acute cardiopulmonary process.      Electronically Signed: Ryan Dejesus MD    6/14/2024 8:35 PM EDT    Workstation ID: [id number]    XR Elbow 3+ View Left [[id number]] Collected: 06/14/24 1738     Updated: 06/14/24 1741    Narrative:      XR ELBOW 3+ VW LEFT    Date of Exam: 6/14/2024 5:28 PM EDT    Indication: pain  pain    Comparison: None available.    Findings:  No fractures, dislocations or acute osseous abnormalities are identified in the left elbow. No evidence of significant joint effusion. There is an olecranon osteophyte. There is mild adjacent soft tissue swelling.      Impression:      Impression:  Left olecranon osteophyte with adjacent soft tissue swelling.      Electronically Signed: Jerson Jane MD    6/14/2024 5:39 PM EDT    Workstation ID: [id number]             Labs  Pending at Discharge:          Time spent on Discharge including face to face service:   minutes  Part of this note may be an electronic transcription/translation of spoken language to printed text using the Dragon Dictation System.     TElectronically signed by Alex Angulo MD, 06/25/24, 4:26 PM EDT.

## 2024-06-25 NOTE — PLAN OF CARE
"Goal Outcome Evaluation:  Plan of Care Reviewed With: patient        Progress: improving  Outcome Evaluation: Pt alert and oriented x4, able to make needs known. Refused to take PO meds this evening stating \"what is the point I am not going to take them at home\". Education provided on importance of taking scheduled medications, pt continued to refuse. Large BM noted this shift, relief of constipation. x1 assist to BSC. Blood glucose closely monitored. Pt slept inbetween care.                               "

## 2024-06-26 ENCOUNTER — HOSPITAL ENCOUNTER (EMERGENCY)
Facility: HOSPITAL | Age: 59
Discharge: HOME | End: 2024-06-26
Attending: EMERGENCY MEDICINE
Payer: COMMERCIAL

## 2024-06-26 VITALS
OXYGEN SATURATION: 99 % | SYSTOLIC BLOOD PRESSURE: 105 MMHG | DIASTOLIC BLOOD PRESSURE: 48 MMHG | TEMPERATURE: 97.6 F | WEIGHT: 142.64 LBS | HEART RATE: 82 BPM | BODY MASS INDEX: 18.9 KG/M2 | HEIGHT: 73 IN | RESPIRATION RATE: 18 BRPM

## 2024-06-26 DIAGNOSIS — M25.421 EFFUSION OF RIGHT ELBOW: ICD-10-CM

## 2024-06-26 DIAGNOSIS — I50.9 CHRONIC CONGESTIVE HEART FAILURE, UNSPECIFIED HEART FAILURE TYPE: ICD-10-CM

## 2024-06-26 DIAGNOSIS — E87.20 LACTIC ACIDOSIS: ICD-10-CM

## 2024-06-26 DIAGNOSIS — M31.0 LEUKOCYTOCLASTIC VASCULITIS: Primary | ICD-10-CM

## 2024-06-26 LAB
ALBUMIN SERPL-MCNC: 3 G/DL (ref 3.5–5.2)
ALBUMIN/GLOB SERPL: 1.1 G/DL
ALP SERPL-CCNC: 228 U/L (ref 39–117)
ALT SERPL W P-5'-P-CCNC: 28 U/L (ref 1–41)
AMMONIA BLD-SCNC: 15 UMOL/L (ref 16–60)
ANION GAP SERPL CALCULATED.3IONS-SCNC: 10.5 MMOL/L (ref 5–15)
AST SERPL-CCNC: 34 U/L (ref 1–40)
BASOPHILS # BLD AUTO: 0.07 10*3/MM3 (ref 0–0.2)
BASOPHILS NFR BLD AUTO: 0.4 % (ref 0–1.5)
BILIRUB SERPL-MCNC: 0.5 MG/DL (ref 0–1.2)
BUN SERPL-MCNC: 22 MG/DL (ref 6–20)
BUN/CREAT SERPL: 24.7 (ref 7–25)
CALCIUM SPEC-SCNC: 8.2 MG/DL (ref 8.6–10.5)
CHLORIDE SERPL-SCNC: 105 MMOL/L (ref 98–107)
CK SERPL-CCNC: 52 U/L (ref 20–200)
CO2 SERPL-SCNC: 24.5 MMOL/L (ref 22–29)
CREAT SERPL-MCNC: 0.89 MG/DL (ref 0.76–1.27)
CRP SERPL-MCNC: 8.47 MG/DL (ref 0–0.5)
D-LACTATE SERPL-SCNC: 3 MMOL/L (ref 0.5–2)
DEPRECATED RDW RBC AUTO: 59.3 FL (ref 37–54)
EGFRCR SERPLBLD CKD-EPI 2021: 99.3 ML/MIN/1.73
EOSINOPHIL # BLD AUTO: 0.19 10*3/MM3 (ref 0–0.4)
EOSINOPHIL NFR BLD AUTO: 1.1 % (ref 0.3–6.2)
ERYTHROCYTE [DISTWIDTH] IN BLOOD BY AUTOMATED COUNT: 18.3 % (ref 12.3–15.4)
ERYTHROCYTE [SEDIMENTATION RATE] IN BLOOD: 13 MM/HR (ref 0–20)
ETHANOL BLD-MCNC: <10 MG/DL (ref 0–10)
ETHANOL UR QL: <0.01 %
GLOBULIN UR ELPH-MCNC: 2.8 GM/DL
GLUCOSE SERPL-MCNC: 94 MG/DL (ref 65–99)
HCT VFR BLD AUTO: 43.1 % (ref 37.5–51)
HGB BLD-MCNC: 13 G/DL (ref 13–17.7)
HOLD SPECIMEN: NORMAL
HOLD SPECIMEN: NORMAL
IMM GRANULOCYTES # BLD AUTO: 0.09 10*3/MM3 (ref 0–0.05)
IMM GRANULOCYTES NFR BLD AUTO: 0.5 % (ref 0–0.5)
LYMPHOCYTES # BLD AUTO: 1.52 10*3/MM3 (ref 0.7–3.1)
LYMPHOCYTES NFR BLD AUTO: 8.8 % (ref 19.6–45.3)
MAGNESIUM SERPL-MCNC: 2 MG/DL (ref 1.6–2.6)
MCH RBC QN AUTO: 27 PG (ref 26.6–33)
MCHC RBC AUTO-ENTMCNC: 30.2 G/DL (ref 31.5–35.7)
MCV RBC AUTO: 89.4 FL (ref 79–97)
MONOCYTES # BLD AUTO: 1.15 10*3/MM3 (ref 0.1–0.9)
MONOCYTES NFR BLD AUTO: 6.7 % (ref 5–12)
NEUTROPHILS NFR BLD AUTO: 14.22 10*3/MM3 (ref 1.7–7)
NEUTROPHILS NFR BLD AUTO: 82.5 % (ref 42.7–76)
NRBC BLD AUTO-RTO: 0 /100 WBC (ref 0–0.2)
NT-PROBNP SERPL-MCNC: 3515 PG/ML (ref 0–900)
PLATELET # BLD AUTO: 428 10*3/MM3 (ref 140–450)
PMV BLD AUTO: 10 FL (ref 6–12)
POTASSIUM SERPL-SCNC: 4.2 MMOL/L (ref 3.5–5.2)
PROCALCITONIN SERPL-MCNC: 0.12 NG/ML (ref 0–0.25)
PROT SERPL-MCNC: 5.8 G/DL (ref 6–8.5)
RBC # BLD AUTO: 4.82 10*6/MM3 (ref 4.14–5.8)
SODIUM SERPL-SCNC: 140 MMOL/L (ref 136–145)
T4 FREE SERPL-MCNC: 1.25 NG/DL (ref 0.92–1.68)
TSH SERPL DL<=0.05 MIU/L-ACNC: 1.96 UIU/ML (ref 0.27–4.2)
WBC NRBC COR # BLD AUTO: 17.24 10*3/MM3 (ref 3.4–10.8)
WHOLE BLOOD HOLD COAG: NORMAL

## 2024-06-26 PROCEDURE — 83880 ASSAY OF NATRIURETIC PEPTIDE: CPT | Performed by: EMERGENCY MEDICINE

## 2024-06-26 PROCEDURE — 85025 COMPLETE CBC W/AUTO DIFF WBC: CPT | Performed by: EMERGENCY MEDICINE

## 2024-06-26 PROCEDURE — 83605 ASSAY OF LACTIC ACID: CPT | Performed by: EMERGENCY MEDICINE

## 2024-06-26 PROCEDURE — 82550 ASSAY OF CK (CPK): CPT | Performed by: EMERGENCY MEDICINE

## 2024-06-26 PROCEDURE — 84439 ASSAY OF FREE THYROXINE: CPT | Performed by: EMERGENCY MEDICINE

## 2024-06-26 PROCEDURE — 82077 ASSAY SPEC XCP UR&BREATH IA: CPT | Performed by: EMERGENCY MEDICINE

## 2024-06-26 PROCEDURE — 36415 COLL VENOUS BLD VENIPUNCTURE: CPT

## 2024-06-26 PROCEDURE — 84145 PROCALCITONIN (PCT): CPT | Performed by: EMERGENCY MEDICINE

## 2024-06-26 PROCEDURE — 83735 ASSAY OF MAGNESIUM: CPT | Performed by: EMERGENCY MEDICINE

## 2024-06-26 PROCEDURE — 86140 C-REACTIVE PROTEIN: CPT | Performed by: EMERGENCY MEDICINE

## 2024-06-26 PROCEDURE — 99283 EMERGENCY DEPT VISIT LOW MDM: CPT

## 2024-06-26 PROCEDURE — 25010000002 KETOROLAC TROMETHAMINE PER 15 MG: Performed by: EMERGENCY MEDICINE

## 2024-06-26 PROCEDURE — 87040 BLOOD CULTURE FOR BACTERIA: CPT | Performed by: EMERGENCY MEDICINE

## 2024-06-26 PROCEDURE — 25810000003 LACTATED RINGERS SOLUTION: Performed by: EMERGENCY MEDICINE

## 2024-06-26 PROCEDURE — 84443 ASSAY THYROID STIM HORMONE: CPT | Performed by: EMERGENCY MEDICINE

## 2024-06-26 PROCEDURE — 80053 COMPREHEN METABOLIC PANEL: CPT | Performed by: EMERGENCY MEDICINE

## 2024-06-26 PROCEDURE — 96374 THER/PROPH/DIAG INJ IV PUSH: CPT

## 2024-06-26 PROCEDURE — 85652 RBC SED RATE AUTOMATED: CPT | Performed by: EMERGENCY MEDICINE

## 2024-06-26 PROCEDURE — 82140 ASSAY OF AMMONIA: CPT | Performed by: EMERGENCY MEDICINE

## 2024-06-26 RX ORDER — SODIUM CHLORIDE 0.9 % (FLUSH) 0.9 %
10 SYRINGE (ML) INJECTION AS NEEDED
Status: DISCONTINUED | OUTPATIENT
Start: 2024-06-26 | End: 2024-06-26 | Stop reason: HOSPADM

## 2024-06-26 RX ORDER — KETOROLAC TROMETHAMINE 30 MG/ML
30 INJECTION, SOLUTION INTRAMUSCULAR; INTRAVENOUS ONCE
Status: COMPLETED | OUTPATIENT
Start: 2024-06-26 | End: 2024-06-26

## 2024-06-26 RX ORDER — TRAMADOL HYDROCHLORIDE 50 MG/1
50 TABLET ORAL EVERY 6 HOURS PRN
Qty: 20 TABLET | Refills: 0 | Status: SHIPPED | OUTPATIENT
Start: 2024-06-26

## 2024-06-26 RX ORDER — DOXYCYCLINE 100 MG/1
100 CAPSULE ORAL 2 TIMES DAILY
Qty: 28 CAPSULE | Refills: 0 | Status: SHIPPED | OUTPATIENT
Start: 2024-06-26 | End: 2024-07-10

## 2024-06-26 RX ADMIN — KETOROLAC TROMETHAMINE 30 MG: 30 INJECTION, SOLUTION INTRAMUSCULAR; INTRAVENOUS at 13:22

## 2024-06-26 RX ADMIN — SODIUM CHLORIDE, SODIUM LACTATE, POTASSIUM CHLORIDE, AND CALCIUM CHLORIDE 1000 ML: .6; .31; .03; .02 INJECTION, SOLUTION INTRAVENOUS at 13:27

## 2024-06-26 NOTE — DISCHARGE INSTRUCTIONS
Taking the antibiotics as directed.  Continue your other home medications as previously prescribed.  Take the tramadol as needed for pain control.  Eat a low-salt diet.  Follow-up with Dr. Carreno for further cardiac and congestive heart failure care.  Follow-up Dr. Yuan for evaluation and treatment of your elbow.  Follow-up with your primary care provider in 2 days for repeat evaluation.  Return to the ER for any change or worsening symptoms or any other concerns that may arise.

## 2024-06-26 NOTE — ED PROVIDER NOTES
"Time: 11:02 AM EDT  Date of encounter:  6/26/2024  Independent Historian/Clinical History and Information was obtained by:   Patient  Chief Complaint: Joint swelling and bilateral foot pain    History is limited by: N/A    History of Present Illness:  Patient is a 58 y.o. year old male who presents to the emergency department for evaluation of joint pain/swelling.  Patient states that he left AMA from [facility] yesterday.  Patient states that he has increasing joint pain.  Patient reports it was very difficult for him to try to walk today.  Patient complains of swelling involving his hands, right elbow and his ankles bilaterally.  Patient denies any fevers.  Patient denies any chest pain.  Patient denies any shortness of breath.      HPI    Patient Care Team  Primary Care Provider: Dickson Landry MD    Past Medical History:     Allergies   Allergen Reactions    Penicillins Shortness Of Breath     Past Medical History:   Diagnosis Date    Anxiety     Arthritis     Asthma     Bipolar affective     Cardiac tamponade     2023    CHF (congestive heart failure)     COPD (chronic obstructive pulmonary disease)     Coronary artery disease     Depression     Diabetes mellitus     Elevated cholesterol     Hypertension     Parkinson disease     Sleep apnea     Stroke     Pt stated it was \"about 2 months ago\" as of 6/15/24     Past Surgical History:   Procedure Laterality Date    CARDIAC CATHETERIZATION Right 09/17/2023    Procedure: Right and Left Heart Cath;  Surgeon: Ben Grant MD;  Location: [facility] CATH INVASIVE LOCATION;  Service: Cardiovascular;  Laterality: Right;    ENDOSCOPY      TESTICLE SURGERY Left     extraction     Family History   Problem Relation Age of Onset    Diabetes Mother     Depression Sister     Restless legs syndrome Sister     Insomnia Sister     Sleep walking Sister     Sleep disorder Sister         Night Terrors    Depression Brother        Home Medications:  Prior to Admission " medications    Medication Sig Start Date End Date Taking? Authorizing Provider   Abilify Maintena 300 MG Suspension Reconstituted ER IM injection ER Inject 300 mg into the appropriate muscle as directed by prescriber Every 30 (Thirty) Days. 5/25/24   Yuliya Blanchard MD   albuterol sulfate  (90 Base) MCG/ACT inhaler Inhale 2 puffs Every 4 (Four) Hours As Needed for Wheezing or Shortness of Air. Indications: Chronic Obstructive Lung Disease 4/16/24   Beto Gillette MD   amiodarone (PACERONE) 200 MG tablet Take 1 tablet by mouth Daily. 4/22/24   Lin Tamez APRN   atorvastatin (LIPITOR) 40 MG tablet Take 1 tablet by mouth every night at bedtime. 1/8/24   Dickson Landry MD   carvedilol (COREG) 12.5 MG tablet Take 2 tablets by mouth 2 (Two) Times a Day. 5/7/24   Beto Gillette MD   Farxiga 5 MG tablet tablet Take 2 tablets by mouth Daily. Indications: Type 2 Diabetes 4/16/24   Beto Gillette MD   ferrous gluconate (FERGON) 324 MG tablet Take 1 tablet by mouth Daily With Breakfast. 5/15/24   Dickson Landry MD   furosemide (LASIX) 40 MG tablet Take 0.5 tablets by mouth Daily.  Patient taking differently: Take 0.5 tablets by mouth Daily. 6/2/24   Felton Reynoso MD   metFORMIN (GLUCOPHAGE) 1000 MG tablet Take 1 tablet by mouth 2 (Two) Times a Day With Meals.    Yuliya Blanchard MD   mirtazapine (REMERON) 15 MG tablet Take 1 tablet by mouth Every Night. 4/15/24   Dickson Landry MD   pantoprazole (PROTONIX) 40 MG EC tablet Take 1 tablet by mouth Daily.    Yuliya Blanchard MD   sacubitril-valsartan (ENTRESTO) 49-51 MG tablet Take 1 tablet by mouth Every Morning.    Yuliya Blanchard MD   sacubitril-valsartan (ENTRESTO) 49-51 MG tablet Take 2 tablets by mouth Every Night.    Yuliya Blanchard MD   spironolactone (ALDACTONE) 25 MG tablet Take 1 tablet by mouth Daily. 4/16/24   Beto Gillette MD   venlafaxine XR (EFFEXOR-XR) 37.5 MG 24 hr capsule Take 1 capsule by mouth Daily.    Provider,  "Historical, MD        Social History:   Social History     Tobacco Use    Smoking status: Every Day     Current packs/day: 0.50     Average packs/day: 0.5 packs/day for 50.3 years (25.2 ttl pk-yrs)     Types: Cigarettes     Start date: 1/1/1974     Last attempt to quit: 11/2/2023    Smokeless tobacco: Never    Tobacco comments:     At least 10 cigarettes a day   Vaping Use    Vaping status: Former    Substances: Nicotine   Substance Use Topics    Alcohol use: Not Currently    Drug use: Not Currently     Types: Methamphetamines, Marijuana         Review of Systems:  Review of Systems   Constitutional:  Negative for chills and fever.   HENT:  Negative for congestion, ear pain and sore throat.    Eyes:  Negative for pain.   Respiratory:  Negative for cough, chest tightness and shortness of breath.    Cardiovascular:  Negative for chest pain.   Gastrointestinal:  Negative for abdominal pain, diarrhea, nausea and vomiting.   Genitourinary:  Negative for flank pain and hematuria.   Musculoskeletal:  Positive for joint swelling.   Skin:  Positive for rash. Negative for pallor.   Neurological:  Negative for seizures and headaches.   All other systems reviewed and are negative.       Physical Exam:  /64 (BP Location: Right arm, Patient Position: Lying)   Pulse 85   Temp 97.6 °F (36.4 °C) (Oral)   Resp 18   Ht 185.4 cm (73\")   Wt 64.7 kg (142 lb 10.2 oz)   SpO2 98%   BMI 18.82 kg/m²     Physical Exam    Vital signs were reviewed under triage note.  General appearance - Patient appears well-developed and well-nourished.  Patient is in no acute distress.  Head - Normocephalic, atraumatic.  Pupils - Equal, round, reactive to light.  Extraocular muscles are intact.  Conjunctiva is clear.  Nasal - Normal inspection.  No evidence of trauma or epistaxis.  Tympanic membranes - Gray, intact without erythema or retractions.  Oral mucosa - Pink and moist without lesions or erythema.  Uvula is midline.  Chest wall - " Atraumatic.  Chest wall is nontender.  There are no vesicular rashes noted.  Neck - Supple.  Trachea was midline.  There is no palpable lymphadenopathy or thyromegaly.  There are no meningeal signs  Lungs - Clear to auscultation and percussion bilaterally.  Heart - Regular rate and rhythm without any murmurs, clicks, or gallops.  Abdomen - Soft.  Bowel sounds are present.  There is no palpable tenderness.  There is no rebound, guarding, or rigidity.  There are no palpable masses.  There are no pulsatile masses.  Back - Spine is straight and midline.  There is no CVA tenderness.  Extremities - Intact x4 with full range of motion.  Patient has swelling at his bilateral ankles.  Patient has swelling of his hands.  Patient also has swelling of his right elbow.  There is no palpable edema.  Pulses are intact x4 and equal.  Neurologic - Patient is awake, alert, and oriented x3.  Cranial nerves II through XII are grossly intact.  Motor and sensory functions grossly intact.  Cerebellar function was normal.  Integument - There are multiple petechial purpuric lesions in various locations.  Patient has a heavy concentration around his bilateral feet and ankles.  Patient has more petechial lesions around his abdominal waistline with some scattered lesions on his upper extremities.  There are no petechia or purpura lesions noted.  There are no vesicular lesions noted.           Procedures:  Procedures      Medical Decision Making:      Comorbidities that affect care:    Hypertension, COPD, diabetes, depression, anxiety, bipolar disorder, cardiac tamponade, CHF, asthma, hyperlipidemia, coronary disease, Parkinson's disease, arthritis, sleep apnea    External Notes reviewed:    Hospital Discharge Summary: Hospital discharge summary from 6/25/2024 was reviewed by me.      The following orders were placed and all results were independently analyzed by me:  Orders Placed This Encounter   Procedures    Blood Culture - Blood,    Blood  Culture - Blood,    Comprehensive Metabolic Panel    Sedimentation Rate    C-reactive Protein    Lactic Acid, Plasma    Procalcitonin    BNP    Ethanol    Urine Drug Screen - Urine, Clean Catch    Urinalysis With Microscopic If Indicated (No Culture) - Urine, Clean Catch    TSH    T4, Free    CK    Ammonia    Magnesium    CBC Auto Differential    STAT Lactic Acid, Reflex    Continuous Pulse Oximetry    Insert Peripheral IV    CBC & Differential    Extra Tubes    Gold Top - SST    Green Top (Gel)    Light Blue Top       Medications Given in the Emergency Department:  Medications   sodium chloride 0.9 % flush 10 mL (has no administration in time range)   ketorolac (TORADOL) injection 30 mg (30 mg Intravenous Given 6/26/24 1322)   lactated ringers bolus 1,000 mL (1,000 mL Intravenous New Bag 6/26/24 1327)        ED Course:     The patient was seen and evaluated the ED by me.  The above history and physical examination was performed as document.  Diagnostic data was obtained.  Results reviewed.  I did review the patient's recent hospitalization.  The patient left AMA yesterday.  I spoke with Dr. Angulo, hospitalist, who cared for him.  He states that he was planning to be discharged yesterday or today.  He does need to be on doxycycline for 14 days.  Patient needs to follow-up with Dr. Yuan, orthopedics due to the elbow issue and needs follow-up Dr. Carreno regards to his congestive heart failure.  The patient is refusing admission again today.  Patient is he needs something for pain.  At this time patient be discharged home with outpatient treatment follow-up.    Labs:    Lab Results (last 24 hours)       Procedure Component Value Units Date/Time    Blood Culture - Blood, Arm, Left [[id number]] Collected: 06/26/24 1201    Specimen: Blood from Arm, Left Updated: 06/26/24 1209    Comprehensive Metabolic Panel [[id number]]  (Abnormal) Collected: 06/26/24 1211    Specimen: Blood Updated: 06/26/24 1308     Glucose 94 mg/dL   "    BUN 22 mg/dL      Creatinine 0.89 mg/dL      Sodium 140 mmol/L      Potassium 4.2 mmol/L      Comment: Slight hemolysis detected by analyzer. Result may be falsely elevated.        Chloride 105 mmol/L      CO2 24.5 mmol/L      Calcium 8.2 mg/dL      Total Protein 5.8 g/dL      Albumin 3.0 g/dL      ALT (SGPT) 28 U/L      AST (SGOT) 34 U/L      Comment: Slight hemolysis detected by analyzer. Result may be falsely elevated.        Alkaline Phosphatase 228 U/L      Total Bilirubin 0.5 mg/dL      Globulin 2.8 gm/dL      A/G Ratio 1.1 g/dL      BUN/Creatinine Ratio 24.7     Anion Gap 10.5 mmol/L      eGFR 99.3 mL/min/1.73     Narrative:      GFR Normal >60  Chronic Kidney Disease <60  Kidney Failure <15      C-reactive Protein [[id number]]  (Abnormal) Collected: 06/26/24 1211    Specimen: Blood Updated: 06/26/24 1304     C-Reactive Protein 8.47 mg/dL     Procalcitonin [[id number]]  (Normal) Collected: 06/26/24 1211    Specimen: Blood Updated: 06/26/24 1310     Procalcitonin 0.12 ng/mL     Narrative:      As a Marker for Sepsis (Non-Neonates):    1. <0.5 ng/mL represents a low risk of severe sepsis and/or septic shock.  2. >2 ng/mL represents a high risk of severe sepsis and/or septic shock.    As a Marker for Lower Respiratory Tract Infections that require antibiotic therapy:    PCT on Admission    Antibiotic Therapy       6-12 Hrs later    >0.5                Strongly Recommended  >0.25 - <0.5        Recommended  0.1 - 0.25          Discouraged              Remeasure/reassess PCT  <0.1                Strongly Discouraged     Remeasure/reassess PCT    As 28 day mortality risk marker: \"Change in Procalcitonin Result\" (>80% or <=80%) if Day 0 (or Day 1) and Day 4 values are available. Refer to http://www.[employer].coms-pct-calculator.com    Change in PCT <=80%  A decrease of PCT levels below or equal to 80% defines a positive change in PCT test result representing a higher risk for 28-day all-cause mortality of patients " diagnosed with severe sepsis for septic shock.    Change in PCT >80%  A decrease of PCT levels of more than 80% defines a negative change in PCT result representing a lower risk for 28-day all-cause mortality of patients diagnosed with severe sepsis or septic shock.    This test is Prognostic not Diagnostic, if elevated correlate with clinical findings before administering antibiotic treatment.        BNP [[id number]]  (Abnormal) Collected: 06/26/24 1211    Specimen: Blood Updated: 06/26/24 1310     proBNP 3,515.0 pg/mL     Narrative:      This assay is used as an aid in the diagnosis of individuals suspected of having heart failure. It can be used as an aid in the diagnosis of acute decompensated heart failure (ADHF) in patients presenting with signs and symptoms of ADHF to the emergency department (ED). In addition, NT-proBNP of <300 pg/mL indicates ADHF is not likely.    Age Range Result Interpretation  NT-proBNP Concentration (pg/mL:      <50             Positive            >450                   Gray                 300-450                    Negative             <300    50-75           Positive            >900                  Gray                300-900                  Negative            <300      >75             Positive            >1800                  Gray                300-1800                  Negative            <300    Ethanol [[id number]] Collected: 06/26/24 1211    Specimen: Blood Updated: 06/26/24 1304     Ethanol <10 mg/dL      Ethanol % <0.010 %     Narrative:      Ethanol (Plasma)  <10 Essentially Negative    Toxic Concentrations           mg/dL    Flushing, slowing of reflexes    Impaired visual activity         Depression of CNS              >100  Possible Coma                  >300       TSH [[id number]]  (Normal) Collected: 06/26/24 1211    Specimen: Blood Updated: 06/26/24 1310     TSH 1.960 uIU/mL     T4, Free [[id number]]  (Normal) Collected: 06/26/24 1211    Specimen: Blood  Updated: 06/26/24 1310     Free T4 1.25 ng/dL     CK [[id number]]  (Normal) Collected: 06/26/24 1211    Specimen: Blood Updated: 06/26/24 1304     Creatine Kinase 52 U/L     Magnesium [[id number]]  (Normal) Collected: 06/26/24 1211    Specimen: Blood Updated: 06/26/24 1308     Magnesium 2.0 mg/dL     CBC & Differential [[id number]]  (Abnormal) Collected: 06/26/24 1213    Specimen: Blood from Arm, Left Updated: 06/26/24 1226    Narrative:      The following orders were created for panel order CBC & Differential.  Procedure                               Abnormality         Status                     ---------                               -----------         ------                     CBC Auto Differential[[id number]]        Abnormal            Final result                 Please view results for these tests on the individual orders.    Sedimentation Rate [[id number]]  (Normal) Collected: 06/26/24 1213    Specimen: Blood from Arm, Left Updated: 06/26/24 1228     Sed Rate 13 mm/hr     Blood Culture - Blood, Arm, Left [[id number]] Collected: 06/26/24 1213    Specimen: Blood from Arm, Left Updated: 06/26/24 1221    Lactic Acid, Plasma [[id number]]  (Abnormal) Collected: 06/26/24 1213    Specimen: Blood from Arm, Left Updated: 06/26/24 1309     Lactate 3.0 mmol/L     Ammonia [[id number]]  (Abnormal) Collected: 06/26/24 1213    Specimen: Blood from Arm, Left Updated: 06/26/24 1245     Ammonia 15 umol/L     CBC Auto Differential [[id number]]  (Abnormal) Collected: 06/26/24 1213    Specimen: Blood from Arm, Left Updated: 06/26/24 1226     WBC 17.24 10*3/mm3      RBC 4.82 10*6/mm3      Hemoglobin 13.0 g/dL      Hematocrit 43.1 %      MCV 89.4 fL      MCH 27.0 pg      MCHC 30.2 g/dL      RDW 18.3 %      RDW-SD 59.3 fl      MPV 10.0 fL      Platelets 428 10*3/mm3      Neutrophil % 82.5 %      Lymphocyte % 8.8 %      Monocyte % 6.7 %      Eosinophil % 1.1 %      Basophil % 0.4 %      Immature Grans % 0.5 %      Neutrophils, Absolute  14.22 10*3/mm3      Lymphocytes, Absolute 1.52 10*3/mm3      Monocytes, Absolute 1.15 10*3/mm3      Eosinophils, Absolute 0.19 10*3/mm3      Basophils, Absolute 0.07 10*3/mm3      Immature Grans, Absolute 0.09 10*3/mm3      nRBC 0.0 /100 WBC              Imaging:    No Radiology Exams Resulted Within Past 24 Hours      Differential Diagnosis and Discussion:    Joint Pain: Differential diagnosis includes but is not limited to polyarticular arthritis, gout, tendinitis, hemarthrosis, septic arthritis, rheumatoid arthritis, bursitis, degenerative joint disease, joint effusion, autoimmune disorder, trauma, and occult neoplasm.  Myalgia: Differential diagnosis includes but is not limited to muscle overuse or strain, infections, inflammatory conditions, autoimmune diseases, medications, metabolic disorders, fibromyalgia, vascular disorders, neurological conditions, psychological factors, toxins, and muscle disorders.    All labs were reviewed and interpreted by me.    MDM     Amount and/or Complexity of Data Reviewed  Clinical lab tests: reviewed  Decide to obtain previous medical records or to obtain history from someone other than the patient: yes             Patient Care Considerations:          Consultants/Shared Management Plan:    I have discussed the case with Dr. Angulo who states that the patient can be safely discharged with close follow up.    Social Determinants of Health:    Patient is independent, reliable, and has access to care.       Disposition and Care Coordination:    Discharged: I considered escalation of care by admitting this patient to the hospital, however the patient left the hospital yesterday AGAINST MEDICAL ADVICE.  However upon talking to the hospitalist service they plan on discharging the patient the only thing is that he left without his antibiotic prescription which I subsequently provided for him.    I have explained the patient´s condition, diagnoses and treatment plan based on the  information available to me at this time. I have answered questions and addressed any concerns. The patient has a good  understanding of the patient´s diagnosis, condition, and treatment plan as can be expected at this point. The vital signs have been stable. The patient´s condition is stable and appropriate for discharge from the emergency department.      The patient will pursue further outpatient evaluation with the primary care physician or other designated or consulting physician as outlined in the discharge instructions. They are agreeable to this plan of care and follow-up instructions have been explained in detail. The patient has received these instructions in written format and has expressed an understanding of the discharge instructions. The patient is aware that any significant change in condition or worsening of symptoms should prompt an immediate return to this or the closest emergency department or call to 911.  I have explained discharge medications and the need for follow up with the patient/caretakers. This was also printed in the discharge instructions. Patient was discharged with the following medications and follow up:      Medication List        New Prescriptions      doxycycline 100 MG capsule  Commonly known as: MONODOX  Take 1 capsule by mouth 2 (Two) Times a Day for 14 days.     traMADol 50 MG tablet  Commonly known as: ULTRAM  Take 1 tablet by mouth Every 6 (Six) Hours As Needed for Moderate Pain.               Where to Get Your Medications        [place] [facility] [place] [address] - [phone] PH - [phone] FX  [address], [place] [address]      Phone: [phone]   doxycycline 100 MG capsule  traMADol 50 MG tablet      Dickson Landry MD  [address]  [place] [address]  [phone]    In 2 days      Olayinka Yuan MD  [address]  [place] [address] [phone]    Schedule an  appointment as soon as possible for a visit       Jaun Carreno MD  [address] [place]  [place] [address]  [phone]    Schedule an appointment as soon as possible for a visit         Final diagnoses:   Leukocytoclastic vasculitis   Effusion of right elbow   Chronic congestive heart failure, unspecified heart failure type   Lactic acidosis        ED Disposition       ED Disposition   Discharge    Condition   Stable    Comment   --               This medical record created using voice recognition software.             Alex Bridges,   06/29/24 1534

## 2024-06-27 ENCOUNTER — TELEPHONE (OUTPATIENT)
Dept: CASE MANAGEMENT | Facility: OTHER | Age: 59
End: 2024-06-27
Payer: COMMERCIAL

## 2024-06-27 NOTE — TELEPHONE ENCOUNTER
Called to follow up with patient, has been hospitalized previously. Patient phone now disconnected, sister number has full mailbox and not able to leave message. Patient left hospital AMA, returned back on 6.25 to er. Unable to reach. Will try to call again next week.

## 2024-07-01 LAB
BACTERIA SPEC AEROBE CULT: NORMAL
BACTERIA SPEC AEROBE CULT: NORMAL

## 2024-07-02 NOTE — TELEPHONE ENCOUNTER
Received a message from [employer] that patient is not in compliance with PAP device and is at risk for insurance not continuing to cover device.  Can you please see if patient would like to move up their appointment to come in and see me next week?  Would like to see if we can help with tolerance.

## 2024-07-03 ENCOUNTER — TELEPHONE (OUTPATIENT)
Dept: CASE MANAGEMENT | Facility: OTHER | Age: 59
End: 2024-07-03
Payer: COMMERCIAL

## 2024-07-03 DIAGNOSIS — I10 PRIMARY HYPERTENSION: ICD-10-CM

## 2024-07-03 DIAGNOSIS — I50.43 ACUTE ON CHRONIC COMBINED SYSTOLIC AND DIASTOLIC CHF (CONGESTIVE HEART FAILURE): Primary | ICD-10-CM

## 2024-07-03 NOTE — TELEPHONE ENCOUNTER
Called to follow up with patient.for nutrition, medication reconcillation. Patient phone number not working, attempted to call listed sister Aurora. No answer, unable to leave VM as noted VM full.     Will attempt again next week to reach patient. Patient does not have any follow up appts scheduled with Dr Landry at this time    Alesha GOMEZ RN  Ambulatory

## 2024-07-10 PROBLEM — R53.1 RIGHT SIDED WEAKNESS: Status: ACTIVE | Noted: 2024-01-01

## 2024-07-10 NOTE — TELEPHONE ENCOUNTER
Caller: Aurora Prather    Relationship: Emergency Contact    Best call back number: [phone]    What is the best time to reach you: ANY TIME     Who are you requesting to speak with (clinical staff, provider,  specific staff member): DR. CORTEZ    What was the call regarding: CALLER IS REQUESTING TO SPEAK DIRECTLY WITH DR. CORTEZ REGARDING PATIENT'S MEDICATIONS, HE CURRENTLY HOSPITALIZED     Is it okay if the provider responds through MyChart: NO

## 2024-07-10 NOTE — CONSULTS
[facility]  [facility]      Patient Name:   Regulo Sepulveda  YOB: 1965  Identification Number:   MRN - [id number]  Date of Service:   07/10/2024 09:44:36    Diagnosis:        G93.41 - Encephalopathy Metabolic        I63.89 - Cerebrovascular accident (CVA) due to other mechanism ([facility])    Impression:       This is a 57 yo M w a PMHX of DM, HTN, HLD, on apixaban, who presents to the ED with the acute onset of fall and AMS last night. Possibly R sided weakness. He was last known to be at baseline at an unclear time, although was this was at 7PM when he sustained a fall. On arrival to the ED his symptoms remain persistent. BP was found to be 108 systolic.               Physical exam with encephalopathy, probable R sided weakness   Labs pending   Imaging with CTH pending official read. On my prelim I see old lacunar infarcts            Etiology needs further investigation. Toxic/metabolic/infectious/hemodynamic pathology remains at the front of the differential, although will rule out a stroke given the possible R sided weakness.   However, I see old L hemipheric lacunar infarcts on CTH, and the current presentation could represent an unmasking of symptoms from that old stroke territory.               -CTA head and neck-->if LVO found, will DW CHARAN and treat this as a stroke.            Otherwise:         Would start with labs for reversible causes:      -UA, Utox, thiamine, ammonia, RPR, TSH, B12, ABG, alcohol, ESR, CRP, troponins, A1C, CBC, CMP, Lipids, LFTs, CPK, lactic acid, blood cultures if febrile.   -CXR      If the above is unrevealing and the symptoms persist/recur, would consider the below:      -MRI brain with and w/o con   -rEEG      If the above is unrevealing and the symptoms persist, would consider the below:      -LP                 ------------------------------------------------------------------------------    Advanced Imaging:  Advanced Imaging  Deferred because:    pending completion      Metrics:  Last Known Well: Unknown  [facility] Notification Time: 07/10/2024 09:44:05  Arrival Time: 07/10/2024 09:42:00  Stamp Time: 07/10/2024 09:44:36  Initial Response Time: 07/10/2024 09:52:55  Symptoms: AMS, possible R sided weakness.  Initial patient interaction: 07/10/2024 09:54:03  NIHSS Assessment Completed: 07/10/2024 10:02:15  Patient is not a candidate for Thrombolytic.  Thrombolytic Medical Decision: 07/10/2024 10:02:15  Patient was not deemed candidate for Thrombolytic because of following reasons:  Last Well Known Above 4.5 Hours.  Use of NOAs within 48 hours.    I personally reviewed the CT head    Primary Provider Notified of Diagnostic Impression and Management Plan on: 07/10/2024 10:08:03        ------------------------------------------------------------------------------    History of Present Illness:  Patient is a 58 year old Male.    Patient was brought by EMS for symptoms of AMS, possible R sided weakness.  This is a 57 yo M w a PMHX of DM, HTN, HLD, on apixaban, who presents to the ED with the acute onset of fall and AMS last night. Possibly R sided weakness. He was last known to be at baseline at an unclear time, although was this was at 7PM when he sustained a fall. On arrival to the ED his symptoms remain persistent. BP was found to be 108 systolic. He was taken immediately for CTH and further evaluation.  Decision on whether or not to give pharmacological thrombolysis was made based on indications, contraindications, and patient's disability status and preference.         Past Medical History:       There is no history of Migraine Headaches    Medications:    Anticoagulant use:  Unknown  Antiplatelet use: Unknown  Reviewed EMR for current medications    Allergies:   NKDA    Social History:  Smoking: No  Alcohol Use: No  Drug Use: No    Family History:    There is no family history of premature cerebrovascular disease pertinent to this  consultation    ROS :  14 Points Review of Systems was performed and was negative except mentioned in HPI.    Past Surgical History:  There Is No Surgical History Contributory To Today’s Visit         Examination:  BP(120/65), Pulse(86),  1A: Level of Consciousness - Alert; keenly responsive + 0  1B: Ask Month and Age - 1 Question Right + 1  1C: Blink Eyes & Squeeze Hands - Performs Both Tasks + 0  2: Test Horizontal Extraocular Movements - Normal + 0  3: Test Visual Fields - No Visual Loss + 0  4: Test Facial Palsy (Use Grimace if Obtunded) - Normal symmetry + 0  5A: Test Left Arm Motor Drift - No Drift for 10 Seconds + 0  5B: Test Right Arm Motor Drift - Drift, hits bed + 2  6A: Test Left Leg Motor Drift - Drift, hits bed + 2  6B: Test Right Leg Motor Drift - Drift, hits bed + 2  7: Test Limb Ataxia (FNF/Heel-Shin) - No Ataxia + 0  8: Test Sensation - Normal; No sensory loss + 0  9: Test Language/Aphasia - Normal; No aphasia + 0  10: Test Dysarthria - Severe Dysarthria: Unintelligble Slurring or Out of Proportion to Aphasia + 2  11: Test Extinction/Inattention - No abnormality + 0    NIHSS Score: 9    Pre-Morbid Modified Tramaine Scale:  1 Points = No significant disability despite symptoms; able to carry out all usual duties and activities    Spoke with : attending  I reviewed the available imaging via Rapid and initiated discussion with the primary provider    This consult was conducted in real time using interactive audio and video technology. Patient was informed of the technology being used for this visit and agreed to proceed. Patient located in hospital and provider located at home/office setting.      Patient is being evaluated for possible acute neurologic impairment and high probability of imminent or life-threatening deterioration. I spent total of 35 minutes providing care to this patient, including time for face to face visit via telemedicine, review of medical records, imaging studies and discussion of  findings with providers, the patient and/or family.      Dr Manuel Busby      [facility]  For Inpatient follow-up with [facility] physician please call [facility] [phone]. This is not an outpatient service. Post hospital discharge, please contact hospital directly.    Please do not communicate with [facility] physicians via secure chat. If you have any questions, Please contact [facility].  Please call or reconsult our service if there are any clinical or diagnostic changes.

## 2024-07-10 NOTE — PLAN OF CARE
Goal Outcome Evaluation:  Plan of Care Reviewed With: patient        Progress: no change  Outcome Evaluation: Patient admitted from ED this afternoon, stroke rule out

## 2024-07-10 NOTE — ED PROVIDER NOTES
"Time: 09:46 AM EDT  Date of encounter:  7/10/2024  Independent Historian/Clinical History and Information was obtained by:   {Blank multiple:78728}    History is limited by: {Limited History:43351}    Chief Complaint: ***      History of Present Illness:  Patient is a 58 y.o. year old male who presents to the emergency department for evaluation of ***    HPI    Patient Care Team  Primary Care Provider: Dickson Landry MD    Past Medical History:     Allergies   Allergen Reactions    Penicillins Shortness Of Breath     Past Medical History:   Diagnosis Date    Anxiety     Arthritis     Asthma     Bipolar affective     Cardiac tamponade     2023    CHF (congestive heart failure)     COPD (chronic obstructive pulmonary disease)     Coronary artery disease     Depression     Diabetes mellitus     Elevated cholesterol     Hypertension     Parkinson disease     Sleep apnea     Stroke     Pt stated it was \"about 2 months ago\" as of 6/15/24     Past Surgical History:   Procedure Laterality Date    CARDIAC CATHETERIZATION Right 09/17/2023    Procedure: Right and Left Heart Cath;  Surgeon: Ben Grant MD;  Location: [facility] CATH INVASIVE LOCATION;  Service: Cardiovascular;  Laterality: Right;    ENDOSCOPY      TESTICLE SURGERY Left     extraction     Family History   Problem Relation Age of Onset    Diabetes Mother     Depression Sister     Restless legs syndrome Sister     Insomnia Sister     Sleep walking Sister     Sleep disorder Sister         Night Terrors    Depression Brother        Home Medications:  Prior to Admission medications    Medication Sig Start Date End Date Taking? Authorizing Provider   Abilify Maintena 300 MG Suspension Reconstituted ER IM injection ER Inject 300 mg into the appropriate muscle as directed by prescriber Every 30 (Thirty) Days. 5/25/24   Provider, MD Yuliya   albuterol sulfate  (90 Base) MCG/ACT inhaler Inhale 2 puffs Every 4 (Four) Hours As Needed for Wheezing or Shortness of " Air. Indications: Chronic Obstructive Lung Disease 4/16/24   Beto Gillette MD   amiodarone (PACERONE) 200 MG tablet Take 1 tablet by mouth Daily. 4/22/24   Lin Tamez APRN   atorvastatin (LIPITOR) 40 MG tablet Take 1 tablet by mouth every night at bedtime. 1/8/24   Dickson Landry MD   carvedilol (COREG) 12.5 MG tablet Take 2 tablets by mouth 2 (Two) Times a Day. 5/7/24   Beto Gillette MD   doxycycline (MONODOX) 100 MG capsule Take 1 capsule by mouth 2 (Two) Times a Day for 14 days. 6/26/24 7/10/24  Alex Bridges DO   Farxiga 5 MG tablet tablet Take 2 tablets by mouth Daily. Indications: Type 2 Diabetes 4/16/24   Beto Gillette MD   ferrous gluconate (FERGON) 324 MG tablet Take 1 tablet by mouth Daily With Breakfast. 5/15/24   Dickson Landry MD   furosemide (LASIX) 40 MG tablet Take 0.5 tablets by mouth Daily.  Patient taking differently: Take 0.5 tablets by mouth Daily. 6/2/24   Felton Reynoso MD   metFORMIN (GLUCOPHAGE) 1000 MG tablet Take 1 tablet by mouth 2 (Two) Times a Day With Meals.    Yuliya Blanchard MD   mirtazapine (REMERON) 15 MG tablet Take 1 tablet by mouth Every Night. 4/15/24   Dickson Landyr MD   pantoprazole (PROTONIX) 40 MG EC tablet Take 1 tablet by mouth Daily.    Yuliya Blanchard MD   sacubitril-valsartan (ENTRESTO) 49-51 MG tablet Take 1 tablet by mouth Every Morning.    Yuliya Blanchard MD   sacubitril-valsartan (ENTRESTO) 49-51 MG tablet Take 2 tablets by mouth Every Night.    Yuliya Blanchard MD   spironolactone (ALDACTONE) 25 MG tablet Take 1 tablet by mouth Daily. 4/16/24   Beto Gillette MD   traMADol (ULTRAM) 50 MG tablet Take 1 tablet by mouth Every 6 (Six) Hours As Needed for Moderate Pain. 6/26/24   Alex Bridges DO   venlafaxine XR (EFFEXOR-XR) 37.5 MG 24 hr capsule Take 1 capsule by mouth Daily.    Provider, Historical, MD        Social History:   Social History     Tobacco Use    Smoking status: Every Day     Current packs/day: 0.50     Average  packs/day: 0.5 packs/day for 50.4 years (25.2 ttl pk-yrs)     Types: Cigarettes     Start date: 1/1/1974     Last attempt to quit: 11/2/2023    Smokeless tobacco: Never    Tobacco comments:     At least 10 cigarettes a day   Vaping Use    Vaping status: Former    Substances: Nicotine   Substance Use Topics    Alcohol use: Not Currently    Drug use: Not Currently     Types: Methamphetamines, Marijuana         Review of Systems:  Review of Systems     Physical Exam:  /73 (BP Location: Left arm, Patient Position: Lying)   Pulse 76   Temp 97.8 °F (36.6 °C) (Oral)   Resp 18   Wt 61.8 kg (136 lb 3.9 oz)   SpO2 99%   BMI 17.98 kg/m²     Physical Exam   ***          Procedures:  Procedures      Medical Decision Making:      Comorbidities that affect care:    {Comorbidities that affect care:48132}    External Notes reviewed:    {External Note review (Optional):61988}      The following orders were placed and all results were independently analyzed by me:  Orders Placed This Encounter   Procedures    CT Head Without Contrast Stroke Protocol    CT Angiogram Head w AI Analysis of LVO    CT Angiogram Neck    CT CEREBRAL PERFUSION WITH & WITHOUT CONTRAST    XR Chest 1 View    [place] Draw    Comprehensive Metabolic Panel    Protime-INR    aPTT    Single High Sensitivity Troponin T    Urinalysis With Microscopic If Indicated (No Culture) - Urine, Clean Catch    CBC Auto Differential    Scan Slide    NPO Diet NPO Type: Strict NPO    Initiate Department's Acute Stroke Process (Team D, Code 19, etc)    Perform NIH Stroke Scale    Measure Actual Weight    Head of Bed 30 Degrees or Less    Undress and Gown    Continuous Pulse Oximetry    Vital Signs    Neuro Checks    Notify Provider for SBP <80 or >200    Notify Provider for SBP >140 (For Hemorrhagic Stroke)    No Hypotonic Fluids    Nursing Dysphagia Screening (Complete Prior to Giving anything PO)    RN to Place Order SLP Consult (IF swallow screen failed) - Eval & Treat  Choosing Reason of RN Dysphagia Screen Failed    Oxygen Therapy- Nasal Cannula; Titrate 1-6 LPM Per SpO2; 90 - 95%    POC Glucose Once    ECG 12 Lead ED Triage Standing Order; Acute Stroke (Onset <12 hrs)    Type & Screen    Insert Large Bore Peripheral IV - Right AC Preferred    CBC & Differential    Green Top (Gel)    Lavender Top    Gold Top - SST    Light Blue Top    Extra Tubes    Gold Top - SST    Gray Top       Medications Given in the Emergency Department:  Medications   sodium chloride 0.9 % flush 10 mL (has no administration in time range)   iopamidol (ISOVUE-370) 76 % injection 50 mL (50 mL Intravenous Given 7/10/24 1018)   iopamidol (ISOVUE-370) 76 % injection 100 mL (100 mL Intravenous Given 7/10/24 1024)        ED Course:         Labs:    Lab Results (last 24 hours)       Procedure Component Value Units Date/Time    CBC & Differential [[id number]]  (Abnormal) Collected: 07/10/24 1044    Specimen: Blood Updated: 07/10/24 1057    Narrative:      The following orders were created for panel order CBC & Differential.  Procedure                               Abnormality         Status                     ---------                               -----------         ------                     CBC Auto Differential[[id number]]        Abnormal            Preliminary result         Scan Slide[[id number]]                                       In process                   Please view results for these tests on the individual orders.    Comprehensive Metabolic Panel [[id number]] Collected: 07/10/24 1044    Specimen: Blood Updated: 07/10/24 1048    Protime-INR [[id number]] Collected: 07/10/24 1044    Specimen: Blood Updated: 07/10/24 1048    aPTT [[id number]] Collected: 07/10/24 1044    Specimen: Blood Updated: 07/10/24 1048    Single High Sensitivity Troponin T [[id number]] Collected: 07/10/24 1044    Specimen: Blood Updated: 07/10/24 1048    CBC Auto Differential [[id number]]  (Abnormal) Collected: 07/10/24 1044     Specimen: Blood Updated: 07/10/24 1059     WBC 16.33 10*3/mm3      RBC 3.55 10*6/mm3      Hemoglobin 10.0 g/dL      Hematocrit 32.1 %      MCV 90.4 fL      MCH 28.2 pg      MCHC 31.2 g/dL      RDW 17.6 %      RDW-SD 57.9 fl      MPV 9.7 fL      Platelets 404 10*3/mm3      nRBC 0.0 /100 WBC     Scan Slide [[id number]] Collected: 07/10/24 1044    Specimen: Blood Updated: 07/10/24 1057    Urinalysis With Microscopic If Indicated (No Culture) - Straight Cath [[id number]] Collected: 07/10/24 1058    Specimen: Urine from Straight Cath Updated: 07/10/24 1111             Imaging:    CT CEREBRAL PERFUSION WITH & WITHOUT CONTRAST    Result Date: 7/10/2024  CT CEREBRAL PERFUSION W WO CONTRAST Date of Exam: 7/10/2024 10:03 AM EDT Indication: Neuro Deficit, acute, Stroke suspected Neuro deficit, acute, stroke suspected.  Comparison: None available. Technique: Axial CT images of the brain were obtained prior to and after the uneventful intravenous administration of iodinated contrast. Core blood volume, core blood flow, mean transit time, and Tmax images were obtained utilizing the Rapid software protocol. A limited CT angiogram of the head was also performed to measure the blood vessel density. The radiation dose reduction device was turned on for each scan per the ALARA (As Low as Reasonably Achievable) protocol. Findings: Color maps are symmetric with no evidence of core infarct or territorial ischemic tissue at risk.     Impression: No evidence of core infarct or ischemic tissue at risk. Electronically Signed: Tito Spicer MD  7/10/2024 10:46 AM EDT  Workstation ID: [id number]    CT Head Without Contrast Stroke Protocol    Result Date: 7/10/2024  CT HEAD WO CONTRAST STROKE PROTOCOL Date of Exam: 7/10/2024 9:41 AM EDT Indication: Neuro Deficit, acute, Stroke suspected Neuro deficit, acute, stroke suspected. Right-sided weakness for greater than 24 hours. Comparison: CT head without contrast 6/18/2024, brain MRI 4/9/2024  Technique: Axial CT images were obtained of the head without contrast administration.  Reconstructed coronal and sagittal images were also obtained. Automated exposure control and iterative construction methods were used. Findings: No intracranial hemorrhage. No mass or midline shift. Volume loss is diffuse and slightly increased for patient's age. Sulci and ventricles size is symmetric. Remote infarct in the periventricular white matter along the inferior left frontal horn lateral  ventricle. Remote infarct in the left thalamus. No focal hypodensities to suggest acute or subacute infarct. There is a mild to moderate amount of low-density in the periventricular and subcortical white matter. The globes The muscles are normal in appearance. Mastoid air cells and visualized paranasal sinuses are well aerated.     Impression: No acute intracranial abnormality on head CT. Diffuse volume loss molecular to patient's age. Remote small infarcts as described. Brain MRI is more sensitive to evaluate for acute or subacute infarct. Electronically Signed: Emilia Ramos MD  7/10/2024 10:10 AM EDT  Workstation ID: [id number]       Differential Diagnosis and Discussion:    {Differentials:94156}    {Independent Review of (Optional):50085}    MDM     {Critical Care:83191}    {SEPSIS RECOGNITION:58982}    Patient Care Considerations:    {Considerations (Optional):02327}      Consultants/Shared Management Plan:    {Shared Management Plan (Optional):49371}    Social Determinants of Health:    {Social Determinants of Health (Optional):39176}      Disposition and Care Coordination:    {Admission consideration:69089}    {Discharge (Optional):56996}    Final diagnoses:   None        ED Disposition       None            This medical record created using voice recognition software.           imperical data, the patient meets criteria for inpatient admission to the hospital.        Final diagnoses:   Cerebrovascular accident (CVA), unspecified mechanism        ED Disposition       ED Disposition   Decision to Admit    Condition   --    Comment   Level of Care: Telemetry [5]   Diagnosis: Right sided weakness [079298]   Admitting Physician: DANIELA ORTIZ [[id number]]   Certification: I Certify That Inpatient Hospital Services Are Medically Necessary For Greater Than 2 Midnights                 This medical record created using voice recognition software.             Jacques Renteria,   07/17/24 1424       Jacques Renteria,   07/17/24 1602

## 2024-07-10 NOTE — PROGRESS NOTES
[facility]   HOSPITALIST HISTORY AND PHYSICAL  Date: 7/10/2024   Patient Name: Regulo Sepulveda  : 1965  MRN: [id number]  Primary Care Physician:  Dickson Landry MD  Date of admission: 7/10/2024    Subjective   Subjective     Chief Complaint: Right sided weakness    HPI:    Regulo Sepulveda is a 58 y.o. male with HFrEF, HTN, T2DM, Afib on Eliquis, HCV, bipolar/schizophrenia on Abilify who was discharged home from [facility] on 24 following evaluation for petechial rash.  He had skin biopsy with pathology consistent with leukocytoclastic vasculitis.  Discharged on prednisone taper.  Of note he was seen by infectious disease, workup included negative HIV, negative syphilis serologies, negative blood culture but had T2 Candida PCR positive and was placed on voriconazole to complete empiric course on 2024.  Patient is poor historian but per report from ER staff patient had a fall last night.  He came to the emergency room today with right-sided weakness and slurred speech.  He was seen by teleneurology.  Not a candidate for thrombolytics.  Initial imaging in the emergency room was negative for acute infarct or bleed.  Blood pressure was soft otherwise vital signs are stable.  Patient was alert to person, year but thought he was in [place].  He did have noticeable weakness involving the right upper and lower extremity along with slurred speech.  He was admitted under the hospitalist service for further evaluation and management.        Personal History     Past Medical History:  T2DM  HTN  Afib  HFrEF (EF 10-15%)  leukocytoclastic vasculitis  HCV  GI bleed  ?Bipolar/schizophrenia    Past Surgical History:  Past Surgical History:   Procedure Laterality Date    CARDIAC CATHETERIZATION Right 2023    Procedure: Right and Left Heart Cath;  Surgeon: Ben Grant MD;  Location: [facility] CATH INVASIVE LOCATION;  Service: Cardiovascular;  Laterality: Right;    ENDOSCOPY      TESTICLE SURGERY Left      extraction     Family History:   Family History   Problem Relation Age of Onset    Diabetes Mother     Depression Sister     Restless legs syndrome Sister     Insomnia Sister     Sleep walking Sister     Sleep disorder Sister         Night Terrors    Depression Brother      Social History:   .  Social History     Socioeconomic History    Marital status: Single   Tobacco Use    Smoking status: Every Day     Current packs/day: 0.50     Average packs/day: 0.5 packs/day for 50.4 years (25.2 ttl pk-yrs)     Types: Cigarettes     Start date: 1/1/1974     Last attempt to quit: 11/2/2023    Smokeless tobacco: Never    Tobacco comments:     At least 10 cigarettes a day   Vaping Use    Vaping status: Former    Substances: Nicotine   Substance and Sexual Activity    Alcohol use: Not Currently    Drug use: Not Currently     Types: Methamphetamines, Marijuana    Sexual activity: Defer         Home Medications:  ARIPiprazole ER, albuterol sulfate HFA, amiodarone, atorvastatin, carvedilol, dapagliflozin, doxycycline, ferrous gluconate, furosemide, metFORMIN, mirtazapine, pantoprazole, sacubitril-valsartan, spironolactone, traMADol, and venlafaxine XR    Allergies:  Allergies   Allergen Reactions    Penicillins Shortness Of Breath       Review of Systems  Constitutional:  No Fever, No Chills, +fatigue  HEENT:  No Hearing Changes, No Visual changes  Cardiovascular:  No Chest Pain, No Edema, No Palpitations  Respiratory:  No Cough, No Dyspnea  Gastrointestinal:  No Nausea, No Vomiting, No Diarrhea  Genitourinary:  No Dysuria, No Hesitancy  Neuro:  +mild confusion, generalized weakness R>L, +trouble speaking  Heme/Lymph:  No Bleeding        Objective   Objective     Vitals:   Temp:  [97.8 °F (36.6 °C)] 97.8 °F (36.6 °C)  Heart Rate:  [74-86] 74  Resp:  [18] 18  BP: (102-120)/(60-73) 102/67    Physical Exam    Constitutional: Thin male appears chronically ill, conversant, NAD   Eyes: Pupils equal and reactive, no conjunctival  injection, EOMI   HENT: NCAT, nares patent, mucous membranes moist.  No mucositis   Neck: Supple, trachea midline   Respiratory: Diminished, nonlabored respirations    Cardiovascular: RRR, no murmurs, no pedal edema   Gastrointestinal: Positive bowel sounds, soft, nontender, nondistended   Musculoskeletal: No gross joint deformities, no clubbing or cyanosis to extremities   Neurologic: Alert to person and year, right upper and lower extremity weakness 3 out of 5 muscle strength, left-sided 4 out of 5 muscle strength, slurred speech   Skin: All extremities warm.  Petechial and purpuric rash, widespread    Result Review    Result Review:  I have personally reviewed the results from the time of this admission to 7/10/2024 13:38 EDT and agree with these findings:  [x]  Laboratory  CBC          6/20/2024    04:30 6/26/2024    12:13 7/10/2024    10:44   CBC   WBC 9.59  17.24  16.33    RBC 4.73  4.82  3.55    Hemoglobin 12.8  13.0  10.0    Hematocrit 40.8  43.1  32.1    MCV 86.3  89.4  90.4    MCH 27.1  27.0  28.2    MCHC 31.4  30.2  31.2    RDW 17.5  18.3  17.6    Platelets 337  428  404      CMP          6/25/2024    03:56 6/26/2024    12:11 7/10/2024    10:44   CMP   Glucose 140  94  129    BUN 25  22  46    Creatinine 0.88  0.89  1.56    EGFR 99.7  99.3  51.2    Sodium 138  140  137    Potassium 4.2  4.2  4.0    Chloride 104  105  105    Calcium 8.0  8.2  7.5    Total Protein  5.8  4.4    Albumin  3.0  2.0    Globulin  2.8  2.4    Total Bilirubin  0.5  0.3    Alkaline Phosphatase  228  151    AST (SGOT)  34  22    ALT (SGPT)  28  11    Albumin/Globulin Ratio  1.1  0.8    BUN/Creatinine Ratio 28.4  24.7  29.5    Anion Gap 7.6  10.5  7.8          []  Microbiology  [x]  Radiology   CT Angiogram Head w AI Analysis of LVO    Result Date: 7/10/2024  CT ANGIOGRAM NECK, CT ANGIOGRAM HEAD W AI ANALYSIS OF LVO Date of Exam: 7/10/2024 11:27 AM EDT Indication: Neuro Deficit, acute, Stroke suspected Neuro deficit, acute, stroke  suspected. Comparison: None available. Technique: CTA of the neck was performed before and after the uneventful intravenous administration of iodinated contrast. Reconstructed coronal and sagittal images were also obtained. In addition, a 3-D volume rendered image was created for interpretation. Automated exposure control and iterative reconstruction methods were used. Findings: The lung apices demonstrate moderate emphysema. The neck soft tissues demonstrate no pathologic cervical adenopathy or unexpected aerodigestive tract mass. Cervical spondylosis changes are present. There is common origin of the brachiocephalic and left common carotid arteries. The subclavian arteries are patent. Both ICA origins are patent. The vertebral arteries are patent. Intracranially, the carotid siphons, vertebral basilar system and branching [place] vessels including bilateral anterior, middle and posterior cerebral arteries demonstrate no evidence of flow-limiting stenosis, large vessel occlusion or aneurysm.     Impression: No evidence of flow-limiting stenosis, large vessel occlusion or aneurysm. Electronically Signed: Tito Spicer MD  7/10/2024 11:31 AM EDT  Workstation ID: [id number]    CT Angiogram Neck    Result Date: 7/10/2024  CT ANGIOGRAM NECK, CT ANGIOGRAM HEAD W AI ANALYSIS OF LVO Date of Exam: 7/10/2024 11:27 AM EDT Indication: Neuro Deficit, acute, Stroke suspected Neuro deficit, acute, stroke suspected. Comparison: None available. Technique: CTA of the neck was performed before and after the uneventful intravenous administration of iodinated contrast. Reconstructed coronal and sagittal images were also obtained. In addition, a 3-D volume rendered image was created for interpretation. Automated exposure control and iterative reconstruction methods were used. Findings: The lung apices demonstrate moderate emphysema. The neck soft tissues demonstrate no pathologic cervical adenopathy or unexpected aerodigestive  tract mass. Cervical spondylosis changes are present. There is common origin of the brachiocephalic and left common carotid arteries. The subclavian arteries are patent. Both ICA origins are patent. The vertebral arteries are patent. Intracranially, the carotid siphons, vertebral basilar system and branching [place] vessels including bilateral anterior, middle and posterior cerebral arteries demonstrate no evidence of flow-limiting stenosis, large vessel occlusion or aneurysm.     Impression: No evidence of flow-limiting stenosis, large vessel occlusion or aneurysm. Electronically Signed: Tito Spicer MD  7/10/2024 11:31 AM EDT  Workstation ID: [id number]    XR Chest 1 View    Result Date: 7/10/2024  XR CHEST 1 VW Date of Exam: 7/10/2024 11:13 AM EDT Indication: Acute Stroke Protocol (Onset < 12 hrs) Comparison: Chest x-ray dated 6/14/2024 Findings: The lungs appear adequately aerated without consolidation or mass. No pleural effusion or pneumothorax is identified. The cardiomediastinal silhouette and pulmonary vasculature appear within normal limits. No acute or suspicious osseous lesion is identified.     Impression: 1.No acute radiographic abnormality is identified. Electronically Signed: Patrick Cardenas MD  7/10/2024 11:15 AM EDT  Workstation ID: [id number]    CT CEREBRAL PERFUSION WITH & WITHOUT CONTRAST    Result Date: 7/10/2024  CT CEREBRAL PERFUSION W WO CONTRAST Date of Exam: 7/10/2024 10:03 AM EDT Indication: Neuro Deficit, acute, Stroke suspected Neuro deficit, acute, stroke suspected.  Comparison: None available. Technique: Axial CT images of the brain were obtained prior to and after the uneventful intravenous administration of iodinated contrast. Core blood volume, core blood flow, mean transit time, and Tmax images were obtained utilizing the Rapid software protocol. A limited CT angiogram of the head was also performed to measure the blood vessel density. The radiation dose reduction device  was turned on for each scan per the ALARA (As Low as Reasonably Achievable) protocol. Findings: Color maps are symmetric with no evidence of core infarct or territorial ischemic tissue at risk.     Impression: No evidence of core infarct or ischemic tissue at risk. Electronically Signed: Tito Spicer MD  7/10/2024 10:46 AM EDT  Workstation ID: [id number]    CT Head Without Contrast Stroke Protocol    Result Date: 7/10/2024  CT HEAD WO CONTRAST STROKE PROTOCOL Date of Exam: 7/10/2024 9:41 AM EDT Indication: Neuro Deficit, acute, Stroke suspected Neuro deficit, acute, stroke suspected. Right-sided weakness for greater than 24 hours. Comparison: CT head without contrast 6/18/2024, brain MRI 4/9/2024 Technique: Axial CT images were obtained of the head without contrast administration.  Reconstructed coronal and sagittal images were also obtained. Automated exposure control and iterative construction methods were used. Findings: No intracranial hemorrhage. No mass or midline shift. Volume loss is diffuse and slightly increased for patient's age. Sulci and ventricles size is symmetric. Remote infarct in the periventricular white matter along the inferior left frontal horn lateral  ventricle. Remote infarct in the left thalamus. No focal hypodensities to suggest acute or subacute infarct. There is a mild to moderate amount of low-density in the periventricular and subcortical white matter. The globes The muscles are normal in appearance. Mastoid air cells and visualized paranasal sinuses are well aerated.     Impression: No acute intracranial abnormality on head CT. Diffuse volume loss molecular to patient's age. Remote small infarcts as described. Brain MRI is more sensitive to evaluate for acute or subacute infarct. Electronically Signed: Emilia Ramos MD  7/10/2024 10:10 AM EDT  Workstation ID: [id number]    CT Abdomen Pelvis With Contrast    Result Date: 6/29/2024  INDICATION:  Generalized body pain.  Itchy spots over  entire body.  Weakness. Recent hospitalization.  Additional History:  Hypertension.  Diabetes.  Chronic obstructive pulmonary disease.  GERD.  Hepatitis C.  Liver failure.  Orchiectomy. TECHNIQUE:  CTA of the chest was performed following rapid injection of intravenous contrast.  Images are reviewed and processed at a workstation according to the CT angiogram protocol with 3-D and/or MIP post processing imaging generated.  CT of the abdomen and pelvis was subsequently performed with intravenous contrast. Isovue 370 100 mL was administered intravenously. Automated mA/kV exposure control was utilized and patient examination was performed in strict accordance with principles of ALARA.   RADIATION AMOUNT:  293 mGy-cm. COMPARISON:  None Available. FINDINGS:   CTA Chest: Heart size normal.  No pericardial effusion.  No findings of aortic aneurysm or dissection.  No coronary artery calcifications are identified.  No significant mediastinal adenopathy.  No findings of pulmonary embolus.  Emphysema.  Small areas of bilateral lower lobe dependent subsegmental atelectasis and groundglass appearance.  No pleural effusion or pneumothorax. Abdomen: No acute abnormality of the stomach is identified. The liver of normal size and contour. No intrahepatic ductal dilatation is identified.  Gallbladder normal.  No acute abnormality of the pancreas.  Spleen of normal appearance.  Adrenal glands of normal appearance. No acute renal process. No retroperitoneal adenopathy. No findings of abdominal aortic aneurysm. Mildly dilated and fluid-filled loops of small bowel with areas of small bowel wall edema and hyperemia.  No bowel wall pneumatosis.  No bowel obstruction.  No free air.   New appendix is not visualized.  No acute abnormality within its expected location. Pelvis: Free fluid within the pelvis.  No findings of pelvic abscess.  No significant adenopathy or discrete mass. Skeleton: No acute bony process is identified. IMPRESSION:  Mildly dilated and fluid-filled loops of small bowel with areas of small bowel wall edema and hyperemia.  No bowel wall pneumatosis.  No bowel obstruction.  No free air.  Correlate with signs or symptoms of enteritis. Small areas of mild dependent subsegmental atelectasis and mild dependent edematous changes.  No acute cardiopulmonary process otherwise. Signed by Hong Melgar MD ##### Final ##### Dictated by:    HNOG MELGAR MD-RAD Dictated DT/TM: 06/29/2024 11:17 pm Interpreted and electronically signed by:  HONG MELGAR MD-RAD Signed DT/TM:  06/30/2024 3:06 am      [x]  EKG/Telemetry personally reviewed and independently interpreted: EKG normal sinus rhythm  []  Cardiology/Vascular   []  Pathology  [x]  Old records  Outside hospital records from [facility] reviewed:  Skin biopsy consistent with leukocytoclastic vasculitis  Hep C antibody positive, viral load undetectable  HIV negative  Syphilis negative  Blood cultures negative  T2 Candida PCR positive  []  Other:      Assessment & Plan   Assessment / Plan     Assessment/Plan:   Right-sided weakness.  Concern for stroke  History of lacunar infarct  Acute kidney injury. Baseline Cr ~1.1  Petechial rash.  Status post skin biopsy with pathology consistent with leukocytoclastic vasculitis  History of hepatitis C with recent viral load undetectable  Leukocytosis  Hypocalcemia  Atrial fibrillation  Normocytic anemia.    History of GI bleed  HFrEF, compensated  Type 2 diabetes  Essential hypertension      Admit to hospitalist service.  Telemetry bed  TeleNeuro consulted in the emergency room.  Not candidate for thrombolytic  Initial CT head showing findings of old left-sided lacunar strokes.  CT angiogram head and neck and CT cerebral perfusion scan negative  MRI brain with and without contrast ordered  Continue neurochecks per protocol  Keep n.p.o. until cleared by speech  Does not appear to be on antiplatelet agents  Normally on atorvastatin 40.   Currently on hold while on voriconazole increased risk of rhabdomyolysis  Recent hemoglobin A1c 6.40.  Low-dose sliding scale every 6 hours  Check UA, bladder scan/PVR, CK  Hold further fluids given CHF.  Avoid nephrotoxic agents. Strict I/Os. Trend renal function and electrolyte  Currently in NSR.  Blood pressure soft.  Likely runs low.  Will monitor and reintroduce home beta-blocker for rate control  Hold amiodarone due to increased risk of QTc prolongation with voriconazole.  QTc currently 462  Restart Eliquis for stroke prophylaxis  Echo earlier this year with EF 10 to 15%. Appears compensated. Hold diuretics, Entresto today given Cr.   Restart voriconazole 200 mg every 12 hours.  Previous Stop date 7/13/2024 to complete empiric course given T2 Candida PCR positive at [facility]  Restart prednisone 40 mg daily  VTE ppx: on Eliquis    VTE Prophylaxis:  Mechanical VTE prophylaxis orders are present.      CODE STATUS:    Code Status (Patient has no pulse and is not breathing): CPR (Attempt to Resuscitate)  Medical Interventions (Patient has pulse or is breathing): Full Support      Admission Status:  I believe this patient meets INPATIENT status.    Electronically signed by Mario Wilson DO, 07/10/24, 1:38 PM EDT.
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
## 2024-07-11 NOTE — NURSING NOTE
Patient BP dropped to 64/29. Patient became less responsive, staring into space. BS 96. MD called to bedside, transferred to CCU-3.

## 2024-07-11 NOTE — SIGNIFICANT NOTE
" Wound Eval / Progress Noted     [place]     Patient Name: Regulo Sepulveda  : 1965  MRN: [id number]  Today's Date: 2024                 Admit Date: 7/10/2024    Visit Dx:    ICD-10-CM ICD-9-CM   1. Cerebrovascular accident (CVA), unspecified mechanism  I63.9 434.91   2. Oropharyngeal dysphagia  R13.12 787.22   3. Decreased activities of daily living (ADL)  Z78.9 V49.89         Right sided weakness        Past Medical History:   Diagnosis Date    Anxiety     Arthritis     Asthma     Bipolar affective     Cardiac tamponade         CHF (congestive heart failure)     COPD (chronic obstructive pulmonary disease)     Coronary artery disease     Depression     Diabetes mellitus     Elevated cholesterol     Hypertension     Parkinson disease     Sleep apnea     Stroke     Pt stated it was \"about 2 months ago\" as of 6/15/24        Past Surgical History:   Procedure Laterality Date    CARDIAC CATHETERIZATION Right 2023    Procedure: Right and Left Heart Cath;  Surgeon: Ben Grant MD;  Location: [facility] CATH INVASIVE LOCATION;  Service: Cardiovascular;  Laterality: Right;    ENDOSCOPY      TESTICLE SURGERY Left     extraction         Physical Assessment:  Wound 07/10/24 1708 Left anterior hand (Active)   Dressing Appearance open to air 24 1032   Closure None 24 1032   Base dry;maroon/purple 24 1032   Periwound intact 24 1032   Periwound Temperature warm 24 1032   Periwound Skin Turgor soft 24 1032   Edges rolled/closed 24 1032   Drainage Amount none 24 1032   Care, Wound cleansed with;sterile normal saline 24 1032   Dressing Care open to air 24 1032       Wound 24 1032 Left lateral ankle Soft Tissue Necrosis (Active)   Dressing Appearance open to air 24 1032   Closure None 24 1032   Base dry;necrotic;black eschar 24 1032   Periwound dry;intact 24 1032   Periwound Temperature warm 24 1032   Periwound Skin Turgor " soft 07/11/24 1032   Edges open 07/11/24 1032   Drainage Amount none 07/11/24 1032   Care, Wound cleansed with;sterile normal saline 07/11/24 1032   Dressing Care dressing applied;dressing moistened;petroleum-based;non-adherent;gauze;silicone;border dressing 07/11/24 1032   Periwound Care absorptive dressing applied 07/11/24 1032       Wound 07/11/24 1032 Right anterior foot Soft Tissue Necrosis (Active)   Dressing Appearance open to air 07/11/24 1032   Closure None 07/11/24 1032   Base dry;black eschar;necrotic 07/11/24 1032   Periwound dry;intact 07/11/24 1032   Periwound Temperature warm 07/11/24 1032   Periwound Skin Turgor soft 07/11/24 1032   Edges open 07/11/24 1032   Drainage Amount none 07/11/24 1032   Care, Wound cleansed with;sterile normal saline 07/11/24 1032   Dressing Care dressing applied;dressing moistened;petroleum-based;non-adherent;gauze;silicone;border dressing 07/11/24 1032   Periwound Care absorptive dressing applied 07/11/24 1032       Wound 07/11/24 1032 Right lateral ankle Soft Tissue Necrosis (Active)   Dressing Appearance open to air 07/11/24 1032   Closure None 07/11/24 1032   Base dry;black eschar;necrotic 07/11/24 1032   Periwound dry;intact 07/11/24 1032   Periwound Temperature warm 07/11/24 1032   Periwound Skin Turgor soft 07/11/24 1032   Edges open 07/11/24 1032   Drainage Amount none 07/11/24 1032   Care, Wound cleansed with;sterile normal saline 07/11/24 1032   Dressing Care dressing applied;dressing moistened;petroleum-based;non-adherent;gauze;silicone;border dressing 07/11/24 1032   Periwound Care absorptive dressing applied 07/11/24 1032       Wound 07/11/24 1032 Right posterior hand Soft Tissue Necrosis (Active)   Dressing Appearance open to air 07/11/24 1032   Closure None 07/11/24 1032   Base dry;maroon/purple 07/11/24 1032   Periwound dry;intact 07/11/24 1032   Periwound Temperature warm 07/11/24 1032   Periwound Skin Turgor soft 07/11/24 1032   Edges rolled/closed  07/11/24 1032   Drainage Amount none 07/11/24 1032   Care, Wound cleansed with;sterile normal saline 07/11/24 1032   Dressing Care open to air 07/11/24 1032       Wound 07/11/24 1032 Left medial antecubital (Active)   Dressing Appearance open to air 07/11/24 1032   Closure None 07/11/24 1032   Base dry;slough 07/11/24 1032   Periwound dry;intact;pink 07/11/24 1032   Periwound Temperature warm 07/11/24 1032   Periwound Skin Turgor soft 07/11/24 1032   Edges open 07/11/24 1032   Drainage Amount none 07/11/24 1032   Care, Wound cleansed with;sterile normal saline 07/11/24 1032   Dressing Care dressing applied;dressing moistened;antimicrobial agent applied;petroleum-based;non-adherent;gauze;silicone;border dressing 07/11/24 1032   Periwound Care absorptive dressing applied 07/11/24 1032                               Wound Check / Follow-up: Patient seen today for wound consult.  Patient currently in CICU.  Patient is awake and alert but only oriented to self.  Reorientation provided.  Primary RN at bedside assisted with turning and repositioning.    Patient with documented vasculitis per attending physician.  Wounds noted to left medial lower arm, right anterior foot, right lateral ankle, left anterior foot, and left lateral ankle. Dry, sloughing tissue noted to left medial lower arm wound base.  Dry, stable eschar is noted to other areas.  Cleansed wounds with normal saline and gauze.  Recommending daily dressing changes with Betadine moistened non-adherent petroleum based gauze and silicone border dressing.  Discussed treatment plan with attending physician.  Physician agrees with treatment plan.    Scattered scabbing noted to right lower arm and bilateral lower extremities.  Dry, crusted tissue is present.  No drainage noted from these areas.  Recommending  topical treatment with application of mupirocin ointment.    Left palm with linear maroon/purple dry tissue.  Scattered areas of dry maroon/purple tissue noted to  fingers on right hand.  No drainage noted.  Recommending skin care and hygiene.    Blanchable redness and dry, flaky tissue present to bilateral gluteal aspects.  Recommending skin care and skin protection with application of blue top barrier cream.    Poon catheter in place for bladder management.    Recommending to implement every 2 hour turns, offload heels, keep patient free from moisture.    Impression: Documented vasculitis. Dry sloughing tissue noted to left lower arm. Dry, stable eschar to right anterior foot, right lateral ankle, left anterior foot, and left lateral ankle.  Scattered scabbing to right lower arm and bilateral lower extremities.  Soft tissue necrosis to left palm and fingers on right hand.  Blanchable redness and dry, flaky tissue to bilateral gluteal aspects.      Short term goals: Regain skin integrity, skin protection, pressure reduction, daily dressing changes, topical treatment, skin care.    Melissa Johnson RN    7/11/2024    17:53 EDT

## 2024-07-11 NOTE — CONSULTS
"Nutrition Services    Patient Name: Regulo Sepulveda  YOB: 1965  MRN: [id number]  Admission date: 7/10/2024      CLINICAL NUTRITION ASSESSMENT      Reason for Assessment  Physician Consult, MST Score 2+, and BMI   H&P:  Past Medical History:   Diagnosis Date    Anxiety     Arthritis     Asthma     Bipolar affective     Cardiac tamponade     2023    CHF (congestive heart failure)     COPD (chronic obstructive pulmonary disease)     Coronary artery disease     Depression     Diabetes mellitus     Elevated cholesterol     Hypertension     Parkinson disease     Sleep apnea     Stroke     Pt stated it was \"about 2 months ago\" as of 6/15/24        Current Problems:   Active Hospital Problems    Diagnosis     **Right sided weakness         Nutrition/Diet History         Narrative   Upon my visit, patient sitting up in bed awake. Pt with obvious signs of wasting in both fat and muscle. NFPE performed. Patient meets criteria for severe malnutrition. Pt inappropriate/unable to answer nutrition questions at this time. Pt was npo at time of visit. SLP eval this morning, recommends pureed diet with thin liquids. RD to order during rounds per PA request. RD ordered both diet and ONS.      Anthropometrics        Current Height, Weight Height: 185.4 cm (72.99\")  Weight: 61.9 kg (136 lb 7.4 oz)   Current BMI Body mass index is 18.01 kg/m².   BMI Classification Underweight   % IBW 78%   Adjusted Body Weight (ABW) N/A   Weight Hx  Wt Readings from Last 30 Encounters:   07/10/24 1331 61.9 kg (136 lb 7.4 oz)   07/10/24 1005 61.8 kg (136 lb 3.9 oz)   07/10/24 0945 61.8 kg (136 lb 3.9 oz)   06/26/24 1003 64.7 kg (142 lb 10.2 oz)   06/14/24 1717 64.7 kg (142 lb 10.2 oz)   05/31/24 0342 60.2 kg (132 lb 11.5 oz)   05/30/24 0505 63.4 kg (139 lb 12.4 oz)   05/29/24 1322 65.5 kg (144 lb 6.4 oz)   05/29/24 0156 67.8 kg (149 lb 7.6 oz)   05/29/24 0141 67.3 kg (148 lb 5.9 oz)   05/28/24 1608 70.6 kg (155 lb 10.3 oz)   05/15/24 1453 67.6 " kg (149 lb)   05/07/24 1510 67.6 kg (149 lb)   04/22/24 0917 73 kg (161 lb)   04/16/24 1253 68.5 kg (151 lb)   04/15/24 1454 67.6 kg (149 lb)   04/09/24 0055 68.6 kg (151 lb 3.8 oz)   04/08/24 1955 65.1 kg (143 lb 8.3 oz)   04/06/24 0526 64.9 kg (143 lb 1.3 oz)   04/05/24 0559 65 kg (143 lb 4.8 oz)   04/04/24 0534 64.1 kg (141 lb 5 oz)   04/03/24 0610 66.1 kg (145 lb 11.6 oz)   04/02/24 2141 68.2 kg (150 lb 5.7 oz)   04/02/24 1308 63.8 kg (140 lb 10.5 oz)   03/29/24 1031 65.3 kg (144 lb)   03/25/24 0600 64.5 kg (142 lb 3.2 oz)   03/24/24 0411 67 kg (147 lb 11.3 oz)   03/24/24 0250 67 kg (147 lb 11.3 oz)   03/21/24 1435 69 kg (152 lb 1.6 oz)   03/04/24 1300 69.7 kg (153 lb 9.6 oz)   02/22/24 1414 68.9 kg (152 lb)   01/31/24 1544 68.9 kg (152 lb)   01/08/24 1533 71.7 kg (158 lb)   12/18/23 0559 70.1 kg (154 lb 8.7 oz)   12/16/23 0500 78.6 kg (173 lb 4.5 oz)   12/15/23 1733 77.8 kg (171 lb 8.3 oz)   12/15/23 0914 74.6 kg (164 lb 7.4 oz)   11/27/23 1311 66.7 kg (147 lb)   11/15/23 0320 66.9 kg (147 lb 7.8 oz)   11/14/23 0619 67.1 kg (147 lb 14.9 oz)   11/13/23 0612 66.9 kg (147 lb 7.8 oz)   11/12/23 0600 68.5 kg (151 lb 0.2 oz)   11/11/23 0500 72.8 kg (160 lb 7.9 oz)   11/10/23 0710 76.2 kg (167 lb 15.9 oz)   11/06/23 1744 78.9 kg (174 lb)   11/02/23 1253 74.8 kg (165 lb)   10/23/23 0500 68.2 kg (150 lb 5.7 oz)   10/22/23 0355 69 kg (152 lb 1.9 oz)   10/21/23 0700 69.7 kg (153 lb 10.6 oz)   10/20/23 0300 72.8 kg (160 lb 7.9 oz)   10/19/23 0600 74.9 kg (165 lb 2 oz)   10/18/23 2204 74.7 kg (164 lb 10.9 oz)   10/18/23 1513 81.5 kg (179 lb 10.8 oz)   09/17/23 0345 71 kg (156 lb 8.4 oz)   09/02/23 0719 71.4 kg (157 lb 6.5 oz)   09/01/23 0500 71 kg (156 lb 8.4 oz)   08/31/23 0500 71.2 kg (156 lb 15.5 oz)   08/30/23 0542 74.2 kg (163 lb 9.3 oz)   08/29/23 0600 74 kg (163 lb 2.3 oz)   08/28/23 0522 79.3 kg (174 lb 13.2 oz)   08/27/23 2034 79.3 kg (174 lb 13.2 oz)   08/27/23 1504 76.6 kg (168 lb 14 oz)   02/27/23 2100 70.4 kg  (155 lb 3.3 oz)   02/27/23 1151 70.4 kg (155 lb 3.3 oz)   09/18/22 2111 83.9 kg (184 lb 15.5 oz)   09/14/22 1636 83.6 kg (184 lb 4.9 oz)          Wt Change Observation -9.9% x 3 months, clinically significant     Estimated/Assessed Needs  Estimated Needs based on: Current Body Weight       Energy Requirements 30-35 kcal/kg    EST Needs (kcal/day) 8514-7949 kcal       Protein Requirements 1.2-1.3 g/kg   EST Daily Needs (g/day) 74-81 g       Fluid Requirements 30 ml/kg    Estimated Needs (mL/day) 1860 ml     Labs/Medications         Pertinent Labs Reviewed.   Results from last 7 days   Lab Units 07/11/24  0422 07/10/24  1044   SODIUM mmol/L 134* 137   POTASSIUM mmol/L 4.5 4.0   CHLORIDE mmol/L 102 105   CO2 mmol/L 17.2* 24.2   BUN mg/dL 51* 46*   CREATININE mg/dL 1.86* 1.56*   CALCIUM mg/dL 7.7* 7.5*   BILIRUBIN mg/dL 0.4 0.3   ALK PHOS U/L 150* 151*   ALT (SGPT) U/L 13 11   AST (SGOT) U/L 30 22   GLUCOSE mg/dL 81 129*     Results from last 7 days   Lab Units 07/11/24  0421   HEMOGLOBIN g/dL 12.6*   HEMATOCRIT % 43.1     COVID19   Date Value Ref Range Status   06/14/2024 Not Detected Not Detected - Ref. Range Final     Lab Results   Component Value Date    HGBA1C 6.40 (H) 06/15/2024         Pertinent Medications Reviewed.     Malnutrition Severity Assessment      Patient meets criteria for : Severe Malnutrition  Malnutrition Type (Last 8 Hours)       Malnutrition Severity Assessment       Row Name 07/11/24 0950       Malnutrition Severity Assessment    Malnutrition Type Chronic Disease - Related Malnutrition      Row Name 07/11/24 0950       Unintentional Weight Loss     Unintentional Weight Loss Findings Severe    Unintentional Weight Loss  Weight loss greater than 7.5% in three months      Row Name 07/11/24 0950       Muscle Loss    Loss of Muscle Mass Findings Severe    Colon Region Severe - deep hollowing/scooping, lack of muscle to touch, facial bones well defined    Clavicle Bone Region Severe - protruding  prominent bone    Acromion Bone Region Severe - squared shoulders, bones, and acromion process protrusion prominent    Patellar Region Severe - prominent bone, square looking, very little muscle definition    Anterior Thigh Region Severe - line/depression along thigh, obviously thin      Row Name 07/11/24 0950       Fat Loss    Subcutaneous Fat Loss Findings Severe    Orbital Region  Severe - pronounced hollowness/depression, dark circles, loose saggy skin    Thoracic & Lumbar Region Severe - ribs visible with prominent depressions, iliac crest very prominent      Row Name 07/11/24 0950       Criteria Met (Must meet criteria for severity in at least 2 of these categories: M Wasting, Fat Loss, Fluid, Secondary Signs, Wt. Status, Intake)    Patient meets criteria for  Severe Malnutrition                     Nutrition Diagnosis         Nutrition Dx Problem 1 Severe malnutrition related to decreased ability to consume sufficient energy as evidenced by unintended weight change. and body composition changes.     Nutrition Intervention           Current Nutrition Orders & Evaluation of Intake       Current PO Diet Diet: Regular/House; Texture: Pureed (NDD 1); Fluid Consistency: Thin (IDDSI 0)   Supplement Orders Placed This Encounter      Dietary Nutrition Supplements Boost Glucose Control (Glucerna Shake)           Nutrition Intervention/Prescription        Order pureed diet with thin liquids per SLP recs.  Boost GC TID (+190 kcal, 16 g pro each)        Medical Nutrition Therapy/Nutrition Education          Learner     Readiness Patient  N/A     Method     Response Explanation  No evidence of learning     Monitor/Evaluation        Monitor Per protocol, PO intake, Supplement intake, Weight, Symptoms, POC/GOC, Swallow function, Diet advancement     Nutrition Discharge Plan         Recommend to continue oral nutrition supplements on discharge.      Electronically signed by:  Irena Barajas RD  07/11/24 10:07 EDT

## 2024-07-11 NOTE — PROGRESS NOTES
[facility]   Hospitalist Progress Note  Date: 2024  Patient Name: Regulo Sepulveda  : 1965  MRN: [id number]  Date of admission: 7/10/2024      Subjective   Subjective     Chief Complaint: Follow up for right sided weakness     Summary: 58 y.o. male with HFrEF, HTN, T2DM, Afib on Eliquis, HCV, bipolar/schizophrenia on Abilify who was discharged home from [facility] on 24 following evaluation for petechial rash.  He had skin biopsy with pathology consistent with leukocytoclastic vasculitis.  Discharged on prednisone taper.  Of note he was seen by infectious disease, workup included negative HIV, negative syphilis serologies, negative blood culture but had T2 Candida PCR positive and was placed on voriconazole to complete empiric course on 2024.  Presented from home after a fall with right-sided weakness and slurred speech.  Additional imaging no stroke, admitted for further workup    Interval Followup:   Overnight, mentation worsened and became hypotensive requiring transfer to the ICU for Levophed  CT abdomen pelvis raise concern for pyelonephritis and colitis  WBC 18.31  This morning on Levophed 0.2 mcg/kg/min  Lactate 3.4 -> 1.2  Very lethargic, now unable to lift right arm at all, awaiting MRI brain      Objective   Objective     Vitals:   Temp:  [97.2 °F (36.2 °C)-99.5 °F (37.5 °C)] 99.5 °F (37.5 °C)  Heart Rate:  [] 104  Resp:  [16-18] 17  BP: ()/() 85/71  Physical Exam    Constitutional: Thin, chronically ill male,   Respiratory: On room air.  Nonlabored respirations    Cardiovascular:   no edema   Gastrointestinal: soft, nondistended   Neurologic: Lethargic, unable to lift right upper extremity, speech dysarthric   Skin: Extremities warm    Result Review    Result Review:  I have personally reviewed the following over the last 24 hours (07:00 to 07:00) and agree with the following findings  [x]  Laboratory  CBC          2024    12:13 7/10/2024    10:44  7/11/2024    04:21   CBC   WBC 17.24  16.33  18.31    RBC 4.82  3.55  4.51    Hemoglobin 13.0  10.0  12.6    Hematocrit 43.1  32.1  43.1    MCV 89.4  90.4  95.6    MCH 27.0  28.2  27.9    MCHC 30.2  31.2  29.2    RDW 18.3  17.6  17.9    Platelets 428  404  543      CMP          6/26/2024    12:11 7/10/2024    10:44 7/11/2024    04:22   CMP   Glucose 94  129  81    BUN 22  46  51    Creatinine 0.89  1.56  1.86    EGFR 99.3  51.2  41.4    Sodium 140  137  134    Potassium 4.2  4.0  4.5    Chloride 105  105  102    Calcium 8.2  7.5  7.7    Total Protein 5.8  4.4  4.8    Albumin 3.0  2.0  2.1    Globulin 2.8  2.4  2.7    Total Bilirubin 0.5  0.3  0.4    Alkaline Phosphatase 228  151  150    AST (SGOT) 34  22  30    ALT (SGPT) 28  11  13    Albumin/Globulin Ratio 1.1  0.8  0.8    BUN/Creatinine Ratio 24.7  29.5  27.4    Anion Gap 10.5  7.8  14.8      [x]  Microbiology  Urine culture no growth  Blood culture pending  [x]  Radiology   [x]  EKG/Telemetry monitor personally reviewed and independently interpreted: NSR/sinus tachycardia  []  Cardiology/Vascular   []  Pathology  []  Old records  [x]  Other:    Intake/Output Summary (Last 24 hours) at 7/11/2024 1400  Last data filed at 7/11/2024 1209  Gross per 24 hour   Intake 684 ml   Output --   Net 684 ml         Assessment & Plan   Assessment / Plan     Assessment/Plan:  Shock; likely septic   ? Pyelonephritis vs colitis   Recent T2 Candida PCR positive (? Candida fungemia) at [facility]  Elevated lactate, not POA, clinically signficant  Toxic/metabolic encephalopathy  Right-sided weakness, concern for acute CVA  Possible left ventricular apex thrombus. Seen on TTE 6/17/24  Acute kidney injury (baseline Cr ~1.1)  Leukocytoclastic vasculitis   History of atrial fibrillation.  Currently in NSR  Type 2 diabetes  Essential hypertension  History of lacunar infarct  History of hepatitis C with recent viral load undetectable            Continue ICU level care.  Appreciate  intensivist assistance  Beta-blocker, diuretics, Entresto on hold  Titrate Levophed to MAP goal >65.  Vasopressin added  Blood cultures pending. Urine culture no growth.  No reports of diarrhea but given findings of colitis on scan, antibiotics broadened to IV vancomycin/cefepime/Flagyl.  De-escalate based off of cultures.  Trend white count with daily CBC  Continue p.o. voriconazole 200 mg 2 times daily (previous stop date 7/13/24)  Neuroexam worse today.  Teleneurology following. MRI brain without contrast pending.  Recommended holding anticoagulation pending results.  Creatinine uptrending, no hydronephrosis noted on CT scan, rise likely from hypoperfusion, will place indwelling Poon catheter for strict I/O's, avoid nephrotoxic agents, avoiding fluids given severely decreased EF, trend renal function electrolytes with daily BMP  Nutrition: cleared for Purée solid, thin liquid diet per SLP  VTE ppx: on hold pending MRI  GI ppx: IV pepcid    Discussed plan with ICU team.     VTE Prophylaxis:  Pharmacologic & mechanical VTE prophylaxis orders are present.      CODE STATUS:   Code Status (Patient has no pulse and is not breathing): CPR (Attempt to Resuscitate)  Medical Interventions (Patient has pulse or is breathing): Full Support    45 minutes of critical care provided including direct patient contact, review of chart, labs, pertinent imaging, high complexity decision making and discussion with physicians, staff, patient/family    Electronically signed by Mario Wilson DO, 07/11/24, 2:18 PM EDT.

## 2024-07-11 NOTE — PLAN OF CARE
Goal Outcome Evaluation:           Plan of care reviewed with: Patient     Progress: Ongoing, declining     Outcome: Patient is A&Ox1, garbled logical speech, able to follow commands, GCS 14. CVC, jennifer and mtz placed. MRI completed. Unable to contact family for update. Norepinephrine @0.1 Vasopressin @0.04.

## 2024-07-11 NOTE — PAYOR COMM NOTE
"UR DEPARTMENT    Cinthya Montanez RN  Phone [phone]  Fax [phone]    [address] [place] [address]    NPI [id number]  TAX ID [id number]    PHYSICIAN NAME AND NPI   MARIO ORTIZ  [id number]    BED TYPE   INPATIENT CRITICAL CARE    TYPE OF ADMISSION: ED ADMISSION MEDICAL    ICD 10 CODE  R53.1    DATE OF ADMISSION 07/10/2024      Nancy Sepulveda (58 y.o. Male)       Date of Birth   1965    Social Security Number       Address   [address] [facility] [address]    Home Phone   [phone]    MRN   [id number]       Hinduism   None    Marital Status   Single                            Admission Date   7/10/24    Admission Type   Emergency    Admitting Provider   Mario Ortiz DO    Attending Provider   Mario Ortiz DO    Department, Room/Bed   [facility] CORONARY CARE UNIT, C03/1       Discharge Date       Discharge Disposition       Discharge Destination                                 Attending Provider: Mario Ortiz DO    Allergies: Penicillins    Isolation: None   Infection: None   Code Status: CPR    Ht: 185.4 cm (72.99\")   Wt: 61.9 kg (136 lb 7.4 oz)    Admission Cmt: None   Principal Problem: Right sided weakness [R53.1]                   Active Insurance as of 7/10/2024       Primary Coverage       Payor Plan Insurance Group Employer/Plan Group    [facility] BY NITHIN [facility] BY NITHIN [id number]       Payor Plan Address Payor Plan Phone Number Payor Plan Fax Number Effective Dates    [address] [address]   12/28/2021 - None Entered    [facility] [phone]         Subscriber Name Subscriber Birth Date Member ID       NANCY SEPULVEDA 1965 [id number]                     Emergency Contacts        (Rel.) Home Phone Work Phone Mobile Phone    Aurora Prather (Sister) [phone] -- [phone]    Yun Don (Roommate) -- -- [phone]    Lorraine Steele (Relative) -- -- [phone]             Stroke: Ischemic RRG " Inpatient Care       Indications Met   Last updated by Lore Babin on 7/10/2024 2002     Review Status Created By   Primary Completed Lore Babin      Criteria Review   Stroke: Ischemic RRG Inpatient Care     Overall Determination: Indications Met     Criteria:  [×] Admission is indicated for  1 or more  of the following  [A]:      [×] Acute ischemic stroke [A] with neurologic findings that warrant inpatient care, as indicated by  1 or more  of the following :          [×] National Institutes of Health Stroke Scale (NIHSS) score greater than 2 [E]              7/10/2024  8:02 PM                  -- 7/10/2024  8:02 PM by Lore Babin --                      NIH = 10-12     Notes:  -- 7/10/2024  8:02 PM by Lore Babin --            *       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 Telemetry bed      TeleNeuro consulted in the emergency room. Not candidate for thrombolytic      Initial CT head showing findings of old left-sided lacunar strokes.      CT angiogram head and neck and CT cerebral perfusion scan negative      MRI brain with and without contrast ordered      Continue neurochecks per protocol      Keep n.p.o. until cleared by speech      Does not appear to be on antiplatelet agents      Normally on atorvastatin 40. Currently on hold while on voriconazole increased risk of rhabdomyolysis      Recent hemoglobin A1c 6.40. Low-dose sliding scale every 6 hours      Check UA, bladder scan/PVR, CK      Hold further fluids given CHF.      Avoid nephrotoxic agents. Strict I/Os. Trend renal function and electrolyte      Currently in NSR. Blood pressure soft. Likely runs low. Will monitor and reintroduce home beta-blocker for rate control      Hold amiodarone due to increased risk of QTc prolongation with  voriconazole. QTc currently 462      Restart Eliquis for stroke prophylaxis      Echo earlier this year with EF 10 to 15%. Appears compensated. Hold diuretics, Entresto today given Cr.       Restart voriconazole 200 mg every 12 hours. Previous Stop date 7/13/2024 to complete empiric course given T2 Candida PCR positive at [facility]      Restart prednisone 40 mg daily      VTE ppx: on Eliquis                   [place]: NPI [id number] Tax ID [id number]  History & Physical    No notes of this type exist for this encounter.          Emergency Department Notes        Spike England RN at 07/10/24 1436          Gave report to Noni TAPIA.    Electronically signed by Spike England RN at 07/10/24 1436       Current Facility-Administered Medications   Medication Dose Route Frequency Provider Last Rate Last Admin   • sennosides-docusate (PERICOLACE) 8.6-50 MG per tablet 2 tablet  2 tablet Oral BID PRN Mario Wilson DO        And   • polyethylene glycol (MIRALAX) packet 17 g  17 g Oral Daily PRN Mario Wilson DO        And   • bisacodyl (DULCOLAX) EC tablet 5 mg  5 mg Oral Daily PRN Mario Wilson DO        And   • bisacodyl (DULCOLAX) suppository 10 mg  10 mg Rectal Daily PRN Mario Wilson, DO       • dextrose (D50W) (25 g/50 mL) IV injection 25 g  25 g Intravenous Q15 Min PRN Mario Wilson, DO       • dextrose (GLUTOSE) oral gel 15 g  15 g Oral Q15 Min PRN Mario Wilson, DO       • famotidine (PEPCID) injection 20 mg  20 mg Intravenous Daily Rajat Shea MD   20 mg at 07/10/24 2203   • glucagon (GLUCAGEN) injection 1 mg  1 mg Intramuscular Q15 Min PRN Mario Wilson, DO       • insulin regular (humuLIN R,novoLIN R) injection 2-7 Units  2-7 Units Subcutaneous Q6H Mario Wilson, DO       • levoFLOXacin (LEVAQUIN) 750 mg/150 mL D5W (premix) (LEVAQUIN) 750 mg  750 mg Intravenous Q48H Rajat Shea  mL/hr at 07/11/24 0333 750 mg at 07/11/24 0333   • norepinephrine (LEVOPHED) 8 mg in 250 mL NS infusion  (premix)  0.02-0.3 mcg/kg/min Intravenous Titrated Rajat Shea MD 23.2 mL/hr at 24 0630 0.2 mcg/kg/min at 24 0630   • ondansetron (ZOFRAN) injection 4 mg  4 mg Intravenous Q6H PRN Mario Wilson DO       • Pharmacy to dose vancomycin   Does not apply Continuous PRN Rajat Shea MD       • sodium chloride 0.9 % flush 10 mL  10 mL Intravenous PRN LepJacques boogie, DO       • sodium chloride 0.9 % flush 10 mL  10 mL Intravenous Q12H Mario Wilson DO   10 mL at 24 0800   • sodium chloride 0.9 % flush 10 mL  10 mL Intravenous PRN Mario Wilson DO       • sodium chloride 0.9 % infusion 40 mL  40 mL Intravenous PRN Mario Wilson DO       • voriconazole (VFEND) tablet 200 mg  200 mg Oral Q12H Mario Wilson DO            Physician Progress Notes (last 24 hours)        Mario Wilson DO at 07/10/24 1337           [facility]  Date: 7/10/2024   Patient Name: Regulo Sepulveda  : 1965  MRN: [id number]  Primary Care Physician:  Dickson Landry MD  Date of admission: 7/10/2024    Subjective   Subjective     Chief Complaint: Right sided weakness    HPI:    Regulo Sepulveda is a 58 y.o. male with HFrEF, HTN, T2DM, Afib on Eliquis, HCV, bipolar/schizophrenia on Abilify who was discharged home from [facility] on 24 following evaluation for petechial rash.  He had skin biopsy with pathology consistent with leukocytoclastic vasculitis.  Discharged on prednisone taper.  Of note he was seen by infectious disease, workup included negative HIV, negative syphilis serologies, negative blood culture but had T2 Candida PCR positive and was placed on voriconazole to complete empiric course on 2024.  Patient is poor historian but per report from ER staff patient had a fall last night.  He came to the emergency room today with right-sided weakness and slurred speech.  He was seen by teleneurology.  Not a candidate for thrombolytics.  Initial imaging in the emergency room  was negative for acute infarct or bleed.  Blood pressure was soft otherwise vital signs are stable.  Patient was alert to person, year but thought he was in [place].  He did have noticeable weakness involving the right upper and lower extremity along with slurred speech.  He was admitted under the hospitalist service for further evaluation and management.        Personal History     Past Medical History:  T2DM  HTN  Afib  HFrEF (EF 10-15%)  leukocytoclastic vasculitis  HCV  GI bleed  ?Bipolar/schizophrenia    Past Surgical History:  Past Surgical History:   Procedure Laterality Date   • CARDIAC CATHETERIZATION Right 09/17/2023    Procedure: Right and Left Heart Cath;  Surgeon: Ben Grant MD;  Location: [facility] CATH INVASIVE LOCATION;  Service: Cardiovascular;  Laterality: Right;   • ENDOSCOPY     • TESTICLE SURGERY Left     extraction     Family History:   Family History   Problem Relation Age of Onset   • Diabetes Mother    • Depression Sister    • Restless legs syndrome Sister    • Insomnia Sister    • Sleep walking Sister    • Sleep disorder Sister         Night Terrors   • Depression Brother      Social History:   .  Social History     Socioeconomic History   • Marital status: Single   Tobacco Use   • Smoking status: Every Day     Current packs/day: 0.50     Average packs/day: 0.5 packs/day for 50.4 years (25.2 ttl pk-yrs)     Types: Cigarettes     Start date: 1/1/1974     Last attempt to quit: 11/2/2023   • Smokeless tobacco: Never   • Tobacco comments:     At least 10 cigarettes a day   Vaping Use   • Vaping status: Former   • Substances: Nicotine   Substance and Sexual Activity   • Alcohol use: Not Currently   • Drug use: Not Currently     Types: Methamphetamines, Marijuana   • Sexual activity: Defer         Home Medications:  ARIPiprazole ER, albuterol sulfate HFA, amiodarone, atorvastatin, carvedilol, dapagliflozin, doxycycline, ferrous gluconate, furosemide, metFORMIN, mirtazapine, pantoprazole,  sacubitril-valsartan, spironolactone, traMADol, and venlafaxine XR    Allergies:  Allergies   Allergen Reactions   • Penicillins Shortness Of Breath       Review of Systems  Constitutional:  No Fever, No Chills, +fatigue  HEENT:  No Hearing Changes, No Visual changes  Cardiovascular:  No Chest Pain, No Edema, No Palpitations  Respiratory:  No Cough, No Dyspnea  Gastrointestinal:  No Nausea, No Vomiting, No Diarrhea  Genitourinary:  No Dysuria, No Hesitancy  Neuro:  +mild confusion, generalized weakness R>L, +trouble speaking  Heme/Lymph:  No Bleeding        Objective   Objective     Vitals:   Temp:  [97.8 °F (36.6 °C)] 97.8 °F (36.6 °C)  Heart Rate:  [74-86] 74  Resp:  [18] 18  BP: (102-120)/(60-73) 102/67    Physical Exam    Constitutional: Thin male appears chronically ill, conversant, NAD   Eyes: Pupils equal and reactive, no conjunctival injection, EOMI   HENT: NCAT, nares patent, mucous membranes moist.  No mucositis   Neck: Supple, trachea midline   Respiratory: Diminished, nonlabored respirations    Cardiovascular: RRR, no murmurs, no pedal edema   Gastrointestinal: Positive bowel sounds, soft, nontender, nondistended   Musculoskeletal: No gross joint deformities, no clubbing or cyanosis to extremities   Neurologic: Alert to person and year, right upper and lower extremity weakness 3 out of 5 muscle strength, left-sided 4 out of 5 muscle strength, slurred speech   Skin: All extremities warm.  Petechial and purpuric rash, widespread    Result Review    Result Review:  I have personally reviewed the results from the time of this admission to 7/10/2024 13:38 EDT and agree with these findings:  [x]  Laboratory  CBC          6/20/2024    04:30 6/26/2024    12:13 7/10/2024    10:44   CBC   WBC 9.59  17.24  16.33    RBC 4.73  4.82  3.55    Hemoglobin 12.8  13.0  10.0    Hematocrit 40.8  43.1  32.1    MCV 86.3  89.4  90.4    MCH 27.1  27.0  28.2    MCHC 31.4  30.2  31.2    RDW 17.5  18.3  17.6    Platelets 337  428   404      CMP          6/25/2024    03:56 6/26/2024    12:11 7/10/2024    10:44   CMP   Glucose 140  94  129    BUN 25  22  46    Creatinine 0.88  0.89  1.56    EGFR 99.7  99.3  51.2    Sodium 138  140  137    Potassium 4.2  4.2  4.0    Chloride 104  105  105    Calcium 8.0  8.2  7.5    Total Protein  5.8  4.4    Albumin  3.0  2.0    Globulin  2.8  2.4    Total Bilirubin  0.5  0.3    Alkaline Phosphatase  228  151    AST (SGOT)  34  22    ALT (SGPT)  28  11    Albumin/Globulin Ratio  1.1  0.8    BUN/Creatinine Ratio 28.4  24.7  29.5    Anion Gap 7.6  10.5  7.8          []  Microbiology  [x]  Radiology   CT Angiogram Head w AI Analysis of LVO    Result Date: 7/10/2024  CT ANGIOGRAM NECK, CT ANGIOGRAM HEAD W AI ANALYSIS OF LVO Date of Exam: 7/10/2024 11:27 AM EDT Indication: Neuro Deficit, acute, Stroke suspected Neuro deficit, acute, stroke suspected. Comparison: None available. Technique: CTA of the neck was performed before and after the uneventful intravenous administration of iodinated contrast. Reconstructed coronal and sagittal images were also obtained. In addition, a 3-D volume rendered image was created for interpretation. Automated exposure control and iterative reconstruction methods were used. Findings: The lung apices demonstrate moderate emphysema. The neck soft tissues demonstrate no pathologic cervical adenopathy or unexpected aerodigestive tract mass. Cervical spondylosis changes are present. There is common origin of the brachiocephalic and left common carotid arteries. The subclavian arteries are patent. Both ICA origins are patent. The vertebral arteries are patent. Intracranially, the carotid siphons, vertebral basilar system and branching [place] vessels including bilateral anterior, middle and posterior cerebral arteries demonstrate no evidence of flow-limiting stenosis, large vessel occlusion or aneurysm.     Impression: No evidence of flow-limiting stenosis, large vessel occlusion or  aneurysm. Electronically Signed: Tito Spicer MD  7/10/2024 11:31 AM EDT  Workstation ID: [id number]    CT Angiogram Neck    Result Date: 7/10/2024  CT ANGIOGRAM NECK, CT ANGIOGRAM HEAD W AI ANALYSIS OF LVO Date of Exam: 7/10/2024 11:27 AM EDT Indication: Neuro Deficit, acute, Stroke suspected Neuro deficit, acute, stroke suspected. Comparison: None available. Technique: CTA of the neck was performed before and after the uneventful intravenous administration of iodinated contrast. Reconstructed coronal and sagittal images were also obtained. In addition, a 3-D volume rendered image was created for interpretation. Automated exposure control and iterative reconstruction methods were used. Findings: The lung apices demonstrate moderate emphysema. The neck soft tissues demonstrate no pathologic cervical adenopathy or unexpected aerodigestive tract mass. Cervical spondylosis changes are present. There is common origin of the brachiocephalic and left common carotid arteries. The subclavian arteries are patent. Both ICA origins are patent. The vertebral arteries are patent. Intracranially, the carotid siphons, vertebral basilar system and branching [place] of [place] vessels including bilateral anterior, middle and posterior cerebral arteries demonstrate no evidence of flow-limiting stenosis, large vessel occlusion or aneurysm.     Impression: No evidence of flow-limiting stenosis, large vessel occlusion or aneurysm. Electronically Signed: Tito Spicer MD  7/10/2024 11:31 AM EDT  Workstation ID: [id number]    XR Chest 1 View    Result Date: 7/10/2024  XR CHEST 1 VW Date of Exam: 7/10/2024 11:13 AM EDT Indication: Acute Stroke Protocol (Onset < 12 hrs) Comparison: Chest x-ray dated 6/14/2024 Findings: The lungs appear adequately aerated without consolidation or mass. No pleural effusion or pneumothorax is identified. The cardiomediastinal silhouette and pulmonary vasculature appear within normal limits. No acute or suspicious  osseous lesion is identified.     Impression: 1.No acute radiographic abnormality is identified. Electronically Signed: Patrick Cardenas MD  7/10/2024 11:15 AM EDT  Workstation ID: [id number]    CT CEREBRAL PERFUSION WITH & WITHOUT CONTRAST    Result Date: 7/10/2024  CT CEREBRAL PERFUSION W WO CONTRAST Date of Exam: 7/10/2024 10:03 AM EDT Indication: Neuro Deficit, acute, Stroke suspected Neuro deficit, acute, stroke suspected.  Comparison: None available. Technique: Axial CT images of the brain were obtained prior to and after the uneventful intravenous administration of iodinated contrast. Core blood volume, core blood flow, mean transit time, and Tmax images were obtained utilizing the Rapid software protocol. A limited CT angiogram of the head was also performed to measure the blood vessel density. The radiation dose reduction device was turned on for each scan per the ALARA (As Low as Reasonably Achievable) protocol. Findings: Color maps are symmetric with no evidence of core infarct or territorial ischemic tissue at risk.     Impression: No evidence of core infarct or ischemic tissue at risk. Electronically Signed: Tito Spicer MD  7/10/2024 10:46 AM EDT  Workstation ID: [id number]    CT Head Without Contrast Stroke Protocol    Result Date: 7/10/2024  CT HEAD WO CONTRAST STROKE PROTOCOL Date of Exam: 7/10/2024 9:41 AM EDT Indication: Neuro Deficit, acute, Stroke suspected Neuro deficit, acute, stroke suspected. Right-sided weakness for greater than 24 hours. Comparison: CT head without contrast 6/18/2024, brain MRI 4/9/2024 Technique: Axial CT images were obtained of the head without contrast administration.  Reconstructed coronal and sagittal images were also obtained. Automated exposure control and iterative construction methods were used. Findings: No intracranial hemorrhage. No mass or midline shift. Volume loss is diffuse and slightly increased for patient's age. Sulci and ventricles size is symmetric.  Remote infarct in the periventricular white matter along the inferior left frontal horn lateral  ventricle. Remote infarct in the left thalamus. No focal hypodensities to suggest acute or subacute infarct. There is a mild to moderate amount of low-density in the periventricular and subcortical white matter. The globes The muscles are normal in appearance. Mastoid air cells and visualized paranasal sinuses are well aerated.     Impression: No acute intracranial abnormality on head CT. Diffuse volume loss molecular to patient's age. Remote small infarcts as described. Brain MRI is more sensitive to evaluate for acute or subacute infarct. Electronically Signed: Emilia Ramos MD  7/10/2024 10:10 AM EDT  Workstation ID: [id number]    CT Abdomen Pelvis With Contrast    Result Date: 6/29/2024  INDICATION:  Generalized body pain.  Itchy spots over entire body.  Weakness. Recent hospitalization.  Additional History:  Hypertension.  Diabetes.  Chronic obstructive pulmonary disease.  GERD.  Hepatitis C.  Liver failure.  Orchiectomy. TECHNIQUE:  CTA of the chest was performed following rapid injection of intravenous contrast.  Images are reviewed and processed at a workstation according to the CT angiogram protocol with 3-D and/or MIP post processing imaging generated.  CT of the abdomen and pelvis was subsequently performed with intravenous contrast. Isovue 370 100 mL was administered intravenously. Automated mA/kV exposure control was utilized and patient examination was performed in strict accordance with principles of ALARA.   RADIATION AMOUNT:  293 mGy-cm. COMPARISON:  None Available. FINDINGS:   CTA Chest: Heart size normal.  No pericardial effusion.  No findings of aortic aneurysm or dissection.  No coronary artery calcifications are identified.  No significant mediastinal adenopathy.  No findings of pulmonary embolus.  Emphysema.  Small areas of bilateral lower lobe dependent subsegmental atelectasis and groundglass  appearance.  No pleural effusion or pneumothorax. Abdomen: No acute abnormality of the stomach is identified. The liver of normal size and contour. No intrahepatic ductal dilatation is identified.  Gallbladder normal.  No acute abnormality of the pancreas.  Spleen of normal appearance.  Adrenal glands of normal appearance. No acute renal process. No retroperitoneal adenopathy. No findings of abdominal aortic aneurysm. Mildly dilated and fluid-filled loops of small bowel with areas of small bowel wall edema and hyperemia.  No bowel wall pneumatosis.  No bowel obstruction.  No free air.   New appendix is not visualized.  No acute abnormality within its expected location. Pelvis: Free fluid within the pelvis.  No findings of pelvic abscess.  No significant adenopathy or discrete mass. Skeleton: No acute bony process is identified. IMPRESSION: Mildly dilated and fluid-filled loops of small bowel with areas of small bowel wall edema and hyperemia.  No bowel wall pneumatosis.  No bowel obstruction.  No free air.  Correlate with signs or symptoms of enteritis. Small areas of mild dependent subsegmental atelectasis and mild dependent edematous changes.  No acute cardiopulmonary process otherwise. Signed by Hong Melgar MD ##### Final ##### Dictated by:    HONG MELGAR MD-RAD Dictated DT/TM: 06/29/2024 11:17 pm Interpreted and electronically signed by:  HONG MELGAR MD-RAD Signed DT/TM:  06/30/2024 3:06 am      [x]  EKG/Telemetry personally reviewed and independently interpreted: EKG normal sinus rhythm  []  Cardiology/Vascular   []  Pathology  [x]  Old records  Outside hospital records from [facility] reviewed:  Skin biopsy consistent with leukocytoclastic vasculitis  Hep C antibody positive, viral load undetectable  HIV negative  Syphilis negative  Blood cultures negative  T2 Candida PCR positive  []  Other:      Assessment & Plan   Assessment / Plan     Assessment/Plan:   Right-sided weakness.  Concern  for stroke  History of lacunar infarct  Acute kidney injury. Baseline Cr ~1.1  Petechial rash.  Status post skin biopsy with pathology consistent with leukocytoclastic vasculitis  History of hepatitis C with recent viral load undetectable  Leukocytosis  Hypocalcemia  Atrial fibrillation  Normocytic anemia.    History of GI bleed  HFrEF, compensated  Type 2 diabetes  Essential hypertension      Admit to hospitalist service.  Telemetry bed  TeleNeuro consulted in the emergency room.  Not candidate for thrombolytic  Initial CT head showing findings of old left-sided lacunar strokes.  CT angiogram head and neck and CT cerebral perfusion scan negative  MRI brain with and without contrast ordered  Continue neurochecks per protocol  Keep n.p.o. until cleared by speech  Does not appear to be on antiplatelet agents  Normally on atorvastatin 40.  Currently on hold while on voriconazole increased risk of rhabdomyolysis  Recent hemoglobin A1c 6.40.  Low-dose sliding scale every 6 hours  Check UA, bladder scan/PVR, CK  Hold further fluids given CHF.  Avoid nephrotoxic agents. Strict I/Os. Trend renal function and electrolyte  Currently in NSR.  Blood pressure soft.  Likely runs low.  Will monitor and reintroduce home beta-blocker for rate control  Hold amiodarone due to increased risk of QTc prolongation with voriconazole.  QTc currently 462  Restart Eliquis for stroke prophylaxis  Echo earlier this year with EF 10 to 15%. Appears compensated. Hold diuretics, Entresto today given Cr.   Restart voriconazole 200 mg every 12 hours.  Previous Stop date 7/13/2024 to complete empiric course given T2 [facility]ida [facility]  Restart prednisone 40 mg daily  VTE ppx: on Eliquis    VTE Prophylaxis:  Mechanical VTE prophylaxis orders are present.      CODE STATUS:    Code Status (Patient has no pulse and is not breathing): CPR (Attempt to Resuscitate)  Medical Interventions (Patient has pulse or is breathing): Full  Support      Admission Status:  I believe this patient meets INPATIENT status.    Electronically signed by Mario Wilson DO, 07/10/24, 1:38 PM EDT.               Electronically signed by Mario Wilson DO at 07/10/24 1605          Consult Notes (last 24 hours)        Manuel Busby DO at 07/10/24 1008          [facility]      Patient Name:   Regulo Sepulveda  YOB: 1965  Identification Number:   MRN - [id number]  Date of Service:   07/10/2024 09:44:36    Diagnosis:      •  G93.41 - Encephalopathy Metabolic      •  I63.89 - Cerebrovascular accident (CVA) due to other mechanism ([facility])    Impression:      • This is a 57 yo M w a PMHX of DM, HTN, HLD, on apixaban, who presents to the ED with the acute onset of fall and AMS last night. Possibly R sided weakness. He was last known to be at baseline at an unclear time, although was this was at 7PM when he sustained a fall. On arrival to the ED his symptoms remain persistent. BP was found to be 108 systolic.               Physical exam with encephalopathy, probable R sided weakness   Labs pending   Imaging with CTH pending official read. On my prelim I see old lacunar infarcts            Etiology needs further investigation. Toxic/metabolic/infectious/hemodynamic pathology remains at the front of the differential, although will rule out a stroke given the possible R sided weakness.   However, I see old L hemipheric lacunar infarcts on CTH, and the current presentation could represent an unmasking of symptoms from that old stroke territory.               -CTA head and neck-->if LVO found, will DW CHARAN and treat this as a stroke.            Otherwise:         Would start with labs for reversible causes:      -UA, Utox, thiamine, ammonia, RPR, TSH, B12, ABG, alcohol, ESR, CRP, troponins, A1C, CBC, CMP, Lipids, LFTs, CPK, lactic acid, blood cultures if febrile.   -CXR      If the above is unrevealing and the  symptoms persist/recur, would consider the below:      -MRI brain with and w/o con   -rEEG      If the above is unrevealing and the symptoms persist, would consider the below:      -LP                 ------------------------------------------------------------------------------    Advanced Imaging:  Advanced Imaging Deferred because:    pending completion      Metrics:  Last Known Well: Unknown  [facility] Notification Time: 07/10/2024 09:44:05  Arrival Time: 07/10/2024 09:42:00  Stamp Time: 07/10/2024 09:44:36  Initial Response Time: 07/10/2024 09:52:55  Symptoms: AMS, possible R sided weakness.  Initial patient interaction: 07/10/2024 09:54:03  NIHSS Assessment Completed: 07/10/2024 10:02:15  Patient is not a candidate for Thrombolytic.  Thrombolytic Medical Decision: 07/10/2024 10:02:15  Patient was not deemed candidate for Thrombolytic because of following reasons:  Last Well Known Above 4.5 Hours.  Use of NOAs within 48 hours.    I personally reviewed the CT head    Primary Provider Notified of Diagnostic Impression and Management Plan on: 07/10/2024 10:08:03        ------------------------------------------------------------------------------    History of Present Illness:  Patient is a 58 year old Male.    Patient was brought by EMS for symptoms of AMS, possible R sided weakness.  This is a 59 yo M w a PMHX of DM, HTN, HLD, on apixaban, who presents to the ED with the acute onset of fall and AMS last night. Possibly R sided weakness. He was last known to be at baseline at an unclear time, although was this was at 7PM when he sustained a fall. On arrival to the ED his symptoms remain persistent. BP was found to be 108 systolic. He was taken immediately for CTH and further evaluation.  Decision on whether or not to give pharmacological thrombolysis was made based on indications, contraindications, and patient's disability status and preference.         Past Medical History:      • There is no history of  Migraine Headaches    Medications:    Anticoagulant use:  Unknown  Antiplatelet use: Unknown  Reviewed EMR for current medications    Allergies:   NKDA    Social History:  Smoking: No  Alcohol Use: No  Drug Use: No    Family History:    There is no family history of premature cerebrovascular disease pertinent to this consultation    ROS :  14 Points Review of Systems was performed and was negative except mentioned in HPI.    Past Surgical History:  There Is No Surgical History Contributory To Today’s Visit         Examination:  BP(120/65), Pulse(86),  1A: Level of Consciousness - Alert; keenly responsive + 0  1B: Ask Month and Age - 1 Question Right + 1  1C: Blink Eyes & Squeeze Hands - Performs Both Tasks + 0  2: Test Horizontal Extraocular Movements - Normal + 0  3: Test Visual Fields - No Visual Loss + 0  4: Test Facial Palsy (Use Grimace if Obtunded) - Normal symmetry + 0  5A: Test Left Arm Motor Drift - No Drift for 10 Seconds + 0  5B: Test Right Arm Motor Drift - Drift, hits bed + 2  6A: Test Left Leg Motor Drift - Drift, hits bed + 2  6B: Test Right Leg Motor Drift - Drift, hits bed + 2  7: Test Limb Ataxia (FNF/Heel-Shin) - No Ataxia + 0  8: Test Sensation - Normal; No sensory loss + 0  9: Test Language/Aphasia - Normal; No aphasia + 0  10: Test Dysarthria - Severe Dysarthria: Unintelligble Slurring or Out of Proportion to Aphasia + 2  11: Test Extinction/Inattention - No abnormality + 0    NIHSS Score: 9    Pre-Morbid Modified Tramaine Scale:  1 Points = No significant disability despite symptoms; able to carry out all usual duties and activities    Spoke with : attending  I reviewed the available imaging via Rapid and initiated discussion with the primary provider    This consult was conducted in real time using interactive audio and video technology. Patient was informed of the technology being used for this visit and agreed to proceed. Patient located in hospital and provider located at home/office  setting.      Patient is being evaluated for possible acute neurologic impairment and high probability of imminent or life-threatening deterioration. I spent total of 35 minutes providing care to this patient, including time for face to face visit via telemedicine, review of medical records, imaging studies and discussion of findings with providers, the patient and/or family.      Dr Manuel Busby      [facility]  For Inpatient follow-up with [facility] physician please call [facility] [phone]. This is not an outpatient service. Post hospital discharge, please contact hospital directly.    Please do not communicate with [facility] physicians via secure chat. If you have any questions, Please contact [facility].  Please call or reconsult our service if there are any clinical or diagnostic changes.       Electronically signed by Manuel Busby DO at 07/10/24 1015

## 2024-07-11 NOTE — PROGRESS NOTES
[facility]  [facility] TeleNeurology Consult Services    Routine Consult Follow-Up    Patient Name:   Regulo Sepulveda  YOB: 1965  Identification Number:   MRN - [id number]  Date of Service:   07/11/2024 07:50:12    Diagnosis        G93.41 - Encephalopathy Metabolic        I63.89 - Cerebrovascular accident (CVA) due to other mechanism ([facility])    Impression  This is a 59 yo M w a PMHX of DM, HTN, HLD, on apixaban,Parkinson disease, bipolar disorder who presents to the ED with the acute onset of fall and AMS last night. Possibly R sided weakness.    He is pending brain MRI. Head CT shows evidence of prior lacunar infarcts. He has had prior admission due to subtle right-sided weakness. On examination he appears to be encephalopathic and has right-sided weakness. It is possible that he is having recrudescence of symptoms in the setting of possible underlying infection. Other considerations include new stroke.    Recommendations  Continue evaluation for underlying infection  Brain MRI without contrast pending  Has had recent echocardiogram done in June with EF 21 to 25% with increased trabeculation of the left ventricular apex and cannot exclude thrombus, low threshold to repeat this  Blood pressure goal of normotension  Telemetry  Holding Eliquis pending review of MRI  Recommend starting statin therapy when able due to prior strokes    Our recommendations are outlined below    Diagnostic Studies :  MRI head without contrast    Antithrombotic Medication :  Statins for LDL goal less than 70    Anticoagulant Medication :  Hold all anticoagulation    Nursing Recommendations :  IV Fluids, avoid dextrose containing fluids, Maintain euglycemiaNeuro checks q4 hrs x 24 hrs and then per shiftHead of bed 30 degreesContinue with Telemetry    Consultations :  Recommend Speech therapy if failed dysphagia screenPhysical therapy/Occupational therapy    DVT Prophylaxis :  Choice of Primary Team    Disposition  :  Neurology will follow    Subjective  Patient was brought by EMS for symptoms of AMS, possible R sided weakness.  This is a 57 yo M w a PMHX of DM, HTN, HLD, on apixaban, who presents to the ED with the acute onset of fall and AMS last night. Possibly R sided weakness. He was last known to be at baseline at an unclear time, although was this was at 7PM when he sustained a fall. On arrival to the ED his symptoms remain persistent. BP was found to be 108 systolic. He was taken immediately for CTH and further evaluation.  Decision on whether or not to give pharmacological thrombolysis was made based on indications, contraindications, and patient's disability status and preference.    Hospital Course  7/11- no acute changesHe has had prior admissions due to right-sided weakness.    Imaging  CT perfusion no perfusion deficit  CT angiogram no LVO or high-grade stenosis  Head CT: Remote infarct in the periventricular white matter along the inferior left frontal horn lateral  ventricle. Remote infarct in the left thalamus    Labs  Lactate 3.4, procalcitonin 1.8, WBC 18.3, blood cultures pending, CT chest abdomen pelvis with patchy enhancement of the kidneys concerning for pyelonephritis       Examination  BP(86/70), Pulse(109), Temp(98.7), Resp(24),  1A: Level of Consciousness - Alert; keenly responsive + 0  1B: Ask Month and Age - 1 Question Right + 1  1C: Blink Eyes & Squeeze Hands - Performs Both Tasks + 0  2: Test Horizontal Extraocular Movements - Normal + 0  3: Test Visual Fields - No Visual Loss + 0  4: Test Facial Palsy (Use Grimace if Obtunded) - Normal symmetry + 0  5A: Test Left Arm Motor Drift - No Drift for 10 Seconds + 0  5B: Test Right Arm Motor Drift - No Movement + 4  6A: Test Left Leg Motor Drift - No Drift for 5 Seconds + 0  6B: Test Right Leg Motor Drift - No Effort Against Gravity + 3  7: Test Limb Ataxia (FNF/Heel-Shin) - Does Not Understand + 0  8: Test Sensation - Normal; No sensory loss + 0  9:  Test Language/Aphasia - Mild-Moderate Aphasia: Some Obvious Changes, Without Significant Limitation + 1  10: Test Dysarthria - Severe Dysarthria: Unintelligble Slurring or Out of Proportion to Aphasia + 2  11: Test Extinction/Inattention - No abnormality + 0    NIHSS Score: 11  NIHSS Free Text : sensation unreliable           This consult was conducted in real time using interactive audio and video technology. Patient was informed of the technology being used for this visit and agreed to proceed. Patient located in hospital and provider located at home/office setting.    Telehealth Neurology consultation was provided. I spent 30 minutes providing telehealth care. This includes time spent for face to face visit via telemedicine, review of medical records, imaging studies and discussion of findings with providers, the patient and/or family.      Dr Cayla Corbett      [facility]  For Inpatient follow-up with [facility] physician please call [facility] [phone]. This is not an outpatient service. Post hospital discharge, please contact hospital directly.    Please do not communicate with [facility] physicians via secure chat. If you have any questions, Please contact [facility].  Please call or reconsult our service if there are any clinical or diagnostic changes.

## 2024-07-11 NOTE — POST-PROCEDURE NOTE
Procedure: Right subclavian Central Intravenous Access    Indication: Hypotension    Consent: Obtained       A time out was performed. My hands were washed immediately prior to the procedure. I wore a surgical cap, mask with protective eyewear, full gown and sterile gloves throughout the procedure. The patient was placed in Trendelenburg position. RIGHT chest region was prepped using chlorhexidine scrub and draped in sterile fashion using a full drape and sterile probe cover and sterile gel employed. The medial and lateral heads of the sternocleidomastoid muscle were identified as was the carotid pulse. The Internal Jugular vein was identified using the ultrasound. Anesthesia was achieved over the vein using 1% lidocaine. Using real-time out of plane guidance, the introducer needle was inserted into the Internal Jugular vein under direct ultrasound visualization. Venous blood was withdrawn. The syringe was removed and a guidewire was advanced into the introducer needle. The guidewire was visualized in the Internal Jugular Vein by ultrasound. A small incision was made at the skin surface with a scalpel and the introducer needle was exchanged for a dilator over the guidewire. After appropriate dilation was obtained, the dilator was exchanged over the wire for a  triple lumen central venous catheter. The wire was removed and the catheter was sutured in place. A sterile dressing was placed over the catheter at the insertion site. The patient tolerated the procedure without any hemodynamic compromise. At time of procedure completion, all ports aspirated and flushed properly. Post-procedure chest x-ray is pending at this time.     The procedure was done by NP student, Osiris Villavicencio under my direct supervision

## 2024-07-11 NOTE — PLAN OF CARE
Goal Outcome Evaluation:  Plan of Care Reviewed With: patient        Progress: no change (First session for evaluation)  Outcome Evaluation: Patient presents with limitations of cognition, right upper extremity sensation and strength, endurance/activity tolerance and likely balance which impede his ability to perform ADL and transfers as prior.  The skills of a therapist will be required to safely and effectively implement treatment plan to restore maximal level of function.      Anticipated Discharge Disposition (OT): sub acute care setting

## 2024-07-11 NOTE — CONSULTS
Pulmonary / Critical Care Consult Note      Patient Name: Regulo Sepulveda  : 1965  MRN: [id number]  Primary Care Physician:  Dickson Landry MD  Referring Physician: Mario Wilson DO  Date of admission: 7/10/2024    Subjective   Subjective     Reason for Consult/ Chief Complaint:   Status post fall with right-sided weakness    HPI:  Regulo Sepulveda is a 58 y.o. male with medical history significant for heart failure with reduced EF, hypertension, hyperlipidemia, atrial fibrillation on apixaban, diabetes mellitus, COPD, CAD, recent diagnosis of leukocytoclastic vasculitis, recent diagnosis of Candida fungemia on voriconazole.  Patient presented last night status post fall with right-sided weakness altered mental status.  He was last known to be at baseline at an unclear time.  Initial CT of the head did not show any infarct or hemorrhage.  Patient was evaluated by teleneurology and not considered a candidate for tPA.  There is a CT abdomen pelvis which showed diffuse colonic wall thickening questionable for colitis.  Also prominent small bowel loops worrisome for generalized ileus.  Generalized anasarca as well as patchy enhancement in both kidneys questionable for pyelonephritis.  Patient white count is 18,000.  Serum creatinine went from a baseline of 0.89 to now 1.86.  Lactic acid 3.3.  proBNP 7,108.  Patient was initially admitted to regular medical floor but transferred to ICU yesterday for hypotension and poor responsiveness.  At time blood sugar was measured at 96.  Patient was seen and examined at bedside by me, ordered labs and diagnostic imaging studies were extensively reviewed.      Personal History     Past Medical History:   Diagnosis Date    Anxiety     Arthritis     Asthma     Bipolar affective     Cardiac tamponade         CHF (congestive heart failure)     COPD (chronic obstructive pulmonary disease)     Coronary artery disease     Depression     Diabetes mellitus     Elevated cholesterol      "Hypertension     Parkinson disease     Sleep apnea     Stroke     Pt stated it was \"about 2 months ago\" as of 6/15/24       Past Surgical History:   Procedure Laterality Date    CARDIAC CATHETERIZATION Right 09/17/2023    Procedure: Right and Left Heart Cath;  Surgeon: Ben Grant MD;  Location: [facility] CATH INVASIVE LOCATION;  Service: Cardiovascular;  Laterality: Right;    ENDOSCOPY      TESTICLE SURGERY Left     extraction       Family History: family history includes Depression in his brother and sister; Diabetes in his mother; Insomnia in his sister; Restless legs syndrome in his sister; Sleep disorder in his sister; Sleep walking in his sister. Otherwise pertinent FHx was reviewed and not pertinent to current issue.    Social History:  reports that he has been smoking cigarettes. He started smoking about 50 years ago. He has a 25.2 pack-year smoking history. He has never used smokeless tobacco. He reports that he does not currently use alcohol. He reports that he does not currently use drugs after having used the following drugs: Methamphetamines and Marijuana.    Home Medications:  ARIPiprazole ER, albuterol sulfate HFA, amiodarone, apixaban, atorvastatin, carvedilol, dapagliflozin, ferrous gluconate, furosemide, metFORMIN, mirtazapine, nicotine, pantoprazole, sacubitril-valsartan, spironolactone, tamsulosin, traMADol, and venlafaxine XR    Allergies:  Allergies   Allergen Reactions    Penicillins Shortness Of Breath       Objective    Objective     Vitals:   Temp:  [97.2 °F (36.2 °C)-99.5 °F (37.5 °C)] 99.5 °F (37.5 °C)  Heart Rate:  [] 104  Resp:  [16-18] 17  BP: ()/() 85/71    Physical Exam:  Vital Signs Reviewed     General:    HEENT: Pupil equal and reactive to light     Respiratory: Air entry is diminished bilaterally    Cardiovascular: First and Second heart sounds heared     Neurological: Lethargy    Gastrointestinal: Abdomen is soft, non-distended. No palpable " organomegally    Extremities: No pedal edema     Skin: intact      Result Review    Result Review:  I have personally reviewed the results from the time of this admission to 7/11/2024 07:34 EDT and agree with these findings:  [x]  Laboratory  []  Microbiology  [x]  Radiology  []  EKG/Telemetry   []  Cardiology/Vascular   []  Pathology  [x]  Old records  []  Other:  Most notable findings include:     Assessment & Plan   Assessment / Plan     Active Hospital Problems:  Active Hospital Problems    Diagnosis     **Right sided weakness      Acute CVA with right-sided weakness  Current CT head did not show any acute abnormalities  MRI of the brain pending.  EEG  Possible lumbar puncture if none of those are revealing by neurology  Awaiting in-house neurologist evaluation  Monitor closely    Acute encephalopathy probably toxic/metabolic  Likely secondary to acute CVA versus infective process  CT head unremarkable  Pending elevate head of bed at all times.  Aspiration/fall precaution    Acute kidney injury: Multifactorial in etiology  Avoid nephrotoxic.  Adjust medication to creatinine clearance  Daily BMP    Sepsis/septic shock source likely urinary  Patient also has Candida fungemia on voriconazole  Follow latest blood culture  On vasopressor titrated to MAP of 65 mmHg  Obtain a central line for vasopressor and arterial line for hemodynamic monitoring  Follow-up lactic acid.  Procalcitonin  Will broaden the antibiotics to cefepime/vancomycin    Recent history of Candida fungemia  On voriconazole 200 mg p.o. twice daily    Questionable pyelonephritis  Urinalysis is not impressive  Currently on Levaquin 750 mg IV every 48 hours.  Will change to cefepime/Vanco    Diabetes mellitus  Accu-Chek with sliding scale insulin coverage for now    Atrial fibrillation: Rate controlled  On apixaban at home will resume if no contraindication    Generalized ileus  Keep n.p.o. for now NG tube to suction    DVT prophylaxis: Apixaban  GI  prophylaxis: On famotidine 20 mg p.o. daily    Other comorbidities: CAD, CHF, bipolar disorder, leukocytoclastic vasculitis        The patient is critically ill in the ICU with physician assistant. Multidisciplinary bedside critical care rounds were performed with nursing staff, respiratory therapy, pharmacy, nutritional services, social work. I have personally reviewed the chart, labs and any pertinent imaging available.  I have spent 45 minutes of critical care time, excluding procedures, in the care of this patient.

## 2024-07-11 NOTE — SIGNIFICANT NOTE
07/11/24 1300   Physical Therapy Time and Intention   Session Not Performed patient unavailable for evaluation

## 2024-07-11 NOTE — THERAPY EVALUATION
"Patient Name: Regulo Sepulveda  : 1965    MRN: [id number]                              Today's Date: 2024       Admit Date: 7/10/2024    Visit Dx:     ICD-10-CM ICD-9-CM   1. Cerebrovascular accident (CVA), unspecified mechanism  I63.9 434.91   2. Oropharyngeal dysphagia  R13.12 787.22   3. Decreased activities of daily living (ADL)  Z78.9 V49.89     Patient Active Problem List   Diagnosis    Major depressive disorder, recurrent episode, moderate    Chronic obstructive pulmonary disease    Diabetes mellitus    Primary hypertension    Hyperlipidemia    Hepatitis C    Cigarette nicotine dependence    BPH (benign prostatic hyperplasia)    GERD (gastroesophageal reflux disease)    B12 deficiency    LV (left ventricular) mural thrombus    Schizophrenia    PAF (paroxysmal atrial fibrillation)    Chronic HFrEF (heart failure with reduced ejection fraction)    Moderate malnutrition    Cardiomyopathy    TIA (transient ischemic attack)    Alcohol abuse    Bipolar 1 disorder    Chronic paranoid schizophrenia    Chronic post-traumatic stress disorder    Inhalant abuse    Type 2 diabetes mellitus without complication    Vitamin D deficiency    Acute exacerbation of CHF (congestive heart failure)    Generalized weakness    Severe malnutrition    Right sided weakness     Past Medical History:   Diagnosis Date    Anxiety     Arthritis     Asthma     Bipolar affective     Cardiac tamponade         CHF (congestive heart failure)     COPD (chronic obstructive pulmonary disease)     Coronary artery disease     Depression     Diabetes mellitus     Elevated cholesterol     Hypertension     Parkinson disease     Sleep apnea     Stroke     Pt stated it was \"about 2 months ago\" as of 6/15/24     Past Surgical History:   Procedure Laterality Date    CARDIAC CATHETERIZATION Right 2023    Procedure: Right and Left Heart Cath;  Surgeon: Ben Grant MD;  Location: [facility] CATH INVASIVE LOCATION;  Service: Cardiovascular;  " Laterality: Right;    ENDOSCOPY      TESTICLE SURGERY Left     extraction      General Information       Row Name 07/11/24 1025          OT Time and Intention    Document Type evaluation  -AV     Mode of Treatment individual therapy;occupational therapy  -AV       Row Name 07/11/24 1025          General Information    Patient Profile Reviewed yes  -AV     Prior Level of Function independent:;ADL's;transfer  Per EMR review (OT eval on 6/18/24): Independent with ADLs at baseline.  Stands to shower (tub).  Stands at sink to groom.  Standard commode.  Ambulates without assistive device.  No home oxygen.  -AV     Existing Precautions/Restrictions fall;NPO  -AV     Barriers to Rehab medically complex  -AV       Row Name 07/11/24 1025          Occupational Profile    Reason for Services/Referral (Occupational Profile) Patient is a 58 year old male admitted to [facility] on 7/10/2024 with right-sided weakness and fall.  He is currently in CCU/on room air with sats 96%, .  Pending MRI. OT consulted due to right-sided weakness and impaired ADL/transfer independence.  No previous OT services for current condition.  -AV       Row Name 07/11/24 1025          Living Environment    People in Home friend(s)  Roommate (per EMR)  -AV       Row Name 07/11/24 1025          Cognition    Orientation Status (Cognition) --  Alert.  Unintelligible speech.  Follows only minimal commands with repeated verbal cues/visual demo.  Confused/impaired attention to task.  Impaired safety awareness.  -AV       Row Name 07/11/24 1025          Safety Issues, Functional Mobility    Impairments Affecting Function (Mobility) balance;cognition;endurance/activity tolerance;strength;sensation/sensory awareness  -AV               User Key  (r) = Recorded By, (t) = Taken By, (c) = Cosigned By      Initials Name Provider Type    AV William Galeas, OT Occupational Therapist                     Mobility/ADL's       Row Name 07/11/24 1033           Transfers    Comment, (Transfers) Testing deferred at nursing request  -AV       [facility] Name 07/11/24 1030          Activities of Daily Living    BADL Assessment/Intervention --  Dependent all ADLs.  NPO.  Pure wick catheter in place.  -AV               User Key  (r) = Recorded By, (t) = Taken By, (c) = Cosigned By      Initials Name Provider Type    William Gaspar OT Occupational Therapist                   Obj/Interventions       Row Name 07/11/24 1030          Sensory Assessment (Somatosensory)    Sensory Assessment Gross response to firm touch left upper extremity.  No response to firm touch right upper extremity.  Impaired cognitive status taken into consideration.  -AV       [facility] Name 07/11/24 1030          Vision Assessment/Intervention    Vision Assessment Comment Fair eye contact.  Able to accurately note two fingers held both left/right of midline, approximately 2 feet away.  [facility] Name 07/11/24 1030          Range of Motion Comprehensive    Comment, General Range of Motion LUE AROM WFL including full opposition.  RUE PROM WFL.  [facility]ospitals Name 07/11/24 1030          Strength Comprehensive (MMT)    Comment, General Manual Muscle Testing (MMT) Assessment Left biceps, triceps and  4/5.  Right anterior deltoid, biceps, triceps and  0/5.  -AV       [facility] Name 07/11/24 1030          Motor Skills    Motor Skills functional endurance  -AV     Functional Endurance Poor  -AV       Row Name 07/11/24 1030          Balance    Comment, Balance Impaired.  Testing deferred at nursing request due to current medical status  -AV               User Key  (r) = Recorded By, (t) = Taken By, (c) = Cosigned By      Initials Name Provider Type    William Gaspar OT Occupational Therapist                   Goals/Plan       [facility] Name 07/11/24 1035          Transfer Goal 1 (OT)    Activity/Assistive Device (Transfer Goal 1, OT) transfers, all  -AV     [place] Level/Cues Needed (Transfer Goal 1, OT) minimum  assist (75% or more patient effort);set-up required;verbal cues required  -AV     Time Frame (Transfer Goal 1, OT) long term goal (LTG);10 days  -AV       Row Name 07/11/24 1035          Bathing Goal 1 (OT)    Activity/Device (Bathing Goal 1, OT) bathing skills, all  -AV     [place] Level/Cues Needed (Bathing Goal 1, OT) minimum assist (75% or more patient effort);set-up required;verbal cues required  -AV     Time Frame (Bathing Goal 1, OT) long term goal (LTG);10 days  -AV       Row Name 07/11/24 1035          Dressing Goal 1 (OT)    Activity/Device (Dressing Goal 1, OT) dressing skills, all  -AV     [place]/Cues Needed (Dressing Goal 1, OT) minimum assist (75% or more patient effort);set-up required;verbal cues required  -AV     Time Frame (Dressing Goal 1, OT) long term goal (LTG);10 days  -AV       Row Name 07/11/24 1035          Toileting Goal 1 (OT)    Activity/Device (Toileting Goal 1, OT) toileting skills, all  -AV     [place] Level/Cues Needed (Toileting Goal 1, OT) minimum assist (75% or more patient effort);set-up required;verbal cues required  -AV     Time Frame (Toileting Goal 1, OT) long term goal (LTG);10 days  -AV       Row Name 07/11/24 1035          Grooming Goal 1 (OT)    Activity/Device (Grooming Goal 1, OT) grooming skills, all  -AV     [place] (Grooming Goal 1, OT) minimum assist (75% or more patient effort);set-up required;verbal cues required  -AV     Time Frame (Grooming Goal 1, OT) long term goal (LTG);10 days  -AV       Row Name 07/11/24 1035          Strength Goal 1 (OT)    Strength Goal 1 (OT) Patient will demonstrate 2(-)/5 any right upper extremity muscle to increase ADL and transfer independence.  -AV     Time Frame (Strength Goal 1, OT) long term goal (LTG);10 days  -AV       Row Name 07/11/24 1035          Problem Specific Goal 1 (OT)    Problem Specific Goal 1 (OT) Patient will demonstrate fair endurance/activity tolerance needed to support ADLs.  -AV     Time  Frame (Problem Specific Goal 1, OT) long term goal (LTG);10 days  -AV       [facility] Name 07/11/24 1035          Therapy Assessment/Plan (OT)    Planned Therapy Interventions (OT) activity tolerance training;BADL retraining;functional balance retraining;neuromuscular control/coordination retraining;occupation/activity based interventions;patient/caregiver education/training;strengthening exercise;transfer/mobility retraining  -AV               User Key  (r) = Recorded By, (t) = Taken By, (c) = Cosigned By      Initials Name Provider Type    AV William Galeas, OT Occupational Therapist                   Clinical Impression       Row Name 07/11/24 1034          Pain Assessment    Additional Documentation Pain Scale: FACES Pre/Post-Treatment (Group)  -AV       Row Name 07/11/24 1034          Pain Scale: FACES Pre/Post-Treatment    Pain: FACES Scale, Pretreatment 0-->no hurt  -AV     Posttreatment Pain Rating 0-->no hurt  -AV       Row Name 07/11/24 1034          Plan of Care Review    Plan of Care Reviewed With patient  -AV     Progress no change  First session for evaluation  -AV     Outcome Evaluation Patient presents with limitations of cognition, right upper extremity sensation and strength, endurance/activity tolerance and likely balance which impede his ability to perform ADL and transfers as prior.  The skills of a therapist will be required to safely and effectively implement treatment plan to restore maximal level of function.  -AV       [facility] Name 07/11/24 1034          Therapy Assessment/Plan (OT)    Patient/Family Therapy Goal Statement (OT) None stated  -AV     Rehab Potential (OT) fair, will monitor progress closely  -AV     Criteria for Skilled Therapeutic Interventions Met (OT) yes;meets criteria;skilled treatment is necessary  -AV     Therapy Frequency (OT) 5 times/wk  -AV       [facility] Name 07/11/24 1034          Therapy Plan Review/Discharge Plan (OT)    Equipment Needs Upon Discharge (OT) walker, rolling;tub  bench;commode chair  -AV     Anticipated Discharge Disposition (OT) sub acute care setting  -AV       Row Name 07/11/24 1034          Vital Signs    O2 Delivery Pre Treatment room air  -AV     O2 Delivery Intra Treatment room air  -AV     O2 Delivery Post Treatment room air  -AV       Row Name 07/11/24 1034          Positioning and Restraints    Pre-Treatment Position in bed  -AV     Post Treatment Position bed  -AV               User Key  (r) = Recorded By, (t) = Taken By, (c) = Cosigned By      Initials Name Provider Type    William Gaspar OT Occupational Therapist                   Outcome Measures       Row Name 07/11/24 1036          How much help from another is currently needed...    Putting on and taking off regular lower body clothing? 1  -AV     Bathing (including washing, rinsing, and drying) 1  -AV     Toileting (which includes using toilet bed pan or urinal) 1  -AV     Putting on and taking off regular upper body clothing 1  -AV     Taking care of personal grooming (such as brushing teeth) 1  -AV     Eating meals 1  -AV     AM-PAC 6 Clicks Score (OT) 6  -AV       Row Name 07/11/24 0800          How much help from another person do you currently need...    Turning from your back to your side while in flat bed without using bedrails? 2  -AE     Moving from lying on back to sitting on the side of a flat bed without bedrails? 2  -AE     Moving to and from a bed to a chair (including a wheelchair)? 2  -AE     Standing up from a chair using your arms (e.g., wheelchair, bedside chair)? 1  -AE     Climbing 3-5 steps with a railing? 1  -AE     To walk in hospital room? 1  -AE     AM-PAC 6 Clicks Score (PT) 9  -AE     Highest Level of Mobility Goal 3 --> Sit at edge of bed  -AE       Row Name 07/11/24 1036          Functional Assessment    Outcome Measure Options AM-PAC 6 Clicks Daily Activity (OT);Optimal Instrument  -AV       Row Name 07/11/24 1036          Optimal Instrument    Optimal Instrument Optimal -  3  -AV     Bending/Stooping 5  -AV     Standing 5  -AV     Reaching 3  -AV     From the list, choose the 3 activities you would most like to be able to do without any difficulty Bending/stooping;Standing;Reaching  -AV     Total Score Optimal - 3 13  -AV               User Key  (r) = Recorded By, (t) = Taken By, (c) = Cosigned By      Initials Name Provider Type    AV William Galeas OT Occupational Therapist    Malinda Simon RN Registered Nurse                    Occupational Therapy Education       Title: PT OT SLP Therapies (In Progress)       Topic: Occupational Therapy (Done)       Point: ADL training (Done)       Description:   Instruct learner(s) on proper safety adaptation and remediation techniques during self care or transfers.   Instruct in proper use of assistive devices.                  Learning Progress Summary             Patient Acceptance, E, VU by AV at 7/11/2024 1037                         Point: Home exercise program (Done)       Description:   Instruct learner(s) on appropriate technique for monitoring, assisting and/or progressing therapeutic exercises/activities.                  Learning Progress Summary             Patient Acceptance, E, VU by AV at 7/11/2024 1037                         Point: Precautions (Done)       Description:   Instruct learner(s) on prescribed precautions during self-care and functional transfers.                  Learning Progress Summary             Patient Acceptance, E, VU by AV at 7/11/2024 1037                         Point: Body mechanics (Done)       Description:   Instruct learner(s) on proper positioning and spine alignment during self-care, functional mobility activities and/or exercises.                  Learning Progress Summary             Patient Acceptance, E, VU by AV at 7/11/2024 1037                                         User Key       Initials Effective Dates Name Provider Type Discipline    AV 06/16/21 -  William Galeas OT Occupational  Therapist OT                  OT Recommendation and Plan  Planned Therapy Interventions (OT): activity tolerance training, BADL retraining, functional balance retraining, neuromuscular control/coordination retraining, occupation/activity based interventions, patient/caregiver education/training, strengthening exercise, transfer/mobility retraining  Therapy Frequency (OT): 5 times/wk  Plan of Care Review  Plan of Care Reviewed With: patient  Progress: no change (First session for evaluation)  Outcome Evaluation: Patient presents with limitations of cognition, right upper extremity sensation and strength, endurance/activity tolerance and likely balance which impede his ability to perform ADL and transfers as prior.  The skills of a therapist will be required to safely and effectively implement treatment plan to restore maximal level of function.     Time Calculation:   Evaluation Complexity (OT)  Review Occupational Profile/Medical/Therapy History Complexity: expanded/moderate complexity  Assessment, Occupational Performance/Identification of Deficit Complexity: 3-5 performance deficits  Clinical Decision Making Complexity (OT): problem focused assessment/low complexity  Overall Complexity of Evaluation (OT): low complexity     Time Calculation- OT       Row Name 07/11/24 [facility]             Time Calculation- OT    OT Received On 07/11/24  -AV      OT Goal Re-Cert Due Date 07/20/24  -AV         Untimed Charges    OT Eval/Re-eval Minutes 35  -AV         Total Minutes    Untimed Charges Total Minutes 35  -AV       Total Minutes 35  -AV                User Key  (r) = Recorded By, (t) = Taken By, (c) = Cosigned By      Initials Name Provider Type    William Gaspar OT Occupational Therapist                  Therapy Charges for Today       Code Description Service Date Service Provider Modifiers Qty    22463968735 HC OT EVAL LOW COMPLEXITY 3 7/11/2024 William Galeas OT GO 1                 William Galeas OT  7/11/2024

## 2024-07-11 NOTE — POST-PROCEDURE NOTE
Procedure: Left radial arterial line Placement    Indication: Hemodynamic Monitoring     Consent: Obtained      A time-out was completed verifying correct patient, procedure, site, positioning. Morgan´s test was performed to ensure adequate perfusion. The patient´s left wrist area was prepped and draped in sterile fashion. 1% Lidocaine was used to anesthetize the area.  A clean puncture was made into the left radial artery using needle and a guidewire passed through the puncture needle which was subsequently removed.  The radial catheter was threaded over the guide wire and the needle was removed with appropriate pulsatile blood return. The catheter was then sutured in place to the skin and a sterile dressing applied. Perfusion to the extremity distal to the point of catheter insertion was checked and found to be adequate. The patient tolerated the procedure well and there were no complications.    The procedure was done by NP student, Osiris Hendrickson under my direct supervision

## 2024-07-11 NOTE — THERAPY EVALUATION
"Acute Care - Speech Language Pathology   Swallow Initial Evaluation  [place]     Patient Name: Regulo Sepulveda  : 1965  MRN: [id number]  Today's Date: 2024               Admit Date: 7/10/2024    Visit Dx:     ICD-10-CM ICD-9-CM   1. Cerebrovascular accident (CVA), unspecified mechanism  I63.9 434.91   2. Oropharyngeal dysphagia  R13.12 787.22     Patient Active Problem List   Diagnosis    Major depressive disorder, recurrent episode, moderate    Chronic obstructive pulmonary disease    Diabetes mellitus    Primary hypertension    Hyperlipidemia    Hepatitis C    Cigarette nicotine dependence    BPH (benign prostatic hyperplasia)    GERD (gastroesophageal reflux disease)    B12 deficiency    LV (left ventricular) mural thrombus    Schizophrenia    PAF (paroxysmal atrial fibrillation)    Chronic HFrEF (heart failure with reduced ejection fraction)    Moderate malnutrition    Cardiomyopathy    TIA (transient ischemic attack)    Alcohol abuse    Bipolar 1 disorder    Chronic paranoid schizophrenia    Chronic post-traumatic stress disorder    Inhalant abuse    Type 2 diabetes mellitus without complication    Vitamin D deficiency    Acute exacerbation of CHF (congestive heart failure)    Generalized weakness    Severe malnutrition    Right sided weakness     Past Medical History:   Diagnosis Date    Anxiety     Arthritis     Asthma     Bipolar affective     Cardiac tamponade         CHF (congestive heart failure)     COPD (chronic obstructive pulmonary disease)     Coronary artery disease     Depression     Diabetes mellitus     Elevated cholesterol     Hypertension     Parkinson disease     Sleep apnea     Stroke     Pt stated it was \"about 2 months ago\" as of 6/15/24     Past Surgical History:   Procedure Laterality Date    CARDIAC CATHETERIZATION Right 2023    Procedure: Right and Left Heart Cath;  Surgeon: Ben Grant MD;  Location: [facility] CATH INVASIVE LOC[facility];  Service: Cardiovascular;  " Laterality: Right;    ENDOSCOPY      TESTICLE SURGERY Left     extraction             Inpatient Speech Pathology Dysphagia Evaluation        PAIN SCALE: None indicated.    PRECAUTIONS/CONTRAINDICATIONS: Standard    SUSPECTED ABUSE/NEGLECT/EXPLOITATION: None indicated.    SOCIAL/PSYCHOLOGICAL NEEDS/BARRIERS: None indicated.    PAST SOCIAL HISTORY: 58-year-old male lives at home    PRIOR FUNCTION: Not fully determined    PATIENT GOALS/EXPECTATIONS: Patient did not verbalize, was agreeing to taking water    HISTORY: 58-year-old male with the above diagnosis referred for speech therapy evaluation to assess for swallowing due to possible stroke.  No previous speech pathology services are noted this incident.  Patient is noted to be very low weight.    CURRENT DIET LEVEL:NPO    OBJECTIVE:    TEST ADMINISTERED: Clinical dysphagia evaluation    COGNITION/SAFETY AWARENESS: Impaired    BEHAVIORAL OBSERVATIONS: Alert and cooperative    ORAL MOTOR EXAM: Patient is edentulous.  Decreased general oral motor strength/range of motion is noted 4 -/5.    VOICE QUALITY: Adequate    REFLEX EXAM: Patient demonstrated weak cough    POSTURE: Assisted sitting upright in bed    FEEDING/SWALLOWING FUNCTION: Assessed with nectar liquid, thin liquids, puréed solids.  Patient refused crunchy solids.    CLINICAL OBSERVATIONS: Nectar liquid by spoon and by cup with delay, patient produced double swallow, vocal quality remaining clear to cervical auscultation.  Delayed coughing following cup drink.  Thin liquid by cup and by straw with delay, vocal quality remaining clear to cervical auscultation.  Belching was noted following liquids.  Purée solid with swallow completed with laryngeal elevation noted to palpation.  Patient refusing further trials.    DYSPHAGIA CRITERIA: Swallow delay, risk of aspiration, signs of possible esophageal dysphagia.    FUNCTIONAL ASSESSMENT INSTRUMENT: Patient currently scored a level 4 of 7 on Functional Communication  Measures for swallowing indicating a 40-59% limitation in function.    ASSESSMENT/ PLAN OF CARE:  Pt presents with limitations, noted below, that impede his ability to swallow safely and maintain nutrition. The skills of a therapist will be required to safely and effectively implement the following treatment plan to restore maximal level of function.    PROBLEMS:  1.  Swallow delay, risk of aspiration                       LTG 1: 30 days: Patient will tolerate least restrictive diet utilizing appropriate positioning and strategies with minimal assistance and without signs or symptoms of aspiration.                       STG 1a: 14 days: Patient will tolerate diet puréed solids and thin liquids with minimal assistance for strategies.                       STG 1b: 14 days: Patient will demonstrate adequate chewing and swallowing 8 of 10 trials of soft solids.                       STG 1c: 14 days:Patient/family education.                       TREATMENT: Dysphagia therapy to address swallow function through exercises and education of strategies.     FREQUENCY/DURATION: Twice daily 5 times per week    REHAB POTENTIAL:  Pt has fair rehab potential.  The following limitations may influence improvement/ length of tx: Medical status.    RECOMMENDATIONS:   1.   DIET: Purée solid, thin liquid    2.  POSITION: Positioning fully upright for all p.o. intake and 30 minutes following.    3.  COMPENSATORY STRATEGIES: Alternate small bites and small sips of solids and liquids at a slow rate.  Medications in applesauce or ice cream.    Pt/responsible party agrees with plan of care and has been informed of all alternatives, risks and benefits.                            Anticipated Discharge Disposition (SLP): skilled nursing facility (07/11/24 1145)                                                               EDUCATION  The patient has been educated in the following areas:   Modified Diet Instruction.                Time  Calculation:    Time Calculation- SLP       Row Name 07/11/24 0944             Time Calculation- SLP    SLP Start Time 0815  -TB      SLP Stop Time 0915  -TB      SLP Time Calculation (min) 60 min  -TB      SLP Received On 07/11/24  -TB         Untimed Charges    SLP Eval/Re-eval  ST Eval Oral Pharyng Swallow - 10784  -TB      [id number] Eval Oral Pharyng Swallow Minutes 60  -TB         Total Minutes    Untimed Charges Total Minutes 60  -TB       Total Minutes 60  -TB                User Key  (r) = Recorded By, (t) = Taken By, (c) = Cosigned By      Initials Name Provider Type    TB Shayla Miranda SLP Speech and Language Pathologist                    Therapy Charges for Today       Code Description Service Date Service Provider Modifiers Qty    [id number]  ST EVAL ORAL PHARYNG SWALLOW 4 7/11/2024 Shayla Miranda SLP GN 1                 DIA Lamb  7/11/2024

## 2024-07-11 NOTE — PLAN OF CARE
Goal Outcome Evaluation:  Plan of Care Reviewed With: patient      ASSESSMENT/ PLAN OF CARE:  Pt presents with limitations, noted below, that impede his ability to swallow safely and maintain nutrition. The skills of a therapist will be required to safely and effectively implement the following treatment plan to restore maximal level of function.    PROBLEMS:  1.  Swallow delay, risk of aspiration                                             TREATMENT: Dysphagia therapy to address swallow function through exercises and education of strategies.     FREQUENCY/DURATION: Twice daily 5 times per week    REHAB POTENTIAL:  Pt has fair rehab potential.  The following limitations may influence improvement/ length of tx: Medical status.    RECOMMENDATIONS:   1.   DIET: Purée solid, thin liquid    2.  POSITION: Positioning fully upright for all p.o. intake and 30 minutes following.    3.  COMPENSATORY STRATEGIES: Alternate small bites and small sips of solids and liquids at a slow rate.  Medications in applesauce or ice cream.            Anticipated Discharge Disposition (SLP): skilled nursing facility

## 2024-07-12 PROBLEM — N17.9 AKI (ACUTE KIDNEY INJURY): Status: ACTIVE | Noted: 2024-01-01

## 2024-07-12 PROBLEM — A41.9 SEPTIC SHOCK: Status: ACTIVE | Noted: 2024-01-01

## 2024-07-12 PROBLEM — R65.21 SEPTIC SHOCK: Status: ACTIVE | Noted: 2024-01-01

## 2024-07-12 NOTE — PROGRESS NOTES
"[facility] Clinical Pharmacy Services: Vancomycin Monitoring Note    Regulo Sepulveda is a 58 y.o. male who is on day  of pharmacy to dose vancomycin for Intra-Abdominal Infection and Sepsis.    Previous Vancomycin Dose: 1000 mg IV every  24  hours  Imaging Reviewed?: Yes  Updated Cultures and Sensitivities:   : Blood, left arm - NG x24 hours  : Blood, left arm - GNB  : MRSA PCR - negative  7/10: Urine culture - negative    Vitals/Labs  Ht: 185.4 cm (72.99\"); Wt: 65.1 kg (143 lb 8.3 oz)   Temp (24hrs), Av.3 °F (36.8 °C), Min:97.8 °F (36.6 °C), Max:98.9 °F (37.2 °C)   Estimated Creatinine Clearance: 28.4 mL/min (A) (by C-G formula based on SCr of 2.61 mg/dL (H)).     Results from last 7 days   Lab Units 24  0843 24  0310 24  0422 24  0421 07/10/24  1044   VANCOMYCIN RM mcg/mL 30.31  --   --   --   --    CREATININE mg/dL  --  2.61* 1.86*  --  1.56*   WBC 10*3/mm3  --  23.62*  --  18.31* 16.33*     Assessment/Plan    Current Vancomycin Dose: Pulse dosing. Will hold dose today. Recheck level in AM  Next Vanc Random ordered for  at 0600  We will continue to monitor patient changes and renal function     Thank you for involving pharmacy in this patient's care. Please contact pharmacy with any questions or concerns.    Sarah Agosto  Clinical Pharmacist    "

## 2024-07-12 NOTE — PLAN OF CARE
Goal Outcome Evaluation:  Plan of Care Reviewed With: patient, family        Progress: declining  Patient rapidly declining throughout the day. Family notified and currently at bedside. Lorraine, BERNARD, and family made the decision to make the patient a DNR/DNI and not to escalate care. Provider Dr. Franco at bedside with RN during goals of care discussion. Family is hopeful that more family will be able to come in and visit the patient before he passes but is realistic and okay with letting him go if his heart were to stop. Family asks to not add any meds, lab draws, procedures, or anything that would prolong care. Only wants to continue Norepinephrine @3 and Vasopressin @0.04.

## 2024-07-12 NOTE — PLAN OF CARE
Goal Outcome Evaluation:                 Pt remains confused and  weakness noted in RUE,dx with CVA on recent MRi , Hypotensive overnight , requiring an  increase levaphed , Slight EKG changes , positive  trop (149), decrease urinary output,

## 2024-07-12 NOTE — PROGRESS NOTES
[facility]     Progress Note    Patient Name: Regulo Sepulveda  : 1965  MRN: [id number]  Primary Care Physician:  Dickson Landry MD  Date of admission: 7/10/2024    Subjective   Subjective       Chief Complaint:   Patient is critically ill in ICU with acute CVA with right-sided weakness, acute encephalopathy, TAYLOR, septic shock, recent history of fungemia    Over last 24 hours,   Continued on pressors  Neurology following  Continues on Vfend  Continues on cefepime and vancomycin      This morning,  Increased requirement for levo, maxed on vaso  Was complaining of chest pain overnight.  Troponin with minimal elevation at 149  Currently on Vfend, cefepime and vancomycin  Upward trend and WBC at 23, upward trend and Pro-Topher at 4  1 out of 2 blood cultures with gram-negative bacilli identified as Bacteroides fragilis          Objective   Objective     Vitals:   Temp:  [97.8 °F (36.6 °C)-98.9 °F (37.2 °C)] 98.9 °F (37.2 °C)  Heart Rate:  [] 122  Resp:  [13-31] 29  BP: ()/(49-81) 100/81  Flow (L/min):  [4] 4  Physical Exam    Vital Signs Reviewed  General: Awake, with encephalopathy, answers some questions, moves around in bed  HEENT: Pupil equal and reactive to light  Chest: Air entry is diminished bilaterally  CV: First and second heart sounds heard  Abd: Nontender/nondistended, soft  EXT: Bilateral atrophy noted in lower extremities, rash on lower extremities noted, no pedal edema  Skin: See media pics       Result Review    Result Review:  I have personally reviewed the results from the time of this admission to 2024 11:58 EDT and agree with these findings:  []  Laboratory  []  Microbiology  []  Radiology  []  EKG/Telemetry   []  Cardiology/Vascular   []  Pathology  []  Old records  []  Other:  Most notable findings include:         Lab 24  0310 24  0422 24  0421 07/10/24  1044   WBC 23.62*  --  18.31* 16.33*   HEMOGLOBIN 11.1*  --  12.6* 10.0*   HEMATOCRIT 35.1*  --  43.1  32.1*   PLATELETS 459*  --  543* 404   SODIUM 140 134*  --  137   POTASSIUM 4.7 4.5  --  4.0   CHLORIDE 108* 102  --  105   CO2 18.1* 17.2*  --  24.2   BUN 64* 51*  --  46*   CREATININE 2.61* 1.86*  --  1.56*   GLUCOSE 120* 81  --  129*   CALCIUM 7.2* 7.7*  --  7.5*   PHOSPHORUS 4.7*  --   --   --    TOTAL PROTEIN  --  4.8*  --  4.4*   ALBUMIN  --  2.1*  --  2.0*   GLOBULIN  --  2.7  --  2.4         Microbiology Results (last 10 days)       Procedure Component Value - Date/Time    MRSA Screen, PCR (Inpatient) - Swab, Nares [[id number]]  (Normal) Collected: 07/11/24 0557    Lab Status: Final result Specimen: Swab from Nares Updated: 07/11/24 0824     MRSA PCR No MRSA Detected    Narrative:      The negative predictive value of this diagnostic test is high and should only be used to consider de-escalating anti-MRSA therapy. A positive result may indicate colonization with MRSA and must be correlated clinically.    Blood Culture - Blood, Arm, Left [[id number]]  (Normal) Collected: 07/11/24 0230    Lab Status: Preliminary result Specimen: Blood from Arm, Left Updated: 07/12/24 0246     Blood Culture No growth at 24 hours    Blood Culture - Blood, Arm, Left [[id number]]  (Abnormal) Collected: 07/11/24 0230    Lab Status: Preliminary result Specimen: Blood from Arm, Left Updated: 07/12/24 1118     Blood Culture Abnormal Stain     Gram Stain Anaerobic Bottle Gram negative bacilli    Urine Culture - Urine, Straight Cath [[id number]]  (Normal) Collected: 07/10/24 1058    Lab Status: Final result Specimen: Urine from Straight Cath Updated: 07/11/24 1538     Urine Culture No growth             Assessment & Plan   Assessment / Plan         Active Hospital Problems:  Active Hospital Problems    Diagnosis     **Right sided weakness      Plan  Acute CVA with right-sided weakness  Current CT head did not show any acute abnormalities  MRI of the brain with late acute to early subacute bilateral cerebral and cerebellar infarcts, likely  embolic in etiology  Teleneurology on board  Monitor closely     Acute encephalopathy probably toxic/metabolic  Likely secondary to acute CVA versus infective process  CT head unremarkable.  MRI abnormal with bilateral cerebral and cerebellar infarcts  Recommend to keep elevate head of bed at all times.  Aspiration/fall precaution     Acute kidney injury: Multifactorial in etiology  Avoid nephrotoxic.  Adjust medication to creatinine clearance  Daily BMP  On LR at 100     Sepsis/septic shock source likely urinary  On Levophed and vasopressin.  Titrate to maintain MAP 65 or greater  Vigileo in place.  CI 2.5-3.1  Previous echo with EF 20 to 25% in June 2024.  Repeat echo ordered today  Patient also has Candida fungemia on voriconazole  Blood culture positive for Bacteroides fragilis.  On cefepime and metronidazole.   MRSA PCR negative  Central line in place for pressors and arterial line for hemodynamic monitoring  Lactate cleared trend Pro-Topher       Recent history of Candida fungemia  On voriconazole 200 mg p.o. twice daily     Questionable pyelonephritis  Urinalysis is not impressive  Currently on cefepime/Vanco/Flagyl     Diabetes mellitus  Accu-Chek with sliding scale insulin coverage for now     Atrial fibrillation: Rate controlled  On apixaban at home.  On hold until MRI obtained.  Recommend resuming anticoagulation once okay by neurology     Generalized ileus  Has been without nausea, vomiting    FEN  Cortrak consult in place  Dietitian consult in place for tube feeds  Trend renal function electrolytes.  Replace as needed     DVT prophylaxis: SCDs.  GI prophylaxis: On famotidine 20 mg p.o. daily     Other comorbidities: CAD, CHF, bipolar disorder, leukocytoclastic vasculitis:        VTE Prophylaxis:  Pharmacologic & mechanical VTE prophylaxis orders are present.        CODE STATUS:   Code Status (Patient has no pulse and is not breathing): CPR (Attempt to Resuscitate)  Medical Interventions (Patient has pulse  or is breathing): Full Support        I, Melissa Desai PA-C, am scribing for & in the presence of Dr. TERESITA Franco on 07/12/2024, who was present on multidisciplinary rounds with me.  Part of this note may be an electronic transcription/translation of spoken language to printed  text using the Dragon Dictation System.      The patient is critically ill in the ICU with physician assistant. Multidisciplinary bedside critical care rounds were performed with nursing staff, respiratory therapy, pharmacy, nutritional services, social work. I have personally reviewed the chart, labs and any pertinent imaging available.  I have spent 39 minutes of critical care time, excluding procedures, in the care of this patient.       Our medical team led by me had an extensive discussion with patient's family including his daughters and led by his niece, Lorraine Steele who allows me to understand is patient's designated HCS.  Will explain the plan of care and patient's current clinical situation including the fact that he has severe diabetes quite extensive, in shock requiring maximal doses of 3 pressors, acute renal failure that likely would require dialysis.  They decided to make patient a DNI/DNR and asked to not escalate care.  They want the rest of the family to come and visit with the patient before they make further decision.  Patient is significantly altered and unable to make his own decision at this point.

## 2024-07-12 NOTE — PROGRESS NOTES
[facility]   Hospitalist Progress Note  Date: 2024  Patient Name: Regulo Sepulveda  : 1965  MRN: [id number]  Date of admission: 7/10/2024      Subjective   Subjective     Chief Complaint: Follow up for right sided weakness     Summary: 58 y.o. male with HFrEF, HTN, T2DM, Afib on Eliquis, HCV, bipolar/schizophrenia on Abilify who was discharged home from [facility] on 24 following evaluation for petechial rash.  He had skin biopsy with pathology consistent with leukocytoclastic vasculitis.  Discharged on prednisone taper.  Of note he was seen by infectious disease, workup included negative HIV, negative syphilis serologies, negative blood culture but had T2 Candida PCR positive and was placed on voriconazole to complete empiric course on 2024.  Presented from home after a fall with right-sided weakness and slurred speech.  Additional imaging no stroke, admitted for further workup    Interval Followup:   Vasopressor requirements increasing  Blood cultures growing Bacteroides  White count uptrending  Creatinine worsening  MRI brain reported multifocal infarcts    Objective   Objective     Vitals:   Temp:  [97.8 °F (36.6 °C)-98.9 °F (37.2 °C)] 98.6 °F (37 °C)  Heart Rate:  [] 140  Resp:  [15-31] 30  BP: ()/(39-81) 47/39  Flow (L/min):  [4] 4  Physical Exam    Constitutional: Thin male, appears ill   Respiratory: Labored breathing with conversation   Cardiovascular:   no edema   Gastrointestinal: soft, nondistended   Neurologic: Lethargic, unable to lift right upper extremity, speech dysarthric   Skin: Extremities cool    Result Review    Result Review:  I have personally reviewed the following over the last 24 hours (07:00 to 07:00) and agree with the following findings  [x]  Laboratory  CBC          7/10/2024    10:44 2024    04:21 2024    03:10   CBC   WBC 16.33  18.31  23.62    RBC 3.55  4.51  3.95    Hemoglobin 10.0  12.6  11.1    Hematocrit 32.1  43.1  35.1    MCV  90.4  95.6  88.9    MCH 28.2  27.9  28.1    MCHC 31.2  29.2  31.6    RDW 17.6  17.9  18.1    Platelets 404  543  459      CMP          7/10/2024    10:44 7/11/2024    04:22 7/12/2024    03:10   CMP   Glucose 129  81  120    BUN 46  51  64    Creatinine 1.56  1.86  2.61    EGFR 51.2  41.4  27.6    Sodium 137  134  140    Potassium 4.0  4.5  4.7    Chloride 105  102  108    Calcium 7.5  7.7  7.2    Total Protein 4.4  4.8     Albumin 2.0  2.1     Globulin 2.4  2.7     Total Bilirubin 0.3  0.4     Alkaline Phosphatase 151  150     AST (SGOT) 22  30     ALT (SGPT) 11  13     Albumin/Globulin Ratio 0.8  0.8     BUN/Creatinine Ratio 29.5  27.4  24.5    Anion Gap 7.8  14.8  13.9      [x]  Microbiology  Urine culture no growth  Blood culture pending  [x]  Radiology   [x]  EKG/Telemetry monitor personally reviewed and independently interpreted: NSR/sinus tachycardia  []  Cardiology/Vascular   []  Pathology  []  Old records  [x]  Other:    Intake/Output Summary (Last 24 hours) at 7/12/2024 1655  Last data filed at 7/12/2024 1619  Gross per 24 hour   Intake 3583.4 ml   Output 187 ml   Net 3396.4 ml         Assessment & Plan   Assessment / Plan     Assessment/Plan:  Septic shock  Bacteroides bacteremia  Recent T2 Candida PCR positive (? Candida fungemia) at [facility]  Multifocal strokes with right-sided weakness; likely cardioembolic  Possible left ventricular apex thrombus. Seen on TTE 6/17/24  Elevated lactate, not POA, clinically signficant  Toxic/metabolic encephalopathy  Right-sided weakness  Acute kidney injury (baseline Cr ~1.1)  Leukocytoclastic vasculitis   History of atrial fibrillation.  Currently in NSR  Type 2 diabetes  Essential hypertension  History of lacunar infarct  History of hepatitis C with recent viral load undetectable            Continue ICU level care.  Appreciate intensivist assistance  Teleneurology consulted given findings of multifocal stroke, appreciate recommendations  Recommended continue  anticoagulation.  Eliquis on hold; heparin drip has been started  Aardiology consulted and TTE ordered, appreciate assistance  Escalate vasopressors to maintain MAP >65.  Likely will need inotrope support soon  Continue IV vancomycin/cefepime/Flagyl.  Pharmacy to renally dose antibiotics  Continue p.o. voriconazole 200 mg 2 times daily (previous stop date 7/13/24)  Consult nephrology given oliguric TAYLOR, appreciate recommendations  Consult palliative care.  Patient requesting CODE STATUS changed to DNR  VTE ppx: heparin drip  GI ppx: IV pepcid    Discussed with Dr Rudy Larose with Dr Gomez  Discussed plan with ICU team.     VTE Prophylaxis:  Pharmacologic & mechanical VTE prophylaxis orders are present.      CODE STATUS:   Code Status (Patient has no pulse and is not breathing): No CPR (Do Not Attempt to Resuscitate)  Medical Interventions (Patient has pulse or is breathing): Full Support    45 minutes of critical care provided including direct patient contact, review of chart, labs, pertinent imaging, high complexity decision making and discussion with physicians, staff, patient/family    Electronically signed by Mario Wilson DO, 07/12/24, 5:05 PM EDT.

## 2024-07-12 NOTE — CASE MANAGEMENT/SOCIAL WORK
Discharge Planning Assessment   [place]     Patient Name: Regulo Sepulveda  MRN: [id number]  Today's Date: 7/12/2024    Admit Date: 7/10/2024    Plan: Pt lives with roommate. PCP: PATRICIA Landry, Pt usually indpendent. BERNARD Lorraine Steele denies fin. stressors with affording medications. Lorraine states that sister Aurora is his payee, she has control of his money, he would like to get his payee switched. SW explained possible ways to start this process. Pt has been in and out of the hospital for the last month. Pt is a re-admission. SW will continue to follow for needs.   Discharge Needs Assessment       Row Name 07/12/24 1309       Living Environment    People in Home other (see comments)    Unique Family Situation Pt has a roommate.    Current Living Arrangements home    Duration at Residence 2020    In the past 12 months has the electric, gas, oil, or water company threatened to shut off services in your home? No    Primary Care Provided by self    Provides Primary Care For no one    Family Caregiver if Needed other (see comments);other relative(s)    Quality of Family Relationships helpful;involved    Able to Return to Prior Arrangements yes       Resource/Environmental Concerns    Resource/Environmental Concerns financial    Financial Concerns rent or mortgage, unable to afford;food, unable to afford    Transportation Concerns rides, unreliable from others       Transportation Needs    In the past 12 months, has lack of transportation kept you from medical appointments or from getting medications? yes    In the past 12 months, has lack of transportation kept you from meetings, work, or from getting things needed for daily living? Yes       Food Insecurity    Within the past 12 months, you worried that your food would run out before you got the money to buy more. Sometimes    Within the past 12 months, the food you bought just didn't last and you didn't have money to get more. Sometimes       Transition Planning     Patient/Family Anticipates Transition to home    Patient/Family Anticipated Services at Transition none    Transportation Anticipated family or friend will provide       Discharge Needs Assessment    Equipment Currently Used at Home none    Anticipated Changes Related to Illness none    Equipment Needed After Discharge none    Discharge Coordination/Progress Pt lives with roommate. PCP: PATRICIA Landry, Pt usually indpendent.  BERNARD Steele denies fin. stressors with affording medications. Lorraine states that sister Aurora is his payee, she has control of his money, he would like to get his payee switched. SW explained possible ways to start this process. Pt has been in and out of the hospital for the last month. Pt is a re-admission. SW will continue to follow for needs.                   Discharge Plan       Row Name 07/12/24 1315       Plan    Plan Pt lives with roommate. PCP: PATRICIA Landry, Pt usually indpendent. BERNARD Steele denies fin. stressors with affording medications. Lorraine states that sister Aurora is his payee, she has control of his money, he would like to get his payee switched. MAYRA explained possible ways to start this process. Pt has been in and out of the hospital for the last month. Pt is a re-admission. SW will continue to follow for needs.      Row Name 07/12/24 1252       Plan    Plan Palliative care team spoke with Lorraine Steele (HCS). She would like rehab referrals made to [facility].  has called and left voicemail for Lorraine. Local referrals have been made.                  Continued Care and Services - Admitted Since 7/10/2024       Destination       Service Provider Request Status Selected Services Address Phone Fax Patient Preferred    [facility] Considering  Need bed availability N/A [address] [facility] [address] [phone] --    [facility] Considering  Need bed availability N/A [address] [place] [id number] [phone] --    [facility] Considering  Need bed availability N/A [address] [place] [address] [phone] --    [facility] - Request Sent N/A [address] [place] [address] [phone] --    [facility] Pending - Request Sent N/A [address] [address] [phone] --    [facility] Pending - Request Sent N/A [address] [place] [id number] [phone] --                  Selected Continued Care - Episodes Includes continued care and service providers with selected services from the active episodes listed below      Ambulatory Social Work Case Management Episode start date: 5/22/2024   There are no active outsourced providers for this episode.             Chronic Care Management Episode start date: 5/15/2024 (Paused)   There are no active outsourced providers for this episode.                    Demographic Summary       Row Name 07/12/24 1305       General Information    Arrived From emergency department    Referral Source admission list    Reason for Consult discharge planning    Preferred Language English       Contact Information    Permission Granted to Share Info With lay caregiver    Contact Information Obtained for lay caregiver       Lay Caregiver Information    Name, Lay Caregiver Lorraine Steele    Phone, Lay Caregiver [phone]                   Functional Status       Row Name 07/12/24 1307       Employment/    Employment Status unemployed      Row Name 07/12/24 1306       Functional Status    Usual Activity Tolerance moderate    Current Activity Tolerance moderate       Physical Activity    On average, how many days per week do you engage in moderate to strenuous exercise (like a brisk walk)? 0 days    On average, how many minutes do you engage in  exercise at this level? 0 min    Number of minutes of exercise per week 0       Assessment of Health Literacy    How often do you have someone help you read hospital materials? Often    How often do you have problems learning about your medical condition because of difficulty understanding written information? Occasionally    How often do you have a problem understanding what is told to you about your medical condition? Occasionally    How confident are you filling out medical forms by yourself? A little bit    Health Literacy Moderate       Functional Status, IADL    Medications independent    Meal Preparation independent    Housekeeping independent    Laundry independent    Shopping independent    IADL Comments Pt has a roommate that he lives with,.       Mental Status    General Appearance WDL WDL       Mental Status Summary    Recent Changes in Mental Status/Cognitive Functioning no changes                   Psychosocial    No documentation.                  Abuse/Neglect    No documentation.                  Legal       Row Name 07/12/24 1893       Financial Resource Strain    How hard is it for you to pay for the very basics like food, housing, medical care, and heating? Somewhat       Financial/Legal    Source of Income social security;disability    Application for Public Assistance applied    Finance Comments Pt leonarda has SNAP benefits.       Legal    Criminal Activity/Legal Involvement none                   Substance Abuse    No documentation.                  Patient Forms    No documentation.                     Hailey Jade

## 2024-07-12 NOTE — PROGRESS NOTES
[facility]  [facility] Tele[facility]urology Consult Services    Routine Consult Follow-Up    Patient Name:   Regulo Sepulveda  YOB: 1965  Identification Number:   MRN - [id number]  Date of Service:   07/12/2024 11:01:58    Diagnosis        G93.41 - Encephalopathy Metabolic        I63.89 - Cerebrovascular accident (CVA) due to other mechanism ([facility])    Impression  This is a 57 yo M w a PMHX of DM, HTN, HLD, on apixaban,Parkinson disease, bipolar disorder who presents to the ED with the acute onset of fall and AMS.    He presents with right-sided weakness. Brain MRI shows multifocal infarcts involving posterior and anterior circulation. This is concerning for a cardioembolic process. Conversely, he was also recently diagnosed with leukocytoclastic vasculitis which can occasionally have CNS involvement and manifest with multifocal strokes. As result, would likely repeat another MRI within a week to evaluate for changes that would be suggestive of vasculitis such as progression or edema etc.    He also has low EF and there was concerns for LV thrombus on his last echocardiogram. There is concern for bacteremia which could predispose to endocarditis.      Recommendations  Hold statins while on voriconazole  Resume Eliquis 5 mg twice daily  TTE pending to evaluate for LV thrombus  Low threshold to perform ZHANG to evaluate for endocarditis due to concern for bacteremia and sepsis  Telemetry  Blood pressure goal normotension  PT, OT, speech therapy  Continue treatment of underlying infection    Our recommendations are outlined below    Anticoagulant Medication :  Eliquis 5mg bid    Nursing Recommendations :  Neuro checks q4 hrs x 24 hrs and then per shiftHead of bed 30 degreesContinue with Telemetry    Consultations :  Recommend Speech therapy if failed dysphagia screenPhysical therapy/Occupational therapy    DVT Prophylaxis :  Choice of Primary Team    Disposition :  Neurology will  follow    Subjective  Patient was brought by EMS for symptoms of AMS, possible R sided weakness.  This is a 59 yo M w a PMHX of DM, HTN, HLD, on apixaban, who presents to the ED with the acute onset of fall and AMS last night. Possibly R sided weakness. He was last known to be at baseline at an unclear time, although was this was at 7PM when he sustained a fall. On arrival to the ED his symptoms remain persistent. BP was found to be 108 systolic. He was taken immediately for CTH and further evaluation.  Decision on whether or not to give pharmacological thrombolysis was made based on indications, contraindications, and patient's disability status and preference.    Hospital Course  7/12- brain mri with bilateral strokes in anterior and posterior circulation. Moved to the ICU. Cr 2.61 up from 0.89, trop uptrending.  7/11- no acute changesHe has had prior admissions due to right-sided weakness.    Imaging  CT perfusion no perfusion deficit  CT angiogram no LVO or high-grade stenosis  Head CT: Remote infarct in the periventricular white matter along the inferior left frontal horn lateral  ventricle. Remote infarct in the left thalamus  Brain mri:  1.Late acute to early subacute bilateral cerebral and cerebellar infarcts, likely embolic in etiology.    Labs  Lactate 3.4, procalcitonin 1.8, WBC 18.3, blood cultures pending, CT chest abdomen pelvis with patchy enhancement of the kidneys concerning for pyelonephritis  cr 2.61       Examination  BP(101/66), Pulse(106), Temp(97.8), Resp(24),  1A: Level of Consciousness - Alert; keenly responsive + 0  1B: Ask Month and Age - Could Not Answer Either Question Correctly + 2  1C: Blink Eyes & Squeeze Hands - Performs Both Tasks + 0  2: Test Horizontal Extraocular Movements - Normal + 0  3: Test Visual Fields - No Visual Loss + 0  4: Test Facial Palsy (Use Grimace if Obtunded) - Normal symmetry + 0  5A: Test Left Arm Motor Drift - No Drift for 10 Seconds + 0  5B: Test Right Arm  Motor Drift - No Movement + 4  6A: Test Left Leg Motor Drift - No Drift for 5 Seconds + 0  6B: Test Right Leg Motor Drift - No Effort Against Gravity + 3  7: Test Limb Ataxia (FNF/Heel-Shin) - Does Not Understand + 0  8: Test Sensation - No Response and Quadriplegic + 2  9: Test Language/Aphasia - Mild-Moderate Aphasia: Some Obvious Changes, Without Significant Limitation + 1  10: Test Dysarthria - Severe Dysarthria: Unintelligble Slurring or Out of Proportion to Aphasia + 2  11: Test Extinction/Inattention - No abnormality + 0    NIHSS Score: 14  NIHSS Free Text : sensation unreliable           This consult was conducted in real time using interactive audio and video technology. Patient was informed of the technology being used for this visit and agreed to proceed. Patient located in hospital and provider located at home/office setting.    Telehealth Neurology consultation was provided. I spent 30 minutes providing telehealth care. This includes time spent for face to face visit via telemedicine, review of medical records, imaging studies and discussion of findings with providers, the patient and/or family.      Dr Cayla Corbett      [facility]  For Inpatient follow-up with [facility] physician please call [facility] [phone]. This is not an outpatient service. Post hospital discharge, please contact hospital directly.    Please do not communicate with [facility] physicians via secure chat. If you have any questions, Please contact [facility].  Please call or reconsult our service if there are any clinical or diagnostic changes.

## 2024-07-12 NOTE — CONSULTS
"  Referring Provider: Mario Wilson DO    Reason for Consultation: Stroke and LV thrombus      Patient Care Team:  Dickson Landry MD as PCP - General (Internal Medicine)  Kalia Corbin MD as Consulting Physician (Urology)  Alesha Cheung, RN as Ambulatory  ([facility])  Delmi Motley MSW as  ([facility]) ([facility])      SUBJECTIVE     Chief Complaint: Fall, stroke, altered mental state    History of present illness:  Regulo Sepulveda is a 58 y.o. male with nonischemic dilated cardiomyopathy, LV thrombus, atrial fibrillation on anticoagulation, methamphetamine use, hypertension, hyperlipidemia, bipolar disorder who presented to the hospital with fall, altered mental state.  MRI shows multifocal infarcts involving posterior and anterior circulation.  Findings are consistent with cardioembolic process.  He also has multiple other risk factors including recent Candida fungemia, leukocytoclastic vasculitis, atrial fibrillation.  Patient is currently critically ill on multiple pressors hypotensive and tachycardic.      Review of systems:  Unable to obtain    Personal History:      Past Medical History:   Diagnosis Date    Anxiety     Arthritis     Asthma     Bipolar affective     Cardiac tamponade     2023    CHF (congestive heart failure)     COPD (chronic obstructive pulmonary disease)     Coronary artery disease     Depression     Diabetes mellitus     Elevated cholesterol     Hypertension     Parkinson disease     Sleep apnea     Stroke     Pt stated it was \"about 2 months ago\" as of 6/15/24       Past Surgical History:   Procedure Laterality Date    CARDIAC CATHETERIZATION Right 09/17/2023    Procedure: Right and Left Heart Cath;  Surgeon: Ben Grant MD;  Location: [facility] CATH INVASIVE LOCATION;  Service: Cardiovascular;  Laterality: Right;    ENDOSCOPY      TESTICLE SURGERY Left     extraction       Family History   Problem Relation Age of Onset    Diabetes Mother     " Depression Sister     Restless legs syndrome Sister     Insomnia Sister     Sleep walking Sister     Sleep disorder Sister         Night Terrors    Depression Brother        Social History     Tobacco Use    Smoking status: Every Day     Current packs/day: 0.50     Average packs/day: 0.5 packs/day for 50.4 years (25.2 ttl pk-yrs)     Types: Cigarettes     Start date: 1/1/1974     Last attempt to quit: 11/2/2023    Smokeless tobacco: Never    Tobacco comments:     At least 10 cigarettes a day   Vaping Use    Vaping status: Former    Substances: Nicotine   Substance Use Topics    Alcohol use: Not Currently    Drug use: Not Currently     Types: Methamphetamines, Marijuana        Home meds:  Prior to Admission medications    Medication Sig Start Date End Date Taking? Authorizing Provider   Abilify Maintena 300 MG Suspension Reconstituted ER IM injection ER Inject 300 mg into the appropriate muscle as directed by prescriber Every 30 (Thirty) Days. 5/25/24   Yuliya Blanchard MD   albuterol sulfate  (90 Base) MCG/ACT inhaler Inhale 2 puffs Every 4 (Four) Hours As Needed for Wheezing or Shortness of Air. Indications: Chronic Obstructive Lung Disease 4/16/24   Beto Gillette MD   amiodarone (PACERONE) 200 MG tablet Take 1 tablet by mouth Daily. 4/22/24   Lin Tamez APRN   atorvastatin (LIPITOR) 40 MG tablet Take 1 tablet by mouth every night at bedtime. 1/8/24   Dickson Landry MD   carvedilol (COREG) 12.5 MG tablet Take 2 tablets by mouth 2 (Two) Times a Day. 5/7/24   Beto Gillette MD   dapagliflozin (Farxiga) 5 MG tablet tablet Take 1 tablet by mouth Daily.    Yuliya Blanchard MD   Eliquis 5 MG tablet tablet Take 1 tablet by mouth Every 12 (Twelve) Hours. 7/2/24   Yuliya Blanchard MD   ferrous gluconate (FERGON) 324 MG tablet Take 1 tablet by mouth Daily With Breakfast. 5/15/24   Dickson Landry MD   furosemide (LASIX) 40 MG tablet Take 1 tablet by mouth Daily.    Yuliya Blanchard MD    metFORMIN (GLUCOPHAGE) 1000 MG tablet Take 1 tablet by mouth 2 (Two) Times a Day With Meals.    Yuliya Blanchard MD   mirtazapine (REMERON) 15 MG tablet Take 1 tablet by mouth Every Night. 4/15/24   Dickson Landry MD   nicotine (NICODERM CQ) 7 MG/24HR patch Place 1 patch on the skin as directed by provider Daily. 6/4/24   Yuliya Blanchard MD   pantoprazole (PROTONIX) 40 MG EC tablet Take 1 tablet by mouth Daily.    Yuliya Blanchard MD   sacubitril-valsartan (ENTRESTO) 49-51 MG tablet Take 1 tablet by mouth 2 (Two) Times a Day.    Yuliya Blanchard MD   spironolactone (ALDACTONE) 25 MG tablet Take 1 tablet by mouth Daily. 4/16/24   Beto Gillette MD   tamsulosin (FLOMAX) 0.4 MG capsule 24 hr capsule Take 1 capsule by mouth Daily. 6/4/24   Yuliya Blanchard MD   traMADol (ULTRAM) 50 MG tablet Take 1 tablet by mouth Every 6 (Six) Hours As Needed for Moderate Pain. 6/26/24   Alex Bridges DO   venlafaxine XR (EFFEXOR-XR) 37.5 MG 24 hr capsule Take 1 capsule by mouth Daily.    Yuliya Blanchard MD       Allergies:     Penicillins    Scheduled Meds:apixaban, 5 mg, Oral, Q12H  cefepime, 1,000 mg, Intravenous, Q12H  famotidine, 20 mg, Intravenous, Daily  insulin regular, 2-7 Units, Subcutaneous, Q6H  metroNIDAZOLE, 500 mg, Oral, Q8H  mirtazapine, 15 mg, Oral, Nightly  mupirocin, 1 Application, Topical, 2 times per day  sodium chloride, 10 mL, Intravenous, Q12H  vancomycin, 1,000 mg, Intravenous, Q24H  venlafaxine XR, 37.5 mg, Oral, Daily  voriconazole, 200 mg, Oral, Q12H      Continuous Infusions:lactated ringers, 100 mL/hr, Last Rate: 100 mL/hr (07/12/24 1012)  norepinephrine, 0.02-3 mcg/kg/min  norepinephrine, 0.02-3 mcg/kg/min, Last Rate: 0.66 mcg/kg/min (07/12/24 1019)  Pharmacy to Dose Cefepime,   Pharmacy to dose vancomycin,   vasopressin, 0.04 Units/min, Last Rate: 0.04 Units/min (07/12/24 9953)      PRN Meds:  senna-docusate sodium **AND** polyethylene glycol **AND** bisacodyl **AND**  "bisacodyl    dextrose    dextrose    glucagon (human recombinant)    ondansetron    Pharmacy to Dose Cefepime    Pharmacy to dose vancomycin    sodium chloride    sodium chloride    sodium chloride      OBJECTIVE    Vital Signs  Vitals:    07/12/24 0830 07/12/24 0845 07/12/24 0855 07/12/24 0900   BP:       Pulse: 112 114  112   Resp:    27   Temp:   98.9 °F (37.2 °C)    TempSrc:   Rectal    SpO2:    99%   Weight:       Height:           Flowsheet Rows      Flowsheet Row First Filed Value   Admission Height 185.4 cm (72.99\") Documented at 07/10/2024 1426   Admission Weight 61.8 kg (136 lb 3.9 oz) Documented at 07/10/2024 0945              Intake/Output Summary (Last 24 hours) at 7/12/2024 1032  Last data filed at 7/12/2024 0600  Gross per 24 hour   Intake 2493.4 ml   Output 161 ml   Net 2332.4 ml        Telemetry: Sinus rhythm/sinus tachycardia    Physical Exam:  The patient is drowsy and ill-appearing  Vital signs as noted above.  Head and neck revealed no carotid bruits or jugular venous distention.  No thyromegaly or lymphadenopathy is present  Lungs clear.  No wheezing.  Breath sounds are normal bilaterally.  Heart: Tachycardic. No murmur.  No precordial rub is present.  No gallop is present.  Abdomen: Soft and nontender.  No organomegaly is present.  Extremities with good peripheral pulses without any pedal edema.  Skin: Warm and dry.  Musculoskeletal system is grossly normal.  CNS unable to accurately assess      Results Review:  I have personally reviewed the results from the time of this admission to 7/12/2024 10:32 EDT and agree with these findings:  []  Laboratory  []  Microbiology  []  Radiology  []  EKG/Telemetry   []  Cardiology/Vascular   []  Pathology  []  Old records  []  Other:    Most notable findings include:     Lab Results (last 24 hours)       Procedure Component Value Units Date/Time    High Sensitivity Troponin T 2Hr [[id number]]  (Abnormal) Collected: 07/12/24 0843    Specimen: Blood Updated: " 07/12/24 0932     HS Troponin T 180 ng/L      Troponin T Delta 11 ng/L     Narrative:      High Sensitive Troponin T Reference Range:  <14.0 ng/L- Negative Female for AMI  <22.0 ng/L- Negative Male for AMI  >=14 - Abnormal Female indicating possible myocardial injury.  >=22 - Abnormal Male indicating possible myocardial injury.   Clinicians would have to utilize clinical acumen, EKG, Troponin, and serial changes to determine if it is an Acute Myocardial Infarction or myocardial injury due to an underlying chronic condition.         Blood Gas, Arterial - [[id number]]  (Abnormal) Collected: 07/12/24 0747    Specimen: Arterial Blood Updated: 07/12/24 0752     Site Arterial Line     Morgan's Test N/A     pH, Arterial 7.408 pH units      pCO2, Arterial 21.3 mm Hg      pO2, Arterial 88.8 mm Hg      HCO3, Arterial 13.4 mmol/L      Base Excess, Arterial -9.0 mmol/L      Comment: Serial Number: [id number]:  [id number]        O2 Saturation, Arterial 97.2 %      Hemoglobin, Blood Gas 13.8 g/dL      Hematocrit, Blood Gas 41.0 %      Barometric Pressure for Blood Gas 743.4000 mmHg      Modality Room Air     Notified Who Malinda Callaway     Read Back Yes     Notified Time --     Hemodilution No    High Sensitivity Troponin T [[id number]]  (Abnormal) Collected: 07/12/24 0550    Specimen: Blood Updated: 07/12/24 0649     HS Troponin T 169 ng/L     Narrative:      High Sensitive Troponin T Reference Range:  <14.0 ng/L- Negative Female for AMI  <22.0 ng/L- Negative Male for AMI  >=14 - Abnormal Female indicating possible myocardial injury.  >=22 - Abnormal Male indicating possible myocardial injury.   Clinicians would have to utilize clinical acumen, EKG, Troponin, and serial changes to determine if it is an Acute Myocardial Infarction or myocardial injury due to an underlying chronic condition.         POC Glucose Once [[id number]]  (Abnormal) Collected: 07/12/24 0548    Specimen: Blood Updated: 07/12/24 0554     Glucose 119 mg/dL       "Comment: Serial Number: [id number]:  [id number]       Lactic Acid, Plasma [[id number]]  (Normal) Collected: 07/12/24 0454    Specimen: Blood Updated: 07/12/24 0516     Lactate 1.9 mmol/L     High Sensitivity Troponin T [[id number]]  (Abnormal) Collected: 07/12/24 0310    Specimen: Blood Updated: 07/12/24 0505     HS Troponin T 149 ng/L     Narrative:      High Sensitive Troponin T Reference Range:  <14.0 ng/L- Negative Female for AMI  <22.0 ng/L- Negative Male for AMI  >=14 - Abnormal Female indicating possible myocardial injury.  >=22 - Abnormal Male indicating possible myocardial injury.   Clinicians would have to utilize clinical acumen, EKG, Troponin, and serial changes to determine if it is an Acute Myocardial Infarction or myocardial injury due to an underlying chronic condition.         Procalcitonin [[id number]]  (Abnormal) Collected: 07/12/24 0310    Specimen: Blood Updated: 07/12/24 0455     Procalcitonin 4.14 ng/mL     Narrative:      As a Marker for Sepsis (Non-Neonates):    1. <0.5 ng/mL represents a low risk of severe sepsis and/or septic shock.  2. >2 ng/mL represents a high risk of severe sepsis and/or septic shock.    As a Marker for Lower Respiratory Tract Infections that require antibiotic therapy:    PCT on Admission    Antibiotic Therapy       6-12 Hrs later    >0.5                Strongly Recommended  >0.25 - <0.5        Recommended  0.1 - 0.25          Discouraged              Remeasure/reassess PCT  <0.1                Strongly Discouraged     Remeasure/reassess PCT    As 28 day mortality risk marker: \"Change in Procalcitonin Result\" (>80% or <=80%) if Day 0 (or Day 1) and Day 4 values are available. Refer to http://www.[employer]-pct-calculator.com    Change in PCT <=80%  A decrease of PCT levels below or equal to 80% defines a positive change in PCT test result representing a higher risk for 28-day all-cause mortality of patients diagnosed with severe sepsis for septic shock.    Change in " PCT >80%  A decrease of PCT levels of more than 80% defines a negative change in PCT result representing a lower risk for 28-day all-cause mortality of patients diagnosed with severe sepsis or septic shock.    This test is Prognostic not Diagnostic, if elevated correlate with clinical findings before administering antibiotic treatment.        Basic Metabolic Panel [[id number]]  (Abnormal) Collected: 07/12/24 0310    Specimen: Blood Updated: 07/12/24 0359     Glucose 120 mg/dL      BUN 64 mg/dL      Creatinine 2.61 mg/dL      Sodium 140 mmol/L      Potassium 4.7 mmol/L      Chloride 108 mmol/L      CO2 18.1 mmol/L      Calcium 7.2 mg/dL      BUN/Creatinine Ratio 24.5     Anion Gap 13.9 mmol/L      eGFR 27.6 mL/min/1.73     Narrative:      GFR Normal >60  Chronic Kidney Disease <60  Kidney Failure <15      Magnesium [[id number]]  (Normal) Collected: 07/12/24 0310    Specimen: Blood Updated: 07/12/24 0359     Magnesium 1.8 mg/dL     Phosphorus [[id number]]  (Abnormal) Collected: 07/12/24 0310    Specimen: Blood Updated: 07/12/24 0359     Phosphorus 4.7 mg/dL     CBC (No Diff) [[id number]]  (Abnormal) Collected: 07/12/24 0310    Specimen: Blood Updated: 07/12/24 0346     WBC 23.62 10*3/mm3      RBC 3.95 10*6/mm3      Hemoglobin 11.1 g/dL      Hematocrit 35.1 %      MCV 88.9 fL      MCH 28.1 pg      MCHC 31.6 g/dL      RDW 18.1 %      RDW-SD 59.2 fl      MPV 10.1 fL      Platelets 459 10*3/mm3     Blood Culture - Blood, Arm, Left [[id number]]  (Normal) Collected: 07/11/24 0230    Specimen: Blood from Arm, Left Updated: 07/12/24 0246     Blood Culture No growth at 24 hours    Blood Culture - Blood, Arm, Left [[id number]]  (Normal) Collected: 07/11/24 0230    Specimen: Blood from Arm, Left Updated: 07/12/24 0246     Blood Culture No growth at 24 hours    POC Glucose Once [[id number]]  (Abnormal) Collected: 07/11/24 1742    Specimen: Blood Updated: 07/12/24 0038     Glucose 116 mg/dL      Comment: Serial Number:  [id number]:  [id number]       POC Glucose Once [[id number]]  (Normal) Collected: 07/11/24 0547    Specimen: Blood Updated: 07/12/24 0038     Glucose 76 mg/dL      Comment: Serial Number: [id number]:  [id number]       POC Glucose Once [[id number]]  (Normal) Collected: 07/11/24 2324    Specimen: Blood Updated: 07/11/24 2343     Glucose 80 mg/dL      Comment: Serial Number: [id number]:  [id number]       POC Glucose Once [[id number]]  (Abnormal) Collected: 07/11/24 1738    Specimen: Blood Updated: 07/11/24 1740     Glucose 23 mg/dL      Comment: Serial Number: [id number]:  [id number]       Urine Culture - Urine, Straight Cath [[id number]]  (Normal) Collected: 07/10/24 1058    Specimen: Urine from Straight Cath Updated: 07/11/24 1538     Urine Culture No growth    Blood Gas, Arterial - [[id number]]  (Abnormal) Collected: 07/11/24 1238    Specimen: Arterial Blood Updated: 07/11/24 1246     Site Arterial Line     Morgan's Test N/A     pH, Arterial 7.421 pH units      pCO2, Arterial 29.3 mm Hg      pO2, Arterial 97.8 mm Hg      HCO3, Arterial 19.0 mmol/L      Base Excess, Arterial -4.3 mmol/L      Comment: Serial Number: [id number]:  [id number]        O2 Saturation, Arterial 97.9 %      Hemoglobin, Blood Gas 12.4 g/dL      Hematocrit, Blood Gas 37.0 %      Barometric Pressure for Blood Gas 744.0000 mmHg      Modality Room Air     FIO2 21 %      Hemodilution No     PO2/FIO2 466    POC Glucose Once [[id number]]  (Normal) Collected: 07/11/24 1238    Specimen: Arterial Blood Updated: 07/11/24 1241     Glucose 77 mg/dL      Comment: Serial Number: [id number]:  [id number]       POC Lactate [[id number]]  (Normal) Collected: 07/11/24 1238    Specimen: Arterial Blood Updated: 07/11/24 1241     Lactate 1.2 mmol/L      Comment: Serial Number: [id number]:  [id number]       POC Electrolyte Panel [[id number]]  (Abnormal) Collected: 07/11/24 1238    Specimen: Arterial Blood Updated: 07/11/24 1241     Sodium 140 mmol/L      POC  Potassium 4.0 mmol/L      Chloride 109 mmol/L      Ionized Calcium 1.02 mmol/L      Comment: Serial Number: [id number]:  [id number]       High Sensitivity Troponin T [[id number]]  (Abnormal) Collected: 07/11/24 1014    Specimen: Blood from Arm, Left Updated: 07/11/24 1111     HS Troponin T 40 ng/L     Narrative:      High Sensitive Troponin T Reference Range:  <14.0 ng/L- Negative Female for AMI  <22.0 ng/L- Negative Male for AMI  >=14 - Abnormal Female indicating possible myocardial injury.  >=22 - Abnormal Male indicating possible myocardial injury.   Clinicians would have to utilize clinical acumen, EKG, Troponin, and serial changes to determine if it is an Acute Myocardial Infarction or myocardial injury due to an underlying chronic condition.                 Imaging Results (Last 24 Hours)       Procedure Component Value Units Date/Time    XR Chest 1 View [[id number]] Collected: 07/12/24 0514     Updated: 07/12/24 0517    Narrative:      XR CHEST 1 VW    Date of Exam: 7/12/2024 5:08 AM EDT    Indication: chest pain    Comparison: 7/11/2024    Findings:  Right IJ line tip at the right atrial level. Heart size is normal. There is some mild left basilar atelectasis. Lungs are otherwise clear. No pneumothorax.      Impression:      Line tip at the right atrial level. Mild left basilar atelectasis. Otherwise no active disease.      Electronically Signed: Regulo Cornejo MD    7/12/2024 5:15 AM EDT    Workstation ID: [id number]    MRI Brain Without Contrast [[id number]] Collected: 07/11/24 1551     Updated: 07/11/24 1558    Narrative:      MRI BRAIN WO CONTRAST    Date of Exam: 7/11/2024 3:15 PM EDT    Indication: CVA.     Comparison: 4/9/2024, CT perfusion 7/10/2021    Technique:  Routine multiplanar/multisequence sequence images of the brain were obtained without contrast administration.      Findings:  There is motion artifact which limits image quality on multiple sequences.    There are areas of restricted diffusion  throughout both cerebral and cerebellar hemispheres but most pronounced in the left parietal and occipital lobes with some associated FLAIR signal abnormality, consistent with late acute to early subacute   infarctions. The diffuse nature suggests showered embolic phenomenon.    There is similar mild to moderate nonspecific white matter signal abnormality most frequently seen in the setting of microvascular ischemic disease. There is no evidence of acute or chronic intracranial hemorrhage. No mass effect or midline shift. No   abnormal extra-axial collections.     The basal ganglia, brainstem and cerebellum appear within normal limits. Midline structures are intact. No significant cortical abnormality is identified.    Calvarial and superficial soft tissue signal is within normal limits. Orbits appear unremarkable. The paranasal sinuses and the mastoid air cells appear well aerated.         Impression:      Impression:  1.Late acute to early subacute bilateral cerebral and cerebellar infarcts, likely embolic in etiology.        Electronically Signed: Ronak Garcia MD    7/11/2024 3:56 PM EDT    Workstation ID: [id number]    XR Chest 1 View [[id number]] Collected: 07/11/24 1246     Updated: 07/11/24 1250    Narrative:      XR CHEST 1 VW    Date of Exam: 7/11/2024 12:28 PM EDT    Indication: central line placement    Comparison: 1724    Findings:  There is interval placement of right internal jugular central venous catheter with the tip projecting at the cavoatrial junction. No evidence of pneumothorax. Heart size and pulmonary vessels are within normal limits. Lungs are clear. No pleural   effusion. Bony structures are unremarkable.      Impression:      Impression:    1. Interval placement of right internal jugular central venous catheter with the tip projecting at the cavoatrial junction. No evidence of pneumothorax.      Electronically Signed: bAel Blount MD    7/11/2024 12:48 PM EDT    Workstation ID: [id number]             LAB RESULTS (LAST 7 DAYS)    CBC  Results from last 7 days   Lab Units 07/12/24  0310 07/11/24  0421 07/10/24  1044   WBC 10*3/mm3 23.62* 18.31* 16.33*   RBC 10*6/mm3 3.95* 4.51 3.55*   HEMOGLOBIN g/dL 11.1* 12.6* 10.0*   HEMATOCRIT % 35.1* 43.1 32.1*   MCV fL 88.9 95.6 90.4   PLATELETS 10*3/mm3 459* 543* 404       BMP  Results from last 7 days   Lab Units 07/12/24  0310 07/11/24  0422 07/10/24  1044   SODIUM mmol/L 140 134* 137   POTASSIUM mmol/L 4.7 4.5 4.0   CHLORIDE mmol/L 108* 102 105   CO2 mmol/L 18.1* 17.2* 24.2   BUN mg/dL 64* 51* 46*   CREATININE mg/dL 2.61* 1.86* 1.56*   GLUCOSE mg/dL 120* 81 129*   MAGNESIUM mg/dL 1.8  --   --    PHOSPHORUS mg/dL 4.7*  --   --        CMP   Results from last 7 days   Lab Units 07/12/24  0310 07/11/24  0422 07/10/24  1044   SODIUM mmol/L 140 134* 137   POTASSIUM mmol/L 4.7 4.5 4.0   CHLORIDE mmol/L 108* 102 105   CO2 mmol/L 18.1* 17.2* 24.2   BUN mg/dL 64* 51* 46*   CREATININE mg/dL 2.61* 1.86* 1.56*   GLUCOSE mg/dL 120* 81 129*   ALBUMIN g/dL  --  2.1* 2.0*   BILIRUBIN mg/dL  --  0.4 0.3   ALK PHOS U/L  --  150* 151*   AST (SGOT) U/L  --  30 22   ALT (SGPT) U/L  --  13 11   LIPASE U/L  --   --  16       BNP        TROPONIN  Results from last 7 days   Lab Units 07/12/24  0843 07/11/24  0422 07/10/24  1044   CK TOTAL U/L  --   --  66   HSTROP T ng/L 180*   < > 13    < > = values in this interval not displayed.       CoAg  Results from last 7 days   Lab Units 07/10/24  1044   INR  1.16*   APTT seconds 25.8       Creatinine Clearance  Estimated Creatinine Clearance: 28.4 mL/min (A) (by C-G formula based on SCr of 2.61 mg/dL (H)).    ABG  Results from last 7 days   Lab Units 07/12/24  0747 07/11/24  1238   PH, ARTERIAL pH units 7.408 7.421   PCO2, ARTERIAL mm Hg 21.3* 29.3*   PO2 ART mm Hg 88.8 97.8   O2 SATURATION ART % 97.2 97.9   BASE EXCESS ART mmol/L -9.0* -4.3*         Radiology  XR Chest 1 View    Result Date: 7/12/2024  Line tip at the right atrial level.  Mild left basilar atelectasis. Otherwise no active disease. Electronically Signed: Regulo Cornejo MD  7/12/2024 5:15 AM EDT  Workstation ID: [id number]    MRI Brain Without Contrast    Result Date: 7/11/2024  Impression: 1.Late acute to early subacute bilateral cerebral and cerebellar infarcts, likely embolic in etiology. Electronically Signed: Ronak Garcia MD  7/11/2024 3:56 PM EDT  Workstation ID: [id number]    XR Chest 1 View    Result Date: 7/11/2024  Impression: 1. Interval placement of right internal jugular central venous catheter with the tip projecting at the cavoatrial junction. No evidence of pneumothorax. Electronically Signed: Abel Blount MD  7/11/2024 12:48 PM EDT  Workstation ID: [id number]    CT Abdomen Pelvis Without Contrast    Result Date: 7/10/2024  1. There is residual contrast in the urinary system from the previous CTA. There is patchy enhancement in both kidneys concerning for pyelonephritis. No hydronephrosis. 2. Generalized anasarca with a small amount of ascites. There is fairly diffuse nonspecific edema in the intra-abdominal fat as well. 3. Diffuse colonic wall thickening concerning for colitis. The appendix is normal. 4. Prominent small bowel loops likely reflect a generalized ileus. Enteritis not excluded. Electronically Signed: Regulo Cornejo MD  7/10/2024 11:39 PM EDT  Workstation ID: [id number]    CT Angiogram Head w AI Analysis of LVO    Result Date: 7/10/2024  Impression: No evidence of flow-limiting stenosis, large vessel occlusion or aneurysm. Electronically Signed: Tito Spicer MD  7/10/2024 11:31 AM EDT  Workstation ID: [id number]    CT Angiogram Neck    Result Date: 7/10/2024  Impression: No evidence of flow-limiting stenosis, large vessel occlusion or aneurysm. Electronically Signed: Tito Spicer MD  7/10/2024 11:31 AM EDT  Workstation ID: [id number]    XR Chest 1 View    Result Date: 7/10/2024  Impression: 1.No acute radiographic abnormality is identified. Electronically  Signed: Patrick Cardenas MD  7/10/2024 11:15 AM EDT  Workstation ID: [id number]       EKG  I personally viewed and interpreted the patient's EKG/Telemetry data:  ECG 12 Lead Chest Pain   Preliminary Result   HEART SPIO=672  bpm   RR Jvymgzjs=135  ms   NM Pshvvojt=598  ms   P Horizontal Axis=8  deg   P Front Axis=59  deg   QRSD Ppgpcbcs=028  ms   QT Lvlhvfuj=946  ms   XBmV=076  ms   QRS Axis=-13  deg   T Wave Axis=144  deg   - ABNORMAL ECG -   Sinus tachycardia   Borderline low voltage, extremity leads   Abnormal R-wave progression, late transition   Abnormal T, consider ischemia, lateral leads   Date and Time of Study:2024-07-12 04:10:15      ECG 12 Lead Chest Pain   Preliminary Result   HEART WRUL=987  bpm   RR Pnqtiavu=514  ms   NM Avkggcjk=601  ms   P Horizontal Axis=-2  deg   P Front Axis=61  deg   QRSD Wcezclgt=859  ms   QT Xamwrylu=414  ms   FMgZ=932  ms   QRS Axis=-29  deg   T Wave Axis=127  deg   - ABNORMAL ECG -   Sinus tachycardia   Borderline left axis deviation   Low voltage, extremity leads   Abnormal R-wave progression, late transition   Repol abnrm suggests ischemia, anterolateral   Date and Time of Study:2024-07-11 10:42:44      ECG 12 Lead Tachycardia   Preliminary Result   HEART ZEQC=129  bpm   RR Dvitdjtl=286  ms   NM Djflicot=650  ms   P Horizontal Axis=-1  deg   P Front Axis=71  deg   QRSD Interval=99  ms   QT Qrntbfdm=908  ms   EKdE=564  ms   QRS Axis=-16  deg   T Wave Axis=188  deg   - ABNORMAL ECG -   Sinus tachycardia   Borderline left axis deviation   Low voltage, extremity leads   Abnormal R-wave progression, late transition   Repol abnrm suggests ischemia, anterolateral   Date and Time of Study:2024-07-11 03:30:56      ECG 12 Lead ED Triage Standing Order; Acute Stroke (Onset <12 hrs)   Preliminary Result   HEART RATE=68  bpm   RR Xpvxndzq=864  ms   NM Xxzzgvps=290  ms   P Horizontal Axis=58  deg   P Front Axis=67  deg   QRSD Dpblqdug=806  ms   QT Ufgsmufp=994  ms   EXfI=557  ms   QRS  Axis=21  deg   T Wave Axis=189  deg   - ABNORMAL ECG -   Sinus rhythm   Consider left atrial enlargement   LVH w/ repol abnormalities, possible ischemia   Date and Time of Study:2024-07-10 11:00:57            Echocardiogram:    Results for orders placed during the hospital encounter of 06/14/24    Adult Transthoracic Echo Complete W/ Cont if Necessary Per Protocol    Interpretation Summary    Left ventricular ejection fraction appears to be 21 - 25%.    Left ventricular wall thickness is consistent with mild concentric hypertrophy.    Left ventricular diastolic dysfunction is noted.    Mildly reduced right ventricular systolic function noted.    The right ventricular cavity is borderline dilated.    There is a trivial pericardial effusion.    Increased trabeculation in the left ventricular apex was noted.  Cannot exclude organized thrombus.        Stress Test:        Cardiac Catheterization:  Results for orders placed during the hospital encounter of 09/17/23    Cardiac Catheterization/Vascular Study    Conclusion  OPERATORS  Ben Grant M.D. (Attending Cardiologist)      PROCEDURE PERFORMED  Ultrasound guided vascular access  Right heart catheterization  Coronary Angiogram  Left Heart Catheterization 61138  Moderate Sedation    INDICATIONS FOR PROCEDURE  58-year-old man with multiple cardiovascular risk factors presented with acute on chronic HFrEF exacerbation.  He has not had any previous ischemic work-up.  He also has pulmonary hypertension.  After discussing the risk and benefit of the procedure he was brought in for right and left heart cath.    PROCEDURE IN DETAIL  Informed consent was obtained from the patient after explaining the risks, benefits, and alternative options of the procedure. After obtaining informed consent, the patient was brought to the cath lab and was prepped in a sterile fashion. Lidocaine 2% was used for local anesthesia into the right femoral venous access site. Right femoral vein was  accessed using the micropuncture needle under ultrasound guidance and micropuncture wire advanced under flouroscopy. A 7 [place] vascular sheath was put into place percutaneously over guide-wire. Guide wires were removed. A 6Fr swan sydnee catheter was advanced to wedge position. RA, RV and PA and wedge pressures were recorded.  PA sat and arterial sats recorded.  The patient tolerated the procedure well without any complications.    Lidocaine 2% was used for local anesthesia into the right femoral arterial access site. The right femoral artery was accessed with a micropuncture needle via modified Seldinger technique under ultrasound guidance. A 6F was inserted successfully.  Afterwards, 6F JR4 and JL4 diagnostic catheters were advanced over a wire into the ascending aorta and were used to engage the ostia of the left main and RCA respectively. JR4 used to cross the AV and obtain LV pressures and gradient across the AV measured via pullback technique. Images of the right and left coronary systems were obtained. All the catheters were exchanged over a wire and subsequently removed. Angiogram of the femoral access site was obtained and did not show complications. The patient tolerated the procedure well without any complications. The pictures were reviewed at the end of the procedure. A Mynx closure device was applied for venous closure.  A 6 M[place]ese Angio-Seal device was applied for arterial closure.    HEMODYNAMICS    RHC  RA 8/5, 4 mmHg  RV 55/3, 8 mmHg  PA 59/24, 40 mmHg  PCW 34/27, 26 mmHg  AO Sat 96%  PA Sat 69%    Lydia CO 4.9 L/min    Lydia CI 2.53 L/min/m²    SVR 1958 D/S   D/S    LHC  LV: 137/27, 31 mmHg  AO: 136/96, 116 mmHg  No significant gradient across the aortic valve during pullback of JR4 catheter.  LV gram was not performed due to recently available echocardiogram.    FINDINGS  Coronary Angiogram    Right dominant circulation    Left main: Left main is a large caliber vessel which gives rise to the  Left Anterior Descending and the Left circumflex.  Left main coronary artery is angiographically free from any significant disease.    Left Anterior Descending Artery: LAD is a medium caliber vessel which gives rise to several septal perforators and several diagonal branches.  LAD is angiographically free from any significant disease.    Left Circumflex: Left circumflex artery gives rise to marginals.  Left circumflex artery is angiographically free from any significant disease.    Right Coronary Artery: The RCA is a large caliber dominant vessel gives rise to PDA and PLV.  RCA is angiographically free from any significant disease    ESTIMATED BLOOD LOSS:  10 ml    COMPLICATIONS:  None    PROCEDURE DATA:  Contrast Used: 24 cc  Sedation Time: 30 minutes    IMPRESSIONS  Non obstructive CAD.  Nonischemic idiopathic dilated cardiomyopathy.  Normal cardiac output and index  Significantly elevated LVEDP and wedge pressure.  Pulmonary hypertension due to volume overload  Significantly elevated systemic vascular resistance      RECOMMENDATIONS  -Afterload reduction and diuretics  -Uptitrate GDMT  -Abstinence from drug abuse    Electronically signed by Ben Grant MD, 09/17/23, 2:51 PM EDT.        Other:      ASSESSMENT & PLAN:    Principal Problem:    Right sided weakness    Septic shock  Currently on 2 pressors, broad-spectrum antibiotics  Also on voriconazole for fungemia  White count is more than 23,000, Pro-Topher is 4  Lactic acid level is normal    HFrEF  Unable to tolerate any GDMT due to hypotension  Repeat echocardiogram  Previous echo with EF of 20 to 25%  Nonischemic dilated cardiomyopathy per cardiac catheterization in 2023  Elevated high-sensitivity troponin likely secondary to demand ischemia in the setting of shock and tachycardia, sepsis and worsening renal function  Can offer right heart cath/Shalimar-Gino catheter placement for more accurate volume assessment.  He will need renal replacement therapy due to  worsening renal function     Atrial fibrillation  Paroxysmal atrial fibrillation.  Currently in sinus rhythm.  [id number] score is 6.  Continue full anticoagulation with heparin  Eliquis is on hold    Acute kidney injury  Creatinine 2.6, GFR is 27.6  Nephrology has been consulted  May require CRRT     LV thrombus  Known LV thrombus.  He will need anticoagulation for atrial fibrillation and LV thrombus.  Repeat echocardiogram with contrast     COPD/polysubstance abuse  Meth abuse, tobacco abuse.  Counseling regarding abstinence from substance abuse provided to the patient  Inhaled bronchodilators for COPD     Bipolar disorder  He has been on mirtazapine and venlafaxine.    Overall this patient's prognosis is really poor.  Agree with palliative care consultation.       Ben Grant MD  07/12/24  10:32 EDT

## 2024-07-12 NOTE — THERAPY EVALUATION
"Acute Care - Physical Therapy Initial Evaluation  [facility]     Patient Name: Regulo Sepulveda  : 1965  MRN: [id number]  Today's Date: 2024      Visit Dx:     ICD-10-CM ICD-9-CM   1. Cerebrovascular accident (CVA), unspecified mechanism  I63.9 434.91   2. Oropharyngeal dysphagia  R13.12 787.22   3. Decreased activities of daily living (ADL)  Z78.9 V49.89   4. Difficulty walking  R26.2 719.7     Patient Active Problem List   Diagnosis    Major depressive disorder, recurrent episode, moderate    Chronic obstructive pulmonary disease    Diabetes mellitus    Primary hypertension    Hyperlipidemia    Hepatitis C    Cigarette nicotine dependence    BPH (benign prostatic hyperplasia)    GERD (gastroesophageal reflux disease)    B12 deficiency    LV (left ventricular) mural thrombus    Schizophrenia    PAF (paroxysmal atrial fibrillation)    Chronic HFrEF (heart failure with reduced ejection fraction)    Moderate malnutrition    Cardiomyopathy    TIA (transient ischemic attack)    Alcohol abuse    Bipolar 1 disorder    Chronic paranoid schizophrenia    Chronic post-traumatic stress disorder    Inhalant abuse    Type 2 diabetes mellitus without complication    Vitamin D deficiency    Acute exacerbation of CHF (congestive heart failure)    Generalized weakness    Severe malnutrition    Right sided weakness    TAYLOR (acute kidney injury)    Septic shock     Past Medical History:   Diagnosis Date    Anxiety     Arthritis     Asthma     Bipolar affective     Cardiac tamponade         CHF (congestive heart failure)     COPD (chronic obstructive pulmonary disease)     Coronary artery disease     Depression     Diabetes mellitus     Elevated cholesterol     Hypertension     Parkinson disease     Sleep apnea     Stroke     Pt stated it was \"about 2 months ago\" as of 6/15/24     Past Surgical History:   Procedure Laterality Date    CARDIAC CATHETERIZATION Right 2023    Procedure: Right and Left Heart Cath;  Surgeon: " Ben Grant MD;  Location: [facility] CATH INVASIVE LOCATION;  Service: Cardiovascular;  Laterality: Right;    ENDOSCOPY      TESTICLE SURGERY Left     extraction     PT Assessment (Last 12 Hours)       PT Evaluation and Treatment       Row Name 07/12/24 1400          Physical Therapy Time and Intention    Document Type evaluation  -AV     Mode of Treatment individual therapy;physical therapy  -AV       Row Name 07/12/24 1400          General Information    Patient Profile Reviewed yes  -AV     Prior Level of Function independent:;all household mobility;gait;transfer;ADL's  Patient alert to self only. Per EMR, patient was (I) with ADLs. Ambulated without an assistive device. No home O2.  -AV     Equipment Currently Used at Home walker, rolling  -AV     Existing Precautions/Restrictions fall  -AV       Row Name 07/12/24 1400          Living Environment    Current Living Arrangements home  -AV     People in Home other (see comments)  Roommate  -AV       Row Name 07/12/24 1400          Cognition    Orientation Status (Cognition) oriented to;person  -AV       Row Name 07/12/24 1400          Range of Motion (ROM)    Range of Motion right lower extremity ROM;left lower extremity ROM  -AV     Left Lower Extremity (ROM) --  AAROM WFL  -AV     Right Lower Extremity (ROM) --  PROM WFL  -AV       Row Name 07/12/24 1400          Strength (Manual Muscle Testing)    Strength (Manual Muscle Testing) right lower extremity strength;left lower extremity strength  -AV     Left Lower Extremity Strength hip;knee;ankle  -AV     Hip, Left (Strength) 2+/5  -AV     Knee, Left (Strength) 2+/5  -AV     Ankle, Left (Strength) 3-/5  -AV     Right Lower Extremity Strength --  1/5  -AV       Row Name 07/12/24 1400          Bed Mobility    Bed Mobility bed mobility (all) activities  -AV     All Activities, [place] (Bed Mobility) maximum assist (25% patient effort);dependent (less than 25% patient effort)  -AV       Row Name 07/12/24 1400           Safety Issues, Functional Mobility    Safety Issues Affecting Function (Mobility) ability to follow commands;awareness of need for assistance;insight into deficits/self-awareness;judgment;positioning of assistive device;sequencing abilities  -AV     Impairments Affecting Function (Mobility) balance;cognition;endurance/activity tolerance;postural/trunk control;strength  -AV       Row Name             Wound 07/10/24 1708 Left anterior hand    Wound - Properties Group Placement Date: 07/10/24  -GB Placement Time: 1708 -GB Side: Left  -GB Orientation: anterior  -GB Location: hand  -GB    Retired Wound - Properties Group Placement Date: 07/10/24  -GB Placement Time: 1708 -GB Side: Left  -GB Orientation: anterior  -GB Location: hand  -GB    Retired Wound - Properties Group Date first assessed: 07/10/24  -GB Time first assessed: 1708 -GB Side: Left  -GB Location: hand  -GB      Row Name             Wound 07/11/24 1032 Left anterior foot Soft Tissue Necrosis    Wound - Properties Group Placement Date: 07/11/24  -NH Placement Time: 1032  -NH Present on Original Admission: Y  -NH Side: Left  -NH Orientation: anterior  -NH Location: foot  -NH Primary Wound Type: Soft tissue  -NH    Retired Wound - Properties Group Placement Date: 07/11/24  -NH Placement Time: 1032  -NH Present on Original Admission: Y  -NH Side: Left  -NH Orientation: anterior  -NH Location: foot  -NH Primary Wound Type: Soft tissue  -NH    Retired Wound - Properties Group Date first assessed: 07/11/24  -NH Time first assessed: 1032  -NH Present on Original Admission: Y  -NH Side: Left  -NH Location: foot  -NH Primary Wound Type: Soft tissue  -NH      Row Name             Wound 07/11/24 1032 Left lateral ankle Soft Tissue Necrosis    Wound - Properties Group Placement Date: 07/11/24  -NH Placement Time: 1032  -NH Present on Original Admission: Y  -NH Side: Left  -NH Orientation: lateral  -NH Location: ankle  -NH Primary Wound Type: Soft tissue  -NH     Retired Wound - Properties Group Placement Date: 07/11/24  -NH Placement Time: 1032  -NH Present on Original Admission: Y  -NH Side: Left  -NH Orientation: lateral  -NH Location: ankle  -NH Primary Wound Type: Soft tissue  -NH    Retired Wound - Properties Group Date first assessed: 07/11/24  -NH Time first assessed: 1032  -NH Present on Original Admission: Y  -NH Side: Left  -NH Location: ankle  -NH Primary Wound Type: Soft tissue  -NH      Row Name             Wound 07/11/24 1032 Right anterior foot Soft Tissue Necrosis    Wound - Properties Group Placement Date: 07/11/24  -NH Placement Time: 1032  -NH Present on Original Admission: Y  -NH Side: Right  -NH Orientation: anterior  -NH Location: foot  -NH Primary Wound Type: Soft tissue  -NH    Retired Wound - Properties Group Placement Date: 07/11/24  -NH Placement Time: 1032  -NH Present on Original Admission: Y  -NH Side: Right  -NH Orientation: anterior  -NH Location: foot  -NH Primary Wound Type: Soft tissue  -NH    Retired Wound - Properties Group Date first assessed: 07/11/24  -NH Time first assessed: 1032  -NH Present on Original Admission: Y  -NH Side: Right  -NH Location: foot  -NH Primary Wound Type: Soft tissue  -NH      Row Name             Wound 07/11/24 1032 Right lateral ankle Soft Tissue Necrosis    Wound - Properties Group Placement Date: 07/11/24  -NH Placement Time: 1032  -NH Present on Original Admission: Y  -NH Side: Right  -NH Orientation: lateral  -NH Location: ankle  -NH Primary Wound Type: Soft tissue  -NH    Retired Wound - Properties Group Placement Date: 07/11/24  -NH Placement Time: 1032  -NH Present on Original Admission: Y  -NH Side: Right  -NH Orientation: lateral  -NH Location: ankle  -NH Primary Wound Type: Soft tissue  -NH    Retired Wound - Properties Group Date first assessed: 07/11/24  -NH Time first assessed: 1032  -NH Present on Original Admission: Y  -NH Side: Right  -NH Location: ankle  -NH Primary Wound Type: Soft tissue   -NH      Row Name             Wound 07/11/24 1032 Right posterior hand Soft Tissue Necrosis    Wound - Properties Group Placement Date: 07/11/24  -NH Placement Time: 1032  -NH Present on Original Admission: Y  -NH Side: Right  -NH Orientation: posterior  -NH Location: hand  -NH Primary Wound Type: Soft tissue  -NH    Retired Wound - Properties Group Placement Date: 07/11/24  -NH Placement Time: 1032  -NH Present on Original Admission: Y  -NH Side: Right  -NH Orientation: posterior  -NH Location: hand  -NH Primary Wound Type: Soft tissue  -NH    Retired Wound - Properties Group Date first assessed: 07/11/24  -NH Time first assessed: 1032  -NH Present on Original Admission: Y  -NH Side: Right  -NH Location: hand  -NH Primary Wound Type: Soft tissue  -NH      Row Name             Wound 07/11/24 1032 Left medial antecubital    Wound - Properties Group Placement Date: 07/11/24  -NH Placement Time: 1032  -NH Present on Original Admission: Y  -NH Side: Left  -NH Orientation: medial  -NH Location: antecubital  -NH    Retired Wound - Properties Group Placement Date: 07/11/24  -NH Placement Time: 1032  -NH Present on Original Admission: Y  -NH Side: Left  -NH Orientation: medial  -NH Location: antecubital  -NH    Retired Wound - Properties Group Date first assessed: 07/11/24  -NH Time first assessed: 1032  -NH Present on Original Admission: Y  -NH Side: Left  -NH Location: antecubital  -NH      Row Name 07/12/24 1400          Plan of Care Review    Plan of Care Reviewed With patient  -AV     Progress no change  -AV     Outcome Evaluation Patient presents with deficits in balance, strength, transfers, and ambulation. Patient will benefit from skilled PT service to address these mobility deficits and decrease risk of falls.  -AV       Row Name 07/12/24 1400          Therapy Assessment/Plan (PT)    Rehab Potential (PT) good, to achieve stated therapy goals  -AV     Criteria for Skilled Interventions Met (PT) yes;meets criteria   -AV     Therapy Frequency (PT) daily  -AV     Predicted Duration of Therapy Intervention (PT) 10 days  -AV     Problem List (PT) problems related to;balance;cognition;mobility;motor control;strength;postural control  -AV     Activity Limitations Related to Problem List (PT) unable to transfer safely;unable to ambulate safely  -AV       Row Name 07/12/24 1400          PT Evaluation Complexity    History, PT Evaluation Complexity 1-2 personal factors and/or comorbidities  -AV     Examination of Body Systems (PT Eval Complexity) total of 4 or more elements  -AV     Clinical Presentation (PT Evaluation Complexity) stable  -AV     Clinical Decision Making (PT Evaluation Complexity) low complexity  -AV     Overall Complexity (PT Evaluation Complexity) low complexity  -AV       Row Name 07/12/24 1400          Therapy Plan Review/Discharge Plan (PT)    Therapy Plan Review (PT) evaluation/treatment results reviewed;patient  -AV       Row Name 07/12/24 1400          Physical Therapy Goals    Bed Mobility Goal Selection (PT) bed mobility, PT goal 1  -AV     Transfer Goal Selection (PT) transfer, PT goal 1  -AV     Gait Training Goal Selection (PT) gait training, PT goal 1  -AV       Row Name 07/12/24 1400          Bed Mobility Goal 1 (PT)    Activity/Assistive Device (Bed Mobility Goal 1, PT) sit to supine/supine to sit  -AV     [place] Level/Cues Needed (Bed Mobility Goal 1, PT) minimum assist (75% or more patient effort)  -AV     Time Frame (Bed Mobility Goal 1, PT) 10 days  -AV       Row Name 07/12/24 1400          Transfer Goal 1 (PT)    Activity/Assistive Device (Transfer Goal 1, PT) sit-to-stand/stand-to-sit;bed-to-chair/chair-to-bed;walker, rolling  -AV     [place] Level/Cues Needed (Transfer Goal 1, PT) minimum assist (75% or more patient effort)  -AV     Time Frame (Transfer Goal 1, PT) 10 days  -AV       Row Name 07/12/24 1400          Gait Training Goal 1 (PT)    Activity/Assistive Device (Gait Training  Goal 1, PT) gait (walking locomotion);assistive device use;walker, rolling  -AV     [place] Level (Gait Training Goal 1, PT) moderate assist (50-74% patient effort)  -AV     Distance (Gait Training Goal 1, PT) 15  -AV     Time Frame (Gait Training Goal 1, PT) 10 days  -AV               User Key  (r) = Recorded By, (t) = Taken By, (c) = Cosigned By      Initials Name Provider Type    Melissa Reynaga, RN Registered Nurse    Noni Chávez, RN Registered Nurse    Daniel Norwood, PT Physical Therapist                    Physical Therapy Education       Title: PT OT SLP Therapies (In Progress)       Topic: Physical Therapy (Not Started)       Point: Mobility training (In Progress)       Learning Progress Summary             Patient  by AV at 7/12/2024 1441    Acceptance, E,D, NR by AV at 7/12/2024 1441                         Point: Home exercise program (Not Started)       Learner Progress:  Not documented in this visit.              Point: Body mechanics (In Progress)       Learning Progress Summary             Patient  by AV at 7/12/2024 1441    Acceptance, E,D, NR by AV at 7/12/2024 1441                         Point: Precautions (In Progress)       Learning Progress Summary             Patient  by AV at 7/12/2024 1441    Acceptance, E,D, NR by AV at 7/12/2024 1441                                         User Key       Initials Effective Dates Name Provider Type Discipline     06/11/21 -  Daniel Frank, GEO Physical Therapist PT                  PT Recommendation and Plan  Anticipated Discharge Disposition (PT): sub acute care setting  Planned Therapy Interventions (PT): balance training, bed mobility training, gait training, home exercise program, neuromuscular re-education, strengthening, transfer training, postural re-education, motor coordination training  Therapy Frequency (PT): daily  Plan of Care Reviewed With: patient  Progress: no change  Outcome Evaluation: Patient presents with  deficits in balance, strength, transfers, and ambulation. Patient will benefit from skilled PT service to address these mobility deficits and decrease risk of falls.   Outcome Measures       Row Name 07/12/24 1400             How much help from another person do you currently need...    Turning from your back to your side while in flat bed without using bedrails? 2  -AV      Moving from lying on back to sitting on the side of a flat bed without bedrails? 1  -AV      Moving to and from a bed to a chair (including a wheelchair)? 1  -AV      Standing up from a chair using your arms (e.g., wheelchair, bedside chair)? 1  -AV      Climbing 3-5 steps with a railing? 1  -AV      To walk in hospital room? 1  -AV      AM-PAC 6 Clicks Score (PT) 7  -AV      Highest Level of Mobility Goal 2 --> Bed activities/dependent transfer  -AV         Functional Assessment    Outcome Measure Options AM-PAC 6 Clicks Basic Mobility (PT)  -AV                User Key  (r) = Recorded By, (t) = Taken By, (c) = Cosigned By      Initials Name Provider Type    AV Daniel Frank, PT Physical Therapist                     Time Calculation:    PT Charges       Row Name 07/12/24 1440             Time Calculation    PT Received On 07/12/24  -AV      PT Goal Re-Cert Due Date 07/21/24  -AV         Untimed Charges    PT Eval/Re-eval Minutes 40  -AV         Total Minutes    Untimed Charges Total Minutes 40  -AV       Total Minutes 40  -AV                User Key  (r) = Recorded By, (t) = Taken By, (c) = Cosigned By      Initials Name Provider Type    AV Daniel Frank, GEO Physical Therapist                  Therapy Charges for Today       Code Description Service Date Service Provider Modifiers Qty    32006664610 HC PT EVAL LOW COMPLEXITY 3 7/12/2024 Daniel Frank, PT GP 1            PT G-Codes  Outcome Measure Options: AM-PAC 6 Clicks Basic Mobility (PT)  AM-PAC 6 Clicks Score (PT): 7  AM-PAC 6 Clicks Score (OT): 6    Daniel Frank  PT  7/12/2024

## 2024-07-12 NOTE — CONSULTS
[facility]   Consult Note    Patient Name: Regulo Sepulveda  : 1965  MRN: [id number]  Primary Care Physician:  Dickson Landry MD  Referring Physician: No ref. provider found  Date of admission: 7/10/2024    Subjective   Subjective     Reason for Consult/ Chief Complaint: TAYLOR    HPI:  Regulo Sepulveda is a 58 y.o. 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  Consistently per documentation and even on evaluation patient is a poor historian and primarily source of history is through collateral information and Medical records    Since this time of admission neurology had been seeing him and MRI showed a subacute infarct.  During his hospitalization he dropped his blood pressures and was found to be in septic shock with possible concerns of cardiogenic shock and started on pressors.  His pressor requirement has been uptrending.  His urine output with Poon in place shows minimal output.  His renal function has also been worsening.  Nephrology has been consulted for management of TAYLOR.  Blood cultures noted to be positive with GNR.  Of note his last echo showed an opacity in the heart possibly concerning for cardiac thrombus    Review of Systems  All review of systems negative except as given below.    Personal  "History     Past Medical History:   Diagnosis Date    Anxiety     Arthritis     Asthma     Bipolar affective     Cardiac tamponade     2023    CHF (congestive heart failure)     COPD (chronic obstructive pulmonary disease)     Coronary artery disease     Depression     Diabetes mellitus     Elevated cholesterol     Hypertension     Parkinson disease     Sleep apnea     Stroke     Pt stated it was \"about 2 months ago\" as of 6/15/24       Past Surgical History:   Procedure Laterality Date    CARDIAC CATHETERIZATION Right 09/17/2023    Procedure: Right and Left Heart Cath;  Surgeon: Ben Grant MD;  Location: [facility] CATH INVASIVE LOC[facility];  Service: Cardiovascular;  Laterality: Right;    ENDOSCOPY      TESTICLE SURGERY Left     extraction       Family History: family history includes Depression in his brother and sister; Diabetes in his mother; Insomnia in his sister; Restless legs syndrome in his sister; Sleep disorder in his sister; Sleep walking in his sister. Otherwise pertinent FHx was reviewed and not pertinent to current issue.    Social History:  reports that he has been smoking cigarettes. He started smoking about 50 years ago. He has a 25.2 pack-year smoking history. He has never used smokeless tobacco. He reports that he does not currently use alcohol. He reports that he does not currently use drugs after having used the following drugs: Methamphetamines and Marijuana.    Home Medications:  ARIPiprazole ER, albuterol sulfate HFA, amiodarone, apixaban, atorvastatin, carvedilol, dapagliflozin, ferrous gluconate, furosemide, metFORMIN, mirtazapine, nicotine, pantoprazole, sacubitril-valsartan, spironolactone, tamsulosin, traMADol, and venlafaxine XR    Allergies:  Allergies   Allergen Reactions    Penicillins Shortness Of Breath       Objective    Objective     Vitals:   Temp:  [97.8 °F (36.6 °C)-98.9 °F (37.2 °C)] 98.4 °F (36.9 °C)  Heart Rate:  [] 122  Resp:  [13-31] 29  BP: ()/(49-81) " 100/81  Flow (L/min):  [4] 4    Physical Exam:             Constitutional:         Altered   eyes:                       PERRLA, sclerae anicteric, no conjunctival injection   HEENT:                   Moist mucous membranes, no nasal or eye discharge, no throat congestion   Neck:                      Supple, no thyromegaly, no lymphadenopathy, trachea midline, no elevated JVD   Respiratory:           Clear to auscultation bilaterally, nonlabored respirations    Cardiovascular:     RRR, no murmurs, rubs, or gallops, palpable pedal pulses bilaterally, No bilateral ankle edema   Gastrointestinal:   Positive bowel sounds, soft, nontender, non-distended, no organomegaly   Musculoskeletal:  No clubbing or cyanosis to extremities, muscle wasting, joint swelling, muscle weakness   Psychiatric:              Deferred   Neurologic:            Altered, strength symmetric in all extremities, Cranial Nerves grossly intact to confrontation, speech clear   Skin:                      No rashes, bruising, skin ulcers, petechiae or ecchymosis    Result Review    Result Review:  I have personally reviewed the results from the time of this admission to 7/12/2024 12:30 EDT and agree with these findings:  []  Laboratory  []  Microbiology  []  Radiology  []  EKG/Telemetry   []  Cardiology/Vascular   []  Pathology  []  Old records  []  Other:    Results from last 7 days   Lab Units 07/12/24  0310 07/11/24  0421 07/10/24  1044   WBC 10*3/mm3 23.62* 18.31* 16.33*   HEMOGLOBIN g/dL 11.1* 12.6* 10.0*   PLATELETS 10*3/mm3 459* 543* 404     Results from last 7 days   Lab Units 07/12/24  0310 07/11/24  0422 07/10/24  1044   SODIUM mmol/L 140 134* 137   POTASSIUM mmol/L 4.7 4.5 4.0   CHLORIDE mmol/L 108* 102 105   CO2 mmol/L 18.1* 17.2* 24.2   ANION GAP mmol/L 13.9 14.8 7.8   BUN mg/dL 64* 51* 46*   CREATININE mg/dL 2.61* 1.86* 1.56*   GLUCOSE mg/dL 120* 81 129*       Assessment & Plan   Assessment / Plan     Active Hospital Problems:  Active  Hospital Problems    Diagnosis     **Right sided weakness     TAYLOR (acute kidney injury)     Septic shock     PAF (paroxysmal atrial fibrillation)     BPH (benign prostatic hyperplasia)     Primary hypertension      58-year-old male with past medical history of heart failure with reduced ejection fraction secondary to dilated nonischemic cardiomyopathy, atrial fibrillation on anticoagulation, BPH, hep C positive, history of cardiac tamponade hyperlipidemia, non-insulin-dependent diabetes, malnutrition recently discharged from [facility] due to Leukoplast ascitic vasculitis on prednisone and due to positive Candida on voriconazole ending mid July came in with concerns for stroke with MRI showing subacute stroke and echo from June showing possible cardiac thrombus while on anticoagulation with worsening status and concerns for septic shock with TAYLOR and creatinine rise to 2.61 with decreased urine output.  Blood cultures are positive.  TAYLOR due to contrast and hypotension.  UA with many hyaline casts and spec gravity of 1030    Plan  Will DC IV fluids as patient has decreased urine output and he is getting input from various sources  Patient is at high risk of ending up on renal replacement therapy with his minimal urine output and low BMI with very little muscle mass likely denoting rising creatinine consistent with severe TAYLOR  Patient will likely need renal placement therapy in the next 24 to 48 hours  Currently patient is on norepinephrine and vasopressin being uptitrated for MAP greater than 65  Patient is on broad-spectrum antibiotics and voriconazole with the former covering  Noted cardiology is following.  Repeat echo is pending  With his degree of renal dysfunction and low urine output, will change hold Eliquis at this time.  Recommend giving the patient heparin if needed  Continue to monitor urine output with Ayah    Thank you for involving the care of the patient.  Will continue to follow along    Electronically  signed by Blayne Gomez MD, 07/12/24, 12:18 PM EDT.

## 2024-07-12 NOTE — SIGNIFICANT NOTE
07/12/24 1346   Spiritual Care   Use of Spiritual Resources non-Zoroastrianism use of spiritual care   Spiritual Care Source  initiative   Spiritual Care Follow-Up will follow closely   Response to Spiritual Care emotion expressed;engaged in conversation;receptive of support   Spiritual Care Interventions supportive conversation provided  (pt asleep at time of visit, awoke to greeting. pt shared that he can't move his rt arm, but fell back to sleep)   Spiritual Care Visit Type initial   Spiritual Care Request coping/stress of illness support;spiritual/moral support   Receptivity to Spiritual Care visit welcomed

## 2024-07-12 NOTE — CONSULTS
"Nutrition Services    Patient Name: Regulo Sepulveda  YOB: 1965  MRN: [id number]  Admission date: 7/10/2024      CLINICAL NUTRITION ASSESSMENT      Reason for Assessment  Physician Consult and Tube Feeding Assessment   H&P:  Past Medical History:   Diagnosis Date    Anxiety     Arthritis     Asthma     Bipolar affective     Cardiac tamponade     2023    CHF (congestive heart failure)     COPD (chronic obstructive pulmonary disease)     Coronary artery disease     Depression     Diabetes mellitus     Elevated cholesterol     Hypertension     Parkinson disease     Sleep apnea     Stroke     Pt stated it was \"about 2 months ago\" as of 6/15/24        Current Problems:   Active Hospital Problems    Diagnosis     **Right sided weakness         Nutrition/Diet History         Narrative   RD consulted to provide enteral nutrition. Cortrak NGT ordered for placement this AM. Physician wants patient to begin trickle feeds today in addition to po diet. RD to order.      Anthropometrics        Current Height, Weight Height: 185.4 cm (72.99\")  Weight: 65.1 kg (143 lb 8.3 oz)   Current BMI Body mass index is 18.94 kg/m².   BMI Classification Underweight   % IBW 78%   Adjusted Body Weight (ABW) N/A   Weight Hx  Wt Readings from Last 30 Encounters:   07/12/24 0330 65.1 kg (143 lb 8.3 oz)   07/10/24 1331 61.9 kg (136 lb 7.4 oz)   07/10/24 1005 61.8 kg (136 lb 3.9 oz)   07/10/24 0945 61.8 kg (136 lb 3.9 oz)   06/26/24 1003 64.7 kg (142 lb 10.2 oz)   06/14/24 1717 64.7 kg (142 lb 10.2 oz)   05/31/24 0342 60.2 kg (132 lb 11.5 oz)   05/30/24 0505 63.4 kg (139 lb 12.4 oz)   05/29/24 1322 65.5 kg (144 lb 6.4 oz)   05/29/24 0156 67.8 kg (149 lb 7.6 oz)   05/29/24 0141 67.3 kg (148 lb 5.9 oz)   05/28/24 1608 70.6 kg (155 lb 10.3 oz)   05/15/24 1453 67.6 kg (149 lb)   05/07/24 1510 67.6 kg (149 lb)   04/22/24 0917 73 kg (161 lb)   04/16/24 1253 68.5 kg (151 lb)   04/15/24 1454 67.6 kg (149 lb)   04/09/24 0055 68.6 kg (151 lb 3.8 oz) "   04/08/24 1955 65.1 kg (143 lb 8.3 oz)   04/06/24 0526 64.9 kg (143 lb 1.3 oz)   04/05/24 0559 65 kg (143 lb 4.8 oz)   04/04/24 0534 64.1 kg (141 lb 5 oz)   04/03/24 0610 66.1 kg (145 lb 11.6 oz)   04/02/24 2141 68.2 kg (150 lb 5.7 oz)   04/02/24 1308 63.8 kg (140 lb 10.5 oz)   03/29/24 1031 65.3 kg (144 lb)   03/25/24 0600 64.5 kg (142 lb 3.2 oz)   03/24/24 0411 67 kg (147 lb 11.3 oz)   03/24/24 0250 67 kg (147 lb 11.3 oz)   03/21/24 1435 69 kg (152 lb 1.6 oz)   03/04/24 1300 69.7 kg (153 lb 9.6 oz)   02/22/24 1414 68.9 kg (152 lb)   01/31/24 1544 68.9 kg (152 lb)   01/08/24 1533 71.7 kg (158 lb)   12/18/23 0559 70.1 kg (154 lb 8.7 oz)   12/16/23 0500 78.6 kg (173 lb 4.5 oz)   12/15/23 1733 77.8 kg (171 lb 8.3 oz)   12/15/23 0914 74.6 kg (164 lb 7.4 oz)   11/27/23 1311 66.7 kg (147 lb)   11/15/23 0320 66.9 kg (147 lb 7.8 oz)   11/14/23 0619 67.1 kg (147 lb 14.9 oz)   11/13/23 0612 66.9 kg (147 lb 7.8 oz)   11/12/23 0600 68.5 kg (151 lb 0.2 oz)   11/11/23 0500 72.8 kg (160 lb 7.9 oz)   11/10/23 0710 76.2 kg (167 lb 15.9 oz)   11/06/23 1744 78.9 kg (174 lb)   11/02/23 1253 74.8 kg (165 lb)   10/23/23 0500 68.2 kg (150 lb 5.7 oz)   10/22/23 0355 69 kg (152 lb 1.9 oz)   10/21/23 0700 69.7 kg (153 lb 10.6 oz)   10/20/23 0300 72.8 kg (160 lb 7.9 oz)   10/19/23 0600 74.9 kg (165 lb 2 oz)   10/18/23 2204 74.7 kg (164 lb 10.9 oz)   10/18/23 1513 81.5 kg (179 lb 10.8 oz)   09/17/23 0345 71 kg (156 lb 8.4 oz)   09/02/23 0719 71.4 kg (157 lb 6.5 oz)   09/01/23 0500 71 kg (156 lb 8.4 oz)   08/31/23 0500 71.2 kg (156 lb 15.5 oz)   08/30/23 0542 74.2 kg (163 lb 9.3 oz)   08/29/23 0600 74 kg (163 lb 2.3 oz)   08/28/23 0522 79.3 kg (174 lb 13.2 oz)   08/27/23 2034 79.3 kg (174 lb 13.2 oz)   08/27/23 1504 76.6 kg (168 lb 14 oz)   02/27/23 2100 70.4 kg (155 lb 3.3 oz)   02/27/23 1151 70.4 kg (155 lb 3.3 oz)   09/18/22 2111 83.9 kg (184 lb 15.5 oz)   09/14/22 1636 83.6 kg (184 lb 4.9 oz)          Wt Change Observation -9.9% x 3  months, clinically significant     Estimated/Assessed Needs  Estimated Needs based on: Current Body Weight       Energy Requirements 30-35 kcal/kg    EST Needs (kcal/day) 0739-5592 kcal       Protein Requirements 1.2-1.3 g/kg   EST Daily Needs (g/day) 74-81 g       Fluid Requirements 30 ml/kg    Estimated Needs (mL/day) 1860 ml     Labs/Medications Levo, Vaso        Pertinent Labs Reviewed.   Results from last 7 days   Lab Units 07/12/24  0310 07/11/24  0422 07/10/24  1044   SODIUM mmol/L 140 134* 137   POTASSIUM mmol/L 4.7 4.5 4.0   CHLORIDE mmol/L 108* 102 105   CO2 mmol/L 18.1* 17.2* 24.2   BUN mg/dL 64* 51* 46*   CREATININE mg/dL 2.61* 1.86* 1.56*   CALCIUM mg/dL 7.2* 7.7* 7.5*   BILIRUBIN mg/dL  --  0.4 0.3   ALK PHOS U/L  --  150* 151*   ALT (SGPT) U/L  --  13 11   AST (SGOT) U/L  --  30 22   GLUCOSE mg/dL 120* 81 129*     Results from last 7 days   Lab Units 07/12/24  0310   MAGNESIUM mg/dL 1.8   PHOSPHORUS mg/dL 4.7*   HEMOGLOBIN g/dL 11.1*   HEMATOCRIT % 35.1*     COVID19   Date Value Ref Range Status   06/14/2024 Not Detected Not Detected - Ref. Range Final     Lab Results   Component Value Date    HGBA1C 6.40 (H) 06/15/2024         Pertinent Medications Reviewed.     Malnutrition Severity Assessment      Patient meets criteria for : Severe Malnutrition         Nutrition Diagnosis         Nutrition Dx Problem 1 Severe malnutrition related to decreased ability to consume sufficient energy as evidenced by unintended weight change. and body composition changes.     Nutrition Intervention           Current Nutrition Orders & Evaluation of Intake       Current PO Diet Diet: Regular/House; Texture: Pureed (NDD 1); Fluid Consistency: Thin (IDDSI 0)   Supplement Orders Placed This Encounter      Dietary Nutrition Supplements Boost Glucose Control (Glucerna Shake)      Feeding Tube Insertion - Cortrak System      Feeding Tube Insertion - Cortrak System      Tube Feeding: Formula: Peptamen AF; Feeding Type:  Continuous; Start at: 20 mL/hr; Then Advance By: Do Not Advance; Water Flush: Other; Other: 60; Every: 1 hour; Water Bolus: None           Nutrition Intervention/Prescription        Peptamen AF @ 20 ml/hr x 22 hrs  FWF 60 ml/hr (1440 ml)  Provides: 528 kcal, 33 g pro, 356 ml (1796 ml total)        Medical Nutrition Therapy/Nutrition Education          Learner     Readiness Patient  N/A     Method     Response Explanation  No evidence of learning     Monitor/Evaluation        Monitor Per protocol, PO intake, Supplement intake, Weight, Symptoms, POC/GOC, Swallow function, Diet advancement     Nutrition Discharge Plan         Recommend to continue oral nutrition supplements on discharge.      Electronically signed by:  Irena Barajas RD  07/12/24 08:30 EDT

## 2024-07-12 NOTE — PLAN OF CARE
Goal Outcome Evaluation:  Plan of Care Reviewed With: patient        Progress: no change  Outcome Evaluation: Patient presents with deficits in balance, strength, transfers, and ambulation. Patient will benefit from skilled PT service to address these mobility deficits and decrease risk of falls.      Anticipated Discharge Disposition (PT): sub acute care setting

## 2024-07-12 NOTE — CONSULTS
"Purpose of the visit was to evaluate for: goals of care/advanced care planning. Spoke with RN, patient, and family and discussed palliative care and resuscitation status.      Assessment:The patient is on CCU, HOB elevated, on the monitor, mtz, restless, nasal canula, on the monitor. Hard to understand at times with his speech.     Tasks Completed: Livingwill and Emotional Support.    Other Comments: Palliative care spoke with the patient at the bedside about having a decision maker for himself. The patient stated he did have a living will however it named his dad who has since passed. The patient requested that his niece be his HCS so I educated on and completed a Living Will on his behalf. The patient was unable to sign for himself so I had his primary nurse listen in and witness for him. I then called his niece Lorraine, who reports she cares for the patients mother and the patient lives with a roommate named Yun. Yun is reported to use oxygen herself and Lorraine is unsure if she will be able to care for the patient at [place]. I informed her how the patient was doing today and how he is unable to lift his legs or move his right arm due to the swelling. Lorraine reports that due to his bipolar/schizophrenia the patient \"always has crazy thought\", \"if mad he will give up\". The niece feels the patient may need to go to rehab with possible long term care placement. [facility] area [facility] choice. Unit SW updated. I did review with the niece about [facility] [place] as she reports the patient was told in [place] that he had less than 6 months to live. I explained that rehab is considered aggressive treatment so the patient would not qualify until he was on the nursing home side. Lorraine to reach out to [facility] [place] at that time.    -Keely PERALTA, RN, PN   Palliative Care    7/12/2024 11:42 EDT    "

## 2024-07-14 LAB
BACTERIA SPEC AEROBE CULT: ABNORMAL
BACTERIA SPEC AEROBE CULT: ABNORMAL
GLUCOSE BLDC GLUCOMTR-MCNC: 113 MG/DL (ref 70–99)
GLUCOSE BLDC GLUCOMTR-MCNC: 139 MG/DL (ref 70–99)
GRAM STN SPEC: ABNORMAL
GRAM STN SPEC: ABNORMAL
ISOLATED FROM: ABNORMAL
ISOLATED FROM: ABNORMAL

## 2024-07-15 NOTE — DISCHARGE SUMMARY
[facility]    Patient Name: Regulo Sepulveda  : 1965  MRN: [id number]    Date of Admission: 7/10/2024  Date of Death:  2024  Time of Death: 01:11  Primary Care Physician: Dickson Landry MD    Consults       Date and Time Order Name Status Description    2024 10:28 AM Inpatient Nephrology Consult Completed     2024 10:28 AM Inpatient Cardiology Consult Completed     2024 10:48 AM Inpatient Orthopedic Surgery Consult Completed               Discharge Diagnoses:  Septic shock  Bacteroides bacteremia  Recent history of Candida fungemia   Multifocal strokes likely cardioembolic  Possible left ventricular apex thrombus  Lactic acidosis  Toxic/metabolic encephalopathy  Acute kidney injury (baseline Cr ~1.1)  Leukocytoclastic vasculitis   History of atrial fibrillation.  Currently in NSR  Type 2 diabetes  Essential hypertension  History of lacunar infarct  History of hepatitis C    Hospital Course     Hospital Course:  Regulo Sepulveda is a 58 y.o. male with HFrEF, HTN, T2DM, Afib on Eliquis, HCV, bipolar/schizophrenia on Abilify who was discharged home from [facility] on 24 following evaluation for petechial rash. He had skin biopsy with pathology consistent with leukocytoclastic vasculitis. Discharged on prednisone taper. Of note he was seen by infectious disease, workup included negative HIV, negative syphilis serologies, negative blood culture but had T2 Candida PCR positive and was placed on voriconazole to complete empiric course.  Presented from home following fall with right-sided weakness.  He was found to have multifocal strokes, likely cardioembolic.  He had worsening mentation and became hypotensive requiring transfer to intensive care unit for vasopressors.  He was found to have bacteremia secondary to Bacteroides fragilis.  He developed renal failure and had increasing vasopressor requirements.  Goals of care were  addressed with his POA, CODE STATUS was changed to DNR/DNI and decision made not to escalate care.  His blood pressure continued to drop, pulse was lost and he was pronounced .    Discharge Disposition:  [facility]    Electronically signed by Mario Wilson DO, 07/15/24, 8:01 AM EDT.

## 2024-07-16 LAB — GLUCOSE BLDC GLUCOMTR-MCNC: 118 MG/DL (ref 70–99)

## (undated) DEVICE — SI AVANTI+ 6F STD W/GW  NO OBT: Brand: AVANTI

## (undated) DEVICE — BALN PRESS WEDGE 5F 110CM

## (undated) DEVICE — GW FC FLOP/TP .035 260CM 3MM

## (undated) DEVICE — DEV CLS VASC MYNXCONTROL 6FTO7F

## (undated) DEVICE — ANGIO-SEAL EVOLUTION VASCULAR CLOSURE DEVICE: Brand: ANGIO-SEAL

## (undated) DEVICE — CATH F6 ST JL 4 100CM: Brand: SUPERTORQUE

## (undated) DEVICE — CATH F6 ST JR 4 100CM: Brand: SUPERTORQUE

## (undated) DEVICE — KT INTRO MIC VSI SMOTH STFF 4F 40CM 7CM

## (undated) DEVICE — SI AVANTI+ 7F STD W/GW  NO OBT: Brand: AVANTI